# Patient Record
Sex: MALE | Race: WHITE | Employment: OTHER | URBAN - METROPOLITAN AREA
[De-identification: names, ages, dates, MRNs, and addresses within clinical notes are randomized per-mention and may not be internally consistent; named-entity substitution may affect disease eponyms.]

---

## 2017-05-24 ENCOUNTER — OFFICE VISIT (OUTPATIENT)
Dept: INTERNAL MEDICINE CLINIC | Age: 62
End: 2017-05-24

## 2017-05-24 VITALS
HEIGHT: 71 IN | OXYGEN SATURATION: 94 % | BODY MASS INDEX: 33.01 KG/M2 | DIASTOLIC BLOOD PRESSURE: 90 MMHG | WEIGHT: 235.8 LBS | HEART RATE: 70 BPM | SYSTOLIC BLOOD PRESSURE: 137 MMHG

## 2017-05-24 DIAGNOSIS — M79.2 PERIPHERAL NEUROPATHIC PAIN: ICD-10-CM

## 2017-05-24 DIAGNOSIS — E78.00 PURE HYPERCHOLESTEROLEMIA: ICD-10-CM

## 2017-05-24 DIAGNOSIS — I10 ESSENTIAL HYPERTENSION: Primary | ICD-10-CM

## 2017-05-24 DIAGNOSIS — Z00.00 PREVENTATIVE HEALTH CARE: ICD-10-CM

## 2017-05-24 DIAGNOSIS — K21.9 GASTROESOPHAGEAL REFLUX DISEASE WITHOUT ESOPHAGITIS: ICD-10-CM

## 2017-05-24 DIAGNOSIS — F51.05 INSOMNIA SECONDARY TO ANXIETY: ICD-10-CM

## 2017-05-24 DIAGNOSIS — I25.10 CORONARY ARTERY DISEASE INVOLVING NATIVE CORONARY ARTERY OF NATIVE HEART WITHOUT ANGINA PECTORIS: ICD-10-CM

## 2017-05-24 DIAGNOSIS — G47.33 OSA (OBSTRUCTIVE SLEEP APNEA): ICD-10-CM

## 2017-05-24 DIAGNOSIS — F41.9 INSOMNIA SECONDARY TO ANXIETY: ICD-10-CM

## 2017-05-24 DIAGNOSIS — E11.42 TYPE 2 DIABETES MELLITUS WITH DIABETIC POLYNEUROPATHY, WITHOUT LONG-TERM CURRENT USE OF INSULIN (HCC): ICD-10-CM

## 2017-05-24 LAB — LDL CHOLESTEROL DIRECT: 72 MG/DL

## 2017-05-24 PROCEDURE — 99204 OFFICE O/P NEW MOD 45 MIN: CPT | Performed by: INTERNAL MEDICINE

## 2017-05-24 RX ORDER — PHENOL 1.4 %
1 AEROSOL, SPRAY (ML) MUCOUS MEMBRANE DAILY
COMMUNITY
End: 2017-07-19 | Stop reason: ALTCHOICE

## 2017-05-24 RX ORDER — ATORVASTATIN CALCIUM 40 MG/1
40 TABLET, FILM COATED ORAL DAILY
Qty: 90 TABLET | Refills: 1 | Status: SHIPPED | OUTPATIENT
Start: 2017-05-24 | End: 2017-07-19 | Stop reason: SINTOL

## 2017-05-24 RX ORDER — AMITRIPTYLINE HYDROCHLORIDE 25 MG/1
50 TABLET, FILM COATED ORAL NIGHTLY
COMMUNITY
End: 2017-05-24 | Stop reason: SDUPTHER

## 2017-05-24 RX ORDER — OMEPRAZOLE 40 MG/1
40 CAPSULE, DELAYED RELEASE ORAL
Qty: 30 CAPSULE | Refills: 3 | Status: SHIPPED | OUTPATIENT
Start: 2017-05-24 | End: 2017-12-21 | Stop reason: SDUPTHER

## 2017-05-24 RX ORDER — BUSPIRONE HYDROCHLORIDE 15 MG/1
15 TABLET ORAL 3 TIMES DAILY
COMMUNITY
End: 2017-05-24

## 2017-05-24 RX ORDER — HYDROCHLOROTHIAZIDE 25 MG/1
25 TABLET ORAL DAILY
Qty: 30 TABLET | Refills: 3 | Status: ON HOLD | OUTPATIENT
Start: 2017-05-24 | End: 2017-09-08 | Stop reason: HOSPADM

## 2017-05-24 RX ORDER — AMITRIPTYLINE HYDROCHLORIDE 25 MG/1
TABLET, FILM COATED ORAL
Qty: 90 TABLET | Refills: 1 | Status: SHIPPED | OUTPATIENT
Start: 2017-05-24 | End: 2017-07-19 | Stop reason: SDUPTHER

## 2017-05-24 RX ORDER — HYDROCHLOROTHIAZIDE 25 MG/1
25 TABLET ORAL DAILY
COMMUNITY
End: 2017-05-24 | Stop reason: SDUPTHER

## 2017-05-24 RX ORDER — LISINOPRIL 30 MG/1
30 TABLET ORAL DAILY
Qty: 90 TABLET | Refills: 1 | Status: ON HOLD | OUTPATIENT
Start: 2017-05-24 | End: 2017-09-08 | Stop reason: HOSPADM

## 2017-05-24 RX ORDER — BLOOD-GLUCOSE METER
1 KIT MISCELLANEOUS DAILY
Qty: 1 KIT | Refills: 0 | Status: SHIPPED | OUTPATIENT
Start: 2017-05-24 | End: 2020-01-20

## 2017-05-24 RX ORDER — LANCETS 30 GAUGE
EACH MISCELLANEOUS
Qty: 50 EACH | Refills: 3 | Status: SHIPPED | OUTPATIENT
Start: 2017-05-24 | End: 2020-01-20

## 2017-05-24 RX ORDER — FENOFIBRATE 48 MG/1
48 TABLET, COATED ORAL DAILY
COMMUNITY
End: 2017-05-24 | Stop reason: ALTCHOICE

## 2017-05-24 RX ORDER — SPIRONOLACTONE 25 MG/1
25 TABLET ORAL EVERY MORNING
Qty: 30 TABLET | Refills: 3 | Status: ON HOLD | OUTPATIENT
Start: 2017-05-24 | End: 2017-09-08 | Stop reason: HOSPADM

## 2017-05-24 ASSESSMENT — PATIENT HEALTH QUESTIONNAIRE - PHQ9
2. FEELING DOWN, DEPRESSED OR HOPELESS: 1
1. LITTLE INTEREST OR PLEASURE IN DOING THINGS: 0
SUM OF ALL RESPONSES TO PHQ QUESTIONS 1-9: 1
SUM OF ALL RESPONSES TO PHQ9 QUESTIONS 1 & 2: 1

## 2017-05-25 ENCOUNTER — TELEPHONE (OUTPATIENT)
Dept: INTERNAL MEDICINE CLINIC | Age: 62
End: 2017-05-25

## 2017-05-25 RX ORDER — CHOLECALCIFEROL (VITAMIN D3) 125 MCG
500 CAPSULE ORAL DAILY
Qty: 90 TABLET | Refills: 1 | Status: SHIPPED | OUTPATIENT
Start: 2017-05-25 | End: 2018-06-12 | Stop reason: ALTCHOICE

## 2017-06-19 ENCOUNTER — TELEPHONE (OUTPATIENT)
Dept: INTERNAL MEDICINE CLINIC | Age: 62
End: 2017-06-19

## 2017-07-19 ENCOUNTER — OFFICE VISIT (OUTPATIENT)
Dept: INTERNAL MEDICINE CLINIC | Age: 62
End: 2017-07-19

## 2017-07-19 VITALS
HEART RATE: 43 BPM | OXYGEN SATURATION: 94 % | DIASTOLIC BLOOD PRESSURE: 91 MMHG | SYSTOLIC BLOOD PRESSURE: 129 MMHG | WEIGHT: 236 LBS | BODY MASS INDEX: 33.38 KG/M2

## 2017-07-19 DIAGNOSIS — M79.10 MYALGIA: ICD-10-CM

## 2017-07-19 DIAGNOSIS — E78.00 PURE HYPERCHOLESTEROLEMIA: ICD-10-CM

## 2017-07-19 DIAGNOSIS — Z00.00 PREVENTATIVE HEALTH CARE: ICD-10-CM

## 2017-07-19 DIAGNOSIS — I25.10 CORONARY ARTERY DISEASE INVOLVING NATIVE CORONARY ARTERY OF NATIVE HEART WITHOUT ANGINA PECTORIS: ICD-10-CM

## 2017-07-19 DIAGNOSIS — I10 ESSENTIAL HYPERTENSION: Primary | ICD-10-CM

## 2017-07-19 DIAGNOSIS — E11.42 TYPE 2 DIABETES MELLITUS WITH DIABETIC POLYNEUROPATHY, WITHOUT LONG-TERM CURRENT USE OF INSULIN (HCC): ICD-10-CM

## 2017-07-19 LAB
HEPATITIS C ANTIBODY INTERPRETATION: NORMAL
TOTAL CK: 90 U/L (ref 39–308)

## 2017-07-19 PROCEDURE — 99214 OFFICE O/P EST MOD 30 MIN: CPT | Performed by: INTERNAL MEDICINE

## 2017-07-19 RX ORDER — PRAVASTATIN SODIUM 40 MG
40 TABLET ORAL EVERY EVENING
Qty: 30 TABLET | Refills: 3 | Status: SHIPPED | OUTPATIENT
Start: 2017-07-19 | End: 2017-10-03 | Stop reason: ALTCHOICE

## 2017-07-19 RX ORDER — AMITRIPTYLINE HYDROCHLORIDE 75 MG/1
75 TABLET, FILM COATED ORAL NIGHTLY
Qty: 90 TABLET | Refills: 1 | Status: SHIPPED | OUTPATIENT
Start: 2017-07-19 | End: 2017-08-18 | Stop reason: SDUPTHER

## 2017-07-19 RX ORDER — CALCIUM CARBONATE 200(500)MG
1 TABLET,CHEWABLE ORAL
Qty: 90 TABLET | Refills: 3 | Status: SHIPPED | OUTPATIENT
Start: 2017-07-19 | End: 2017-08-31 | Stop reason: ALTCHOICE

## 2017-07-19 ASSESSMENT — ENCOUNTER SYMPTOMS
GASTROINTESTINAL NEGATIVE: 1
SHORTNESS OF BREATH: 0
RESPIRATORY NEGATIVE: 1

## 2017-07-31 ENCOUNTER — OFFICE VISIT (OUTPATIENT)
Dept: INTERNAL MEDICINE CLINIC | Age: 62
End: 2017-07-31

## 2017-07-31 VITALS
BODY MASS INDEX: 33.67 KG/M2 | DIASTOLIC BLOOD PRESSURE: 107 MMHG | WEIGHT: 238 LBS | HEART RATE: 115 BPM | SYSTOLIC BLOOD PRESSURE: 153 MMHG | OXYGEN SATURATION: 95 %

## 2017-07-31 DIAGNOSIS — G93.41 METABOLIC ENCEPHALOPATHY: ICD-10-CM

## 2017-07-31 DIAGNOSIS — I10 ESSENTIAL HYPERTENSION: Primary | ICD-10-CM

## 2017-07-31 DIAGNOSIS — T23.201A: ICD-10-CM

## 2017-07-31 PROCEDURE — 99213 OFFICE O/P EST LOW 20 MIN: CPT | Performed by: INTERNAL MEDICINE

## 2017-07-31 RX ORDER — HYDROCODONE BITARTRATE AND ACETAMINOPHEN 5; 325 MG/1; MG/1
1 TABLET ORAL EVERY 8 HOURS PRN
Qty: 30 TABLET | Refills: 0 | Status: SHIPPED | OUTPATIENT
Start: 2017-07-31 | End: 2017-08-10

## 2017-07-31 RX ORDER — ACETAMINOPHEN 500 MG
1000 TABLET ORAL EVERY 6 HOURS PRN
Qty: 225 TABLET | Refills: 3 | Status: SHIPPED | OUTPATIENT
Start: 2017-07-31 | End: 2017-09-18 | Stop reason: ALTCHOICE

## 2017-07-31 ASSESSMENT — ENCOUNTER SYMPTOMS
RESPIRATORY NEGATIVE: 1
SHORTNESS OF BREATH: 0
GASTROINTESTINAL NEGATIVE: 1

## 2017-08-11 ENCOUNTER — OFFICE VISIT (OUTPATIENT)
Dept: INTERNAL MEDICINE CLINIC | Age: 62
End: 2017-08-11

## 2017-08-11 ENCOUNTER — TELEPHONE (OUTPATIENT)
Dept: INTERNAL MEDICINE CLINIC | Age: 62
End: 2017-08-11

## 2017-08-11 VITALS — HEART RATE: 94 BPM | RESPIRATION RATE: 14 BRPM | SYSTOLIC BLOOD PRESSURE: 115 MMHG | DIASTOLIC BLOOD PRESSURE: 79 MMHG

## 2017-08-11 DIAGNOSIS — T23.201D: Primary | ICD-10-CM

## 2017-08-11 DIAGNOSIS — H81.10 BPPV (BENIGN PAROXYSMAL POSITIONAL VERTIGO), UNSPECIFIED LATERALITY: ICD-10-CM

## 2017-08-11 DIAGNOSIS — M26.609 TMJ (TEMPOROMANDIBULAR JOINT SYNDROME): ICD-10-CM

## 2017-08-11 PROCEDURE — 99214 OFFICE O/P EST MOD 30 MIN: CPT | Performed by: INTERNAL MEDICINE

## 2017-08-11 RX ORDER — MECLIZINE HYDROCHLORIDE 25 MG/1
25 TABLET ORAL 3 TIMES DAILY PRN
Qty: 15 TABLET | Refills: 0 | Status: SHIPPED | OUTPATIENT
Start: 2017-08-11 | End: 2017-08-16

## 2017-08-11 RX ORDER — TRAMADOL HYDROCHLORIDE 50 MG/1
TABLET ORAL
Qty: 56 TABLET | Refills: 1 | Status: ON HOLD | OUTPATIENT
Start: 2017-08-11 | End: 2017-09-08 | Stop reason: HOSPADM

## 2017-08-11 RX ORDER — CYCLOBENZAPRINE HCL 10 MG
10 TABLET ORAL NIGHTLY
Qty: 30 TABLET | Refills: 2 | Status: SHIPPED | OUTPATIENT
Start: 2017-08-11 | End: 2017-10-03 | Stop reason: ALTCHOICE

## 2017-08-11 RX ORDER — ACETAMINOPHEN 500 MG
1000 TABLET ORAL EVERY 6 HOURS PRN
Qty: 225 TABLET | Refills: 3 | Status: SHIPPED | OUTPATIENT
Start: 2017-08-11 | End: 2017-08-14 | Stop reason: SDUPTHER

## 2017-08-11 ASSESSMENT — ENCOUNTER SYMPTOMS
COUGH: 0
RESPIRATORY NEGATIVE: 1
SWOLLEN GLANDS: 0
BURN: 1
SORE THROAT: 0
VOMITING: 0
VISUAL CHANGE: 1
GASTROINTESTINAL NEGATIVE: 1
NAUSEA: 0
CHANGE IN BOWEL HABIT: 0
SHORTNESS OF BREATH: 0
ABDOMINAL PAIN: 0

## 2017-08-14 ENCOUNTER — OFFICE VISIT (OUTPATIENT)
Dept: INTERNAL MEDICINE CLINIC | Age: 62
End: 2017-08-14

## 2017-08-14 VITALS
OXYGEN SATURATION: 100 % | BODY MASS INDEX: 33.38 KG/M2 | SYSTOLIC BLOOD PRESSURE: 127 MMHG | WEIGHT: 236 LBS | DIASTOLIC BLOOD PRESSURE: 70 MMHG | HEART RATE: 94 BPM

## 2017-08-14 DIAGNOSIS — L03.213 PERIORBITAL CELLULITIS OF LEFT EYE: Primary | ICD-10-CM

## 2017-08-14 PROCEDURE — 99213 OFFICE O/P EST LOW 20 MIN: CPT | Performed by: NURSE PRACTITIONER

## 2017-08-14 RX ORDER — CLINDAMYCIN HYDROCHLORIDE 300 MG/1
300 CAPSULE ORAL 3 TIMES DAILY
Qty: 30 CAPSULE | Refills: 0 | Status: SHIPPED | OUTPATIENT
Start: 2017-08-14 | End: 2017-08-24

## 2017-08-14 RX ORDER — SAW/VIT E/SOD SEL/LYC/BETA/PYG 160-100
1 TABLET ORAL DAILY
COMMUNITY
Start: 2017-08-14 | End: 2017-10-03 | Stop reason: ALTCHOICE

## 2017-08-14 RX ORDER — METHYLPREDNISOLONE 4 MG/1
TABLET ORAL
Qty: 1 KIT | Refills: 0 | Status: SHIPPED | OUTPATIENT
Start: 2017-08-14 | End: 2017-08-20

## 2017-08-18 ENCOUNTER — OFFICE VISIT (OUTPATIENT)
Dept: INTERNAL MEDICINE CLINIC | Age: 62
End: 2017-08-18

## 2017-08-18 VITALS
BODY MASS INDEX: 33.79 KG/M2 | HEART RATE: 97 BPM | WEIGHT: 236 LBS | HEIGHT: 70 IN | OXYGEN SATURATION: 94 % | DIASTOLIC BLOOD PRESSURE: 106 MMHG | SYSTOLIC BLOOD PRESSURE: 154 MMHG

## 2017-08-18 DIAGNOSIS — F51.05 INSOMNIA SECONDARY TO ANXIETY: ICD-10-CM

## 2017-08-18 DIAGNOSIS — L03.213 PRESEPTAL CELLULITIS OF LEFT LOWER EYELID: Primary | ICD-10-CM

## 2017-08-18 DIAGNOSIS — F41.9 INSOMNIA SECONDARY TO ANXIETY: ICD-10-CM

## 2017-08-18 PROCEDURE — 99214 OFFICE O/P EST MOD 30 MIN: CPT | Performed by: INTERNAL MEDICINE

## 2017-08-18 RX ORDER — AMITRIPTYLINE HYDROCHLORIDE 150 MG/1
150 TABLET, FILM COATED ORAL NIGHTLY
Qty: 30 TABLET | Refills: 3 | Status: SHIPPED | OUTPATIENT
Start: 2017-08-18 | End: 2017-10-13 | Stop reason: ALTCHOICE

## 2017-08-18 ASSESSMENT — ENCOUNTER SYMPTOMS
SHORTNESS OF BREATH: 0
GASTROINTESTINAL NEGATIVE: 1
EYE DISCHARGE: 0
EYE REDNESS: 0
RESPIRATORY NEGATIVE: 1
BURN: 1

## 2017-08-23 ENCOUNTER — TELEPHONE (OUTPATIENT)
Dept: INTERNAL MEDICINE CLINIC | Age: 62
End: 2017-08-23

## 2017-09-01 PROBLEM — L03.113 CELLULITIS OF RIGHT UPPER EXTREMITY: Status: ACTIVE | Noted: 2017-09-01

## 2017-10-03 ENCOUNTER — TELEPHONE (OUTPATIENT)
Dept: INTERNAL MEDICINE CLINIC | Age: 62
End: 2017-10-03

## 2017-10-03 PROBLEM — N17.9 ACUTE RENAL FAILURE SUPERIMPOSED ON STAGE 3 CHRONIC KIDNEY DISEASE (HCC): Status: ACTIVE | Noted: 2017-10-03

## 2017-10-03 PROBLEM — E66.01 MORBID OBESITY DUE TO EXCESS CALORIES (HCC): Status: ACTIVE | Noted: 2017-10-03

## 2017-10-03 PROBLEM — N18.30 ACUTE RENAL FAILURE SUPERIMPOSED ON STAGE 3 CHRONIC KIDNEY DISEASE (HCC): Status: ACTIVE | Noted: 2017-10-03

## 2017-10-03 NOTE — TELEPHONE ENCOUNTER
Patient walked in at 1:20pm asking to see Janessa Gan. After finding out the patient has been feeling dizzy for over a week when he stands up, and that he was discharged from the hospital on 09/21/17 I went back and asked Gamal Heaton CNP if she felt comfortable seeing him tomorrow. Patient was also advised to go to the ER if things got worse. After speaking with Monica Pearce she felt he needs to go to the ER. (All physician schedules were filled today). Patient was advised to call if he needs anything else. Please advise.

## 2017-10-04 ENCOUNTER — TELEPHONE (OUTPATIENT)
Dept: CARDIOLOGY CLINIC | Age: 62
End: 2017-10-04

## 2017-10-13 ENCOUNTER — OFFICE VISIT (OUTPATIENT)
Dept: INTERNAL MEDICINE CLINIC | Age: 62
End: 2017-10-13

## 2017-10-13 VITALS
WEIGHT: 230 LBS | HEART RATE: 88 BPM | RESPIRATION RATE: 16 BRPM | BODY MASS INDEX: 31.19 KG/M2 | SYSTOLIC BLOOD PRESSURE: 110 MMHG | DIASTOLIC BLOOD PRESSURE: 70 MMHG

## 2017-10-13 DIAGNOSIS — J41.0 SIMPLE CHRONIC BRONCHITIS (HCC): ICD-10-CM

## 2017-10-13 DIAGNOSIS — H81.10 BPPV (BENIGN PAROXYSMAL POSITIONAL VERTIGO), UNSPECIFIED LATERALITY: Primary | ICD-10-CM

## 2017-10-13 DIAGNOSIS — D50.0 IRON DEFICIENCY ANEMIA DUE TO CHRONIC BLOOD LOSS: ICD-10-CM

## 2017-10-13 DIAGNOSIS — I10 ESSENTIAL HYPERTENSION: ICD-10-CM

## 2017-10-13 LAB
FERRITIN: 275 NG/ML (ref 30–400)
IRON SATURATION: 42 % (ref 20–50)
IRON: 122 UG/DL (ref 59–158)
TOTAL IRON BINDING CAPACITY: 293 UG/DL (ref 260–445)
VITAMIN B-12: 392 PG/ML (ref 211–911)

## 2017-10-13 PROCEDURE — G0008 ADMIN INFLUENZA VIRUS VAC: HCPCS | Performed by: INTERNAL MEDICINE

## 2017-10-13 PROCEDURE — 90686 IIV4 VACC NO PRSV 0.5 ML IM: CPT | Performed by: INTERNAL MEDICINE

## 2017-10-13 PROCEDURE — 99214 OFFICE O/P EST MOD 30 MIN: CPT | Performed by: INTERNAL MEDICINE

## 2017-10-13 RX ORDER — METHYLPREDNISOLONE 4 MG/1
8 TABLET ORAL EVERY MORNING
Qty: 14 TABLET | Refills: 0 | Status: ON HOLD | OUTPATIENT
Start: 2017-10-13 | End: 2017-10-17 | Stop reason: HOSPADM

## 2017-10-13 RX ORDER — PNV NO.95/FERROUS FUM/FOLIC AC 28MG-0.8MG
1 TABLET ORAL 2 TIMES DAILY WITH MEALS
Qty: 60 TABLET | Refills: 5 | Status: SHIPPED | OUTPATIENT
Start: 2017-10-13 | End: 2018-02-09 | Stop reason: ALTCHOICE

## 2017-10-13 RX ORDER — ALBUTEROL SULFATE 90 UG/1
2 AEROSOL, METERED RESPIRATORY (INHALATION) EVERY 4 HOURS PRN
Qty: 2 INHALER | Refills: 5 | Status: ON HOLD | OUTPATIENT
Start: 2017-10-13 | End: 2017-12-16

## 2017-10-13 RX ORDER — ZOLPIDEM TARTRATE 10 MG/1
10 TABLET ORAL NIGHTLY PRN
Qty: 30 TABLET | Refills: 2 | Status: ON HOLD | OUTPATIENT
Start: 2017-10-13 | End: 2017-11-08 | Stop reason: CLARIF

## 2017-10-13 RX ORDER — BUDESONIDE AND FORMOTEROL FUMARATE DIHYDRATE 160; 4.5 UG/1; UG/1
2 AEROSOL RESPIRATORY (INHALATION) 2 TIMES DAILY
Qty: 1 INHALER | Refills: 3 | Status: ON HOLD | OUTPATIENT
Start: 2017-10-13 | End: 2017-12-16

## 2017-10-13 ASSESSMENT — ENCOUNTER SYMPTOMS
EYE DISCHARGE: 0
SWOLLEN GLANDS: 0
CHANGE IN BOWEL HABIT: 0
HEMOPTYSIS: 0
COUGH: 1
VOMITING: 0
GASTROINTESTINAL NEGATIVE: 1
VISUAL CHANGE: 0
SHORTNESS OF BREATH: 0
EYE REDNESS: 0
NAUSEA: 0
SORE THROAT: 0
ABDOMINAL PAIN: 0
ORTHOPNEA: 0
SPUTUM PRODUCTION: 1

## 2017-10-13 NOTE — LETTER
MEDICATION AGREEMENT     Fillmoretha Guerra  [birth year]      For certain conditions, multiple classes of medications may be used to help better manage your symptoms, and to improve your ability to function at home, work and in social settings. However, these medications do have risks, which will be discussed with you, including addiction and dependency. The following prescribed medications need frequent monitoring and will require you to partner and assist in your healthcare. Medication  Dose, instructions and quantity as indicated on current prescription bottle Diagnosis/Reason(s) for Taking Category    Ambien 10 mg tablets # 30   take 1 tablet by mouth nightly as needed for sleep    Insomnia   sedative                           Benefits and goals of Controlled Substance Medications: There are two potential goals for your treatment: (1) decreased pain and suffering (2) improved daily life functions. There are many possible treatments for your chronic condition(s), and, in addition to controlled substance medications, we will try alternatives such as physical therapy, yoga, massage, home daily exercise, meditation, relaxation techniques, injections, chiropractic manipulations, surgery, cognitive therapy, hypnosis and many medications that are not habit-forming. Use of controlled substance medications may be helpful, but they are unlikely to resolve all of your symptoms or restore all function. Risks of Controlled Substance Medications:    Opioid pain medications: These medications can lead to problems such as addiction/dependence, sedation, lightheadedness/dizziness, memory issues, falls, constipation, nausea, or vomiting. They may also impair the ability to drive or operate machinery. Additionally, these medications may lower testosterone levels, leading to loss of bone strength, stamina and sex drive.   They may cause problems with breathing, sleep apnea and reduced coughing, which are especially dangerous for patients with lung disease. Overdose or dangerous interactions with alcohol and other medications may occur, leading to death. Hyperalgesia may develop, in which patients receiving opioids for the treatment of pain may actually become more sensitive to certain painful stimuli, and in some cases, experience pain from ordinarily non-painful stimuli. Women between the ages of 14-53 who could become pregnant should carefully weigh the risks and benefits of opioids with their physicians, as these medications increase the risk of pregnancy complications, including miscarriage,  delivery and stillbirth. It is also possible for babies to be born addicted to opioids. Opioid dependence withdrawal symptoms may include; feelings of uneasiness, increased pain, irritability, belly pain, diarrhea, sweats and goose-flesh. Benzodiazepines and non-benzodiazepine sleep medications: These medications can lead to problems such as addiction/dependence, sedation, fatigue, lightheadedness, dizziness, incoordination, falls, depression, hallucinations, and impaired judgment, memory and concentration. The ability to drive and operate machinery may also be affected. Abnormal sleep-related behaviors have been reported, including sleep walking, driving, making telephone calls, eating, or having sex while not fully awake. These medications can suppress breathing and worsen sleep apnea, particularly when combined with alcohol or other sedating medications, potentially leading to death. Dependence withdrawal symptoms may include tremors, anxiety, hallucinations and seizures. Stimulants:  Common adverse effects include addiction/dependence, increased blood pressure and heart rate, decreased appetite, nausea, involuntary weight loss, insomnia, irritability, and headaches.   These risks may increase when these medications are combined with other stimulants, such as caffeine pills or energy drinks, certain weight loss supplements and oral decongestants. Dependence withdrawal symptoms may include depressed mood, loss of interest, suicidal thoughts, anxiety, fatigue, appetite changes and agitation. Testosterone replacement therapy:  Potential side effects include increased risk of stroke and heart attack, blood clots, increased blood pressure, increased cholesterol, enlarged prostate, sleep apnea, irritability/aggression and other mood disorders, and decreased fertility. Other:     1. I understand that I have the following responsibilities:  · I will take medications at the dose and frequency prescribed. · I will not increase or change how I take my medications without the approval of the health care provider who signs this Medication Agreement. · I will arrange for refills at the prescribed interval ONLY during regular office hours. I will not ask for refills earlier than agreed, after-hours, on holidays or on weekends. · I will obtain all refills for these medications at  ·  ____________________________________  pharmacy (phone number  ·  ________________________), with full consent for my provider and pharmacist to exchange information in writing or verbally. · I will not request any pain medications or controlled substances from other providers and will inform this provider of all other medications I am taking. · I will inform my other health care providers that I am taking these medications and of the existence of this [address]. In the event of an emergency, I will provide the same information to the emergency department providers. · I will protect my prescriptions and medications. I understand that lost or misplaced prescriptions will not be replaced. · I will keep medications only for my own use and will not share them with others. I will keep all medications away from children. · My behavior is inconsistent with the responsibilities outlined above, which may also result in my being prevented from receiving further care from this office. · Other:____________________________________________________________________    AGREEMENT:    I have read the above and have had all of my questions answered. For chronic disease management, I know that my symptoms can be managed with many types of treatments. A chronic medication trial may be part of my treatment, but I must be an active participant in my care. Medication therapy is only one part of my symptom management plan. In some cases, there may be limited scientific evidence to support the chronic use of certain medications to improve symptoms and daily function. Furthermore, in certain circumstances, there may be scientific information that suggests that use of chronic controlled substances may actually worsen my symptoms and increase my risk of unintentional death directly related to this medication therapy. I know that if my provider feels my risk from controlled medications is greater than my benefit, I will have my controlled substance medication(s) compassionately lowered or removed altogether. I agree to a controlled substance medication trial.      I further agree to allow this office to contact family or friends if there are concerns about my safety and use of the controlled medications. I have agreed to use the following medications above as instructed by my physician and as stated in this [address].      Patient Signature:  ______________________  Date:10/13/2017 or _____________    Provider Signature:______________________  Date:10/13/2017 or _____________

## 2017-10-13 NOTE — PROGRESS NOTES
Subjective:      Patient ID: Obed Nguyen is a 58 y.o. male. Patient was in the hospital because of dizziness and LOC, he had a MRSA skin infection, he is still having dizziness and also c/o exertional dyspnea for the last 4 weeks, he was poorly compliant with his diuretics before and now he is unclear about which BP meds he is using, he still has not seen GI for the colonoscopy as yet      Dizziness   This is a recurrent problem. The current episode started 1 to 4 weeks ago. The problem occurs 2 to 4 times per day. The problem has been unchanged. Associated symptoms include coughing and vertigo. Pertinent negatives include no abdominal pain, anorexia, arthralgias, change in bowel habit, chest pain, chills, congestion, diaphoresis, fatigue, fever, headaches, joint swelling, myalgias, nausea, numbness, rash, sore throat, swollen glands, urinary symptoms, visual change, vomiting or weakness. The symptoms are aggravated by standing. He has tried lying down and position changes (meclizine) for the symptoms. The treatment provided significant relief. Shortness of Breath   This is a new problem. The current episode started 1 to 4 weeks ago. The problem occurs daily. The problem has been unchanged. Associated symptoms include sputum production. Pertinent negatives include no abdominal pain, chest pain, fever, headaches, hemoptysis, leg pain, leg swelling, orthopnea, PND, rash, sore throat, swollen glands or vomiting. The symptoms are aggravated by any activity. He has tried nothing for the symptoms. His past medical history is significant for COPD. Review of Systems   Constitutional: Negative. Negative for chills, diaphoresis, fatigue and fever. HENT: Positive for tinnitus. Negative for congestion and sore throat. Eyes: Negative for discharge, redness and visual disturbance. Respiratory: Positive for cough and sputum production. Negative for hemoptysis and shortness of breath.     Cardiovascular: Negative. Negative for chest pain, orthopnea, leg swelling and PND. Gastrointestinal: Negative. Negative for abdominal pain, anorexia, change in bowel habit, nausea and vomiting. Endocrine: Negative. Negative for polydipsia, polyphagia and polyuria. Musculoskeletal: Negative for arthralgias, joint swelling and myalgias. Skin: Positive for wound. Negative for rash. Allergic/Immunologic: Negative for immunocompromised state. Neurological: Positive for dizziness and vertigo. Negative for weakness, numbness and headaches. Hematological: Negative. Psychiatric/Behavioral: Positive for sleep disturbance. Negative for behavioral problems and dysphoric mood. Past Medical History:   Diagnosis Date    CAD (coronary artery disease)     [facility] [address] [facility]     [facility]     Hyperlipidemia     Hypertension     Kidney stone     MRSA infection        I personally reviewed active meds and allergies with the patient today      Objective:   Physical Exam   Constitutional: He is oriented to person, place, and time. He appears well-developed. No distress. + obese habitus   HENT:   Head: Normocephalic and atraumatic. Eyes: EOM are normal. Pupils are equal, round, and reactive to light. Left eye exhibits hordeolum. Left eye exhibits no chemosis and no discharge. Right conjunctiva is not injected. Left conjunctiva is not injected. No scleral icterus. Neck: No JVD present. Cardiovascular: Normal rate, regular rhythm and normal heart sounds. Exam reveals no gallop. No murmur heard. Pulmonary/Chest: Effort normal and breath sounds normal. No respiratory distress. He has no rales. Musculoskeletal: He exhibits no edema. Neurological: He is alert and oriented to person, place, and time. Coordination normal.   Skin: Skin is warm and dry. Burn noted. No rash noted. He is not diaphoretic. There is erythema. Psychiatric: He has a normal mood and affect. His behavior is normal.   Vitals reviewed. /70 (Site: Left Arm, Position: Sitting, Cuff Size: Large Adult)   Pulse 88 Comment: Regular  Resp 16   Wt 230 lb (104.3 kg)   BMI 31.19 kg/m²     Assessment:           ICD-10-CM ICD-9-CM    1. BPPV (benign paroxysmal positional vertigo), unspecified laterality H81.10 386.11 [facility]   2. Iron deficiency anemia due to chronic blood loss D50.0 280.0 VITAMIN B12      IRON AND TIBC      FERRITIN   3. Simple chronic bronchitis (HCC) J41.0 491.0 FULL PFT STUDY   4. Essential hypertension I10 401.9            Plan:       steroid burst, prn Albuterol, daily Symbicort   see Vestibular PT for Ep[jair  Start Iron Rx  Vaccinate today  D/c Elavil and reduce Ambien to 10 mg  See GI for Colonoscopy  Get PFTs    F/u in 4 weeks with all physical medications     Orders Placed This Encounter   Procedures    INFLUENZA, QUADV, 3 YRS AND OLDER, IM, PF, PREFILL SYR OR SDV, 0.5ML (FLUZONE QUADV, PF)    VITAMIN B12    IRON AND TIBC    FERRITIN    [facility]    FULL PFT STUDY       Current Outpatient Prescriptions   Medication Sig Dispense Refill    Ferrous Sulfate (IRON) 325 (65 Fe) MG TABS Take 1 tablet by mouth 2 times daily (with meals) 60 tablet 5    methylPREDNISolone (MEDROL) 4 MG tablet Take 2 tablets by mouth every morning for 7 days 14 tablet 0    albuterol sulfate  (90 Base) MCG/ACT inhaler Inhale 2 puffs into the lungs every 4 hours as needed for Wheezing or Shortness of Breath Use inhaler with a spacer device 2 Inhaler 5    Spacer/Aero-Holding Chambers (E-Z SPACER) MITZI 1 Device by Does not apply route daily as needed (shortness of breath) 1 Device 0    budesonide-formoterol (SYMBICORT) 160-4.5 MCG/ACT AERO Inhale 2 puffs into the lungs 2 times daily 1 Inhaler 3    zolpidem (AMBIEN) 10 MG tablet Take 1 tablet by mouth nightly as needed for Sleep 30 tablet 2    nitroGLYCERIN (NITROSTAT) 0.4 MG SL tablet up to max of 3 total doses.

## 2017-10-15 PROBLEM — R07.9 CHEST PAIN: Status: ACTIVE | Noted: 2017-10-15

## 2017-10-19 ENCOUNTER — TELEPHONE (OUTPATIENT)
Dept: CARDIOLOGY CLINIC | Age: 62
End: 2017-10-19

## 2017-10-19 NOTE — TELEPHONE ENCOUNTER
Please call patient and schedule (Dbl Bk) him tomorrow at 1000 only per Dr. Ron Farmer. Thanks.        Pt has presented repeatedly to the ED  Normal Stress 10/16, 5/20  Normal CXR 10/3, 10/14 and 10/19  Normal ECHO 9/19  Repeated troponin's negative (15x)  Mg a little low today   Looks like PFTs ordered on 10/13  MRI brain 9/21/17 negative   CT head 9/19 negative   Carotid duplex 9/19 negative

## 2017-10-19 NOTE — TELEPHONE ENCOUNTER
Patient with multiple admissions to hospital for CP and was seen in the ED again today. Dr. Elbert Dykes spoke with ED physician, states to have patient see him in office tomorrow.  I called patient to schedule appointment, no answer, LM for him to call office to schedule appointment for tomorrow with Dr. Elbert Dykes per Dr. Elbert Dykes request.

## 2017-11-08 PROBLEM — R55 SYNCOPE: Status: ACTIVE | Noted: 2017-11-08

## 2017-11-08 PROBLEM — I95.1 ORTHOSTATIC HYPOTENSION: Status: ACTIVE | Noted: 2017-11-08

## 2017-12-06 PROBLEM — R80.9 PROTEINURIA: Status: ACTIVE | Noted: 2017-12-06

## 2017-12-06 PROBLEM — R42 ORTHOSTATIC DIZZINESS: Status: ACTIVE | Noted: 2017-12-06

## 2017-12-06 PROBLEM — E11.9 DMII (DIABETES MELLITUS, TYPE 2) (HCC): Status: ACTIVE | Noted: 2017-05-24

## 2017-12-06 PROBLEM — E27.40 ADRENAL INSUFFICIENCY (HCC): Status: ACTIVE | Noted: 2017-12-06

## 2017-12-18 PROBLEM — R07.89 ATYPICAL CHEST PAIN: Status: ACTIVE | Noted: 2017-12-18

## 2017-12-20 RX ORDER — FENOFIBRATE 145 MG/1
145 TABLET, COATED ORAL DAILY
Qty: 30 TABLET | Refills: 3 | Status: ON HOLD | OUTPATIENT
Start: 2017-12-20 | End: 2018-04-03 | Stop reason: HOSPADM

## 2017-12-20 NOTE — TELEPHONE ENCOUNTER
Left message for pt to call back. Per [facility] pt should start taking fenofibrate due to lipid panel.  RX sent to pharmacy

## 2017-12-21 ENCOUNTER — OFFICE VISIT (OUTPATIENT)
Dept: INTERNAL MEDICINE CLINIC | Age: 62
End: 2017-12-21

## 2017-12-21 VITALS
HEART RATE: 92 BPM | SYSTOLIC BLOOD PRESSURE: 138 MMHG | BODY MASS INDEX: 31.38 KG/M2 | WEIGHT: 225 LBS | RESPIRATION RATE: 16 BRPM | DIASTOLIC BLOOD PRESSURE: 82 MMHG

## 2017-12-21 DIAGNOSIS — R07.89 OTHER CHEST PAIN: Primary | ICD-10-CM

## 2017-12-21 DIAGNOSIS — E78.2 MIXED HYPERLIPIDEMIA: ICD-10-CM

## 2017-12-21 DIAGNOSIS — K21.9 GASTROESOPHAGEAL REFLUX DISEASE, ESOPHAGITIS PRESENCE NOT SPECIFIED: ICD-10-CM

## 2017-12-21 DIAGNOSIS — I25.10 CORONARY ARTERY DISEASE INVOLVING NATIVE CORONARY ARTERY OF NATIVE HEART WITHOUT ANGINA PECTORIS: ICD-10-CM

## 2017-12-21 DIAGNOSIS — E11.9 TYPE 2 DIABETES MELLITUS WITHOUT COMPLICATION, WITHOUT LONG-TERM CURRENT USE OF INSULIN (HCC): ICD-10-CM

## 2017-12-21 DIAGNOSIS — I10 ESSENTIAL HYPERTENSION: ICD-10-CM

## 2017-12-21 PROCEDURE — 1111F DSCHRG MED/CURRENT MED MERGE: CPT | Performed by: INTERNAL MEDICINE

## 2017-12-21 PROCEDURE — 99214 OFFICE O/P EST MOD 30 MIN: CPT | Performed by: INTERNAL MEDICINE

## 2017-12-21 RX ORDER — OMEPRAZOLE 40 MG/1
40 CAPSULE, DELAYED RELEASE ORAL
Qty: 90 CAPSULE | Refills: 2 | Status: SHIPPED | OUTPATIENT
Start: 2017-12-21 | End: 2018-03-28

## 2017-12-21 RX ORDER — TRAMADOL HYDROCHLORIDE 50 MG/1
50 TABLET ORAL EVERY 8 HOURS PRN
Qty: 90 TABLET | Refills: 1 | Status: ON HOLD | OUTPATIENT
Start: 2017-12-21 | End: 2018-01-04 | Stop reason: HOSPADM

## 2017-12-21 RX ORDER — MIRTAZAPINE 15 MG/1
15 TABLET, FILM COATED ORAL NIGHTLY
Qty: 30 TABLET | Refills: 3 | Status: SHIPPED | OUTPATIENT
Start: 2017-12-21 | End: 2018-01-11 | Stop reason: SDUPTHER

## 2017-12-21 NOTE — LETTER
stimulants, such as caffeine pills or energy drinks, certain weight loss supplements and oral decongestants. Dependence withdrawal symptoms may include depressed mood, loss of interest, suicidal thoughts, anxiety, fatigue, appetite changes and agitation. Testosterone replacement therapy:  Potential side effects include increased risk of stroke and heart attack, blood clots, increased blood pressure, increased cholesterol, enlarged prostate, sleep apnea, irritability/aggression and other mood disorders, and decreased fertility. Other:     1. I understand that I have the following responsibilities:  · I will take medications at the dose and frequency prescribed. · I will not increase or change how I take my medications without the approval of the health care provider who signs this Medication Agreement. · I will arrange for refills at the prescribed interval ONLY during regular office hours. I will not ask for refills earlier than agreed, after-hours, on holidays or on weekends. · I will obtain all refills for these medications at  ·  ____________________________________  pharmacy (phone number  ·  ________________________), with full consent for my provider and pharmacist to exchange information in writing or verbally. · I will not request any pain medications or controlled substances from other providers and will inform this provider of all other medications I am taking. · I will inform my other health care providers that I am taking these medications and of the existence of this [address]. In the event of an emergency, I will provide the same information to the emergency department providers. · I will protect my prescriptions and medications. I understand that lost or misplaced prescriptions will not be replaced. · I will keep medications only for my own use and will not share them with others. I will keep all medications away from children. · I agree to participate in any medical, psychological or psychiatric assessments recommended by my provider. · I will actively participate in any program designed to improve function, including social, physical, psychological and daily or work activities. 2. I will not use illegal or street drugs or another person's prescription. If I have an addiction problem with drugs or alcohol and my provider asks me to enter a program to address this issue, I agree to follow through. Such programs may include:  · 12-Step program and securing a sponsor  · Individual counseling   · Inpatient or outpatient treatment  · Other:_____________________________________________________________________________________________________________________________________________    If in treatment, I will request that a copy of the programs initial evaluation and treatment recommendations be sent to this provider and will not expect refills until that is received. I will also request written monthly updates be sent to this provider to verify my continuing treatment. 3. I will consent to drug screening upon my providers request to assure I am only taking the prescribed drugs, described in this MEDICATION AGREEMENT. I understand that a drug screen is a laboratory test in which a sample of my urine, blood or saliva is checked to see what drugs I have been taking. 4. I agree that I will treat the providers and staff at this office with respect at all times. I will keep all of my scheduled appointments, but if I need to cancel my appointment, I will do so a minimum of 24 hours before it is scheduled. 5. I understand that this provider may stop prescribing the medications listed if:  · I do not show any improvement in pain, or my activity has not improved. · I develop rapid tolerance or loss of improvement, as described in my treatment plan. · I develop significant side effects from the medication. · My behavior is inconsistent with the responsibilities outlined above, which may also result in my being prevented from receiving further care from this office. · Other:____________________________________________________________________    AGREEMENT:    I have read the above and have had all of my questions answered. For chronic disease management, I know that my symptoms can be managed with many types of treatments. A chronic medication trial may be part of my treatment, but I must be an active participant in my care. Medication therapy is only one part of my symptom management plan. In some cases, there may be limited scientific evidence to support the chronic use of certain medications to improve symptoms and daily function. Furthermore, in certain circumstances, there may be scientific information that suggests that use of chronic controlled substances may actually worsen my symptoms and increase my risk of unintentional death directly related to this medication therapy. I know that if my provider feels my risk from controlled medications is greater than my benefit, I will have my controlled substance medication(s) compassionately lowered or removed altogether. I agree to a controlled substance medication trial.      I further agree to allow this office to contact family or friends if there are concerns about my safety and use of the controlled medications. I have agreed to use the following medications above as instructed by my physician and as stated in this [address].      Patient Signature:  ______________________  Date:12/21/2017 or _____________    Provider Signature:______________________  Date:12/21/2017 or _____________

## 2017-12-22 ASSESSMENT — ENCOUNTER SYMPTOMS
CONSTIPATION: 1
EYE DISCHARGE: 0
VOMITING: 0
DIARRHEA: 0
ABDOMINAL PAIN: 0
COUGH: 0
EYE REDNESS: 0
BLOOD IN STOOL: 0
WHEEZING: 0
SHORTNESS OF BREATH: 0

## 2017-12-22 NOTE — PROGRESS NOTES
Subjective:      Patient ID: Aidee Guerra is a 58 y.o. male. Hospital f/u for chest pain and ALEA / dehydration, cardiac work up was negative, his CAD was stable on angiography, the chest pain is non-exertional, central and dull, worse at night and sometimes wakes him from sleep, he ran out of the PPI,  The ALEA resolved with hydration and he's now off the thiazide and Aldactone, he says he feels fine today except that his sleep is still poor and the Ambien is no longer effective,         Review of Systems   Constitutional: Negative. Negative for activity change, appetite change, fever and unexpected weight change. HENT: Positive for postnasal drip and tinnitus. Eyes: Negative for discharge, redness and visual disturbance. Respiratory: Negative for cough, shortness of breath and wheezing. Cardiovascular: Positive for chest pain. Negative for leg swelling. Gastrointestinal: Positive for constipation. Negative for abdominal pain, blood in stool, diarrhea and vomiting. Endocrine: Negative. Negative for polydipsia, polyphagia and polyuria. Genitourinary: Negative for difficulty urinating. Musculoskeletal: Positive for arthralgias. Negative for joint swelling. Skin: Negative for rash and wound. Allergic/Immunologic: Positive for environmental allergies. Negative for immunocompromised state. Neurological: Negative for syncope, weakness and headaches. Hematological: Negative. Psychiatric/Behavioral: Positive for sleep disturbance. Negative for behavioral problems and dysphoric mood. The patient is nervous/anxious.          Past Medical History:   Diagnosis Date    CAD (coronary artery disease)     Chest pain 10/15/2017    COPD (chronic obstructive pulmonary disease) (HCC)     Diabetes mellitus ([facility] [place] [address]     Hyperlipidemia     Hypertension     Kidney stone     MRSA infection        I personally reviewed active meds and allergies with the patient today      Objective:   Physical Exam Constitutional: He is oriented to person, place, and time. He appears well-developed. No distress. + obese habitus   HENT:   Head: Normocephalic and atraumatic. Eyes: EOM are normal. Pupils are equal, round, and reactive to light. No scleral icterus. Neck: No JVD present. Cardiovascular: Normal rate, regular rhythm and normal heart sounds. Exam reveals no gallop. No murmur heard. Pulmonary/Chest: Effort normal and breath sounds normal. No respiratory distress. He has no rales. Musculoskeletal: He exhibits no edema. Neurological: He is alert and oriented to person, place, and time. No sensory deficit. Coordination normal.   Normal monofilament testing of both feet   Skin: Skin is warm and dry. No rash noted. He is not diaphoretic. No foot ulcers   Psychiatric: He has a normal mood and affect. His behavior is normal.   Vitals reviewed. /82 (Site: Right Arm, Position: Sitting, Cuff Size: Large Adult)   Pulse 92 Comment: Regular  Resp 16   Wt 225 lb (102.1 kg)   BMI 31.38 kg/m²     Assessment:           ICD-10-CM ICD-9-CM    1. Other chest pain R07.89 786.59    2. Essential hypertension I10 401.9    3. Gastroesophageal reflux disease, esophagitis presence not specified K21.9 530.81    4. Coronary artery disease involving native coronary artery of native heart without angina pectoris I25.10 414.01    5.  Type 2 diabetes mellitus without complication, without long-term current use of insulin (HCC) E11.9 250.00  DIABETES FOOT EXAM   6. Mixed hyperlipidemia E78.2 272.2       chest pain is consistent with GERD   HTN and DM 2 well controlled      Plan:        resume the PPI and start Remron 15 qhs  Continue prn Tylenol / Tramadol for joint pain  F/u with Cardiology      Orders Placed This Encounter   Procedures     DIABETES FOOT EXAM       Current Outpatient Prescriptions   Medication Sig Dispense Refill    omeprazole (PRILOSEC) 40 MG delayed release capsule Take 1 capsule by mouth every morning (before breakfast) 90 capsule 2    mirtazapine (REMERON) 15 MG tablet Take 1 tablet by mouth nightly 30 tablet 3    traMADol (ULTRAM) 50 MG tablet Take 1 tablet by mouth every 8 hours as needed for Pain . 90 tablet 1    metoprolol tartrate (LOPRESSOR) 25 MG tablet Take 0.5 tablets by mouth 2 times daily 60 tablet 0    magnesium oxide (MAG-OX) 400 (241.3 Mg) MG TABS tablet Take 1 tablet by mouth daily 30 tablet 0    budesonide-formoterol (SYMBICORT) 160-4.5 MCG/ACT AERO Inhale 2 puffs into the lungs 2 times daily 1 Inhaler 0    albuterol sulfate  (90 Base) MCG/ACT inhaler Inhale 2 puffs into the lungs every 4 hours as needed for Wheezing or Shortness of Breath Use inhaler with a spacer device 2 Inhaler 0    hydrALAZINE (APRESOLINE) 50 MG tablet Take 1 tablet by mouth 3 times daily 90 tablet 0    escitalopram (LEXAPRO) 10 MG tablet Take 1 tablet by mouth daily 30 tablet 3    fluticasone (FLONASE) 50 MCG/ACT nasal spray 1 spray by Nasal route daily as needed for Rhinitis      Ferrous Sulfate (IRON) 325 (65 Fe) MG TABS Take 1 tablet by mouth 2 times daily (with meals) 60 tablet 5    Spacer/Aero-Holding Chambers (E-Z SPACER) MITZI 1 Device by Does not apply route daily as needed (shortness of breath) 1 Device 0    nitroGLYCERIN (NITROSTAT) 0.4 MG SL tablet up to max of 3 total doses.  If no relief after 1 dose, call 911. 25 tablet 3    atorvastatin (LIPITOR) 40 MG tablet Take 40 mg by mouth daily      glucose blood VI test strips (FREESTYLE TEST STRIPS) strip 1 each by In Vitro route daily Test daily ICD E11.42 100 each 11    vitamin B-12 (CYANOCOBALAMIN) 500 MCG tablet Take 1 tablet by mouth daily 90 tablet 1    vitamin D (D3-1000) 1000 UNITS TABS tablet Take 1 tablet by mouth daily 90 tablet 3    glucose monitoring kit (FREESTYLE) monitoring kit 1 kit by Does not apply route daily ICD E11.42 1 kit 0    Lancets MISC Test daily ICD E11.42 50 each 3    aspirin 81 MG tablet Take 81 mg by mouth daily      fenofibrate (TRICOR) 145 MG tablet Take 1 tablet by mouth daily 30 tablet 3     No current facility-administered medications for this visit.         AVS and education print provided      Peace Rea   12/22/2017

## 2017-12-29 PROBLEM — F32.9 MAJOR DEPRESSION, SINGLE EPISODE: Status: ACTIVE | Noted: 2017-12-29

## 2018-01-04 PROBLEM — R07.89 ATYPICAL CHEST PAIN: Status: RESOLVED | Noted: 2017-12-18 | Resolved: 2018-01-04

## 2018-01-04 PROBLEM — R55 SYNCOPE: Status: RESOLVED | Noted: 2017-11-08 | Resolved: 2018-01-04

## 2018-01-04 PROBLEM — R80.9 PROTEINURIA: Status: RESOLVED | Noted: 2017-12-06 | Resolved: 2018-01-04

## 2018-01-04 PROBLEM — I25.10 CORONARY ARTERY DISEASE INVOLVING NATIVE CORONARY ARTERY OF NATIVE HEART WITHOUT ANGINA PECTORIS: Status: RESOLVED | Noted: 2017-05-24 | Resolved: 2018-01-04

## 2018-01-04 PROBLEM — E78.00 PURE HYPERCHOLESTEROLEMIA: Status: RESOLVED | Noted: 2017-05-24 | Resolved: 2018-01-04

## 2018-01-04 PROBLEM — I95.1 ORTHOSTATIC HYPOTENSION: Status: RESOLVED | Noted: 2017-11-08 | Resolved: 2018-01-04

## 2018-01-04 PROBLEM — K21.9 GASTROESOPHAGEAL REFLUX DISEASE WITHOUT ESOPHAGITIS: Status: RESOLVED | Noted: 2017-05-24 | Resolved: 2018-01-04

## 2018-01-04 PROBLEM — F51.05 INSOMNIA SECONDARY TO ANXIETY: Status: RESOLVED | Noted: 2017-05-24 | Resolved: 2018-01-04

## 2018-01-04 PROBLEM — L03.113 CELLULITIS OF RIGHT UPPER EXTREMITY: Status: RESOLVED | Noted: 2017-09-01 | Resolved: 2018-01-04

## 2018-01-04 PROBLEM — F32.9 MAJOR DEPRESSION, SINGLE EPISODE: Status: RESOLVED | Noted: 2017-12-29 | Resolved: 2018-01-04

## 2018-01-04 PROBLEM — M79.2 PERIPHERAL NEUROPATHIC PAIN: Status: RESOLVED | Noted: 2017-05-24 | Resolved: 2018-01-04

## 2018-01-04 PROBLEM — R42 ORTHOSTATIC DIZZINESS: Status: RESOLVED | Noted: 2017-12-06 | Resolved: 2018-01-04

## 2018-01-04 PROBLEM — G47.33 OSA (OBSTRUCTIVE SLEEP APNEA): Status: RESOLVED | Noted: 2017-05-24 | Resolved: 2018-01-04

## 2018-01-04 PROBLEM — N17.9 ACUTE RENAL FAILURE SUPERIMPOSED ON STAGE 3 CHRONIC KIDNEY DISEASE (HCC): Status: RESOLVED | Noted: 2017-10-03 | Resolved: 2018-01-04

## 2018-01-04 PROBLEM — E27.40 ADRENAL INSUFFICIENCY (HCC): Status: RESOLVED | Noted: 2017-12-06 | Resolved: 2018-01-04

## 2018-01-04 PROBLEM — I10 ESSENTIAL HYPERTENSION: Status: RESOLVED | Noted: 2017-05-24 | Resolved: 2018-01-04

## 2018-01-04 PROBLEM — R07.9 CHEST PAIN: Status: RESOLVED | Noted: 2017-10-15 | Resolved: 2018-01-04

## 2018-01-04 PROBLEM — N18.30 ACUTE RENAL FAILURE SUPERIMPOSED ON STAGE 3 CHRONIC KIDNEY DISEASE (HCC): Status: RESOLVED | Noted: 2017-10-03 | Resolved: 2018-01-04

## 2018-01-04 PROBLEM — F41.9 INSOMNIA SECONDARY TO ANXIETY: Status: RESOLVED | Noted: 2017-05-24 | Resolved: 2018-01-04

## 2018-01-04 PROBLEM — E11.9 DMII (DIABETES MELLITUS, TYPE 2) (HCC): Status: RESOLVED | Noted: 2017-05-24 | Resolved: 2018-01-04

## 2018-01-04 PROBLEM — E66.01 MORBID OBESITY DUE TO EXCESS CALORIES (HCC): Status: RESOLVED | Noted: 2017-10-03 | Resolved: 2018-01-04

## 2018-01-11 ENCOUNTER — OFFICE VISIT (OUTPATIENT)
Dept: INTERNAL MEDICINE CLINIC | Age: 63
End: 2018-01-11

## 2018-01-11 VITALS
WEIGHT: 230 LBS | RESPIRATION RATE: 16 BRPM | DIASTOLIC BLOOD PRESSURE: 90 MMHG | HEART RATE: 84 BPM | BODY MASS INDEX: 32.08 KG/M2 | SYSTOLIC BLOOD PRESSURE: 150 MMHG

## 2018-01-11 DIAGNOSIS — E11.9 TYPE 2 DIABETES MELLITUS WITHOUT COMPLICATION, WITHOUT LONG-TERM CURRENT USE OF INSULIN (HCC): ICD-10-CM

## 2018-01-11 DIAGNOSIS — I10 ESSENTIAL HYPERTENSION: ICD-10-CM

## 2018-01-11 DIAGNOSIS — F39 MOOD DISORDER (HCC): Primary | ICD-10-CM

## 2018-01-11 DIAGNOSIS — N52.01 ERECTILE DYSFUNCTION DUE TO ARTERIAL INSUFFICIENCY: ICD-10-CM

## 2018-01-11 PROCEDURE — 99214 OFFICE O/P EST MOD 30 MIN: CPT | Performed by: INTERNAL MEDICINE

## 2018-01-11 PROCEDURE — 1111F DSCHRG MED/CURRENT MED MERGE: CPT | Performed by: INTERNAL MEDICINE

## 2018-01-11 RX ORDER — TAMSULOSIN HYDROCHLORIDE 0.4 MG/1
0.4 CAPSULE ORAL DAILY
Qty: 30 CAPSULE | Refills: 3 | Status: SHIPPED | OUTPATIENT
Start: 2018-01-11 | End: 2018-04-01 | Stop reason: ALTCHOICE

## 2018-01-11 RX ORDER — LOSARTAN POTASSIUM 25 MG/1
25 TABLET ORAL DAILY
Qty: 30 TABLET | Refills: 3 | Status: SHIPPED | OUTPATIENT
Start: 2018-01-11 | End: 2018-01-11 | Stop reason: SDUPTHER

## 2018-01-11 RX ORDER — ARIPIPRAZOLE 5 MG/1
5 TABLET ORAL DAILY
Qty: 30 TABLET | Refills: 0 | Status: ON HOLD | OUTPATIENT
Start: 2018-01-11 | End: 2018-01-26 | Stop reason: HOSPADM

## 2018-01-11 RX ORDER — MIRTAZAPINE 15 MG/1
15 TABLET, FILM COATED ORAL NIGHTLY
Qty: 30 TABLET | Refills: 3 | Status: SHIPPED | OUTPATIENT
Start: 2018-01-11 | End: 2018-02-19 | Stop reason: SDUPTHER

## 2018-01-11 RX ORDER — LOSARTAN POTASSIUM 25 MG/1
25 TABLET ORAL DAILY
Qty: 30 TABLET | Refills: 3 | Status: ON HOLD | OUTPATIENT
Start: 2018-01-11 | End: 2018-04-03 | Stop reason: HOSPADM

## 2018-01-11 RX ORDER — VILAZODONE HYDROCHLORIDE 20 MG/1
20 TABLET ORAL DAILY
Qty: 30 TABLET | Refills: 2 | Status: ON HOLD | OUTPATIENT
Start: 2018-01-11 | End: 2018-01-26 | Stop reason: HOSPADM

## 2018-01-11 RX ORDER — PREDNISONE 20 MG/1
20 TABLET ORAL 2 TIMES DAILY
Qty: 10 TABLET | Refills: 0 | Status: SHIPPED | OUTPATIENT
Start: 2018-01-11 | End: 2018-01-16

## 2018-01-11 RX ORDER — SILDENAFIL CITRATE 20 MG/1
TABLET ORAL
Qty: 30 TABLET | Refills: 3 | Status: SHIPPED | OUTPATIENT
Start: 2018-01-11 | End: 2018-04-01 | Stop reason: ALTCHOICE

## 2018-01-12 ASSESSMENT — ENCOUNTER SYMPTOMS
SHORTNESS OF BREATH: 0
DIARRHEA: 0
COUGH: 0
CONSTIPATION: 1
WHEEZING: 0
EYE DISCHARGE: 0
VOMITING: 0
BLOOD IN STOOL: 0
EYE REDNESS: 0
ABDOMINAL PAIN: 0

## 2018-01-12 NOTE — PROGRESS NOTES
(ABILIFY) 5 MG tablet Take 1 tablet by mouth daily 30 tablet 0    predniSONE (DELTASONE) 20 MG tablet Take 1 tablet by mouth 2 times daily for 5 days 10 tablet 0    tamsulosin (FLOMAX) 0.4 MG capsule Take 1 capsule by mouth daily 30 capsule 3    losartan (COZAAR) 25 MG tablet Take 1 tablet by mouth daily 30 tablet 3    omeprazole (PRILOSEC) 40 MG delayed release capsule Take 1 capsule by mouth every morning (before breakfast) 90 capsule 2    fenofibrate (TRICOR) 145 MG tablet Take 1 tablet by mouth daily 30 tablet 3    metoprolol tartrate (LOPRESSOR) 25 MG tablet Take 0.5 tablets by mouth 2 times daily 60 tablet 0    magnesium oxide (MAG-OX) 400 (241.3 Mg) MG TABS tablet Take 1 tablet by mouth daily 30 tablet 0    budesonide-formoterol (SYMBICORT) 160-4.5 MCG/ACT AERO Inhale 2 puffs into the lungs 2 times daily 1 Inhaler 0    albuterol sulfate  (90 Base) MCG/ACT inhaler Inhale 2 puffs into the lungs every 4 hours as needed for Wheezing or Shortness of Breath Use inhaler with a spacer device 2 Inhaler 0    hydrALAZINE (APRESOLINE) 50 MG tablet Take 1 tablet by mouth 3 times daily 90 tablet 0    fluticasone (FLONASE) 50 MCG/ACT nasal spray 1 spray by Nasal route daily as needed for Rhinitis      Spacer/Aero-Holding Chambers (E-Z SPACER) MITZI 1 Device by Does not apply route daily as needed (shortness of breath) 1 Device 0    nitroGLYCERIN (NITROSTAT) 0.4 MG SL tablet up to max of 3 total doses.  If no relief after 1 dose, call 911. 25 tablet 3    atorvastatin (LIPITOR) 40 MG tablet Take 40 mg by mouth daily      glucose blood VI test strips (FREESTYLE TEST STRIPS) strip 1 each by In Vitro route daily Test daily ICD E11.42 100 each 11    vitamin B-12 (CYANOCOBALAMIN) 500 MCG tablet Take 1 tablet by mouth daily 90 tablet 1    vitamin D (D3-1000) 1000 UNITS TABS tablet Take 1 tablet by mouth daily 90 tablet 3    glucose monitoring kit (FREESTYLE) monitoring kit 1 kit by Does not apply route daily Inhaler 0    albuterol sulfate  (90 Base) MCG/ACT inhaler Inhale 2 puffs into the lungs every 4 hours as needed for Wheezing or Shortness of Breath Use inhaler with a spacer device 2 Inhaler 0    hydrALAZINE (APRESOLINE) 50 MG tablet Take 1 tablet by mouth 3 times daily 90 tablet 0    fluticasone (FLONASE) 50 MCG/ACT nasal spray 1 spray by Nasal route daily as needed for Rhinitis      Spacer/Aero-Holding Chambers (E-Z SPACER) MITZI 1 Device by Does not apply route daily as needed (shortness of breath) 1 Device 0    nitroGLYCERIN (NITROSTAT) 0.4 MG SL tablet up to max of 3 total doses. If no relief after 1 dose, call 911. 25 tablet 3    atorvastatin (LIPITOR) 40 MG tablet Take 40 mg by mouth daily      glucose blood VI test strips (FREESTYLE TEST STRIPS) strip 1 each by In Vitro route daily Test daily ICD E11.42 100 each 11    vitamin B-12 (CYANOCOBALAMIN) 500 MCG tablet Take 1 tablet by mouth daily 90 tablet 1    vitamin D (D3-1000) 1000 UNITS TABS tablet Take 1 tablet by mouth daily 90 tablet 3    glucose monitoring kit (FREESTYLE) monitoring kit 1 kit by Does not apply route daily ICD E11.42 1 kit 0    Lancets MISC Test daily ICD E11.42 50 each 3    aspirin 81 MG tablet Take 81 mg by mouth daily          Medications patient taking as of now reconciled against medications ordered at time of hospital discharge     Vitals:    01/11/18 1043 01/11/18 1127   BP: (!) 148/80 (!) 150/90   Site: Right Arm    Position: Sitting    Cuff Size: Large Adult    Pulse: 84    Resp: 16    Weight: 230 lb (104.3 kg)      Body mass index is 32.08 kg/m². Wt Readings from Last 3 Encounters:   01/11/18 230 lb (104.3 kg)   12/28/17 228 lb 13.4 oz (103.8 kg)   12/21/17 225 lb (102.1 kg)     BP Readings from Last 3 Encounters:   01/11/18 (!) 150/90   12/28/17 (!) 154/86   12/21/17 138/82        Inpatient course: Discharge summary reviewed- see chart.     Chief Complaint   Patient presents with   [address] from [facility]

## 2018-01-19 ENCOUNTER — TELEPHONE (OUTPATIENT)
Dept: INTERNAL MEDICINE CLINIC | Age: 63
End: 2018-01-19

## 2018-01-19 PROBLEM — F33.9 MAJOR DEPRESSIVE DISORDER, RECURRENT (HCC): Status: ACTIVE | Noted: 2018-01-19

## 2018-02-09 PROBLEM — R55 SYNCOPE AND COLLAPSE: Status: ACTIVE | Noted: 2018-02-09

## 2018-02-19 ENCOUNTER — TELEPHONE (OUTPATIENT)
Dept: INTERNAL MEDICINE CLINIC | Age: 63
End: 2018-02-19

## 2018-02-19 ENCOUNTER — OFFICE VISIT (OUTPATIENT)
Dept: PSYCHOLOGY | Age: 63
End: 2018-02-19

## 2018-02-19 ENCOUNTER — OFFICE VISIT (OUTPATIENT)
Dept: INTERNAL MEDICINE CLINIC | Age: 63
End: 2018-02-19

## 2018-02-19 VITALS
HEART RATE: 84 BPM | WEIGHT: 232 LBS | SYSTOLIC BLOOD PRESSURE: 138 MMHG | BODY MASS INDEX: 32.36 KG/M2 | RESPIRATION RATE: 16 BRPM | DIASTOLIC BLOOD PRESSURE: 96 MMHG

## 2018-02-19 DIAGNOSIS — F41.9 ANXIETY: ICD-10-CM

## 2018-02-19 DIAGNOSIS — F39 MOOD DISORDER (HCC): Primary | ICD-10-CM

## 2018-02-19 DIAGNOSIS — F32.A DEPRESSIVE DISORDER: Primary | ICD-10-CM

## 2018-02-19 DIAGNOSIS — I10 ESSENTIAL HYPERTENSION: ICD-10-CM

## 2018-02-19 PROCEDURE — 99213 OFFICE O/P EST LOW 20 MIN: CPT | Performed by: INTERNAL MEDICINE

## 2018-02-19 PROCEDURE — 90791 PSYCH DIAGNOSTIC EVALUATION: CPT | Performed by: PSYCHOLOGIST

## 2018-02-19 RX ORDER — HYDROXYZINE PAMOATE 50 MG/1
50 CAPSULE ORAL 2 TIMES DAILY PRN
Qty: 90 CAPSULE | Refills: 2 | Status: SHIPPED | OUTPATIENT
Start: 2018-02-19 | End: 2018-11-08 | Stop reason: ALTCHOICE

## 2018-02-19 RX ORDER — CHLORPROMAZINE HYDROCHLORIDE 200 MG/1
200 TABLET, FILM COATED ORAL NIGHTLY
Qty: 90 TABLET | Refills: 2 | Status: SHIPPED | OUTPATIENT
Start: 2018-02-19 | End: 2018-02-20

## 2018-02-19 RX ORDER — MIRTAZAPINE 15 MG/1
15 TABLET, FILM COATED ORAL NIGHTLY
Qty: 30 TABLET | Refills: 3 | Status: ON HOLD | OUTPATIENT
Start: 2018-02-19 | End: 2020-02-12

## 2018-02-19 ASSESSMENT — ENCOUNTER SYMPTOMS
SHORTNESS OF BREATH: 0
BLOOD IN STOOL: 0
WHEEZING: 0
COUGH: 0
CONSTIPATION: 1
DIARRHEA: 0
VOMITING: 0
ABDOMINAL PAIN: 0
EYE DISCHARGE: 0
EYE REDNESS: 0

## 2018-02-19 ASSESSMENT — PATIENT HEALTH QUESTIONNAIRE - PHQ9
SUM OF ALL RESPONSES TO PHQ QUESTIONS 1-9: 2
2. FEELING DOWN, DEPRESSED OR HOPELESS: 1
1. LITTLE INTEREST OR PLEASURE IN DOING THINGS: 1
SUM OF ALL RESPONSES TO PHQ9 QUESTIONS 1 & 2: 2

## 2018-02-19 NOTE — PATIENT INSTRUCTIONS
do so.    6. Recall your dreams. Your dreams often have important things to say about your feelings and about your relationship with the one who . Your dreams may be scary or sad, especially early on. They may seem weird or crazy to you. You may find that your loved one appears in your dreams. Accept your dreams for what they are and see what you can learn from them. No one knows that better than you. 7. Tell people what helps you and what doesnt. People around you may not understand what you need. So tell them. If hearing your loved ones name spoken aloud by others feels good, say so. If you need more time alone, or assistance with chores youre unable to complete, or an occasional hug, be honest.  People cant read your mind, so youll have to speak it. 8. Write things down. Most people who are grieving become more forgetful than usual.  So help yourself remember what you want by keeping track of it on paper or with whatever system works best for you. This may include writing down things you want to preserve about the person who has . 9. Ask for a copy of the Stepan's. If the  liturgy or memorial service holds special meaning for you because of what was spoken or read, ask for the words. Whoever participated in that ritual will feel gratified that what they prepared was appreciated. Turn to these words whenever you want. Some people find these thoughts provide even more help weeks and months after the service. 10. Remember the serenity prayer. This prayer is attributed to theologian Angel Luis Mireles, but its actually an ancient  [facility]. It has brought comfort and support to many that have suffered various kinds of afflictions. 11. Create a memory area at home.   In a space that feels appropriate, arrange a small table that honors the person: a framed photograph or two, perhaps a prized possession or award or something they created or something they therapist or a psychologist doesnt necessarily understand the unique issues of someone in grief. 12. Begin your day with your loved one. If your grief is young, youll probably wake up thinking of that person anyway. So why not decide that youll include her or him from the start? Focus this time in a positive way. Bring to your mind fulfilling memories. Recall lessons that this person taught you, gifts he or she gave you. Think about how you can spend your day in ways that would be keeping in with your loved ones best self and with your best self. Then carry that best self with you through your day. 16. Invite some one to be your telephone teresa. If your grief and sadness hit you especially hard at times and you have no   one nearby to turn to, ask someone you trust to be your telephone teresa. Ask their permission for you to call them whenever you feel youre at loose ends, day or night. Then put their number beside your phone and call them if you need them. Dont abuse the privilege, of course. And covenant that some day it will be payback time  someday youll make yourself available to help someone else in the same way youve been helped. That will help you accept the care youre receiving. 18. Avoid certain people if you must.  No one likes to be unfriendly or cold. But if there are people in your life who make it  very difficult for you to do your grieving then do what you can to stay out of their way. Some people may lecture you or belittle you. 23. Donate their possessions meaningfully. Whether you give your loved ones personal possessions to someone you know   or to a stranger, find ways to pass these things along so that others might benefit from them. Family members of friends might like to receive keepsakes. They or others might deserve tools, utensils, books or sporting equipment. [employer] organizations can put clothes to good use.   Some wish to do this quickly following the death, while others wish to wait awhile. 21. Donate in the others name. Honor the [employer] and spirit by giving a gift or gifts to a cause the other would   appreciate. A favorite marcus? A local ? A building project? Extend that persons influence even further. 21. Create or commission a memory quilt. Sew or invite others to sew with you, or hire someone to sew for you. However you get it completed, put together a wall hanging or a bedroom quilt that remembers the important life events of the one who . Take your time doing this. Make it what it is, a labor of love. 22. Take a yoga class. People of almost any age can do yoga. More than conditioning your body, it helps you relax and focus your mind. It can be woven into a practice of mediation. Its a gentle art for that time in your life when you deserve gentleness all around you. 23. Connect on the Internet. If youre [employer], search the Internet. Karl Sofia find many resources for people in grief,   as well as the opportunity to chat with fellow grievers. You can link up with others without leaving your home. Karl Sofia also find more to expand your horizons as a person who is beginning to grow. 24. Speak to a cleargyperson. If youre searching for answers to the larger questions about life and death, Episcopalian and spirituality, consider talking with a representative of your simona, or even anothers simona. Consider becoming a spiritual friend with another and making your time of grieving a time of personal exploring. Read of how others have responded to a loved ones death. You may feel that your own grief is all you can handle. But if youd like to look at the ways others have done it, try:  Beyond Grief:  A Guide for Recovering from the Death of a Loved One by Sixto 40 by Brandon Messina and Mayte Bustillo  The Grief Recovery Handbook:   The Action Program for Moving Beyond Death, Divorce, & Other Losses by Bryce Arroyo   A Grief Observed by Timmothy Malling  by Dionicio Matos When Good-Bye Is Forever by Humberto Carreno and Grief by Medina Valle or Lilian Rue Alexandre Gipson for a Son. There are many others. Check with a . 22. Learn about your loved one from others. Listen to the stories others have to tell about the one, who , stories youre familiar with and those youve never heard before. Spend time with their friends, schoolmates or colleagues. Invite them into your home. Solicit the writings of others. Preserve whatever you find out. Celebrate your time together. 26. Take a day off. When the mood is just right, take a one-day vacation. Do whatever you want, or dont do whatever you want. Travel somewhere or stay inside by yourself. Be very active or dont do anything at all. Just make it your day, whatever that means for you. 32. Invite someone to give you feedback. Select someone you trust, preferably someone familiar with the working of grief, to give you his or her reaction when you ask for it. If you want to check out how clearly youre thinking, how accurately youre remembering, how effectively youre coping, go to that person. Pose your questions, then listen to their responses. What you choose to do with that information will be up to you. 28. Vent your anger rather than hold it in. You may feel awkward being angry when youre grieving, but anger is a common reaction. The expression holds true: anger is best out floating rather than in bloating. Even if you feel a bit ashamed as you do it, find ways to get it out of your system. [place], even if its in an empty house. Cry. Hit something soft. Throw eggs at something hard. Vacuum up a storm. Resist the temptation to be proper. 29. Give thanks every day.   Whatever has happened to you, you still have things to be thankful

## 2018-02-19 NOTE — PROGRESS NOTES
Behavioral Health Consultation  RAHAT Amaya,Ph.D.  Psychologist  2018  2:04 PM      Time spent with Patient: 30 minutes  This is patient's first  [facility] appointment. Reason for Consult:    Chief Complaint   Patient presents with    Depression     Referring Provider: Claire Silverman MD  [facility], [address]    Pt provided informed consent for the behavioral health program. Discussed with patient model of service to include the limits of confidentiality (i.e. abuse reporting, suicide intervention, etc.) and short-term intervention focused approach. Pt indicated understanding. Feedback given to PCP. S:  Pt has been depressed off and on. Wife  this past September and he's been depressed since then. She had a heart attack and  in pt's arms. Doesn't want to go to a grief support group. Helps to visit friends and family. Has ARTEMIO but can't use a CPAP machine. Pt not sleeping well. Was using Ativan but now on Remeron and not sleeping at night. Napping during the day. Pt not working and sits at home and watches TV all day. Pt admits to drugs and overdosing in past, still thinks about it and not wanting to be here. Currently wishes would die but no active SI. Protective factor: dog, family. Has not been in therapy in the past, but has been on medications in the past.  Pt reports chronic anxiety. Sometimes goes for a drive when anxious, went to ER for it in the past and was given Ativan.     O:  MSE:    Appearance    cooperative  Appetite abnormal: eating once per day; lost a little weight  Sleep disturbance Yes  Fatigue Yes  Loss of pleasure No  Impulsive behavior No  Speech    normal rate  Mood    Anxious  Depressed  Affect    depressed affect  Thought Content    intact  Thought Process    coherent  Associations    logical connections  Insight    Good  Judgment    Intact  Orientation    oriented to person, place, time, and general circumstances  Memory    recent Spacer/Aero-Holding Chambers (E-Z SPACER) MITZI 1 Device by Does not apply route daily as needed (shortness of breath) 1 Device 0    nitroGLYCERIN (NITROSTAT) 0.4 MG SL tablet up to max of 3 total doses. If no relief after 1 dose, call 911. 25 tablet 3    atorvastatin (LIPITOR) 40 MG tablet Take 40 mg by mouth daily      glucose blood VI test strips (FREESTYLE TEST STRIPS) strip 1 each by In Vitro route daily Test daily ICD E11.42 100 each 11    vitamin B-12 (CYANOCOBALAMIN) 500 MCG tablet Take 1 tablet by mouth daily 90 tablet 1    vitamin D (D3-1000) 1000 UNITS TABS tablet Take 1 tablet by mouth daily 90 tablet 3    glucose monitoring kit (FREESTYLE) monitoring kit 1 kit by Does not apply route daily ICD E11.42 1 kit 0    Lancets MISC Test daily ICD E11.42 50 each 3    aspirin 81 MG tablet Take 81 mg by mouth daily       No current facility-administered medications for this visit. Social History:   Social History     Social History    Marital status:      Spouse name: N/A    Number of children: 1    Years of education: 15     Occupational History    retired      Social History Main Topics    Smoking status: Former Smoker     Packs/day: 2.50     Years: 40.00     Types: Cigarettes    Smokeless tobacco: Never Used    Alcohol use Yes      Comment: socially    Drug use: No    Sexual activity: Yes     Partners: Female     Other Topics Concern    Not on file     Social History Narrative    No narrative on file       TOBACCO:   reports that he has quit smoking. His smoking use included Cigarettes. He has a 100.00 pack-year smoking history. He has never used smokeless tobacco.  ETOH:   reports that he drinks alcohol.     Family History:   Family History   Problem Relation Age of Onset    Heart Disease Mother     Cancer Father      lung cancer    Diabetes Brother     Liver Disease Sister     Diabetes Brother          A:  Pt with h/o episodes of depression, current one started after wife  last fall. Sits around and watches TV and naps all day. Not sleeping well at night, also has untreated ARTEMIO. Pt also complains of anxiety. H/o suicide attempts, currently wishes would die but no active SI. Was given medication during recent hospitalization but ran out, needs a psychiatrist.  Referring to Dr. Olga Corrales per Dr. Weber Carls request.      [facility] Scores 2018   PHQ2 Score 2 1   PHQ9 Score 2 1     Interpretation of Total Score Depression Severity: 1-4 = Minimal depression, 5-9 = Mild depression, 10-14 = Moderate depression, 15-19 = Moderately severe depression, 20-27 = Severe depression        Diagnosis:    1. Depressive disorder    2.  Anxiety          Diagnosis Date    CAD (coronary artery disease)     Chest pain 10/15/2017    [facility]) ([facility])     [facility] mellitus ([facility] [address]     Hyperlipidemia     Hypertension     Kidney stone     MRSA infection          Plan:  Pt interventions:  Provided handout on  grief, Provided education on the use of medication to treat  depression and anxiety, Discussed various factors related to the development and maintenance of  depression and anxiety (including biological, cognitive, behavioral, and environmental factors), Trained in strategies for increasing balanced thinking, Discussed and set plan for behavioral activation, Trained in relaxation strategies, Discussed self-care (sleep, nutrition, rewarding activities, social support, exercise), Discussed benefits of referral for specialty care, Discussed and problem-solved barriers in adhering to behavioral change plan, Motivational Interviewing to increase patient confidence and compliance with adhering to behavioral change plan and Motivational Interviewing to determine importance and readiness for change      Pt Behavioral Change Plan:  See Pt Instructions

## 2018-02-20 ENCOUNTER — TELEPHONE (OUTPATIENT)
Dept: INTERNAL MEDICINE CLINIC | Age: 63
End: 2018-02-20

## 2018-02-20 RX ORDER — OLANZAPINE 10 MG/1
10 TABLET ORAL NIGHTLY
Qty: 30 TABLET | Refills: 3 | Status: SHIPPED | OUTPATIENT
Start: 2018-02-20 | End: 2018-03-19 | Stop reason: SDUPTHER

## 2018-02-20 NOTE — PROGRESS NOTES
and reactive to light. No scleral icterus. Neck: No JVD present. Cardiovascular: Normal rate, regular rhythm and normal heart sounds. Exam reveals no gallop. No murmur heard. Pulmonary/Chest: Effort normal and breath sounds normal. No respiratory distress. He has no rales. Musculoskeletal: He exhibits no edema. Neurological: He is alert and oriented to person, place, and time. No sensory deficit. Coordination normal.   Normal monofilament testing of both feet   Skin: Skin is warm and dry. No rash noted. He is not diaphoretic. No foot ulcers   Psychiatric: He has a normal mood and affect. His behavior is normal.   Vitals reviewed. BP (!) 138/96 (Site: Right Arm, Position: Sitting, Cuff Size: Large Adult)   Pulse 84 Comment: Regular  Resp 16   Wt 232 lb (105.2 kg)   BMI 32.36 kg/m²     Assessment:           ICD-10-CM ICD-9-CM    1. Mood disorder (HCC) F39 296.90    2. Essential hypertension I10 401.9       HTN controlled    Mood is poor      Plan:        resume the Thorazine  Go see Psychology and Psychiatry - Dr Nathan St    F/u in 4 weeks      No orders of the defined types were placed in this encounter. Current Outpatient Prescriptions   Medication Sig Dispense Refill    chlorproMAZINE (THORAZINE) 200 MG tablet Take 1 tablet by mouth nightly 90 tablet 2    hydrOXYzine (VISTARIL) 50 MG capsule Take 1 capsule by mouth 2 times daily as needed for Anxiety 90 capsule 2    mirtazapine (REMERON) 15 MG tablet Take 1 tablet by mouth nightly 30 tablet 3    traMADol (ULTRAM) 50 MG tablet Take 50 mg by mouth every 8 hours as needed for Pain.       amLODIPine (NORVASC) 5 MG tablet Take 1 tablet by mouth daily 30 tablet 0    sildenafil (REVATIO) 20 MG tablet Take 5 tablets by mouth as needed for erectile dysfunction 30 tablet 3    tamsulosin (FLOMAX) 0.4 MG capsule Take 1 capsule by mouth daily 30 capsule 3    losartan (COZAAR) 25 MG tablet Take 1 tablet by mouth daily 30 tablet 3    omeprazole (PRILOSEC) 40 MG delayed release capsule Take 1 capsule by mouth every morning (before breakfast) 90 capsule 2    fenofibrate (TRICOR) 145 MG tablet Take 1 tablet by mouth daily 30 tablet 3    metoprolol tartrate (LOPRESSOR) 25 MG tablet Take 0.5 tablets by mouth 2 times daily 60 tablet 0    magnesium oxide (MAG-OX) 400 (241.3 Mg) MG TABS tablet Take 1 tablet by mouth daily 30 tablet 0    budesonide-formoterol (SYMBICORT) 160-4.5 MCG/ACT AERO Inhale 2 puffs into the lungs 2 times daily 1 Inhaler 0    albuterol sulfate  (90 Base) MCG/ACT inhaler Inhale 2 puffs into the lungs every 4 hours as needed for Wheezing or Shortness of Breath Use inhaler with a spacer device 2 Inhaler 0    hydrALAZINE (APRESOLINE) 50 MG tablet Take 1 tablet by mouth 3 times daily (Patient taking differently: Take 50 mg by mouth 2 times daily ) 90 tablet 0    fluticasone (FLONASE) 50 MCG/ACT nasal spray 1 spray by Nasal route daily as needed for Rhinitis      Spacer/Aero-Holding Chambers (E-Z SPACER) MITZI 1 Device by Does not apply route daily as needed (shortness of breath) 1 Device 0    nitroGLYCERIN (NITROSTAT) 0.4 MG SL tablet up to max of 3 total doses. If no relief after 1 dose, call 911. 25 tablet 3    atorvastatin (LIPITOR) 40 MG tablet Take 40 mg by mouth daily      vitamin B-12 (CYANOCOBALAMIN) 500 MCG tablet Take 1 tablet by mouth daily 90 tablet 1    vitamin D (D3-1000) 1000 UNITS TABS tablet Take 1 tablet by mouth daily 90 tablet 3    glucose monitoring kit (FREESTYLE) monitoring kit 1 kit by Does not apply route daily ICD E11.42 1 kit 0    aspirin 81 MG tablet Take 81 mg by mouth daily      glucose blood VI test strips (FREESTYLE TEST STRIPS) strip 1 each by In Vitro route daily Test daily ICD E11.42 100 each 11    Lancets MISC Test daily ICD E11.42 50 each 3     No current facility-administered medications for this visit.         AVS and education print provided      Mary Alice Sharp   2/19/2018 90 tablet 0    fluticasone (FLONASE) 50 MCG/ACT nasal spray 1 spray by Nasal route daily as needed for Rhinitis      Spacer/Aero-Holding Chambers (E-Z SPACER) MITZI 1 Device by Does not apply route daily as needed (shortness of breath) 1 Device 0    nitroGLYCERIN (NITROSTAT) 0.4 MG SL tablet up to max of 3 total doses. If no relief after 1 dose, call 911. 25 tablet 3    atorvastatin (LIPITOR) 40 MG tablet Take 40 mg by mouth daily      vitamin B-12 (CYANOCOBALAMIN) 500 MCG tablet Take 1 tablet by mouth daily 90 tablet 1    vitamin D (D3-1000) 1000 UNITS TABS tablet Take 1 tablet by mouth daily 90 tablet 3    glucose monitoring kit (FREESTYLE) monitoring kit 1 kit by Does not apply route daily ICD E11.42 1 kit 0    aspirin 81 MG tablet Take 81 mg by mouth daily          Medications patient taking as of now reconciled against medications ordered at time of hospital discharge     Vitals:    02/19/18 1445 02/19/18 1451   BP: (!) 138/90 (!) 138/96   Site: Right Arm Right Arm   Position: Sitting Sitting   Cuff Size: Large Adult Large Adult   Pulse: 84    Resp: 16    Weight: 232 lb (105.2 kg)      Body mass index is 32.36 kg/m². Wt Readings from Last 3 Encounters:   02/19/18 232 lb (105.2 kg)   02/09/18 234 lb 9.1 oz (106.4 kg)   02/05/18 225 lb (102.1 kg)     BP Readings from Last 3 Encounters:   02/19/18 (!) 138/96   02/10/18 (!) 163/85   02/05/18 (!) 155/84        Inpatient course: Discharge summary reviewed- see chart. Chief Complaint   Patient presents with    Follow-up     Difficulty sleeping.      History of Present illness - Follow up of Hospital diagnosis(es): Major Depression with Suicidal attempt      Non face to face  following discharge, date last encounter closed (first attempt may have been earlier): *No documented post hospital discharge outreach found in the last 14 days *No documented post hospital discharge outreach found in the last 14 days    Call initiated 2 business days of discharge:

## 2018-02-20 NOTE — TELEPHONE ENCOUNTER
Yeah, that's too much, I will send in Olanzapine, even if it's not covered, it's $13 simmons    Angeli Coppola    2/20/2018

## 2018-03-09 PROBLEM — F41.9 ANXIETY: Status: ACTIVE | Noted: 2018-03-09

## 2018-03-09 PROBLEM — J18.9 PNEUMONIA: Status: ACTIVE | Noted: 2018-03-09
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
## 2018-03-09 PROBLEM — F32.A DEPRESSION: Status: ACTIVE | Noted: 2018-03-09

## 2018-03-09 PROBLEM — G47.9 SLEEPING DIFFICULTY: Status: ACTIVE | Noted: 2018-03-09

## 2018-03-12 ENCOUNTER — OFFICE VISIT (OUTPATIENT)
Dept: PSYCHOLOGY | Age: 63
End: 2018-03-12

## 2018-03-12 DIAGNOSIS — F41.9 ANXIETY: ICD-10-CM

## 2018-03-12 DIAGNOSIS — F32.A DEPRESSIVE DISORDER: Primary | ICD-10-CM

## 2018-03-12 PROCEDURE — 90832 PSYTX W PT 30 MINUTES: CPT | Performed by: PSYCHOLOGIST

## 2018-03-12 NOTE — PROGRESS NOTES
put very little effort into developing new skills. Continues to report multiple panic attacks per day. Admitted he took 3 Vistaril at once to try to manage anxiety with no benefit. Pt also did not successfully schedule an appt with Dr. Fany Stevenson and reports an odd reason why he didn't. His report of a recent ER visit also does not match what is documented in his chart. He thinks about suicide when he's experiencing severe anxiety but denied plan or intent. Diagnosis:    1. Depressive disorder    2.  Anxiety          Diagnosis Date    CAD (coronary artery disease)     Chest pain 10/15/2017    [facility]     Diabetes mellitus ([facility] [address]     Hyperlipidemia     Hypertension     Kidney stone     MRSA infection          Plan:  Pt interventions:  Discussed various factors related to the development and maintenance of  depression and anxiety (including biological, cognitive, behavioral, and environmental factors), Trained in strategies for increasing balanced thinking, Trained in relaxation strategies, Provided education, Discussed self-care (sleep, nutrition, rewarding activities, social support, exercise), Discussed and problem-solved barriers in adhering to behavioral change plan, Motivational Interviewing to increase patient confidence and compliance with adhering to behavioral change plan and Motivational Interviewing to determine importance and readiness for change      Pt Behavioral Change Plan:  See Pt Instructions

## 2018-03-13 ENCOUNTER — TELEPHONE (OUTPATIENT)
Dept: INTERNAL MEDICINE CLINIC | Age: 63
End: 2018-03-13

## 2018-03-14 ENCOUNTER — TELEPHONE (OUTPATIENT)
Dept: INTERNAL MEDICINE CLINIC | Age: 63
End: 2018-03-14

## 2018-03-15 ENCOUNTER — TELEPHONE (OUTPATIENT)
Dept: INTERNAL MEDICINE CLINIC | Age: 63
End: 2018-03-15

## 2018-03-15 NOTE — TELEPHONE ENCOUNTER
Pt's appt was bumped today . He is having bad panic attacks and needs to be seen  Please call him at phone# provided. Spoke to Raciel she will see where he can be worked in.

## 2018-03-16 NOTE — TELEPHONE ENCOUNTER
Patient called frustrated stating that he was told he would be seen today. I informed patient that Dr. Jorge Leyden was out of the office. He proceeded to state this is ridiculous, and that he is finding another doctor. Patient then hung up on me.

## 2018-03-19 ENCOUNTER — OFFICE VISIT (OUTPATIENT)
Dept: INTERNAL MEDICINE CLINIC | Age: 63
End: 2018-03-19

## 2018-03-19 VITALS
BODY MASS INDEX: 34.45 KG/M2 | WEIGHT: 247 LBS | SYSTOLIC BLOOD PRESSURE: 168 MMHG | HEART RATE: 88 BPM | DIASTOLIC BLOOD PRESSURE: 90 MMHG | RESPIRATION RATE: 16 BRPM

## 2018-03-19 DIAGNOSIS — I10 ESSENTIAL HYPERTENSION: ICD-10-CM

## 2018-03-19 DIAGNOSIS — F39 MOOD DISORDER (HCC): Primary | ICD-10-CM

## 2018-03-19 PROCEDURE — 99213 OFFICE O/P EST LOW 20 MIN: CPT | Performed by: INTERNAL MEDICINE

## 2018-03-19 RX ORDER — SERTRALINE HYDROCHLORIDE 100 MG/1
TABLET, FILM COATED ORAL
Qty: 30 TABLET | Refills: 3 | Status: SHIPPED | OUTPATIENT
Start: 2018-03-19 | End: 2019-06-07

## 2018-03-19 RX ORDER — CHLORPROMAZINE HYDROCHLORIDE 200 MG/1
200 TABLET, FILM COATED ORAL NIGHTLY
Qty: 30 TABLET | Refills: 3 | Status: SHIPPED | OUTPATIENT
Start: 2018-03-19 | End: 2018-04-01 | Stop reason: ALTCHOICE

## 2018-03-19 RX ORDER — HYDROCHLOROTHIAZIDE 25 MG/1
25 TABLET ORAL DAILY
Status: ON HOLD | COMMUNITY
End: 2018-05-11 | Stop reason: HOSPADM

## 2018-03-19 RX ORDER — SPIRONOLACTONE 50 MG/1
50 TABLET, FILM COATED ORAL DAILY
COMMUNITY
End: 2018-05-10 | Stop reason: ALTCHOICE

## 2018-03-19 RX ORDER — MIDODRINE HYDROCHLORIDE 5 MG/1
5 TABLET ORAL 3 TIMES DAILY
COMMUNITY
End: 2018-03-19 | Stop reason: ALTCHOICE

## 2018-03-19 RX ORDER — LISINOPRIL 30 MG/1
30 TABLET ORAL DAILY
Status: ON HOLD | COMMUNITY
End: 2018-04-03 | Stop reason: HOSPADM

## 2018-03-19 RX ORDER — MECLIZINE HYDROCHLORIDE 25 MG/1
25 TABLET ORAL 3 TIMES DAILY PRN
COMMUNITY
End: 2018-04-01 | Stop reason: ALTCHOICE

## 2018-03-19 RX ORDER — OLANZAPINE 15 MG/1
15 TABLET ORAL NIGHTLY
Qty: 30 TABLET | Refills: 3 | Status: SHIPPED | OUTPATIENT
Start: 2018-03-19 | End: 2018-03-19 | Stop reason: ALTCHOICE

## 2018-03-19 ASSESSMENT — ENCOUNTER SYMPTOMS
EYE DISCHARGE: 0
SHORTNESS OF BREATH: 0
ABDOMINAL PAIN: 0
VOMITING: 0
BLOOD IN STOOL: 0
EYE REDNESS: 0
CONSTIPATION: 1
DIARRHEA: 0
WHEEZING: 0
COUGH: 0

## 2018-03-19 NOTE — PROGRESS NOTES
personally reviewed active meds and allergies with the patient today      Objective:   Physical Exam   Constitutional: He is oriented to person, place, and time. He appears well-developed. No distress. + obese habitus   HENT:   Head: Normocephalic and atraumatic. Eyes: EOM are normal. Pupils are equal, round, and reactive to light. No scleral icterus. Neck: No JVD present. Cardiovascular: Normal rate, regular rhythm and normal heart sounds. Exam reveals no gallop. No murmur heard. Pulmonary/Chest: Effort normal and breath sounds normal. No respiratory distress. He has no rales. Musculoskeletal: He exhibits no edema. Neurological: He is alert and oriented to person, place, and time. No sensory deficit. Coordination normal.   Normal monofilament testing of both feet   Skin: Skin is warm and dry. No rash noted. He is not diaphoretic. No foot ulcers   Psychiatric: He has a normal mood and affect. His behavior is normal.   Vitals reviewed. BP (!) 168/90 (Site: Right Arm, Position: Sitting, Cuff Size: Large Adult)   Pulse 88 Comment: Regular  Resp 16   Wt 247 lb (112 kg)   BMI 34.45 kg/m²     Assessment:           ICD-10-CM ICD-9-CM    1. Mood disorder (HCC) F39 296.90    2. Essential hypertension I10 401.9       HTN uncontrolled     Mood is poor      Plan:        resume the Labetalol, STOP the Midodrine  resume the Thorazine and stop the Olanzapine  Start the Zoloft, titrate  Continue the Remeron 15  Prn Atarax   Go see Psychology and Psychiatry - Dr Leana Urbano    F/u in 4 weeks      No orders of the defined types were placed in this encounter.       Current Outpatient Prescriptions   Medication Sig Dispense Refill    spironolactone (ALDACTONE) 50 MG tablet Take 50 mg by mouth daily      hydrochlorothiazide (HYDRODIURIL) 25 MG tablet Take 25 mg by mouth daily      lisinopril (PRINIVIL;ZESTRIL) 30 MG tablet Take 30 mg by mouth daily      meclizine (ANTIVERT) 25 MG tablet Take 25 mg by mouth 3 times daily as needed      metFORMIN (GLUCOPHAGE) 1000 MG tablet Take 1,000 mg by mouth 2 times daily (with meals)      sertraline (ZOLOFT) 100 MG tablet Take HALF tablet by mouth daily for 1 week, then take 1 tablet by mouth daily 30 tablet 3    chlorproMAZINE (THORAZINE) 200 MG tablet Take 1 tablet by mouth nightly 30 tablet 3    hydrOXYzine (VISTARIL) 50 MG capsule Take 1 capsule by mouth 2 times daily as needed for Anxiety 90 capsule 2    mirtazapine (REMERON) 15 MG tablet Take 1 tablet by mouth nightly 30 tablet 3    omeprazole (PRILOSEC) 40 MG delayed release capsule Take 1 capsule by mouth every morning (before breakfast) (Patient taking differently: Take 40 mg by mouth daily as needed (heartburn) ) 90 capsule 2    fenofibrate (TRICOR) 145 MG tablet Take 1 tablet by mouth daily 30 tablet 3    budesonide-formoterol (SYMBICORT) 160-4.5 MCG/ACT AERO Inhale 2 puffs into the lungs 2 times daily 1 Inhaler 0    albuterol sulfate  (90 Base) MCG/ACT inhaler Inhale 2 puffs into the lungs every 4 hours as needed for Wheezing or Shortness of Breath Use inhaler with a spacer device 2 Inhaler 0    hydrALAZINE (APRESOLINE) 50 MG tablet Take 1 tablet by mouth 3 times daily (Patient taking differently: Take 50 mg by mouth daily ) 90 tablet 0    Spacer/Aero-Holding Chambers (E-Z SPACER) MITZI 1 Device by Does not apply route daily as needed (shortness of breath) 1 Device 0    nitroGLYCERIN (NITROSTAT) 0.4 MG SL tablet up to max of 3 total doses.  If no relief after 1 dose, call 911. 25 tablet 3    atorvastatin (LIPITOR) 40 MG tablet Take 40 mg by mouth daily      glucose blood VI test strips (FREESTYLE TEST STRIPS) strip 1 each by In Vitro route daily Test daily ICD E11.42 100 each 11    vitamin B-12 (CYANOCOBALAMIN) 500 MCG tablet Take 1 tablet by mouth daily 90 tablet 1    vitamin D (D3-1000) 1000 UNITS TABS tablet Take 1 tablet by mouth daily 90 tablet 3    glucose monitoring kit (FREESTYLE) monitoring (GLUCOPHAGE) 1000 MG tablet Take 1,000 mg by mouth 2 times daily (with meals)      sertraline (ZOLOFT) 100 MG tablet Take HALF tablet by mouth daily for 1 week, then take 1 tablet by mouth daily 30 tablet 3    chlorproMAZINE (THORAZINE) 200 MG tablet Take 1 tablet by mouth nightly 30 tablet 3    hydrOXYzine (VISTARIL) 50 MG capsule Take 1 capsule by mouth 2 times daily as needed for Anxiety 90 capsule 2    mirtazapine (REMERON) 15 MG tablet Take 1 tablet by mouth nightly 30 tablet 3    omeprazole (PRILOSEC) 40 MG delayed release capsule Take 1 capsule by mouth every morning (before breakfast) (Patient taking differently: Take 40 mg by mouth daily as needed (heartburn) ) 90 capsule 2    fenofibrate (TRICOR) 145 MG tablet Take 1 tablet by mouth daily 30 tablet 3    budesonide-formoterol (SYMBICORT) 160-4.5 MCG/ACT AERO Inhale 2 puffs into the lungs 2 times daily 1 Inhaler 0    albuterol sulfate  (90 Base) MCG/ACT inhaler Inhale 2 puffs into the lungs every 4 hours as needed for Wheezing or Shortness of Breath Use inhaler with a spacer device 2 Inhaler 0    hydrALAZINE (APRESOLINE) 50 MG tablet Take 1 tablet by mouth 3 times daily (Patient taking differently: Take 50 mg by mouth daily ) 90 tablet 0    Spacer/Aero-Holding Chambers (E-Z SPACER) MITZI 1 Device by Does not apply route daily as needed (shortness of breath) 1 Device 0    nitroGLYCERIN (NITROSTAT) 0.4 MG SL tablet up to max of 3 total doses.  If no relief after 1 dose, call 911. 25 tablet 3    atorvastatin (LIPITOR) 40 MG tablet Take 40 mg by mouth daily      glucose blood VI test strips (FREESTYLE TEST STRIPS) strip 1 each by In Vitro route daily Test daily ICD E11.42 100 each 11    vitamin B-12 (CYANOCOBALAMIN) 500 MCG tablet Take 1 tablet by mouth daily 90 tablet 1    vitamin D (D3-1000) 1000 UNITS TABS tablet Take 1 tablet by mouth daily 90 tablet 3    glucose monitoring kit (FREESTYLE) monitoring kit 1 kit by Does not apply route

## 2018-04-01 PROBLEM — I16.0 HYPERTENSIVE URGENCY: Status: ACTIVE | Noted: 2018-04-01
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
## 2018-05-10 PROBLEM — R07.9 CHEST PAIN: Status: ACTIVE | Noted: 2018-05-10

## 2018-05-10 PROBLEM — I10 UNCONTROLLED HYPERTENSION: Status: ACTIVE | Noted: 2018-05-10

## 2018-05-15 ENCOUNTER — TELEPHONE (OUTPATIENT)
Dept: CARDIOLOGY CLINIC | Age: 63
End: 2018-05-15

## 2018-05-15 NOTE — TELEPHONE ENCOUNTER
Elvis,   Please scheduled with Dr. Lobato Shown at the 1st opening at [facility] which is 6/13, Farhana Pa 6/25 OR he can be scheduled with Dr. Arnold Rodriguez at [facility] on 6/14. I thoroughly read through his chart and has known CAD with numerous ED visits/hospitalization for CP and associated symptoms and extensive repeated cardiac workup. Most recent include [address] 12/2017, Stress Test 4/2018 and an echo 2/2018. He has been made f/u appt with Cardiologist or attempted to be reached for appt. And I do not see any office follow ups. He has been seen but Dr. Arnold Rodriguez and Dr. Lobato Shown the most inpatient and has also been seen by Dr. Otis Knight  (not including cardiologist for other [facility].)   Most recent ED visit was again discussed and review with Dr. Lobato Shown. He has a hx of poorly controlled BP and will need management of his BP with PCP closely. Any further questions, let me know. Thanks.

## 2018-05-21 PROBLEM — F48.8: Status: ACTIVE | Noted: 2018-05-21

## 2018-05-29 ENCOUNTER — TELEPHONE (OUTPATIENT)
Dept: CARDIOLOGY CLINIC | Age: 63
End: 2018-05-29

## 2018-09-28 ENCOUNTER — APPOINTMENT (OUTPATIENT)
Dept: GENERAL RADIOLOGY | Age: 63
End: 2018-09-28
Payer: MEDICARE

## 2018-09-28 ENCOUNTER — HOSPITAL ENCOUNTER (EMERGENCY)
Age: 63
Discharge: HOME | End: 2018-09-28
Attending: EMERGENCY MEDICINE
Payer: MEDICARE

## 2018-09-28 VITALS
WEIGHT: 239.42 LBS | SYSTOLIC BLOOD PRESSURE: 153 MMHG | DIASTOLIC BLOOD PRESSURE: 91 MMHG | BODY MASS INDEX: 32.43 KG/M2 | OXYGEN SATURATION: 97 % | HEIGHT: 72 IN | HEART RATE: 79 BPM | RESPIRATION RATE: 18 BRPM | TEMPERATURE: 97.1 F

## 2018-09-28 DIAGNOSIS — R07.9 CHEST PAIN, UNSPECIFIED TYPE: Primary | ICD-10-CM

## 2018-09-28 LAB
A/G RATIO: 1.6 (ref 1.1–2.2)
ALBUMIN SERPL-MCNC: 4.7 G/DL (ref 3.4–5)
ALP BLD-CCNC: 111 U/L (ref 40–129)
ALT SERPL-CCNC: 25 U/L (ref 10–40)
ANION GAP SERPL CALCULATED.3IONS-SCNC: 15 MMOL/L (ref 3–16)
AST SERPL-CCNC: 18 U/L (ref 15–37)
BASOPHILS ABSOLUTE: 0 K/UL (ref 0–0.2)
BASOPHILS RELATIVE PERCENT: 0.5 %
BILIRUB SERPL-MCNC: 0.5 MG/DL (ref 0–1)
BUN BLDV-MCNC: 22 MG/DL (ref 7–20)
CALCIUM SERPL-MCNC: 9.1 MG/DL (ref 8.3–10.6)
CHLORIDE BLD-SCNC: 98 MMOL/L (ref 99–110)
CO2: 27 MMOL/L (ref 21–32)
CREAT SERPL-MCNC: 1.2 MG/DL (ref 0.8–1.3)
EKG ATRIAL RATE: 94 BPM
EKG DIAGNOSIS: NORMAL
EKG P AXIS: 37 DEGREES
EKG P-R INTERVAL: 156 MS
EKG Q-T INTERVAL: 378 MS
EKG QRS DURATION: 88 MS
EKG QTC CALCULATION (BAZETT): 472 MS
EKG R AXIS: 59 DEGREES
EKG T AXIS: 41 DEGREES
EKG VENTRICULAR RATE: 94 BPM
EOSINOPHILS ABSOLUTE: 0.1 K/UL (ref 0–0.6)
EOSINOPHILS RELATIVE PERCENT: 1.1 %
GFR AFRICAN AMERICAN: >60
GFR NON-AFRICAN AMERICAN: >60
GLOBULIN: 2.9 G/DL
GLUCOSE BLD-MCNC: 149 MG/DL (ref 70–99)
HCT VFR BLD CALC: 42.5 % (ref 40.5–52.5)
HEMOGLOBIN: 14 G/DL (ref 13.5–17.5)
LYMPHOCYTES ABSOLUTE: 1.5 K/UL (ref 1–5.1)
LYMPHOCYTES RELATIVE PERCENT: 22.6 %
MCH RBC QN AUTO: 28.5 PG (ref 26–34)
MCHC RBC AUTO-ENTMCNC: 33 G/DL (ref 31–36)
MCV RBC AUTO: 86.5 FL (ref 80–100)
MONOCYTES ABSOLUTE: 0.6 K/UL (ref 0–1.3)
MONOCYTES RELATIVE PERCENT: 8.5 %
NEUTROPHILS ABSOLUTE: 4.5 K/UL (ref 1.7–7.7)
NEUTROPHILS RELATIVE PERCENT: 67.3 %
PDW BLD-RTO: 14.2 % (ref 12.4–15.4)
PLATELET # BLD: 203 K/UL (ref 135–450)
PMV BLD AUTO: 8.4 FL (ref 5–10.5)
POTASSIUM REFLEX MAGNESIUM: 3.7 MMOL/L (ref 3.5–5.1)
PRO-BNP: 128 PG/ML (ref 0–124)
RBC # BLD: 4.92 M/UL (ref 4.2–5.9)
SODIUM BLD-SCNC: 140 MMOL/L (ref 136–145)
TOTAL PROTEIN: 7.6 G/DL (ref 6.4–8.2)
TROPONIN: <0.01 NG/ML
TROPONIN: <0.01 NG/ML
WBC # BLD: 6.8 K/UL (ref 4–11)

## 2018-09-28 PROCEDURE — 6360000002 HC RX W HCPCS: Performed by: PHYSICIAN ASSISTANT

## 2018-09-28 PROCEDURE — 85025 COMPLETE CBC W/AUTO DIFF WBC: CPT

## 2018-09-28 PROCEDURE — 93010 ELECTROCARDIOGRAM REPORT: CPT | Performed by: INTERNAL MEDICINE

## 2018-09-28 PROCEDURE — 71046 X-RAY EXAM CHEST 2 VIEWS: CPT

## 2018-09-28 PROCEDURE — 93005 ELECTROCARDIOGRAM TRACING: CPT | Performed by: PHYSICIAN ASSISTANT

## 2018-09-28 PROCEDURE — 99285 EMERGENCY DEPT VISIT HI MDM: CPT

## 2018-09-28 PROCEDURE — 83880 ASSAY OF NATRIURETIC PEPTIDE: CPT

## 2018-09-28 PROCEDURE — 84484 ASSAY OF TROPONIN QUANT: CPT

## 2018-09-28 PROCEDURE — 6370000000 HC RX 637 (ALT 250 FOR IP): Performed by: PHYSICIAN ASSISTANT

## 2018-09-28 PROCEDURE — 80053 COMPREHEN METABOLIC PANEL: CPT

## 2018-09-28 PROCEDURE — 96374 THER/PROPH/DIAG INJ IV PUSH: CPT

## 2018-09-28 RX ORDER — KETOROLAC TROMETHAMINE 30 MG/ML
15 INJECTION, SOLUTION INTRAMUSCULAR; INTRAVENOUS ONCE
Status: COMPLETED | OUTPATIENT
Start: 2018-09-28 | End: 2018-09-28

## 2018-09-28 RX ORDER — ASPIRIN 81 MG/1
324 TABLET, CHEWABLE ORAL ONCE
Status: COMPLETED | OUTPATIENT
Start: 2018-09-28 | End: 2018-09-28

## 2018-09-28 RX ADMIN — ASPIRIN 81 MG CHEWABLE TABLET 324 MG: 81 TABLET CHEWABLE at 11:29

## 2018-09-28 RX ADMIN — KETOROLAC TROMETHAMINE 15 MG: 30 INJECTION, SOLUTION INTRAMUSCULAR at 14:59

## 2018-09-28 ASSESSMENT — PAIN DESCRIPTION - FREQUENCY
FREQUENCY: CONTINUOUS
FREQUENCY: CONTINUOUS

## 2018-09-28 ASSESSMENT — PAIN DESCRIPTION - LOCATION
LOCATION: CHEST
LOCATION: CHEST

## 2018-09-28 ASSESSMENT — PAIN SCALES - GENERAL
PAINLEVEL_OUTOF10: 3
PAINLEVEL_OUTOF10: 7
PAINLEVEL_OUTOF10: 5
PAINLEVEL_OUTOF10: 5

## 2018-09-28 ASSESSMENT — PAIN DESCRIPTION - PAIN TYPE
TYPE: ACUTE PAIN
TYPE: ACUTE PAIN

## 2018-09-28 ASSESSMENT — HEART SCORE: ECG: 1

## 2018-09-28 ASSESSMENT — PAIN DESCRIPTION - ORIENTATION
ORIENTATION: LEFT;UPPER
ORIENTATION: LEFT

## 2018-09-28 NOTE — ED PROVIDER NOTES
[address]  [address] [address]  Dept: [phone]  Loc: [phone]    eMERGENCY dEPARTMENT eNCOUnter        CHIEF COMPLAINT    Chief Complaint   Patient presents with    Chest Pain     L sided chest pain started yesterday, stent placement and MI    Shortness of Breath     on exertion, hx COPD       HPI    Bony Page is a 61 y.o. male who presents with chest pain. Onset was yesterday at 1 PM.  The duration has been intermittent since the onset, constant since 5:30 AM today. The chest pain quality is sharp. The chest pain is localized in the left anterior chest .  Severity is 6/10. The chest pain is not associated with a cough. There are no alleviating factors. The patient denies any associated radiation of the pain, vomiting, diaphoresis, or increased pain with exertion. REVIEW OF SYSTEMS    Cardiac: see HPI, No syncope  Respiratory: +SOB, no sputum production, No hemoptysis  GI: No Vomiting or Diarrhea  General: No Fever  All other systems reviewed and are negative.      PAST MEDICAL & SURGICAL HISTORY    Past Medical History:   Diagnosis Date    CAD (coronary artery disease)     Chest pain 10/15/2017    [facility] [address]     Diabetes mellitus ([facility] [address]     Hyperlipidemia     Hypertension     Kidney stone     MRSA colonization 04/01/2018    MRSA infection      Past Surgical History:   Procedure Laterality Date    CHOLECYSTECTOMY      COLONOSCOPY      CORONARY ANGIOPLASTY WITH STENT PLACEMENT      3 stents     LITHOTRIPSY      SHOULDER ARTHROSCOPY         CURRENT MEDICATIONS  (may include discharge medications prescribed in the ED)  Current Outpatient Rx   Medication Sig Dispense Refill    carvedilol (COREG) 3.125 MG tablet Take 1 tablet by mouth 2 times daily (with meals) 60 tablet 0    vitamin B-12 (CYANOCOBALAMIN) 500 MCG tablet Take 500 mcg by mouth daily  isosorbide mononitrate (IMDUR) 30 MG extended release tablet Take 1 tablet by mouth daily 30 tablet 0    vitamin B-1 (THIAMINE) 100 MG tablet Take 100 mg by mouth daily      hydrALAZINE (APRESOLINE) 100 MG tablet Take 1 tablet by mouth every 8 hours 90 tablet 0    fenofibrate (TRICOR) 54 MG tablet Take 1 tablet by mouth daily s [facility] pharmacy: Please dispense generic fenofibrate unless prescriber denote 30 tablet 0    OLANZapine (ZYPREXA) 10 MG tablet Take 10 mg by mouth nightly      metFORMIN (GLUCOPHAGE) 1000 MG tablet Take 1,000 mg by mouth 2 times daily (with meals)      sertraline (ZOLOFT) 100 MG tablet Take HALF tablet by mouth daily for 1 week, then take 1 tablet by mouth daily (Patient taking differently: Take 100 mg by mouth daily ) 30 tablet 3    zolpidem (AMBIEN) 10 MG tablet Take 10 mg by mouth nightly.  hydrOXYzine (VISTARIL) 50 MG capsule Take 1 capsule by mouth 2 times daily as needed for Anxiety 90 capsule 2    mirtazapine (REMERON) 15 MG tablet Take 1 tablet by mouth nightly 30 tablet 3    budesonide-formoterol (SYMBICORT) 160-4.5 MCG/ACT AERO Inhale 2 puffs into the lungs 2 times daily 1 Inhaler 0    albuterol sulfate  (90 Base) MCG/ACT inhaler Inhale 2 puffs into the lungs every 4 hours as needed for Wheezing or Shortness of Breath Use inhaler with a spacer device 2 Inhaler 0    Spacer/Aero-Holding Chambers (E-Z SPACER) MITZI 1 Device by Does not apply route daily as needed (shortness of breath) 1 Device 0    nitroGLYCERIN (NITROSTAT) 0.4 MG SL tablet up to max of 3 total doses.  If no relief after 1 dose, call 911. 25 tablet 3    atorvastatin (LIPITOR) 40 MG tablet Take 40 mg by mouth daily      glucose blood VI test strips (FREESTYLE TEST STRIPS) strip 1 each by In Vitro route daily Test daily ICD E11.42 100 each 11    vitamin D (D3-1000) 1000 UNITS TABS tablet Take 1 tablet by mouth daily 90 tablet 3    glucose monitoring kit (FREESTYLE) monitoring kit 1 kit by interpreted. (See chart for details)  See chart for details of medications given during the ED stay. Vitals:    09/28/18 1401 09/28/18 1429 09/28/18 1446 09/28/18 1501   BP: (!) 141/70 (!) 119/92 136/89 (!) 147/95   Pulse: 76 94 85 90   Resp: 13 15 17 24   Temp:       TempSrc:       SpO2: 95% 100% 96% 97%   Weight:       Height:           Differential Diagnosis: Acute bronchitis, Musculoskeletal chest pain, Pleurisy, Pulmonary edema, Congestive Heart Failure, Myocardial Infarction, Pulmonary Embolus, Thoracic Dissection, Pericarditis, Pericardial Effusion, Pneumonia, Pneumothorax, Boerhaave's syndrome, Ischemic Bowel, Bowel Obstruction, PUD, GERD, Acute Cholecystitis, Pancreatitis, Hepatitis, Colitis, other    CARDIAC CATH 12/19/2017:  1. Minimal spasm of nondominant right coronary artery which was catheter  induced clearly. 2.  Patent stents in the proximal LAD and mid circumflex coronary artery. Left coronary dominance. No coronary artery disease identified of any  significance. The stents were widely patent in the LAD and circumflex. 3.  LV ejection fraction 55% without wall motion abnormality. The LVEDP  was only 3-5 mmHg suggesting that the patient is   chronically intravascularly depleted. 4.  Successful Angio-Seal right femoral arteriotomy. Patient is afebrile and nontoxic in appearance. Labs overall unremarkable. EKG nonischemic as interpreted by the physician, troponin negative. Chest x-ray as above. Patient is not PERC negative due to age being over 48, however, I believe the patient is clinically at low risk for pulmonary embolism: HR <100, O2 Sat on RA >95%, no prior history of venous thromboembolism, no trauma or surgery within the past 4 weeks, no hemoptysis, no exogenous estrogen use, and no unilateral leg swelling.  Patient is also in the low risk group per the Wells' Criteria for Pulmonary Embolism: no clinical signs or symptoms of DVT, PE is not the #1 most likely diagnosis,

## 2018-11-08 ENCOUNTER — APPOINTMENT (OUTPATIENT)
Dept: CT IMAGING | Age: 63
DRG: 305 | End: 2018-11-08
Payer: MEDICARE

## 2018-11-08 ENCOUNTER — APPOINTMENT (OUTPATIENT)
Dept: GENERAL RADIOLOGY | Age: 63
DRG: 305 | End: 2018-11-08
Payer: MEDICARE

## 2018-11-08 ENCOUNTER — HOSPITAL ENCOUNTER (INPATIENT)
Age: 63
LOS: 3 days | Discharge: HOME | DRG: 305 | End: 2018-11-14
Attending: EMERGENCY MEDICINE
Payer: MEDICARE

## 2018-11-08 DIAGNOSIS — R55 SYNCOPE AND COLLAPSE: ICD-10-CM

## 2018-11-08 DIAGNOSIS — S09.90XA CLOSED HEAD INJURY, INITIAL ENCOUNTER: ICD-10-CM

## 2018-11-08 DIAGNOSIS — R07.9 CHEST PAIN, UNSPECIFIED TYPE: Primary | ICD-10-CM

## 2018-11-08 DIAGNOSIS — R06.02 SHORTNESS OF BREATH: ICD-10-CM

## 2018-11-08 LAB
A/G RATIO: 1.5 (ref 1.1–2.2)
ALBUMIN SERPL-MCNC: 4 G/DL (ref 3.4–5)
ALP BLD-CCNC: 126 U/L (ref 40–129)
ALT SERPL-CCNC: 35 U/L (ref 10–40)
ANION GAP SERPL CALCULATED.3IONS-SCNC: 11 MMOL/L (ref 3–16)
AST SERPL-CCNC: 24 U/L (ref 15–37)
BASOPHILS ABSOLUTE: 0 K/UL (ref 0–0.2)
BASOPHILS RELATIVE PERCENT: 0.7 %
BILIRUB SERPL-MCNC: <0.2 MG/DL (ref 0–1)
BILIRUBIN URINE: NEGATIVE
BLOOD, URINE: NEGATIVE
BUN BLDV-MCNC: 12 MG/DL (ref 7–20)
CALCIUM SERPL-MCNC: 8.3 MG/DL (ref 8.3–10.6)
CHLORIDE BLD-SCNC: 106 MMOL/L (ref 99–110)
CLARITY: CLEAR
CO2: 23 MMOL/L (ref 21–32)
COLOR: YELLOW
CREAT SERPL-MCNC: 0.9 MG/DL (ref 0.8–1.3)
D DIMER: <200 NG/ML DDU (ref 0–229)
EOSINOPHILS ABSOLUTE: 0.1 K/UL (ref 0–0.6)
EOSINOPHILS RELATIVE PERCENT: 1.3 %
EPITHELIAL CELLS, UA: 0 /HPF (ref 0–5)
GFR AFRICAN AMERICAN: >60
GFR NON-AFRICAN AMERICAN: >60
GLOBULIN: 2.7 G/DL
GLUCOSE BLD-MCNC: 109 MG/DL (ref 70–99)
GLUCOSE BLD-MCNC: 131 MG/DL (ref 70–99)
GLUCOSE URINE: 250 MG/DL
HCT VFR BLD CALC: 37.5 % (ref 40.5–52.5)
HEMOGLOBIN: 12.3 G/DL (ref 13.5–17.5)
HYALINE CASTS: 0 /LPF (ref 0–8)
KETONES, URINE: NEGATIVE MG/DL
LACTIC ACID: 1.5 MMOL/L (ref 0.4–2)
LEUKOCYTE ESTERASE, URINE: NEGATIVE
LYMPHOCYTES ABSOLUTE: 1.4 K/UL (ref 1–5.1)
LYMPHOCYTES RELATIVE PERCENT: 22.9 %
MCH RBC QN AUTO: 28.6 PG (ref 26–34)
MCHC RBC AUTO-ENTMCNC: 32.7 G/DL (ref 31–36)
MCV RBC AUTO: 87.4 FL (ref 80–100)
MICROSCOPIC EXAMINATION: YES
MONOCYTES ABSOLUTE: 0.5 K/UL (ref 0–1.3)
MONOCYTES RELATIVE PERCENT: 7.7 %
NEUTROPHILS ABSOLUTE: 4.1 K/UL (ref 1.7–7.7)
NEUTROPHILS RELATIVE PERCENT: 67.4 %
NITRITE, URINE: NEGATIVE
PDW BLD-RTO: 14.3 % (ref 12.4–15.4)
PERFORMED ON: ABNORMAL
PH UA: 5.5
PLATELET # BLD: 180 K/UL (ref 135–450)
PMV BLD AUTO: 8.2 FL (ref 5–10.5)
POTASSIUM SERPL-SCNC: 4.2 MMOL/L (ref 3.5–5.1)
PROTEIN UA: ABNORMAL MG/DL
RAPID INFLUENZA  B AGN: NEGATIVE
RAPID INFLUENZA A AGN: NEGATIVE
RBC # BLD: 4.29 M/UL (ref 4.2–5.9)
RBC UA: 1 /HPF (ref 0–4)
SODIUM BLD-SCNC: 140 MMOL/L (ref 136–145)
SPECIFIC GRAVITY UA: 1.02
TOTAL PROTEIN: 6.7 G/DL (ref 6.4–8.2)
TROPONIN: <0.01 NG/ML
URINE REFLEX TO CULTURE: ABNORMAL
URINE TYPE: ABNORMAL
UROBILINOGEN, URINE: 0.2 E.U./DL
WBC # BLD: 6.1 K/UL (ref 4–11)
WBC UA: 1 /HPF (ref 0–5)

## 2018-11-08 PROCEDURE — 83605 ASSAY OF LACTIC ACID: CPT

## 2018-11-08 PROCEDURE — 70450 CT HEAD/BRAIN W/O DYE: CPT

## 2018-11-08 PROCEDURE — 85379 FIBRIN DEGRADATION QUANT: CPT

## 2018-11-08 PROCEDURE — 96374 THER/PROPH/DIAG INJ IV PUSH: CPT

## 2018-11-08 PROCEDURE — 71046 X-RAY EXAM CHEST 2 VIEWS: CPT

## 2018-11-08 PROCEDURE — 36415 COLL VENOUS BLD VENIPUNCTURE: CPT

## 2018-11-08 PROCEDURE — G0378 HOSPITAL OBSERVATION PER HR: HCPCS

## 2018-11-08 PROCEDURE — 81001 URINALYSIS AUTO W/SCOPE: CPT

## 2018-11-08 PROCEDURE — 2580000003 HC RX 258: Performed by: NURSE PRACTITIONER

## 2018-11-08 PROCEDURE — 6370000000 HC RX 637 (ALT 250 FOR IP): Performed by: NURSE PRACTITIONER

## 2018-11-08 PROCEDURE — 80053 COMPREHEN METABOLIC PANEL: CPT

## 2018-11-08 PROCEDURE — 87804 INFLUENZA ASSAY W/OPTIC: CPT

## 2018-11-08 PROCEDURE — 6360000002 HC RX W HCPCS: Performed by: NURSE PRACTITIONER

## 2018-11-08 PROCEDURE — 99285 EMERGENCY DEPT VISIT HI MDM: CPT

## 2018-11-08 PROCEDURE — 84484 ASSAY OF TROPONIN QUANT: CPT

## 2018-11-08 PROCEDURE — 85025 COMPLETE CBC W/AUTO DIFF WBC: CPT

## 2018-11-08 PROCEDURE — 6370000000 HC RX 637 (ALT 250 FOR IP): Performed by: EMERGENCY MEDICINE

## 2018-11-08 PROCEDURE — 93005 ELECTROCARDIOGRAM TRACING: CPT | Performed by: NURSE PRACTITIONER

## 2018-11-08 RX ORDER — ONDANSETRON 2 MG/ML
4 INJECTION INTRAMUSCULAR; INTRAVENOUS EVERY 6 HOURS PRN
Status: DISCONTINUED | OUTPATIENT
Start: 2018-11-08 | End: 2018-11-14 | Stop reason: HOSPADM

## 2018-11-08 RX ORDER — MIRTAZAPINE 15 MG/1
15 TABLET, FILM COATED ORAL NIGHTLY
Status: DISCONTINUED | OUTPATIENT
Start: 2018-11-08 | End: 2018-11-14 | Stop reason: HOSPADM

## 2018-11-08 RX ORDER — ZOLPIDEM TARTRATE 5 MG/1
10 TABLET ORAL NIGHTLY
Status: DISCONTINUED | OUTPATIENT
Start: 2018-11-08 | End: 2018-11-14 | Stop reason: HOSPADM

## 2018-11-08 RX ORDER — ACETAMINOPHEN 325 MG/1
650 TABLET ORAL EVERY 4 HOURS PRN
Status: DISCONTINUED | OUTPATIENT
Start: 2018-11-08 | End: 2018-11-14 | Stop reason: HOSPADM

## 2018-11-08 RX ORDER — ASPIRIN 81 MG/1
243 TABLET, CHEWABLE ORAL ONCE
Status: COMPLETED | OUTPATIENT
Start: 2018-11-08 | End: 2018-11-08

## 2018-11-08 RX ORDER — METOPROLOL TARTRATE 50 MG/1
50 TABLET, FILM COATED ORAL ONCE
Status: COMPLETED | OUTPATIENT
Start: 2018-11-08 | End: 2018-11-08

## 2018-11-08 RX ORDER — LISINOPRIL AND HYDROCHLOROTHIAZIDE 12.5; 1 MG/1; MG/1
2 TABLET ORAL DAILY
Status: DISCONTINUED | OUTPATIENT
Start: 2018-11-09 | End: 2018-11-09

## 2018-11-08 RX ORDER — HYDRALAZINE HYDROCHLORIDE 20 MG/ML
10 INJECTION INTRAMUSCULAR; INTRAVENOUS EVERY 6 HOURS PRN
Status: DISCONTINUED | OUTPATIENT
Start: 2018-11-08 | End: 2018-11-08

## 2018-11-08 RX ORDER — ASPIRIN 81 MG/1
81 TABLET, CHEWABLE ORAL DAILY
Status: DISCONTINUED | OUTPATIENT
Start: 2018-11-09 | End: 2018-11-14 | Stop reason: HOSPADM

## 2018-11-08 RX ORDER — LISINOPRIL AND HYDROCHLOROTHIAZIDE 25; 20 MG/1; MG/1
1 TABLET ORAL DAILY
Status: ON HOLD | COMMUNITY
End: 2018-11-14

## 2018-11-08 RX ORDER — FENOFIBRATE 54 MG/1
54 TABLET ORAL DAILY
Status: DISCONTINUED | OUTPATIENT
Start: 2018-11-09 | End: 2018-11-14 | Stop reason: HOSPADM

## 2018-11-08 RX ORDER — HYDRALAZINE HYDROCHLORIDE 20 MG/ML
10 INJECTION INTRAMUSCULAR; INTRAVENOUS ONCE
Status: COMPLETED | OUTPATIENT
Start: 2018-11-08 | End: 2018-11-08

## 2018-11-08 RX ORDER — NITROGLYCERIN 0.4 MG/1
0.4 TABLET SUBLINGUAL EVERY 5 MIN PRN
Status: DISCONTINUED | OUTPATIENT
Start: 2018-11-08 | End: 2018-11-14 | Stop reason: HOSPADM

## 2018-11-08 RX ORDER — CARVEDILOL 3.12 MG/1
3.12 TABLET ORAL 2 TIMES DAILY WITH MEALS
Status: DISCONTINUED | OUTPATIENT
Start: 2018-11-09 | End: 2018-11-08

## 2018-11-08 RX ORDER — SERTRALINE HYDROCHLORIDE 100 MG/1
100 TABLET, FILM COATED ORAL NIGHTLY
Status: DISCONTINUED | OUTPATIENT
Start: 2018-11-08 | End: 2018-11-14 | Stop reason: HOSPADM

## 2018-11-08 RX ORDER — CARVEDILOL 3.12 MG/1
3.12 TABLET ORAL 2 TIMES DAILY WITH MEALS
Status: DISCONTINUED | OUTPATIENT
Start: 2018-11-08 | End: 2018-11-09

## 2018-11-08 RX ORDER — SODIUM CHLORIDE 0.9 % (FLUSH) 0.9 %
10 SYRINGE (ML) INJECTION PRN
Status: DISCONTINUED | OUTPATIENT
Start: 2018-11-08 | End: 2018-11-14 | Stop reason: HOSPADM

## 2018-11-08 RX ORDER — ROSUVASTATIN CALCIUM 10 MG/1
20 TABLET, COATED ORAL NIGHTLY
Status: DISCONTINUED | OUTPATIENT
Start: 2018-11-08 | End: 2018-11-14 | Stop reason: HOSPADM

## 2018-11-08 RX ORDER — SODIUM CHLORIDE 0.9 % (FLUSH) 0.9 %
10 SYRINGE (ML) INJECTION EVERY 12 HOURS SCHEDULED
Status: DISCONTINUED | OUTPATIENT
Start: 2018-11-08 | End: 2018-11-14 | Stop reason: HOSPADM

## 2018-11-08 RX ORDER — HYDROCODONE BITARTRATE AND ACETAMINOPHEN 5; 325 MG/1; MG/1
2 TABLET ORAL ONCE
Status: COMPLETED | OUTPATIENT
Start: 2018-11-08 | End: 2018-11-08

## 2018-11-08 RX ADMIN — NITROGLYCERIN 0.4 MG: 0.4 TABLET, ORALLY DISINTEGRATING SUBLINGUAL at 18:35

## 2018-11-08 RX ADMIN — ASPIRIN 81 MG 243 MG: 81 TABLET ORAL at 18:35

## 2018-11-08 RX ADMIN — NITROGLYCERIN 0.4 MG: 0.4 TABLET, ORALLY DISINTEGRATING SUBLINGUAL at 18:46

## 2018-11-08 RX ADMIN — Medication 10 ML: at 22:56

## 2018-11-08 RX ADMIN — ACETAMINOPHEN 650 MG: 325 TABLET, FILM COATED ORAL at 22:54

## 2018-11-08 RX ADMIN — SERTRALINE 100 MG: 100 TABLET, FILM COATED ORAL at 22:54

## 2018-11-08 RX ADMIN — HYDRALAZINE HYDROCHLORIDE 10 MG: 20 INJECTION INTRAMUSCULAR; INTRAVENOUS at 23:17

## 2018-11-08 RX ADMIN — NITROGLYCERIN 0.4 MG: 0.4 TABLET, ORALLY DISINTEGRATING SUBLINGUAL at 18:39

## 2018-11-08 RX ADMIN — HYDROCODONE BITARTRATE AND ACETAMINOPHEN 2 TABLET: 5; 325 TABLET ORAL at 20:52

## 2018-11-08 RX ADMIN — NITROGLYCERIN 0.5 INCH: 20 OINTMENT TOPICAL at 22:54

## 2018-11-08 RX ADMIN — ZOLPIDEM TARTRATE 10 MG: 5 TABLET ORAL at 22:57

## 2018-11-08 RX ADMIN — MIRTAZAPINE 15 MG: 15 TABLET, FILM COATED ORAL at 22:54

## 2018-11-08 RX ADMIN — METOPROLOL TARTRATE 50 MG: 50 TABLET ORAL at 19:12

## 2018-11-08 RX ADMIN — ROSUVASTATIN CALCIUM 20 MG: 10 TABLET, FILM COATED ORAL at 23:16

## 2018-11-08 RX ADMIN — CARVEDILOL 3.12 MG: 3.12 TABLET, FILM COATED ORAL at 22:54

## 2018-11-08 ASSESSMENT — PAIN DESCRIPTION - PAIN TYPE
TYPE: ACUTE PAIN

## 2018-11-08 ASSESSMENT — ENCOUNTER SYMPTOMS
SHORTNESS OF BREATH: 1
COLOR CHANGE: 0
ABDOMINAL PAIN: 0
COUGH: 0
BACK PAIN: 0
DIARRHEA: 0
FACIAL SWELLING: 0
NAUSEA: 0
SORE THROAT: 0
VOMITING: 0
CONSTIPATION: 0
SINUS PRESSURE: 1
ABDOMINAL DISTENTION: 0

## 2018-11-08 ASSESSMENT — PAIN DESCRIPTION - LOCATION
LOCATION: CHEST
LOCATION: CHEST;GENERALIZED
LOCATION: CHEST

## 2018-11-08 ASSESSMENT — PAIN SCALES - GENERAL
PAINLEVEL_OUTOF10: 8
PAINLEVEL_OUTOF10: 7
PAINLEVEL_OUTOF10: 7
PAINLEVEL_OUTOF10: 6

## 2018-11-08 ASSESSMENT — PAIN DESCRIPTION - ORIENTATION: ORIENTATION: LEFT

## 2018-11-08 ASSESSMENT — PAIN DESCRIPTION - FREQUENCY
FREQUENCY: CONTINUOUS
FREQUENCY: CONTINUOUS

## 2018-11-08 ASSESSMENT — PAIN DESCRIPTION - DESCRIPTORS: DESCRIPTORS: SHARP

## 2018-11-08 NOTE — ED PROVIDER NOTES
[facility]  [address] [place] [address]   Phone [phone]   D-DIMER, QUANTITATIVE    Narrative:     Performed at:  [facility]  [address] [facility], [place] [address]   Phone [phone]   MICROSCOPIC URINALYSIS    Narrative:     Performed at:  [facility]  [address] [facility] [place] [address]   Phone [phone]       All other labs were within normal range or not returned as of this dictation. EKG: All EKG's are interpreted by the Emergency Department Physician who either signs orCo-signs this chart in the absence of a cardiologist.  Please see their note for interpretation of EKG. RADIOLOGY:   Non-plain film images such as CT, Ultrasound and MRI are read by the radiologist. Plain radiographic images are visualized andpreliminarily interpreted by the  ED Provider with the below findings:        Interpretation perthe Radiologist below, if available at the time of this note:    CT HEAD WO CONTRAST   Final Result   No acute intracranial abnormality. XR CHEST STANDARD (2 VW)   Final Result   No acute abnormality. Clear lungs. No significant change from the prior   study. No results found.       PROCEDURES   Unless otherwise noted below, none     Procedures    CRITICAL CARE TIME   N/A    CONSULTS:  IP CONSULT TO HOSPITALIST      EMERGENCY DEPARTMENT COURSE and DIFFERENTIALDIAGNOSIS/MDM:   Vitals:    Vitals:    11/08/18 1745 11/08/18 1817 11/08/18 1902 11/08/18 1946   BP: (!) 202/111  (!) 173/93 (!) 176/98   Pulse: 91  99 96   Resp: 16  19 20   Temp: 98.2 °F (36.8 °C) 99.3 °F (37.4 °C)     TempSrc: Oral Rectal     SpO2: 97%  97% 96%   Weight: 257 lb 0.9 oz (116.6 kg)      Height: 6' 1\" (1.854 m)          Patient was given thefollowing medications:  Medications   nitroGLYCERIN (NITROSTAT) SL tablet 0.4 mg (0.4 mg Sublingual Given 11/8/18 1846)   aspirin chewable tablet 243 mg (243 mg

## 2018-11-09 PROBLEM — R06.02 SHORTNESS OF BREATH: Status: ACTIVE | Noted: 2018-11-09

## 2018-11-09 LAB
CHOLESTEROL, TOTAL: 154 MG/DL (ref 0–199)
GLUCOSE BLD-MCNC: 130 MG/DL (ref 70–99)
GLUCOSE BLD-MCNC: 132 MG/DL (ref 70–99)
GLUCOSE BLD-MCNC: 165 MG/DL (ref 70–99)
GLUCOSE BLD-MCNC: 187 MG/DL (ref 70–99)
HCT VFR BLD CALC: 34.5 % (ref 40.5–52.5)
HDLC SERPL-MCNC: 29 MG/DL (ref 40–60)
HEMOGLOBIN: 11.4 G/DL (ref 13.5–17.5)
LDL CHOLESTEROL CALCULATED: ABNORMAL MG/DL
LDL CHOLESTEROL DIRECT: 61 MG/DL
MCH RBC QN AUTO: 28.8 PG (ref 26–34)
MCHC RBC AUTO-ENTMCNC: 33.2 G/DL (ref 31–36)
MCV RBC AUTO: 86.8 FL (ref 80–100)
PDW BLD-RTO: 14.1 % (ref 12.4–15.4)
PERFORMED ON: ABNORMAL
PLATELET # BLD: 171 K/UL (ref 135–450)
PMV BLD AUTO: 8.1 FL (ref 5–10.5)
RBC # BLD: 3.97 M/UL (ref 4.2–5.9)
TRIGL SERPL-MCNC: 546 MG/DL (ref 0–150)
TROPONIN: <0.01 NG/ML
TROPONIN: <0.01 NG/ML
VLDLC SERPL CALC-MCNC: ABNORMAL MG/DL
WBC # BLD: 5.3 K/UL (ref 4–11)

## 2018-11-09 PROCEDURE — 6360000002 HC RX W HCPCS: Performed by: INTERNAL MEDICINE

## 2018-11-09 PROCEDURE — 93010 ELECTROCARDIOGRAM REPORT: CPT | Performed by: INTERNAL MEDICINE

## 2018-11-09 PROCEDURE — G0008 ADMIN INFLUENZA VIRUS VAC: HCPCS | Performed by: INTERNAL MEDICINE

## 2018-11-09 PROCEDURE — 94760 N-INVAS EAR/PLS OXIMETRY 1: CPT

## 2018-11-09 PROCEDURE — 96376 TX/PRO/DX INJ SAME DRUG ADON: CPT

## 2018-11-09 PROCEDURE — 85027 COMPLETE CBC AUTOMATED: CPT

## 2018-11-09 PROCEDURE — 84484 ASSAY OF TROPONIN QUANT: CPT

## 2018-11-09 PROCEDURE — 80061 LIPID PANEL: CPT

## 2018-11-09 PROCEDURE — 99223 1ST HOSP IP/OBS HIGH 75: CPT | Performed by: INTERNAL MEDICINE

## 2018-11-09 PROCEDURE — 83721 ASSAY OF BLOOD LIPOPROTEIN: CPT

## 2018-11-09 PROCEDURE — 36415 COLL VENOUS BLD VENIPUNCTURE: CPT

## 2018-11-09 PROCEDURE — 6370000000 HC RX 637 (ALT 250 FOR IP): Performed by: INTERNAL MEDICINE

## 2018-11-09 PROCEDURE — 90686 IIV4 VACC NO PRSV 0.5 ML IM: CPT | Performed by: INTERNAL MEDICINE

## 2018-11-09 PROCEDURE — 6370000000 HC RX 637 (ALT 250 FOR IP): Performed by: NURSE PRACTITIONER

## 2018-11-09 PROCEDURE — G0378 HOSPITAL OBSERVATION PER HR: HCPCS

## 2018-11-09 PROCEDURE — 2580000003 HC RX 258: Performed by: NURSE PRACTITIONER

## 2018-11-09 PROCEDURE — 96372 THER/PROPH/DIAG INJ SC/IM: CPT

## 2018-11-09 PROCEDURE — 6360000002 HC RX W HCPCS: Performed by: NURSE PRACTITIONER

## 2018-11-09 RX ORDER — CARVEDILOL 6.25 MG/1
6.25 TABLET ORAL 2 TIMES DAILY WITH MEALS
Status: DISCONTINUED | OUTPATIENT
Start: 2018-11-09 | End: 2018-11-09

## 2018-11-09 RX ORDER — NIFEDIPINE 60 MG/1
60 TABLET, EXTENDED RELEASE ORAL DAILY
Status: DISCONTINUED | OUTPATIENT
Start: 2018-11-09 | End: 2018-11-11

## 2018-11-09 RX ORDER — NICOTINE POLACRILEX 4 MG
15 LOZENGE BUCCAL PRN
Status: DISCONTINUED | OUTPATIENT
Start: 2018-11-09 | End: 2018-11-14 | Stop reason: HOSPADM

## 2018-11-09 RX ORDER — CARVEDILOL 25 MG/1
25 TABLET ORAL 2 TIMES DAILY WITH MEALS
Status: DISCONTINUED | OUTPATIENT
Start: 2018-11-09 | End: 2018-11-14 | Stop reason: HOSPADM

## 2018-11-09 RX ORDER — DEXTROSE MONOHYDRATE 50 MG/ML
100 INJECTION, SOLUTION INTRAVENOUS PRN
Status: DISCONTINUED | OUTPATIENT
Start: 2018-11-09 | End: 2018-11-14 | Stop reason: HOSPADM

## 2018-11-09 RX ORDER — DEXTROSE MONOHYDRATE 25 G/50ML
12.5 INJECTION, SOLUTION INTRAVENOUS PRN
Status: DISCONTINUED | OUTPATIENT
Start: 2018-11-09 | End: 2018-11-14 | Stop reason: HOSPADM

## 2018-11-09 RX ORDER — LISINOPRIL 20 MG/1
40 TABLET ORAL DAILY
Status: DISCONTINUED | OUTPATIENT
Start: 2018-11-09 | End: 2018-11-14 | Stop reason: HOSPADM

## 2018-11-09 RX ORDER — HYDRALAZINE HYDROCHLORIDE 20 MG/ML
10 INJECTION INTRAMUSCULAR; INTRAVENOUS EVERY 6 HOURS PRN
Status: DISCONTINUED | OUTPATIENT
Start: 2018-11-09 | End: 2018-11-14 | Stop reason: HOSPADM

## 2018-11-09 RX ADMIN — NIFEDIPINE 60 MG: 60 TABLET, FILM COATED, EXTENDED RELEASE ORAL at 10:09

## 2018-11-09 RX ADMIN — ACETAMINOPHEN 650 MG: 325 TABLET, FILM COATED ORAL at 19:54

## 2018-11-09 RX ADMIN — ASPIRIN 81 MG CHEWABLE TABLET 81 MG: 81 TABLET CHEWABLE at 10:09

## 2018-11-09 RX ADMIN — LISINOPRIL 40 MG: 20 TABLET ORAL at 10:09

## 2018-11-09 RX ADMIN — ENOXAPARIN SODIUM 40 MG: 40 INJECTION SUBCUTANEOUS at 10:09

## 2018-11-09 RX ADMIN — ROSUVASTATIN CALCIUM 20 MG: 10 TABLET, FILM COATED ORAL at 21:28

## 2018-11-09 RX ADMIN — NITROGLYCERIN 0.5 INCH: 20 OINTMENT TOPICAL at 05:43

## 2018-11-09 RX ADMIN — INSULIN LISPRO 1 UNITS: 100 INJECTION, SOLUTION INTRAVENOUS; SUBCUTANEOUS at 17:01

## 2018-11-09 RX ADMIN — NITROGLYCERIN 0.5 INCH: 20 OINTMENT TOPICAL at 16:45

## 2018-11-09 RX ADMIN — CARVEDILOL 25 MG: 25 TABLET, FILM COATED ORAL at 16:45

## 2018-11-09 RX ADMIN — FENOFIBRATE 54 MG: 54 TABLET ORAL at 10:20

## 2018-11-09 RX ADMIN — SERTRALINE 100 MG: 100 TABLET, FILM COATED ORAL at 21:29

## 2018-11-09 RX ADMIN — INFLUENZA A VIRUS A/MICHIGAN/45/2015 X-275 (H1N1) ANTIGEN (FORMALDEHYDE INACTIVATED), INFLUENZA A VIRUS A/SINGAPORE/INFIMH-16-0019/2016 IVR-186 (H3N2) ANTIGEN (FORMALDEHYDE INACTIVATED), INFLUENZA B VIRUS B/PHUKET/3073/2013 ANTIGEN (FORMALDEHYDE INACTIVATED), AND INFLUENZA B VIRUS B/MARYLAND/15/2016 BX-69A ANTIGEN (FORMALDEHYDE INACTIVATED) 0.5 ML: 15; 15; 15; 15 INJECTION, SUSPENSION INTRAMUSCULAR at 10:09

## 2018-11-09 RX ADMIN — NITROGLYCERIN 0.5 INCH: 20 OINTMENT TOPICAL at 12:39

## 2018-11-09 RX ADMIN — MIRTAZAPINE 15 MG: 15 TABLET, FILM COATED ORAL at 21:28

## 2018-11-09 RX ADMIN — ZOLPIDEM TARTRATE 10 MG: 5 TABLET ORAL at 21:29

## 2018-11-09 RX ADMIN — Medication 10 ML: at 21:29

## 2018-11-09 RX ADMIN — HYDRALAZINE HYDROCHLORIDE 10 MG: 20 INJECTION INTRAMUSCULAR; INTRAVENOUS at 16:46

## 2018-11-09 RX ADMIN — Medication 10 ML: at 10:10

## 2018-11-09 ASSESSMENT — PAIN DESCRIPTION - LOCATION
LOCATION: CHEST
LOCATION: CHEST
LOCATION: LEG

## 2018-11-09 ASSESSMENT — PAIN DESCRIPTION - DESCRIPTORS
DESCRIPTORS: PRESSURE
DESCRIPTORS: SHARP

## 2018-11-09 ASSESSMENT — PAIN SCALES - GENERAL
PAINLEVEL_OUTOF10: 6
PAINLEVEL_OUTOF10: 0
PAINLEVEL_OUTOF10: 2
PAINLEVEL_OUTOF10: 0
PAINLEVEL_OUTOF10: 6

## 2018-11-09 ASSESSMENT — PAIN DESCRIPTION - PAIN TYPE
TYPE: ACUTE PAIN
TYPE: CHRONIC PAIN

## 2018-11-09 ASSESSMENT — PAIN DESCRIPTION - ORIENTATION: ORIENTATION: LEFT

## 2018-11-09 ASSESSMENT — PAIN DESCRIPTION - FREQUENCY: FREQUENCY: CONTINUOUS

## 2018-11-09 NOTE — CONSULTS
mg, Oral, Daily  glucose (GLUTOSE) 40 % oral gel 15 g, 15 g, Oral, PRN  dextrose 50 % solution 12.5 g, 12.5 g, Intravenous, PRN  glucagon (rDNA) injection 1 mg, 1 mg, Intramuscular, PRN  dextrose 5 % solution, 100 mL/hr, Intravenous, PRN  insulin lispro (HUMALOG) injection vial 0-6 Units, 0-6 Units, Subcutaneous, TID WC  insulin lispro (HUMALOG) injection vial 0-3 Units, 0-3 Units, Subcutaneous, Nightly  NIFEdipine (PROCARDIA XL) extended release tablet 60 mg, 60 mg, Oral, Daily  carvedilol (COREG) tablet 25 mg, 25 mg, Oral, BID WC  nitroGLYCERIN (NITROSTAT) SL tablet 0.4 mg, 0.4 mg, Sublingual, Q5 Min PRN  aspirin chewable tablet 81 mg, 81 mg, Oral, Daily  fenofibrate (TRICOR) tablet 54 mg, 54 mg, Oral, Daily  mirtazapine (REMERON) tablet 15 mg, 15 mg, Oral, Nightly  sertraline (ZOLOFT) tablet 100 mg, 100 mg, Oral, Nightly  zolpidem (AMBIEN) tablet 10 mg, 10 mg, Oral, Nightly  sodium chloride flush 0.9 % injection 10 mL, 10 mL, Intravenous, 2 times per day  sodium chloride flush 0.9 % injection 10 mL, 10 mL, Intravenous, PRN  magnesium hydroxide (MILK OF MAGNESIA) 400 MG/5ML suspension 30 mL, 30 mL, Oral, Daily PRN  ondansetron (ZOFRAN) injection 4 mg, 4 mg, Intravenous, Q6H PRN  rosuvastatin (CRESTOR) tablet 20 mg, 20 mg, Oral, Nightly  acetaminophen (TYLENOL) tablet 650 mg, 650 mg, Oral, Q4H PRN  enoxaparin (LOVENOX) injection 40 mg, 40 mg, Subcutaneous, Daily  influenza quadrivalent split vaccine (FLUZONE;FLUARIX;FLULAVAL;AFLURIA) injection 0.5 mL, 0.5 mL, Intramuscular, Once  nitroglycerin (NITRO-BID) 2 % ointment 0.5 inch, 0.5 inch, Topical, 4 times per day      Labs:   Recent Labs      11/08/18   1826  11/09/18   0604   WBC  6.1  5.3   HGB  12.3*  11.4*   HCT  37.5*  34.5*   PLT  180  171     Recent Labs      11/08/18   1826   NA  140   K  4.2   CO2  23   BUN  12   CREATININE  0.9   GLUCOSE  131*     Recent Labs      11/08/18   1826  11/08/18   2218  11/09/18   0159   TROPONINI  <0.01  <0.01  <0.01     Lab Results   Component Value Date    HDL 29 11/09/2018    LDLDIRECT 61 11/09/2018    LDLCALC see below 11/09/2018    TRIG 546 11/09/2018     Recent Labs      11/08/18   1826   AST  24   ALT  35   LABALBU  4.0         EKG:   Normal sinus rhythmPossible Left atrial enlargementIncomplete right bundle branch blockNonspecific T wave abnormalityAbnormal     Chest X-Ray:      ECHO: 2/2018   Normal left ventricle size and systolic function with an estimated ejection   fraction of 55-60%. No regional wall motion abnormalities are seen. There is   mild concentric left ventricular hypertrophy.   Normal right ventricular size and function.   Trivial mitral regurgitation. Stress Nuclear: 4/2018   Normal myocardial perfusion study.    Normal LV size and systolic function.    Non-diagnostic EKG response due to failure to reach target heart rate .    Overall findings represent a low risk study.             [facility] angiogram  & Intervention: 12/2017  1. Minimal spasm of nondominant right coronary artery which was catheter induced clearly. 2.  Patent stents in the proximal LAD and mid circumflex coronary artery. Left coronary dominance. No coronary artery disease identified of any significance. The stents were widely patent in the LAD and circumflex. 3.  LV ejection fraction 55% without wall motion abnormality. The LVEDP was only 3-5 mmHg suggesting that the patient is chronically intravascularly depleted. 4.  Successful Angio-Seal right femoral arteriotomy. ASSESSMENT AND PLAN:      Atypical chest pain  Patient originally came in daily because of severe hypertension  Has vague complaints of discomfort in his chest  Had multiple admissions for chest pain  Coronary angiogram  11 months ago showed no major obstructive disease  Subsequently had a stress nuclear scan in April 2018, which was also negative for ischemia.       Hypertension  Uncontrolled  Patient on lisinopril and low-dose Coreg  Will add nifedipine 60 mg daily

## 2018-11-09 NOTE — H&P
04/01/2018    MRSA infection        Past Surgical History:        Procedure Laterality Date    ABDOMEN SURGERY      CARDIAC SURGERY      3 STENTS PLACED     CHOLECYSTECTOMY      COLONOSCOPY      CORONARY ANGIOPLASTY WITH STENT PLACEMENT      3 stents     FRACTURE SURGERY      RIGHT ANKLE    LITHOTRIPSY      SHOULDER ARTHROSCOPY      VASCULAR SURGERY         Medications Prior to Admission:    Prior to Admission medications    Medication Sig Start Date End Date Taking? Authorizing Provider   lisinopril-hydrochlorothiazide (PRINZIDE;ZESTORETIC) 20-25 MG per tablet Take 1 tablet by mouth daily   Yes Historical Provider, MD   carvedilol (COREG) 3.125 MG tablet Take 1 tablet by mouth 2 times daily (with meals) 6/13/18  Yes Lance Traylor MD   vitamin B-12 (CYANOCOBALAMIN) 500 MCG tablet Take 500 mcg by mouth daily   Yes Historical Provider, MD   vitamin B-1 (THIAMINE) 100 MG tablet Take 100 mg by mouth daily   Yes Historical Provider, MD   fenofibrate (TRICOR) 54 MG tablet Take 1 tablet by mouth daily s [facility] pharmacy: Please dispense generic fenofibrate unless prescriber denote 4/4/18  Yes Cori Cortez MD   metFORMIN (GLUCOPHAGE) 1000 MG tablet Take 1,000 mg by mouth 2 times daily (with meals)   Yes Historical Provider, MD   sertraline (ZOLOFT) 100 MG tablet Take HALF tablet by mouth daily for 1 week, then take 1 tablet by mouth daily  Patient taking differently: Take 100 mg by mouth nightly  3/19/18  Yes Johnna Ewing MD   zolpidem (AMBIEN) 10 MG tablet Take 10 mg by mouth nightly. Yes Historical Provider, MD   mirtazapine (REMERON) 15 MG tablet Take 1 tablet by mouth nightly 2/19/18  Yes Johnna Ewing MD   nitroGLYCERIN (NITROSTAT) 0.4 MG SL tablet up to max of 3 total doses.  If no relief after 1 dose, call 911. 10/5/17  Yes Jamilah Ruano, DO   vitamin D (D3-1000) 1000 UNITS TABS tablet Take 1 tablet by mouth daily 5/24/17  Yes Johnna Ewing MD   aspirin 81 MG tablet Take 81 mg by mouth daily   Yes Historical Provider, MD   Spacer/Aero-Holding Chambers (E-Z SPACER) MITZI 1 Device by Does not apply route daily as needed (shortness of breath) 10/13/17   Elijah Maldonado MD   glucose blood VI test strips (FREESTYLE TEST STRIPS) strip 1 each by In Vitro route daily Test daily ICD E11.42 8/24/17   Elijah Maldonado MD   glucose monitoring kit (FREESTYLE) monitoring kit 1 kit by Does not apply route daily ICD E11.42 5/24/17   Elijah Maldonado MD   Lancets MISC Test daily ICD E11.42 5/24/17   Elijah Maldonado MD       Allergies:  Patient has no known allergies. Social History:  The patient currently lives Independently    TOBACCO:   reports that he has quit smoking. His smoking use included Cigarettes. He has a 100.00 pack-year smoking history. He has never used smokeless tobacco.  ETOH:   reports that he drinks alcohol. Family History:  Reviewed in detail and negative for DM, Early CAD, Cancer, CVA. Positive as follows:    Family History   Problem Relation Age of Onset    High Blood Pressure Mother     Cancer Father         lung cancer    Diabetes Brother     Liver Disease Sister     Diabetes Brother        REVIEW OF SYSTEMS:   Positive for Shortness of breath, headaches, nasal congestion and as noted in the HPI. All other systems reviewed and negative. PHYSICAL EXAM:    BP (!) 161/85   Pulse 91   Temp 98.6 °F (37 °C) (Oral)   Resp 18   Ht 6' (1.829 m)   Wt 255 lb 8.2 oz (115.9 kg)   SpO2 94%   BMI 34.65 kg/m²     General appearance: No apparent distress appears stated age and cooperative. Overweight  HEENT Normal cephalic, atraumatic without obvious deformity. Pupils equal, round, and reactive to light. Extra ocular muscles intact. Conjunctivae/corneas clear. Neck: Supple, No jugular venous distention/bruits. Trachea midline, full range of motion. Lungs: Clear to auscultation, bilaterally without Rales/Wheezes/Rhonchi with good respiratory effort.   Heart: Regular rate

## 2018-11-10 ENCOUNTER — APPOINTMENT (OUTPATIENT)
Dept: CT IMAGING | Age: 63
DRG: 305 | End: 2018-11-10
Payer: MEDICARE

## 2018-11-10 LAB
GLUCOSE BLD-MCNC: 145 MG/DL (ref 70–99)
GLUCOSE BLD-MCNC: 150 MG/DL (ref 70–99)
GLUCOSE BLD-MCNC: 154 MG/DL (ref 70–99)
GLUCOSE BLD-MCNC: 250 MG/DL (ref 70–99)
PERFORMED ON: ABNORMAL

## 2018-11-10 PROCEDURE — 71260 CT THORAX DX C+: CPT

## 2018-11-10 PROCEDURE — 96376 TX/PRO/DX INJ SAME DRUG ADON: CPT

## 2018-11-10 PROCEDURE — 6370000000 HC RX 637 (ALT 250 FOR IP): Performed by: NURSE PRACTITIONER

## 2018-11-10 PROCEDURE — 94640 AIRWAY INHALATION TREATMENT: CPT

## 2018-11-10 PROCEDURE — G0378 HOSPITAL OBSERVATION PER HR: HCPCS

## 2018-11-10 PROCEDURE — 6370000000 HC RX 637 (ALT 250 FOR IP): Performed by: INTERNAL MEDICINE

## 2018-11-10 PROCEDURE — 2580000003 HC RX 258: Performed by: NURSE PRACTITIONER

## 2018-11-10 PROCEDURE — 6360000002 HC RX W HCPCS: Performed by: INTERNAL MEDICINE

## 2018-11-10 PROCEDURE — 6360000004 HC RX CONTRAST MEDICATION: Performed by: INTERNAL MEDICINE

## 2018-11-10 PROCEDURE — 96372 THER/PROPH/DIAG INJ SC/IM: CPT

## 2018-11-10 PROCEDURE — 94760 N-INVAS EAR/PLS OXIMETRY 1: CPT

## 2018-11-10 PROCEDURE — 94664 DEMO&/EVAL PT USE INHALER: CPT

## 2018-11-10 PROCEDURE — 6360000002 HC RX W HCPCS: Performed by: NURSE PRACTITIONER

## 2018-11-10 RX ORDER — LORAZEPAM 0.5 MG/1
0.5 TABLET ORAL EVERY 6 HOURS PRN
Status: DISCONTINUED | OUTPATIENT
Start: 2018-11-10 | End: 2018-11-14 | Stop reason: HOSPADM

## 2018-11-10 RX ADMIN — NIFEDIPINE 60 MG: 60 TABLET, FILM COATED, EXTENDED RELEASE ORAL at 08:05

## 2018-11-10 RX ADMIN — INSULIN LISPRO 1 UNITS: 100 INJECTION, SOLUTION INTRAVENOUS; SUBCUTANEOUS at 09:09

## 2018-11-10 RX ADMIN — HYDRALAZINE HYDROCHLORIDE 10 MG: 20 INJECTION INTRAMUSCULAR; INTRAVENOUS at 19:36

## 2018-11-10 RX ADMIN — SERTRALINE 100 MG: 100 TABLET, FILM COATED ORAL at 21:13

## 2018-11-10 RX ADMIN — CARVEDILOL 25 MG: 25 TABLET, FILM COATED ORAL at 08:05

## 2018-11-10 RX ADMIN — ROSUVASTATIN CALCIUM 20 MG: 10 TABLET, FILM COATED ORAL at 21:13

## 2018-11-10 RX ADMIN — INSULIN LISPRO 2 UNITS: 100 INJECTION, SOLUTION INTRAVENOUS; SUBCUTANEOUS at 21:22

## 2018-11-10 RX ADMIN — IOPAMIDOL 75 ML: 755 INJECTION, SOLUTION INTRAVENOUS at 09:37

## 2018-11-10 RX ADMIN — INSULIN LISPRO 1 UNITS: 100 INJECTION, SOLUTION INTRAVENOUS; SUBCUTANEOUS at 18:03

## 2018-11-10 RX ADMIN — BECLOMETHASONE DIPROPIONATE 2 PUFF: 80 AEROSOL, METERED RESPIRATORY (INHALATION) at 20:23

## 2018-11-10 RX ADMIN — ASPIRIN 81 MG CHEWABLE TABLET 81 MG: 81 TABLET CHEWABLE at 08:05

## 2018-11-10 RX ADMIN — ZOLPIDEM TARTRATE 10 MG: 5 TABLET ORAL at 21:13

## 2018-11-10 RX ADMIN — Medication 10 ML: at 21:15

## 2018-11-10 RX ADMIN — NITROGLYCERIN 0.5 INCH: 20 OINTMENT TOPICAL at 05:14

## 2018-11-10 RX ADMIN — LISINOPRIL 40 MG: 20 TABLET ORAL at 08:05

## 2018-11-10 RX ADMIN — MIRTAZAPINE 15 MG: 15 TABLET, FILM COATED ORAL at 21:13

## 2018-11-10 RX ADMIN — NITROGLYCERIN 0.5 INCH: 20 OINTMENT TOPICAL at 12:30

## 2018-11-10 RX ADMIN — ACETAMINOPHEN 650 MG: 325 TABLET, FILM COATED ORAL at 05:14

## 2018-11-10 RX ADMIN — HYDRALAZINE HYDROCHLORIDE 10 MG: 20 INJECTION INTRAMUSCULAR; INTRAVENOUS at 11:52

## 2018-11-10 RX ADMIN — Medication 10 ML: at 08:05

## 2018-11-10 RX ADMIN — ENOXAPARIN SODIUM 40 MG: 40 INJECTION SUBCUTANEOUS at 08:05

## 2018-11-10 RX ADMIN — CARVEDILOL 25 MG: 25 TABLET, FILM COATED ORAL at 17:08

## 2018-11-10 RX ADMIN — FENOFIBRATE 54 MG: 54 TABLET ORAL at 08:16

## 2018-11-10 RX ADMIN — INSULIN LISPRO 1 UNITS: 100 INJECTION, SOLUTION INTRAVENOUS; SUBCUTANEOUS at 12:30

## 2018-11-10 RX ADMIN — LORAZEPAM 0.5 MG: 0.5 TABLET ORAL at 17:40

## 2018-11-10 RX ADMIN — HYDRALAZINE HYDROCHLORIDE 10 MG: 20 INJECTION INTRAMUSCULAR; INTRAVENOUS at 05:08

## 2018-11-10 RX ADMIN — ACETAMINOPHEN 650 MG: 325 TABLET, FILM COATED ORAL at 17:40

## 2018-11-10 ASSESSMENT — PAIN DESCRIPTION - PAIN TYPE: TYPE: CHRONIC PAIN

## 2018-11-10 ASSESSMENT — PAIN SCALES - GENERAL
PAINLEVEL_OUTOF10: 3
PAINLEVEL_OUTOF10: 5
PAINLEVEL_OUTOF10: 7
PAINLEVEL_OUTOF10: 0
PAINLEVEL_OUTOF10: 3

## 2018-11-10 ASSESSMENT — PAIN DESCRIPTION - LOCATION: LOCATION: GENERALIZED

## 2018-11-10 NOTE — PROGRESS NOTES
Pt c/o anxiety. He also requested to remove his nitro patch because he has a headache. Dr. Shima Shelley notified. New orders received.

## 2018-11-11 PROBLEM — I16.1 HYPERTENSIVE EMERGENCY: Status: ACTIVE | Noted: 2018-11-11

## 2018-11-11 LAB
GLUCOSE BLD-MCNC: 125 MG/DL (ref 70–99)
GLUCOSE BLD-MCNC: 204 MG/DL (ref 70–99)
GLUCOSE BLD-MCNC: 213 MG/DL (ref 70–99)
GLUCOSE BLD-MCNC: 248 MG/DL (ref 70–99)
PERFORMED ON: ABNORMAL

## 2018-11-11 PROCEDURE — 6370000000 HC RX 637 (ALT 250 FOR IP): Performed by: INTERNAL MEDICINE

## 2018-11-11 PROCEDURE — 6370000000 HC RX 637 (ALT 250 FOR IP): Performed by: NURSE PRACTITIONER

## 2018-11-11 PROCEDURE — 96372 THER/PROPH/DIAG INJ SC/IM: CPT

## 2018-11-11 PROCEDURE — 96376 TX/PRO/DX INJ SAME DRUG ADON: CPT

## 2018-11-11 PROCEDURE — 1200000000 HC SEMI PRIVATE

## 2018-11-11 PROCEDURE — 94760 N-INVAS EAR/PLS OXIMETRY 1: CPT

## 2018-11-11 PROCEDURE — 6360000002 HC RX W HCPCS: Performed by: NURSE PRACTITIONER

## 2018-11-11 PROCEDURE — 6360000002 HC RX W HCPCS: Performed by: INTERNAL MEDICINE

## 2018-11-11 PROCEDURE — 2580000003 HC RX 258: Performed by: NURSE PRACTITIONER

## 2018-11-11 PROCEDURE — 94640 AIRWAY INHALATION TREATMENT: CPT

## 2018-11-11 RX ORDER — NIFEDIPINE 90 MG/1
90 TABLET, EXTENDED RELEASE ORAL DAILY
Status: DISCONTINUED | OUTPATIENT
Start: 2018-11-12 | End: 2018-11-14 | Stop reason: HOSPADM

## 2018-11-11 RX ADMIN — CARVEDILOL 25 MG: 25 TABLET, FILM COATED ORAL at 08:25

## 2018-11-11 RX ADMIN — NIFEDIPINE 60 MG: 60 TABLET, FILM COATED, EXTENDED RELEASE ORAL at 08:25

## 2018-11-11 RX ADMIN — LISINOPRIL 40 MG: 20 TABLET ORAL at 08:25

## 2018-11-11 RX ADMIN — CARVEDILOL 25 MG: 25 TABLET, FILM COATED ORAL at 17:47

## 2018-11-11 RX ADMIN — ACETAMINOPHEN 650 MG: 325 TABLET, FILM COATED ORAL at 17:47

## 2018-11-11 RX ADMIN — SERTRALINE 100 MG: 100 TABLET, FILM COATED ORAL at 20:59

## 2018-11-11 RX ADMIN — ROSUVASTATIN CALCIUM 20 MG: 10 TABLET, FILM COATED ORAL at 20:59

## 2018-11-11 RX ADMIN — INSULIN LISPRO 1 UNITS: 100 INJECTION, SOLUTION INTRAVENOUS; SUBCUTANEOUS at 21:02

## 2018-11-11 RX ADMIN — HYDRALAZINE HYDROCHLORIDE 10 MG: 20 INJECTION INTRAMUSCULAR; INTRAVENOUS at 08:25

## 2018-11-11 RX ADMIN — MIRTAZAPINE 15 MG: 15 TABLET, FILM COATED ORAL at 20:59

## 2018-11-11 RX ADMIN — BECLOMETHASONE DIPROPIONATE 2 PUFF: 80 AEROSOL, METERED RESPIRATORY (INHALATION) at 20:11

## 2018-11-11 RX ADMIN — ZOLPIDEM TARTRATE 10 MG: 5 TABLET ORAL at 20:59

## 2018-11-11 RX ADMIN — BECLOMETHASONE DIPROPIONATE 2 PUFF: 80 AEROSOL, METERED RESPIRATORY (INHALATION) at 09:09

## 2018-11-11 RX ADMIN — ENOXAPARIN SODIUM 40 MG: 40 INJECTION SUBCUTANEOUS at 08:26

## 2018-11-11 RX ADMIN — INSULIN LISPRO 2 UNITS: 100 INJECTION, SOLUTION INTRAVENOUS; SUBCUTANEOUS at 13:58

## 2018-11-11 RX ADMIN — ASPIRIN 81 MG CHEWABLE TABLET 81 MG: 81 TABLET CHEWABLE at 08:25

## 2018-11-11 RX ADMIN — Medication 10 ML: at 08:25

## 2018-11-11 RX ADMIN — INSULIN LISPRO 2 UNITS: 100 INJECTION, SOLUTION INTRAVENOUS; SUBCUTANEOUS at 17:48

## 2018-11-11 RX ADMIN — Medication 10 ML: at 21:06

## 2018-11-11 RX ADMIN — FENOFIBRATE 54 MG: 54 TABLET ORAL at 08:25

## 2018-11-11 RX ADMIN — LORAZEPAM 0.5 MG: 0.5 TABLET ORAL at 17:47

## 2018-11-11 ASSESSMENT — PAIN SCALES - GENERAL
PAINLEVEL_OUTOF10: 0

## 2018-11-11 NOTE — PLAN OF CARE
Problem: Pain:  Goal: Pain level will decrease  Pain level will decrease    Pt able to express presence and absence of pain and rate pain appropriately using numerical scale.

## 2018-11-11 NOTE — PROGRESS NOTES
Hospitalist Progress Note      PCP: Carlos Alberto Clay MD    Date of Admission: 11/8/2018    Chief Complaint: MONSALVE      Subjective:   Continues to have persistent dyspnea. Worsened with ambulation and exertion  Denies CP. Otherwise no new acute complaints. Medications:  Reviewed    Infusion Medications    dextrose       Scheduled Medications    beclomethasone  2 puff Inhalation BID    lisinopril  40 mg Oral Daily    insulin lispro  0-6 Units Subcutaneous TID WC    insulin lispro  0-3 Units Subcutaneous Nightly    NIFEdipine  60 mg Oral Daily    carvedilol  25 mg Oral BID WC    aspirin  81 mg Oral Daily    fenofibrate  54 mg Oral Daily    mirtazapine  15 mg Oral Nightly    sertraline  100 mg Oral Nightly    zolpidem  10 mg Oral Nightly    sodium chloride flush  10 mL Intravenous 2 times per day    rosuvastatin  20 mg Oral Nightly    enoxaparin  40 mg Subcutaneous Daily     PRN Meds: LORazepam, glucose, dextrose, glucagon (rDNA), dextrose, hydrALAZINE, nitroGLYCERIN, sodium chloride flush, magnesium hydroxide, ondansetron, acetaminophen      Intake/Output Summary (Last 24 hours) at 11/11/18 1648  Last data filed at 11/11/18 0926   Gross per 24 hour   Intake              240 ml   Output                0 ml   Net              240 ml       Exam:    BP (!) 148/84   Pulse 80   Temp 98.2 °F (36.8 °C) (Oral)   Resp 20   Ht 6' (1.829 m)   Wt 247 lb 5.7 oz (112.2 kg)   SpO2 95%   BMI 33.55 kg/m²     Gen/overall appearance: Not in acute distress. Alert. Head: Normocephalic, atraumatic  Eyes: EOMI, no scleral icterus  CVS: regular rate and rhythm, Normal S1S2  Pulm: Clear to auscultation bilaterally. No crackles/wheezes  Gastrointestinal: Soft, nontender, nondistended, no guarding or rebound  Extremities: No edema.  No erythema or warmth  Neuro: No gross focal deficits noted  Skin: Warm, dry    Labs:   Recent Labs      11/08/18   1826  11/09/18   0604   WBC  6.1  5.3   HGB  12.3*  11.4*   HCT  37.5*

## 2018-11-12 ENCOUNTER — APPOINTMENT (OUTPATIENT)
Dept: GENERAL RADIOLOGY | Age: 63
DRG: 305 | End: 2018-11-12
Payer: MEDICARE

## 2018-11-12 PROBLEM — R06.09 DYSPNEA ON EXERTION: Status: ACTIVE | Noted: 2018-11-09

## 2018-11-12 LAB
ANION GAP SERPL CALCULATED.3IONS-SCNC: 10 MMOL/L (ref 3–16)
BASE EXCESS ARTERIAL: 0.5 MMOL/L (ref -3–3)
BUN BLDV-MCNC: 24 MG/DL (ref 7–20)
CALCIUM SERPL-MCNC: 8.3 MG/DL (ref 8.3–10.6)
CARBOXYHEMOGLOBIN ARTERIAL: 1.5 % (ref 0–1.5)
CHLORIDE BLD-SCNC: 105 MMOL/L (ref 99–110)
CO2: 25 MMOL/L (ref 21–32)
CREAT SERPL-MCNC: 1.2 MG/DL (ref 0.8–1.3)
GFR AFRICAN AMERICAN: >60
GFR NON-AFRICAN AMERICAN: >60
GLUCOSE BLD-MCNC: 133 MG/DL (ref 70–99)
GLUCOSE BLD-MCNC: 209 MG/DL (ref 70–99)
GLUCOSE BLD-MCNC: 210 MG/DL (ref 70–99)
GLUCOSE BLD-MCNC: 232 MG/DL (ref 70–99)
GLUCOSE BLD-MCNC: 233 MG/DL (ref 70–99)
HCO3 ARTERIAL: 25.1 MMOL/L (ref 21–29)
HCT VFR BLD CALC: 39.5 % (ref 40.5–52.5)
HEMOGLOBIN, ART, EXTENDED: 13 G/DL (ref 13.5–17.5)
HEMOGLOBIN: 12.9 G/DL (ref 13.5–17.5)
MCH RBC QN AUTO: 28.3 PG (ref 26–34)
MCHC RBC AUTO-ENTMCNC: 32.6 G/DL (ref 31–36)
MCV RBC AUTO: 86.8 FL (ref 80–100)
METHEMOGLOBIN ARTERIAL: 1.1 %
O2 CONTENT ARTERIAL: 17 ML/DL
O2 SAT, ARTERIAL: 95.1 %
O2 THERAPY: ABNORMAL
PCO2 ARTERIAL: 42.5 MMHG (ref 35–45)
PDW BLD-RTO: 14.3 % (ref 12.4–15.4)
PERFORMED ON: ABNORMAL
PH ARTERIAL: 7.39 (ref 7.35–7.45)
PLATELET # BLD: 190 K/UL (ref 135–450)
PMV BLD AUTO: 8.1 FL (ref 5–10.5)
PO2 ARTERIAL: 72.7 MMHG (ref 75–108)
POTASSIUM SERPL-SCNC: 3.9 MMOL/L (ref 3.5–5.1)
RBC # BLD: 4.55 M/UL (ref 4.2–5.9)
SODIUM BLD-SCNC: 140 MMOL/L (ref 136–145)
TCO2 ARTERIAL: 26.4 MMOL/L
WBC # BLD: 6 K/UL (ref 4–11)

## 2018-11-12 PROCEDURE — 99223 1ST HOSP IP/OBS HIGH 75: CPT | Performed by: INTERNAL MEDICINE

## 2018-11-12 PROCEDURE — 6370000000 HC RX 637 (ALT 250 FOR IP): Performed by: INTERNAL MEDICINE

## 2018-11-12 PROCEDURE — 94760 N-INVAS EAR/PLS OXIMETRY 1: CPT

## 2018-11-12 PROCEDURE — 1200000000 HC SEMI PRIVATE

## 2018-11-12 PROCEDURE — 36415 COLL VENOUS BLD VENIPUNCTURE: CPT

## 2018-11-12 PROCEDURE — 94761 N-INVAS EAR/PLS OXIMETRY MLT: CPT

## 2018-11-12 PROCEDURE — 94680 O2 UPTK RST&XERS DIR SIMPLE: CPT

## 2018-11-12 PROCEDURE — 94640 AIRWAY INHALATION TREATMENT: CPT

## 2018-11-12 PROCEDURE — 6360000002 HC RX W HCPCS: Performed by: INTERNAL MEDICINE

## 2018-11-12 PROCEDURE — 6360000002 HC RX W HCPCS: Performed by: NURSE PRACTITIONER

## 2018-11-12 PROCEDURE — 2580000003 HC RX 258: Performed by: NURSE PRACTITIONER

## 2018-11-12 PROCEDURE — 36600 WITHDRAWAL OF ARTERIAL BLOOD: CPT

## 2018-11-12 PROCEDURE — 6370000000 HC RX 637 (ALT 250 FOR IP): Performed by: NURSE PRACTITIONER

## 2018-11-12 PROCEDURE — 82803 BLOOD GASES ANY COMBINATION: CPT

## 2018-11-12 PROCEDURE — 76000 FLUOROSCOPY <1 HR PHYS/QHP: CPT

## 2018-11-12 PROCEDURE — 80048 BASIC METABOLIC PNL TOTAL CA: CPT

## 2018-11-12 PROCEDURE — 85027 COMPLETE CBC AUTOMATED: CPT

## 2018-11-12 RX ADMIN — LORAZEPAM 0.5 MG: 0.5 TABLET ORAL at 21:04

## 2018-11-12 RX ADMIN — ENOXAPARIN SODIUM 40 MG: 40 INJECTION SUBCUTANEOUS at 08:23

## 2018-11-12 RX ADMIN — FENOFIBRATE 54 MG: 54 TABLET ORAL at 11:41

## 2018-11-12 RX ADMIN — Medication 10 ML: at 21:04

## 2018-11-12 RX ADMIN — INSULIN LISPRO 1 UNITS: 100 INJECTION, SOLUTION INTRAVENOUS; SUBCUTANEOUS at 21:04

## 2018-11-12 RX ADMIN — NIFEDIPINE 90 MG: 90 TABLET, FILM COATED, EXTENDED RELEASE ORAL at 08:22

## 2018-11-12 RX ADMIN — LISINOPRIL 40 MG: 20 TABLET ORAL at 08:22

## 2018-11-12 RX ADMIN — Medication 10 ML: at 08:23

## 2018-11-12 RX ADMIN — INSULIN LISPRO 2 UNITS: 100 INJECTION, SOLUTION INTRAVENOUS; SUBCUTANEOUS at 17:39

## 2018-11-12 RX ADMIN — INSULIN LISPRO 2 UNITS: 100 INJECTION, SOLUTION INTRAVENOUS; SUBCUTANEOUS at 11:42

## 2018-11-12 RX ADMIN — ASPIRIN 81 MG CHEWABLE TABLET 81 MG: 81 TABLET CHEWABLE at 08:22

## 2018-11-12 RX ADMIN — BECLOMETHASONE DIPROPIONATE 2 PUFF: 80 AEROSOL, METERED RESPIRATORY (INHALATION) at 08:50

## 2018-11-12 RX ADMIN — ZOLPIDEM TARTRATE 10 MG: 5 TABLET ORAL at 21:04

## 2018-11-12 RX ADMIN — CARVEDILOL 25 MG: 25 TABLET, FILM COATED ORAL at 15:36

## 2018-11-12 RX ADMIN — CARVEDILOL 25 MG: 25 TABLET, FILM COATED ORAL at 08:22

## 2018-11-12 RX ADMIN — ROSUVASTATIN CALCIUM 20 MG: 10 TABLET, FILM COATED ORAL at 23:06

## 2018-11-12 RX ADMIN — MIRTAZAPINE 15 MG: 15 TABLET, FILM COATED ORAL at 21:04

## 2018-11-12 RX ADMIN — BECLOMETHASONE DIPROPIONATE 2 PUFF: 80 AEROSOL, METERED RESPIRATORY (INHALATION) at 20:13

## 2018-11-12 RX ADMIN — SERTRALINE 100 MG: 100 TABLET, FILM COATED ORAL at 21:04

## 2018-11-12 RX ADMIN — HYDRALAZINE HYDROCHLORIDE 10 MG: 20 INJECTION INTRAMUSCULAR; INTRAVENOUS at 15:36

## 2018-11-12 ASSESSMENT — PAIN SCALES - GENERAL: PAINLEVEL_OUTOF10: 0

## 2018-11-12 NOTE — PLAN OF CARE
Problem: Falls - Risk of:  Goal: Will remain free from falls  Will remain free from falls   Outcome: Ongoing  Patient is up ad shelby. Patient is wearing nonskid socks. Bed is locked, and in lowest position. Room is free of clutter. Call light is within reach and used appropriately. Fall risk assessed per protocol. Will continue to monitor. Problem: Pain:  Goal: Pain level will decrease  Pain level will decrease   Outcome: Ongoing  No complaints of pain or discomfort. Problem: Safety:  Goal: Free from accidental physical injury  Free from accidental physical injury   Outcome: Ongoing  Patient is A/O. Up ad shelby.

## 2018-11-13 LAB
GLUCOSE BLD-MCNC: 137 MG/DL (ref 70–99)
GLUCOSE BLD-MCNC: 239 MG/DL (ref 70–99)
GLUCOSE BLD-MCNC: 243 MG/DL (ref 70–99)
GLUCOSE BLD-MCNC: 243 MG/DL (ref 70–99)
LV EF: 60 %
LVEF MODALITY: NORMAL
PERFORMED ON: ABNORMAL

## 2018-11-13 PROCEDURE — 6360000002 HC RX W HCPCS: Performed by: INTERNAL MEDICINE

## 2018-11-13 PROCEDURE — 94761 N-INVAS EAR/PLS OXIMETRY MLT: CPT

## 2018-11-13 PROCEDURE — 6370000000 HC RX 637 (ALT 250 FOR IP): Performed by: INTERNAL MEDICINE

## 2018-11-13 PROCEDURE — 2500000003 HC RX 250 WO HCPCS: Performed by: INTERNAL MEDICINE

## 2018-11-13 PROCEDURE — 99233 SBSQ HOSP IP/OBS HIGH 50: CPT | Performed by: INTERNAL MEDICINE

## 2018-11-13 PROCEDURE — 94664 DEMO&/EVAL PT USE INHALER: CPT

## 2018-11-13 PROCEDURE — 6360000002 HC RX W HCPCS: Performed by: NURSE PRACTITIONER

## 2018-11-13 PROCEDURE — 2060000000 HC ICU INTERMEDIATE R&B

## 2018-11-13 PROCEDURE — C8929 TTE W OR WO FOL WCON,DOPPLER: HCPCS

## 2018-11-13 PROCEDURE — 2580000003 HC RX 258: Performed by: NURSE PRACTITIONER

## 2018-11-13 PROCEDURE — 1200000000 HC SEMI PRIVATE

## 2018-11-13 PROCEDURE — 6370000000 HC RX 637 (ALT 250 FOR IP): Performed by: NURSE PRACTITIONER

## 2018-11-13 PROCEDURE — 6360000004 HC RX CONTRAST MEDICATION: Performed by: INTERNAL MEDICINE

## 2018-11-13 PROCEDURE — 94640 AIRWAY INHALATION TREATMENT: CPT

## 2018-11-13 RX ORDER — IPRATROPIUM BROMIDE AND ALBUTEROL SULFATE 2.5; .5 MG/3ML; MG/3ML
1 SOLUTION RESPIRATORY (INHALATION)
Status: DISCONTINUED | OUTPATIENT
Start: 2018-11-13 | End: 2018-11-14

## 2018-11-13 RX ORDER — LABETALOL HYDROCHLORIDE 5 MG/ML
10 INJECTION, SOLUTION INTRAVENOUS EVERY 6 HOURS PRN
Status: DISCONTINUED | OUTPATIENT
Start: 2018-11-13 | End: 2018-11-13

## 2018-11-13 RX ORDER — HYDROCHLOROTHIAZIDE 25 MG/1
25 TABLET ORAL DAILY
Status: DISCONTINUED | OUTPATIENT
Start: 2018-11-13 | End: 2018-11-14 | Stop reason: HOSPADM

## 2018-11-13 RX ORDER — METOPROLOL TARTRATE 5 MG/5ML
5 INJECTION INTRAVENOUS EVERY 6 HOURS PRN
Status: DISCONTINUED | OUTPATIENT
Start: 2018-11-13 | End: 2018-11-14 | Stop reason: HOSPADM

## 2018-11-13 RX ADMIN — HYDRALAZINE HYDROCHLORIDE 10 MG: 20 INJECTION INTRAMUSCULAR; INTRAVENOUS at 07:08

## 2018-11-13 RX ADMIN — HYDROCHLOROTHIAZIDE 25 MG: 25 TABLET ORAL at 21:05

## 2018-11-13 RX ADMIN — ACETAMINOPHEN 650 MG: 325 TABLET, FILM COATED ORAL at 18:46

## 2018-11-13 RX ADMIN — INSULIN LISPRO 2 UNITS: 100 INJECTION, SOLUTION INTRAVENOUS; SUBCUTANEOUS at 17:08

## 2018-11-13 RX ADMIN — LORAZEPAM 0.5 MG: 0.5 TABLET ORAL at 21:05

## 2018-11-13 RX ADMIN — IPRATROPIUM BROMIDE AND ALBUTEROL SULFATE 1 AMPULE: .5; 3 SOLUTION RESPIRATORY (INHALATION) at 16:19

## 2018-11-13 RX ADMIN — HYDRALAZINE HYDROCHLORIDE 10 MG: 20 INJECTION INTRAMUSCULAR; INTRAVENOUS at 23:40

## 2018-11-13 RX ADMIN — ZOLPIDEM TARTRATE 10 MG: 5 TABLET ORAL at 21:05

## 2018-11-13 RX ADMIN — Medication 10 ML: at 21:06

## 2018-11-13 RX ADMIN — IPRATROPIUM BROMIDE AND ALBUTEROL SULFATE 1 AMPULE: .5; 3 SOLUTION RESPIRATORY (INHALATION) at 11:42

## 2018-11-13 RX ADMIN — HYDRALAZINE HYDROCHLORIDE 10 MG: 20 INJECTION INTRAMUSCULAR; INTRAVENOUS at 00:39

## 2018-11-13 RX ADMIN — NIFEDIPINE 90 MG: 90 TABLET, FILM COATED, EXTENDED RELEASE ORAL at 08:15

## 2018-11-13 RX ADMIN — HYDRALAZINE HYDROCHLORIDE 10 MG: 20 INJECTION INTRAMUSCULAR; INTRAVENOUS at 14:45

## 2018-11-13 RX ADMIN — ACETAMINOPHEN 650 MG: 325 TABLET, FILM COATED ORAL at 14:49

## 2018-11-13 RX ADMIN — SERTRALINE 100 MG: 100 TABLET, FILM COATED ORAL at 21:05

## 2018-11-13 RX ADMIN — BECLOMETHASONE DIPROPIONATE 2 PUFF: 80 AEROSOL, METERED RESPIRATORY (INHALATION) at 07:58

## 2018-11-13 RX ADMIN — MIRTAZAPINE 15 MG: 15 TABLET, FILM COATED ORAL at 21:05

## 2018-11-13 RX ADMIN — INSULIN LISPRO 1 UNITS: 100 INJECTION, SOLUTION INTRAVENOUS; SUBCUTANEOUS at 21:08

## 2018-11-13 RX ADMIN — FENOFIBRATE 54 MG: 54 TABLET ORAL at 08:19

## 2018-11-13 RX ADMIN — PERFLUTREN 1.43 MG: 6.52 INJECTION, SUSPENSION INTRAVENOUS at 14:13

## 2018-11-13 RX ADMIN — ENOXAPARIN SODIUM 40 MG: 40 INJECTION SUBCUTANEOUS at 08:15

## 2018-11-13 RX ADMIN — METOPROLOL TARTRATE 5 MG: 5 INJECTION, SOLUTION INTRAVENOUS at 18:46

## 2018-11-13 RX ADMIN — ROSUVASTATIN CALCIUM 20 MG: 10 TABLET, FILM COATED ORAL at 21:06

## 2018-11-13 RX ADMIN — CARVEDILOL 25 MG: 25 TABLET, FILM COATED ORAL at 08:15

## 2018-11-13 RX ADMIN — IPRATROPIUM BROMIDE AND ALBUTEROL SULFATE 1 AMPULE: .5; 3 SOLUTION RESPIRATORY (INHALATION) at 19:38

## 2018-11-13 RX ADMIN — ASPIRIN 81 MG CHEWABLE TABLET 81 MG: 81 TABLET CHEWABLE at 08:15

## 2018-11-13 RX ADMIN — CARVEDILOL 25 MG: 25 TABLET, FILM COATED ORAL at 17:08

## 2018-11-13 RX ADMIN — LISINOPRIL 40 MG: 20 TABLET ORAL at 08:15

## 2018-11-13 RX ADMIN — Medication 10 ML: at 07:07

## 2018-11-13 RX ADMIN — INSULIN LISPRO 2 UNITS: 100 INJECTION, SOLUTION INTRAVENOUS; SUBCUTANEOUS at 11:52

## 2018-11-13 ASSESSMENT — PAIN SCALES - GENERAL
PAINLEVEL_OUTOF10: 0
PAINLEVEL_OUTOF10: 4
PAINLEVEL_OUTOF10: 4

## 2018-11-14 VITALS
DIASTOLIC BLOOD PRESSURE: 71 MMHG | HEART RATE: 81 BPM | BODY MASS INDEX: 32.79 KG/M2 | OXYGEN SATURATION: 94 % | WEIGHT: 242.06 LBS | TEMPERATURE: 98.1 F | RESPIRATION RATE: 19 BRPM | HEIGHT: 72 IN | SYSTOLIC BLOOD PRESSURE: 124 MMHG

## 2018-11-14 DIAGNOSIS — J44.9 CHRONIC OBSTRUCTIVE PULMONARY DISEASE, UNSPECIFIED COPD TYPE (HCC): Primary | ICD-10-CM

## 2018-11-14 LAB
EKG ATRIAL RATE: 91 BPM
EKG DIAGNOSIS: NORMAL
EKG P AXIS: 38 DEGREES
EKG P-R INTERVAL: 168 MS
EKG Q-T INTERVAL: 362 MS
EKG QRS DURATION: 102 MS
EKG QTC CALCULATION (BAZETT): 445 MS
EKG R AXIS: 63 DEGREES
EKG T AXIS: 56 DEGREES
EKG VENTRICULAR RATE: 91 BPM
GLUCOSE BLD-MCNC: 124 MG/DL (ref 70–99)
GLUCOSE BLD-MCNC: 264 MG/DL (ref 70–99)
PERFORMED ON: ABNORMAL
PERFORMED ON: ABNORMAL

## 2018-11-14 PROCEDURE — 94640 AIRWAY INHALATION TREATMENT: CPT

## 2018-11-14 PROCEDURE — 94761 N-INVAS EAR/PLS OXIMETRY MLT: CPT

## 2018-11-14 PROCEDURE — 6360000002 HC RX W HCPCS: Performed by: NURSE PRACTITIONER

## 2018-11-14 PROCEDURE — 6370000000 HC RX 637 (ALT 250 FOR IP): Performed by: INTERNAL MEDICINE

## 2018-11-14 PROCEDURE — 94664 DEMO&/EVAL PT USE INHALER: CPT

## 2018-11-14 PROCEDURE — 6370000000 HC RX 637 (ALT 250 FOR IP): Performed by: NURSE PRACTITIONER

## 2018-11-14 PROCEDURE — 2500000003 HC RX 250 WO HCPCS: Performed by: INTERNAL MEDICINE

## 2018-11-14 PROCEDURE — 99232 SBSQ HOSP IP/OBS MODERATE 35: CPT | Performed by: INTERNAL MEDICINE

## 2018-11-14 RX ORDER — ALBUTEROL SULFATE 90 UG/1
2 AEROSOL, METERED RESPIRATORY (INHALATION) EVERY 6 HOURS PRN
Qty: 1 INHALER | Refills: 3 | Status: SHIPPED | OUTPATIENT
Start: 2018-11-14 | End: 2018-11-24

## 2018-11-14 RX ORDER — IPRATROPIUM BROMIDE AND ALBUTEROL SULFATE 2.5; .5 MG/3ML; MG/3ML
1 SOLUTION RESPIRATORY (INHALATION) EVERY 4 HOURS PRN
Status: DISCONTINUED | OUTPATIENT
Start: 2018-11-14 | End: 2018-11-14 | Stop reason: HOSPADM

## 2018-11-14 RX ORDER — NIFEDIPINE 90 MG/1
90 TABLET, EXTENDED RELEASE ORAL DAILY
Qty: 30 TABLET | Refills: 3 | Status: ON HOLD | OUTPATIENT
Start: 2018-11-15 | End: 2019-03-25 | Stop reason: HOSPADM

## 2018-11-14 RX ORDER — LISINOPRIL AND HYDROCHLOROTHIAZIDE 25; 20 MG/1; MG/1
2 TABLET ORAL DAILY
Qty: 30 TABLET | Refills: 3 | Status: SHIPPED | OUTPATIENT
Start: 2018-11-14 | End: 2019-01-18

## 2018-11-14 RX ADMIN — INSULIN LISPRO 3 UNITS: 100 INJECTION, SOLUTION INTRAVENOUS; SUBCUTANEOUS at 12:20

## 2018-11-14 RX ADMIN — CARVEDILOL 25 MG: 25 TABLET, FILM COATED ORAL at 08:53

## 2018-11-14 RX ADMIN — LISINOPRIL 40 MG: 20 TABLET ORAL at 08:53

## 2018-11-14 RX ADMIN — NIFEDIPINE 90 MG: 90 TABLET, FILM COATED, EXTENDED RELEASE ORAL at 08:53

## 2018-11-14 RX ADMIN — ASPIRIN 81 MG CHEWABLE TABLET 81 MG: 81 TABLET CHEWABLE at 08:53

## 2018-11-14 RX ADMIN — ENOXAPARIN SODIUM 40 MG: 40 INJECTION SUBCUTANEOUS at 08:53

## 2018-11-14 RX ADMIN — IPRATROPIUM BROMIDE AND ALBUTEROL SULFATE 1 AMPULE: .5; 3 SOLUTION RESPIRATORY (INHALATION) at 08:22

## 2018-11-14 RX ADMIN — METOPROLOL TARTRATE 5 MG: 5 INJECTION, SOLUTION INTRAVENOUS at 04:56

## 2018-11-14 RX ADMIN — HYDROCHLOROTHIAZIDE 25 MG: 25 TABLET ORAL at 08:53

## 2018-11-14 RX ADMIN — FENOFIBRATE 54 MG: 54 TABLET ORAL at 08:53

## 2018-11-14 RX ADMIN — TIOTROPIUM BROMIDE 18 MCG: 18 CAPSULE ORAL; RESPIRATORY (INHALATION) at 11:36

## 2018-11-14 ASSESSMENT — PAIN SCALES - GENERAL: PAINLEVEL_OUTOF10: 0

## 2018-11-14 NOTE — PLAN OF CARE
Problem: Falls - Risk of:  Goal: Will remain free from falls  Will remain free from falls   Outcome: Ongoing  Fall risk assessment completed every shift. All precautions in place. Pt has call light within reach at all times. Room clear of clutter. Pt aware to call for assistance when getting up.    Goal: Absence of physical injury  Absence of physical injury   Outcome: Ongoing  No signs of physical injury    Problem: Pain:  Goal: Pain level will decrease  Pain level will decrease   Outcome: Ongoing

## 2018-11-14 NOTE — DISCHARGE SUMMARY
Take 500 mcg by mouth dailyHistorical Med      vitamin B-1 (THIAMINE) 100 MG tablet Take 100 mg by mouth dailyHistorical Med      fenofibrate (TRICOR) 54 MG tablet Take 1 tablet by mouth daily s [facility] pharmacy: Please dispense generic fenofibrate unless prescriber denote, Disp-30 tablet, R-0Print      metFORMIN (GLUCOPHAGE) 1000 MG tablet Take 1,000 mg by mouth 2 times daily (with meals)Historical Med      sertraline (ZOLOFT) 100 MG tablet Take HALF tablet by mouth daily for 1 week, then take 1 tablet by mouth daily, Disp-30 tablet, R-3Normal      zolpidem (AMBIEN) 10 MG tablet Take 10 mg by mouth nightly. Historical Med      mirtazapine (REMERON) 15 MG tablet Take 1 tablet by mouth nightly, Disp-30 tablet, R-3Normal      Spacer/Aero-Holding Chambers (E-Z SPACER) MITZI DAILY PRN Starting Fri 10/13/2017, Disp-1 Device, R-0, Normal      nitroGLYCERIN (NITROSTAT) 0.4 MG SL tablet up to max of 3 total doses. If no relief after 1 dose, call 911., Disp-25 tablet, R-3Normal      glucose blood VI test strips (FREESTYLE TEST STRIPS) strip DAILY Starting 8/24/2017, Until Discontinued, Disp-100 each, R-11, NormalTest daily ICD E11.42      vitamin D (D3-1000) 1000 UNITS TABS tablet Take 1 tablet by mouth daily, Disp-90 tablet, R-3Normal      glucose monitoring kit (FREESTYLE) monitoring kit DAILY Starting 5/24/2017, Until Discontinued, Disp-1 kit, R-0, PrintICD E11.42      Lancets MISC Disp-50 each, R-3, PrintTest daily ICD E11.42      aspirin 81 MG tablet Take 81 mg by mouth dailyHistorical Med             Time Spent on discharge is more than 45 minutes in the examination, evaluation, counseling and review of medications and discharge plan. Signed:    Dariel Sierra MD   11/14/2018    Thank you Roger Campoverde MD for the opportunity to be involved in this patient's care.

## 2018-11-14 NOTE — PROGRESS NOTES
Pulmonary Progress Note    CC:  Follow up dyspnea    Subjective:  Had an \"event\" while in the bathroom yesterday but vitals were stable and no evidence of desaturation  Started him on duonebs and he says they are helping. Feels ready to go home        Intake/Output Summary (Last 24 hours) at 11/14/18 0717  Last data filed at 11/14/18 0600   Gross per 24 hour   Intake              240 ml   Output                0 ml   Net              240 ml         PHYSICAL EXAM:  Blood pressure (!) 178/95, pulse 87, temperature 97.7 °F (36.5 °C), temperature source Oral, resp. rate 18, height 6' (1.829 m), weight 242 lb 1 oz (109.8 kg), SpO2 95 %.'  Gen: No distress. Eyes: PERRL. No sclera icterus. No conjunctival injection. ENT: No discharge. Pharynx clear. External appearance of ears and nose normal.  Neck: Trachea midline. No obvious mass. Resp: No crackles. No wheezes. No rhonchi. No dullness on percussion. CV: Regular rate. Regular rhythm. No murmur or rub. GI: Non-tender. Non-distended. No hernia. Skin: Warm, dry, normal texture and turgor. No nodule on exposed extremities. Lymph: No cervical LAD. No supraclavicular LAD. M/S: No cyanosis. No clubbing. No joint deformity. Neuro: Moves all four extremities. CN 2-12 tested, no defect noted.   Ext:   trace edema    Medications:    Scheduled Meds:   ipratropium-albuterol  1 ampule Inhalation Q4H WA    hydrochlorothiazide  25 mg Oral Daily    NIFEdipine  90 mg Oral Daily    lisinopril  40 mg Oral Daily    insulin lispro  0-6 Units Subcutaneous TID     insulin lispro  0-3 Units Subcutaneous Nightly    carvedilol  25 mg Oral BID     aspirin  81 mg Oral Daily    fenofibrate  54 mg Oral Daily    mirtazapine  15 mg Oral Nightly    sertraline  100 mg Oral Nightly    zolpidem  10 mg Oral Nightly    sodium chloride flush  10 mL Intravenous 2 times per day    rosuvastatin  20 mg Oral Nightly    enoxaparin  40 mg Subcutaneous Daily       Continuous Infusions:   dextrose         PRN Meds:  metoprolol, LORazepam, glucose, dextrose, glucagon (rDNA), dextrose, hydrALAZINE, nitroGLYCERIN, sodium chloride flush, magnesium hydroxide, ondansetron, acetaminophen    Labs:  CBC:   Recent Labs      18   1137   WBC  6.0   HGB  12.9*   HCT  39.5*   MCV  86.8   PLT  190     BMP:   Recent Labs      18   1137   NA  140   K  3.9   CL  105   CO2  25   BUN  24*   CREATININE  1.2     LIVER PROFILE: No results for input(s): AST, ALT, LIPASE, BILIDIR, BILITOT, ALKPHOS in the last 72 hours. Invalid input(s): AMYLASE,  ALB  PT/INR: No results for input(s): PROTIME, INR in the last 72 hours. APTT: No results for input(s): APTT in the last 72 hours. UA:No results for input(s): NITRITE, COLORU, PHUR, LABCAST, WBCUA, RBCUA, MUCUS, TRICHOMONAS, YEAST, BACTERIA, CLARITYU, SPECGRAV, LEUKOCYTESUR, UROBILINOGEN, BILIRUBINUR, BLOODU, GLUCOSEU, AMORPHOUS in the last 72 hours. Invalid input(s): Smooth Paul  No results for input(s): PH, PCO2, PO2 in the last 72 hours. SNIFF:  1. Chronic elevation of the right hemidiaphragm. 2. No evidence of diaphragmatic paralysis. AB.    Oxygen assessment:  Patient resting on 0  lmp  with an oxygen saturation of  98 %      Patient ambulated on 0 lpm with an oxygen saturation of 96%    Assessment:   · Dyspnea:  Probably multifactorial.  May be more deconditioning than anything  · COPD (chart diagnosis). Adolhp Maria he was told he had COPD by PCP in [place]. Never did PFTs. Said inhalers never helped. Said this doctor diagnosed him with a lot of things  · Abnormal Radiograph with elevated right diaphragm but normal function of the right diaphragm  · ARTEMIO with intolerance to PAP therapy      Plan:  · Ambulate halls with RN and document oxygen and heart rate in chart  · Start Spiriva and albuterol.   Please discharge home with these  · Recommend outpatient PFTs  · DVT prophylaxis    I will get him an appointment with me  [place]

## 2018-11-23 ENCOUNTER — HOSPITAL ENCOUNTER (EMERGENCY)
Age: 63
Discharge: HOME | End: 2018-11-24
Attending: EMERGENCY MEDICINE
Payer: MEDICARE

## 2018-11-23 ENCOUNTER — APPOINTMENT (OUTPATIENT)
Dept: GENERAL RADIOLOGY | Age: 63
End: 2018-11-23
Payer: MEDICARE

## 2018-11-23 DIAGNOSIS — T78.40XA ACUTE ALLERGIC REACTION, INITIAL ENCOUNTER: Primary | ICD-10-CM

## 2018-11-23 DIAGNOSIS — J40 BRONCHITIS: ICD-10-CM

## 2018-11-23 LAB — TROPONIN: <0.01 NG/ML

## 2018-11-23 PROCEDURE — S0028 INJECTION, FAMOTIDINE, 20 MG: HCPCS | Performed by: EMERGENCY MEDICINE

## 2018-11-23 PROCEDURE — 99284 EMERGENCY DEPT VISIT MOD MDM: CPT

## 2018-11-23 PROCEDURE — 96374 THER/PROPH/DIAG INJ IV PUSH: CPT

## 2018-11-23 PROCEDURE — 96372 THER/PROPH/DIAG INJ SC/IM: CPT

## 2018-11-23 PROCEDURE — 71046 X-RAY EXAM CHEST 2 VIEWS: CPT

## 2018-11-23 PROCEDURE — 84484 ASSAY OF TROPONIN QUANT: CPT

## 2018-11-23 PROCEDURE — 96375 TX/PRO/DX INJ NEW DRUG ADDON: CPT

## 2018-11-23 PROCEDURE — 6360000002 HC RX W HCPCS: Performed by: EMERGENCY MEDICINE

## 2018-11-23 PROCEDURE — 2500000003 HC RX 250 WO HCPCS: Performed by: EMERGENCY MEDICINE

## 2018-11-23 PROCEDURE — 93005 ELECTROCARDIOGRAM TRACING: CPT | Performed by: EMERGENCY MEDICINE

## 2018-11-23 RX ORDER — DIPHENHYDRAMINE HYDROCHLORIDE 50 MG/ML
25 INJECTION INTRAMUSCULAR; INTRAVENOUS ONCE
Status: COMPLETED | OUTPATIENT
Start: 2018-11-23 | End: 2018-11-23

## 2018-11-23 RX ORDER — EPINEPHRINE 1 MG/ML(1)
0.3 AMPUL (ML) INJECTION ONCE
Status: COMPLETED | OUTPATIENT
Start: 2018-11-23 | End: 2018-11-23

## 2018-11-23 RX ORDER — METHYLPREDNISOLONE SODIUM SUCCINATE 125 MG/2ML
125 INJECTION, POWDER, LYOPHILIZED, FOR SOLUTION INTRAMUSCULAR; INTRAVENOUS ONCE
Status: COMPLETED | OUTPATIENT
Start: 2018-11-23 | End: 2018-11-23

## 2018-11-23 RX ADMIN — DIPHENHYDRAMINE HYDROCHLORIDE 25 MG: 50 INJECTION, SOLUTION INTRAMUSCULAR; INTRAVENOUS at 22:08

## 2018-11-23 RX ADMIN — EPINEPHRINE 0.3 MG: 1 INJECTION, SOLUTION, CONCENTRATE INTRAVENOUS at 22:15

## 2018-11-23 RX ADMIN — FAMOTIDINE 20 MG: 10 INJECTION, SOLUTION INTRAVENOUS at 22:11

## 2018-11-23 RX ADMIN — METHYLPREDNISOLONE SODIUM SUCCINATE 125 MG: 125 INJECTION, POWDER, FOR SOLUTION INTRAMUSCULAR; INTRAVENOUS at 22:10

## 2018-11-23 ASSESSMENT — ENCOUNTER SYMPTOMS
EYES NEGATIVE: 1
SORE THROAT: 1
GASTROINTESTINAL NEGATIVE: 1
SHORTNESS OF BREATH: 1
WHEEZING: 0

## 2018-11-23 ASSESSMENT — PAIN SCALES - GENERAL
PAINLEVEL_OUTOF10: 1
PAINLEVEL_OUTOF10: 5

## 2018-11-23 ASSESSMENT — PAIN DESCRIPTION - LOCATION: LOCATION: THROAT

## 2018-11-24 VITALS
WEIGHT: 251.54 LBS | HEART RATE: 84 BPM | SYSTOLIC BLOOD PRESSURE: 131 MMHG | DIASTOLIC BLOOD PRESSURE: 82 MMHG | RESPIRATION RATE: 21 BRPM | BODY MASS INDEX: 34.07 KG/M2 | HEIGHT: 72 IN | OXYGEN SATURATION: 96 % | TEMPERATURE: 98 F

## 2018-11-24 LAB
EKG ATRIAL RATE: 85 BPM
EKG DIAGNOSIS: NORMAL
EKG P AXIS: 45 DEGREES
EKG P-R INTERVAL: 162 MS
EKG Q-T INTERVAL: 372 MS
EKG QRS DURATION: 94 MS
EKG QTC CALCULATION (BAZETT): 442 MS
EKG R AXIS: 64 DEGREES
EKG T AXIS: 39 DEGREES
EKG VENTRICULAR RATE: 85 BPM

## 2018-11-24 PROCEDURE — 93010 ELECTROCARDIOGRAM REPORT: CPT | Performed by: INTERNAL MEDICINE

## 2018-11-24 RX ORDER — ALBUTEROL SULFATE 90 UG/1
2 AEROSOL, METERED RESPIRATORY (INHALATION) EVERY 4 HOURS PRN
Qty: 1 INHALER | Refills: 0 | Status: ON HOLD | OUTPATIENT
Start: 2018-11-24 | End: 2020-02-12

## 2018-11-24 RX ORDER — PREDNISONE 10 MG/1
60 TABLET ORAL DAILY
Qty: 30 TABLET | Refills: 0 | Status: SHIPPED | OUTPATIENT
Start: 2018-11-24 | End: 2018-11-29

## 2018-11-24 RX ORDER — BENZONATATE 100 MG/1
100 CAPSULE ORAL 3 TIMES DAILY PRN
Qty: 30 CAPSULE | Refills: 0 | Status: SHIPPED | OUTPATIENT
Start: 2018-11-24 | End: 2018-12-01

## 2018-11-24 ASSESSMENT — PAIN SCALES - GENERAL: PAINLEVEL_OUTOF10: 0

## 2018-11-24 NOTE — ED NOTES
Bed: B-08  Expected date:   Expected time:   Means of arrival:   Comments:  Soren Wintre RN  11/23/18 [id number]

## 2018-11-26 ENCOUNTER — APPOINTMENT (OUTPATIENT)
Dept: GENERAL RADIOLOGY | Age: 63
End: 2018-11-26
Payer: MEDICARE

## 2018-11-26 ENCOUNTER — HOSPITAL ENCOUNTER (EMERGENCY)
Age: 63
Discharge: HOME | End: 2018-11-26
Attending: EMERGENCY MEDICINE
Payer: MEDICARE

## 2018-11-26 VITALS
RESPIRATION RATE: 16 BRPM | OXYGEN SATURATION: 99 % | TEMPERATURE: 97.2 F | DIASTOLIC BLOOD PRESSURE: 109 MMHG | HEART RATE: 95 BPM | SYSTOLIC BLOOD PRESSURE: 195 MMHG

## 2018-11-26 DIAGNOSIS — J40 BRONCHITIS: Primary | ICD-10-CM

## 2018-11-26 LAB
ANION GAP SERPL CALCULATED.3IONS-SCNC: 13 MMOL/L (ref 3–16)
BASOPHILS ABSOLUTE: 0 K/UL (ref 0–0.2)
BASOPHILS RELATIVE PERCENT: 0.2 %
BUN BLDV-MCNC: 16 MG/DL (ref 7–20)
CALCIUM SERPL-MCNC: 8.3 MG/DL (ref 8.3–10.6)
CHLORIDE BLD-SCNC: 104 MMOL/L (ref 99–110)
CO2: 23 MMOL/L (ref 21–32)
CREAT SERPL-MCNC: 0.8 MG/DL (ref 0.8–1.3)
EOSINOPHILS ABSOLUTE: 0.1 K/UL (ref 0–0.6)
EOSINOPHILS RELATIVE PERCENT: 2.4 %
GFR AFRICAN AMERICAN: >60
GFR NON-AFRICAN AMERICAN: >60
GLUCOSE BLD-MCNC: 249 MG/DL (ref 70–99)
HCT VFR BLD CALC: 40.5 % (ref 40.5–52.5)
HEMOGLOBIN: 13.1 G/DL (ref 13.5–17.5)
LYMPHOCYTES ABSOLUTE: 1.1 K/UL (ref 1–5.1)
LYMPHOCYTES RELATIVE PERCENT: 31.3 %
MCH RBC QN AUTO: 28 PG (ref 26–34)
MCHC RBC AUTO-ENTMCNC: 32.4 G/DL (ref 31–36)
MCV RBC AUTO: 86.5 FL (ref 80–100)
MONOCYTES ABSOLUTE: 0.4 K/UL (ref 0–1.3)
MONOCYTES RELATIVE PERCENT: 11.6 %
NEUTROPHILS ABSOLUTE: 2 K/UL (ref 1.7–7.7)
NEUTROPHILS RELATIVE PERCENT: 54.5 %
PDW BLD-RTO: 13.9 % (ref 12.4–15.4)
PLATELET # BLD: 157 K/UL (ref 135–450)
PMV BLD AUTO: 8.6 FL (ref 5–10.5)
POTASSIUM REFLEX MAGNESIUM: 4.1 MMOL/L (ref 3.5–5.1)
RAPID INFLUENZA  B AGN: NEGATIVE
RAPID INFLUENZA A AGN: NEGATIVE
RBC # BLD: 4.68 M/UL (ref 4.2–5.9)
SODIUM BLD-SCNC: 140 MMOL/L (ref 136–145)
TROPONIN: <0.01 NG/ML
WBC # BLD: 3.7 K/UL (ref 4–11)

## 2018-11-26 PROCEDURE — 84484 ASSAY OF TROPONIN QUANT: CPT

## 2018-11-26 PROCEDURE — 71046 X-RAY EXAM CHEST 2 VIEWS: CPT

## 2018-11-26 PROCEDURE — 85025 COMPLETE CBC W/AUTO DIFF WBC: CPT

## 2018-11-26 PROCEDURE — 94640 AIRWAY INHALATION TREATMENT: CPT

## 2018-11-26 PROCEDURE — 87804 INFLUENZA ASSAY W/OPTIC: CPT

## 2018-11-26 PROCEDURE — 93005 ELECTROCARDIOGRAM TRACING: CPT | Performed by: PHYSICIAN ASSISTANT

## 2018-11-26 PROCEDURE — 36415 COLL VENOUS BLD VENIPUNCTURE: CPT

## 2018-11-26 PROCEDURE — 99284 EMERGENCY DEPT VISIT MOD MDM: CPT

## 2018-11-26 PROCEDURE — 94664 DEMO&/EVAL PT USE INHALER: CPT

## 2018-11-26 PROCEDURE — 80048 BASIC METABOLIC PNL TOTAL CA: CPT

## 2018-11-26 PROCEDURE — 6370000000 HC RX 637 (ALT 250 FOR IP): Performed by: PHYSICIAN ASSISTANT

## 2018-11-26 RX ORDER — IPRATROPIUM BROMIDE AND ALBUTEROL SULFATE 2.5; .5 MG/3ML; MG/3ML
1 SOLUTION RESPIRATORY (INHALATION) ONCE
Status: COMPLETED | OUTPATIENT
Start: 2018-11-26 | End: 2018-11-26

## 2018-11-26 RX ORDER — AZITHROMYCIN 250 MG/1
TABLET, FILM COATED ORAL
Qty: 1 PACKET | Refills: 0 | Status: SHIPPED | OUTPATIENT
Start: 2018-11-26 | End: 2018-12-06

## 2018-11-26 RX ORDER — PREDNISONE 10 MG/1
TABLET ORAL
Qty: 30 TABLET | Refills: 0 | Status: SHIPPED | OUTPATIENT
Start: 2018-11-26 | End: 2019-01-18

## 2018-11-26 RX ADMIN — IPRATROPIUM BROMIDE AND ALBUTEROL SULFATE 1 AMPULE: .5; 3 SOLUTION RESPIRATORY (INHALATION) at 21:16

## 2018-11-26 RX ADMIN — IPRATROPIUM BROMIDE AND ALBUTEROL SULFATE 1 AMPULE: .5; 3 SOLUTION RESPIRATORY (INHALATION) at 20:00

## 2018-11-26 ASSESSMENT — ENCOUNTER SYMPTOMS
SHORTNESS OF BREATH: 1
COLOR CHANGE: 0
CHEST TIGHTNESS: 1
ABDOMINAL PAIN: 0
RHINORRHEA: 0
NAUSEA: 0
VOMITING: 0
COUGH: 1

## 2018-11-26 ASSESSMENT — PAIN DESCRIPTION - LOCATION: LOCATION: HEAD

## 2018-11-26 ASSESSMENT — PAIN SCALES - GENERAL: PAINLEVEL_OUTOF10: 8

## 2018-11-27 ENCOUNTER — TELEPHONE (OUTPATIENT)
Dept: OTHER | Facility: CLINIC | Age: 63
End: 2018-11-27

## 2018-11-27 LAB
EKG ATRIAL RATE: 86 BPM
EKG DIAGNOSIS: NORMAL
EKG P AXIS: 47 DEGREES
EKG P-R INTERVAL: 160 MS
EKG Q-T INTERVAL: 374 MS
EKG QRS DURATION: 86 MS
EKG QTC CALCULATION (BAZETT): 447 MS
EKG R AXIS: 68 DEGREES
EKG T AXIS: 64 DEGREES
EKG VENTRICULAR RATE: 86 BPM

## 2018-11-27 PROCEDURE — 93010 ELECTROCARDIOGRAM REPORT: CPT | Performed by: INTERNAL MEDICINE

## 2018-11-27 NOTE — ED PROVIDER NOTES
[facility]  eMERGENCY dEPARTMENT eNCOUnter        Pt Name: Bebo Callaway  MRN: [id number]  [place] 1955  Date of evaluation: 11/26/2018  Provider: Raudel Matos PA-C  PCP: Andry Grace MD    This patient was seen and evaluated by the attending physician Dr. Arthur Napoles       Chief Complaint   Patient presents with    Cough    Shortness of Breath       HISTORY OF PRESENT ILLNESS   (Location/Symptom, Timing/Onset, Context/Setting, Quality, Duration, Modifying Factors, Severity)  Note limiting factors. Bebo Callaway is a 61 y.o. male presents emergency department by private vehicle complaining of shortness breath, chest tightness, cough, fatigue and generalized ill feeling. Patient states he had allergic reaction to naproxen last Friday was seen and evaluated in the emergency department. Patient also diagnosed with acute bronchitis at that time. Patient was discharged home with albuterol inhaler and steroids. Has been taking albuterol inhaler, steroids and cough medication as prescribed. Has not had any improvement since starting medications. States he has past medical history of COPD, does not take any medications daily for COPD. Other medical problems include DM, hypertension, hyperlipidemia, CAD. Patient has past cardiac history of stent placement ×3. Patient also had a syncopal episode yesterday. Patient hit his head and sustained hematoma to his head. Denies any extremity weakness/numbness/tingling, vomiting, amnesia. He is not on blood thinners. Denies any fevers or chills, nausea, vomiting, abdominal pain, rhinorrhea or nasal congestion. Nursing Notes were all reviewed and agreed with or any disagreements were addressed  in the HPI. REVIEW OF SYSTEMS    (2-9 systems for level 4, 10 or more for level 5)     Review of Systems   Constitutional: Negative for chills and fever.    HENT: Negative for congestion and affect. His behavior is normal.       DIAGNOSTIC RESULTS   LABS:    Labs Reviewed   CBC WITH AUTO DIFFERENTIAL - Abnormal; Notable for the following:        Result Value    WBC 3.7 (*)     Hemoglobin 13.1 (*)     All other components within normal limits    Narrative:     Performed at:  [facility]  [address] [place], [place] [address]   Phone [phone]   BASIC METABOLIC PANEL W/ REFLEX TO MG FOR LOW K - Abnormal; Notable for the following:     Glucose 249 (*)     All other components within normal limits    Narrative:     Performed at:  [facility]  [address] [place], [place] [address]   Phone [phone]   RAPID INFLUENZA A/B ANTIGENS    Narrative:     Performed at:  [address] [address]   Phone [phone]   TROPONIN    Narrative:     Performed at:  [facility]  [address] [place] [address]   Phone [phone]       All other labs were within normal range or not returned as of this dictation. EKG: All EKG's are interpreted by the Emergency Department Physician who either signs orCo-signs this chart in the absence of a cardiologist.  Please see their note for interpretation of EKG. RADIOLOGY:   Non-plain film images such as CT, Ultrasound and MRI are read by the radiologist. Plain radiographic images are visualized andpreliminarily interpreted by the  ED Provider with the below findings:        Interpretation perthe Radiologist below, if available at the time of this note:    XR CHEST STANDARD (2 VW)   Final Result   No acute process.            Xr Chest Standard (2 Vw)    Result Date: 11/26/2018  EXAMINATION: TWO VIEWS OF THE CHEST 11/26/2018 5:56 pm COMPARISON: 11/23/2018 HISTORY: ORDERING SYSTEM PROVIDED HISTORY: cough TECHNOLOGIST PROVIDED HISTORY: Reason for exam:->cough Ordering Physician Provided Reason for Exam: cough, sob, hx heart problems Acuity: Acute Type of Exam: Initial FINDINGS: The lungs are without acute focal process. There is no effusion or pneumothorax. The cardiomediastinal silhouette is stable. The osseous structures are stable. No acute process. PROCEDURES   Unless otherwise noted below, none     Procedures    CRITICAL CARE TIME   N/A    CONSULTS:  None      EMERGENCY DEPARTMENT COURSE and DIFFERENTIALDIAGNOSIS/MDM:   Vitals:    Vitals:    11/26/18 1726 11/26/18 2009 11/26/18 2116 11/26/18 2223   BP: (!) 191/110   (!) 195/109   Pulse: 96   95   Resp: 18 18 18 16   Temp: 97.2 °F (36.2 °C)      TempSrc: Oral      SpO2: 97% 97% 97% 99%       Patient was given thefollowing medications:  Medications   ipratropium-albuterol (DUONEB) nebulizer solution 1 ampule (1 ampule Inhalation Given 11/26/18 2000)   ipratropium-albuterol (DUONEB) nebulizer solution 1 ampule (1 ampule Inhalation Given 11/26/18 2000)   ipratropium-albuterol (DUONEB) nebulizer solution 1 ampule (1 ampule Inhalation Given 11/26/18 2116)   ipratropium-albuterol (DUONEB) nebulizer solution 1 ampule (1 ampule Inhalation Given 11/26/18 2116)       Patient presents to the ED with the HPI noted above. Upon arrival blood pressure elevated but patient is not tachycardic. He is afebrile and nontoxic-appearing. Oxygen saturation 97% on room air. After initial evaluation EKG obtained a cardiac workup initiated. Chest x-ray obtained. Patient given 2 duoneb treatments. Mild improvement at reevaluation, however, patient still symptomatic and two additional DuoNeb treatments given. A second reevaluation patient had near resolution of all symptoms. Oxygen saturation 99% on room air at this time. CBC showed mild leukopenia with a CBC of 3.7, mild anemia with and hgb of 13.1. BMP showed no left abnormality, no renal impairment. Influenza negative. Chest x-ray showed no acute process.   Given significant improvement in the ED

## 2018-12-04 ENCOUNTER — TELEPHONE (OUTPATIENT)
Dept: OTHER | Facility: CLINIC | Age: 63
End: 2018-12-04

## 2018-12-06 ENCOUNTER — TELEPHONE (OUTPATIENT)
Dept: PULMONOLOGY | Age: 63
End: 2018-12-06

## 2019-01-04 ENCOUNTER — HOSPITAL ENCOUNTER (EMERGENCY)
Age: 64
Discharge: HOME | End: 2019-01-04
Attending: EMERGENCY MEDICINE
Payer: MEDICARE

## 2019-01-04 VITALS
DIASTOLIC BLOOD PRESSURE: 94 MMHG | TEMPERATURE: 98.1 F | SYSTOLIC BLOOD PRESSURE: 174 MMHG | HEART RATE: 78 BPM | HEIGHT: 72 IN | OXYGEN SATURATION: 97 % | RESPIRATION RATE: 18 BRPM | BODY MASS INDEX: 32.78 KG/M2 | WEIGHT: 242 LBS

## 2019-01-04 DIAGNOSIS — F41.0 PANIC ATTACK: Primary | ICD-10-CM

## 2019-01-04 LAB
EKG ATRIAL RATE: 75 BPM
EKG DIAGNOSIS: NORMAL
EKG P AXIS: 31 DEGREES
EKG P-R INTERVAL: 176 MS
EKG Q-T INTERVAL: 392 MS
EKG QRS DURATION: 94 MS
EKG QTC CALCULATION (BAZETT): 437 MS
EKG R AXIS: 59 DEGREES
EKG T AXIS: 56 DEGREES
EKG VENTRICULAR RATE: 75 BPM

## 2019-01-04 PROCEDURE — 93010 ELECTROCARDIOGRAM REPORT: CPT | Performed by: INTERNAL MEDICINE

## 2019-01-04 PROCEDURE — 6370000000 HC RX 637 (ALT 250 FOR IP): Performed by: EMERGENCY MEDICINE

## 2019-01-04 PROCEDURE — 93005 ELECTROCARDIOGRAM TRACING: CPT | Performed by: EMERGENCY MEDICINE

## 2019-01-04 PROCEDURE — 99283 EMERGENCY DEPT VISIT LOW MDM: CPT

## 2019-01-04 RX ORDER — LORAZEPAM 1 MG/1
2 TABLET ORAL ONCE
Status: COMPLETED | OUTPATIENT
Start: 2019-01-04 | End: 2019-01-04

## 2019-01-04 RX ORDER — CLONAZEPAM 0.5 MG/1
1 TABLET ORAL 2 TIMES DAILY PRN
Qty: 10 TABLET | Refills: 0 | Status: ON HOLD | OUTPATIENT
Start: 2019-01-04 | End: 2019-08-19

## 2019-01-04 RX ADMIN — LORAZEPAM 2 MG: 1 TABLET ORAL at 03:30

## 2019-01-04 ASSESSMENT — PAIN SCALES - GENERAL
PAINLEVEL_OUTOF10: 0
PAINLEVEL_OUTOF10: 0

## 2019-01-18 ENCOUNTER — APPOINTMENT (OUTPATIENT)
Dept: CT IMAGING | Age: 64
DRG: 866 | End: 2019-01-18
Payer: MEDICARE

## 2019-01-18 ENCOUNTER — APPOINTMENT (OUTPATIENT)
Dept: GENERAL RADIOLOGY | Age: 64
DRG: 866 | End: 2019-01-18
Payer: MEDICARE

## 2019-01-18 ENCOUNTER — HOSPITAL ENCOUNTER (INPATIENT)
Age: 64
LOS: 4 days | Discharge: HOME | DRG: 866 | End: 2019-01-22
Attending: EMERGENCY MEDICINE | Admitting: INTERNAL MEDICINE
Payer: MEDICARE

## 2019-01-18 DIAGNOSIS — J18.9 PNEUMONIA OF RIGHT UPPER LOBE DUE TO INFECTIOUS ORGANISM: ICD-10-CM

## 2019-01-18 DIAGNOSIS — R11.2 NAUSEA, VOMITING AND DIARRHEA: ICD-10-CM

## 2019-01-18 DIAGNOSIS — A41.9 SEPTICEMIA (HCC): Primary | ICD-10-CM

## 2019-01-18 DIAGNOSIS — R19.7 NAUSEA, VOMITING AND DIARRHEA: ICD-10-CM

## 2019-01-18 DIAGNOSIS — R06.2 WHEEZING: ICD-10-CM

## 2019-01-18 DIAGNOSIS — R55 SYNCOPE, UNSPECIFIED SYNCOPE TYPE: ICD-10-CM

## 2019-01-18 LAB
A/G RATIO: 1.4 (ref 1.1–2.2)
ALBUMIN SERPL-MCNC: 4.7 G/DL (ref 3.4–5)
ALP BLD-CCNC: 138 U/L (ref 40–129)
ALT SERPL-CCNC: 31 U/L (ref 10–40)
ANION GAP SERPL CALCULATED.3IONS-SCNC: 16 MMOL/L (ref 3–16)
AST SERPL-CCNC: 25 U/L (ref 15–37)
BASOPHILS ABSOLUTE: 0 K/UL (ref 0–0.2)
BASOPHILS RELATIVE PERCENT: 0.6 %
BILIRUB SERPL-MCNC: 0.3 MG/DL (ref 0–1)
BUN BLDV-MCNC: 15 MG/DL (ref 7–20)
CALCIUM SERPL-MCNC: 9.2 MG/DL (ref 8.3–10.6)
CHLORIDE BLD-SCNC: 95 MMOL/L (ref 99–110)
CO2: 31 MMOL/L (ref 21–32)
CREAT SERPL-MCNC: 1.1 MG/DL (ref 0.8–1.3)
EOSINOPHILS ABSOLUTE: 0.1 K/UL (ref 0–0.6)
EOSINOPHILS RELATIVE PERCENT: 0.7 %
GFR AFRICAN AMERICAN: >60
GFR NON-AFRICAN AMERICAN: >60
GLOBULIN: 3.3 G/DL
GLUCOSE BLD-MCNC: 200 MG/DL (ref 70–99)
GLUCOSE BLD-MCNC: 225 MG/DL (ref 70–99)
HCT VFR BLD CALC: 43.4 % (ref 40.5–52.5)
HEMOGLOBIN: 14.5 G/DL (ref 13.5–17.5)
LACTIC ACID: 1.3 MMOL/L (ref 0.4–2)
LACTIC ACID: 1.8 MMOL/L (ref 0.4–2)
LIPASE: 40 U/L (ref 13–60)
LYMPHOCYTES ABSOLUTE: 0.9 K/UL (ref 1–5.1)
LYMPHOCYTES RELATIVE PERCENT: 13.1 %
MAGNESIUM: 1.3 MG/DL (ref 1.8–2.4)
MCH RBC QN AUTO: 28.6 PG (ref 26–34)
MCHC RBC AUTO-ENTMCNC: 33.5 G/DL (ref 31–36)
MCV RBC AUTO: 85.5 FL (ref 80–100)
MONOCYTES ABSOLUTE: 0.5 K/UL (ref 0–1.3)
MONOCYTES RELATIVE PERCENT: 7.6 %
NEUTROPHILS ABSOLUTE: 5.6 K/UL (ref 1.7–7.7)
NEUTROPHILS RELATIVE PERCENT: 78 %
PDW BLD-RTO: 13.6 % (ref 12.4–15.4)
PERFORMED ON: ABNORMAL
PLATELET # BLD: 199 K/UL (ref 135–450)
PMV BLD AUTO: 8.6 FL (ref 5–10.5)
POTASSIUM REFLEX MAGNESIUM: 3.4 MMOL/L (ref 3.5–5.1)
RAPID INFLUENZA  B AGN: NEGATIVE
RAPID INFLUENZA A AGN: NEGATIVE
RBC # BLD: 5.07 M/UL (ref 4.2–5.9)
SODIUM BLD-SCNC: 142 MMOL/L (ref 136–145)
TOTAL PROTEIN: 8 G/DL (ref 6.4–8.2)
TROPONIN: <0.01 NG/ML
WBC # BLD: 7.2 K/UL (ref 4–11)

## 2019-01-18 PROCEDURE — 85025 COMPLETE CBC W/AUTO DIFF WBC: CPT

## 2019-01-18 PROCEDURE — 6370000000 HC RX 637 (ALT 250 FOR IP): Performed by: PHYSICIAN ASSISTANT

## 2019-01-18 PROCEDURE — 87040 BLOOD CULTURE FOR BACTERIA: CPT

## 2019-01-18 PROCEDURE — 2580000003 HC RX 258: Performed by: NURSE PRACTITIONER

## 2019-01-18 PROCEDURE — 96361 HYDRATE IV INFUSION ADD-ON: CPT

## 2019-01-18 PROCEDURE — 1200000000 HC SEMI PRIVATE

## 2019-01-18 PROCEDURE — 96366 THER/PROPH/DIAG IV INF ADDON: CPT

## 2019-01-18 PROCEDURE — 71046 X-RAY EXAM CHEST 2 VIEWS: CPT

## 2019-01-18 PROCEDURE — 93010 ELECTROCARDIOGRAM REPORT: CPT | Performed by: INTERNAL MEDICINE

## 2019-01-18 PROCEDURE — 83690 ASSAY OF LIPASE: CPT

## 2019-01-18 PROCEDURE — 96368 THER/DIAG CONCURRENT INF: CPT

## 2019-01-18 PROCEDURE — 74176 CT ABD & PELVIS W/O CONTRAST: CPT

## 2019-01-18 PROCEDURE — 6360000004 HC RX CONTRAST MEDICATION: Performed by: EMERGENCY MEDICINE

## 2019-01-18 PROCEDURE — 6370000000 HC RX 637 (ALT 250 FOR IP): Performed by: NURSE PRACTITIONER

## 2019-01-18 PROCEDURE — 87804 INFLUENZA ASSAY W/OPTIC: CPT

## 2019-01-18 PROCEDURE — 71260 CT THORAX DX C+: CPT

## 2019-01-18 PROCEDURE — 84484 ASSAY OF TROPONIN QUANT: CPT

## 2019-01-18 PROCEDURE — 6360000002 HC RX W HCPCS: Performed by: NURSE PRACTITIONER

## 2019-01-18 PROCEDURE — 83735 ASSAY OF MAGNESIUM: CPT

## 2019-01-18 PROCEDURE — 96367 TX/PROPH/DG ADDL SEQ IV INF: CPT

## 2019-01-18 PROCEDURE — 87801 DETECT AGNT MULT DNA AMPLI: CPT

## 2019-01-18 PROCEDURE — 80053 COMPREHEN METABOLIC PANEL: CPT

## 2019-01-18 PROCEDURE — 96375 TX/PRO/DX INJ NEW DRUG ADDON: CPT

## 2019-01-18 PROCEDURE — 99285 EMERGENCY DEPT VISIT HI MDM: CPT

## 2019-01-18 PROCEDURE — 96365 THER/PROPH/DIAG IV INF INIT: CPT

## 2019-01-18 PROCEDURE — 83605 ASSAY OF LACTIC ACID: CPT

## 2019-01-18 PROCEDURE — 6370000000 HC RX 637 (ALT 250 FOR IP): Performed by: EMERGENCY MEDICINE

## 2019-01-18 PROCEDURE — 93005 ELECTROCARDIOGRAM TRACING: CPT | Performed by: EMERGENCY MEDICINE

## 2019-01-18 PROCEDURE — 2580000003 HC RX 258: Performed by: PHYSICIAN ASSISTANT

## 2019-01-18 RX ORDER — CARVEDILOL 6.25 MG/1
3.12 TABLET ORAL 2 TIMES DAILY WITH MEALS
Status: DISCONTINUED | OUTPATIENT
Start: 2019-01-19 | End: 2019-01-22 | Stop reason: HOSPADM

## 2019-01-18 RX ORDER — NICOTINE POLACRILEX 4 MG
15 LOZENGE BUCCAL PRN
Status: DISCONTINUED | OUTPATIENT
Start: 2019-01-18 | End: 2019-01-22 | Stop reason: HOSPADM

## 2019-01-18 RX ORDER — SODIUM CHLORIDE 9 MG/ML
INJECTION, SOLUTION INTRAVENOUS CONTINUOUS
Status: DISCONTINUED | OUTPATIENT
Start: 2019-01-18 | End: 2019-01-22 | Stop reason: HOSPADM

## 2019-01-18 RX ORDER — POTASSIUM CHLORIDE 7.45 MG/ML
10 INJECTION INTRAVENOUS PRN
Status: DISCONTINUED | OUTPATIENT
Start: 2019-01-18 | End: 2019-01-22 | Stop reason: HOSPADM

## 2019-01-18 RX ORDER — FENOFIBRATE 54 MG/1
54 TABLET ORAL DAILY
Status: DISCONTINUED | OUTPATIENT
Start: 2019-01-19 | End: 2019-01-18 | Stop reason: CLARIF

## 2019-01-18 RX ORDER — LISINOPRIL 20 MG/1
20 TABLET ORAL DAILY
COMMUNITY
End: 2019-06-07

## 2019-01-18 RX ORDER — LISINOPRIL AND HYDROCHLOROTHIAZIDE 25; 20 MG/1; MG/1
2 TABLET ORAL DAILY
Status: DISCONTINUED | OUTPATIENT
Start: 2019-01-19 | End: 2019-01-18 | Stop reason: CLARIF

## 2019-01-18 RX ORDER — ALBUTEROL SULFATE 90 UG/1
2 AEROSOL, METERED RESPIRATORY (INHALATION) EVERY 4 HOURS PRN
Status: DISCONTINUED | OUTPATIENT
Start: 2019-01-18 | End: 2019-01-22 | Stop reason: HOSPADM

## 2019-01-18 RX ORDER — POTASSIUM CHLORIDE 20 MEQ/1
40 TABLET, EXTENDED RELEASE ORAL PRN
Status: DISCONTINUED | OUTPATIENT
Start: 2019-01-18 | End: 2019-01-22 | Stop reason: HOSPADM

## 2019-01-18 RX ORDER — THIAMINE MONONITRATE (VIT B1) 100 MG
100 TABLET ORAL DAILY
Status: DISCONTINUED | OUTPATIENT
Start: 2019-01-19 | End: 2019-01-22 | Stop reason: HOSPADM

## 2019-01-18 RX ORDER — DEXTROSE MONOHYDRATE 25 G/50ML
12.5 INJECTION, SOLUTION INTRAVENOUS PRN
Status: DISCONTINUED | OUTPATIENT
Start: 2019-01-18 | End: 2019-01-22 | Stop reason: HOSPADM

## 2019-01-18 RX ORDER — 0.9 % SODIUM CHLORIDE 0.9 %
30 INTRAVENOUS SOLUTION INTRAVENOUS ONCE
Status: COMPLETED | OUTPATIENT
Start: 2019-01-18 | End: 2019-01-18

## 2019-01-18 RX ORDER — ASPIRIN 81 MG/1
81 TABLET, CHEWABLE ORAL DAILY
Status: DISCONTINUED | OUTPATIENT
Start: 2019-01-19 | End: 2019-01-22 | Stop reason: HOSPADM

## 2019-01-18 RX ORDER — METHYLPREDNISOLONE SODIUM SUCCINATE 40 MG/ML
40 INJECTION, POWDER, LYOPHILIZED, FOR SOLUTION INTRAMUSCULAR; INTRAVENOUS DAILY
Status: DISCONTINUED | OUTPATIENT
Start: 2019-01-19 | End: 2019-01-20

## 2019-01-18 RX ORDER — CHOLECALCIFEROL (VITAMIN D3) 125 MCG
500 CAPSULE ORAL DAILY
Status: DISCONTINUED | OUTPATIENT
Start: 2019-01-19 | End: 2019-01-22 | Stop reason: HOSPADM

## 2019-01-18 RX ORDER — ESZOPICLONE 2 MG/1
2 TABLET, FILM COATED ORAL NIGHTLY
Status: ON HOLD | COMMUNITY
Start: 2019-01-14 | End: 2019-01-22 | Stop reason: HOSPADM

## 2019-01-18 RX ORDER — LISINOPRIL 10 MG/1
20 TABLET ORAL DAILY
Status: DISCONTINUED | OUTPATIENT
Start: 2019-01-19 | End: 2019-01-22 | Stop reason: HOSPADM

## 2019-01-18 RX ORDER — SERTRALINE HYDROCHLORIDE 100 MG/1
100 TABLET, FILM COATED ORAL NIGHTLY
Status: DISCONTINUED | OUTPATIENT
Start: 2019-01-18 | End: 2019-01-22 | Stop reason: HOSPADM

## 2019-01-18 RX ORDER — ACETAMINOPHEN 325 MG/1
650 TABLET ORAL ONCE
Status: COMPLETED | OUTPATIENT
Start: 2019-01-18 | End: 2019-01-18

## 2019-01-18 RX ORDER — ACETAMINOPHEN 325 MG/1
650 TABLET ORAL EVERY 4 HOURS PRN
Status: DISCONTINUED | OUTPATIENT
Start: 2019-01-18 | End: 2019-01-22 | Stop reason: HOSPADM

## 2019-01-18 RX ORDER — IPRATROPIUM BROMIDE AND ALBUTEROL SULFATE 2.5; .5 MG/3ML; MG/3ML
1 SOLUTION RESPIRATORY (INHALATION) EVERY 4 HOURS PRN
Status: DISCONTINUED | OUTPATIENT
Start: 2019-01-18 | End: 2019-01-22 | Stop reason: HOSPADM

## 2019-01-18 RX ORDER — MAGNESIUM SULFATE IN WATER 40 MG/ML
2 INJECTION, SOLUTION INTRAVENOUS ONCE
Status: COMPLETED | OUTPATIENT
Start: 2019-01-18 | End: 2019-01-18

## 2019-01-18 RX ORDER — BENZONATATE 100 MG/1
100 CAPSULE ORAL 3 TIMES DAILY PRN
Status: DISCONTINUED | OUTPATIENT
Start: 2019-01-18 | End: 2019-01-22 | Stop reason: HOSPADM

## 2019-01-18 RX ORDER — SODIUM CHLORIDE 0.9 % (FLUSH) 0.9 %
10 SYRINGE (ML) INJECTION PRN
Status: DISCONTINUED | OUTPATIENT
Start: 2019-01-18 | End: 2019-01-22 | Stop reason: HOSPADM

## 2019-01-18 RX ORDER — MIRTAZAPINE 15 MG/1
15 TABLET, FILM COATED ORAL NIGHTLY
Status: DISCONTINUED | OUTPATIENT
Start: 2019-01-18 | End: 2019-01-22 | Stop reason: HOSPADM

## 2019-01-18 RX ORDER — SODIUM CHLORIDE 0.9 % (FLUSH) 0.9 %
10 SYRINGE (ML) INJECTION EVERY 12 HOURS SCHEDULED
Status: DISCONTINUED | OUTPATIENT
Start: 2019-01-18 | End: 2019-01-22 | Stop reason: HOSPADM

## 2019-01-18 RX ORDER — ZOLPIDEM TARTRATE 5 MG/1
10 TABLET ORAL NIGHTLY PRN
Status: DISCONTINUED | OUTPATIENT
Start: 2019-01-18 | End: 2019-01-22 | Stop reason: HOSPADM

## 2019-01-18 RX ORDER — CODEINE PHOSPHATE AND GUAIFENESIN 10; 100 MG/5ML; MG/5ML
5 SOLUTION ORAL EVERY 4 HOURS PRN
Status: DISCONTINUED | OUTPATIENT
Start: 2019-01-18 | End: 2019-01-19

## 2019-01-18 RX ORDER — DEXTROSE MONOHYDRATE 50 MG/ML
100 INJECTION, SOLUTION INTRAVENOUS PRN
Status: DISCONTINUED | OUTPATIENT
Start: 2019-01-18 | End: 2019-01-22 | Stop reason: HOSPADM

## 2019-01-18 RX ORDER — FAMOTIDINE 20 MG/1
20 TABLET, FILM COATED ORAL 2 TIMES DAILY
Status: DISCONTINUED | OUTPATIENT
Start: 2019-01-18 | End: 2019-01-22 | Stop reason: HOSPADM

## 2019-01-18 RX ORDER — METHYLPREDNISOLONE SODIUM SUCCINATE 125 MG/2ML
125 INJECTION, POWDER, LYOPHILIZED, FOR SOLUTION INTRAMUSCULAR; INTRAVENOUS ONCE
Status: COMPLETED | OUTPATIENT
Start: 2019-01-18 | End: 2019-01-18

## 2019-01-18 RX ORDER — ONDANSETRON 2 MG/ML
4 INJECTION INTRAMUSCULAR; INTRAVENOUS EVERY 6 HOURS PRN
Status: DISCONTINUED | OUTPATIENT
Start: 2019-01-18 | End: 2019-01-22 | Stop reason: HOSPADM

## 2019-01-18 RX ORDER — NIFEDIPINE 30 MG/1
90 TABLET, EXTENDED RELEASE ORAL DAILY
Status: DISCONTINUED | OUTPATIENT
Start: 2019-01-19 | End: 2019-01-22 | Stop reason: HOSPADM

## 2019-01-18 RX ORDER — IPRATROPIUM BROMIDE AND ALBUTEROL SULFATE 2.5; .5 MG/3ML; MG/3ML
1 SOLUTION RESPIRATORY (INHALATION) ONCE
Status: COMPLETED | OUTPATIENT
Start: 2019-01-18 | End: 2019-01-18

## 2019-01-18 RX ORDER — 0.9 % SODIUM CHLORIDE 0.9 %
1000 INTRAVENOUS SOLUTION INTRAVENOUS ONCE
Status: DISCONTINUED | OUTPATIENT
Start: 2019-01-18 | End: 2019-01-18

## 2019-01-18 RX ORDER — POTASSIUM CHLORIDE 1.5 G/1.77G
40 POWDER, FOR SOLUTION ORAL PRN
Status: DISCONTINUED | OUTPATIENT
Start: 2019-01-18 | End: 2019-01-22 | Stop reason: HOSPADM

## 2019-01-18 RX ORDER — ZOLPIDEM TARTRATE 5 MG/1
10 TABLET ORAL NIGHTLY
Status: CANCELLED | OUTPATIENT
Start: 2019-01-18

## 2019-01-18 RX ORDER — CLONAZEPAM 0.5 MG/1
1 TABLET ORAL 2 TIMES DAILY PRN
Status: CANCELLED | OUTPATIENT
Start: 2019-01-18

## 2019-01-18 RX ORDER — LANOLIN ALCOHOL/MO/W.PET/CERES
3 CREAM (GRAM) TOPICAL NIGHTLY PRN
Status: DISCONTINUED | OUTPATIENT
Start: 2019-01-18 | End: 2019-01-22 | Stop reason: HOSPADM

## 2019-01-18 RX ADMIN — IPRATROPIUM BROMIDE AND ALBUTEROL SULFATE 1 AMPULE: .5; 3 SOLUTION RESPIRATORY (INHALATION) at 18:12

## 2019-01-18 RX ADMIN — ACETAMINOPHEN 650 MG: 325 TABLET, FILM COATED ORAL at 19:09

## 2019-01-18 RX ADMIN — METHYLPREDNISOLONE SODIUM SUCCINATE 125 MG: 125 INJECTION, POWDER, FOR SOLUTION INTRAMUSCULAR; INTRAVENOUS at 20:17

## 2019-01-18 RX ADMIN — IOPAMIDOL 75 ML: 755 INJECTION, SOLUTION INTRAVENOUS at 19:13

## 2019-01-18 RX ADMIN — SODIUM CHLORIDE 3264 ML: 9 INJECTION, SOLUTION INTRAVENOUS at 19:09

## 2019-01-18 RX ADMIN — CEFTRIAXONE 1 G: 1 INJECTION, POWDER, FOR SOLUTION INTRAMUSCULAR; INTRAVENOUS at 20:19

## 2019-01-18 RX ADMIN — FAMOTIDINE 20 MG: 20 TABLET ORAL at 23:19

## 2019-01-18 RX ADMIN — ZOLPIDEM TARTRATE 10 MG: 5 TABLET ORAL at 23:55

## 2019-01-18 RX ADMIN — GUAIFENESIN AND CODEINE PHOSPHATE 5 ML: 10; 100 LIQUID ORAL at 23:19

## 2019-01-18 RX ADMIN — BENZONATATE 100 MG: 100 CAPSULE ORAL at 23:19

## 2019-01-18 RX ADMIN — MIRTAZAPINE 15 MG: 15 TABLET, FILM COATED ORAL at 23:19

## 2019-01-18 RX ADMIN — MAGNESIUM SULFATE HEPTAHYDRATE 2 G: 40 INJECTION, SOLUTION INTRAVENOUS at 20:41

## 2019-01-18 RX ADMIN — SODIUM CHLORIDE: 9 INJECTION, SOLUTION INTRAVENOUS at 23:17

## 2019-01-18 RX ADMIN — AZITHROMYCIN MONOHYDRATE 500 MG: 500 INJECTION, POWDER, LYOPHILIZED, FOR SOLUTION INTRAVENOUS at 21:52

## 2019-01-18 RX ADMIN — INSULIN LISPRO 2 UNITS: 100 INJECTION, SOLUTION INTRAVENOUS; SUBCUTANEOUS at 23:20

## 2019-01-18 RX ADMIN — SERTRALINE 100 MG: 100 TABLET, FILM COATED ORAL at 23:19

## 2019-01-18 ASSESSMENT — PAIN DESCRIPTION - FREQUENCY
FREQUENCY: CONTINUOUS
FREQUENCY: CONTINUOUS

## 2019-01-18 ASSESSMENT — ENCOUNTER SYMPTOMS
VOMITING: 1
BACK PAIN: 0
COUGH: 1
ABDOMINAL PAIN: 1
BLOOD IN STOOL: 0
ABDOMINAL DISTENTION: 0
NAUSEA: 1
COLOR CHANGE: 1
DIARRHEA: 1
SHORTNESS OF BREATH: 1

## 2019-01-18 ASSESSMENT — PAIN DESCRIPTION - ORIENTATION
ORIENTATION: LEFT
ORIENTATION: LEFT

## 2019-01-18 ASSESSMENT — PAIN SCALES - GENERAL
PAINLEVEL_OUTOF10: 0
PAINLEVEL_OUTOF10: 5
PAINLEVEL_OUTOF10: 8

## 2019-01-18 ASSESSMENT — PAIN DESCRIPTION - LOCATION
LOCATION: CHEST;HEAD
LOCATION: CHEST

## 2019-01-18 ASSESSMENT — PAIN DESCRIPTION - PAIN TYPE: TYPE: ACUTE PAIN

## 2019-01-18 ASSESSMENT — PAIN DESCRIPTION - DESCRIPTORS
DESCRIPTORS: HEAVINESS
DESCRIPTORS: TIGHTNESS

## 2019-01-18 ASSESSMENT — PAIN DESCRIPTION - PROGRESSION
CLINICAL_PROGRESSION: GRADUALLY IMPROVING
CLINICAL_PROGRESSION: GRADUALLY WORSENING

## 2019-01-18 ASSESSMENT — PAIN DESCRIPTION - ONSET: ONSET: GRADUAL

## 2019-01-19 PROBLEM — E87.6 HYPOKALEMIA: Status: ACTIVE | Noted: 2019-01-19

## 2019-01-19 PROBLEM — E78.1 HYPERTRIGLYCERIDEMIA: Status: ACTIVE | Noted: 2019-01-19

## 2019-01-19 PROBLEM — J44.1 COPD EXACERBATION (HCC): Status: ACTIVE | Noted: 2019-01-19

## 2019-01-19 PROBLEM — R07.81 PLEURITIC CHEST PAIN: Status: ACTIVE | Noted: 2019-01-19

## 2019-01-19 PROBLEM — A41.9 SEPSIS (HCC): Status: ACTIVE | Noted: 2019-01-19

## 2019-01-19 PROBLEM — E83.42 HYPOMAGNESEMIA: Status: ACTIVE | Noted: 2019-01-19

## 2019-01-19 PROBLEM — I10 HTN (HYPERTENSION): Status: ACTIVE | Noted: 2019-01-19

## 2019-01-19 LAB
ANION GAP SERPL CALCULATED.3IONS-SCNC: 18 MMOL/L (ref 3–16)
BUN BLDV-MCNC: 15 MG/DL (ref 7–20)
CALCIUM SERPL-MCNC: 8.3 MG/DL (ref 8.3–10.6)
CHLORIDE BLD-SCNC: 98 MMOL/L (ref 99–110)
CO2: 25 MMOL/L (ref 21–32)
CREAT SERPL-MCNC: 0.9 MG/DL (ref 0.8–1.3)
GFR AFRICAN AMERICAN: >60
GFR NON-AFRICAN AMERICAN: >60
GLUCOSE BLD-MCNC: 212 MG/DL (ref 70–99)
GLUCOSE BLD-MCNC: 253 MG/DL (ref 70–99)
GLUCOSE BLD-MCNC: 270 MG/DL (ref 70–99)
GLUCOSE BLD-MCNC: 277 MG/DL (ref 70–99)
GLUCOSE BLD-MCNC: 324 MG/DL (ref 70–99)
HCT VFR BLD CALC: 40.9 % (ref 40.5–52.5)
HEMOGLOBIN: 13.5 G/DL (ref 13.5–17.5)
MAGNESIUM: 1.4 MG/DL (ref 1.8–2.4)
MCH RBC QN AUTO: 28.1 PG (ref 26–34)
MCHC RBC AUTO-ENTMCNC: 33 G/DL (ref 31–36)
MCV RBC AUTO: 85.1 FL (ref 80–100)
ORGANISM: ABNORMAL
PDW BLD-RTO: 13.4 % (ref 12.4–15.4)
PERFORMED ON: ABNORMAL
PLATELET # BLD: 177 K/UL (ref 135–450)
PMV BLD AUTO: 8.8 FL (ref 5–10.5)
POTASSIUM REFLEX MAGNESIUM: 3.5 MMOL/L (ref 3.5–5.1)
PROCALCITONIN: 0.07 NG/ML (ref 0–0.15)
RBC # BLD: 4.81 M/UL (ref 4.2–5.9)
REPORT: NORMAL
RESPIRATORY PANEL PCR: ABNORMAL
RESPIRATORY PANEL PCR: ABNORMAL
SODIUM BLD-SCNC: 141 MMOL/L (ref 136–145)
WBC # BLD: 5.7 K/UL (ref 4–11)

## 2019-01-19 PROCEDURE — 80048 BASIC METABOLIC PNL TOTAL CA: CPT

## 2019-01-19 PROCEDURE — 87581 M.PNEUMON DNA AMP PROBE: CPT

## 2019-01-19 PROCEDURE — 2580000003 HC RX 258: Performed by: PHYSICIAN ASSISTANT

## 2019-01-19 PROCEDURE — 36415 COLL VENOUS BLD VENIPUNCTURE: CPT

## 2019-01-19 PROCEDURE — 6360000002 HC RX W HCPCS: Performed by: PHYSICIAN ASSISTANT

## 2019-01-19 PROCEDURE — 84145 PROCALCITONIN (PCT): CPT

## 2019-01-19 PROCEDURE — 6370000000 HC RX 637 (ALT 250 FOR IP): Performed by: PHYSICIAN ASSISTANT

## 2019-01-19 PROCEDURE — 87081 CULTURE SCREEN ONLY: CPT

## 2019-01-19 PROCEDURE — 94760 N-INVAS EAR/PLS OXIMETRY 1: CPT

## 2019-01-19 PROCEDURE — 94640 AIRWAY INHALATION TREATMENT: CPT

## 2019-01-19 PROCEDURE — 87486 CHLMYD PNEUM DNA AMP PROBE: CPT

## 2019-01-19 PROCEDURE — 85027 COMPLETE CBC AUTOMATED: CPT

## 2019-01-19 PROCEDURE — 1200000000 HC SEMI PRIVATE

## 2019-01-19 PROCEDURE — 83735 ASSAY OF MAGNESIUM: CPT

## 2019-01-19 PROCEDURE — 87798 DETECT AGENT NOS DNA AMP: CPT

## 2019-01-19 PROCEDURE — 87633 RESP VIRUS 12-25 TARGETS: CPT

## 2019-01-19 PROCEDURE — 6370000000 HC RX 637 (ALT 250 FOR IP): Performed by: HOSPITALIST

## 2019-01-19 RX ORDER — CODEINE PHOSPHATE AND GUAIFENESIN 10; 100 MG/5ML; MG/5ML
5 SOLUTION ORAL EVERY 4 HOURS PRN
Status: DISCONTINUED | OUTPATIENT
Start: 2019-01-19 | End: 2019-01-22 | Stop reason: HOSPADM

## 2019-01-19 RX ORDER — FENOFIBRATE 54 MG/1
54 TABLET ORAL DAILY
Status: DISCONTINUED | OUTPATIENT
Start: 2019-01-19 | End: 2019-01-22 | Stop reason: HOSPADM

## 2019-01-19 RX ADMIN — METHYLPREDNISOLONE SODIUM SUCCINATE 40 MG: 40 INJECTION, POWDER, FOR SOLUTION INTRAMUSCULAR; INTRAVENOUS at 09:13

## 2019-01-19 RX ADMIN — BENZONATATE 100 MG: 100 CAPSULE ORAL at 09:18

## 2019-01-19 RX ADMIN — ZOLPIDEM TARTRATE 10 MG: 5 TABLET ORAL at 20:42

## 2019-01-19 RX ADMIN — ACETAMINOPHEN 650 MG: 325 TABLET, FILM COATED ORAL at 09:18

## 2019-01-19 RX ADMIN — LISINOPRIL 20 MG: 10 TABLET ORAL at 09:12

## 2019-01-19 RX ADMIN — Medication 3 MG: at 20:42

## 2019-01-19 RX ADMIN — FAMOTIDINE 20 MG: 20 TABLET ORAL at 09:12

## 2019-01-19 RX ADMIN — ENOXAPARIN SODIUM 40 MG: 40 INJECTION SUBCUTANEOUS at 09:13

## 2019-01-19 RX ADMIN — MOMETASONE FUROATE AND FORMOTEROL FUMARATE DIHYDRATE 2 PUFF: 200; 5 AEROSOL RESPIRATORY (INHALATION) at 08:59

## 2019-01-19 RX ADMIN — Medication 10 ML: at 20:33

## 2019-01-19 RX ADMIN — FENOFIBRATE 54 MG: 54 TABLET ORAL at 09:12

## 2019-01-19 RX ADMIN — AZITHROMYCIN MONOHYDRATE 500 MG: 500 INJECTION, POWDER, LYOPHILIZED, FOR SOLUTION INTRAVENOUS at 21:43

## 2019-01-19 RX ADMIN — MIRTAZAPINE 15 MG: 15 TABLET, FILM COATED ORAL at 20:32

## 2019-01-19 RX ADMIN — INSULIN LISPRO 8 UNITS: 100 INJECTION, SOLUTION INTRAVENOUS; SUBCUTANEOUS at 17:39

## 2019-01-19 RX ADMIN — INSULIN LISPRO 4 UNITS: 100 INJECTION, SOLUTION INTRAVENOUS; SUBCUTANEOUS at 12:18

## 2019-01-19 RX ADMIN — INSULIN LISPRO 6 UNITS: 100 INJECTION, SOLUTION INTRAVENOUS; SUBCUTANEOUS at 09:19

## 2019-01-19 RX ADMIN — CARVEDILOL 3.12 MG: 6.25 TABLET, FILM COATED ORAL at 17:38

## 2019-01-19 RX ADMIN — ACETAMINOPHEN 650 MG: 325 TABLET, FILM COATED ORAL at 20:32

## 2019-01-19 RX ADMIN — Medication 500 MCG: at 09:12

## 2019-01-19 RX ADMIN — SERTRALINE 100 MG: 100 TABLET, FILM COATED ORAL at 20:32

## 2019-01-19 RX ADMIN — MOMETASONE FUROATE AND FORMOTEROL FUMARATE DIHYDRATE 2 PUFF: 200; 5 AEROSOL RESPIRATORY (INHALATION) at 20:01

## 2019-01-19 RX ADMIN — GUAIFENESIN AND CODEINE PHOSPHATE 5 ML: 10; 100 LIQUID ORAL at 12:24

## 2019-01-19 RX ADMIN — TIOTROPIUM BROMIDE 18 MCG: 18 CAPSULE ORAL; RESPIRATORY (INHALATION) at 08:59

## 2019-01-19 RX ADMIN — ASPIRIN 81 MG 81 MG: 81 TABLET ORAL at 09:12

## 2019-01-19 RX ADMIN — SODIUM CHLORIDE: 9 INJECTION, SOLUTION INTRAVENOUS at 20:45

## 2019-01-19 RX ADMIN — Medication 100 MG: at 09:12

## 2019-01-19 RX ADMIN — FAMOTIDINE 20 MG: 20 TABLET ORAL at 20:32

## 2019-01-19 RX ADMIN — INSULIN LISPRO 3 UNITS: 100 INJECTION, SOLUTION INTRAVENOUS; SUBCUTANEOUS at 20:48

## 2019-01-19 RX ADMIN — CARVEDILOL 3.12 MG: 6.25 TABLET, FILM COATED ORAL at 09:12

## 2019-01-19 RX ADMIN — CEFTRIAXONE SODIUM 1 G: 1 INJECTION, POWDER, FOR SOLUTION INTRAMUSCULAR; INTRAVENOUS at 20:33

## 2019-01-19 RX ADMIN — NIFEDIPINE 90 MG: 30 TABLET, FILM COATED, EXTENDED RELEASE ORAL at 09:12

## 2019-01-19 ASSESSMENT — PAIN SCALES - GENERAL
PAINLEVEL_OUTOF10: 6
PAINLEVEL_OUTOF10: 8
PAINLEVEL_OUTOF10: 6

## 2019-01-20 LAB
ANION GAP SERPL CALCULATED.3IONS-SCNC: 13 MMOL/L (ref 3–16)
BUN BLDV-MCNC: 16 MG/DL (ref 7–20)
CALCIUM SERPL-MCNC: 8.6 MG/DL (ref 8.3–10.6)
CHLORIDE BLD-SCNC: 108 MMOL/L (ref 99–110)
CO2: 27 MMOL/L (ref 21–32)
CREAT SERPL-MCNC: 0.8 MG/DL (ref 0.8–1.3)
GFR AFRICAN AMERICAN: >60
GFR NON-AFRICAN AMERICAN: >60
GLUCOSE BLD-MCNC: 136 MG/DL (ref 70–99)
GLUCOSE BLD-MCNC: 144 MG/DL (ref 70–99)
GLUCOSE BLD-MCNC: 183 MG/DL (ref 70–99)
GLUCOSE BLD-MCNC: 309 MG/DL (ref 70–99)
GLUCOSE BLD-MCNC: 346 MG/DL (ref 70–99)
HCT VFR BLD CALC: 39.5 % (ref 40.5–52.5)
HEMOGLOBIN: 13 G/DL (ref 13.5–17.5)
MCH RBC QN AUTO: 28.6 PG (ref 26–34)
MCHC RBC AUTO-ENTMCNC: 32.8 G/DL (ref 31–36)
MCV RBC AUTO: 87.3 FL (ref 80–100)
MRSA CULTURE ONLY: ABNORMAL
MRSA CULTURE ONLY: ABNORMAL
ORGANISM: ABNORMAL
PDW BLD-RTO: 13.4 % (ref 12.4–15.4)
PERFORMED ON: ABNORMAL
PLATELET # BLD: 169 K/UL (ref 135–450)
PMV BLD AUTO: 8.5 FL (ref 5–10.5)
POTASSIUM SERPL-SCNC: 3.5 MMOL/L (ref 3.5–5.1)
RBC # BLD: 4.53 M/UL (ref 4.2–5.9)
REPORT: NORMAL
SODIUM BLD-SCNC: 148 MMOL/L (ref 136–145)
WBC # BLD: 7.2 K/UL (ref 4–11)

## 2019-01-20 PROCEDURE — 80048 BASIC METABOLIC PNL TOTAL CA: CPT

## 2019-01-20 PROCEDURE — 6360000002 HC RX W HCPCS: Performed by: PHYSICIAN ASSISTANT

## 2019-01-20 PROCEDURE — 94640 AIRWAY INHALATION TREATMENT: CPT

## 2019-01-20 PROCEDURE — 6370000000 HC RX 637 (ALT 250 FOR IP): Performed by: PHYSICIAN ASSISTANT

## 2019-01-20 PROCEDURE — 6370000000 HC RX 637 (ALT 250 FOR IP): Performed by: HOSPITALIST

## 2019-01-20 PROCEDURE — 94760 N-INVAS EAR/PLS OXIMETRY 1: CPT

## 2019-01-20 PROCEDURE — 2580000003 HC RX 258: Performed by: HOSPITALIST

## 2019-01-20 PROCEDURE — 85027 COMPLETE CBC AUTOMATED: CPT

## 2019-01-20 PROCEDURE — 6360000002 HC RX W HCPCS: Performed by: HOSPITALIST

## 2019-01-20 PROCEDURE — 1200000000 HC SEMI PRIVATE

## 2019-01-20 PROCEDURE — 2580000003 HC RX 258: Performed by: PHYSICIAN ASSISTANT

## 2019-01-20 RX ORDER — PREDNISONE 20 MG/1
40 TABLET ORAL DAILY
Status: DISCONTINUED | OUTPATIENT
Start: 2019-01-20 | End: 2019-01-22 | Stop reason: HOSPADM

## 2019-01-20 RX ADMIN — Medication 500 MCG: at 09:09

## 2019-01-20 RX ADMIN — Medication 10 ML: at 21:37

## 2019-01-20 RX ADMIN — MIRTAZAPINE 15 MG: 15 TABLET, FILM COATED ORAL at 21:25

## 2019-01-20 RX ADMIN — FAMOTIDINE 20 MG: 20 TABLET ORAL at 21:24

## 2019-01-20 RX ADMIN — Medication 3 MG: at 21:25

## 2019-01-20 RX ADMIN — CARVEDILOL 3.12 MG: 6.25 TABLET, FILM COATED ORAL at 17:56

## 2019-01-20 RX ADMIN — INSULIN LISPRO 8 UNITS: 100 INJECTION, SOLUTION INTRAVENOUS; SUBCUTANEOUS at 17:56

## 2019-01-20 RX ADMIN — GUAIFENESIN AND CODEINE PHOSPHATE 5 ML: 10; 100 LIQUID ORAL at 21:35

## 2019-01-20 RX ADMIN — Medication 100 MG: at 09:09

## 2019-01-20 RX ADMIN — FENOFIBRATE 54 MG: 54 TABLET ORAL at 09:09

## 2019-01-20 RX ADMIN — MOMETASONE FUROATE AND FORMOTEROL FUMARATE DIHYDRATE 2 PUFF: 200; 5 AEROSOL RESPIRATORY (INHALATION) at 19:31

## 2019-01-20 RX ADMIN — IPRATROPIUM BROMIDE AND ALBUTEROL SULFATE 1 AMPULE: .5; 3 SOLUTION RESPIRATORY (INHALATION) at 08:58

## 2019-01-20 RX ADMIN — LISINOPRIL 20 MG: 10 TABLET ORAL at 09:09

## 2019-01-20 RX ADMIN — VANCOMYCIN HYDROCHLORIDE 1250 MG: 10 INJECTION, POWDER, LYOPHILIZED, FOR SOLUTION INTRAVENOUS at 15:02

## 2019-01-20 RX ADMIN — ASPIRIN 81 MG 81 MG: 81 TABLET ORAL at 09:08

## 2019-01-20 RX ADMIN — ZOLPIDEM TARTRATE 10 MG: 5 TABLET ORAL at 21:25

## 2019-01-20 RX ADMIN — TIOTROPIUM BROMIDE 18 MCG: 18 CAPSULE ORAL; RESPIRATORY (INHALATION) at 08:58

## 2019-01-20 RX ADMIN — GUAIFENESIN AND CODEINE PHOSPHATE 5 ML: 10; 100 LIQUID ORAL at 03:16

## 2019-01-20 RX ADMIN — SERTRALINE 100 MG: 100 TABLET, FILM COATED ORAL at 21:25

## 2019-01-20 RX ADMIN — PREDNISONE 40 MG: 20 TABLET ORAL at 15:02

## 2019-01-20 RX ADMIN — MOMETASONE FUROATE AND FORMOTEROL FUMARATE DIHYDRATE 2 PUFF: 200; 5 AEROSOL RESPIRATORY (INHALATION) at 08:58

## 2019-01-20 RX ADMIN — FAMOTIDINE 20 MG: 20 TABLET ORAL at 09:09

## 2019-01-20 RX ADMIN — INSULIN LISPRO 2 UNITS: 100 INJECTION, SOLUTION INTRAVENOUS; SUBCUTANEOUS at 12:29

## 2019-01-20 RX ADMIN — AZITHROMYCIN MONOHYDRATE 500 MG: 500 INJECTION, POWDER, LYOPHILIZED, FOR SOLUTION INTRAVENOUS at 22:58

## 2019-01-20 RX ADMIN — METHYLPREDNISOLONE SODIUM SUCCINATE 40 MG: 40 INJECTION, POWDER, FOR SOLUTION INTRAMUSCULAR; INTRAVENOUS at 09:10

## 2019-01-20 RX ADMIN — CEFTRIAXONE SODIUM 1 G: 1 INJECTION, POWDER, FOR SOLUTION INTRAMUSCULAR; INTRAVENOUS at 21:25

## 2019-01-20 RX ADMIN — NIFEDIPINE 90 MG: 30 TABLET, FILM COATED, EXTENDED RELEASE ORAL at 09:08

## 2019-01-20 RX ADMIN — CARVEDILOL 3.12 MG: 6.25 TABLET, FILM COATED ORAL at 09:09

## 2019-01-20 RX ADMIN — INSULIN LISPRO 4 UNITS: 100 INJECTION, SOLUTION INTRAVENOUS; SUBCUTANEOUS at 21:29

## 2019-01-20 RX ADMIN — ENOXAPARIN SODIUM 40 MG: 40 INJECTION SUBCUTANEOUS at 09:09

## 2019-01-21 PROBLEM — E11.65 TYPE 2 DIABETES MELLITUS WITH HYPERGLYCEMIA (HCC): Status: ACTIVE | Noted: 2017-05-24

## 2019-01-21 LAB
ANION GAP SERPL CALCULATED.3IONS-SCNC: 14 MMOL/L (ref 3–16)
BUN BLDV-MCNC: 17 MG/DL (ref 7–20)
CALCIUM SERPL-MCNC: 8.6 MG/DL (ref 8.3–10.6)
CHLORIDE BLD-SCNC: 103 MMOL/L (ref 99–110)
CO2: 24 MMOL/L (ref 21–32)
CREAT SERPL-MCNC: 1.1 MG/DL (ref 0.8–1.3)
EKG ATRIAL RATE: 102 BPM
EKG DIAGNOSIS: NORMAL
EKG P AXIS: 55 DEGREES
EKG P-R INTERVAL: 152 MS
EKG Q-T INTERVAL: 334 MS
EKG QRS DURATION: 90 MS
EKG QTC CALCULATION (BAZETT): 435 MS
EKG R AXIS: 85 DEGREES
EKG T AXIS: 63 DEGREES
EKG VENTRICULAR RATE: 102 BPM
GFR AFRICAN AMERICAN: >60
GFR NON-AFRICAN AMERICAN: >60
GLUCOSE BLD-MCNC: 157 MG/DL (ref 70–99)
GLUCOSE BLD-MCNC: 202 MG/DL (ref 70–99)
GLUCOSE BLD-MCNC: 205 MG/DL (ref 70–99)
GLUCOSE BLD-MCNC: 253 MG/DL (ref 70–99)
GLUCOSE BLD-MCNC: 310 MG/DL (ref 70–99)
PERFORMED ON: ABNORMAL
POTASSIUM SERPL-SCNC: 3.5 MMOL/L (ref 3.5–5.1)
PROCALCITONIN: 0.07 NG/ML (ref 0–0.15)
SODIUM BLD-SCNC: 141 MMOL/L (ref 136–145)
VANCOMYCIN TROUGH: 7.5 UG/ML (ref 10–20)

## 2019-01-21 PROCEDURE — 6370000000 HC RX 637 (ALT 250 FOR IP): Performed by: INTERNAL MEDICINE

## 2019-01-21 PROCEDURE — 1200000000 HC SEMI PRIVATE

## 2019-01-21 PROCEDURE — 84145 PROCALCITONIN (PCT): CPT

## 2019-01-21 PROCEDURE — 94760 N-INVAS EAR/PLS OXIMETRY 1: CPT

## 2019-01-21 PROCEDURE — 6370000000 HC RX 637 (ALT 250 FOR IP): Performed by: PHYSICIAN ASSISTANT

## 2019-01-21 PROCEDURE — 6370000000 HC RX 637 (ALT 250 FOR IP): Performed by: HOSPITALIST

## 2019-01-21 PROCEDURE — 2580000003 HC RX 258: Performed by: HOSPITALIST

## 2019-01-21 PROCEDURE — 94640 AIRWAY INHALATION TREATMENT: CPT

## 2019-01-21 PROCEDURE — 80048 BASIC METABOLIC PNL TOTAL CA: CPT

## 2019-01-21 PROCEDURE — 6360000002 HC RX W HCPCS: Performed by: HOSPITALIST

## 2019-01-21 PROCEDURE — 2580000003 HC RX 258: Performed by: PHYSICIAN ASSISTANT

## 2019-01-21 PROCEDURE — 80202 ASSAY OF VANCOMYCIN: CPT

## 2019-01-21 PROCEDURE — 36415 COLL VENOUS BLD VENIPUNCTURE: CPT

## 2019-01-21 PROCEDURE — 6360000002 HC RX W HCPCS: Performed by: PHYSICIAN ASSISTANT

## 2019-01-21 RX ORDER — CLONAZEPAM 0.5 MG/1
0.5 TABLET ORAL 2 TIMES DAILY PRN
Status: DISCONTINUED | OUTPATIENT
Start: 2019-01-21 | End: 2019-01-22 | Stop reason: HOSPADM

## 2019-01-21 RX ORDER — AZITHROMYCIN 500 MG/1
500 TABLET, FILM COATED ORAL
Status: COMPLETED | OUTPATIENT
Start: 2019-01-21 | End: 2019-01-22

## 2019-01-21 RX ADMIN — FAMOTIDINE 20 MG: 20 TABLET ORAL at 21:59

## 2019-01-21 RX ADMIN — CARVEDILOL 3.12 MG: 6.25 TABLET, FILM COATED ORAL at 17:14

## 2019-01-21 RX ADMIN — VANCOMYCIN HYDROCHLORIDE 1250 MG: 10 INJECTION, POWDER, LYOPHILIZED, FOR SOLUTION INTRAVENOUS at 02:16

## 2019-01-21 RX ADMIN — ACETAMINOPHEN 650 MG: 325 TABLET, FILM COATED ORAL at 22:17

## 2019-01-21 RX ADMIN — MIRTAZAPINE 15 MG: 15 TABLET, FILM COATED ORAL at 21:59

## 2019-01-21 RX ADMIN — SODIUM CHLORIDE: 9 INJECTION, SOLUTION INTRAVENOUS at 12:51

## 2019-01-21 RX ADMIN — LISINOPRIL 20 MG: 10 TABLET ORAL at 09:20

## 2019-01-21 RX ADMIN — FAMOTIDINE 20 MG: 20 TABLET ORAL at 09:20

## 2019-01-21 RX ADMIN — SODIUM CHLORIDE: 9 INJECTION, SOLUTION INTRAVENOUS at 02:21

## 2019-01-21 RX ADMIN — METFORMIN HYDROCHLORIDE 1000 MG: 500 TABLET ORAL at 18:55

## 2019-01-21 RX ADMIN — SERTRALINE 100 MG: 100 TABLET, FILM COATED ORAL at 21:58

## 2019-01-21 RX ADMIN — MOMETASONE FUROATE AND FORMOTEROL FUMARATE DIHYDRATE 2 PUFF: 200; 5 AEROSOL RESPIRATORY (INHALATION) at 08:32

## 2019-01-21 RX ADMIN — GUAIFENESIN AND CODEINE PHOSPHATE 5 ML: 10; 100 LIQUID ORAL at 09:30

## 2019-01-21 RX ADMIN — PREDNISONE 40 MG: 20 TABLET ORAL at 09:20

## 2019-01-21 RX ADMIN — INSULIN LISPRO 4 UNITS: 100 INJECTION, SOLUTION INTRAVENOUS; SUBCUTANEOUS at 21:59

## 2019-01-21 RX ADMIN — Medication 10 ML: at 21:59

## 2019-01-21 RX ADMIN — INSULIN LISPRO 6 UNITS: 100 INJECTION, SOLUTION INTRAVENOUS; SUBCUTANEOUS at 17:15

## 2019-01-21 RX ADMIN — CLONAZEPAM 0.5 MG: 0.5 TABLET ORAL at 17:14

## 2019-01-21 RX ADMIN — ASPIRIN 81 MG 81 MG: 81 TABLET ORAL at 09:20

## 2019-01-21 RX ADMIN — TIOTROPIUM BROMIDE 18 MCG: 18 CAPSULE ORAL; RESPIRATORY (INHALATION) at 08:33

## 2019-01-21 RX ADMIN — MOMETASONE FUROATE AND FORMOTEROL FUMARATE DIHYDRATE 2 PUFF: 200; 5 AEROSOL RESPIRATORY (INHALATION) at 20:50

## 2019-01-21 RX ADMIN — ZOLPIDEM TARTRATE 10 MG: 5 TABLET ORAL at 22:05

## 2019-01-21 RX ADMIN — AZITHROMYCIN 500 MG: 500 TABLET, FILM COATED ORAL at 17:14

## 2019-01-21 RX ADMIN — Medication 100 MG: at 09:20

## 2019-01-21 RX ADMIN — INSULIN LISPRO 2 UNITS: 100 INJECTION, SOLUTION INTRAVENOUS; SUBCUTANEOUS at 12:47

## 2019-01-21 RX ADMIN — Medication 500 MCG: at 09:20

## 2019-01-21 RX ADMIN — CARVEDILOL 3.12 MG: 6.25 TABLET, FILM COATED ORAL at 09:30

## 2019-01-21 RX ADMIN — FENOFIBRATE 54 MG: 54 TABLET ORAL at 09:20

## 2019-01-21 RX ADMIN — Medication 10 ML: at 09:30

## 2019-01-21 RX ADMIN — BENZONATATE 100 MG: 100 CAPSULE ORAL at 22:05

## 2019-01-21 RX ADMIN — ENOXAPARIN SODIUM 40 MG: 40 INJECTION SUBCUTANEOUS at 09:19

## 2019-01-21 RX ADMIN — SODIUM CHLORIDE: 9 INJECTION, SOLUTION INTRAVENOUS at 22:18

## 2019-01-21 RX ADMIN — NIFEDIPINE 90 MG: 30 TABLET, FILM COATED, EXTENDED RELEASE ORAL at 09:20

## 2019-01-21 ASSESSMENT — PAIN SCALES - GENERAL: PAINLEVEL_OUTOF10: 6

## 2019-01-22 VITALS
RESPIRATION RATE: 18 BRPM | HEART RATE: 90 BPM | DIASTOLIC BLOOD PRESSURE: 91 MMHG | HEIGHT: 72 IN | TEMPERATURE: 99.2 F | OXYGEN SATURATION: 93 % | BODY MASS INDEX: 32.55 KG/M2 | SYSTOLIC BLOOD PRESSURE: 197 MMHG | WEIGHT: 240.3 LBS

## 2019-01-22 LAB
ANION GAP SERPL CALCULATED.3IONS-SCNC: 13 MMOL/L (ref 3–16)
BUN BLDV-MCNC: 16 MG/DL (ref 7–20)
CALCIUM SERPL-MCNC: 8.5 MG/DL (ref 8.3–10.6)
CHLORIDE BLD-SCNC: 106 MMOL/L (ref 99–110)
CO2: 24 MMOL/L (ref 21–32)
CREAT SERPL-MCNC: 0.7 MG/DL (ref 0.8–1.3)
CULTURE, BLOOD 2: ABNORMAL
GFR AFRICAN AMERICAN: >60
GFR NON-AFRICAN AMERICAN: >60
GLUCOSE BLD-MCNC: 134 MG/DL (ref 70–99)
GLUCOSE BLD-MCNC: 136 MG/DL (ref 70–99)
GLUCOSE BLD-MCNC: 145 MG/DL (ref 70–99)
ORGANISM: ABNORMAL
ORGANISM: ABNORMAL
PERFORMED ON: ABNORMAL
PERFORMED ON: ABNORMAL
POTASSIUM SERPL-SCNC: 3.4 MMOL/L (ref 3.5–5.1)
SODIUM BLD-SCNC: 143 MMOL/L (ref 136–145)

## 2019-01-22 PROCEDURE — 2580000003 HC RX 258: Performed by: PHYSICIAN ASSISTANT

## 2019-01-22 PROCEDURE — 6370000000 HC RX 637 (ALT 250 FOR IP): Performed by: INTERNAL MEDICINE

## 2019-01-22 PROCEDURE — 6360000002 HC RX W HCPCS: Performed by: PHYSICIAN ASSISTANT

## 2019-01-22 PROCEDURE — 36415 COLL VENOUS BLD VENIPUNCTURE: CPT

## 2019-01-22 PROCEDURE — 6370000000 HC RX 637 (ALT 250 FOR IP): Performed by: HOSPITALIST

## 2019-01-22 PROCEDURE — 94640 AIRWAY INHALATION TREATMENT: CPT

## 2019-01-22 PROCEDURE — 94761 N-INVAS EAR/PLS OXIMETRY MLT: CPT

## 2019-01-22 PROCEDURE — 6370000000 HC RX 637 (ALT 250 FOR IP): Performed by: PHYSICIAN ASSISTANT

## 2019-01-22 PROCEDURE — 80048 BASIC METABOLIC PNL TOTAL CA: CPT

## 2019-01-22 RX ORDER — GUAIFENESIN 600 MG/1
1200 TABLET, EXTENDED RELEASE ORAL 2 TIMES DAILY
Qty: 40 TABLET | Refills: 0 | Status: SHIPPED | OUTPATIENT
Start: 2019-01-22 | End: 2019-02-01

## 2019-01-22 RX ORDER — GUAIFENESIN 600 MG/1
600 TABLET, EXTENDED RELEASE ORAL 2 TIMES DAILY
Status: DISCONTINUED | OUTPATIENT
Start: 2019-01-22 | End: 2019-01-22 | Stop reason: HOSPADM

## 2019-01-22 RX ORDER — BENZONATATE 100 MG/1
100 CAPSULE ORAL 3 TIMES DAILY PRN
Qty: 21 CAPSULE | Refills: 0 | Status: SHIPPED | OUTPATIENT
Start: 2019-01-22 | End: 2019-01-29

## 2019-01-22 RX ORDER — FENOFIBRATE 54 MG/1
54 TABLET ORAL DAILY
Qty: 30 TABLET | Refills: 3 | Status: ON HOLD | OUTPATIENT
Start: 2019-01-23 | End: 2020-02-12

## 2019-01-22 RX ADMIN — NIFEDIPINE 90 MG: 30 TABLET, FILM COATED, EXTENDED RELEASE ORAL at 09:13

## 2019-01-22 RX ADMIN — FENOFIBRATE 54 MG: 54 TABLET ORAL at 09:14

## 2019-01-22 RX ADMIN — MOMETASONE FUROATE AND FORMOTEROL FUMARATE DIHYDRATE 2 PUFF: 200; 5 AEROSOL RESPIRATORY (INHALATION) at 08:19

## 2019-01-22 RX ADMIN — GUAIFENESIN 600 MG: 600 TABLET, EXTENDED RELEASE ORAL at 12:01

## 2019-01-22 RX ADMIN — FAMOTIDINE 20 MG: 20 TABLET ORAL at 09:14

## 2019-01-22 RX ADMIN — Medication 100 MG: at 09:14

## 2019-01-22 RX ADMIN — Medication 500 MCG: at 09:14

## 2019-01-22 RX ADMIN — LISINOPRIL 20 MG: 10 TABLET ORAL at 09:14

## 2019-01-22 RX ADMIN — ENOXAPARIN SODIUM 40 MG: 40 INJECTION SUBCUTANEOUS at 09:13

## 2019-01-22 RX ADMIN — METFORMIN HYDROCHLORIDE 1000 MG: 500 TABLET ORAL at 09:23

## 2019-01-22 RX ADMIN — GUAIFENESIN AND CODEINE PHOSPHATE 5 ML: 10; 100 LIQUID ORAL at 03:03

## 2019-01-22 RX ADMIN — INSULIN LISPRO 2 UNITS: 100 INJECTION, SOLUTION INTRAVENOUS; SUBCUTANEOUS at 13:39

## 2019-01-22 RX ADMIN — AZITHROMYCIN 500 MG: 500 TABLET, FILM COATED ORAL at 15:54

## 2019-01-22 RX ADMIN — ASPIRIN 81 MG 81 MG: 81 TABLET ORAL at 09:14

## 2019-01-22 RX ADMIN — Medication 10 ML: at 09:14

## 2019-01-22 RX ADMIN — PREDNISONE 40 MG: 20 TABLET ORAL at 09:14

## 2019-01-22 RX ADMIN — CARVEDILOL 3.12 MG: 6.25 TABLET, FILM COATED ORAL at 09:23

## 2019-01-22 RX ADMIN — SODIUM CHLORIDE: 9 INJECTION, SOLUTION INTRAVENOUS at 12:01

## 2019-01-22 RX ADMIN — TIOTROPIUM BROMIDE 18 MCG: 18 CAPSULE ORAL; RESPIRATORY (INHALATION) at 08:19

## 2019-01-23 LAB — BLOOD CULTURE, ROUTINE: NORMAL

## 2019-03-08 ENCOUNTER — APPOINTMENT (OUTPATIENT)
Dept: CT IMAGING | Age: 64
End: 2019-03-08
Payer: MEDICARE

## 2019-03-08 ENCOUNTER — APPOINTMENT (OUTPATIENT)
Dept: GENERAL RADIOLOGY | Age: 64
End: 2019-03-08
Payer: MEDICARE

## 2019-03-08 ENCOUNTER — HOSPITAL ENCOUNTER (EMERGENCY)
Age: 64
Discharge: HOME | End: 2019-03-08
Attending: EMERGENCY MEDICINE
Payer: MEDICARE

## 2019-03-08 VITALS
RESPIRATION RATE: 17 BRPM | TEMPERATURE: 98 F | OXYGEN SATURATION: 94 % | SYSTOLIC BLOOD PRESSURE: 178 MMHG | HEART RATE: 79 BPM | DIASTOLIC BLOOD PRESSURE: 98 MMHG | BODY MASS INDEX: 32.7 KG/M2 | WEIGHT: 241.4 LBS | HEIGHT: 72 IN

## 2019-03-08 DIAGNOSIS — R07.9 CHEST PAIN, UNSPECIFIED TYPE: Primary | ICD-10-CM

## 2019-03-08 DIAGNOSIS — R10.9 ACUTE RIGHT FLANK PAIN: ICD-10-CM

## 2019-03-08 DIAGNOSIS — R06.02 SOB (SHORTNESS OF BREATH): ICD-10-CM

## 2019-03-08 LAB
A/G RATIO: 1.5 (ref 1.1–2.2)
ALBUMIN SERPL-MCNC: 4.2 G/DL (ref 3.4–5)
ALP BLD-CCNC: 94 U/L (ref 40–129)
ALT SERPL-CCNC: 21 U/L (ref 10–40)
ANION GAP SERPL CALCULATED.3IONS-SCNC: 12 MMOL/L (ref 3–16)
AST SERPL-CCNC: 16 U/L (ref 15–37)
BASOPHILS ABSOLUTE: 0 K/UL (ref 0–0.2)
BASOPHILS RELATIVE PERCENT: 0.5 %
BILIRUB SERPL-MCNC: 0.3 MG/DL (ref 0–1)
BUN BLDV-MCNC: 23 MG/DL (ref 7–20)
CALCIUM SERPL-MCNC: 9 MG/DL (ref 8.3–10.6)
CHLORIDE BLD-SCNC: 101 MMOL/L (ref 99–110)
CO2: 26 MMOL/L (ref 21–32)
CREAT SERPL-MCNC: 1 MG/DL (ref 0.8–1.3)
D DIMER: <200 NG/ML DDU (ref 0–229)
EKG ATRIAL RATE: 86 BPM
EKG DIAGNOSIS: NORMAL
EKG P AXIS: 39 DEGREES
EKG P-R INTERVAL: 164 MS
EKG Q-T INTERVAL: 368 MS
EKG QRS DURATION: 90 MS
EKG QTC CALCULATION (BAZETT): 440 MS
EKG R AXIS: 64 DEGREES
EKG T AXIS: 54 DEGREES
EKG VENTRICULAR RATE: 86 BPM
EOSINOPHILS ABSOLUTE: 0 K/UL (ref 0–0.6)
EOSINOPHILS RELATIVE PERCENT: 0.8 %
GFR AFRICAN AMERICAN: >60
GFR NON-AFRICAN AMERICAN: >60
GLOBULIN: 2.8 G/DL
GLUCOSE BLD-MCNC: 125 MG/DL (ref 70–99)
HCT VFR BLD CALC: 39.4 % (ref 40.5–52.5)
HEMOGLOBIN: 12.9 G/DL (ref 13.5–17.5)
LIPASE: 41 U/L (ref 13–60)
LYMPHOCYTES ABSOLUTE: 1.4 K/UL (ref 1–5.1)
LYMPHOCYTES RELATIVE PERCENT: 24.1 %
MCH RBC QN AUTO: 28.6 PG (ref 26–34)
MCHC RBC AUTO-ENTMCNC: 32.8 G/DL (ref 31–36)
MCV RBC AUTO: 87.1 FL (ref 80–100)
MONOCYTES ABSOLUTE: 0.4 K/UL (ref 0–1.3)
MONOCYTES RELATIVE PERCENT: 7.6 %
NEUTROPHILS ABSOLUTE: 3.9 K/UL (ref 1.7–7.7)
NEUTROPHILS RELATIVE PERCENT: 67 %
PDW BLD-RTO: 14.6 % (ref 12.4–15.4)
PLATELET # BLD: 233 K/UL (ref 135–450)
PMV BLD AUTO: 8.2 FL (ref 5–10.5)
POTASSIUM REFLEX MAGNESIUM: 4 MMOL/L (ref 3.5–5.1)
PRO-BNP: 153 PG/ML (ref 0–124)
RBC # BLD: 4.52 M/UL (ref 4.2–5.9)
SODIUM BLD-SCNC: 139 MMOL/L (ref 136–145)
TOTAL PROTEIN: 7 G/DL (ref 6.4–8.2)
TROPONIN: <0.01 NG/ML
WBC # BLD: 5.8 K/UL (ref 4–11)

## 2019-03-08 PROCEDURE — 71046 X-RAY EXAM CHEST 2 VIEWS: CPT

## 2019-03-08 PROCEDURE — 83690 ASSAY OF LIPASE: CPT

## 2019-03-08 PROCEDURE — 93010 ELECTROCARDIOGRAM REPORT: CPT | Performed by: INTERNAL MEDICINE

## 2019-03-08 PROCEDURE — 6360000002 HC RX W HCPCS: Performed by: PHYSICIAN ASSISTANT

## 2019-03-08 PROCEDURE — 84484 ASSAY OF TROPONIN QUANT: CPT

## 2019-03-08 PROCEDURE — 85025 COMPLETE CBC W/AUTO DIFF WBC: CPT

## 2019-03-08 PROCEDURE — 74176 CT ABD & PELVIS W/O CONTRAST: CPT

## 2019-03-08 PROCEDURE — 96361 HYDRATE IV INFUSION ADD-ON: CPT

## 2019-03-08 PROCEDURE — 96375 TX/PRO/DX INJ NEW DRUG ADDON: CPT

## 2019-03-08 PROCEDURE — 6370000000 HC RX 637 (ALT 250 FOR IP): Performed by: PHYSICIAN ASSISTANT

## 2019-03-08 PROCEDURE — 83880 ASSAY OF NATRIURETIC PEPTIDE: CPT

## 2019-03-08 PROCEDURE — 99285 EMERGENCY DEPT VISIT HI MDM: CPT

## 2019-03-08 PROCEDURE — 85379 FIBRIN DEGRADATION QUANT: CPT

## 2019-03-08 PROCEDURE — 80053 COMPREHEN METABOLIC PANEL: CPT

## 2019-03-08 PROCEDURE — 2580000003 HC RX 258: Performed by: PHYSICIAN ASSISTANT

## 2019-03-08 PROCEDURE — 96374 THER/PROPH/DIAG INJ IV PUSH: CPT

## 2019-03-08 PROCEDURE — 93005 ELECTROCARDIOGRAM TRACING: CPT | Performed by: EMERGENCY MEDICINE

## 2019-03-08 RX ORDER — ONDANSETRON 2 MG/ML
4 INJECTION INTRAMUSCULAR; INTRAVENOUS EVERY 30 MIN PRN
Status: DISCONTINUED | OUTPATIENT
Start: 2019-03-08 | End: 2019-03-08 | Stop reason: HOSPADM

## 2019-03-08 RX ORDER — SODIUM CHLORIDE, SODIUM LACTATE, POTASSIUM CHLORIDE, AND CALCIUM CHLORIDE .6; .31; .03; .02 G/100ML; G/100ML; G/100ML; G/100ML
1000 INJECTION, SOLUTION INTRAVENOUS ONCE
Status: COMPLETED | OUTPATIENT
Start: 2019-03-08 | End: 2019-03-08

## 2019-03-08 RX ORDER — ASPIRIN 81 MG/1
324 TABLET, CHEWABLE ORAL ONCE
Status: COMPLETED | OUTPATIENT
Start: 2019-03-08 | End: 2019-03-08

## 2019-03-08 RX ORDER — MORPHINE SULFATE 4 MG/ML
4 INJECTION, SOLUTION INTRAMUSCULAR; INTRAVENOUS ONCE
Status: COMPLETED | OUTPATIENT
Start: 2019-03-08 | End: 2019-03-08

## 2019-03-08 RX ORDER — HYDROCODONE BITARTRATE AND ACETAMINOPHEN 5; 325 MG/1; MG/1
1 TABLET ORAL EVERY 6 HOURS PRN
Qty: 10 TABLET | Refills: 0 | Status: SHIPPED | OUTPATIENT
Start: 2019-03-08 | End: 2019-03-15

## 2019-03-08 RX ORDER — ORPHENADRINE CITRATE 30 MG/ML
60 INJECTION INTRAMUSCULAR; INTRAVENOUS ONCE
Status: COMPLETED | OUTPATIENT
Start: 2019-03-08 | End: 2019-03-08

## 2019-03-08 RX ADMIN — ORPHENADRINE CITRATE 60 MG: 30 INJECTION INTRAMUSCULAR; INTRAVENOUS at 16:39

## 2019-03-08 RX ADMIN — ONDANSETRON 4 MG: 2 INJECTION INTRAMUSCULAR; INTRAVENOUS at 16:36

## 2019-03-08 RX ADMIN — ASPIRIN 81 MG 324 MG: 81 TABLET ORAL at 16:30

## 2019-03-08 RX ADMIN — SODIUM CHLORIDE, POTASSIUM CHLORIDE, SODIUM LACTATE AND CALCIUM CHLORIDE 1000 ML: 600; 310; 30; 20 INJECTION, SOLUTION INTRAVENOUS at 16:36

## 2019-03-08 RX ADMIN — MORPHINE SULFATE 4 MG: 4 INJECTION, SOLUTION INTRAMUSCULAR; INTRAVENOUS at 16:37

## 2019-03-08 ASSESSMENT — PAIN SCALES - GENERAL
PAINLEVEL_OUTOF10: 8
PAINLEVEL_OUTOF10: 2
PAINLEVEL_OUTOF10: 9
PAINLEVEL_OUTOF10: 6

## 2019-03-08 ASSESSMENT — PAIN DESCRIPTION - PAIN TYPE: TYPE: ACUTE PAIN

## 2019-03-08 ASSESSMENT — PAIN DESCRIPTION - LOCATION
LOCATION: BACK
LOCATION: BACK;FLANK
LOCATION_2: CHEST

## 2019-03-08 ASSESSMENT — PAIN DESCRIPTION - INTENSITY: RATING_2: 6

## 2019-03-08 ASSESSMENT — HEART SCORE: ECG: 0

## 2019-03-23 ENCOUNTER — APPOINTMENT (OUTPATIENT)
Dept: GENERAL RADIOLOGY | Age: 64
DRG: 684 | End: 2019-03-23
Payer: MEDICARE

## 2019-03-23 ENCOUNTER — HOSPITAL ENCOUNTER (INPATIENT)
Age: 64
LOS: 1 days | Discharge: HOME | DRG: 684 | End: 2019-03-25
Attending: FAMILY MEDICINE | Admitting: INTERNAL MEDICINE
Payer: MEDICARE

## 2019-03-23 ENCOUNTER — APPOINTMENT (OUTPATIENT)
Dept: CT IMAGING | Age: 64
DRG: 684 | End: 2019-03-23
Payer: MEDICARE

## 2019-03-23 ENCOUNTER — HOSPITAL ENCOUNTER (EMERGENCY)
Age: 64
Discharge: HOME | DRG: 684 | End: 2019-03-23
Payer: MEDICARE

## 2019-03-23 VITALS
DIASTOLIC BLOOD PRESSURE: 71 MMHG | HEIGHT: 72 IN | HEART RATE: 88 BPM | TEMPERATURE: 98 F | BODY MASS INDEX: 32.1 KG/M2 | RESPIRATION RATE: 15 BRPM | SYSTOLIC BLOOD PRESSURE: 138 MMHG | WEIGHT: 236.99 LBS | OXYGEN SATURATION: 97 %

## 2019-03-23 DIAGNOSIS — N17.9 ACUTE KIDNEY INJURY (HCC): ICD-10-CM

## 2019-03-23 DIAGNOSIS — N17.9 AKI (ACUTE KIDNEY INJURY) (HCC): ICD-10-CM

## 2019-03-23 DIAGNOSIS — E87.6 HYPOKALEMIA: ICD-10-CM

## 2019-03-23 DIAGNOSIS — R55 SYNCOPE AND COLLAPSE: Primary | ICD-10-CM

## 2019-03-23 DIAGNOSIS — R77.8 ELEVATED TROPONIN: ICD-10-CM

## 2019-03-23 LAB
A/G RATIO: 1.2 (ref 1.1–2.2)
ALBUMIN SERPL-MCNC: 4.3 G/DL (ref 3.4–5)
ALP BLD-CCNC: 116 U/L (ref 40–129)
ALT SERPL-CCNC: 16 U/L (ref 10–40)
ANION GAP SERPL CALCULATED.3IONS-SCNC: 15 MMOL/L (ref 3–16)
AST SERPL-CCNC: 15 U/L (ref 15–37)
BASOPHILS ABSOLUTE: 0.1 K/UL (ref 0–0.2)
BASOPHILS RELATIVE PERCENT: 0.9 %
BILIRUB SERPL-MCNC: 0.4 MG/DL (ref 0–1)
BUN BLDV-MCNC: 38 MG/DL (ref 7–20)
CALCIUM SERPL-MCNC: 9.9 MG/DL (ref 8.3–10.6)
CHLORIDE BLD-SCNC: 95 MMOL/L (ref 99–110)
CO2: 27 MMOL/L (ref 21–32)
CREAT SERPL-MCNC: 2 MG/DL (ref 0.8–1.3)
EOSINOPHILS ABSOLUTE: 0.1 K/UL (ref 0–0.6)
EOSINOPHILS RELATIVE PERCENT: 0.9 %
GFR AFRICAN AMERICAN: 41
GFR NON-AFRICAN AMERICAN: 34
GLOBULIN: 3.5 G/DL
GLUCOSE BLD-MCNC: 198 MG/DL (ref 70–99)
GLUCOSE BLD-MCNC: 244 MG/DL (ref 70–99)
HCT VFR BLD CALC: 45.3 % (ref 40.5–52.5)
HEMOGLOBIN: 14.6 G/DL (ref 13.5–17.5)
LYMPHOCYTES ABSOLUTE: 1.7 K/UL (ref 1–5.1)
LYMPHOCYTES RELATIVE PERCENT: 22.6 %
MCH RBC QN AUTO: 28.1 PG (ref 26–34)
MCHC RBC AUTO-ENTMCNC: 32.2 G/DL (ref 31–36)
MCV RBC AUTO: 87.4 FL (ref 80–100)
MONOCYTES ABSOLUTE: 0.8 K/UL (ref 0–1.3)
MONOCYTES RELATIVE PERCENT: 10.4 %
NEUTROPHILS ABSOLUTE: 5 K/UL (ref 1.7–7.7)
NEUTROPHILS RELATIVE PERCENT: 65.2 %
PDW BLD-RTO: 13.9 % (ref 12.4–15.4)
PERFORMED ON: ABNORMAL
PLATELET # BLD: 194 K/UL (ref 135–450)
PMV BLD AUTO: 8.6 FL (ref 5–10.5)
POTASSIUM SERPL-SCNC: 4.1 MMOL/L (ref 3.5–5.1)
RBC # BLD: 5.18 M/UL (ref 4.2–5.9)
SODIUM BLD-SCNC: 137 MMOL/L (ref 136–145)
TOTAL PROTEIN: 7.8 G/DL (ref 6.4–8.2)
TROPONIN: 0.03 NG/ML
WBC # BLD: 7.7 K/UL (ref 4–11)

## 2019-03-23 PROCEDURE — 99284 EMERGENCY DEPT VISIT MOD MDM: CPT

## 2019-03-23 PROCEDURE — 85025 COMPLETE CBC W/AUTO DIFF WBC: CPT

## 2019-03-23 PROCEDURE — 2580000003 HC RX 258: Performed by: PHYSICIAN ASSISTANT

## 2019-03-23 PROCEDURE — 70450 CT HEAD/BRAIN W/O DYE: CPT

## 2019-03-23 PROCEDURE — 93005 ELECTROCARDIOGRAM TRACING: CPT | Performed by: PHYSICIAN ASSISTANT

## 2019-03-23 PROCEDURE — 71046 X-RAY EXAM CHEST 2 VIEWS: CPT

## 2019-03-23 PROCEDURE — 84484 ASSAY OF TROPONIN QUANT: CPT

## 2019-03-23 PROCEDURE — 80053 COMPREHEN METABOLIC PANEL: CPT

## 2019-03-23 PROCEDURE — 36415 COLL VENOUS BLD VENIPUNCTURE: CPT

## 2019-03-23 RX ORDER — 0.9 % SODIUM CHLORIDE 0.9 %
1000 INTRAVENOUS SOLUTION INTRAVENOUS ONCE
Status: COMPLETED | OUTPATIENT
Start: 2019-03-23 | End: 2019-03-23

## 2019-03-23 RX ORDER — SODIUM CHLORIDE, SODIUM LACTATE, POTASSIUM CHLORIDE, CALCIUM CHLORIDE 600; 310; 30; 20 MG/100ML; MG/100ML; MG/100ML; MG/100ML
INJECTION, SOLUTION INTRAVENOUS CONTINUOUS
Status: DISCONTINUED | OUTPATIENT
Start: 2019-03-23 | End: 2019-03-24

## 2019-03-23 RX ADMIN — SODIUM CHLORIDE 1000 ML: 9 INJECTION, SOLUTION INTRAVENOUS at 15:52

## 2019-03-23 SDOH — HEALTH STABILITY: MENTAL HEALTH: HOW OFTEN DO YOU HAVE A DRINK CONTAINING ALCOHOL?: NEVER

## 2019-03-23 ASSESSMENT — PAIN - FUNCTIONAL ASSESSMENT
PAIN_FUNCTIONAL_ASSESSMENT: PREVENTS OR INTERFERES SOME ACTIVE ACTIVITIES AND ADLS
PAIN_FUNCTIONAL_ASSESSMENT: ACTIVITIES ARE NOT PREVENTED
PAIN_FUNCTIONAL_ASSESSMENT: PREVENTS OR INTERFERES SOME ACTIVE ACTIVITIES AND ADLS
PAIN_FUNCTIONAL_ASSESSMENT: ACTIVITIES ARE NOT PREVENTED

## 2019-03-23 ASSESSMENT — PAIN DESCRIPTION - ORIENTATION
ORIENTATION: MID
ORIENTATION: MID

## 2019-03-23 ASSESSMENT — PAIN DESCRIPTION - LOCATION
LOCATION: HEAD
LOCATION: CHEST

## 2019-03-23 ASSESSMENT — ENCOUNTER SYMPTOMS
EYE REDNESS: 0
BACK PAIN: 0
FACIAL SWELLING: 0
VOMITING: 0
ABDOMINAL PAIN: 0
SHORTNESS OF BREATH: 0
CHOKING: 0
EYE DISCHARGE: 0
NAUSEA: 0
APNEA: 0

## 2019-03-23 ASSESSMENT — PAIN DESCRIPTION - ONSET
ONSET: ON-GOING

## 2019-03-23 ASSESSMENT — PAIN DESCRIPTION - PROGRESSION
CLINICAL_PROGRESSION: GRADUALLY WORSENING
CLINICAL_PROGRESSION: GRADUALLY IMPROVING
CLINICAL_PROGRESSION: GRADUALLY WORSENING
CLINICAL_PROGRESSION: GRADUALLY WORSENING

## 2019-03-23 ASSESSMENT — PAIN SCALES - GENERAL
PAINLEVEL_OUTOF10: 7
PAINLEVEL_OUTOF10: 0
PAINLEVEL_OUTOF10: 7
PAINLEVEL_OUTOF10: 4
PAINLEVEL_OUTOF10: 7

## 2019-03-23 ASSESSMENT — PAIN DESCRIPTION - FREQUENCY
FREQUENCY: CONTINUOUS

## 2019-03-23 ASSESSMENT — PAIN DESCRIPTION - DESCRIPTORS
DESCRIPTORS: HEAVINESS
DESCRIPTORS: ACHING;HEADACHE
DESCRIPTORS: HEAVINESS;POUNDING

## 2019-03-23 ASSESSMENT — PAIN DESCRIPTION - PAIN TYPE
TYPE: ACUTE PAIN

## 2019-03-23 NOTE — ED NOTES
Pt unhooked his tele monitor wires and unhooked his IV and got up to go to bathroom alone. RN informed pt about fall risk and asked pt to please not get up without staff assistance since he is here for syncope. Pt verbalized understanding.      Natalie Duffy RN  03/23/19 9864

## 2019-03-23 NOTE — ED NOTES
Pt signed [place] paperwork, states he has to go home and take care of his dog. Is planning to return. Pt informed of risks involved, demonstrated understanding.      Phyllis Rodriguez RN  03/23/19 2002

## 2019-03-23 NOTE — LETTER
[facility] Emergency Department  [address] [place] [id number]  Phone: [phone]    Patient: Michael Cardoza  YOB: 1955  Date: 3/23/2019 Time: 6:41 PM    Leaving the Hospital Against Medical Advice    Chart #:[id number]    This will certify that I, the undersigned,    ______________________________________________________________________    A patient in the above named medical center, having requested discharge and removal from the medical center against the advice of my attending physician(s), hereby release the Emergency Department, its physicians, officers and employees, severally and individually, from any and all liability of any nature whatsoever for any injury or harm or complication of any kind that may result directly or indirectly, by reason of my terminating my stay as a patient from [facility], and hereby waive any and all rights of action I may now have or later acquire as a result of my voluntary departure from [facility] and the termination of my stay as a patient therein. This release is made with the full knowledge of the danger that may result from the action which I am taking.       Date:_______________________                         ___________________________                                                                                    Patient/Legal Representative    Witness:        ____________________________                          ___________________________  Nurse                                                                        Physician

## 2019-03-23 NOTE — ED PROVIDER NOTES
**EVALUATED BY ADVANCED PRACTICE PROVIDER**        [facility]  eMERGENCY dEPARTMENT eNCOUnter      Pt Name: Luz Mast  YNV:[id number]  [place]ang[place]fmiguel a 1955  Date of evaluation: 3/23/2019  Provider: Josef Baires PA-C      Chief Complaint:    Chief Complaint   Patient presents with    Loss of Consciousness     Initially happened yesterday while going up stairs. Today passed out 2 more times, short of breath, headache and chest pain       Nursing Notes, Past Medical Hx, Past Surgical Hx, Social Hx, Allergies, and Family Hx were all reviewed and agreed with or any disagreements were addressed in the HPI.    HPI:  (Location, Duration, Timing, Severity,Quality, Assoc Sx, Context, Modifying factors)  This is a  59 y.o. male who complain of having 3 syncopal episodes since yesterday. His first episode was yesterday he was coming down the steps at dizzy and lost consciousness and fell. In he described another episode is sitting on the couch watching TV and got up and he collapsed again had a full syncopal episode. He complained of slight headache. Denies visual changes, says he has pressure on his chest. No nausea vomiting. Denies abdominal pain. No extremity weakness. This has not happened in the past. He has coronary artery disease, diabetes, hyperlipidemia, hypertension.     PastMedical/Surgical History:      Diagnosis Date    CAD (coronary artery disease)     Chest pain 10/15/2017    COPD (chronic obstructive pulmonary disease) (HCC)     Diabetes mellitus ([facility] [address]     Hyperlipidemia     Hypertension     Kidney stone     MRSA colonization 1/19/19;04/01/2018    MRSA infection          Procedure Laterality Date    ABDOMEN SURGERY      CARDIAC SURGERY      3 STENTS PLACED     CHOLECYSTECTOMY      COLONOSCOPY      CORONARY ANGIOPLASTY WITH STENT PLACEMENT      3 stents     FRACTURE SURGERY      RIGHT ANKLE    LITHOTRIPSY      SHOULDER ARTHROSCOPY      VASCULAR SURGERY         Medications:  Previous Medications    ALBUTEROL SULFATE HFA (PROVENTIL HFA) 108 (90 BASE) MCG/ACT INHALER    Inhale 2 puffs into the lungs every 4 hours as needed for Wheezing (coughing episodes)    ASPIRIN 81 MG TABLET    Take 81 mg by mouth daily    CARVEDILOL (COREG) 3.125 MG TABLET    Take 1 tablet by mouth 2 times daily (with meals)    CLONAZEPAM (KLONOPIN) 0.5 MG TABLET    Take 2 tablets by mouth 2 times daily as needed for Anxiety for up to 3 days. .    FENOFIBRATE (TRICOR) 54 MG TABLET    Take 1 tablet by mouth daily [facility]: Please dispense generic fenofibrate unless prescriber denote    GLUCOSE BLOOD VI TEST STRIPS (FREESTYLE TEST STRIPS) STRIP    1 each by In Vitro route daily Test daily ICD E11.42    GLUCOSE MONITORING KIT (FREESTYLE) MONITORING KIT    1 kit by Does not apply route daily ICD E11.42    LANCETS MISC    Test daily ICD E11.42    LISINOPRIL (PRINIVIL;ZESTRIL) 20 MG TABLET    Take 20 mg by mouth daily    METFORMIN (GLUCOPHAGE) 1000 MG TABLET    Take 1,000 mg by mouth 2 times daily (with meals)    MIRTAZAPINE (REMERON) 15 MG TABLET    Take 1 tablet by mouth nightly    MOMETASONE-FORMOTEROL (DULERA) 200-5 MCG/ACT INHALER    Inhale 2 puffs into the lungs 2 times daily    NIFEDIPINE (PROCARDIA XL) 90 MG EXTENDED RELEASE TABLET    Take 1 tablet by mouth daily    NITROGLYCERIN (NITROSTAT) 0.4 MG SL TABLET    up to max of 3 total doses. If no relief after 1 dose, call 911.     SERTRALINE (ZOLOFT) 100 MG TABLET    Take HALF tablet by mouth daily for 1 week, then take 1 tablet by mouth daily    SPACER/AERO-HOLDING CHAMBERS (E-Z SPACER) MITZI    1 Device by Does not apply route daily as needed (shortness of breath)    TIOTROPIUM (SPIRIVA) 18 MCG INHALATION CAPSULE    Inhale 1 capsule into the lungs daily    VITAMIN B-1 (THIAMINE) 100 MG TABLET    Take 100 mg by mouth daily    VITAMIN B-12 (CYANOCOBALAMIN) 500 MCG TABLET    Take 500 mcg by mouth daily    VITAMIN D (D3-1000) 1000 UNITS TABS TABLET    Take 1 tablet by mouth daily    ZOLPIDEM (AMBIEN) 10 MG TABLET    Take 10 mg by mouth nightly. Review of Systems:  Review of Systems   Constitutional: Negative for chills and fever. HENT: Negative for congestion, facial swelling and sneezing. Eyes: Negative for discharge and redness. Respiratory: Negative for apnea, choking and shortness of breath. Cardiovascular: Positive for chest pain. Gastrointestinal: Negative for abdominal pain, nausea and vomiting. Genitourinary: Negative for dysuria. Musculoskeletal: Negative for back pain, neck pain and neck stiffness. Neurological: Positive for syncope. Negative for dizziness, tremors, seizures and headaches. All other systems reviewed and are negative. Positives and Pertinent negatives as per HPI. Except as noted above in the ROS, problem specific ROS was completed and is negative. Physical Exam:  Physical Exam   Constitutional: He is oriented to person, place, and time. He appears well-developed and well-nourished. HENT:   Head: Normocephalic and atraumatic. Nose: Nose normal.   Eyes: Pupils are equal, round, and reactive to light. Conjunctivae and EOM are normal. Right eye exhibits no discharge. Left eye exhibits no discharge. No scleral icterus. Neck: Normal range of motion. Neck supple. No nuchal rigidity   Cardiovascular: Normal rate, regular rhythm and normal heart sounds. Exam reveals no gallop and no friction rub. No murmur heard. Pulmonary/Chest: Effort normal and breath sounds normal. No respiratory distress. He has no wheezes. He has no rales. He exhibits no tenderness. Abdominal: Soft. Bowel sounds are normal. He exhibits no distension and no mass. There is no tenderness. There is no rebound and no guarding. Musculoskeletal: Normal range of motion. Neurological: He is alert and oriented to person, place, and time. He has normal strength. He is not disoriented. He displays no tremor.  No cranial nerve deficit or sensory deficit. He exhibits normal muscle tone. He displays no seizure activity. Coordination normal. GCS eye subscore is 4. GCS verbal subscore is 5. GCS motor subscore is 6. Finger to nose normal   Skin: Skin is warm and dry. He is not diaphoretic. Psychiatric: He has a normal mood and affect. His behavior is normal.   Nursing note and vitals reviewed.       MEDICAL DECISION MAKING    Vitals:    Vitals:    03/23/19 1555 03/23/19 1558 03/23/19 1602 03/23/19 1614   BP: 132/76 131/81 (!) 141/92    Pulse: 95 100 99 103   Resp: 16 19 17 19   Temp:       TempSrc:       SpO2: 95% 96% 96% 97%   Weight:       Height:           LABS:  Labs Reviewed   COMPREHENSIVE METABOLIC PANEL - Abnormal; Notable for the following components:       Result Value    Chloride 95 (*)     Glucose 198 (*)     BUN 38 (*)     CREATININE 2.0 (*)     GFR Non- 34 (*)     GFR  41 (*)     All other components within normal limits    Narrative:     Performed at:  [address] [place]   Phone [phone]   TROPONIN - Abnormal; Notable for the following components:    Troponin 0.03 (*)     All other components within normal limits    Narrative:     Performed at:  [address] [place] [address]   Phone [phone]   POCT GLUCOSE - Abnormal; Notable for the following components:    POC Glucose 244 (*)     All other components within normal limits    Narrative:     Performed at:  [address] [facility] [place] [address]   Phone [phone]   CBC WITH AUTO DIFFERENTIAL    Narrative:     Performed at:  [address] [place] [address]   Phone [phone] of labs reviewed and werenegative at this time or not returned at the time of this note.    RADIOLOGY:   Non-plain film images such as CT, Ultrasound and MRI are read by the radiologist. Derrek Milligan PA-C have directly visualized the radiologic plain film image(s) with the below findings:        Interpretation per the Radiologist below, if available at the time of thisnote:    XR CHEST STANDARD (2 VW)   Final Result   No active cardiopulmonary disease         CT Head WO Contrast   Final Result   No acute intracranial abnormality. Ct Abdomen Pelvis Wo Contrast Additional Contrast? None    Result Date: 3/8/2019  EXAMINATION: CT OF THE ABDOMEN AND PELVIS WITHOUT CONTRAST 3/8/2019 2:46 pm TECHNIQUE: CT of the abdomen and pelvis was performed without the administration of intravenous contrast. Multiplanar reformatted images are provided for review. Dose modulation, iterative reconstruction, and/or weight based adjustment of the mA/kV was utilized to reduce the radiation dose to as low as reasonably achievable. COMPARISON: 01/18/2019 HISTORY: ORDERING SYSTEM PROVIDED HISTORY: right flank pain TECHNOLOGIST PROVIDED HISTORY: Additional Contrast?->None Ordering Physician Provided Reason for Exam: rt flank pain, hx stones hx gb surgery Acuity: Acute Type of Exam: Initial FINDINGS: Lower Chest: Scarring versus atelectasis in the right lower lobe. Interval resolution of left basilar infiltrate Organs: No ureterolithiasis or hydronephrosis. 7 mm left renal stone. Slight low-attenuation of the liver. Remaining solid organs have an unremarkable noncontrast appearance. Gallbladder surgically absent GI/Bowel: Diverticulum of the 2nd segment of the duodenum. A few diverticula of the sigmoid colon with no diverticular inflammatory stranding. Appendix normal Pelvis: Urinary bladder unremarkable. No pelvic fluid Peritoneum/Retroperitoneum: No ascites or pneumoperitoneum. Aorta normal in caliber. Bones/Soft Tissues: No acute bony abnormality     1.  No acute abdominopelvic process demonstrated 2. Nonobstructing left nephrolithiasis 3. Mild hepatic steatosis 4. Duodenal and colonic diverticulosis     Xr Chest Standard (2 Vw)    Result Date: 3/23/2019  EXAMINATION: TWO VIEWS OF THE CHEST 3/23/2019 3:31 pm COMPARISON: 03/08/2019 HISTORY: ORDERING SYSTEM PROVIDED HISTORY: Syncope TECHNOLOGIST PROVIDED HISTORY: Reason for exam:->Syncope Ordering Physician Provided Reason for Exam: dizzy, sob Acuity: Acute Type of Exam: Initial FINDINGS: Heart size and pulmonary vessels within normal limits. Coronary artery stents noted. Lungs clear. Costophrenic angles sharp     No active cardiopulmonary disease     Xr Chest Standard (2 Vw)    Result Date: 3/8/2019  EXAMINATION: TWO VIEWS OF THE CHEST 3/8/2019 2:33 pm COMPARISON: 01/18/2018 HISTORY: ORDERING SYSTEM PROVIDED HISTORY: CP TECHNOLOGIST PROVIDED HISTORY: Reason for exam:->CP Ordering Physician Provided Reason for Exam: chest pain Acuity: Acute Type of Exam: Initial Relevant Medical/Surgical History: Back Back, Shortness of Breath FINDINGS: Frontal and lateral views of the chest are submitted for review. The cardiac silhouette is normal in size. Lung parenchyma is clear without focal airspace consolidation, sizeable pleural effusion, or pneumothorax. Trachea is midline. Osseous structures and soft tissues are grossly intact. Multilevel degenerative endplate spurring of the thoracic spine. Acute cardiopulmonary pathology. Ct Head Wo Contrast    Result Date: 3/23/2019  EXAMINATION: CT OF THE HEAD WITHOUT CONTRAST  3/23/2019 3:23 pm TECHNIQUE: CT of the head was performed without the administration of intravenous contrast. Dose modulation, iterative reconstruction, and/or weight based adjustment of the mA/kV was utilized to reduce the radiation dose to as low as reasonably achievable. COMPARISON: 11/08/2018 HISTORY: ORDERING SYSTEM PROVIDED HISTORY: STROKE TECHNOLOGIST PROVIDED HISTORY: Has a \"code stroke\" or \"stroke alert\" been called? ->No Ordering EF is 6E percent. Left atrium is of normal size. Normal right ventricular size and function. I did discuss patient lab results CT results and chest x-ray results with him. At this time I'll place a call out to hospitalist service for admission for syncope        The patient tolerated their visit well. I evaluated the patient. The physician was available for consultation as needed. The patient and / or the family were informed of the results of anytests, a time was given to answer questions, a plan was proposed and they agreed with plan. CLINICAL IMPRESSION:  1. Syncope and collapse        DISPOSITION Decision To Admit 03/23/2019 05:57:08 PM      PATIENT REFERRED TO:  No follow-up provider specified.     DISCHARGE MEDICATIONS:  New Prescriptions    No medications on file       DISCONTINUED MEDICATIONS:  Discontinued Medications    No medications on file              (Please note the MDM and HPI sections of this note were completed with a voice recognition program.  Efforts weremade to edit the dictations but occasionally words are mis-transcribed.)    Electronically signed, Leeanna Francis PA-C,          Leeanna Francis PA-C  03/28/19 [id number]

## 2019-03-24 PROBLEM — E11.9 DMII (DIABETES MELLITUS, TYPE 2) (HCC): Status: ACTIVE | Noted: 2019-03-24

## 2019-03-24 LAB
A/G RATIO: 1.6 (ref 1.1–2.2)
ALBUMIN SERPL-MCNC: 4.4 G/DL (ref 3.4–5)
ALP BLD-CCNC: 106 U/L (ref 40–129)
ALT SERPL-CCNC: 16 U/L (ref 10–40)
AMPHETAMINE SCREEN, URINE: NORMAL
ANION GAP SERPL CALCULATED.3IONS-SCNC: 17 MMOL/L (ref 3–16)
ANION GAP SERPL CALCULATED.3IONS-SCNC: 21 MMOL/L (ref 3–16)
AST SERPL-CCNC: 13 U/L (ref 15–37)
BARBITURATE SCREEN URINE: NORMAL
BASOPHILS ABSOLUTE: 0 K/UL (ref 0–0.2)
BASOPHILS ABSOLUTE: 0.1 K/UL (ref 0–0.2)
BASOPHILS RELATIVE PERCENT: 0.4 %
BASOPHILS RELATIVE PERCENT: 0.8 %
BENZODIAZEPINE SCREEN, URINE: NORMAL
BILIRUB SERPL-MCNC: 0.3 MG/DL (ref 0–1)
BILIRUBIN URINE: NEGATIVE
BILIRUBIN URINE: NEGATIVE
BLOOD, URINE: NEGATIVE
BLOOD, URINE: NEGATIVE
BUN BLDV-MCNC: 42 MG/DL (ref 7–20)
BUN BLDV-MCNC: 45 MG/DL (ref 7–20)
CALCIUM SERPL-MCNC: 9.1 MG/DL (ref 8.3–10.6)
CALCIUM SERPL-MCNC: 9.2 MG/DL (ref 8.3–10.6)
CANNABINOID SCREEN URINE: NORMAL
CHLORIDE BLD-SCNC: 90 MMOL/L (ref 99–110)
CHLORIDE BLD-SCNC: 97 MMOL/L (ref 99–110)
CLARITY: CLEAR
CLARITY: CLEAR
CO2: 18 MMOL/L (ref 21–32)
CO2: 25 MMOL/L (ref 21–32)
COCAINE METABOLITE SCREEN URINE: NORMAL
COLOR: YELLOW
COLOR: YELLOW
CORTISOL TOTAL: 4.1 UG/DL
CREAT SERPL-MCNC: 1.7 MG/DL (ref 0.8–1.3)
CREAT SERPL-MCNC: 2.3 MG/DL (ref 0.8–1.3)
CREATININE URINE: 133 MG/DL (ref 39–259)
EKG ATRIAL RATE: 100 BPM
EKG DIAGNOSIS: NORMAL
EKG P AXIS: 35 DEGREES
EKG P-R INTERVAL: 154 MS
EKG Q-T INTERVAL: 350 MS
EKG QRS DURATION: 86 MS
EKG QTC CALCULATION (BAZETT): 451 MS
EKG R AXIS: 74 DEGREES
EKG T AXIS: 46 DEGREES
EKG VENTRICULAR RATE: 100 BPM
EOSINOPHILS ABSOLUTE: 0 K/UL (ref 0–0.6)
EOSINOPHILS ABSOLUTE: 0.1 K/UL (ref 0–0.6)
EOSINOPHILS RELATIVE PERCENT: 0.1 %
EOSINOPHILS RELATIVE PERCENT: 1.3 %
GFR AFRICAN AMERICAN: 35
GFR AFRICAN AMERICAN: 49
GFR NON-AFRICAN AMERICAN: 29
GFR NON-AFRICAN AMERICAN: 41
GLOBULIN: 2.8 G/DL
GLUCOSE BLD-MCNC: 137 MG/DL (ref 70–99)
GLUCOSE BLD-MCNC: 153 MG/DL (ref 70–99)
GLUCOSE BLD-MCNC: 156 MG/DL (ref 70–99)
GLUCOSE BLD-MCNC: 174 MG/DL (ref 70–99)
GLUCOSE BLD-MCNC: 175 MG/DL (ref 70–99)
GLUCOSE BLD-MCNC: 179 MG/DL (ref 70–99)
GLUCOSE URINE: 250 MG/DL
GLUCOSE URINE: 500 MG/DL
HCT VFR BLD CALC: 41.9 % (ref 40.5–52.5)
HCT VFR BLD CALC: 42.6 % (ref 40.5–52.5)
HEMOGLOBIN: 13.6 G/DL (ref 13.5–17.5)
HEMOGLOBIN: 13.7 G/DL (ref 13.5–17.5)
KETONES, URINE: NEGATIVE MG/DL
KETONES, URINE: NEGATIVE MG/DL
LEUKOCYTE ESTERASE, URINE: NEGATIVE
LEUKOCYTE ESTERASE, URINE: NEGATIVE
LYMPHOCYTES ABSOLUTE: 0.9 K/UL (ref 1–5.1)
LYMPHOCYTES ABSOLUTE: 2.4 K/UL (ref 1–5.1)
LYMPHOCYTES RELATIVE PERCENT: 16.5 %
LYMPHOCYTES RELATIVE PERCENT: 36.3 %
Lab: NORMAL
MCH RBC QN AUTO: 28.1 PG (ref 26–34)
MCH RBC QN AUTO: 28.4 PG (ref 26–34)
MCHC RBC AUTO-ENTMCNC: 32 G/DL (ref 31–36)
MCHC RBC AUTO-ENTMCNC: 32.4 G/DL (ref 31–36)
MCV RBC AUTO: 87.7 FL (ref 80–100)
MCV RBC AUTO: 87.7 FL (ref 80–100)
METHADONE SCREEN, URINE: NORMAL
MICROSCOPIC EXAMINATION: ABNORMAL
MICROSCOPIC EXAMINATION: ABNORMAL
MONOCYTES ABSOLUTE: 0.2 K/UL (ref 0–1.3)
MONOCYTES ABSOLUTE: 0.7 K/UL (ref 0–1.3)
MONOCYTES RELATIVE PERCENT: 10.4 %
MONOCYTES RELATIVE PERCENT: 3 %
NEUTROPHILS ABSOLUTE: 3.4 K/UL (ref 1.7–7.7)
NEUTROPHILS ABSOLUTE: 4.5 K/UL (ref 1.7–7.7)
NEUTROPHILS RELATIVE PERCENT: 51.2 %
NEUTROPHILS RELATIVE PERCENT: 80 %
NITRITE, URINE: NEGATIVE
NITRITE, URINE: NEGATIVE
OPIATE SCREEN URINE: NORMAL
OSMOLALITY URINE: 384 MOSM/KG (ref 390–1070)
OXYCODONE URINE: NORMAL
PDW BLD-RTO: 14 % (ref 12.4–15.4)
PDW BLD-RTO: 14.2 % (ref 12.4–15.4)
PERFORMED ON: ABNORMAL
PH UA: 5
PH UA: 5 (ref 5–8)
PH UA: 5 (ref 5–8)
PHENCYCLIDINE SCREEN URINE: NORMAL
PLATELET # BLD: 164 K/UL (ref 135–450)
PLATELET # BLD: 188 K/UL (ref 135–450)
PMV BLD AUTO: 8.7 FL (ref 5–10.5)
PMV BLD AUTO: 9 FL (ref 5–10.5)
POTASSIUM REFLEX MAGNESIUM: 4.6 MMOL/L (ref 3.5–5.1)
POTASSIUM SERPL-SCNC: 2.9 MMOL/L (ref 3.5–5.1)
PRO-BNP: 241 PG/ML (ref 0–124)
PROPOXYPHENE SCREEN: NORMAL
PROTEIN UA: NEGATIVE MG/DL
PROTEIN UA: NEGATIVE MG/DL
RAPID INFLUENZA  B AGN: NEGATIVE
RAPID INFLUENZA A AGN: NEGATIVE
RBC # BLD: 4.78 M/UL (ref 4.2–5.9)
RBC # BLD: 4.86 M/UL (ref 4.2–5.9)
SODIUM BLD-SCNC: 132 MMOL/L (ref 136–145)
SODIUM BLD-SCNC: 136 MMOL/L (ref 136–145)
SODIUM URINE: 74 MMOL/L
SPECIFIC GRAVITY UA: 1.01 (ref 1–1.03)
SPECIFIC GRAVITY UA: 1.01 (ref 1–1.03)
TOTAL PROTEIN: 7.2 G/DL (ref 6.4–8.2)
TROPONIN: 0.03 NG/ML
TROPONIN: 0.04 NG/ML
TROPONIN: <0.01 NG/ML
TROPONIN: <0.01 NG/ML
TSH REFLEX: 2.94 UIU/ML (ref 0.27–4.2)
URIC ACID, SERUM: 9.5 MG/DL (ref 3.5–7.2)
URINE REFLEX TO CULTURE: ABNORMAL
URINE REFLEX TO CULTURE: ABNORMAL
URINE TYPE: ABNORMAL
URINE TYPE: ABNORMAL
UROBILINOGEN, URINE: 0.2 E.U./DL
UROBILINOGEN, URINE: 0.2 E.U./DL
WBC # BLD: 5.6 K/UL (ref 4–11)
WBC # BLD: 6.7 K/UL (ref 4–11)

## 2019-03-24 PROCEDURE — 82570 ASSAY OF URINE CREATININE: CPT

## 2019-03-24 PROCEDURE — 87804 INFLUENZA ASSAY W/OPTIC: CPT

## 2019-03-24 PROCEDURE — 99223 1ST HOSP IP/OBS HIGH 75: CPT | Performed by: INTERNAL MEDICINE

## 2019-03-24 PROCEDURE — 84550 ASSAY OF BLOOD/URIC ACID: CPT

## 2019-03-24 PROCEDURE — 83935 ASSAY OF URINE OSMOLALITY: CPT

## 2019-03-24 PROCEDURE — 36415 COLL VENOUS BLD VENIPUNCTURE: CPT

## 2019-03-24 PROCEDURE — 6370000000 HC RX 637 (ALT 250 FOR IP): Performed by: FAMILY MEDICINE

## 2019-03-24 PROCEDURE — 96361 HYDRATE IV INFUSION ADD-ON: CPT

## 2019-03-24 PROCEDURE — 84484 ASSAY OF TROPONIN QUANT: CPT

## 2019-03-24 PROCEDURE — 93010 ELECTROCARDIOGRAM REPORT: CPT | Performed by: INTERNAL MEDICINE

## 2019-03-24 PROCEDURE — 6370000000 HC RX 637 (ALT 250 FOR IP): Performed by: INTERNAL MEDICINE

## 2019-03-24 PROCEDURE — 1200000000 HC SEMI PRIVATE

## 2019-03-24 PROCEDURE — 93005 ELECTROCARDIOGRAM TRACING: CPT | Performed by: FAMILY MEDICINE

## 2019-03-24 PROCEDURE — 84300 ASSAY OF URINE SODIUM: CPT

## 2019-03-24 PROCEDURE — 94640 AIRWAY INHALATION TREATMENT: CPT

## 2019-03-24 PROCEDURE — 96365 THER/PROPH/DIAG IV INF INIT: CPT

## 2019-03-24 PROCEDURE — 6360000002 HC RX W HCPCS: Performed by: INTERNAL MEDICINE

## 2019-03-24 PROCEDURE — 96375 TX/PRO/DX INJ NEW DRUG ADDON: CPT

## 2019-03-24 PROCEDURE — 83880 ASSAY OF NATRIURETIC PEPTIDE: CPT

## 2019-03-24 PROCEDURE — 96372 THER/PROPH/DIAG INJ SC/IM: CPT

## 2019-03-24 PROCEDURE — 82533 TOTAL CORTISOL: CPT

## 2019-03-24 PROCEDURE — 81003 URINALYSIS AUTO W/O SCOPE: CPT

## 2019-03-24 PROCEDURE — 94760 N-INVAS EAR/PLS OXIMETRY 1: CPT

## 2019-03-24 PROCEDURE — G0378 HOSPITAL OBSERVATION PER HR: HCPCS

## 2019-03-24 PROCEDURE — 2580000003 HC RX 258: Performed by: INTERNAL MEDICINE

## 2019-03-24 PROCEDURE — 80053 COMPREHEN METABOLIC PANEL: CPT

## 2019-03-24 PROCEDURE — 84443 ASSAY THYROID STIM HORMONE: CPT

## 2019-03-24 PROCEDURE — 80307 DRUG TEST PRSMV CHEM ANLYZR: CPT

## 2019-03-24 PROCEDURE — 87641 MR-STAPH DNA AMP PROBE: CPT

## 2019-03-24 PROCEDURE — 2580000003 HC RX 258: Performed by: FAMILY MEDICINE

## 2019-03-24 PROCEDURE — 87086 URINE CULTURE/COLONY COUNT: CPT

## 2019-03-24 PROCEDURE — 6360000002 HC RX W HCPCS: Performed by: FAMILY MEDICINE

## 2019-03-24 PROCEDURE — 94664 DEMO&/EVAL PT USE INHALER: CPT

## 2019-03-24 PROCEDURE — 85025 COMPLETE CBC W/AUTO DIFF WBC: CPT

## 2019-03-24 RX ORDER — ZOLPIDEM TARTRATE 5 MG/1
10 TABLET ORAL NIGHTLY
Status: DISCONTINUED | OUTPATIENT
Start: 2019-03-24 | End: 2019-03-25 | Stop reason: HOSPADM

## 2019-03-24 RX ORDER — FAMOTIDINE 20 MG/1
20 TABLET, FILM COATED ORAL 2 TIMES DAILY
Status: DISCONTINUED | OUTPATIENT
Start: 2019-03-24 | End: 2019-03-24 | Stop reason: DRUGHIGH

## 2019-03-24 RX ORDER — POTASSIUM CHLORIDE 750 MG/1
60 TABLET, FILM COATED, EXTENDED RELEASE ORAL ONCE
Status: COMPLETED | OUTPATIENT
Start: 2019-03-24 | End: 2019-03-24

## 2019-03-24 RX ORDER — ONDANSETRON 2 MG/ML
4 INJECTION INTRAMUSCULAR; INTRAVENOUS EVERY 4 HOURS PRN
Status: DISCONTINUED | OUTPATIENT
Start: 2019-03-24 | End: 2019-03-25 | Stop reason: HOSPADM

## 2019-03-24 RX ORDER — CLONAZEPAM 0.5 MG/1
0.5 TABLET ORAL ONCE
Status: COMPLETED | OUTPATIENT
Start: 2019-03-24 | End: 2019-03-24

## 2019-03-24 RX ORDER — TRAMADOL HYDROCHLORIDE 50 MG/1
50 TABLET ORAL EVERY 6 HOURS PRN
Status: DISCONTINUED | OUTPATIENT
Start: 2019-03-24 | End: 2019-03-25 | Stop reason: HOSPADM

## 2019-03-24 RX ORDER — TRAMADOL HYDROCHLORIDE 50 MG/1
100 TABLET ORAL EVERY 6 HOURS PRN
Status: DISCONTINUED | OUTPATIENT
Start: 2019-03-24 | End: 2019-03-25 | Stop reason: HOSPADM

## 2019-03-24 RX ORDER — NIFEDIPINE 90 MG/1
90 TABLET, EXTENDED RELEASE ORAL DAILY
Status: DISCONTINUED | OUTPATIENT
Start: 2019-03-24 | End: 2019-03-24

## 2019-03-24 RX ORDER — SODIUM CHLORIDE 9 MG/ML
INJECTION, SOLUTION INTRAVENOUS CONTINUOUS
Status: DISCONTINUED | OUTPATIENT
Start: 2019-03-24 | End: 2019-03-25 | Stop reason: HOSPADM

## 2019-03-24 RX ORDER — MAGNESIUM SULFATE IN WATER 40 MG/ML
2 INJECTION, SOLUTION INTRAVENOUS ONCE
Status: COMPLETED | OUTPATIENT
Start: 2019-03-24 | End: 2019-03-24

## 2019-03-24 RX ORDER — POTASSIUM CHLORIDE 20 MEQ/1
40 TABLET, EXTENDED RELEASE ORAL ONCE
Status: COMPLETED | OUTPATIENT
Start: 2019-03-24 | End: 2019-03-24

## 2019-03-24 RX ORDER — DEXAMETHASONE SODIUM PHOSPHATE 4 MG/ML
4 INJECTION, SOLUTION INTRA-ARTICULAR; INTRALESIONAL; INTRAMUSCULAR; INTRAVENOUS; SOFT TISSUE ONCE
Status: COMPLETED | OUTPATIENT
Start: 2019-03-24 | End: 2019-03-24

## 2019-03-24 RX ORDER — IPRATROPIUM BROMIDE AND ALBUTEROL SULFATE 2.5; .5 MG/3ML; MG/3ML
1 SOLUTION RESPIRATORY (INHALATION) EVERY 4 HOURS PRN
Status: DISCONTINUED | OUTPATIENT
Start: 2019-03-24 | End: 2019-03-25 | Stop reason: HOSPADM

## 2019-03-24 RX ORDER — SERTRALINE HYDROCHLORIDE 100 MG/1
100 TABLET, FILM COATED ORAL NIGHTLY
Status: DISCONTINUED | OUTPATIENT
Start: 2019-03-24 | End: 2019-03-25 | Stop reason: HOSPADM

## 2019-03-24 RX ORDER — FAMOTIDINE 20 MG/1
20 TABLET, FILM COATED ORAL 2 TIMES DAILY
Status: DISCONTINUED | OUTPATIENT
Start: 2019-03-24 | End: 2019-03-25 | Stop reason: HOSPADM

## 2019-03-24 RX ORDER — METOCLOPRAMIDE HYDROCHLORIDE 5 MG/ML
10 INJECTION INTRAMUSCULAR; INTRAVENOUS ONCE
Status: COMPLETED | OUTPATIENT
Start: 2019-03-24 | End: 2019-03-24

## 2019-03-24 RX ORDER — NIFEDIPINE 60 MG/1
60 TABLET, EXTENDED RELEASE ORAL DAILY
Status: DISCONTINUED | OUTPATIENT
Start: 2019-03-25 | End: 2019-03-25 | Stop reason: HOSPADM

## 2019-03-24 RX ORDER — MIRTAZAPINE 15 MG/1
15 TABLET, FILM COATED ORAL NIGHTLY
Status: DISCONTINUED | OUTPATIENT
Start: 2019-03-24 | End: 2019-03-25 | Stop reason: HOSPADM

## 2019-03-24 RX ORDER — NICOTINE POLACRILEX 4 MG
15 LOZENGE BUCCAL PRN
Status: DISCONTINUED | OUTPATIENT
Start: 2019-03-24 | End: 2019-03-25 | Stop reason: HOSPADM

## 2019-03-24 RX ORDER — DEXTROSE MONOHYDRATE 50 MG/ML
100 INJECTION, SOLUTION INTRAVENOUS PRN
Status: DISCONTINUED | OUTPATIENT
Start: 2019-03-24 | End: 2019-03-25 | Stop reason: HOSPADM

## 2019-03-24 RX ORDER — CARVEDILOL 3.12 MG/1
3.12 TABLET ORAL 2 TIMES DAILY WITH MEALS
Status: DISCONTINUED | OUTPATIENT
Start: 2019-03-24 | End: 2019-03-25 | Stop reason: HOSPADM

## 2019-03-24 RX ORDER — FENTANYL CITRATE 50 UG/ML
25 INJECTION, SOLUTION INTRAMUSCULAR; INTRAVENOUS ONCE
Status: COMPLETED | OUTPATIENT
Start: 2019-03-24 | End: 2019-03-24

## 2019-03-24 RX ORDER — SODIUM CHLORIDE 0.9 % (FLUSH) 0.9 %
10 SYRINGE (ML) INJECTION PRN
Status: DISCONTINUED | OUTPATIENT
Start: 2019-03-24 | End: 2019-03-25 | Stop reason: HOSPADM

## 2019-03-24 RX ORDER — SODIUM CHLORIDE 0.9 % (FLUSH) 0.9 %
10 SYRINGE (ML) INJECTION EVERY 12 HOURS SCHEDULED
Status: DISCONTINUED | OUTPATIENT
Start: 2019-03-24 | End: 2019-03-25 | Stop reason: HOSPADM

## 2019-03-24 RX ORDER — DIPHENHYDRAMINE HYDROCHLORIDE 50 MG/ML
50 INJECTION INTRAMUSCULAR; INTRAVENOUS ONCE
Status: COMPLETED | OUTPATIENT
Start: 2019-03-24 | End: 2019-03-24

## 2019-03-24 RX ORDER — FAMOTIDINE 20 MG/1
20 TABLET, FILM COATED ORAL DAILY
Status: DISCONTINUED | OUTPATIENT
Start: 2019-03-24 | End: 2019-03-24

## 2019-03-24 RX ORDER — ASPIRIN 81 MG/1
81 TABLET, CHEWABLE ORAL DAILY
Status: DISCONTINUED | OUTPATIENT
Start: 2019-03-24 | End: 2019-03-25 | Stop reason: HOSPADM

## 2019-03-24 RX ORDER — DEXTROSE MONOHYDRATE 25 G/50ML
12.5 INJECTION, SOLUTION INTRAVENOUS PRN
Status: DISCONTINUED | OUTPATIENT
Start: 2019-03-24 | End: 2019-03-25 | Stop reason: HOSPADM

## 2019-03-24 RX ADMIN — MOMETASONE FUROATE AND FORMOTEROL FUMARATE DIHYDRATE 2 PUFF: 200; 5 AEROSOL RESPIRATORY (INHALATION) at 20:27

## 2019-03-24 RX ADMIN — INSULIN LISPRO 2 UNITS: 100 INJECTION, SOLUTION INTRAVENOUS; SUBCUTANEOUS at 12:39

## 2019-03-24 RX ADMIN — TRAMADOL HYDROCHLORIDE 100 MG: 50 TABLET, FILM COATED ORAL at 05:03

## 2019-03-24 RX ADMIN — MIRTAZAPINE 15 MG: 15 TABLET, FILM COATED ORAL at 21:27

## 2019-03-24 RX ADMIN — NITROGLYCERIN 0.5 INCH: 20 OINTMENT TOPICAL at 01:53

## 2019-03-24 RX ADMIN — FAMOTIDINE 20 MG: 20 TABLET ORAL at 21:27

## 2019-03-24 RX ADMIN — POTASSIUM CHLORIDE 40 MEQ: 20 TABLET, EXTENDED RELEASE ORAL at 01:54

## 2019-03-24 RX ADMIN — MAGNESIUM SULFATE HEPTAHYDRATE 2 G: 40 INJECTION, SOLUTION INTRAVENOUS at 01:54

## 2019-03-24 RX ADMIN — DIPHENHYDRAMINE HYDROCHLORIDE 50 MG: 50 INJECTION, SOLUTION INTRAMUSCULAR; INTRAVENOUS at 01:54

## 2019-03-24 RX ADMIN — SODIUM CHLORIDE, SODIUM LACTATE, POTASSIUM CHLORIDE, AND CALCIUM CHLORIDE: .6; .31; .03; .02 INJECTION, SOLUTION INTRAVENOUS at 00:26

## 2019-03-24 RX ADMIN — ZOLPIDEM TARTRATE 10 MG: 5 TABLET ORAL at 21:27

## 2019-03-24 RX ADMIN — TRAMADOL HYDROCHLORIDE 100 MG: 50 TABLET, FILM COATED ORAL at 16:37

## 2019-03-24 RX ADMIN — CLONAZEPAM 0.5 MG: 0.5 TABLET ORAL at 00:36

## 2019-03-24 RX ADMIN — NIFEDIPINE 90 MG: 90 TABLET, FILM COATED, EXTENDED RELEASE ORAL at 08:02

## 2019-03-24 RX ADMIN — CARVEDILOL 3.12 MG: 3.12 TABLET, FILM COATED ORAL at 08:01

## 2019-03-24 RX ADMIN — ENOXAPARIN SODIUM 40 MG: 40 INJECTION SUBCUTANEOUS at 08:01

## 2019-03-24 RX ADMIN — Medication 10 ML: at 08:06

## 2019-03-24 RX ADMIN — METOCLOPRAMIDE 10 MG: 5 INJECTION, SOLUTION INTRAMUSCULAR; INTRAVENOUS at 01:53

## 2019-03-24 RX ADMIN — TIOTROPIUM BROMIDE 18 MCG: 18 CAPSULE ORAL; RESPIRATORY (INHALATION) at 09:18

## 2019-03-24 RX ADMIN — ASPIRIN 81 MG 81 MG: 81 TABLET ORAL at 08:01

## 2019-03-24 RX ADMIN — INSULIN LISPRO 2 UNITS: 100 INJECTION, SOLUTION INTRAVENOUS; SUBCUTANEOUS at 16:32

## 2019-03-24 RX ADMIN — FAMOTIDINE 20 MG: 20 TABLET ORAL at 08:01

## 2019-03-24 RX ADMIN — SODIUM CHLORIDE: 9 INJECTION, SOLUTION INTRAVENOUS at 03:55

## 2019-03-24 RX ADMIN — MOMETASONE FUROATE AND FORMOTEROL FUMARATE DIHYDRATE 2 PUFF: 200; 5 AEROSOL RESPIRATORY (INHALATION) at 09:18

## 2019-03-24 RX ADMIN — POTASSIUM CHLORIDE 60 MEQ: 750 TABLET, EXTENDED RELEASE ORAL at 01:54

## 2019-03-24 RX ADMIN — FENTANYL CITRATE 25 MCG: 50 INJECTION, SOLUTION INTRAMUSCULAR; INTRAVENOUS at 00:36

## 2019-03-24 RX ADMIN — CARVEDILOL 3.12 MG: 3.12 TABLET, FILM COATED ORAL at 16:37

## 2019-03-24 RX ADMIN — SODIUM CHLORIDE: 9 INJECTION, SOLUTION INTRAVENOUS at 16:38

## 2019-03-24 RX ADMIN — DEXAMETHASONE SODIUM PHOSPHATE 4 MG: 4 INJECTION, SOLUTION INTRAMUSCULAR; INTRAVENOUS at 01:53

## 2019-03-24 RX ADMIN — INSULIN LISPRO 2 UNITS: 100 INJECTION, SOLUTION INTRAVENOUS; SUBCUTANEOUS at 08:03

## 2019-03-24 RX ADMIN — SERTRALINE 100 MG: 100 TABLET, FILM COATED ORAL at 21:27

## 2019-03-24 ASSESSMENT — PAIN DESCRIPTION - LOCATION
LOCATION: HEAD;CHEST
LOCATION: HEAD
LOCATION: HEAD
LOCATION: CHEST
LOCATION: HEAD

## 2019-03-24 ASSESSMENT — PAIN DESCRIPTION - PAIN TYPE
TYPE: ACUTE PAIN

## 2019-03-24 ASSESSMENT — PAIN DESCRIPTION - ORIENTATION
ORIENTATION: MID
ORIENTATION: MID

## 2019-03-24 ASSESSMENT — PAIN SCALES - GENERAL
PAINLEVEL_OUTOF10: 8
PAINLEVEL_OUTOF10: 0
PAINLEVEL_OUTOF10: 0
PAINLEVEL_OUTOF10: 4
PAINLEVEL_OUTOF10: 6
PAINLEVEL_OUTOF10: 0
PAINLEVEL_OUTOF10: 8
PAINLEVEL_OUTOF10: 7

## 2019-03-24 ASSESSMENT — PAIN DESCRIPTION - ONSET: ONSET: ON-GOING

## 2019-03-24 ASSESSMENT — PAIN DESCRIPTION - FREQUENCY
FREQUENCY: CONTINUOUS

## 2019-03-24 ASSESSMENT — PAIN DESCRIPTION - PROGRESSION
CLINICAL_PROGRESSION: NOT CHANGED
CLINICAL_PROGRESSION: GRADUALLY IMPROVING

## 2019-03-24 ASSESSMENT — PAIN DESCRIPTION - DESCRIPTORS
DESCRIPTORS: PRESSURE;ACHING
DESCRIPTORS: HEADACHE;PRESSURE
DESCRIPTORS: HEADACHE
DESCRIPTORS: HEADACHE;PRESSURE
DESCRIPTORS: HEADACHE

## 2019-03-24 NOTE — CONSULTS
[address]                                  CONSULTATION    PATIENT NAME: Mckenna Marques                      :        1955  MED REC NO:   [id number]                          ROOM:       26  ACCOUNT NO:   [de-identified]                           ADMIT DATE: 2019  PROVIDER:     Elena Caballero MD      CARDIOLOGY CONSULTATION    CONSULT DATE:  2019    HISTORY OF PRESENT ILLNESS:  This is a 28-year-old male with a known  history of coronary artery disease, hypertension, diabetes,  hyperlipidemia, and COPD who presented to the hospital with episodes of  syncope. He said Friday, he has passed out while he was walking on the  stairs. He had no symptoms of chest pain or palpitation prior to this. He lost his consciousness for a very brief period of time. He again had  two more episodes yesterday and decided to come to the hospital.  He did  complain of some chest pain after he fell yesterday. He had no  diaphoresis. He had no urinary or bowel incontinence or weakness of his  arms. In the emergency room, he was found to have acute kidney injury and also  had troponin elevation leading to this cardiac consultation. The  patient does describe having more exertional shortness of breath than  before but has not been experiencing any heaviness, pressure or  diaphoresis. He also is having trouble with diarrhea but had no fever,  chills or rigors, cough, or sputum production. REVIEW OF SYSTEMS:  Please see HPI. All other systems were reviewed and  they are negative. PAST MEDICAL HISTORY:  1. History of coronary artery disease. He has a stent in his LAD and  circumflex artery. His angiogram from 2017 showed patent stents with  some spasm of the arteries. 2.  Diabetes mellitus. 3.  Postural hypertension. 4.  Hyperlipidemia. 5.  Hypertension. 6.  History of _____ colonization.   7. COPD.    SURGICAL HISTORY:  He had stents placed as mentioned, cholecystectomy,  colonoscopy, right ankle fracture surgery, lithotripsy, shoulder  arthroscopy. MEDICINES AND ALLERGIES:  Have been reviewed. SOCIAL HISTORY:  He denied any smoking or alcohol abuse. FAMILY HISTORY:  His father and brother both had _____. PHYSICAL EXAMINATION:  VITAL SIGNS:  His pulse is 84, blood pressure 142/71, respirations 16,  oxygen saturation 94%, temperature is 98.1. CONSTITUTIONAL:  Alert and oriented. HEENT EXAMINATION:  His neck is supple. JVP is flat. No carotid  bruits. His right eye shows no icterus or pale conjunctiva. CARDIAC EXAMINATION:  Normal S1 and S2. No gallop, murmur or rub. LUNGS:  Clear to auscultation and palpation. ABDOMEN:  Soft, nontender. Bowel sounds are present. EXTREMITIES:  Show no edema. NEUROLOGICAL EXAMINATION:  Alert and oriented. Cranial nerves II  through XII intact. No focal deficits. SKIN:  Shows no rashes. LABORATORY DATA:  Sodium 132, potassium 4.6, chloride 97, bicarb is 18,  BUN is 42, creatinine 1.7. BUN was 38 and creatinine of 2.0 on  admission. ProBNP was 241. His initial troponin was 0.03, which went  up to 0.04, and is now less than 0.01.  TSH is normal.    His EKG shows sinus rhythm. No acute ST-T wave changes. White count is  5.6, hemoglobin 13.6, hematocrit is 41.6. IMPRESSION:  1. This is a 63-year-old male who has presented with recurrent syncope. This is with his abnormal kidney function, appears to be due to  significant postural hypertension plus possible volume depletion. The  patient does have history of postural hypertension in the past and  certainly, his diarrhea may have caused further volume depletion having  him the syncopal spells. Other cardiac arrhythmias though less likely,  cannot entirely be excluded. 2.  Elevated troponin.   The patient's troponin was elevated on admission  but it has trended down with improvement in his

## 2019-03-24 NOTE — PROGRESS NOTES
Clinical Pharmacy Note  Renal Dose Adjustment    Michael Cardoza is receiving Famotidine 20 mg po QAM.  This medication is renally eliminated. Based on the patient's Estimated Creatinine Clearance: 56 mL/min (A) (based on SCr of 1.7 mg/dL (H)). and urine output, the dose has been adjusted to Famotidine 20 mg po BID per protocol. Pharmacy will continue to monitor and adjust dose as needed for changes in renal function.       Mariluz William, PharmD, BCPS  3/24/2019 2:20 PM

## 2019-03-24 NOTE — PLAN OF CARE
Problem: Falls - Risk of:  Goal: Will remain free from falls  Description  Will remain free from falls  3/24/2019 0825 by Aquiles Buitrago RN  Outcome: Ongoing  3/24/2019 0413 by Salvador Pepe RN  Outcome: Ongoing  Goal: Absence of physical injury  Description  Absence of physical injury  3/24/2019 0825 by Aquiles Buitrago RN  Outcome: Ongoing  3/24/2019 0413 by aSlvador Pepe RN  Outcome: Ongoing     Problem: Pain:  Goal: Pain level will decrease  Description  Pain level will decrease  3/24/2019 0825 by Aquiles Buitrago RN  Outcome: Ongoing  3/24/2019 0413 by Salvador Pepe RN  Outcome: Ongoing  Goal: Control of acute pain  Description  Control of acute pain  3/24/2019 0825 by Aquiles Buitrago RN  Outcome: Ongoing  3/24/2019 0413 by Salvador Pepe RN  Outcome: Ongoing  Goal: Control of chronic pain  Description  Control of chronic pain  3/24/2019 0825 by Aquiles Buitrago RN  Outcome: Ongoing  3/24/2019 0413 by Salvador Pepe RN  Outcome: Ongoing     Problem: SKIN INTEGRITY  Goal: Skin integrity is maintained or improved  3/24/2019 0825 by Aquiles Buitrago RN  Outcome: Ongoing  3/24/2019 0413 by Salvador Pepe RN  Outcome: Ongoing     Problem: KNOWLEDGE DEFICIT  Goal: Patient/S.O. demonstrates understanding of disease process, treatment plan, medications, and discharge instructions.   3/24/2019 0825 by Aquiles Buitrago RN  Outcome: Ongoing  3/24/2019 0413 by Salvador Pepe RN  Outcome: Ongoing     Problem: DISCHARGE BARRIERS  Goal: Patient's continuum of care needs are met  3/24/2019 0825 by Aquiles Buitrago RN  Outcome: Ongoing  3/24/2019 0413 by Salvador Pepe RN  Outcome: Ongoing

## 2019-03-24 NOTE — PROGRESS NOTES
Patient remains alert and oriented x4,able to verbalize needs. Denies feeling dizziness or lightheaded. Patient stated that he has had several loose stools in the past 24 hr. No stools noted this morning,denies abdominal pain/cramping. Pt placed In c-diff precaution,hat in toilet,pending. Nasal swab collected to r/o MRSA. Call light remains in reach. Pt watching tv.

## 2019-03-24 NOTE — H&P
Hospital Medicine  History and Physical    PCP: Sandi Casanova MD  Patient Name: Robert Odom    Date of Service: Pt seen/examined on 03/24/2019 and Admitted to Inpatient with expected LOS greater than two midnights due to medical therapy    CHIEF COMPLAINT:  Pt c/o passing out 3 times in 2 days  HISTORY OF PRESENT ILLNESS: Patient is a 51-year-old man with a history of hypertension, hyperlipidemia, diabetes mellitus, coronary artery disease and COPD. He presents to the emergency room for evaluation after having three syncopal episodes in the past two days. Initially, he states that he passed out while walking on the stairs on 3/22/2019. He states that he passed out two more times prior to coming in to the emergency room on 3/23/2019. In the emergency room he was found to have acute renal failure, hypokalemia and an elevated troponin level. He is being admitted for further evaluation and treatment. Associated signs and symptoms do not include chest pain, shortness of breath, diaphoresis, edema, orthopnea, paroxysmal nocturnal dyspnea, fever or chills. Past Medical History:        Diagnosis Date    CAD (coronary artery disease)     Chest pain 10/15/2017    COPD (chronic obstructive pulmonary disease) (HCC)     Diabetes mellitus ([address]     Hyperlipidemia     Hypertension     Kidney stone     MRSA colonization 1/19/19;04/01/2018    MRSA infection        Past Surgical History:        Procedure Laterality Date    ABDOMEN SURGERY      CARDIAC SURGERY      3 STENTS PLACED     CHOLECYSTECTOMY      COLONOSCOPY      CORONARY ANGIOPLASTY WITH STENT PLACEMENT      3 stents     FRACTURE SURGERY      RIGHT ANKLE    LITHOTRIPSY      SHOULDER ARTHROSCOPY      VASCULAR SURGERY         Allergies:  Naproxen    Medications Prior to Admission:    Prior to Admission medications    Medication Sig Start Date End Date Taking?  Authorizing Provider   fenofibrate (TRICOR) 54 MG tablet Take 1 tablet by mouth daily [facility]: Please dispense generic fenofibrate unless prescriber denote 1/23/19  Yes Puneet Simpson MD   lisinopril (PRINIVIL;ZESTRIL) 20 MG tablet Take 20 mg by mouth daily   Yes Historical Provider, MD   NIFEdipine (PROCARDIA XL) 90 MG extended release tablet Take 1 tablet by mouth daily 11/15/18  Yes Nat Brewer MD   carvedilol (COREG) 3.125 MG tablet Take 1 tablet by mouth 2 times daily (with meals) 6/13/18  Yes Lance Ruiz MD   vitamin B-12 (CYANOCOBALAMIN) 500 MCG tablet Take 500 mcg by mouth daily   Yes Historical Provider, MD   vitamin B-1 (THIAMINE) 100 MG tablet Take 100 mg by mouth daily   Yes Historical Provider, MD   metFORMIN (GLUCOPHAGE) 1000 MG tablet Take 1,000 mg by mouth 2 times daily (with meals)   Yes Historical Provider, MD   sertraline (ZOLOFT) 100 MG tablet Take HALF tablet by mouth daily for 1 week, then take 1 tablet by mouth daily  Patient taking differently: Take 100 mg by mouth nightly  3/19/18  Yes Tre Freeman MD   glucose blood VI test strips (FREESTYLE TEST STRIPS) strip 1 each by In Vitro route daily Test daily ICD E11.42 8/24/17  Yes Tre Freeman MD   vitamin D (D3-1000) 1000 UNITS TABS tablet Take 1 tablet by mouth daily 5/24/17  Yes Tre Freeman MD   glucose monitoring kit (FREESTYLE) monitoring kit 1 kit by Does not apply route daily ICD E11.42 5/24/17  Yes Tre Freeman MD   Lancets MISC Test daily ICD E11.42 5/24/17  Yes Tre Freeman MD   aspirin 81 MG tablet Take 81 mg by mouth daily   Yes Historical Provider, MD   mometasone-formoterol (DULERA) 200-5 MCG/ACT inhaler Inhale 2 puffs into the lungs 2 times daily 1/22/19   Puneet Simpson MD   tiotropium (SPIRIVA) 18 MCG inhalation capsule Inhale 1 capsule into the lungs daily 1/23/19   Puneet Simpson MD   clonazePAM (KLONOPIN) 0.5 MG tablet Take 2 tablets by mouth 2 times daily as needed for Anxiety for up to 3 days. . 1/4/19 1/18/19  Ashley Boland MD   albuterol sulfate HFA modulation, iterative reconstruction, and/or weight based adjustment of the mA/kV was utilized to reduce the radiation dose to as low as reasonably achievable. COMPARISON: 01/18/2019 HISTORY: ORDERING SYSTEM PROVIDED HISTORY: right flank pain TECHNOLOGIST PROVIDED HISTORY: Additional Contrast?->None Ordering Physician Provided Reason for Exam: rt flank pain, hx stones hx gb surgery Acuity: Acute Type of Exam: Initial FINDINGS: Lower Chest: Scarring versus atelectasis in the right lower lobe. Interval resolution of left basilar infiltrate Organs: No ureterolithiasis or hydronephrosis. 7 mm left renal stone. Slight low-attenuation of the liver. Remaining solid organs have an unremarkable noncontrast appearance. Gallbladder surgically absent GI/Bowel: Diverticulum of the 2nd segment of the duodenum. A few diverticula of the sigmoid colon with no diverticular inflammatory stranding. Appendix normal Pelvis: Urinary bladder unremarkable. No pelvic fluid Peritoneum/Retroperitoneum: No ascites or pneumoperitoneum. Aorta normal in caliber. Bones/Soft Tissues: No acute bony abnormality     1. No acute abdominopelvic process demonstrated 2. Nonobstructing left nephrolithiasis 3. Mild hepatic steatosis 4. Duodenal and colonic diverticulosis     Xr Chest Standard (2 Vw)    Result Date: 3/23/2019  EXAMINATION: TWO VIEWS OF THE CHEST 3/23/2019 3:31 pm COMPARISON: 03/08/2019 HISTORY: ORDERING SYSTEM PROVIDED HISTORY: Syncope TECHNOLOGIST PROVIDED HISTORY: Reason for exam:->Syncope Ordering Physician Provided Reason for Exam: dizzy, sob Acuity: Acute Type of Exam: Initial FINDINGS: Heart size and pulmonary vessels within normal limits. Coronary artery stents noted. Lungs clear.   Costophrenic angles sharp     No active cardiopulmonary disease     Xr Chest Standard (2 Vw)    Result Date: 3/8/2019  EXAMINATION: TWO VIEWS OF THE CHEST 3/8/2019 2:33 pm COMPARISON: 01/18/2018 HISTORY: ORDERING SYSTEM PROVIDED HISTORY: DELMER TECHNOLOGIST PROVIDED HISTORY: Reason for exam:->CP Ordering Physician Provided Reason for Exam: chest pain Acuity: Acute Type of Exam: Initial Relevant Medical/Surgical History: Back Back, Shortness of Breath FINDINGS: Frontal and lateral views of the chest are submitted for review. The cardiac silhouette is normal in size. Lung parenchyma is clear without focal airspace consolidation, sizeable pleural effusion, or pneumothorax. Trachea is midline. Osseous structures and soft tissues are grossly intact. Multilevel degenerative endplate spurring of the thoracic spine. Acute cardiopulmonary pathology. Ct Head Wo Contrast    Result Date: 3/23/2019  EXAMINATION: CT OF THE HEAD WITHOUT CONTRAST  3/23/2019 3:23 pm TECHNIQUE: CT of the head was performed without the administration of intravenous contrast. Dose modulation, iterative reconstruction, and/or weight based adjustment of the mA/kV was utilized to reduce the radiation dose to as low as reasonably achievable. COMPARISON: 11/08/2018 HISTORY: ORDERING SYSTEM PROVIDED HISTORY: STROKE TECHNOLOGIST PROVIDED HISTORY: Has a \"code stroke\" or \"stroke alert\" been called? ->No Ordering Physician Provided Reason for Exam: syncope/stroke. Loss of Consciousness (Initially happened yesterday while going up stairs. Today passed out 2 more times Acuity: Acute Type of Exam: Initial FINDINGS: BRAIN/VENTRICLES: There is no acute intracranial hemorrhage, mass effect or midline shift. No abnormal extra-axial fluid collection. The gray-white differentiation is maintained without evidence of an acute infarct. There is no evidence of hydrocephalus. ORBITS: The visualized portion of the orbits demonstrate no acute abnormality. SINUSES: The visualized paranasal sinuses and mastoid air cells demonstrate no acute abnormality. SOFT TISSUES/SKULL:  No acute abnormality of the visualized skull or soft tissues. No acute intracranial abnormality.      Assessment:   Principal Problem:    Syncope and collapse  Active Problems:    Chronic obstructive pulmonary disease (HCC)    Acute renal failure (ARF) (HCC)    Hypertension    Mixed hyperlipidemia    CAD (coronary artery disease)    Syncope    Hypokalemia    DMII (diabetes mellitus, type 2) ([facility])  Resolved Problems:    * No resolved hospital problems. *      Plan:     Syncope and collapse - 3 episodes in the past 2 days. Reviewed recent Echo 11/13/2018 with EF 60%. Could be associated with his acute renal failure. Will check Orthostatics. Consult Cardiology and monitor on Telemetry. Elevated troponin - Pt denies chest pain, and there are not ischemic changes on the initial EKG. Possibly associated with renal failure. Will monitor on Telemetry and follow serial cardiac enzymes    CAD (coronary artery disease) - currently stable. Pt denies chest pain. Continue Beta Blocker and Aspirin. Monitor on Telemetry. Acute renal failure - the etiology is unclear but considerations include dehydration, hypotension, nephrotoxic medications, and ATN. Will hydrate, consult Nephrology to help in his evaluation and treatment and avoid nephrotoxic agents. · Urinalysis with Micro  · Urine Culture  · FEna (Urine Sodium, Urine Creatinine)  · Cortisol (total)  · Uric Acid  · TSH with Reflex  · Urine Osmolality    Hypokalemia - replace potassium and recheck in the am    COPD (chronic obstructive pulmonary disease) - without acute exacerbation. Will provide Nebulizer treatments as needed and continue home medications. Patient will be monitored closely, and deep breathing and coughing will be encouraged while awake. Diabetes mellitus II - SSI and CCD    Essential (primary) hypertension - continue home meds and monitor blood pressure          DVT Prophylaxis: Lovenox  Diet: DIET CARB CONTROL;  Code Status: Full Code  (Advanced care planning has been discussed with patient and/or responsible family member and is reflected in the code status.  Further orders associated with this have been entered if appropriate)    Disposition: Anticipate that patient will remain in the hospital for 2 to 3 days depending on further evaluation and clinical course.      Tracey Bryant MD

## 2019-03-24 NOTE — PROGRESS NOTES
4 Eyes Skin Assessment     The patient is being assess for  Admission    I agree that 2 RN's have performed a thorough Head to Toe Skin Assessment on the patient. ALL assessment sites listed below have been assessed. Areas assessed by both nurses:                                                          [x]   Head, Face, and Ears   [x]   Shoulders, Back, and Chest  [x]   Arms, Elbows, and Hands   [x]   Coccyx, Sacrum, and IschIum  [x]   Legs, Feet, and Heels        Does the Patient have Skin Breakdown?   No         Mundo Prevention initiated:  No   Wound Care Orders initiated:  No      [facility] nurse consulted for Pressure Injury (Stage 3,4, Unstageable, DTI, NWPT, and Complex wounds), New and Established Ostomies:  No      Nurse 1 eSignature: Electronically signed by Love Pugh RN on 3/24/19 at 4:47 AM    **SHARE this note so that the co-signing nurse is able to place an eSignature**    Nurse 2 eSignature: {Esignature:[id number]}

## 2019-03-24 NOTE — ED PROVIDER NOTES
Triage Chief Complaint:   Headache (started yesterday, patient here earlier today, left AMA to take care of his dogs, is now back for admission. ) and Generalized Body Aches    Kenaitze:  Balinda Lesches is a 59 y.o. male that presents with 4-5 episodes of syncopal episodes over the last 2-3 days, intermittent headache, generalized body aches, and intermittent chest pain. Patient was seen earlier and admission was recommended in he left AMA in order to take care of his dogs. He presents back for admission. He notes continued intermittent chest pain as well as continued intermittent headache. No thunderclap onset. No nuchal rigidity. Not the worse headache of his life. No vision change. No unilateral weakness. Although previously has a history of cocaine use denies the same currently. No prior history of renal dysfunction.     ROS:  General:  No fevers, no chills, no weakness  Eyes:  No recent vison changes, no discharge  ENT:  No sore throat, no nasal congestion, no hearing changes  Cardiovascular:  As above  Respiratory:  No shortness of breath, no cough, no wheezing  Gastrointestinal:  No pain, no nausea, no vomiting, no diarrhea  Musculoskeletal:  No muscle pain, no joint pain  Skin:  No rash, no pruritis, no easy bruising  Neurologic:  As above   Psychiatric:  No anxiety, no hallucinations or delusions, no suicidal or homicidal ideation  Genitourinary:  No dysuria, no hematuria  Endocrine:  No unexpected weight gain, no unexpected weight loss  Extremities:  no edema, no pain    Past Medical History:   Diagnosis Date    CAD (coronary artery disease)     Chest pain 10/15/2017    COPD [facility]ch[facility] obstructive [facility] disease) [facility])     Diabetes mellitus (HCC)     Hyperlipidemia     Hypertension     Kidney stone     MRSA colonization 1/19/19;04/01/2018    MRSA infection      Past Surgical History:   Procedure Laterality Date    ABDOMEN SURGERY      CARDIAC SURGERY      3 STENTS PLACED     CHOLECYSTECTOMY      COLONOSCOPY      CORONARY ANGIOPLASTY WITH STENT PLACEMENT      3 stents     FRACTURE SURGERY      RIGHT ANKLE    LITHOTRIPSY      SHOULDER ARTHROSCOPY      VASCULAR SURGERY       Family History   Problem Relation Age of Onset    High Blood Pressure Mother     Cancer Father         lung cancer    Diabetes Brother     Liver Disease Sister     Diabetes Brother      Social History     Socioeconomic History    Marital status: Single     Spouse name: Not on file    Number of children: 1    Years of education: 15    Highest education level: Not on file   Occupational History    Occupation: retired   Social Needs    Financial resource strain: Not on file    Food insecurity:     Worry: Not on file     Inability: Not on file    Transportation needs:     Medical: Not on file     Non-medical: Not on file   Tobacco Use    Smoking status: Former Smoker     Packs/day: 2.50     Years: 40.00     Pack years: 100.00     Types: Cigarettes    Smokeless tobacco: Never Used   Substance and Sexual Activity    Alcohol use: Never     Frequency: Never    Drug use: No    Sexual activity: Yes     Partners: Female   Lifestyle    Physical activity:     Days per week: Not on file     Minutes per session: Not on file    Stress: Not on file   Relationships    Social connections:     Talks on phone: Not on file     Gets together: Not on file     Attends [employer] service: Not on file     Active member of club or organization: Not on file     Attends meetings of clubs or organizations: Not on file     Relationship status: Not on file    Intimate partner violence:     Fear of current or ex partner: Not on file     Emotionally abused: Not on file     Physically abused: Not on file     Forced sexual activity: Not on file   Other Topics Concern    Not on file   Social History Narrative    Not on file     Current Facility-Administered Medications   Medication Dose Route Frequency Provider Last Rate Last Dose    zolpidem (AMBIEN) tablet 10 mg  10 mg Oral Nightly Deidre Ham MD        tiotropium [facility]) inhalation capsule 18 mcg  18 mcg Inhalation Daily Deidre Ham MD        sertraline (ZOLOFT) tablet 100 mg  100 mg Oral Nightly Deidre Ham MD        NIFEdipine (PROCARDIA XL) extended release tablet 90 mg  90 mg Oral Daily Deidre Ham MD        mometasone-formoterol [facility]) 200-5 MCG/ACT inhaler 2 puff  2 puff Inhalation BID Deidre Ham MD        mirtazapine (REMERON) tablet 15 mg  15 mg Oral Nightly Deidre Ham MD        carvedilol (COREG) tablet 3.125 mg  3.125 mg Oral BID  Deidre Ham MD        aspirin chewable tablet 81 mg  81 mg Oral Daily Deidre Ham MD        sodium chloride flush 0.9 % injection 10 mL  10 mL Intravenous 2 times per day Deidre Ham MD        sodium chloride flush 0.9 % injection 10 mL  10 mL Intravenous PRN Deidre Ham MD        magnesium hydroxide (MILK OF MAGNESIA) 400 MG/5ML suspension 30 mL  30 mL Oral Daily PRN Deidre Ham MD        ondansetron [facility]) injection 4 mg  4 mg Intravenous Q4H PRN Deidre Ham MD        enoxaparin (LOVENOX) injection 40 mg  40 mg Subcutaneous Daily Deidre Ham MD        insulin lispro (HUMALOG) injection vial 0-12 Units  0-12 Units Subcutaneous TID  Deidre Ham MD        insulin lispro (HUMALOG) injection vial 0-6 Units  0-6 Units Subcutaneous Nightly Deidre Ham MD        glucose (GLUTOSE) 40 % oral gel 15 g  15 g Oral PRN Deidre Ham MD        dextrose 50 % solution 12.5 g  12.5 g Intravenous PRN Deidre Ham MD        glucagon (rDNA) injection 1 mg  1 mg Intramuscular PRN Deidre Ham MD        dextrose 5 % solution  100 mL/hr Intravenous PRN Deidre Ham MD        0.9 % sodium chloride infusion   Intravenous Continuous Deidre Ham MD 75 mL/hr at 03/24/19 0355      famotidine (PEPCID) tablet 20 mg  20 mg Oral Daily Deidre Ham MD  traMADol (ULTRAM) tablet 50 mg  50 mg Oral Q6H PRN Marisa Walden MD        Or    traMADol Andrea Serve) tablet 100 mg  100 mg Oral Q6H PRN Marisa Walden MD   100 mg at 03/24/19 0503    ipratropium-albuterol (DUONEB) nebulizer solution 1 ampule  1 ampule Inhalation Q4H PRN Marisa Walden MD         Allergies   Allergen Reactions    Naproxen Hives       Nursing Notes Reviewed    Physical Exam:  ED Triage Vitals   Enc Vitals Group      BP 03/23/19 2349 107/70      Pulse 03/23/19 2349 98      Resp 03/23/19 2349 18      Temp 03/23/19 2349 98.3 °F (36.8 °C)      Temp Source 03/23/19 2349 Oral      SpO2 03/23/19 2349 96 %      Weight 03/23/19 2349 245 lb 2.4 oz (111.2 kg)      Height 03/24/19 0322 6' (1.829 m)      Head Circumference --       Peak Flow --       Pain Score --       Pain Loc --       Pain Edu? --       Excl. in [address]? --        My pulse ox interpretation is - normal    General appearance:  No acute distress. Mentating normally. Skin:  Warm. Dry. No petechiae or purpura. Eye:  Extraocular movements intact. PERRLA  Ears, nose, mouth and throat:  Oral mucosa moist, no trismus. Tympanic membranes bilaterally normal.  Oropharynx with no exudate or erythema. Neck:  Trachea midline. Supple. No cervical lymphadenopathy  Extremity:  No swelling. Normal ROM. No calf pain or asymmetric swelling. No lower extremity edema  Heart:  Regular rate and rhythm, normal S1 & S2, no extra heart sounds. Perfusion:  Intact, capillary refill less than 2 seconds  Respiratory:  Lungs clear to auscultation bilaterally. Respirations nonlabored. Abdominal:  Normal bowel sounds. Soft. No peritoneal signs. No hepatosplenomegaly. Back:  No CVA tenderness to palpation. No bruising. No CTL tenderness to palpation or step-off  Neurological:  Alert and oriented times 3. No focal neuro deficits. Cranial nerves II through XII are grossly intact.           Psychiatric:  Appropriate    I have reviewed and Negative    Ketones, Urine Negative Negative mg/dL    Specific Gravity, UA 1.010 1.005 - 1.030    Blood, Urine Negative Negative    pH, UA 5.0 5.0 - 8.0    Protein, UA Negative Negative mg/dL    Urobilinogen, Urine 0.2 <2.0 E.U./dL    Nitrite, Urine Negative Negative    Leukocyte Esterase, Urine Negative Negative    Microscopic Examination Not Indicated     Urine Reflex to Culture Not Indicated     Urine Type Not Specified    Urine Drug Screen   Result Value Ref Range    Amphetamine Screen, Urine Neg Negative <1000ng/mL    Barbiturate Screen, Ur Neg Negative <200 ng/mL    Benzodiazepine Screen, Urine Neg Negative <200 ng/mL    Cannabinoid Scrn, Ur Neg Negative <50 ng/mL    Cocaine Metabolite Screen, Urine Neg Negative <300 ng/mL    Opiate Scrn, Ur Neg Negative <300 ng/mL    PCP Screen, Urine Neg Negative <25 ng/mL    Methadone Screen, Urine Neg Negative <300 ng/mL    Propoxyphene Scrn, Ur Neg Negative <300 ng/mL    pH, UA 5.0     Drug Screen Comment: see below     Oxycodone Urine Neg Negative <100 ng/ml   Troponin   Result Value Ref Range    Troponin 0.03 (H) <0.01 ng/mL   TSH with Reflex   Result Value Ref Range    TSH 2.94 0.27 - 4.20 uIU/mL   Sodium, urine, random   Result Value Ref Range    Sodium, Ur 74 Not Established mmol/L   Creatinine, Random Urine   Result Value Ref Range    Creatinine, Ur 133.0 39.0 - 259.0 mg/dL   Cortisol Total   Result Value Ref Range    Cortisol 4.1 ug/dL   Uric Acid   Result Value Ref Range    Uric Acid, Serum 9.5 (H) 3.5 - 7.2 mg/dL   Urinalysis Reflex to Culture   Result Value Ref Range    Color, UA YELLOW Straw/Yellow    Clarity, UA Clear Clear    Glucose, Ur 500 (A) Negative mg/dL    Bilirubin Urine Negative Negative    Ketones, Urine Negative Negative mg/dL    Specific Gravity, UA 1.014 1.005 - 1.030    Blood, Urine Negative Negative    pH, UA 5.0 5.0 - 8.0    Protein, UA Negative Negative mg/dL    Urobilinogen, Urine 0.2 <2.0 E.U./dL    Nitrite, Urine Negative Negative Leukocyte Esterase, Urine Negative Negative    Microscopic Examination Not Indicated     Urine Reflex to Culture Not Indicated     Urine Type Not Specified       Radiographs (if obtained):  [] The following radiograph was interpreted by myself in the absence of a radiologist:   [] Radiologist's Report Reviewed:  No orders to display         EKG (if obtained): (All EKG's are interpreted by myself in the absence of a cardiologist) EKG compared to earlier today is negative for diameter morphologic change. Normal sinus rhythm. Normal axis. Incomplete right bundle-branch. Prolonged QTC. P1 60. QRS 96. QTc 524. No acute ST elevation or depression. Normal R-wave progression. Abnormal EKG. No evidence of acute ischemia. Chart review shows recent radiographs:  Ct Abdomen Pelvis Wo Contrast Additional Contrast? None    Result Date: 3/8/2019  EXAMINATION: CT OF THE ABDOMEN AND PELVIS WITHOUT CONTRAST 3/8/2019 2:46 pm TECHNIQUE: CT of the abdomen and pelvis was performed without the administration of intravenous contrast. Multiplanar reformatted images are provided for review. Dose modulation, iterative reconstruction, and/or weight based adjustment of the mA/kV was utilized to reduce the radiation dose to as low as reasonably achievable. COMPARISON: 01/18/2019 HISTORY: ORDERING SYSTEM PROVIDED HISTORY: right flank pain TECHNOLOGIST PROVIDED HISTORY: Additional Contrast?->None Ordering Physician Provided Reason for Exam: rt flank pain, hx stones hx gb surgery Acuity: Acute Type of Exam: Initial FINDINGS: Lower Chest: Scarring versus atelectasis in the right lower lobe. Interval resolution of left basilar infiltrate Organs: No ureterolithiasis or hydronephrosis. 7 mm left renal stone. Slight low-attenuation of the liver. Remaining solid organs have an unremarkable noncontrast appearance. Gallbladder surgically absent GI/Bowel: Diverticulum of the 2nd segment of the duodenum.   A few diverticula of the sigmoid colon with no diverticular inflammatory stranding. Appendix normal Pelvis: Urinary bladder unremarkable. No pelvic fluid Peritoneum/Retroperitoneum: No ascites or pneumoperitoneum. Aorta normal in caliber. Bones/Soft Tissues: No acute bony abnormality     1. No acute abdominopelvic process demonstrated 2. Nonobstructing left nephrolithiasis 3. Mild hepatic steatosis 4. Duodenal and colonic diverticulosis     Xr Chest Standard (2 Vw)    Result Date: 3/23/2019  EXAMINATION: TWO VIEWS OF THE CHEST 3/23/2019 3:31 pm COMPARISON: 03/08/2019 HISTORY: ORDERING SYSTEM PROVIDED HISTORY: Syncope TECHNOLOGIST PROVIDED HISTORY: Reason for exam:->Syncope Ordering Physician Provided Reason for Exam: dizzy, sob Acuity: Acute Type of Exam: Initial FINDINGS: Heart size and pulmonary vessels within normal limits. Coronary artery stents noted. Lungs clear. Costophrenic angles sharp     No active cardiopulmonary disease     Xr Chest Standard (2 Vw)    Result Date: 3/8/2019  EXAMINATION: TWO VIEWS OF THE CHEST 3/8/2019 2:33 pm COMPARISON: 01/18/2018 HISTORY: ORDERING SYSTEM PROVIDED HISTORY: CP TECHNOLOGIST PROVIDED HISTORY: Reason for exam:->CP Ordering Physician Provided Reason for Exam: chest pain Acuity: Acute Type of Exam: Initial Relevant Medical/Surgical History: Back Back, Shortness of Breath FINDINGS: Frontal and lateral views of the chest are submitted for review. The cardiac silhouette is normal in size. Lung parenchyma is clear without focal airspace consolidation, sizeable pleural effusion, or pneumothorax. Trachea is midline. Osseous structures and soft tissues are grossly intact. Multilevel degenerative endplate spurring of the thoracic spine. Acute cardiopulmonary pathology.      Ct Head Wo Contrast    Result Date: 3/23/2019  EXAMINATION: CT OF THE HEAD WITHOUT CONTRAST  3/23/2019 3:23 pm TECHNIQUE: CT of the head was performed without the administration of intravenous contrast. Dose modulation, iterative reconstruction, and/or weight based adjustment of the mA/kV was utilized to reduce the radiation dose to as low as reasonably achievable. COMPARISON: 11/08/2018 HISTORY: ORDERING SYSTEM PROVIDED HISTORY: STROKE TECHNOLOGIST PROVIDED HISTORY: Has a \"code stroke\" or \"stroke alert\" been called? ->No Ordering Physician Provided Reason for Exam: syncope/stroke. Loss of Consciousness (Initially happened yesterday while going up stairs. Today passed out 2 more times Acuity: Acute Type of Exam: Initial FINDINGS: BRAIN/VENTRICLES: There is no acute intracranial hemorrhage, mass effect or midline shift. No abnormal extra-axial fluid collection. The gray-white differentiation is maintained without evidence of an acute infarct. There is no evidence of hydrocephalus. ORBITS: The visualized portion of the orbits demonstrate no acute abnormality. SINUSES: The visualized paranasal sinuses and mastoid air cells demonstrate no acute abnormality. SOFT TISSUES/SKULL:  No acute abnormality of the visualized skull or soft tissues. No acute intracranial abnormality. MDM:  70-year-old mal with history as above. Neuro exam is normal.  Head CT earlier was negative. Headache treated with Benadryl, Decadron, fentanyl and Reglan. This improved significantly. No evidence of nuchal rigidity or acute infectious process. Doubt subarachnoid hemorrhage. Patient with new elevation of creatinine. Urinalysis sent. Potassium decreased. Corrected orally. Also magnesium given. EKG with no acute changes. Troponin indeterminate but unchanged from earlier today. 4 baby aspirin had artery been given. When I saw the patient he was currently chest pain-free and so no additional treatment was given for this. On repeat evaluation at time of admission patient states his headache is nearly gone. He is mentating normally. His blood pressure normal.  He has no current chest pain.   He is aware that he is with some degree of acute renal failure and will continue to be evaluated both for his chest pain, syncope, and renal failure. Critical care time of 35 minutes with CNS, cardiac, pulmonary, renal, toxicology systems all at acute risk of decompensation and collapse requiring immediate intervention. This is exclusionary billable procedures. Clinical Impression:  1. Syncope and collapse    2. ALEA (acute kidney injury) ([facility]    3. Hypokalemia      Disposition referral (if applicable):  Nelly Quintero MD  [address] [phone]          Disposition medications (if applicable):  Current Discharge Medication List          Comment: Please note this report has been produced using speech recognition software and may contain errors related to that system including errors in grammar, punctuation, and spelling, as well as words and phrases that may be inappropriate. If there are any questions or concerns please feel free to contact the dictating provider for clarification.       Maricruz Mckenna MD  03/24/19 [id number]

## 2019-03-24 NOTE — PLAN OF CARE
Problem: Falls - Risk of:  Goal: Will remain free from falls  Description  Will remain free from falls  Outcome: Ongoing  Goal: Absence of physical injury  Description  Absence of physical injury  Outcome: Ongoing     Problem: Pain:  Goal: Pain level will decrease  Description  Pain level will decrease  Outcome: Ongoing  Goal: Control of acute pain  Description  Control of acute pain  Outcome: Ongoing  Goal: Control of chronic pain  Description  Control of chronic pain  Outcome: Ongoing     Problem: SKIN INTEGRITY  Goal: Skin integrity is maintained or improved  Outcome: Ongoing     Problem: KNOWLEDGE DEFICIT  Goal: Patient/S.O. demonstrates understanding of disease process, treatment plan, medications, and discharge instructions.   Outcome: Ongoing     Problem: DISCHARGE BARRIERS  Goal: Patient's continuum of care needs are met  Outcome: Ongoing

## 2019-03-25 VITALS
HEIGHT: 72 IN | OXYGEN SATURATION: 92 % | WEIGHT: 242.95 LBS | TEMPERATURE: 98.2 F | SYSTOLIC BLOOD PRESSURE: 161 MMHG | RESPIRATION RATE: 18 BRPM | HEART RATE: 75 BPM | BODY MASS INDEX: 32.91 KG/M2 | DIASTOLIC BLOOD PRESSURE: 92 MMHG

## 2019-03-25 LAB
ANION GAP SERPL CALCULATED.3IONS-SCNC: 10 MMOL/L (ref 3–16)
BASOPHILS ABSOLUTE: 0 K/UL (ref 0–0.2)
BASOPHILS RELATIVE PERCENT: 0.4 %
BUN BLDV-MCNC: 30 MG/DL (ref 7–20)
CALCIUM SERPL-MCNC: 8.6 MG/DL (ref 8.3–10.6)
CHLORIDE BLD-SCNC: 106 MMOL/L (ref 99–110)
CHLORIDE URINE RANDOM: 81 MMOL/L
CO2: 25 MMOL/L (ref 21–32)
CREAT SERPL-MCNC: 0.9 MG/DL (ref 0.8–1.3)
CREATININE URINE: 49.2 MG/DL (ref 39–259)
EKG ATRIAL RATE: 93 BPM
EKG DIAGNOSIS: NORMAL
EKG P AXIS: 34 DEGREES
EKG P-R INTERVAL: 160 MS
EKG Q-T INTERVAL: 422 MS
EKG QRS DURATION: 96 MS
EKG QTC CALCULATION (BAZETT): 524 MS
EKG R AXIS: 80 DEGREES
EKG T AXIS: 49 DEGREES
EKG VENTRICULAR RATE: 93 BPM
EOSINOPHILS ABSOLUTE: 0.1 K/UL (ref 0–0.6)
EOSINOPHILS RELATIVE PERCENT: 0.9 %
GFR AFRICAN AMERICAN: >60
GFR NON-AFRICAN AMERICAN: >60
GLUCOSE BLD-MCNC: 138 MG/DL (ref 70–99)
GLUCOSE BLD-MCNC: 160 MG/DL (ref 70–99)
GLUCOSE BLD-MCNC: 230 MG/DL (ref 70–99)
HCT VFR BLD CALC: 39.3 % (ref 40.5–52.5)
HEMOGLOBIN: 12.7 G/DL (ref 13.5–17.5)
LV EF: 58 %
LV EF: 65 %
LVEF MODALITY: NORMAL
LVEF MODALITY: NORMAL
LYMPHOCYTES ABSOLUTE: 1.9 K/UL (ref 1–5.1)
LYMPHOCYTES RELATIVE PERCENT: 29.5 %
MCH RBC QN AUTO: 28.1 PG (ref 26–34)
MCHC RBC AUTO-ENTMCNC: 32.4 G/DL (ref 31–36)
MCV RBC AUTO: 86.9 FL (ref 80–100)
MONOCYTES ABSOLUTE: 0.5 K/UL (ref 0–1.3)
MONOCYTES RELATIVE PERCENT: 8.6 %
MRSA SCREEN RT-PCR: ABNORMAL
MRSA SCREEN RT-PCR: ABNORMAL
NEUTROPHILS ABSOLUTE: 3.8 K/UL (ref 1.7–7.7)
NEUTROPHILS RELATIVE PERCENT: 60.6 %
ORGANISM: ABNORMAL
PDW BLD-RTO: 14.1 % (ref 12.4–15.4)
PERFORMED ON: ABNORMAL
PERFORMED ON: ABNORMAL
PLATELET # BLD: 152 K/UL (ref 135–450)
PMV BLD AUTO: 8.8 FL (ref 5–10.5)
POTASSIUM REFLEX MAGNESIUM: 4.3 MMOL/L (ref 3.5–5.1)
PROTEIN PROTEIN: <4 MG/DL
RBC # BLD: 4.53 M/UL (ref 4.2–5.9)
SODIUM BLD-SCNC: 141 MMOL/L (ref 136–145)
SODIUM URINE: 102 MMOL/L
URINE CULTURE, ROUTINE: NORMAL
WBC # BLD: 6.3 K/UL (ref 4–11)

## 2019-03-25 PROCEDURE — 93010 ELECTROCARDIOGRAM REPORT: CPT | Performed by: INTERNAL MEDICINE

## 2019-03-25 PROCEDURE — C8929 TTE W OR WO FOL WCON,DOPPLER: HCPCS

## 2019-03-25 PROCEDURE — 6360000002 HC RX W HCPCS: Performed by: INTERNAL MEDICINE

## 2019-03-25 PROCEDURE — 94640 AIRWAY INHALATION TREATMENT: CPT

## 2019-03-25 PROCEDURE — 6370000000 HC RX 637 (ALT 250 FOR IP): Performed by: INTERNAL MEDICINE

## 2019-03-25 PROCEDURE — 84156 ASSAY OF PROTEIN URINE: CPT

## 2019-03-25 PROCEDURE — 85025 COMPLETE CBC W/AUTO DIFF WBC: CPT

## 2019-03-25 PROCEDURE — 80048 BASIC METABOLIC PNL TOTAL CA: CPT

## 2019-03-25 PROCEDURE — 93017 CV STRESS TEST TRACING ONLY: CPT

## 2019-03-25 PROCEDURE — 94760 N-INVAS EAR/PLS OXIMETRY 1: CPT

## 2019-03-25 PROCEDURE — A9502 TC99M TETROFOSMIN: HCPCS | Performed by: INTERNAL MEDICINE

## 2019-03-25 PROCEDURE — 99223 1ST HOSP IP/OBS HIGH 75: CPT | Performed by: INTERNAL MEDICINE

## 2019-03-25 PROCEDURE — 78452 HT MUSCLE IMAGE SPECT MULT: CPT

## 2019-03-25 PROCEDURE — 82570 ASSAY OF URINE CREATININE: CPT

## 2019-03-25 PROCEDURE — 6360000004 HC RX CONTRAST MEDICATION: Performed by: INTERNAL MEDICINE

## 2019-03-25 PROCEDURE — G0378 HOSPITAL OBSERVATION PER HR: HCPCS

## 2019-03-25 PROCEDURE — 3430000000 HC RX DIAGNOSTIC RADIOPHARMACEUTICAL: Performed by: INTERNAL MEDICINE

## 2019-03-25 PROCEDURE — 82436 ASSAY OF URINE CHLORIDE: CPT

## 2019-03-25 PROCEDURE — 2580000003 HC RX 258: Performed by: INTERNAL MEDICINE

## 2019-03-25 PROCEDURE — 96361 HYDRATE IV INFUSION ADD-ON: CPT

## 2019-03-25 PROCEDURE — 84300 ASSAY OF URINE SODIUM: CPT

## 2019-03-25 PROCEDURE — 36415 COLL VENOUS BLD VENIPUNCTURE: CPT

## 2019-03-25 RX ORDER — NIFEDIPINE 60 MG/1
60 TABLET, FILM COATED, EXTENDED RELEASE ORAL DAILY
Qty: 30 TABLET | Refills: 3 | Status: ON HOLD | OUTPATIENT
Start: 2019-03-26 | End: 2019-04-18 | Stop reason: HOSPADM

## 2019-03-25 RX ADMIN — TETROFOSMIN 30 MILLICURIE: 0.23 INJECTION, POWDER, LYOPHILIZED, FOR SOLUTION INTRAVENOUS at 08:53

## 2019-03-25 RX ADMIN — SODIUM CHLORIDE: 9 INJECTION, SOLUTION INTRAVENOUS at 05:29

## 2019-03-25 RX ADMIN — TETROFOSMIN 10 MILLICURIE: 0.23 INJECTION, POWDER, LYOPHILIZED, FOR SOLUTION INTRAVENOUS at 07:15

## 2019-03-25 RX ADMIN — TIOTROPIUM BROMIDE 18 MCG: 18 CAPSULE ORAL; RESPIRATORY (INHALATION) at 12:29

## 2019-03-25 RX ADMIN — MOMETASONE FUROATE AND FORMOTEROL FUMARATE DIHYDRATE 2 PUFF: 200; 5 AEROSOL RESPIRATORY (INHALATION) at 12:29

## 2019-03-25 RX ADMIN — FAMOTIDINE 20 MG: 20 TABLET ORAL at 11:44

## 2019-03-25 RX ADMIN — PERFLUTREN 1.43 MG: 6.52 INJECTION, SUSPENSION INTRAVENOUS at 10:44

## 2019-03-25 RX ADMIN — INSULIN LISPRO 4 UNITS: 100 INJECTION, SOLUTION INTRAVENOUS; SUBCUTANEOUS at 11:44

## 2019-03-25 RX ADMIN — CARVEDILOL 3.12 MG: 3.12 TABLET, FILM COATED ORAL at 11:43

## 2019-03-25 RX ADMIN — REGADENOSON 0.4 MG: 0.08 INJECTION, SOLUTION INTRAVENOUS at 08:55

## 2019-03-25 RX ADMIN — TRAMADOL HYDROCHLORIDE 100 MG: 50 TABLET, FILM COATED ORAL at 02:14

## 2019-03-25 RX ADMIN — NIFEDIPINE 60 MG: 60 TABLET, FILM COATED, EXTENDED RELEASE ORAL at 11:44

## 2019-03-25 RX ADMIN — ASPIRIN 81 MG 81 MG: 81 TABLET ORAL at 11:44

## 2019-03-25 ASSESSMENT — PAIN DESCRIPTION - PAIN TYPE
TYPE: ACUTE PAIN

## 2019-03-25 ASSESSMENT — PAIN DESCRIPTION - ORIENTATION: ORIENTATION: MID

## 2019-03-25 ASSESSMENT — PAIN DESCRIPTION - FREQUENCY
FREQUENCY: CONTINUOUS

## 2019-03-25 ASSESSMENT — PAIN DESCRIPTION - DESCRIPTORS
DESCRIPTORS: HEADACHE
DESCRIPTORS: HEADACHE;PRESSURE
DESCRIPTORS: HEADACHE;PRESSURE

## 2019-03-25 ASSESSMENT — PAIN SCALES - GENERAL
PAINLEVEL_OUTOF10: 7
PAINLEVEL_OUTOF10: 3
PAINLEVEL_OUTOF10: 5

## 2019-03-25 ASSESSMENT — PAIN DESCRIPTION - LOCATION
LOCATION: HEAD

## 2019-03-25 ASSESSMENT — PAIN - FUNCTIONAL ASSESSMENT: PAIN_FUNCTIONAL_ASSESSMENT: ACTIVITIES ARE NOT PREVENTED

## 2019-03-25 ASSESSMENT — PAIN DESCRIPTION - PROGRESSION: CLINICAL_PROGRESSION: NOT CHANGED

## 2019-03-25 ASSESSMENT — PAIN DESCRIPTION - ONSET: ONSET: ON-GOING

## 2019-03-25 NOTE — DISCHARGE SUMMARY
Hospital Medicine Discharge Summary    Patient ID: Sebastian Pineda      Patient's PCP: Kari Smyth MD    Admit Date: 3/23/2019     Discharge Date:   3/25/19    Admitting Physician: Krupa Jacques MD     Discharge Physician: Carson Carvalho MD     Discharge Diagnoses: Active Hospital Problems    Diagnosis Date Noted    [facility] [E11.9] 03/24/2019    Elevated troponin [R74.8]     Hypokalemia [E87.6] 01/19/2019    Syncope and collapse [R55] 02/09/2018    Syncope [R55] 11/08/2017    CAD (coronary artery disease) [I25.10]     Mixed hyperlipidemia [E78.2]     Hypertension [I10] 05/24/2017    Acute renal failure (ARF) ([facility] [N17.9] 06/17/2014    Chronic obstructive pulmonary disease ([facility] [J44.9] 06/16/2014       The patient was seen and examined on day of discharge and this discharge summary is in conjunction with any daily progress note from day of discharge. Patient is a 60-year-old man with a history of hypertension, hyperlipidemia, diabetes mellitus, coronary artery disease and COPD. He presents to the emergency room for evaluation after having three syncopal episodes in the past two days. Initially, he states that he passed out while walking on the stairs on 3/22/2019. He states that he passed out two more times prior to coming in to the emergency room on 3/23/2019. In the emergency room he was found to have acute renal failure, hypokalemia and an elevated troponin level. He is being admitted for further evaluation and treatment. Associated signs and symptoms do not include chest pain, shortness of breath, diaphoresis, edema, orthopnea, paroxysmal nocturnal dyspnea, fever or chills. Hospital Course:   Syncope and collapse - 3 episodes in the past 2 days. Reviewed recent Echo 11/13/2018 with EF 60%. Could be associated with his acute renal failure. Will check Orthostatics, normal. Consult Cardiology and monitor on Telemetry.  repeat ECHO showed now change     Elevated troponin - Pt denies chest pain, and there are not ischemic changes on the initial EKG. Possibly associated with renal failure. Will monitor on Telemetry and follow serial cardiac enzymes. Enzymes trended to zero. NM stress negative for reversible ischemia     CAD (coronary artery disease) - currently stable. Pt denies chest pain. Continue Beta Blocker and Aspirin. Monitor on Telemetry.      Acute renal failure - the etiology is unclear but considerations include dehydration, hypotension, nephrotoxic medications, and ATN. Will hydrate, consult Nephrology to help in his evaluation and treatment and avoid nephrotoxic agents. Resolved with IVF and decrease in BP meds     Hypokalemia - replace potassium and recheck in the am, resolved     COPD (chronic obstructive pulmonary disease) - without acute exacerbation. Will provide Nebulizer treatments as needed and continue home medications. Patient will be monitored closely, and deep breathing and coughing will be encouraged while awake.      Diabetes mellitus II - SSI and CCD     Essential (primary) hypertension - continue home meds and monitor blood pressure      Exam:     BP (!) 161/92   Pulse 75   Temp 98.2 °F (36.8 °C) (Oral)   Resp 18   Ht 6' (1.829 m)   Wt 242 lb 15.2 oz (110.2 kg)   SpO2 92%   BMI 32.95 kg/m²     General appearance: No apparent distress, appears stated age and cooperative. HEENT: Pupils equal, round, and reactive to light. Conjunctivae/corneas clear. Neck: Supple, with full range of motion. No jugular venous distention. Trachea midline. Respiratory:  Normal respiratory effort. Clear to auscultation, bilaterally without Rales/Wheezes/Rhonchi. Cardiovascular: Regular rate and rhythm with normal S1/S2 without murmurs, rubs or gallops. Abdomen: Soft, non-tender, non-distended with normal bowel sounds. Musculoskeletal: No clubbing, cyanosis or edema bilaterally. Full range of motion without deformity.   Skin: Skin color, metFORMIN (GLUCOPHAGE) 1000 MG tablet Take 1,000 mg by mouth 2 times daily (with meals)      sertraline (ZOLOFT) 100 MG tablet Take HALF tablet by mouth daily for 1 week, then take 1 tablet by mouth daily  Qty: 30 tablet, Refills: 3      glucose blood VI test strips (FREESTYLE TEST STRIPS) strip 1 each by In Vitro route daily Test daily ICD E11.42  Qty: 100 each, Refills: 11      vitamin D (D3-1000) 1000 UNITS TABS tablet Take 1 tablet by mouth daily  Qty: 90 tablet, Refills: 3      glucose monitoring kit (FREESTYLE) monitoring kit 1 kit by Does not apply route daily ICD E11.42  Qty: 1 kit, Refills: 0      Lancets MISC Test daily ICD E11.42  Qty: 50 each, Refills: 3      aspirin 81 MG tablet Take 81 mg by mouth daily      mometasone-formoterol (DULERA) 200-5 MCG/ACT inhaler Inhale 2 puffs into the lungs 2 times daily  Qty: 1 Inhaler, Refills: 1      tiotropium (SPIRIVA) 18 MCG inhalation capsule Inhale 1 capsule into the lungs daily  Qty: 30 capsule, Refills: 3      clonazePAM (KLONOPIN) 0.5 MG tablet Take 2 tablets by mouth 2 times daily as needed for Anxiety for up to 3 days. [place]: 10 tablet, Refills: 0    Associated Diagnoses: Panic attack      albuterol sulfate HFA (PROVENTIL HFA) 108 (90 Base) MCG/ACT inhaler Inhale 2 puffs into the lungs every 4 hours as needed for Wheezing (coughing episodes)  Qty: 1 Inhaler, Refills: 0      zolpidem (AMBIEN) 10 MG tablet Take 10 mg by mouth nightly. mirtazapine (REMERON) 15 MG tablet Take 1 tablet by mouth nightly  Qty: 30 tablet, Refills: 3      Spacer/Aero-Holding Chambers (E-Z SPACER) MITZI 1 Device by Does not apply route daily as needed (shortness of breath)  Qty: 1 Device, Refills: 0      nitroGLYCERIN (NITROSTAT) 0.4 MG SL tablet up to max of 3 total doses. If no relief after 1 dose, call 911. Qty: 25 tablet, Refills: 3             No future appointments.     Time Spent on discharge is more than 30 minutes in the examination, evaluation, counseling and review of medications and discharge plan. Signed:    Georgiana Tipton MD   3/25/2019      Thank you Brenda Chakraborty MD for the opportunity to be involved in this patient's care. If you have any questions or concerns please feel free to contact me at [phone].

## 2019-03-25 NOTE — CONSULTS
inhaler 2 puff BID   mirtazapine (REMERON) tablet 15 mg Nightly   carvedilol (COREG) tablet 3.125 mg BID WC   aspirin chewable tablet 81 mg Daily   sodium chloride flush 0.9 % injection 10 mL 2 times per day   sodium chloride flush 0.9 % injection 10 mL PRN   magnesium hydroxide (MILK OF MAGNESIA) 400 MG/5ML suspension 30 mL Daily PRN   ondansetron (ZOFRAN) injection 4 mg Q4H PRN   enoxaparin (LOVENOX) injection 40 mg Daily   insulin lispro (HUMALOG) injection vial 0-12 Units TID WC   insulin lispro (HUMALOG) injection vial 0-6 Units Nightly   glucose (GLUTOSE) 40 % oral gel 15 g PRN   dextrose 50 % solution 12.5 g PRN   glucagon (rDNA) injection 1 mg PRN   dextrose 5 % solution PRN   0.9 % sodium chloride infusion Continuous   traMADol (ULTRAM) tablet 50 mg Q6H PRN   Or    traMADol (ULTRAM) tablet 100 mg Q6H PRN   ipratropium-albuterol (DUONEB) nebulizer solution 1 ampule Q4H PRN   regadenoson (LEXISCAN) injection 0.4 mg ONCE PRN   famotidine (PEPCID) tablet 20 mg BID       Review of Systems:   14 point ROS obtained but were negative except mentioned in HPI      Physical exam:     Vitals:  BP (!) 157/70   Pulse 82   Temp 97.8 °F (36.6 °C) (Oral)   Resp 18   Ht 6' (1.829 m)   Wt 240 lb 1.3 oz (108.9 kg)   SpO2 93%   BMI 32.56 kg/m²   Constitutional:  OAA X3 NAD  Skin: no rash, turgor wnl  Heent:  eomi, mmm  Neck: no bruits or jvd noted  Cardiovascular:  S1, S2 without m/r/g  Respiratory: CTA B without w/r/r  Abdomen:  +bs, soft, nt, nd  Ext: no lower extremity edema  Psychiatric: mood and affect appropriate  Musculoskeletal:  Rom, muscular strength intact    Data:   Labs:  CBC:   Recent Labs     03/23/19  1548 03/24/19  0022 03/24/19  0723   WBC 7.7 6.7 5.6   HGB 14.6 13.7 13.6    188 164     BMP:  Recent Labs     03/23/19  1548 03/24/19  0022 03/24/19  0723    136 132*   K 4.1 2.9* 4.6   CL 95* 90* 97*   CO2 27 25 18*   BUN 38* 45* 42*   CREATININE 2.0* 2.3* 1.7*   GLUCOSE 198* 153* 175*

## 2019-03-25 NOTE — PROGRESS NOTES
[facility]  [facility]    CC:        Syncope        HPI:   This is a 59 y.o. male with history of CAD, hypertension, diabetes, COPD, who came to the hospital for recurrent syncope  In the emergency room, he was found to have acute kidney injury and also  had troponin elevation leading to this cardiac consultation. Review of Systems -   Constitutional: Negative for weight gain/loss; malaise, fever  Respiratory: Negative for Asthma;  cough and hemoptysis  Cardiovascular: Negative for palpitations,dizziness   Gastrointestinal: Negative for abd.pain; constipation/diarrhea;    Genitourinary: Negative for stones; hematuria; frequency hesitancy  Integumentt: Negative for rash or pruritis  Hematologic/lymphatic: Negative for blood dyscrasia; leukemia/lymphoma  Musculoskeletal: Negative for Connective tissue disease  Neurological:  Negative for Seizure   Behavioral/Psych:Negative for Bipolar disorder, Schizophrenia; Dementia  Endocrine: negative for thyroid, parathyroid disease    No intake or output data in the 24 hours ending 03/25/19 1637      Physical Examination:    BP (!) 161/92   Pulse 75   Temp 98.2 °F (36.8 °C) (Oral)   Resp 18   Ht 6' (1.829 m)   Wt 242 lb 15.2 oz (110.2 kg)   SpO2 92%   BMI 32.95 kg/m²   HEENT:  Face: Atraumatic, Conjunctiva: Pink; non icteric,  Mucous Memb:  Moist, No thyromegaly or Lymphadenopathy  Respiratory:  Resp Assessment: normal, Resp Auscultation: clear   Cardiovascular: Auscultation: nl S1 & S2, Palpation:  Nl PMI;  No heaves or thrills, JVP:  normal  Abdomen: Soft, non-tender, Normal bowel sounds,  No organomegaly  Extremities: No Cyanosis or Clubbing; Edema none  Neurological: Oriented to time, place, and person, Non-anxious  Psychiatric: Normal mood and affect  Skin: Warm and dry,  No rash seen    Current Facility-Administered Medications: mupirocin (BACTROBAN) 2 % ointment 1 g, 1 g, Nasal, BID  zolpidem (AMBIEN) tablet 10 mg, 10 mg, Oral, Labs     03/24/19  0022 03/24/19  0723 03/24/19  1523   TROPONINI 0.03*  0.04* <0.01 <0.01         Telemetry Monitor:       EKG:    Sinus rhythm with Premature atrial complexesPossible Left atrial enlargementBorderline      ECHO:  Left ventricular cavity size is normal. There is mild conc. LVH EF 55-60%. Normal right ventricular size and function. There are no significant valvular abnormalities appreciated in this study. Stress Nuclear:  Normal myocardial perfusion study. Normal LV size and systolic function.        ASSESSMENT AND PLAN:    Stress test negative  ECHO normal LV size and function    Syncope likely from hypotension/hypovolemia  May go home from cardiac standpoint    Hypertension  BP up again   On Nifedipine and Roxene Lab M.D  3/25/2019

## 2019-03-25 NOTE — PROGRESS NOTES
Discharge orders received. Provided Pt with written and verbal discharge orders. Pt verbalized understanding.

## 2019-03-25 NOTE — CARE COORDINATION
INITIAL CASE MANAGEMENT ASSESSMENT    Reviewed chart, met with patient to assess possible discharge needs. Explained Case Management role/services. Living Situation: Patient lives with his Brother in a house with a full flight of steps to his BR/BR. ADLs: Independent     DME: None    PT/OT Recs: not ordered     Active Services: None     Transportation: Drives/Friend will transport to home     Medications: [facility]/affordable    PCP: Dr. Francisco Vaughn      HD/PD: n/a    PLAN/COMMENTS: Patient plans to return to home without needs. SW/CM provided contact information for patient or family to call with any questions. SW/CM will follow and assist as needed. Electronically signed by Jono Oshea RN on 3/25/2019 at 1:27 PM

## 2019-03-25 NOTE — PROGRESS NOTES
Pt continues to complain of headache unrelieved by Ultram. Pt refusing Fide Lubin aware. No new orders received. Will continue to monitor.

## 2019-04-15 ENCOUNTER — HOSPITAL ENCOUNTER (INPATIENT)
Age: 64
LOS: 10 days | Discharge: HOME | DRG: 683 | End: 2019-04-26
Attending: FAMILY MEDICINE | Admitting: INTERNAL MEDICINE
Payer: MEDICARE

## 2019-04-15 DIAGNOSIS — E87.1 HYPONATREMIA: ICD-10-CM

## 2019-04-15 DIAGNOSIS — R55 SYNCOPE AND COLLAPSE: Primary | ICD-10-CM

## 2019-04-15 DIAGNOSIS — N17.9 AKI (ACUTE KIDNEY INJURY) (HCC): ICD-10-CM

## 2019-04-15 PROCEDURE — 99285 EMERGENCY DEPT VISIT HI MDM: CPT

## 2019-04-15 PROCEDURE — 93005 ELECTROCARDIOGRAM TRACING: CPT | Performed by: FAMILY MEDICINE

## 2019-04-15 ASSESSMENT — PAIN SCALES - GENERAL: PAINLEVEL_OUTOF10: 6

## 2019-04-15 ASSESSMENT — PAIN DESCRIPTION - DESCRIPTORS: DESCRIPTORS: POUNDING

## 2019-04-15 ASSESSMENT — PAIN DESCRIPTION - LOCATION: LOCATION: CHEST

## 2019-04-15 ASSESSMENT — PAIN DESCRIPTION - PAIN TYPE: TYPE: ACUTE PAIN

## 2019-04-16 ENCOUNTER — APPOINTMENT (OUTPATIENT)
Dept: GENERAL RADIOLOGY | Age: 64
DRG: 683 | End: 2019-04-16
Payer: MEDICARE

## 2019-04-16 LAB
A/G RATIO: 1.1 (ref 1.1–2.2)
ALBUMIN SERPL-MCNC: 3.6 G/DL (ref 3.4–5)
ALP BLD-CCNC: 144 U/L (ref 40–129)
ALT SERPL-CCNC: 15 U/L (ref 10–40)
AMPHETAMINE SCREEN, URINE: ABNORMAL
ANION GAP SERPL CALCULATED.3IONS-SCNC: 14 MMOL/L (ref 3–16)
ANION GAP SERPL CALCULATED.3IONS-SCNC: 17 MMOL/L (ref 3–16)
AST SERPL-CCNC: 12 U/L (ref 15–37)
BARBITURATE SCREEN URINE: ABNORMAL
BASOPHILS ABSOLUTE: 0 K/UL (ref 0–0.2)
BASOPHILS RELATIVE PERCENT: 0.6 %
BENZODIAZEPINE SCREEN, URINE: ABNORMAL
BILIRUB SERPL-MCNC: <0.2 MG/DL (ref 0–1)
BILIRUBIN URINE: NEGATIVE
BLOOD, URINE: NEGATIVE
BUN BLDV-MCNC: 35 MG/DL (ref 7–20)
BUN BLDV-MCNC: 42 MG/DL (ref 7–20)
CALCIUM SERPL-MCNC: 8 MG/DL (ref 8.3–10.6)
CALCIUM SERPL-MCNC: 8.2 MG/DL (ref 8.3–10.6)
CANNABINOID SCREEN URINE: ABNORMAL
CHLORIDE BLD-SCNC: 93 MMOL/L (ref 99–110)
CHLORIDE BLD-SCNC: 99 MMOL/L (ref 99–110)
CLARITY: CLEAR
CO2: 25 MMOL/L (ref 21–32)
CO2: 27 MMOL/L (ref 21–32)
COCAINE METABOLITE SCREEN URINE: POSITIVE
COLOR: YELLOW
CREAT SERPL-MCNC: 1.2 MG/DL (ref 0.8–1.3)
CREAT SERPL-MCNC: 1.7 MG/DL (ref 0.8–1.3)
EOSINOPHILS ABSOLUTE: 0.1 K/UL (ref 0–0.6)
EOSINOPHILS RELATIVE PERCENT: 1.7 %
GFR AFRICAN AMERICAN: 49
GFR AFRICAN AMERICAN: >60
GFR NON-AFRICAN AMERICAN: 41
GFR NON-AFRICAN AMERICAN: >60
GI BACTERIAL PATHOGENS BY PCR: NORMAL
GLOBULIN: 3.2 G/DL
GLUCOSE BLD-MCNC: 110 MG/DL (ref 70–99)
GLUCOSE BLD-MCNC: 113 MG/DL (ref 70–99)
GLUCOSE BLD-MCNC: 125 MG/DL (ref 70–99)
GLUCOSE BLD-MCNC: 129 MG/DL (ref 70–99)
GLUCOSE BLD-MCNC: 177 MG/DL (ref 70–99)
GLUCOSE BLD-MCNC: 193 MG/DL (ref 70–99)
GLUCOSE URINE: NEGATIVE MG/DL
HCT VFR BLD CALC: 38.6 % (ref 40.5–52.5)
HEMOGLOBIN: 12.7 G/DL (ref 13.5–17.5)
KETONES, URINE: NEGATIVE MG/DL
LACTIC ACID: 2.4 MMOL/L (ref 0.4–2)
LEUKOCYTE ESTERASE, URINE: NEGATIVE
LYMPHOCYTES ABSOLUTE: 2 K/UL (ref 1–5.1)
LYMPHOCYTES RELATIVE PERCENT: 30.9 %
Lab: ABNORMAL
MAGNESIUM: 1.3 MG/DL (ref 1.8–2.4)
MCH RBC QN AUTO: 27.9 PG (ref 26–34)
MCHC RBC AUTO-ENTMCNC: 32.7 G/DL (ref 31–36)
MCV RBC AUTO: 85.3 FL (ref 80–100)
METHADONE SCREEN, URINE: ABNORMAL
MICROSCOPIC EXAMINATION: NORMAL
MONOCYTES ABSOLUTE: 0.6 K/UL (ref 0–1.3)
MONOCYTES RELATIVE PERCENT: 10 %
MRSA SCREEN RT-PCR: ABNORMAL
MRSA SCREEN RT-PCR: ABNORMAL
NEUTROPHILS ABSOLUTE: 3.7 K/UL (ref 1.7–7.7)
NEUTROPHILS RELATIVE PERCENT: 56.8 %
NITRITE, URINE: NEGATIVE
OPIATE SCREEN URINE: ABNORMAL
ORGANISM: ABNORMAL
OXYCODONE URINE: ABNORMAL
PDW BLD-RTO: 13.9 % (ref 12.4–15.4)
PERFORMED ON: ABNORMAL
PH UA: 5.5
PH UA: 5.5 (ref 5–8)
PHENCYCLIDINE SCREEN URINE: ABNORMAL
PLATELET # BLD: 185 K/UL (ref 135–450)
PMV BLD AUTO: 8.9 FL (ref 5–10.5)
POTASSIUM SERPL-SCNC: 2.9 MMOL/L (ref 3.5–5.1)
POTASSIUM SERPL-SCNC: 3.6 MMOL/L (ref 3.5–5.1)
PRO-BNP: 63 PG/ML (ref 0–124)
PROPOXYPHENE SCREEN: ABNORMAL
PROTEIN UA: NEGATIVE MG/DL
RBC # BLD: 4.53 M/UL (ref 4.2–5.9)
SEDIMENTATION RATE, ERYTHROCYTE: 29 MM/HR (ref 0–20)
SODIUM BLD-SCNC: 134 MMOL/L (ref 136–145)
SODIUM BLD-SCNC: 141 MMOL/L (ref 136–145)
SPECIFIC GRAVITY UA: 1.01 (ref 1–1.03)
TOTAL CK: 94 U/L (ref 39–308)
TOTAL PROTEIN: 6.8 G/DL (ref 6.4–8.2)
TROPONIN: <0.01 NG/ML
TROPONIN: <0.01 NG/ML
URINE REFLEX TO CULTURE: NORMAL
URINE TYPE: NORMAL
UROBILINOGEN, URINE: 0.2 E.U./DL
WBC # BLD: 6.5 K/UL (ref 4–11)

## 2019-04-16 PROCEDURE — 94640 AIRWAY INHALATION TREATMENT: CPT

## 2019-04-16 PROCEDURE — 82550 ASSAY OF CK (CPK): CPT

## 2019-04-16 PROCEDURE — 6370000000 HC RX 637 (ALT 250 FOR IP): Performed by: HOSPITALIST

## 2019-04-16 PROCEDURE — 36415 COLL VENOUS BLD VENIPUNCTURE: CPT

## 2019-04-16 PROCEDURE — 6370000000 HC RX 637 (ALT 250 FOR IP): Performed by: INTERNAL MEDICINE

## 2019-04-16 PROCEDURE — 87046 STOOL CULTR AEROBIC BACT EA: CPT

## 2019-04-16 PROCEDURE — 85025 COMPLETE CBC W/AUTO DIFF WBC: CPT

## 2019-04-16 PROCEDURE — 83605 ASSAY OF LACTIC ACID: CPT

## 2019-04-16 PROCEDURE — 96361 HYDRATE IV INFUSION ADD-ON: CPT

## 2019-04-16 PROCEDURE — 71045 X-RAY EXAM CHEST 1 VIEW: CPT

## 2019-04-16 PROCEDURE — 80053 COMPREHEN METABOLIC PANEL: CPT

## 2019-04-16 PROCEDURE — 2580000003 HC RX 258: Performed by: FAMILY MEDICINE

## 2019-04-16 PROCEDURE — 84484 ASSAY OF TROPONIN QUANT: CPT

## 2019-04-16 PROCEDURE — 94760 N-INVAS EAR/PLS OXIMETRY 1: CPT

## 2019-04-16 PROCEDURE — 1200000000 HC SEMI PRIVATE

## 2019-04-16 PROCEDURE — 2580000003 HC RX 258: Performed by: HOSPITALIST

## 2019-04-16 PROCEDURE — 6360000002 HC RX W HCPCS: Performed by: HOSPITALIST

## 2019-04-16 PROCEDURE — 93010 ELECTROCARDIOGRAM REPORT: CPT | Performed by: INTERNAL MEDICINE

## 2019-04-16 PROCEDURE — 2580000003 HC RX 258: Performed by: INTERNAL MEDICINE

## 2019-04-16 PROCEDURE — 83880 ASSAY OF NATRIURETIC PEPTIDE: CPT

## 2019-04-16 PROCEDURE — 6360000002 HC RX W HCPCS: Performed by: INTERNAL MEDICINE

## 2019-04-16 PROCEDURE — 6370000000 HC RX 637 (ALT 250 FOR IP): Performed by: NURSE PRACTITIONER

## 2019-04-16 PROCEDURE — 81003 URINALYSIS AUTO W/O SCOPE: CPT

## 2019-04-16 PROCEDURE — 87505 NFCT AGENT DETECTION GI: CPT

## 2019-04-16 PROCEDURE — 83735 ASSAY OF MAGNESIUM: CPT

## 2019-04-16 PROCEDURE — 80307 DRUG TEST PRSMV CHEM ANLYZR: CPT

## 2019-04-16 PROCEDURE — 85652 RBC SED RATE AUTOMATED: CPT

## 2019-04-16 PROCEDURE — 96360 HYDRATION IV INFUSION INIT: CPT

## 2019-04-16 PROCEDURE — 87641 MR-STAPH DNA AMP PROBE: CPT

## 2019-04-16 PROCEDURE — 6370000000 HC RX 637 (ALT 250 FOR IP): Performed by: FAMILY MEDICINE

## 2019-04-16 RX ORDER — 0.9 % SODIUM CHLORIDE 0.9 %
1000 INTRAVENOUS SOLUTION INTRAVENOUS ONCE
Status: COMPLETED | OUTPATIENT
Start: 2019-04-16 | End: 2019-04-16

## 2019-04-16 RX ORDER — MAGNESIUM SULFATE IN WATER 40 MG/ML
2 INJECTION, SOLUTION INTRAVENOUS ONCE
Status: COMPLETED | OUTPATIENT
Start: 2019-04-16 | End: 2019-04-16

## 2019-04-16 RX ORDER — MOMETASONE FUROATE 1 MG/G
CREAM TOPICAL DAILY
Status: ON HOLD | COMMUNITY
End: 2019-08-22 | Stop reason: HOSPADM

## 2019-04-16 RX ORDER — BACITRACIN ZINC AND POLYMYXIN B SULFATE 500; 1000 [USP'U]/G; [USP'U]/G
OINTMENT TOPICAL 2 TIMES DAILY
Status: DISCONTINUED | OUTPATIENT
Start: 2019-04-16 | End: 2019-04-21

## 2019-04-16 RX ORDER — ALBUTEROL SULFATE 2.5 MG/3ML
2.5 SOLUTION RESPIRATORY (INHALATION) EVERY 4 HOURS PRN
Status: DISCONTINUED | OUTPATIENT
Start: 2019-04-16 | End: 2019-04-26 | Stop reason: HOSPADM

## 2019-04-16 RX ORDER — SODIUM CHLORIDE 9 MG/ML
INJECTION, SOLUTION INTRAVENOUS CONTINUOUS
Status: DISCONTINUED | OUTPATIENT
Start: 2019-04-16 | End: 2019-04-17

## 2019-04-16 RX ORDER — DEXTROSE MONOHYDRATE 25 G/50ML
12.5 INJECTION, SOLUTION INTRAVENOUS PRN
Status: DISCONTINUED | OUTPATIENT
Start: 2019-04-16 | End: 2019-04-26 | Stop reason: HOSPADM

## 2019-04-16 RX ORDER — SODIUM CHLORIDE 0.9 % (FLUSH) 0.9 %
10 SYRINGE (ML) INJECTION PRN
Status: DISCONTINUED | OUTPATIENT
Start: 2019-04-16 | End: 2019-04-26 | Stop reason: HOSPADM

## 2019-04-16 RX ORDER — LORAZEPAM 1 MG/1
1 TABLET ORAL ONCE
Status: COMPLETED | OUTPATIENT
Start: 2019-04-16 | End: 2019-04-16

## 2019-04-16 RX ORDER — NIFEDIPINE 60 MG/1
60 TABLET, EXTENDED RELEASE ORAL DAILY
Status: DISCONTINUED | OUTPATIENT
Start: 2019-04-16 | End: 2019-04-17

## 2019-04-16 RX ORDER — HYDRALAZINE HYDROCHLORIDE 25 MG/1
25 TABLET, FILM COATED ORAL 3 TIMES DAILY
Status: DISCONTINUED | OUTPATIENT
Start: 2019-04-16 | End: 2019-04-17

## 2019-04-16 RX ORDER — LISINOPRIL 20 MG/1
20 TABLET ORAL DAILY
Status: DISCONTINUED | OUTPATIENT
Start: 2019-04-16 | End: 2019-04-16

## 2019-04-16 RX ORDER — ONDANSETRON 2 MG/ML
4 INJECTION INTRAMUSCULAR; INTRAVENOUS EVERY 6 HOURS PRN
Status: DISCONTINUED | OUTPATIENT
Start: 2019-04-16 | End: 2019-04-26 | Stop reason: HOSPADM

## 2019-04-16 RX ORDER — NICOTINE POLACRILEX 4 MG
15 LOZENGE BUCCAL PRN
Status: DISCONTINUED | OUTPATIENT
Start: 2019-04-16 | End: 2019-04-26 | Stop reason: HOSPADM

## 2019-04-16 RX ORDER — SODIUM CHLORIDE, SODIUM LACTATE, POTASSIUM CHLORIDE, CALCIUM CHLORIDE 600; 310; 30; 20 MG/100ML; MG/100ML; MG/100ML; MG/100ML
INJECTION, SOLUTION INTRAVENOUS ONCE
Status: COMPLETED | OUTPATIENT
Start: 2019-04-16 | End: 2019-04-16

## 2019-04-16 RX ORDER — DEXTROSE MONOHYDRATE 50 MG/ML
100 INJECTION, SOLUTION INTRAVENOUS PRN
Status: DISCONTINUED | OUTPATIENT
Start: 2019-04-16 | End: 2019-04-26 | Stop reason: HOSPADM

## 2019-04-16 RX ORDER — POTASSIUM CHLORIDE 20 MEQ/1
80 TABLET, EXTENDED RELEASE ORAL ONCE
Status: COMPLETED | OUTPATIENT
Start: 2019-04-16 | End: 2019-04-16

## 2019-04-16 RX ORDER — SODIUM CHLORIDE 0.9 % (FLUSH) 0.9 %
10 SYRINGE (ML) INJECTION EVERY 12 HOURS SCHEDULED
Status: DISCONTINUED | OUTPATIENT
Start: 2019-04-16 | End: 2019-04-26 | Stop reason: HOSPADM

## 2019-04-16 RX ORDER — LANOLIN ALCOHOL/MO/W.PET/CERES
6 CREAM (GRAM) TOPICAL ONCE
Status: COMPLETED | OUTPATIENT
Start: 2019-04-16 | End: 2019-04-16

## 2019-04-16 RX ORDER — MIRTAZAPINE 15 MG/1
15 TABLET, FILM COATED ORAL NIGHTLY
Status: DISCONTINUED | OUTPATIENT
Start: 2019-04-16 | End: 2019-04-26 | Stop reason: HOSPADM

## 2019-04-16 RX ADMIN — SODIUM CHLORIDE, POTASSIUM CHLORIDE, SODIUM LACTATE AND CALCIUM CHLORIDE: 600; 310; 30; 20 INJECTION, SOLUTION INTRAVENOUS at 03:04

## 2019-04-16 RX ADMIN — POTASSIUM CHLORIDE 80 MEQ: 20 TABLET, EXTENDED RELEASE ORAL at 03:04

## 2019-04-16 RX ADMIN — MAGNESIUM SULFATE HEPTAHYDRATE 2 G: 40 INJECTION, SOLUTION INTRAVENOUS at 12:13

## 2019-04-16 RX ADMIN — LORAZEPAM 1 MG: 1 TABLET ORAL at 03:27

## 2019-04-16 RX ADMIN — SODIUM CHLORIDE 1000 ML: 9 INJECTION, SOLUTION INTRAVENOUS at 02:09

## 2019-04-16 RX ADMIN — Medication 6 MG: at 22:34

## 2019-04-16 RX ADMIN — MIRTAZAPINE 15 MG: 15 TABLET, FILM COATED ORAL at 21:21

## 2019-04-16 RX ADMIN — SODIUM CHLORIDE: 9 INJECTION, SOLUTION INTRAVENOUS at 20:25

## 2019-04-16 RX ADMIN — ENOXAPARIN SODIUM 40 MG: 40 INJECTION SUBCUTANEOUS at 08:57

## 2019-04-16 RX ADMIN — HYDRALAZINE HYDROCHLORIDE 25 MG: 25 TABLET ORAL at 18:09

## 2019-04-16 RX ADMIN — NIFEDIPINE 60 MG: 60 TABLET, FILM COATED, EXTENDED RELEASE ORAL at 08:57

## 2019-04-16 RX ADMIN — INSULIN LISPRO 1 UNITS: 100 INJECTION, SOLUTION INTRAVENOUS; SUBCUTANEOUS at 21:21

## 2019-04-16 RX ADMIN — MOMETASONE FUROATE AND FORMOTEROL FUMARATE DIHYDRATE 2 PUFF: 200; 5 AEROSOL RESPIRATORY (INHALATION) at 22:05

## 2019-04-16 RX ADMIN — BACITRACIN ZINC AND POLYMYXIN B SULFATE: at 22:33

## 2019-04-16 RX ADMIN — SODIUM CHLORIDE: 9 INJECTION, SOLUTION INTRAVENOUS at 07:06

## 2019-04-16 ASSESSMENT — PAIN SCALES - GENERAL
PAINLEVEL_OUTOF10: 6
PAINLEVEL_OUTOF10: 0
PAINLEVEL_OUTOF10: 4
PAINLEVEL_OUTOF10: 0
PAINLEVEL_OUTOF10: 5
PAINLEVEL_OUTOF10: 0
PAINLEVEL_OUTOF10: 6

## 2019-04-16 ASSESSMENT — PAIN DESCRIPTION - DESCRIPTORS
DESCRIPTORS: ACHING
DESCRIPTORS: ACHING
DESCRIPTORS: ACHING;DULL
DESCRIPTORS: HEAVINESS

## 2019-04-16 ASSESSMENT — PAIN DESCRIPTION - FREQUENCY
FREQUENCY: CONTINUOUS

## 2019-04-16 ASSESSMENT — PAIN DESCRIPTION - PROGRESSION
CLINICAL_PROGRESSION: NOT CHANGED
CLINICAL_PROGRESSION: NOT CHANGED

## 2019-04-16 ASSESSMENT — PAIN DESCRIPTION - PAIN TYPE
TYPE: ACUTE PAIN

## 2019-04-16 ASSESSMENT — PAIN DESCRIPTION - ORIENTATION: ORIENTATION: MID

## 2019-04-16 ASSESSMENT — PAIN DESCRIPTION - LOCATION
LOCATION: CHEST

## 2019-04-16 NOTE — PROGRESS NOTES
Hospitalist Progress Note    CC:     Shortness of Breath (SOB,CP,dizziness since this morning. reports LOC x 2. reports hitting head. denies blood thinners. ); Chest Pain; Dizziness; and Loss of Consciousness      Admit date: 4/15/2019  Days in hospital:  0    Subjective:     Denies anymore complaint. No acute events overnight. ROS:   Constitutional: Negative for chills and fever. Respiratory: Negative for cough and shortness of breath. Cardiovascular: Negative for chest pain and palpitations. Gastrointestinal: Negative for abdominal pain, constipation and positive for diarrhea. Genitourinary: Negative for dysuria and urgency. Neurological: Negative for headaches. Objective:    BP (!) 179/84   Pulse 78   Temp 98 °F (36.7 °C) (Oral)   Resp 18   Ht 6' (1.829 m)   Wt 246 lb 7.6 oz (111.8 kg)   SpO2 95%   BMI 33.43 kg/m²     Gen: alert, NAD  HEENT: NC/AT, moist mucous membranes, no oropharyngeal erythema or exudate  Neck: supple, trachea midline, no anterior cervical or SC LAD  Heart: Normal s1/s2, RRR, no murmurs, gallops, or rubs. Lungs: CTA bilaterally, no wheezing, no rales, no rhonchi, no use of accessory muscles  Abd: bowel sounds present, soft, nondistended, none tender  Extrem: No clubbing, cyanosis, no  edema  Psych: A & O x3  , affect appropriate  Neuro: grossly intact, moves all four extremities spontaneously.       Medications:  Scheduled Meds:   mirtazapine  15 mg Oral Nightly    NIFEdipine  60 mg Oral Daily    sodium chloride flush  10 mL Intravenous 2 times per day    enoxaparin  40 mg Subcutaneous Daily    insulin lispro  0-12 Units Subcutaneous TID     insulin lispro  0-6 Units Subcutaneous Nightly    magnesium sulfate  2 g Intravenous Once       PRN Meds:  sodium chloride flush, magnesium hydroxide, ondansetron, glucose, dextrose, glucagon (rDNA), dextrose    IV:   sodium chloride 100 mL/hr at 04/16/19 0706    dextrose           Intake/Output Summary (Last 24 hours) at 4/16/2019 1307  Last data filed at 4/16/2019 0814  Gross per 24 hour   Intake 240 ml   Output --   Net 240 ml       Results:  CBC:   Recent Labs     04/16/19 0200   WBC 6.5   HGB 12.7*   HCT 38.6*   MCV 85.3        BMP:   Recent Labs     04/16/19 0200 04/16/19  0611   * 141   K 2.9* 3.6   CL 93* 99   CO2 27 25   BUN 42* 35*   CREATININE 1.7* 1.2     Mag: No results for input(s): MAG in the last 72 hours. Phos:   Lab Results   Component Value Date    PHOS 2.5 04/02/2018     No components found for: GLU    LIVER PROFILE:   Recent Labs     04/16/19 0200   AST 12*   ALT 15   BILITOT <0.2   ALKPHOS 144*     PT/INR: No results for input(s): PROTIME, INR in the last 72 hours. APTT: No results for input(s): APTT in the last 72 hours. UA:  Recent Labs     04/16/19 0200   COLORU YELLOW   PHUR 5.5  5.5   CLARITYU Clear   SPECGRAV 1.012   LEUKOCYTESUR Negative   UROBILINOGEN 0.2   BILIRUBINUR Negative   BLOODU Negative   GLUCOSEU Negative       Invalid input(s): ABG  Lab Results   Component Value Date    CALCIUM 8.0 (L) 04/16/2019    PHOS 2.5 04/02/2018       Assessment and Plan:    Syncope:   patient had similar presentation 3 weeks ago. Patient felt lightheaded after going to the bathroom and then lost his consciousness. He reports some loose stool for the last couple days. Orthostatic were positive in the ED. Patient was seen by cardiology 3 weeks ago for similar presentation. Continue telemetry  IV fluid  PT/OT    Acute kidney injury  Most likely prerenal with loose stool and diarrhea  IV fluid  Avoid nephrotoxic medications    Hypokalemia and hypomagnesemia  Replete and recheck    COPD without any acute exacerbation  Continue with home meds    Type 2 diabetes mellitus  Sliding scale insulin    Hypertension  Continue with nifedipine  Hold lisinopril    Positive drug screen:   patient drug screen is positive for cocaine. Patient denies any recent use.   Counseling was provided          Code status:  Full   DVT prophylaxis: Lovenox   Disposition: [facility]     Electronically signed by Rodriguez Hogan MD on 4/16/2019 at 1:07 PM

## 2019-04-16 NOTE — PROGRESS NOTES
Pt arrived to room 4255 from ED. Pt is alert and oriented in bed. VSS. Call light within reach of patient. Will continue to monitor.

## 2019-04-16 NOTE — PROGRESS NOTES
Clinical Pharmacy Note  Medication Counseling    Reviewed new medications started during hospital admission: enoxaparin, insulin lispro. Indications and side effects were emphasized during counseling. All medication-related questions addressed. Patient verbalized understanding of education. Should the patient express any additional questions or concerns regarding their medications, please do not hesitate to contact the pharmacy department. Patient/caregiver aware they may refuse medications during hospital stay.

## 2019-04-16 NOTE — CARE COORDINATION
INITIAL CASE MANAGEMENT ASSESSMENT    Reviewed chart, met with patient to assess possible discharge needs. Explained Case Management role/services. Living Situation: lives w/ his brother who is w/c bound. Home is 2 story and his bedroom is upstairs. 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 Car is here and plans to drive himself home     Medications: states that he had a \"[employer]\" policy last year and copays were minimal. This termed 1/1/19 and now has [employer] and copays are 30+. Provided SV[employer] information for assistance    PCP: confirmed Donaciano Dakin      HD/PD: n/a    PLAN/COMMENTS: would like a new glucometer. Requested per sticky note    SW/CM provided contact information for patient or family to call with any questions. SW/CM will follow and assist as needed.     Electronically signed by Christine Kan RN on 4/16/2019 at 11:04 AM

## 2019-04-16 NOTE — H&P
Hospital Medicine History & Physical      PCP: Camden Gutierres MD    Date of Admission: 4/15/2019    Date of Service: Pt seen/examined on 4/16/19 and Admitted to Inpatient   Chief Complaint:  syncope      History Of Present Illness: The patient is a 59 y.o. male who presents to [facility] with acute onset of syncope. Started yesterday no cp or palpitations hx of similar episode last month pt. 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  Authorizing Provider   NIFEdipine (ADALAT CC) 60 MG extended release tablet Take 1 tablet by mouth daily 3/26/19  Yes Sue Brice MD   mometasone-formoterol [facility]) 200-5 MCG/ACT inhaler Inhale 2 puffs into the lungs 2 times daily 1/22/19  Yes Heather De La Cruz MD   tiotropium (SPIRIVA) 18 MCG inhalation capsule Inhale 1 capsule into the lungs daily 1/23/19  Yes Heather De La Cruz MD   fenofibrate (TRICOR) 54 MG tablet Take 1 tablet by mouth daily s [facility] pharmacy: Please dispense generic fenofibrate unless prescriber denote 1/23/19  Yes Heather De La Cruz MD   lisinopril (PRINIVIL;ZESTRIL) 20 MG tablet Take 20 mg by mouth daily   Yes Historical Provider, MD   albuterol sulfate HFA (PROVENTIL HFA) 108 (90 Base) MCG/ACT inhaler Inhale 2 puffs into the lungs every 4 hours as needed for Wheezing (coughing episodes) 11/24/18  Yes Adal Kohler MD   carvedilol (COREG) 3.125 MG tablet Take 1 tablet by mouth 2 times daily (with meals) 6/13/18  Yes Lance Traylor MD   vitamin B-12 (CYANOCOBALAMIN) 500 MCG tablet Take 500 mcg by mouth daily   Yes Historical Provider, MD   vitamin B-1 (THIAMINE) 100 MG tablet Take 100 mg by mouth daily   Yes Historical Provider, MD   metFORMIN (GLUCOPHAGE) 1000 MG tablet Take 1,000 mg by mouth 2 times daily (with meals)   Yes Historical Provider, MD   sertraline (ZOLOFT) 100 MG tablet Take HALF tablet by mouth daily for 1 week, then take 1 tablet by mouth daily  Patient taking differently: Take 100 mg by mouth nightly  3/19/18  Yes Cordella Sandhoff, MD   zolpidem (AMBIEN) 10 MG tablet Take 10 mg by mouth nightly. Yes Historical Provider, MD   mirtazapine (REMERON) 15 MG tablet Take 1 tablet by mouth nightly 2/19/18  Yes Cordella Sandhoff, MD   nitroGLYCERIN (NITROSTAT) 0.4 MG SL tablet up to max of 3 total doses. If no relief after 1 dose, call 911. 10/5/17  Yes Jamilah Ruano,    vitamin D (D3-1000) 1000 UNITS TABS tablet Take 1 tablet by mouth daily 5/24/17  Yes Cordella Sandhoff, MD   aspirin 81 MG tablet Take 81 mg by mouth daily   Yes Historical Provider, MD   clonazePAM (KLONOPIN) 0.5 MG tablet Take 2 tablets by mouth 2 times daily as needed for Anxiety for up to 3 days. . 1/4/19 1/18/19  Imer Kwok MD   Spacer/Aero-Holding Chambers (E-Z SPACER) MITZI 1 Device by Does not apply route daily as needed (shortness of breath) 10/13/17   Cordella Sandhoff, MD   glucose blood VI test strips (FREESTYLE TEST STRIPS) strip 1 each by In Vitro route daily Test daily ICD E11.42 8/24/17   Cordella Sandhoff, MD   glucose monitoring kit (FREESTYLE) monitoring kit 1 kit by Does not apply route daily ICD E11.42 5/24/17   Tre Freeman MD   Lancets MISC Test daily ICD E11.42 5/24/17   Tre Freeman MD       Allergies:  Naproxen    Social History:  The patient currently lives home     TOBACCO:   reports that he has quit smoking. His smoking use included cigarettes. He has a 100.00 pack-year smoking history. He has never used smokeless tobacco.  ETOH:   reports that he does not drink alcohol. Family History:  Reviewed in detail and negative for DM, Early CAD, Cancer, CVA. Positive as follows:        Problem Relation Age of Onset    High Blood Pressure Mother     Cancer Father         lung cancer    Diabetes Brother     Liver Disease Sister     Diabetes Brother        REVIEW OF SYSTEMS:   Positive for syncope  and as noted in the HPI. All other systems reviewed and negative. PHYSICAL EXAM:    BP (!) 160/92   Pulse 84   Temp 97.8 °F (36.6 °C) (Oral)   Resp 18   Ht 6' (1.829 m)   Wt 246 lb 7.6 oz (111.8 kg)   SpO2 95%   BMI 33.43 kg/m²     General appearance: No apparent distress appears stated age and cooperative. HEENT Normal cephalic, atraumatic without obvious deformity. Pupils equal, round, and reactive to light. Extra ocular muscles intact. Conjunctivae/corneas clear. Neck: Supple, No jugular venous distention/bruits. Trachea midline without thyromegaly or adenopathy with full range of motion. Lungs: Clear to auscultation, bilaterally without Rales/Wheezes/Rhonchi with good respiratory effort. Heart: Regular rate and rhythm with Normal S1/S2 without murmurs, rubs or gallops, point of maximum impulse non-displaced  Abdomen: Soft, non-tender or non-distended without rigidity or guarding and positive bowel sounds all four quadrants. Extremities: No clubbing, cyanosis, or edema bilaterally. Full range of motion without deformity and normal gait intact. Skin: Skin color, texture, turgor normal.  No rashes or lesions.   Neurologic: Alert and oriented X 3, neurovascularly intact with sensory/motor intact upper extremities/lower extremities, bilaterally. Cranial nerves: II-XII intact, grossly non-focal.  Mental status: Alert, oriented, thought content appropriate. Capillary Refill: Acceptable  < 3 seconds  Peripheral Pulses: +3 Easily felt, not easily obliterated with pressure      CXR:  I have reviewed the CXR   EKG:  I have reviewed the EKG    CBC   Recent Labs     04/16/19 0200   WBC 6.5   HGB 12.7*   HCT 38.6*         RENAL  Recent Labs     04/16/19 0200   *   K 2.9*   CL 93*   CO2 27   BUN 42*   CREATININE 1.7*     LFT'S  Recent Labs     04/16/19 0200   AST 12*   ALT 15   BILITOT <0.2   ALKPHOS 144*     COAG  No results for input(s): INR in the last 72 hours. CARDIAC ENZYMES  Recent Labs     04/16/19 0200   CKTOTAL 94   TROPONINI <0.01       U/A:    Lab Results   Component Value Date    COLORU YELLOW 04/16/2019    WBCUA 1 11/08/2018    RBCUA 1 11/08/2018    MUCUS 3+ 01/19/2018    BACTERIA Rare 01/19/2018    CLARITYU Clear 04/16/2019    SPECGRAV 1.012 04/16/2019    LEUKOCYTESUR Negative 04/16/2019    BLOODU Negative 04/16/2019    GLUCOSEU Negative 04/16/2019       ABG    Lab Results   Component Value Date    COD7IBC 25.1 11/12/2018    BEART 0.5 11/12/2018    I3HGWTTY 95.1 11/12/2018    PHART 7.389 11/12/2018    HFC7EGV 42.5 11/12/2018    PO2ART 72.7 11/12/2018    WWS5ILH 26.4 11/12/2018           Active Hospital Problems    Diagnosis Date Noted    Syncope and collapse [R55] 02/09/2018         PHYSICIANS CERTIFICATION:    I certify that Govind Quintana is expected to be hospitalized for more than 2 midnights based on the following assessment and plan:      ASSESSMENT/PLAN:  Hypokalemia  ivania  Dm    Tele  Serial trop  ivf  Monitor and correct electrolytes  Resume home mes      DVT Prophylaxis: lovenox  Diet: DIET CARB CONTROL;  Code Status: Full Code      Dispo - inpt.        Genie Mathew MD    Thank you Batsheva Mcbride MD for the opportunity to be involved in this patient's care. If you have any questions or concerns please feel free to contact me at [phone].

## 2019-04-16 NOTE — PROGRESS NOTES
Medication Reconciliation    List of medications patient is currently taking is complete. Source of information: 1. Conversation with patient's family at bedside                                      2. EPIC records      Allergies  Naproxen     Notes regarding home medications: 1. Patient no longer taking Spiriva. Removed from list.  2. Patient still taking clonazepam 0.5 mg as needed for panic attacks. Added back to list.   3. Patient taking mometasone cream for psoriasis.  Added to list.

## 2019-04-16 NOTE — ED NOTES
Report given to Korey Corea on 4N. Bed still dirty. Will take pt to floor once bed is clean.       Titi Ramirez RN  04/16/19 [id number]

## 2019-04-17 PROBLEM — R55 SYNCOPE AND COLLAPSE: Status: RESOLVED | Noted: 2018-02-09 | Resolved: 2019-04-17

## 2019-04-17 LAB
ANION GAP SERPL CALCULATED.3IONS-SCNC: 11 MMOL/L (ref 3–16)
BASOPHILS ABSOLUTE: 0 K/UL (ref 0–0.2)
BASOPHILS RELATIVE PERCENT: 0.7 %
BUN BLDV-MCNC: 18 MG/DL (ref 7–20)
C DIFFICILE TOXIN, EIA: NORMAL
CALCIUM SERPL-MCNC: 8.2 MG/DL (ref 8.3–10.6)
CHLORIDE BLD-SCNC: 105 MMOL/L (ref 99–110)
CO2: 26 MMOL/L (ref 21–32)
CREAT SERPL-MCNC: 0.8 MG/DL (ref 0.8–1.3)
EOSINOPHILS ABSOLUTE: 0.1 K/UL (ref 0–0.6)
EOSINOPHILS RELATIVE PERCENT: 2 %
GFR AFRICAN AMERICAN: >60
GFR NON-AFRICAN AMERICAN: >60
GLUCOSE BLD-MCNC: 129 MG/DL (ref 70–99)
GLUCOSE BLD-MCNC: 137 MG/DL (ref 70–99)
GLUCOSE BLD-MCNC: 141 MG/DL (ref 70–99)
GLUCOSE BLD-MCNC: 157 MG/DL (ref 70–99)
GLUCOSE BLD-MCNC: 157 MG/DL (ref 70–99)
HCT VFR BLD CALC: 37.6 % (ref 40.5–52.5)
HEMOGLOBIN: 12.2 G/DL (ref 13.5–17.5)
LYMPHOCYTES ABSOLUTE: 1.3 K/UL (ref 1–5.1)
LYMPHOCYTES RELATIVE PERCENT: 28 %
MCH RBC QN AUTO: 28.1 PG (ref 26–34)
MCHC RBC AUTO-ENTMCNC: 32.5 G/DL (ref 31–36)
MCV RBC AUTO: 86.2 FL (ref 80–100)
MONOCYTES ABSOLUTE: 0.5 K/UL (ref 0–1.3)
MONOCYTES RELATIVE PERCENT: 10.1 %
NEUTROPHILS ABSOLUTE: 2.7 K/UL (ref 1.7–7.7)
NEUTROPHILS RELATIVE PERCENT: 59.2 %
PDW BLD-RTO: 14.1 % (ref 12.4–15.4)
PERFORMED ON: ABNORMAL
PLATELET # BLD: 165 K/UL (ref 135–450)
PMV BLD AUTO: 8.6 FL (ref 5–10.5)
POTASSIUM REFLEX MAGNESIUM: 3.8 MMOL/L (ref 3.5–5.1)
RBC # BLD: 4.36 M/UL (ref 4.2–5.9)
SODIUM BLD-SCNC: 142 MMOL/L (ref 136–145)
WBC # BLD: 4.6 K/UL (ref 4–11)

## 2019-04-17 PROCEDURE — 6370000000 HC RX 637 (ALT 250 FOR IP): Performed by: INTERNAL MEDICINE

## 2019-04-17 PROCEDURE — 94761 N-INVAS EAR/PLS OXIMETRY MLT: CPT

## 2019-04-17 PROCEDURE — 87449 NOS EACH ORGANISM AG IA: CPT

## 2019-04-17 PROCEDURE — 36415 COLL VENOUS BLD VENIPUNCTURE: CPT

## 2019-04-17 PROCEDURE — 6360000002 HC RX W HCPCS: Performed by: INTERNAL MEDICINE

## 2019-04-17 PROCEDURE — 1200000000 HC SEMI PRIVATE

## 2019-04-17 PROCEDURE — 87324 CLOSTRIDIUM AG IA: CPT

## 2019-04-17 PROCEDURE — 2580000003 HC RX 258: Performed by: INTERNAL MEDICINE

## 2019-04-17 PROCEDURE — 6370000000 HC RX 637 (ALT 250 FOR IP): Performed by: HOSPITALIST

## 2019-04-17 PROCEDURE — 80048 BASIC METABOLIC PNL TOTAL CA: CPT

## 2019-04-17 PROCEDURE — 94640 AIRWAY INHALATION TREATMENT: CPT

## 2019-04-17 PROCEDURE — 85025 COMPLETE CBC W/AUTO DIFF WBC: CPT

## 2019-04-17 RX ORDER — NIFEDIPINE 30 MG/1
30 TABLET, EXTENDED RELEASE ORAL ONCE
Status: COMPLETED | OUTPATIENT
Start: 2019-04-17 | End: 2019-04-17

## 2019-04-17 RX ORDER — CARVEDILOL 3.12 MG/1
3.12 TABLET ORAL 2 TIMES DAILY WITH MEALS
Status: DISCONTINUED | OUTPATIENT
Start: 2019-04-17 | End: 2019-04-26 | Stop reason: HOSPADM

## 2019-04-17 RX ORDER — SERTRALINE HYDROCHLORIDE 100 MG/1
100 TABLET, FILM COATED ORAL NIGHTLY
Status: DISCONTINUED | OUTPATIENT
Start: 2019-04-17 | End: 2019-04-26 | Stop reason: HOSPADM

## 2019-04-17 RX ORDER — LOPERAMIDE HYDROCHLORIDE 2 MG/1
2 CAPSULE ORAL 4 TIMES DAILY PRN
Status: DISCONTINUED | OUTPATIENT
Start: 2019-04-17 | End: 2019-04-26 | Stop reason: HOSPADM

## 2019-04-17 RX ORDER — LANCETS 28 GAUGE
1 EACH MISCELLANEOUS DAILY
Qty: 100 EACH | Refills: 3 | Status: SHIPPED | OUTPATIENT
Start: 2019-04-17 | End: 2019-04-17 | Stop reason: SDUPTHER

## 2019-04-17 RX ORDER — LANCETS 28 GAUGE
1 EACH MISCELLANEOUS 3 TIMES DAILY
Qty: 100 EACH | Refills: 3 | Status: SHIPPED | OUTPATIENT
Start: 2019-04-17 | End: 2020-01-20

## 2019-04-17 RX ORDER — LOPERAMIDE HYDROCHLORIDE 2 MG/1
2 CAPSULE ORAL 4 TIMES DAILY PRN
Qty: 20 CAPSULE | Refills: 0 | Status: SHIPPED | OUTPATIENT
Start: 2019-04-17 | End: 2019-04-17

## 2019-04-17 RX ORDER — NIFEDIPINE 90 MG/1
90 TABLET, EXTENDED RELEASE ORAL DAILY
Status: DISCONTINUED | OUTPATIENT
Start: 2019-04-18 | End: 2019-04-21

## 2019-04-17 RX ORDER — LOPERAMIDE HYDROCHLORIDE 2 MG/1
2 CAPSULE ORAL 4 TIMES DAILY PRN
Qty: 20 CAPSULE | Refills: 0 | Status: SHIPPED | OUTPATIENT
Start: 2019-04-17 | End: 2019-04-18 | Stop reason: HOSPADM

## 2019-04-17 RX ORDER — BLOOD-GLUCOSE METER
1 KIT MISCELLANEOUS DAILY PRN
Qty: 1 DEVICE | Refills: 0 | Status: SHIPPED | OUTPATIENT
Start: 2019-04-17 | End: 2020-01-20

## 2019-04-17 RX ORDER — HYDRALAZINE HYDROCHLORIDE 50 MG/1
50 TABLET, FILM COATED ORAL 3 TIMES DAILY
Qty: 90 TABLET | Refills: 3 | Status: SHIPPED | OUTPATIENT
Start: 2019-04-17 | End: 2019-04-18

## 2019-04-17 RX ORDER — HYDRALAZINE HYDROCHLORIDE 50 MG/1
50 TABLET, FILM COATED ORAL 3 TIMES DAILY
Qty: 90 TABLET | Refills: 3 | Status: SHIPPED | OUTPATIENT
Start: 2019-04-17 | End: 2019-04-17

## 2019-04-17 RX ORDER — ALBUTEROL SULFATE 90 UG/1
2 AEROSOL, METERED RESPIRATORY (INHALATION) EVERY 4 HOURS PRN
Status: DISCONTINUED | OUTPATIENT
Start: 2019-04-17 | End: 2019-04-26 | Stop reason: HOSPADM

## 2019-04-17 RX ORDER — LISINOPRIL 20 MG/1
20 TABLET ORAL DAILY
Status: DISCONTINUED | OUTPATIENT
Start: 2019-04-17 | End: 2019-04-18

## 2019-04-17 RX ORDER — HYDRALAZINE HYDROCHLORIDE 50 MG/1
50 TABLET, FILM COATED ORAL 3 TIMES DAILY
Status: DISCONTINUED | OUTPATIENT
Start: 2019-04-17 | End: 2019-04-18

## 2019-04-17 RX ORDER — CLONIDINE HYDROCHLORIDE 0.1 MG/1
0.1 TABLET ORAL 2 TIMES DAILY
Status: DISCONTINUED | OUTPATIENT
Start: 2019-04-17 | End: 2019-04-18

## 2019-04-17 RX ORDER — ZOLPIDEM TARTRATE 5 MG/1
5 TABLET ORAL NIGHTLY PRN
Status: DISCONTINUED | OUTPATIENT
Start: 2019-04-17 | End: 2019-04-18

## 2019-04-17 RX ORDER — BLOOD-GLUCOSE METER
1 KIT MISCELLANEOUS DAILY PRN
Qty: 1 DEVICE | Refills: 0 | Status: SHIPPED | OUTPATIENT
Start: 2019-04-17 | End: 2019-04-17 | Stop reason: SDUPTHER

## 2019-04-17 RX ADMIN — ZOLPIDEM TARTRATE 5 MG: 5 TABLET ORAL at 21:23

## 2019-04-17 RX ADMIN — NIFEDIPINE 30 MG: 30 TABLET, FILM COATED, EXTENDED RELEASE ORAL at 17:19

## 2019-04-17 RX ADMIN — BACITRACIN ZINC AND POLYMYXIN B SULFATE: at 09:01

## 2019-04-17 RX ADMIN — CLONIDINE HYDROCHLORIDE 0.1 MG: 0.1 TABLET ORAL at 17:18

## 2019-04-17 RX ADMIN — HYDRALAZINE HYDROCHLORIDE 50 MG: 50 TABLET, FILM COATED ORAL at 08:59

## 2019-04-17 RX ADMIN — HYDRALAZINE HYDROCHLORIDE 50 MG: 50 TABLET, FILM COATED ORAL at 14:34

## 2019-04-17 RX ADMIN — CARVEDILOL 3.12 MG: 3.12 TABLET, FILM COATED ORAL at 17:17

## 2019-04-17 RX ADMIN — Medication 10 ML: at 09:00

## 2019-04-17 RX ADMIN — LISINOPRIL 20 MG: 20 TABLET ORAL at 10:56

## 2019-04-17 RX ADMIN — Medication 10 ML: at 21:24

## 2019-04-17 RX ADMIN — NIFEDIPINE 60 MG: 60 TABLET, FILM COATED, EXTENDED RELEASE ORAL at 09:00

## 2019-04-17 RX ADMIN — SERTRALINE 100 MG: 100 TABLET, FILM COATED ORAL at 21:22

## 2019-04-17 RX ADMIN — LOPERAMIDE HYDROCHLORIDE 2 MG: 2 CAPSULE ORAL at 10:54

## 2019-04-17 RX ADMIN — ENOXAPARIN SODIUM 40 MG: 40 INJECTION SUBCUTANEOUS at 08:59

## 2019-04-17 RX ADMIN — MOMETASONE FUROATE AND FORMOTEROL FUMARATE DIHYDRATE 2 PUFF: 200; 5 AEROSOL RESPIRATORY (INHALATION) at 19:42

## 2019-04-17 RX ADMIN — INSULIN LISPRO 1 UNITS: 100 INJECTION, SOLUTION INTRAVENOUS; SUBCUTANEOUS at 21:23

## 2019-04-17 RX ADMIN — LOPERAMIDE HYDROCHLORIDE 2 MG: 2 CAPSULE ORAL at 17:18

## 2019-04-17 RX ADMIN — INSULIN LISPRO 2 UNITS: 100 INJECTION, SOLUTION INTRAVENOUS; SUBCUTANEOUS at 17:18

## 2019-04-17 RX ADMIN — BACITRACIN ZINC AND POLYMYXIN B SULFATE: at 21:28

## 2019-04-17 RX ADMIN — MOMETASONE FUROATE AND FORMOTEROL FUMARATE DIHYDRATE 2 PUFF: 200; 5 AEROSOL RESPIRATORY (INHALATION) at 08:11

## 2019-04-17 RX ADMIN — MIRTAZAPINE 15 MG: 15 TABLET, FILM COATED ORAL at 21:23

## 2019-04-17 RX ADMIN — CARVEDILOL 3.12 MG: 3.12 TABLET, FILM COATED ORAL at 09:04

## 2019-04-17 RX ADMIN — HYDRALAZINE HYDROCHLORIDE 50 MG: 50 TABLET, FILM COATED ORAL at 21:23

## 2019-04-17 ASSESSMENT — PAIN DESCRIPTION - DESCRIPTORS
DESCRIPTORS: TENDER
DESCRIPTORS: ACHING
DESCRIPTORS: ACHING

## 2019-04-17 ASSESSMENT — PAIN DESCRIPTION - ONSET
ONSET: AWAKENED FROM SLEEP
ONSET: ON-GOING
ONSET: GRADUAL

## 2019-04-17 ASSESSMENT — PAIN DESCRIPTION - PAIN TYPE
TYPE: ACUTE PAIN

## 2019-04-17 ASSESSMENT — PAIN DESCRIPTION - PROGRESSION
CLINICAL_PROGRESSION: NOT CHANGED
CLINICAL_PROGRESSION: GRADUALLY IMPROVING

## 2019-04-17 ASSESSMENT — PAIN DESCRIPTION - ORIENTATION
ORIENTATION: MID
ORIENTATION: LOWER;LEFT
ORIENTATION: MID

## 2019-04-17 ASSESSMENT — PAIN DESCRIPTION - LOCATION
LOCATION: HEAD
LOCATION: ABDOMEN
LOCATION: HEAD

## 2019-04-17 ASSESSMENT — PAIN DESCRIPTION - FREQUENCY
FREQUENCY: CONTINUOUS

## 2019-04-17 ASSESSMENT — PAIN - FUNCTIONAL ASSESSMENT: PAIN_FUNCTIONAL_ASSESSMENT: ACTIVITIES ARE NOT PREVENTED

## 2019-04-17 ASSESSMENT — PAIN SCALES - GENERAL
PAINLEVEL_OUTOF10: 7
PAINLEVEL_OUTOF10: 5
PAINLEVEL_OUTOF10: 8

## 2019-04-17 NOTE — ED PROVIDER NOTES
COLONOSCOPY      CORONARY ANGIOPLASTY WITH STENT PLACEMENT      3 stents     FRACTURE SURGERY      RIGHT ANKLE    LITHOTRIPSY      SHOULDER ARTHROSCOPY      VASCULAR SURGERY       Family History   Problem Relation Age of Onset    High Blood Pressure Mother     Cancer Father         lung cancer    Diabetes Brother     Liver Disease Sister     Diabetes Brother      Social History     Socioeconomic History    Marital status: Single     Spouse name: Not on file    Number of children: 1    Years of education: 15    Highest education level: Not on file   Occupational History    Occupation: retired   Social Needs    Financial resource strain: Not on file    Food insecurity:     Worry: Not on file     Inability: Not on file    Transportation needs:     Medical: Not on file     Non-medical: Not on file   Tobacco Use    Smoking status: Former Smoker     Packs/day: 2.50     Years: 40.00     Pack years: 100.00     Types: Cigarettes    Smokeless tobacco: Never Used   Substance and Sexual Activity    Alcohol use: Never     Frequency: Never    Drug use: No    Sexual activity: Yes     Partners: Female   Lifestyle    Physical activity:     Days per week: Not on file     Minutes per session: Not on file    Stress: Not on file   Relationships    Social connections:     Talks on phone: Not on file     Gets together: Not on file     Attends Mandaen service: Not on file     Active member of club or organization: Not on file     Attends meetings of clubs or organizations: Not on file     Relationship status: Not on file    Intimate partner violence:     Fear of current or ex partner: Not on file     Emotionally abused: Not on file     Physically abused: Not on file     Forced sexual activity: Not on file   Other Topics Concern    Not on file   Social History Narrative    Not on file     Current Facility-Administered Medications   Medication Dose Route Frequency Provider Last Rate Last Dose    mirtazapine 2348]   Enc Vitals Group      /68      Pulse 69      Resp 16      Temp 97 °F (36.1 °C)      Temp Source Oral      SpO2 97 %      Weight 242 lb 11.6 oz (110.1 kg)      Height 6' (1.829 m)      Head Circumference       Peak Flow       Pain Score       Pain Loc       Pain Edu? Excl. in [address]? My pulse ox interpretation is - normal    General appearance:  No acute distress. Pleasant. Normal vital signs. Skin:  Warm. Dry. No petechiae or purpura. Eye:  Extraocular movements intact. PERRLA  Ears, nose, mouth and throat:  Oral mucosa moist, no trismus. Tympanic membranes bilaterally normal.  Oropharynx with no exudate or erythema. Neck:  Trachea midline. Supple. No cervical lymphadenopathy  Extremity:  No swelling. Normal ROM. No calf pain or asymmetric swelling. No lower extremity edema  Heart:  Regular rate and rhythm, normal S1 & S2, no extra heart sounds. Perfusion:  Intact, capillary refill less than 2 seconds  Respiratory:  Lungs clear to auscultation bilaterally. Respirations nonlabored. Abdominal:  Normal bowel sounds. Soft. No peritoneal signs. No hepatosplenomegaly. Back:  No CVA tenderness to palpation. No bruising. No CTL tenderness to palpation or step-off  Neurological:  Alert and oriented times 3. No focal neuro deficits. Cranial nerves II through XII are grossly intact.   Upper extremity and lower extremity strength, deep tendon reflexes, light touch are normal.          Psychiatric:  Appropriate    I have reviewed and interpreted all of the currently available lab results from this visit (if applicable):  Results for orders placed or performed during the hospital encounter of 04/15/19   MRSA DNA Probe, Nasal   Result Value Ref Range    MRSA SCREEN RT-PCR      Organism Staph aureus MRSA (A)     MRSA SCREEN RT-PCR (A)      POSITIVE for  Normal Range: Not detected  CONTACT PRECAUTIONS INDICATED     GI Bacterial Pathogens By PCR   Result Value Ref Range    GI Bacterial Pathogens By PCR       No Shigella spp/EIEC DNA detected  No Shiga toxin-producing gene(s) detected  No Campylobacter spp. (jejuni and coli)DNA detected  No Salmonella spp.  DNA detected  Normal Range:  None detected     CBC Auto Differential   Result Value Ref Range    WBC 6.5 4.0 - 11.0 K/uL    RBC 4.53 4.20 - 5.90 M/uL    Hemoglobin 12.7 (L) 13.5 - 17.5 g/dL    Hematocrit 38.6 (L) 40.5 - 52.5 %    MCV 85.3 80.0 - 100.0 fL    MCH 27.9 26.0 - 34.0 pg    MCHC 32.7 31.0 - 36.0 g/dL    RDW 13.9 12.4 - 15.4 %    Platelets 052 199 - 225 K/uL    MPV 8.9 5.0 - 10.5 fL    Neutrophils % 56.8 %    Lymphocytes % 30.9 %    Monocytes % 10.0 %    Eosinophils % 1.7 %    Basophils % 0.6 %    Neutrophils # 3.7 1.7 - 7.7 K/uL    Lymphocytes # 2.0 1.0 - 5.1 K/uL    Monocytes # 0.6 0.0 - 1.3 K/uL    Eosinophils # 0.1 0.0 - 0.6 K/uL    Basophils # 0.0 0.0 - 0.2 K/uL   Comprehensive Metabolic Panel   Result Value Ref Range    Sodium 134 (L) 136 - 145 mmol/L    Potassium 2.9 (LL) 3.5 - 5.1 mmol/L    Chloride 93 (L) 99 - 110 mmol/L    CO2 27 21 - 32 mmol/L    Anion Gap 14 3 - 16    Glucose 193 (H) 70 - 99 mg/dL    BUN 42 (H) 7 - 20 mg/dL    CREATININE 1.7 (H) 0.8 - 1.3 mg/dL    GFR Non- 41 (A) >60    GFR  49 (A) >60    Calcium 8.2 (L) 8.3 - 10.6 mg/dL    Total Protein 6.8 6.4 - 8.2 g/dL    Alb 3.6 3.4 - 5.0 g/dL    Albumin/Globulin Ratio 1.1 1.1 - 2.2    Total Bilirubin <0.2 0.0 - 1.0 mg/dL    Alkaline Phosphatase 144 (H) 40 - 129 U/L    ALT 15 10 - 40 U/L    AST 12 (L) 15 - 37 U/L    Globulin 3.2 g/dL   Troponin   Result Value Ref Range    Troponin <0.01 <0.01 ng/mL   Brain Natriuretic Peptide   Result Value Ref Range    Pro-BNP 63 0 - 124 pg/mL   Urine, reflex to culture   Result Value Ref Range    Color, UA YELLOW Straw/Yellow    Clarity, UA Clear Clear    Glucose, Ur Negative Negative mg/dL    Bilirubin Urine Negative Negative    Ketones, Urine Negative Negative mg/dL    Specific [place], UA 1.012 1.005 - 1.030    Blood, Urine Negative Negative    pH, UA 5.5 5.0 - 8.0    Protein, UA Negative Negative mg/dL    Urobilinogen, Urine 0.2 <2.0 E.U./dL    Nitrite, Urine Negative Negative    Leukocyte Esterase, Urine Negative Negative    Microscopic Examination Not Indicated     Urine Reflex to Culture Not Indicated     Urine Type Not Specified    Urine Drug Screen   Result Value Ref Range    Amphetamine Screen, Urine Neg Negative <1000ng/mL    Barbiturate Screen, Ur Neg Negative <200 ng/mL    Benzodiazepine Screen, Urine Neg Negative <200 ng/mL    Cannabinoid Scrn, Ur Neg Negative <50 ng/mL    Cocaine Metabolite Screen, Urine POSITIVE (A) Negative <300 ng/mL    Opiate Scrn, Ur Neg Negative <300 ng/mL    PCP Screen, Urine Neg Negative <25 ng/mL    Methadone Screen, Urine Neg Negative <300 ng/mL    Propoxyphene Scrn, Ur Neg Negative <300 ng/mL    pH, UA 5.5     Drug Screen Comment: see below     Oxycodone Urine Neg Negative <100 ng/ml   Sedimentation Rate   Result Value Ref Range    Sed Rate 29 (H) 0 - 20 mm/Hr   Lactic Acid, Plasma   Result Value Ref Range    Lactic Acid 2.4 (H) 0.4 - 2.0 mmol/L   CK   Result Value Ref Range    Total CK 94 39 - 308 U/L   Magnesium   Result Value Ref Range    Magnesium 1.30 (L) 1.80 - 2.40 mg/dL   Troponin   Result Value Ref Range    Troponin <0.01 <0.01 ng/mL   Basic Metabolic Panel   Result Value Ref Range    Sodium 141 136 - 145 mmol/L    Potassium 3.6 3.5 - 5.1 mmol/L    Chloride 99 99 - 110 mmol/L    CO2 25 21 - 32 mmol/L    Anion Gap 17 (H) 3 - 16    Glucose 113 (H) 70 - 99 mg/dL    BUN 35 (H) 7 - 20 mg/dL    CREATININE 1.2 0.8 - 1.3 mg/dL    GFR Non-African American >60 >60    GFR African American >60 >60    Calcium 8.0 (L) 8.3 - 10.6 mg/dL   POCT Glucose   Result Value Ref Range    POC Glucose 110 (H) 70 - 99 mg/dl    Performed on ACCU-CHEK    POCT Glucose   Result Value Ref Range    POC Glucose 125 (H) 70 - 99 mg/dl    Performed on ACCU-CHEK    POCT Glucose   Result Value Ref Range    POC Glucose 129 (H) 70 - 99 mg/dl    Performed on ACCU-CHEK    POCT Glucose   Result Value Ref Range    POC Glucose 177 (H) 70 - 99 mg/dl    Performed on ACCU-CHEK    EKG 12 Lead   Result Value Ref Range    Ventricular Rate 70 BPM    Atrial Rate 70 BPM    P-R Interval 174 ms    QRS Duration 104 ms    Q-T Interval 418 ms    QTc Calculation (Bazett) 451 ms    P Axis 51 degrees    R Axis 78 degrees    T Axis 71 degrees    Diagnosis       Sinus rhythm with Premature atrial complexesPossible Left atrial enlargementIncomplete right bundle branch blockNonspecific ST and T wave abnormalityConfirmed by Diana DANIEL MD ([id number]) on 4/16/2019 9:34:35 AM      Radiographs (if obtained):  [] The following radiograph was interpreted by myself in the absence of a radiologist:   [] Radiologist's Report Reviewed:  XR CHEST PORTABLE   Final Result   No acute abnormality detected. EKG (if obtained): (All EKG's are interpreted by myself in the absence of a cardiologist) EKG is abnormal but no evidence of ST elevation MI. Sinus rhythm. PACs. Rate 70. . . QTc 451. Nonspecific ST elevations    Chart review shows recent radiographs:  Xr Chest Standard (2 Vw)    Result Date: 3/23/2019  EXAMINATION: TWO VIEWS OF THE CHEST 3/23/2019 3:31 pm COMPARISON: 03/08/2019 HISTORY: ORDERING SYSTEM PROVIDED HISTORY: Syncope TECHNOLOGIST PROVIDED HISTORY: Reason for exam:->Syncope Ordering Physician Provided Reason for Exam: dizzy, sob Acuity: Acute Type of Exam: Initial FINDINGS: Heart size and pulmonary vessels within normal limits. Coronary artery stents noted. Lungs clear.   Costophrenic angles sharp     No active cardiopulmonary disease     Ct Head Wo Contrast    Result Date: 3/23/2019  EXAMINATION: CT OF THE HEAD WITHOUT CONTRAST  3/23/2019 3:23 pm TECHNIQUE: CT of the head was performed without the administration of intravenous contrast. Dose modulation, iterative reconstruction, and/or weight based adjustment of the mA/kV was utilized to reduce the radiation dose to as low as reasonably achievable. COMPARISON: 11/08/2018 HISTORY: ORDERING SYSTEM PROVIDED HISTORY: STROKE TECHNOLOGIST PROVIDED HISTORY: Has a \"code stroke\" or \"stroke alert\" been called? ->No Ordering Physician Provided Reason for Exam: syncope/stroke. Loss of Consciousness (Initially happened yesterday while going up stairs. Today passed out 2 more times Acuity: Acute Type of Exam: Initial FINDINGS: BRAIN/VENTRICLES: There is no acute intracranial hemorrhage, mass effect or midline shift. No abnormal extra-axial fluid collection. The gray-white differentiation is maintained without evidence of an acute infarct. There is no evidence of hydrocephalus. ORBITS: The visualized portion of the orbits demonstrate no acute abnormality. SINUSES: The visualized paranasal sinuses and mastoid air cells demonstrate no acute abnormality. SOFT TISSUES/SKULL:  No acute abnormality of the visualized skull or soft tissues. No acute intracranial abnormality. Xr Chest Portable    Result Date: 4/16/2019  EXAMINATION: SINGLE XRAY VIEW OF THE CHEST 4/15/2019 9:14 pm COMPARISON: 03/23/2019 HISTORY: ORDERING SYSTEM PROVIDED HISTORY: syncope TECHNOLOGIST PROVIDED HISTORY: Reason for exam:->syncope Ordering Physician Provided Reason for Exam: dizzy, chest pain, hx of stents Acuity: Acute Type of Exam: Initial FINDINGS: Cardial-pericardial silhouette is normal.  The lungs are clear. There is mild elevation the right hemidiaphragm, unchanged. No pneumothorax is found. No free air. No acute bony abnormality. No acute abnormality detected.      Nm Myocardial Spect Rest Exercise Or Rx    Result Date: 3/25/2019  Cardiac Perfusion Imaging  Demographics   Patient Name       Fredrick Gomez   Date of Study      03/25/2019         Gender              Male   Patient Number     [id number]         Date of Birth       1955   Visit Number       [id number] Age                 59 year(s)   Accession Number   [id number]          Room Number         7582   Corporate ID       [id number]           NM Technician       Karie Guajardo, RT   Nurse              Claudia Vicente RN, Interpreting        Danica Soto.                     BSN                Physician           Miguel Ragland MD   Ordering Physician Richmond Cox,  Stress Interpreting Renee Bruce MD                 Physician           Oma Mancera MD   The procedure was explained in detail to the patient. Risks,  complications and alternative treatments were reviewed. Written consent  was obtained. Procedure Procedure Type:   Nuclear Stress Test:NM MYOCARDIAL SPECT REST EXERCISE OR RX   Study location: [facility] - Nuclear Medicine   Indications: Dyspnea upon exertion. Hospital Status: Inpatient. Risk Factors   The patient risk factors include:obesity, physical activity, former tobacco  use, treated hypercholesterolemia, treated hypertension, family history of  premature CAD appeared at age 52, orally-treated diabetes mellitus, chronic  lung disease, dyslipidemia, prior heart failure , prior MI and (pack years:  1 years not smokin). Conclusions   Summary  Normal myocardial perfusion study. Normal LV size and systolic function. Non-diagnostic EKG response due to failure to reach target heart rate. Overall findings represent a low risk study. Stress Protocols   Resting ECG  Normal sinus rhythm. Resting HR:75 bpm             Resting BP:156/87 mmHg   Pre-stress physical exam: Rest Lexiscan Stress Test:  Findings on the brief pre-stress cardiopulmonary exam:  unremarkable heart and lungs. Prior to test initiation, patient states he is has a headache. Pre-Test SpO2 = 94% RA.   Stress Protocol:Pharmacologic - Lexiscan's Critical care time of 30 minutes with CNS, toxicology, renal, A, cardiac systems all at acute risk of decompensation and collapse. This is exclusionary of any billable procedures. Clinical Impression:  1. Syncope and collapse    2. ALEA (acute kidney injury) ([facility] [address]    3. Hyponatremia      Disposition referral (if applicable):  Paulina Jain MD  [address] [phone]          Disposition medications (if applicable):  Current Discharge Medication List          Comment: Please note this report has been produced using speech recognition software and may contain errors related to that system including errors in grammar, punctuation, and spelling, as well as words and phrases that may be inappropriate. If there are any questions or concerns please feel free to contact the dictating provider for clarification.       Bosie Lundborg, MD  04/17/19 5002

## 2019-04-17 NOTE — PROGRESS NOTES
Patient in bed resting comfortably. Respirations steady and unlabored. No signs of respiratory or cardiac distress. No complaints of pain at this time. IVF infusing as ordered. No needs at this time. Call light in reach. Will continue to monitor.   Electronically signed by Guerda Garibay RN on 4/17/2019 at 2:11 AM

## 2019-04-17 NOTE — PROGRESS NOTES
Patient asking for home meds of klonopin, ambien, metformin, and zoloft to be ordered. Patient states he cannot sleep without the ambien and is feeling nervous. Melatonin ordered for sleep. Patient not happy that [place] not ordered. On call MDs name provided to pt so he can address with attending in AM.  No needs at this time. Patient resting comfortably in bed. Call light in reach. Will continue to monitor.    Electronically signed by Jose Umanzor RN on 4/17/2019 at 12:34 AM

## 2019-04-17 NOTE — PROGRESS NOTES
Patient is A&O x3.  RA, sat 91%. No complaints of pain or SOB. Respirations appear to be easy and unlabored. Lungs clear. C/o occasional dizzy, none now. Respirations easy with no complaints of cough. Cardiac monitor in place, current rhythm NSR.  IVF infusing per right FA PIV as ordered. No complaints of nausea/vomiting/diarrhea. Up ad shelby to the bathroom/BSC as needed. Tolerating diabetic diet. Plan of care and safety measures reviewed with the patient. Call light in reach. Will continue to monitor.   Electronically signed by Jessica Fonseca RN on 4/17/2019 at 12:31 AM

## 2019-04-18 LAB
ANION GAP SERPL CALCULATED.3IONS-SCNC: 11 MMOL/L (ref 3–16)
BASOPHILS ABSOLUTE: 0.1 K/UL (ref 0–0.2)
BASOPHILS RELATIVE PERCENT: 0.9 %
BUN BLDV-MCNC: 14 MG/DL (ref 7–20)
CALCIUM SERPL-MCNC: 9.2 MG/DL (ref 8.3–10.6)
CHLORIDE BLD-SCNC: 99 MMOL/L (ref 99–110)
CO2: 29 MMOL/L (ref 21–32)
CREAT SERPL-MCNC: 0.8 MG/DL (ref 0.8–1.3)
EOSINOPHILS ABSOLUTE: 0.2 K/UL (ref 0–0.6)
EOSINOPHILS RELATIVE PERCENT: 2.3 %
GFR AFRICAN AMERICAN: >60
GFR NON-AFRICAN AMERICAN: >60
GLUCOSE BLD-MCNC: 125 MG/DL (ref 70–99)
GLUCOSE BLD-MCNC: 137 MG/DL (ref 70–99)
GLUCOSE BLD-MCNC: 150 MG/DL (ref 70–99)
GLUCOSE BLD-MCNC: 184 MG/DL (ref 70–99)
GLUCOSE BLD-MCNC: 201 MG/DL (ref 70–99)
HCT VFR BLD CALC: 41.4 % (ref 40.5–52.5)
HEMOGLOBIN: 13.7 G/DL (ref 13.5–17.5)
LYMPHOCYTES ABSOLUTE: 1.7 K/UL (ref 1–5.1)
LYMPHOCYTES RELATIVE PERCENT: 25.2 %
MCH RBC QN AUTO: 28.4 PG (ref 26–34)
MCHC RBC AUTO-ENTMCNC: 33.2 G/DL (ref 31–36)
MCV RBC AUTO: 85.6 FL (ref 80–100)
MONOCYTES ABSOLUTE: 0.6 K/UL (ref 0–1.3)
MONOCYTES RELATIVE PERCENT: 8.7 %
NEUTROPHILS ABSOLUTE: 4.1 K/UL (ref 1.7–7.7)
NEUTROPHILS RELATIVE PERCENT: 62.9 %
PDW BLD-RTO: 13.8 % (ref 12.4–15.4)
PERFORMED ON: ABNORMAL
PLATELET # BLD: 199 K/UL (ref 135–450)
PMV BLD AUTO: 8.6 FL (ref 5–10.5)
POTASSIUM REFLEX MAGNESIUM: 3.9 MMOL/L (ref 3.5–5.1)
RBC # BLD: 4.84 M/UL (ref 4.2–5.9)
SODIUM BLD-SCNC: 139 MMOL/L (ref 136–145)
WBC # BLD: 6.6 K/UL (ref 4–11)

## 2019-04-18 PROCEDURE — 94761 N-INVAS EAR/PLS OXIMETRY MLT: CPT

## 2019-04-18 PROCEDURE — 6370000000 HC RX 637 (ALT 250 FOR IP): Performed by: HOSPITALIST

## 2019-04-18 PROCEDURE — 6370000000 HC RX 637 (ALT 250 FOR IP): Performed by: INTERNAL MEDICINE

## 2019-04-18 PROCEDURE — 2500000003 HC RX 250 WO HCPCS: Performed by: HOSPITALIST

## 2019-04-18 PROCEDURE — 36415 COLL VENOUS BLD VENIPUNCTURE: CPT

## 2019-04-18 PROCEDURE — 80048 BASIC METABOLIC PNL TOTAL CA: CPT

## 2019-04-18 PROCEDURE — 6360000002 HC RX W HCPCS: Performed by: INTERNAL MEDICINE

## 2019-04-18 PROCEDURE — 2580000003 HC RX 258: Performed by: INTERNAL MEDICINE

## 2019-04-18 PROCEDURE — 94640 AIRWAY INHALATION TREATMENT: CPT

## 2019-04-18 PROCEDURE — 1200000000 HC SEMI PRIVATE

## 2019-04-18 PROCEDURE — 85025 COMPLETE CBC W/AUTO DIFF WBC: CPT

## 2019-04-18 RX ORDER — ACETAMINOPHEN 325 MG/1
650 TABLET ORAL EVERY 4 HOURS PRN
Status: DISCONTINUED | OUTPATIENT
Start: 2019-04-18 | End: 2019-04-26 | Stop reason: HOSPADM

## 2019-04-18 RX ORDER — NIFEDIPINE 90 MG/1
90 TABLET, EXTENDED RELEASE ORAL DAILY
Qty: 30 TABLET | Refills: 3 | Status: SHIPPED | OUTPATIENT
Start: 2019-04-18 | End: 2019-04-18

## 2019-04-18 RX ORDER — CLONIDINE HYDROCHLORIDE 0.1 MG/1
0.1 TABLET ORAL 2 TIMES DAILY
Qty: 60 TABLET | Refills: 3 | Status: SHIPPED | OUTPATIENT
Start: 2019-04-18 | End: 2019-04-26 | Stop reason: HOSPADM

## 2019-04-18 RX ORDER — CLONIDINE HYDROCHLORIDE 0.1 MG/1
0.2 TABLET ORAL 2 TIMES DAILY
Status: DISCONTINUED | OUTPATIENT
Start: 2019-04-18 | End: 2019-04-24

## 2019-04-18 RX ORDER — LABETALOL HYDROCHLORIDE 5 MG/ML
20 INJECTION, SOLUTION INTRAVENOUS EVERY 4 HOURS PRN
Status: DISCONTINUED | OUTPATIENT
Start: 2019-04-18 | End: 2019-04-21

## 2019-04-18 RX ORDER — NIFEDIPINE 90 MG/1
90 TABLET, EXTENDED RELEASE ORAL DAILY
Qty: 30 TABLET | Refills: 3 | Status: SHIPPED | OUTPATIENT
Start: 2019-04-18 | End: 2019-04-26 | Stop reason: HOSPADM

## 2019-04-18 RX ORDER — CLONAZEPAM 0.5 MG/1
0.5 TABLET ORAL 2 TIMES DAILY PRN
Status: DISCONTINUED | OUTPATIENT
Start: 2019-04-18 | End: 2019-04-26 | Stop reason: HOSPADM

## 2019-04-18 RX ORDER — HYDRALAZINE HYDROCHLORIDE 50 MG/1
50 TABLET, FILM COATED ORAL 3 TIMES DAILY
Qty: 90 TABLET | Refills: 3 | Status: SHIPPED | OUTPATIENT
Start: 2019-04-18 | End: 2019-04-18

## 2019-04-18 RX ORDER — LISINOPRIL 20 MG/1
20 TABLET ORAL ONCE
Status: COMPLETED | OUTPATIENT
Start: 2019-04-18 | End: 2019-04-18

## 2019-04-18 RX ORDER — HYDRALAZINE HYDROCHLORIDE 50 MG/1
50 TABLET, FILM COATED ORAL 3 TIMES DAILY
Qty: 90 TABLET | Refills: 3 | Status: SHIPPED | OUTPATIENT
Start: 2019-04-18 | End: 2019-04-26 | Stop reason: HOSPADM

## 2019-04-18 RX ORDER — ACETAMINOPHEN 650 MG/1
650 SUPPOSITORY RECTAL EVERY 4 HOURS PRN
Status: DISCONTINUED | OUTPATIENT
Start: 2019-04-18 | End: 2019-04-18

## 2019-04-18 RX ORDER — ZOLPIDEM TARTRATE 5 MG/1
10 TABLET ORAL NIGHTLY PRN
Status: DISCONTINUED | OUTPATIENT
Start: 2019-04-18 | End: 2019-04-26 | Stop reason: HOSPADM

## 2019-04-18 RX ORDER — LISINOPRIL 20 MG/1
40 TABLET ORAL DAILY
Status: DISCONTINUED | OUTPATIENT
Start: 2019-04-19 | End: 2019-04-19

## 2019-04-18 RX ORDER — CLONIDINE HYDROCHLORIDE 0.1 MG/1
0.1 TABLET ORAL 2 TIMES DAILY
Qty: 60 TABLET | Refills: 3 | Status: SHIPPED | OUTPATIENT
Start: 2019-04-18 | End: 2019-04-18

## 2019-04-18 RX ADMIN — HYDRALAZINE HYDROCHLORIDE 75 MG: 25 TABLET ORAL at 21:35

## 2019-04-18 RX ADMIN — Medication 10 ML: at 10:26

## 2019-04-18 RX ADMIN — CLONAZEPAM 0.5 MG: 0.5 TABLET ORAL at 15:50

## 2019-04-18 RX ADMIN — CLONIDINE HYDROCHLORIDE 0.2 MG: 0.1 TABLET ORAL at 21:36

## 2019-04-18 RX ADMIN — MOMETASONE FUROATE AND FORMOTEROL FUMARATE DIHYDRATE 2 PUFF: 200; 5 AEROSOL RESPIRATORY (INHALATION) at 08:42

## 2019-04-18 RX ADMIN — MOMETASONE FUROATE AND FORMOTEROL FUMARATE DIHYDRATE 2 PUFF: 200; 5 AEROSOL RESPIRATORY (INHALATION) at 19:32

## 2019-04-18 RX ADMIN — BACITRACIN ZINC AND POLYMYXIN B SULFATE: at 10:25

## 2019-04-18 RX ADMIN — ACETAMINOPHEN 650 MG: 325 TABLET ORAL at 12:13

## 2019-04-18 RX ADMIN — MIRTAZAPINE 15 MG: 15 TABLET, FILM COATED ORAL at 21:35

## 2019-04-18 RX ADMIN — CARVEDILOL 3.12 MG: 3.12 TABLET, FILM COATED ORAL at 10:23

## 2019-04-18 RX ADMIN — LISINOPRIL 20 MG: 20 TABLET ORAL at 10:24

## 2019-04-18 RX ADMIN — CLONIDINE HYDROCHLORIDE 0.1 MG: 0.1 TABLET ORAL at 10:23

## 2019-04-18 RX ADMIN — Medication 10 ML: at 21:36

## 2019-04-18 RX ADMIN — LABETALOL HYDROCHLORIDE 20 MG: 5 INJECTION INTRAVENOUS at 18:20

## 2019-04-18 RX ADMIN — SERTRALINE 100 MG: 100 TABLET, FILM COATED ORAL at 21:36

## 2019-04-18 RX ADMIN — HYDRALAZINE HYDROCHLORIDE 75 MG: 25 TABLET ORAL at 15:09

## 2019-04-18 RX ADMIN — CARVEDILOL 3.12 MG: 3.12 TABLET, FILM COATED ORAL at 18:20

## 2019-04-18 RX ADMIN — ENOXAPARIN SODIUM 40 MG: 40 INJECTION SUBCUTANEOUS at 10:25

## 2019-04-18 RX ADMIN — HYDRALAZINE HYDROCHLORIDE 50 MG: 50 TABLET, FILM COATED ORAL at 10:23

## 2019-04-18 RX ADMIN — BACITRACIN ZINC AND POLYMYXIN B SULFATE: at 21:38

## 2019-04-18 RX ADMIN — NIFEDIPINE 90 MG: 90 TABLET, FILM COATED, EXTENDED RELEASE ORAL at 10:23

## 2019-04-18 RX ADMIN — INSULIN LISPRO 1 UNITS: 100 INJECTION, SOLUTION INTRAVENOUS; SUBCUTANEOUS at 21:36

## 2019-04-18 RX ADMIN — ZOLPIDEM TARTRATE 10 MG: 5 TABLET ORAL at 21:35

## 2019-04-18 RX ADMIN — LISINOPRIL 20 MG: 20 TABLET ORAL at 18:20

## 2019-04-18 ASSESSMENT — PAIN DESCRIPTION - PAIN TYPE
TYPE: ACUTE PAIN
TYPE: ACUTE PAIN

## 2019-04-18 ASSESSMENT — PAIN SCALES - GENERAL
PAINLEVEL_OUTOF10: 0
PAINLEVEL_OUTOF10: 0
PAINLEVEL_OUTOF10: 2
PAINLEVEL_OUTOF10: 5
PAINLEVEL_OUTOF10: 0
PAINLEVEL_OUTOF10: 8

## 2019-04-18 ASSESSMENT — PAIN DESCRIPTION - ONSET
ONSET: ON-GOING
ONSET: GRADUAL

## 2019-04-18 ASSESSMENT — PAIN - FUNCTIONAL ASSESSMENT: PAIN_FUNCTIONAL_ASSESSMENT: ACTIVITIES ARE NOT PREVENTED

## 2019-04-18 ASSESSMENT — PAIN DESCRIPTION - FREQUENCY
FREQUENCY: INTERMITTENT
FREQUENCY: INTERMITTENT

## 2019-04-18 ASSESSMENT — PAIN DESCRIPTION - LOCATION
LOCATION: HEAD
LOCATION: HEAD

## 2019-04-18 ASSESSMENT — PAIN DESCRIPTION - DESCRIPTORS
DESCRIPTORS: ACHING
DESCRIPTORS: ACHING

## 2019-04-18 ASSESSMENT — PAIN DESCRIPTION - ORIENTATION
ORIENTATION: MID
ORIENTATION: MID

## 2019-04-18 ASSESSMENT — PAIN DESCRIPTION - PROGRESSION: CLINICAL_PROGRESSION: GRADUALLY IMPROVING

## 2019-04-18 NOTE — FLOWSHEET NOTE
/115. MD paged. Awaiting call back. Electronically signed by Digna Meneses RN on 4/18/2019 at 5:53 PM    MD aware of elevated BP. See new orders.   Electronically signed by Digna Meneses RN on 4/18/2019 at 6:10 PM

## 2019-04-18 NOTE — FLOWSHEET NOTE
BP remains elevated. Headache resolved with tylenol. MD made aware of BP. D/c cancelled. See new orders. Updated pt. He VU.   Electronically signed by Digna Meneses RN on 4/18/2019 at 1:59 PM

## 2019-04-18 NOTE — PROGRESS NOTES
NP declining to treat HA pain at this time.     Electronically signed by Jason Mills RN on 4/17/2019 at 10:57 PM

## 2019-04-18 NOTE — PROGRESS NOTES
Hospitalist Progress Note    CC:     Shortness of Breath (SOB,CP,dizziness since this morning. reports LOC x 2. reports hitting head. denies blood thinners. ); Chest Pain; Dizziness; and Loss of Consciousness      Admit date: 4/15/2019  Days in hospital:  2    Subjective:     Patient blood pressure this morning was doing better so he was discharged. Unfortunately later on in the afternoon his blood pressure became very high, so he was kept for further evaluation and treatment, discharge was canceled    ROS:   Review of Systems   Constitutional: Negative for chills and fever. Respiratory: Negative for cough and shortness of breath. Cardiovascular: Negative for chest pain and palpitations. Gastrointestinal: Negative for abdominal pain, constipation and diarrhea. Genitourinary: Negative for dysuria and urgency. Neurological: Negative for headaches. Objective:    BP (!) 192/101   Pulse 76   Temp 98.1 °F (36.7 °C)   Resp 18   Ht 6' (1.829 m)   Wt 248 lb 14.4 oz (112.9 kg)   SpO2 93%   BMI 33.76 kg/m²     Gen: alert, NAD  HEENT: NC/AT, moist mucous membranes, no oropharyngeal erythema or exudate  Neck: supple, trachea midline, no anterior cervical or SC LAD  Heart: Normal s1/s2, RRR, no murmurs, gallops, or rubs. Lungs: CTA bilaterally, no wheezing, no rales, no rhonchi, no use of accessory muscles  Abd: bowel sounds present, soft, nondistended, none tender  Extrem: No clubbing, cyanosis, no  edema  Psych: A & O x3  , affect appropriate  Neuro: grossly intact, moves all four extremities spontaneously.       Medications:  Scheduled Meds:   hydrALAZINE  75 mg Oral TID    cloNIDine  0.2 mg Oral BID    [START ON 4/19/2019] lisinopril  40 mg Oral Daily    carvedilol  3.125 mg Oral BID WC    sertraline  100 mg Oral Nightly    NIFEdipine  90 mg Oral Daily    mirtazapine  15 mg Oral Nightly    sodium chloride flush  10 mL Intravenous 2 times per day    enoxaparin  40 mg Subcutaneous Daily    insulin lispro  0-12 Units Subcutaneous TID WC    insulin lispro  0-6 Units Subcutaneous Nightly    bacitracin-polymyxin b   Topical BID    mometasone-formoterol  2 puff Inhalation BID       PRN Meds:  acetaminophen, albuterol sulfate HFA, loperamide, zolpidem, sodium chloride flush, magnesium hydroxide, ondansetron, glucose, dextrose, glucagon (rDNA), dextrose, albuterol    IV:   dextrose           Intake/Output Summary (Last 24 hours) at 4/18/2019 1412  Last data filed at 4/18/2019 1218  Gross per 24 hour   Intake 1140 ml   Output --   Net 1140 ml       Results:  CBC:   Recent Labs     04/16/19  0200 04/17/19  0725 04/18/19  0842   WBC 6.5 4.6 6.6   HGB 12.7* 12.2* 13.7   HCT 38.6* 37.6* 41.4   MCV 85.3 86.2 85.6    165 199     BMP:   Recent Labs     04/16/19  0611 04/17/19  0725 04/18/19  0842    142 139   K 3.6 3.8 3.9   CL 99 105 99   CO2 25 26 29   BUN 35* 18 14   CREATININE 1.2 0.8 0.8     Mag: No results for input(s): MAG in the last 72 hours. Phos:   Lab Results   Component Value Date    PHOS 2.5 04/02/2018     No components found for: GLU    LIVER PROFILE:   Recent Labs     04/16/19  0200   AST 12*   ALT 15   BILITOT <0.2   ALKPHOS 144*     PT/INR: No results for input(s): PROTIME, INR in the last 72 hours. APTT: No results for input(s): APTT in the last 72 hours. UA:  Recent Labs     04/16/19  0200   COLORU YELLOW   PHUR 5.5  5.5   CLARITYU Clear   SPECGRAV 1.012   LEUKOCYTESUR Negative   UROBILINOGEN 0.2   BILIRUBINUR Negative   BLOODU Negative   GLUCOSEU Negative       Invalid input(s): ABG  Lab Results   Component Value Date    CALCIUM 9.2 04/18/2019    PHOS 2.5 04/02/2018       Assessment and Plan:      Hypertension urgency  Improved   Continue with nifedipine, and lisinopril. Increase Hydralazine and clonidine  Check renal artery study     Syncope:   Patient had similar presentation 3 weeks ago.   Patient felt lightheaded after going to the bathroom and then lost his consciousness. He reports some loose stool for the last couple days. Orthostatic were positive in the ED. Patient was seen by cardiology 3 weeks ago for similar presentation. Resolved   PT/OT     Acute kidney injury  Most likely prerenal with loose stool and diarrhea  Resolved   Avoid nephrotoxic medications           Diarrhea:   PCR pathogen and C. diff were negative  Resolved after 2 Imodium     COPD without any acute exacerbation  Continue with home meds     Type 2 diabetes mellitus  Sliding scale insulin          Positive drug screen:   patient drug screen is positive for cocaine. Patient denies any recent use.   Counseling was provided        Code status:  Full   DVT prophylaxis: Lovenox   Disposition: Pending blood pressure improvement     Electronically signed by Mich Ford MD on 4/18/2019 at 2:12 PM

## 2019-04-18 NOTE — PROGRESS NOTES
Patient in bed resting comfortably. Respirations steady and unlabored. No signs of respiratory or cardiac distress. No complaints of pain at this time. No needs at this time. Call light in reach. Will continue to monitor.   Electronically signed by Myron Lovelace RN on 4/18/2019 at 1:58 AM

## 2019-04-18 NOTE — DISCHARGE SUMMARY
Hospitalist Discharge Summary    Patient ID:  Holly Dee  [id number]  76 y.o.  1955    Admit date: 4/15/2019    Discharge date: 4/18/2019    Disposition: home    Admission Diagnoses:   Patient Active Problem List   Diagnosis    [facility] disease ([facility]    Acute renal failure (ARF) ([facility])    ARTEMIO (obstructive sleep apnea)    Hypertension    Type 2 diabetes mellitus with hyperglycemia (HCC)    Mixed hyperlipidemia    CAD (coronary artery disease)    Syncope    Atypical chest pain    Major depressive disorder, recurrent ([facility]    Suicide ideation    [facility]ondependent [facility] abuse ([facility]    Pneumonia    Depression    Anxiety    Sleeping difficulty    Hypertensive urgency    Drug use disorder    Uncontrolled hypertension    LVH (left ventricular hypertrophy) due to hypertensive disease, without heart failure    Chest pain    Syncope, psychogenic    Dyspnea on exertion    Hypertensive emergency    Abnormal radiograph    Hypertriglyceridemia    Sepsis ([facility]    COPD exacerbation ([facility])    Pleuritic chest pain    Hypomagnesemia    Hypokalemia    HTN (hypertension)    DMII (diabetes mellitus, type 2) ([facility])    Elevated troponin       Discharge Diagnoses: Active Problems:    * No active hospital problems. *  Resolved Problems:    Syncope and collapse      Code Status:  Full Code    Condition:  Stable    Discharge Diet: Diet:  DIET CARB CONTROL;    PCP to do list:          Hospital Course:     Patient  Presented to the hospital with diarrhea and syncope. He was found to have ALEA, and + orthostatic hypotension. His symptoms resolved and he is being discharged to follow up with his PCP. Syncope:   Patient had similar presentation 3 weeks ago. Patient felt lightheaded after going to the bathroom and then lost his consciousness. He reports some loose stool for the last couple days. Orthostatic were positive in the ED.   Patient was seen by cardiology 3 weeks ago for similar presentation. Resolved   PT/OT     Acute kidney injury  Most likely prerenal with loose stool and diarrhea  Resolved   Avoid nephrotoxic medications          Diarrhea:   PCR pathogen and C. diff were negative  Resolved after 2 Imodium     COPD without any acute exacerbation  Continue with home meds     Type 2 diabetes mellitus  Sliding scale insulin     Hypertension urgency  Improved   Continue with nifedipine, and lisinopril. Hydralazine and clonidine were added.      Positive drug screen:   patient drug screen is positive for cocaine. Patient denies any recent use. Counseling was provided      Discharge Medications:   Current Discharge Medication List      START taking these medications    Details   cloNIDine (CATAPRES) 0.1 MG tablet Take 1 tablet by mouth 2 times daily  Qty: 60 tablet, Refills: 3      hydrALAZINE (APRESOLINE) 50 MG tablet Take 1 tablet by mouth 3 times daily  Qty: 90 tablet, Refills: 3      Blood Glucose Monitoring Suppl (FREESTYLE LITE) MITZI 1 Device by Does not apply route daily as needed (Check BG 3 times a day)  Qty: 1 Device, Refills: 0      !! blood glucose test strips (FREESTYLE LITE) strip 1 each by In Vitro route 3 times daily As needed. Qty: 100 each, Refills: 3      !! FREESTYLE LANCETS MISC 1 each by Does not apply route 3 times daily  Qty: 100 each, Refills: 3       !! - Potential duplicate medications found. Please discuss with provider. Current Discharge Medication List      CONTINUE these medications which have CHANGED    Details   NIFEdipine (PROCARDIA XL) 90 MG extended release tablet Take 1 tablet by mouth daily  Qty: 30 tablet, Refills: 3           Current Discharge Medication List      CONTINUE these medications which have NOT CHANGED    Details   mometasone (ELOCON) 0.1 % cream Apply topically daily Apply topically daily.       mometasone-formoterol (DULERA) 200-5 MCG/ACT inhaler Inhale 2 puffs into the lungs 2 times daily  Qty: 1 Inhaler, Refills: 1      fenofibrate (TRICOR) 54 MG tablet Take 1 tablet by mouth daily [facility]: Please dispense generic fenofibrate unless prescriber denote  Qty: 30 tablet, Refills: 3      lisinopril (PRINIVIL;ZESTRIL) 20 MG tablet Take 20 mg by mouth daily      clonazePAM (KLONOPIN) 0.5 MG tablet Take 2 tablets by mouth 2 times daily as needed for Anxiety for up to 3 days. Sarah Parish: 10 tablet, Refills: 0    Associated Diagnoses: Panic attack      albuterol sulfate HFA (PROVENTIL HFA) 108 (90 Base) MCG/ACT inhaler Inhale 2 puffs into the lungs every 4 hours as needed for Wheezing (coughing episodes)  Qty: 1 Inhaler, Refills: 0      carvedilol (COREG) 3.125 MG tablet Take 1 tablet by mouth 2 times daily (with meals)  Qty: 60 tablet, Refills: 0      vitamin B-12 (CYANOCOBALAMIN) 500 MCG tablet Take 500 mcg by mouth daily      vitamin B-1 (THIAMINE) 100 MG tablet Take 100 mg by mouth daily      metFORMIN (GLUCOPHAGE) 1000 MG tablet Take 1,000 mg by mouth 2 times daily (with meals)      sertraline (ZOLOFT) 100 MG tablet Take HALF tablet by mouth daily for 1 week, then take 1 tablet by mouth daily  Qty: 30 tablet, Refills: 3      zolpidem (AMBIEN) 10 MG tablet Take 10 mg by mouth nightly. mirtazapine (REMERON) 15 MG tablet Take 1 tablet by mouth nightly  Qty: 30 tablet, Refills: 3      nitroGLYCERIN (NITROSTAT) 0.4 MG SL tablet up to max of 3 total doses. If no relief after 1 dose, call 911.   Qty: 25 tablet, Refills: 3      vitamin D (D3-1000) 1000 UNITS TABS tablet Take 1 tablet by mouth daily  Qty: 90 tablet, Refills: 3      aspirin 81 MG tablet Take 81 mg by mouth daily      Spacer/Aero-Holding Chambers (E-Z SPACER) MITZI 1 Device by Does not apply route daily as needed (shortness of breath)  Qty: 1 Device, Refills: 0      !! glucose blood VI test strips (FREESTYLE TEST STRIPS) strip 1 each by In Vitro route daily Test daily ICD E11.42  Qty: 100 each, Refills: 11      glucose monitoring kit (FREESTYLE) without the administration of intravenous contrast. Dose modulation, iterative reconstruction, and/or weight based adjustment of the mA/kV was utilized to reduce the radiation dose to as low as reasonably achievable. COMPARISON: 11/08/2018 HISTORY: ORDERING SYSTEM PROVIDED HISTORY: STROKE TECHNOLOGIST PROVIDED HISTORY: Has a \"code stroke\" or \"stroke alert\" been called? ->No Ordering Physician Provided Reason for Exam: syncope/stroke. Loss of Consciousness (Initially happened yesterday while going up stairs. Today passed out 2 more times Acuity: Acute Type of Exam: Initial FINDINGS: BRAIN/VENTRICLES: There is no acute intracranial hemorrhage, mass effect or midline shift. No abnormal extra-axial fluid collection. The gray-white differentiation is maintained without evidence of an acute infarct. There is no evidence of hydrocephalus. ORBITS: The visualized portion of the orbits demonstrate no acute abnormality. SINUSES: The visualized paranasal sinuses and mastoid air cells demonstrate no acute abnormality. SOFT TISSUES/SKULL:  No acute abnormality of the visualized skull or soft tissues. No acute intracranial abnormality. Xr Chest Portable    Result Date: 4/16/2019  EXAMINATION: SINGLE XRAY VIEW OF THE CHEST 4/15/2019 9:14 pm COMPARISON: 03/23/2019 HISTORY: ORDERING SYSTEM PROVIDED HISTORY: syncope TECHNOLOGIST PROVIDED HISTORY: Reason for exam:->syncope Ordering Physician Provided Reason for Exam: dizzy, chest pain, hx of stents Acuity: Acute Type of Exam: Initial FINDINGS: Cardial-pericardial silhouette is normal.  The lungs are clear. There is mild elevation the right hemidiaphragm, unchanged. No pneumothorax is found. No free air. No acute bony abnormality. No acute abnormality detected.      Nm Myocardial Spect Rest Exercise Or Rx    Result Date: 3/25/2019  Cardiac Perfusion Imaging  Demographics   Patient Name       Para Downs   Date of Study      03/25/2019         Gender              Male patient states he is has a headache. Pre-Test SpO2 = 94% RA. Stress Protocol:Pharmacologic - Lexiscan's  Peak HR:96 bpm                      HR response: Normal  Peak BP:156/87 mmHg                 BP response: Normal  Predicted HR: 156 bpm               HR/BP product:82567  % of predicted HR: 62  Test duration: 1 min and 40 sec  Reason for termination:Completed   Symptoms  Developed symptoms likely related to [address] [place] [address]. There was stress induced shortness of breath and lightheaded. Symptoms returned to baseline with rest.  Pre-Test SpO2 = 96% RA. Complications  Procedure complication was none.   Procedure Medications   - Lexiscan I.V. 0.4 mg.  Imaging Protocols   - One Day   Rest                   Stress   Isotope:Myoview        Isotope: Myoview  Isotope dose:11.4 mCi  Isotope dose:34 mCi                          Technique:   Gated  Imaging Results    Summed scores     - Summed stress score: 0     - Summed rest score: 2     - Summed difference score:    0     Stress ejection    Ejection fraction:65 %    EDV :117 ml    ESV :41 ml    Stroke volume :76 ml  Medical History  Signatures   ------------------------------------------------------------------  Electronically signed by Jeannine Gallegos MD  (Interpreting physician) on 03/25/2019 at 15:25  ------------------------------------------------------------------            Last Labs on Discharge:     Recent Results (from the past 24 hour(s))   POCT Glucose    Collection Time: 04/17/19 11:54 AM   Result Value Ref Range    POC Glucose 129 (H) 70 - 99 mg/dl    Performed on ACCU-CHEK    POCT Glucose    Collection Time: 04/17/19  4:45 PM   Result Value Ref Range    POC Glucose 157 (H) 70 - 99 mg/dl    Performed on ACCU-CHEK    POCT Glucose    Collection Time: 04/17/19  8:51 PM   Result Value Ref Range    POC Glucose 157 (H) 70 - 99 mg/dl    Performed on ACCU-CHEK    POCT Glucose    Collection Time: 04/18/19  8:04 AM   Result Value Ref Range    POC Glucose 137 (H) 70 - 99 mg/dl    Performed on ACCU-CHEK    Basic Metabolic Panel w/ Reflex to MG    Collection Time: 04/18/19  8:42 AM   Result Value Ref Range    Sodium 139 136 - 145 mmol/L    Potassium reflex Magnesium 3.9 3.5 - 5.1 mmol/L    Chloride 99 99 - 110 mmol/L    CO2 29 21 - 32 mmol/L    Anion Gap 11 3 - 16    Glucose 184 (H) 70 - 99 mg/dL    BUN 14 7 - 20 mg/dL    CREATININE 0.8 0.8 - 1.3 mg/dL    GFR Non-African American >60 >60    GFR African American >60 >60    Calcium 9.2 8.3 - 10.6 mg/dL   CBC Auto Differential    Collection Time: 04/18/19  8:42 AM   Result Value Ref Range    WBC 6.6 4.0 - 11.0 K/uL    RBC 4.84 4.20 - 5.90 M/uL    Hemoglobin 13.7 13.5 - 17.5 g/dL    Hematocrit 41.4 40.5 - 52.5 %    MCV 85.6 80.0 - 100.0 fL    MCH 28.4 26.0 - 34.0 pg    MCHC 33.2 31.0 - 36.0 g/dL    RDW 13.8 12.4 - 15.4 %    Platelets 914 093 - 966 K/uL    MPV 8.6 5.0 - 10.5 fL    Neutrophils % 62.9 %    Lymphocytes % 25.2 %    Monocytes % 8.7 %    Eosinophils % 2.3 %    Basophils % 0.9 %    Neutrophils # 4.1 1.7 - 7.7 K/uL    Lymphocytes # 1.7 1.0 - 5.1 K/uL    Monocytes # 0.6 0.0 - 1.3 K/uL    Eosinophils # 0.2 0.0 - 0.6 K/uL    Basophils # 0.1 0.0 - 0.2 K/uL         Follow up: with Blair Fay MD    Note that over 30 minutes was spent in preparing discharge papers, discussing discharge with patient, medication review, etc.    Thank you Blair Fay MD for the opportunity to be involved in this patient's care. If you have any questions or concerns please feel free to contact me at [phone].     Electronically signed by Avis Muro MD on 4/18/2019 at 10:11 AM

## 2019-04-18 NOTE — PROGRESS NOTES
Patient HA 7/10 now and patient states it is slowly improving. Will continue to monitor for now and contact MD if warranted. No needs at this time. Patient resting comfortably in bed. Call light in reach. Will continue to monitor.    Electronically signed by Colby Abad RN on 4/17/2019 at 10:58 PM

## 2019-04-19 LAB
ANION GAP SERPL CALCULATED.3IONS-SCNC: 11 MMOL/L (ref 3–16)
BUN BLDV-MCNC: 17 MG/DL (ref 7–20)
CALCIUM SERPL-MCNC: 8.6 MG/DL (ref 8.3–10.6)
CHLORIDE BLD-SCNC: 104 MMOL/L (ref 99–110)
CO2: 26 MMOL/L (ref 21–32)
CREAT SERPL-MCNC: 0.8 MG/DL (ref 0.8–1.3)
GFR AFRICAN AMERICAN: >60
GFR NON-AFRICAN AMERICAN: >60
GLUCOSE BLD-MCNC: 115 MG/DL (ref 70–99)
GLUCOSE BLD-MCNC: 155 MG/DL (ref 70–99)
GLUCOSE BLD-MCNC: 182 MG/DL (ref 70–99)
GLUCOSE BLD-MCNC: 184 MG/DL (ref 70–99)
PERFORMED ON: ABNORMAL
POTASSIUM REFLEX MAGNESIUM: 3.7 MMOL/L (ref 3.5–5.1)
SODIUM BLD-SCNC: 141 MMOL/L (ref 136–145)

## 2019-04-19 PROCEDURE — 36415 COLL VENOUS BLD VENIPUNCTURE: CPT

## 2019-04-19 PROCEDURE — 93975 VASCULAR STUDY: CPT

## 2019-04-19 PROCEDURE — 1200000000 HC SEMI PRIVATE

## 2019-04-19 PROCEDURE — 94760 N-INVAS EAR/PLS OXIMETRY 1: CPT

## 2019-04-19 PROCEDURE — 84244 ASSAY OF RENIN: CPT

## 2019-04-19 PROCEDURE — 82088 ASSAY OF ALDOSTERONE: CPT

## 2019-04-19 PROCEDURE — 94640 AIRWAY INHALATION TREATMENT: CPT

## 2019-04-19 PROCEDURE — 6370000000 HC RX 637 (ALT 250 FOR IP): Performed by: INTERNAL MEDICINE

## 2019-04-19 PROCEDURE — 6360000002 HC RX W HCPCS: Performed by: INTERNAL MEDICINE

## 2019-04-19 PROCEDURE — 83835 ASSAY OF METANEPHRINES: CPT

## 2019-04-19 PROCEDURE — 80048 BASIC METABOLIC PNL TOTAL CA: CPT

## 2019-04-19 PROCEDURE — 2580000003 HC RX 258: Performed by: INTERNAL MEDICINE

## 2019-04-19 PROCEDURE — 2500000003 HC RX 250 WO HCPCS: Performed by: HOSPITALIST

## 2019-04-19 PROCEDURE — 6370000000 HC RX 637 (ALT 250 FOR IP): Performed by: HOSPITALIST

## 2019-04-19 RX ORDER — SPIRONOLACTONE 25 MG/1
25 TABLET ORAL 2 TIMES DAILY
Status: DISCONTINUED | OUTPATIENT
Start: 2019-04-19 | End: 2019-04-20

## 2019-04-19 RX ORDER — LISINOPRIL 20 MG/1
20 TABLET ORAL DAILY
Status: DISCONTINUED | OUTPATIENT
Start: 2019-04-20 | End: 2019-04-26 | Stop reason: HOSPADM

## 2019-04-19 RX ADMIN — HYDRALAZINE HYDROCHLORIDE 75 MG: 25 TABLET ORAL at 13:10

## 2019-04-19 RX ADMIN — LISINOPRIL 40 MG: 20 TABLET ORAL at 08:16

## 2019-04-19 RX ADMIN — NIFEDIPINE 90 MG: 90 TABLET, FILM COATED, EXTENDED RELEASE ORAL at 08:16

## 2019-04-19 RX ADMIN — CARVEDILOL 3.12 MG: 3.12 TABLET, FILM COATED ORAL at 08:17

## 2019-04-19 RX ADMIN — INSULIN LISPRO 1 UNITS: 100 INJECTION, SOLUTION INTRAVENOUS; SUBCUTANEOUS at 20:37

## 2019-04-19 RX ADMIN — CARVEDILOL 3.12 MG: 3.12 TABLET, FILM COATED ORAL at 17:18

## 2019-04-19 RX ADMIN — BACITRACIN ZINC AND POLYMYXIN B SULFATE: at 20:45

## 2019-04-19 RX ADMIN — SPIRONOLACTONE 25 MG: 25 TABLET ORAL at 17:18

## 2019-04-19 RX ADMIN — ENOXAPARIN SODIUM 40 MG: 40 INJECTION SUBCUTANEOUS at 08:16

## 2019-04-19 RX ADMIN — MOMETASONE FUROATE AND FORMOTEROL FUMARATE DIHYDRATE 2 PUFF: 200; 5 AEROSOL RESPIRATORY (INHALATION) at 21:12

## 2019-04-19 RX ADMIN — CLONAZEPAM 0.5 MG: 0.5 TABLET ORAL at 13:15

## 2019-04-19 RX ADMIN — CLONIDINE HYDROCHLORIDE 0.2 MG: 0.1 TABLET ORAL at 20:36

## 2019-04-19 RX ADMIN — CLONAZEPAM 0.5 MG: 0.5 TABLET ORAL at 22:37

## 2019-04-19 RX ADMIN — INSULIN LISPRO 2 UNITS: 100 INJECTION, SOLUTION INTRAVENOUS; SUBCUTANEOUS at 13:10

## 2019-04-19 RX ADMIN — CLONIDINE HYDROCHLORIDE 0.2 MG: 0.1 TABLET ORAL at 08:16

## 2019-04-19 RX ADMIN — MOMETASONE FUROATE AND FORMOTEROL FUMARATE DIHYDRATE 2 PUFF: 200; 5 AEROSOL RESPIRATORY (INHALATION) at 09:08

## 2019-04-19 RX ADMIN — SERTRALINE 100 MG: 100 TABLET, FILM COATED ORAL at 20:36

## 2019-04-19 RX ADMIN — INSULIN LISPRO 2 UNITS: 100 INJECTION, SOLUTION INTRAVENOUS; SUBCUTANEOUS at 17:18

## 2019-04-19 RX ADMIN — ZOLPIDEM TARTRATE 10 MG: 5 TABLET ORAL at 22:37

## 2019-04-19 RX ADMIN — HYDRALAZINE HYDROCHLORIDE 75 MG: 25 TABLET ORAL at 08:17

## 2019-04-19 RX ADMIN — Medication 10 ML: at 08:20

## 2019-04-19 RX ADMIN — MIRTAZAPINE 15 MG: 15 TABLET, FILM COATED ORAL at 20:36

## 2019-04-19 RX ADMIN — HYDRALAZINE HYDROCHLORIDE 75 MG: 25 TABLET ORAL at 20:36

## 2019-04-19 RX ADMIN — BACITRACIN ZINC AND POLYMYXIN B SULFATE: at 08:19

## 2019-04-19 RX ADMIN — LABETALOL HYDROCHLORIDE 20 MG: 5 INJECTION INTRAVENOUS at 10:18

## 2019-04-19 RX ADMIN — LABETALOL HYDROCHLORIDE 20 MG: 5 INJECTION INTRAVENOUS at 17:18

## 2019-04-19 RX ADMIN — Medication 10 ML: at 20:37

## 2019-04-19 ASSESSMENT — PAIN DESCRIPTION - PROGRESSION
CLINICAL_PROGRESSION: GRADUALLY IMPROVING

## 2019-04-19 ASSESSMENT — PAIN SCALES - GENERAL
PAINLEVEL_OUTOF10: 6
PAINLEVEL_OUTOF10: 0

## 2019-04-19 ASSESSMENT — PAIN DESCRIPTION - LOCATION: LOCATION: HEAD

## 2019-04-19 ASSESSMENT — PAIN DESCRIPTION - PAIN TYPE: TYPE: ACUTE PAIN

## 2019-04-19 NOTE — CONSULTS
Nephrology Consult Note      Patient's Name: Ruben Fung  1:31 PM  4/19/2019    Reason for Consult:  Uncontrolled HTN      Requesting Physician:  Sasha Pepe MD      Chief Complaint:    Chief Complaint   Patient presents with    Shortness of Breath     SOB,CP,dizziness since this morning. reports LOC x 2. reports hitting head. denies blood thinners.  Chest Pain    Dizziness    Loss of Consciousness         History of Present iIlness:    Ruben Fung is a 59 y.o. male with h/o uncontrolled HTN . He was admitted with c/o SOB, nausea, diarrhea. He had an episode of loss of consciousness  He is on multiple BP meds and BP is still uncontrolled   Has h/o non compliance with meds. He had ALEA which improved over last few days     His Urine tox screen is positive for cocaine     On multiple meds for HTN       Past Medical History:   Diagnosis Date    CAD (coronary artery disease)     Chest pain 10/15/2017    [facility]) [facility]     Diabetes mellitus [facility]     Hyperlipidemia     Hypertension     Kidney stone     MRSA colonization 1/19/19;04/01/2018    MRSA colonization 4/16/19; 03/24/2019    MRSA infection        Past Surgical History:   Procedure Laterality Date    ABDOMEN SURGERY      CARDIAC SURGERY      3 STENTS PLACED     CHOLECYSTECTOMY      COLONOSCOPY      CORONARY ANGIOPLASTY WITH STENT PLACEMENT      3 stents     FRACTURE SURGERY      RIGHT ANKLE    LITHOTRIPSY      SHOULDER ARTHROSCOPY      VASCULAR SURGERY         Family History   Problem Relation Age of Onset    High Blood Pressure Mother     Cancer Father         lung cancer    Diabetes Brother     Liver Disease Sister     Diabetes Brother         reports that he has quit smoking. His smoking use included cigarettes. He has a 100.00 pack-year smoking history. He has never used smokeless tobacco. He reports that he does not drink alcohol or use drugs.     Allergies:  Naproxen    Current Medications:      acetaminophen (TYLENOL) tablet 650 mg Q4H PRN   hydrALAZINE (APRESOLINE) tablet 75 mg TID   cloNIDine (CATAPRES) tablet 0.2 mg BID   lisinopril (PRINIVIL;ZESTRIL) tablet 40 mg Daily   clonazePAM (KLONOPIN) tablet 0.5 mg BID PRN   zolpidem (AMBIEN) tablet 10 mg Nightly PRN   labetalol (NORMODYNE;TRANDATE) injection 20 mg Q4H PRN   albuterol sulfate  (90 Base) MCG/ACT inhaler 2 puff Q4H PRN   carvedilol (COREG) tablet 3.125 mg BID WC   sertraline (ZOLOFT) tablet 100 mg Nightly   loperamide (IMODIUM) capsule 2 mg 4x Daily PRN   NIFEdipine (PROCARDIA XL) extended release tablet 90 mg Daily   mirtazapine (REMERON) tablet 15 mg Nightly   sodium chloride flush 0.9 % injection 10 mL 2 times per day   sodium chloride flush 0.9 % injection 10 mL PRN   magnesium hydroxide (MILK OF MAGNESIA) 400 MG/5ML suspension 30 mL Daily PRN   ondansetron (ZOFRAN) injection 4 mg Q6H PRN   enoxaparin (LOVENOX) injection 40 mg Daily   insulin lispro (HUMALOG) injection vial 0-12 Units TID WC   insulin lispro (HUMALOG) injection vial 0-6 Units Nightly   glucose (GLUTOSE) 40 % oral gel 15 g PRN   dextrose 50 % solution 12.5 g PRN   glucagon (rDNA) injection 1 mg PRN   dextrose 5 % solution PRN   bacitracin-polymyxin b (POLYSPORIN) ointment BID   mometasone-formoterol (DULERA) 200-5 MCG/ACT inhaler 2 puff BID   albuterol (PROVENTIL) nebulizer solution 2.5 mg Q4H PRN       Review of Systems:   14 point ROS obtained but were negative except mentioned in HPI      Physical exam:     Vitals:  BP (!) 196/96   Pulse 72   Temp 97.8 °F (36.6 °C) (Oral)   Resp 16   Ht 6' (1.829 m)   Wt 249 lb 1.9 oz (113 kg)   SpO2 97%   BMI 33.79 kg/m²   Constitutional:  OAA X3 NAD  Skin: no rash, turgor wnl  Heent:  eomi, mmm  Neck: no bruits or jvd noted  Cardiovascular:  S1, S2 without m/r/g  Respiratory: CTA B without w/r/r  Abdomen:  +bs, soft, nt, nd  Ext: no lower extremity edema  Psychiatric: mood and affect appropriate  Musculoskeletal:  Rom, muscular strength intact    Labs:  CBC:   Recent Labs     04/17/19  0725 04/18/19  0842   WBC 4.6 6.6   HGB 12.2* 13.7    199     BMP:  Recent Labs     04/17/19  0725 04/18/19  0842 04/19/19  0635    139 141   K 3.8 3.9 3.7    99 104   CO2 26 29 26   BUN 18 14 17   CREATININE 0.8 0.8 0.8   GLUCOSE 141* 184* 155*     Ca/Mg/Phos: Recent Labs     04/17/19  0725 04/18/19  0842 04/19/19  0635   CALCIUM 8.2* 9.2 8.6     Hepatic: No results for input(s): AST, ALT, ALB, BILITOT, ALKPHOS in the last 72 hours. Troponin: No results for input(s): TROPONINI in the last 72 hours. BNP: No results for input(s): BNP in the last 72 hours. Lipids: No results for input(s): CHOL, TRIG, HDL, LDLCALC, LABVLDL in the last 72 hours. ABGs: No results for input(s): PHART, PO2ART, CVK2TCB in the last 72 hours. INR: No results for input(s): INR in the last 72 hours. UA:No results for input(s): Onita Broom, GLUCOSEU, BILIRUBINUR, [place], BLOODU, PHUR, PROTEINU, UROBILINOGEN, NITRU, LEUKOCYTESUR, LABMICR, URINETYPE in the last 72 hours. Urine Microscopic: No results for input(s): LABCAST, BACTERIA, COMU, HYALCAST, WBCUA, RBCUA, EPIU in the last 72 hours. Urine Culture: No results for input(s): LABURIN in the last 72 hours. Urine Chemistry: No results for input(s): Glenard Sober, PROTEINUR, NAUR in the last 72 hours. IMAGING:  VL Renal Arterial Duplex Complete         XR CHEST PORTABLE   Final Result   No acute abnormality detected. I/O last 3 completed shifts: In: 1160 [P.O.:1160]  Out: -     Assessment/Plan :      1. Uncontrolled  HTN. Check renin, ary levels  Plasma metanephrine levels already ordered  Will start aldactone   Monitor k level  Check renal doppler study   Low sodium diet     2. ALEA - resolved     3. Cocaine abuse.  advised to avoid illicit drug use     Monitor and adjust BP meds           Thank you for allowing us to participate in care of Robert Odom         Electronically signed by: Veronica Lemus MD, 4/19/2019, 1:31 PM      [facility] [id number]  Office : [phone]  Fax :[phone]

## 2019-04-19 NOTE — PROGRESS NOTES
Hospitalist Progress Note    CC:     Shortness of Breath (SOB,CP,dizziness since this morning. reports LOC x 2. reports hitting head. denies blood thinners. ); Chest Pain; Dizziness; and Loss of Consciousness      Admit date: 4/15/2019  Days in hospital:  3    Subjective:     Patient continued to have high blood pressure in the 200s readings with some headache. No other complaints    ROS:   Review of Systems   Constitutional: Negative for chills and fever. Respiratory: Negative for cough and shortness of breath. Cardiovascular: Negative for chest pain and palpitations. Gastrointestinal: Negative for abdominal pain, constipation and diarrhea. Genitourinary: Negative for dysuria and urgency. Neurological: Negative for headaches. Objective:    BP (!) 196/96   Pulse 72   Temp 97.8 °F (36.6 °C) (Oral)   Resp 16   Ht 6' (1.829 m)   Wt 249 lb 1.9 oz (113 kg)   SpO2 97%   BMI 33.79 kg/m²     Gen: alert, NAD  HEENT: NC/AT, moist mucous membranes, no oropharyngeal erythema or exudate  Neck: supple, trachea midline, no anterior cervical or SC LAD  Heart: Normal s1/s2, RRR, no murmurs, gallops, or rubs. Lungs: CTA bilaterally, no wheezing, no rales, no rhonchi, no use of accessory muscles  Abd: bowel sounds present, soft, nondistended, none tender  Extrem: No clubbing, cyanosis, no  edema  Psych: A & O x3  , affect appropriate  Neuro: grossly intact, moves all four extremities spontaneously.       Medications:  Scheduled Meds:   spironolactone  25 mg Oral BID    [START ON 4/20/2019] lisinopril  20 mg Oral Daily    hydrALAZINE  75 mg Oral TID    cloNIDine  0.2 mg Oral BID    carvedilol  3.125 mg Oral BID     sertraline  100 mg Oral Nightly    NIFEdipine  90 mg Oral Daily    mirtazapine  15 mg Oral Nightly    sodium chloride flush  10 mL Intravenous 2 times per day    enoxaparin  40 mg Subcutaneous Daily    insulin lispro  0-12 Units Subcutaneous TID     insulin lispro  0-6 Units Subcutaneous Nightly    bacitracin-polymyxin b   Topical BID    mometasone-formoterol  2 puff Inhalation BID       PRN Meds:  acetaminophen, clonazePAM, zolpidem, labetalol, albuterol sulfate HFA, loperamide, sodium chloride flush, magnesium hydroxide, ondansetron, glucose, dextrose, glucagon (rDNA), dextrose, albuterol    IV:   dextrose           Intake/Output Summary (Last 24 hours) at 4/19/2019 1455  Last data filed at 4/18/2019 2139  Gross per 24 hour   Intake 440 ml   Output --   Net 440 ml       Results:  CBC:   Recent Labs     04/17/19  0725 04/18/19  0842   WBC 4.6 6.6   HGB 12.2* 13.7   HCT 37.6* 41.4   MCV 86.2 85.6    199     BMP:   Recent Labs     04/17/19  0725 04/18/19  0842 04/19/19  0635    139 141   K 3.8 3.9 3.7    99 104   CO2 26 29 26   BUN 18 14 17   CREATININE 0.8 0.8 0.8     Mag: No results for input(s): MAG in the last 72 hours. Phos:   Lab Results   Component Value Date    PHOS 2.5 04/02/2018     No components found for: GLU    LIVER PROFILE: No results for input(s): AST, ALT, LIPASE, BILIDIR, BILITOT, ALKPHOS in the last 72 hours. Invalid input(s): AMYLASE,  ALB  PT/INR: No results for input(s): PROTIME, INR in the last 72 hours. APTT: No results for input(s): APTT in the last 72 hours. UA:No results for input(s): NITRITE, COLORU, PHUR, LABCAST, WBCUA, RBCUA, MUCUS, TRICHOMONAS, YEAST, BACTERIA, CLARITYU, SPECGRAV, LEUKOCYTESUR, UROBILINOGEN, BILIRUBINUR, BLOODU, GLUCOSEU, AMORPHOUS in the last 72 hours. Invalid input(s): Miguel Gutierres input(s): ABG  Lab Results   Component Value Date    CALCIUM 8.6 04/19/2019    PHOS 2.5 04/02/2018       Assessment and Plan:    Hypertension urgency  Continues to have high readings.    Continue with nifedipine, lisinopril, Hydralazine and clonidine  Renal artery study: Non diagnostic exam.  Check a.m. cortisol, metanephrines level    I'll ask nephrology to see him     Syncope:   Patient had similar presentation 3 weeks ago. Patient felt lightheaded after going to the bathroom and then lost his consciousness. He reports some loose stool for the last couple days. Orthostatic were positive in the ED. Patient was seen by cardiology 3 weeks ago for similar presentation. Resolved   PT/OT     Acute kidney injury  Most likely prerenal with loose stool and diarrhea  Resolved   Avoid nephrotoxic medications           Diarrhea:   PCR pathogen and C. diff were negative  Resolved after 2 Imodium     COPD without any acute exacerbation  Continue with home meds     Type 2 diabetes mellitus  Sliding scale insulin           Positive drug screen:   patient drug screen is positive for cocaine. Patient denies any recent use.   Counseling was provided           Code status:  Full   DVT prophylaxis: Lovenox   Disposition: Pending blood pressure improvement         Electronically signed by Wilfredo Woodson MD on 4/19/2019 at 2:55 PM

## 2019-04-19 NOTE — PROGRESS NOTES
Patient is A&O x3.  RA, sat 93%. No complaints of pain or SOB. Respirations appear to be easy and unlabored. Lungs clear. Respirations easy with no complaints of cough. Cardiac monitor in place, current rhythm NSR. BP running high still, medicated with scheduled and PRN meds as needed. No complaints of nausea/vomiting/diarrhea. Up ad shelby to the bathroom/BSC as needed. Right FA PIV intact and flushed. Tolerating diabetic diet. Plan of care and safety measures reviewed with the patient. Call light in reach. Will continue to monitor.   Electronically signed by Guerda Garibay RN on 4/19/2019 at 12:11 AM

## 2019-04-19 NOTE — PROGRESS NOTES
Patient in bed resting comfortably. Respirations steady and unlabored. No signs of respiratory or cardiac distress. No complaints of pain at this time. No needs at this time. Call light in reach. Will continue to monitor.   Electronically signed by Sherie Lane RN on 4/19/2019 at 1:42 AM

## 2019-04-20 LAB
ANION GAP SERPL CALCULATED.3IONS-SCNC: 10 MMOL/L (ref 3–16)
BASOPHILS ABSOLUTE: 0.1 K/UL (ref 0–0.2)
BASOPHILS RELATIVE PERCENT: 1 %
BUN BLDV-MCNC: 17 MG/DL (ref 7–20)
CALCIUM SERPL-MCNC: 8.4 MG/DL (ref 8.3–10.6)
CHLORIDE BLD-SCNC: 106 MMOL/L (ref 99–110)
CO2: 28 MMOL/L (ref 21–32)
CORTISOL - AM: 1.9 UG/DL (ref 4.3–22.4)
CREAT SERPL-MCNC: 0.9 MG/DL (ref 0.8–1.3)
EOSINOPHILS ABSOLUTE: 0.1 K/UL (ref 0–0.6)
EOSINOPHILS RELATIVE PERCENT: 1.9 %
GFR AFRICAN AMERICAN: >60
GFR NON-AFRICAN AMERICAN: >60
GLUCOSE BLD-MCNC: 138 MG/DL (ref 70–99)
GLUCOSE BLD-MCNC: 148 MG/DL (ref 70–99)
GLUCOSE BLD-MCNC: 152 MG/DL (ref 70–99)
GLUCOSE BLD-MCNC: 164 MG/DL (ref 70–99)
GLUCOSE BLD-MCNC: 175 MG/DL (ref 70–99)
HCT VFR BLD CALC: 37.8 % (ref 40.5–52.5)
HEMOGLOBIN: 12.4 G/DL (ref 13.5–17.5)
LYMPHOCYTES ABSOLUTE: 1.3 K/UL (ref 1–5.1)
LYMPHOCYTES RELATIVE PERCENT: 26.5 %
MCH RBC QN AUTO: 28.4 PG (ref 26–34)
MCHC RBC AUTO-ENTMCNC: 32.7 G/DL (ref 31–36)
MCV RBC AUTO: 86.6 FL (ref 80–100)
MONOCYTES ABSOLUTE: 0.4 K/UL (ref 0–1.3)
MONOCYTES RELATIVE PERCENT: 7.3 %
NEUTROPHILS ABSOLUTE: 3.2 K/UL (ref 1.7–7.7)
NEUTROPHILS RELATIVE PERCENT: 63.3 %
PDW BLD-RTO: 14 % (ref 12.4–15.4)
PERFORMED ON: ABNORMAL
PLATELET # BLD: 182 K/UL (ref 135–450)
PMV BLD AUTO: 8.1 FL (ref 5–10.5)
POTASSIUM REFLEX MAGNESIUM: 4 MMOL/L (ref 3.5–5.1)
RBC # BLD: 4.36 M/UL (ref 4.2–5.9)
SODIUM BLD-SCNC: 144 MMOL/L (ref 136–145)
WBC # BLD: 5 K/UL (ref 4–11)

## 2019-04-20 PROCEDURE — 94664 DEMO&/EVAL PT USE INHALER: CPT

## 2019-04-20 PROCEDURE — 82533 TOTAL CORTISOL: CPT

## 2019-04-20 PROCEDURE — 6360000002 HC RX W HCPCS: Performed by: HOSPITALIST

## 2019-04-20 PROCEDURE — 85025 COMPLETE CBC W/AUTO DIFF WBC: CPT

## 2019-04-20 PROCEDURE — 6370000000 HC RX 637 (ALT 250 FOR IP): Performed by: HOSPITALIST

## 2019-04-20 PROCEDURE — 2580000003 HC RX 258: Performed by: INTERNAL MEDICINE

## 2019-04-20 PROCEDURE — 94640 AIRWAY INHALATION TREATMENT: CPT

## 2019-04-20 PROCEDURE — 84244 ASSAY OF RENIN: CPT

## 2019-04-20 PROCEDURE — 80048 BASIC METABOLIC PNL TOTAL CA: CPT

## 2019-04-20 PROCEDURE — 36415 COLL VENOUS BLD VENIPUNCTURE: CPT

## 2019-04-20 PROCEDURE — 2500000003 HC RX 250 WO HCPCS: Performed by: HOSPITALIST

## 2019-04-20 PROCEDURE — 94760 N-INVAS EAR/PLS OXIMETRY 1: CPT

## 2019-04-20 PROCEDURE — 82088 ASSAY OF ALDOSTERONE: CPT

## 2019-04-20 PROCEDURE — 6370000000 HC RX 637 (ALT 250 FOR IP): Performed by: NURSE PRACTITIONER

## 2019-04-20 PROCEDURE — 1200000000 HC SEMI PRIVATE

## 2019-04-20 PROCEDURE — 6360000002 HC RX W HCPCS: Performed by: INTERNAL MEDICINE

## 2019-04-20 PROCEDURE — 6370000000 HC RX 637 (ALT 250 FOR IP): Performed by: INTERNAL MEDICINE

## 2019-04-20 RX ORDER — HYDRALAZINE HYDROCHLORIDE 20 MG/ML
5 INJECTION INTRAMUSCULAR; INTRAVENOUS ONCE
Status: COMPLETED | OUTPATIENT
Start: 2019-04-20 | End: 2019-04-20

## 2019-04-20 RX ORDER — SPIRONOLACTONE 25 MG/1
50 TABLET ORAL 2 TIMES DAILY
Status: DISCONTINUED | OUTPATIENT
Start: 2019-04-20 | End: 2019-04-26 | Stop reason: HOSPADM

## 2019-04-20 RX ORDER — BUTALBITAL, ACETAMINOPHEN AND CAFFEINE 50; 325; 40 MG/1; MG/1; MG/1
1 TABLET ORAL EVERY 4 HOURS PRN
Status: DISCONTINUED | OUTPATIENT
Start: 2019-04-20 | End: 2019-04-24

## 2019-04-20 RX ORDER — HYDRALAZINE HYDROCHLORIDE 50 MG/1
100 TABLET, FILM COATED ORAL 3 TIMES DAILY
Status: DISCONTINUED | OUTPATIENT
Start: 2019-04-21 | End: 2019-04-21

## 2019-04-20 RX ADMIN — MOMETASONE FUROATE AND FORMOTEROL FUMARATE DIHYDRATE 2 PUFF: 200; 5 AEROSOL RESPIRATORY (INHALATION) at 20:50

## 2019-04-20 RX ADMIN — CARVEDILOL 3.12 MG: 3.12 TABLET, FILM COATED ORAL at 07:53

## 2019-04-20 RX ADMIN — HYDRALAZINE HYDROCHLORIDE 75 MG: 25 TABLET ORAL at 07:54

## 2019-04-20 RX ADMIN — BUTALBITAL, ACETAMINOPHEN, AND CAFFEINE 1 TABLET: 50; 325; 40 TABLET ORAL at 21:44

## 2019-04-20 RX ADMIN — INSULIN LISPRO 1 UNITS: 100 INJECTION, SOLUTION INTRAVENOUS; SUBCUTANEOUS at 21:44

## 2019-04-20 RX ADMIN — ENOXAPARIN SODIUM 40 MG: 40 INJECTION SUBCUTANEOUS at 07:53

## 2019-04-20 RX ADMIN — ZOLPIDEM TARTRATE 10 MG: 5 TABLET ORAL at 22:00

## 2019-04-20 RX ADMIN — LISINOPRIL 20 MG: 20 TABLET ORAL at 07:54

## 2019-04-20 RX ADMIN — HYDRALAZINE HYDROCHLORIDE 5 MG: 20 INJECTION INTRAMUSCULAR; INTRAVENOUS at 18:52

## 2019-04-20 RX ADMIN — LABETALOL HYDROCHLORIDE 20 MG: 5 INJECTION INTRAVENOUS at 05:18

## 2019-04-20 RX ADMIN — HYDRALAZINE HYDROCHLORIDE 75 MG: 25 TABLET ORAL at 20:00

## 2019-04-20 RX ADMIN — MOMETASONE FUROATE AND FORMOTEROL FUMARATE DIHYDRATE 2 PUFF: 200; 5 AEROSOL RESPIRATORY (INHALATION) at 08:45

## 2019-04-20 RX ADMIN — INSULIN LISPRO 2 UNITS: 100 INJECTION, SOLUTION INTRAVENOUS; SUBCUTANEOUS at 12:55

## 2019-04-20 RX ADMIN — CLONAZEPAM 0.5 MG: 0.5 TABLET ORAL at 22:00

## 2019-04-20 RX ADMIN — HYDRALAZINE HYDROCHLORIDE 75 MG: 25 TABLET ORAL at 12:54

## 2019-04-20 RX ADMIN — NIFEDIPINE 90 MG: 90 TABLET, FILM COATED, EXTENDED RELEASE ORAL at 07:54

## 2019-04-20 RX ADMIN — LABETALOL HYDROCHLORIDE 20 MG: 5 INJECTION INTRAVENOUS at 12:54

## 2019-04-20 RX ADMIN — BACITRACIN ZINC AND POLYMYXIN B SULFATE: at 22:02

## 2019-04-20 RX ADMIN — LOPERAMIDE HYDROCHLORIDE 2 MG: 2 CAPSULE ORAL at 09:42

## 2019-04-20 RX ADMIN — LABETALOL HYDROCHLORIDE 20 MG: 5 INJECTION INTRAVENOUS at 17:21

## 2019-04-20 RX ADMIN — ACETAMINOPHEN 650 MG: 325 TABLET ORAL at 05:18

## 2019-04-20 RX ADMIN — BACITRACIN ZINC AND POLYMYXIN B SULFATE: at 07:55

## 2019-04-20 RX ADMIN — CLONIDINE HYDROCHLORIDE 0.2 MG: 0.1 TABLET ORAL at 20:00

## 2019-04-20 RX ADMIN — CARVEDILOL 3.12 MG: 3.12 TABLET, FILM COATED ORAL at 16:29

## 2019-04-20 RX ADMIN — INSULIN LISPRO 2 UNITS: 100 INJECTION, SOLUTION INTRAVENOUS; SUBCUTANEOUS at 17:21

## 2019-04-20 RX ADMIN — SPIRONOLACTONE 50 MG: 25 TABLET ORAL at 16:29

## 2019-04-20 RX ADMIN — SERTRALINE 100 MG: 100 TABLET, FILM COATED ORAL at 22:00

## 2019-04-20 RX ADMIN — LABETALOL HYDROCHLORIDE 20 MG: 5 INJECTION INTRAVENOUS at 21:50

## 2019-04-20 RX ADMIN — MIRTAZAPINE 15 MG: 15 TABLET, FILM COATED ORAL at 22:00

## 2019-04-20 RX ADMIN — CLONIDINE HYDROCHLORIDE 0.2 MG: 0.1 TABLET ORAL at 07:54

## 2019-04-20 RX ADMIN — ACETAMINOPHEN 650 MG: 325 TABLET ORAL at 16:29

## 2019-04-20 RX ADMIN — SPIRONOLACTONE 25 MG: 25 TABLET ORAL at 07:54

## 2019-04-20 RX ADMIN — Medication 10 ML: at 07:56

## 2019-04-20 RX ADMIN — Medication 10 ML: at 21:52

## 2019-04-20 ASSESSMENT — PAIN SCALES - GENERAL
PAINLEVEL_OUTOF10: 5
PAINLEVEL_OUTOF10: 7
PAINLEVEL_OUTOF10: 3
PAINLEVEL_OUTOF10: 6
PAINLEVEL_OUTOF10: 0
PAINLEVEL_OUTOF10: 0
PAINLEVEL_OUTOF10: 3
PAINLEVEL_OUTOF10: 8
PAINLEVEL_OUTOF10: 7

## 2019-04-20 ASSESSMENT — PAIN DESCRIPTION - LOCATION
LOCATION: HEAD

## 2019-04-20 ASSESSMENT — PAIN DESCRIPTION - PAIN TYPE
TYPE: ACUTE PAIN

## 2019-04-20 NOTE — PROGRESS NOTES
Hospitalist Progress Note    CC:     Shortness of Breath (SOB,CP,dizziness since this morning. reports LOC x 2. reports hitting head. denies blood thinners. ); Chest Pain; Dizziness; and Loss of Consciousness      Admit date: 4/15/2019  Days in hospital:  4    Subjective:     He continues to have some diarrhea and high BP. No acute events overnight     ROS:   Review of Systems   Constitutional: Negative for chills and fever. Respiratory: Negative for cough and shortness of breath. Cardiovascular: Negative for chest pain and palpitations. Gastrointestinal: Negative for abdominal pain, constipation. Genitourinary: Negative for dysuria and urgency. Neurological: Positive for headaches. Objective:    BP (!) 193/97   Pulse 75   Temp 98.5 °F (36.9 °C) (Oral)   Resp 16   Ht 6' (1.829 m)   Wt 240 lb 4.8 oz (109 kg)   SpO2 96%   BMI 32.59 kg/m²     Gen: alert, NAD  HEENT: NC/AT, moist mucous membranes, no oropharyngeal erythema or exudate  Neck: supple, trachea midline, no anterior cervical or SC LAD  Heart: Normal s1/s2, RRR, no murmurs, gallops, or rubs. Lungs: CTA bilaterally, no wheezing, no rales, no rhonchi, no use of accessory muscles  Abd: bowel sounds present, soft, nondistended, none tender  Extrem: No clubbing, cyanosis, no  edema  Psych: A & O x3 , affect appropriate  Neuro: grossly intact, moves all four extremities spontaneously.       Medications:  Scheduled Meds:   spironolactone  50 mg Oral BID    lisinopril  20 mg Oral Daily    hydrALAZINE  75 mg Oral TID    cloNIDine  0.2 mg Oral BID    carvedilol  3.125 mg Oral BID     sertraline  100 mg Oral Nightly    NIFEdipine  90 mg Oral Daily    mirtazapine  15 mg Oral Nightly    sodium chloride flush  10 mL Intravenous 2 times per day    enoxaparin  40 mg Subcutaneous Daily    insulin lispro  0-12 Units Subcutaneous TID     insulin lispro  0-6 Units Subcutaneous Nightly    bacitracin-polymyxin b   Topical BID  mometasone-formoterol  2 puff Inhalation BID       PRN Meds:  acetaminophen, clonazePAM, zolpidem, labetalol, albuterol sulfate HFA, loperamide, sodium chloride flush, magnesium hydroxide, ondansetron, glucose, dextrose, glucagon (rDNA), dextrose, albuterol    IV:   dextrose           Intake/Output Summary (Last 24 hours) at 4/20/2019 1724  Last data filed at 4/20/2019 0526  Gross per 24 hour   Intake 480 ml   Output --   Net 480 ml       Results:  CBC:   Recent Labs     04/18/19  0842 04/20/19  0637   WBC 6.6 5.0   HGB 13.7 12.4*   HCT 41.4 37.8*   MCV 85.6 86.6    182     BMP:   Recent Labs     04/18/19  0842 04/19/19  0635 04/20/19  0637    141 144   K 3.9 3.7 4.0   CL 99 104 106   CO2 29 26 28   BUN 14 17 17   CREATININE 0.8 0.8 0.9     Mag: No results for input(s): MAG in the last 72 hours. Phos:   Lab Results   Component Value Date    PHOS 2.5 04/02/2018     No components found for: GLU    LIVER PROFILE: No results for input(s): AST, ALT, LIPASE, BILIDIR, BILITOT, ALKPHOS in the last 72 hours. Invalid input(s): AMYLASE,  ALB  PT/INR: No results for input(s): PROTIME, INR in the last 72 hours. APTT: No results for input(s): APTT in the last 72 hours. UA:No results for input(s): NITRITE, COLORU, PHUR, LABCAST, WBCUA, RBCUA, MUCUS, TRICHOMONAS, YEAST, BACTERIA, CLARITYU, SPECGRAV, LEUKOCYTESUR, UROBILINOGEN, BILIRUBINUR, BLOODU, GLUCOSEU, AMORPHOUS in the last 72 hours. Invalid input(s): Sally Jackson input(s): ABG  Lab Results   Component Value Date    CALCIUM 8.4 04/20/2019    PHOS 2.5 04/02/2018       Assessment and Plan:    Hypertension urgency  Continues to have high readings. Continue with nifedipine, lisinopril, Hydralazine and clonidine  Increase aldactone   Renal artery study: Non diagnostic exam.  A.m. Cortisol: Low: R/O cushing disease.   metanephrines level , renin and Aldosterone level are pending   Nephro are following      Syncope:   Patient had similar presentation 3 weeks ago. Patient felt lightheaded after going to the bathroom and then lost his consciousness. He reports some loose stool for the last couple days. Orthostatic were positive in the ED. Patient was seen by cardiology 3 weeks ago for similar presentation. Resolved   PT/OT     Acute kidney injury  Most likely prerenal with loose stool and diarrhea  Resolved   Avoid nephrotoxic medications           Diarrhea:   PCR pathogen and C. diff were negative  Resolved after 2 Imodium     COPD without any acute exacerbation  Continue with home meds     Type 2 diabetes mellitus  Sliding scale insulin           Positive drug screen:   patient drug screen is positive for cocaine. Patient denies any recent use.   Counseling was provided           Code status:  Full   DVT prophylaxis: Lovenox   Disposition: Pending blood pressure improvement   *    Electronically signed by Kiki Molina MD on 4/20/2019 at 5:24 PM

## 2019-04-20 NOTE — PROGRESS NOTES
Patient c/o headache due to hypertension - BP continues to be elevated 180/88 manual right arm.  :artient has scattered psoriasis - new onset - has appt with dermatology

## 2019-04-20 NOTE — PROGRESS NOTES
Patient c/o headache due to hypertension - BP continues to be elevated 180/88 manual right arm.  partient has scattered psoriasis - new onset - has appt with dermatology

## 2019-04-20 NOTE — PROGRESS NOTES
Nephrology progress Note        Chief Complaint:    Chief Complaint   Patient presents with    Shortness of Breath     SOB,CP,dizziness since this morning. reports LOC x 2. reports hitting head. denies blood thinners.  Chest Pain    Dizziness    Loss of Consciousness         History of Present iIlness:    Robert Odom is a 59 y.o. male with h/o uncontrolled HTN . He was admitted with c/o SOB, nausea, diarrhea. He had an episode of loss of consciousness  He is on multiple BP meds and BP is still uncontrolled   Has h/o non compliance with meds.    He had ALEA which improved over last few days     His Urine tox screen is positive for cocaine     On multiple meds for HTN       Interval HX     Still has diarrhea   SBP now in 160s  No headaches   Started on aldactone yesterday       Past Medical History:   Diagnosis Date    CAD (coronary artery disease)     Chest pain 10/15/2017    [facility] [address]     Diabetes mellitus (HCC)     Hyperlipidemia     Hypertension     Kidney stone     MRSA colonization 1/19/19;04/01/2018    MRSA colonization 4/16/19; 03/24/2019    MRSA infection        Past Surgical History:   Procedure Laterality Date    ABDOMEN SURGERY      CARDIAC SURGERY      3 STENTS PLACED     CHOLECYSTECTOMY      COLONOSCOPY      CORONARY ANGIOPLASTY WITH STENT PLACEMENT      3 stents     FRACTURE SURGERY      RIGHT ANKLE    LITHOTRIPSY      SHOULDER ARTHROSCOPY      VASCULAR SURGERY         Family History   Problem Relation Age of Onset    High Blood Pressure Mother     Cancer Father         lung cancer    Diabetes Brother     Liver Disease Sister     Diabetes Brother          Current Medications:      spironolactone (ALDACTONE) tablet 50 mg BID   lisinopril (PRINIVIL;ZESTRIL) tablet 20 mg Daily   acetaminophen (TYLENOL) tablet 650 mg Q4H PRN   hydrALAZINE (APRESOLINE) tablet 75 mg TID   cloNIDine (CATAPRES) tablet 0.2 mg BID   clonazePAM (KLONOPIN) tablet 0.5 mg BID PRN   zolpidem (AMBIEN) tablet 10 mg Nightly PRN   labetalol (NORMODYNE;TRANDATE) injection 20 mg Q4H PRN   albuterol sulfate  (90 Base) MCG/ACT inhaler 2 puff Q4H PRN   carvedilol (COREG) tablet 3.125 mg BID WC   sertraline (ZOLOFT) tablet 100 mg Nightly   loperamide (IMODIUM) capsule 2 mg 4x Daily PRN   NIFEdipine (PROCARDIA XL) extended release tablet 90 mg Daily   mirtazapine (REMERON) tablet 15 mg Nightly   sodium chloride flush 0.9 % injection 10 mL 2 times per day   sodium chloride flush 0.9 % injection 10 mL PRN   magnesium hydroxide (MILK OF MAGNESIA) 400 MG/5ML suspension 30 mL Daily PRN   ondansetron (ZOFRAN) injection 4 mg Q6H PRN   enoxaparin (LOVENOX) injection 40 mg Daily   insulin lispro (HUMALOG) injection vial 0-12 Units TID    insulin lispro (HUMALOG) injection vial 0-6 Units Nightly   glucose (GLUTOSE) 40 % oral gel 15 g PRN   dextrose 50 % solution 12.5 g PRN   glucagon (rDNA) injection 1 mg PRN   dextrose 5 % solution PRN   bacitracin-polymyxin b (POLYSPORIN) ointment BID   mometasone-formoterol (DULERA) 200-5 MCG/ACT inhaler 2 puff BID   albuterol (PROVENTIL) nebulizer solution 2.5 mg Q4H PRN             Physical exam:     Vitals:  BP (!) 196/93   Pulse 75   Temp 98.8 °F (37.1 °C)   Resp 18   Ht 6' (1.829 m)   Wt 240 lb 4.8 oz (109 kg)   SpO2 92%   BMI 32.59 kg/m²   Constitutional:  OAA X3 NAD  Skin: no rash, turgor wnl  Heent:  eomi, mmm  Neck: no bruits or jvd noted  Cardiovascular:  S1, S2 without m/r/g  Respiratory: CTA B without w/r/r  Abdomen:  +bs, soft, nt, nd  Ext: no lower extremity edema  Psychiatric: mood and affect appropriate  Musculoskeletal:  Rom, muscular strength intact    Labs:  CBC:   Recent Labs     04/18/19  0842 04/20/19  0637   WBC 6.6 5.0   HGB 13.7 12.4*    182     BMP:    Recent Labs     04/18/19  0842 04/19/19  0635 04/20/19  0637    141 144   K 3.9 3.7 4.0   CL 99 104 106   CO2 29 26 28   BUN 14 17 17 CREATININE 0.8 0.8 0.9   GLUCOSE 184* 155* 152*     Ca/Mg/Phos:   Recent Labs     04/18/19  0842 04/19/19  0635 04/20/19  0637   CALCIUM 9.2 8.6 8.4     Hepatic: No results for input(s): AST, ALT, ALB, BILITOT, ALKPHOS in the last 72 hours. Troponin: No results for input(s): TROPONINI in the last 72 hours. BNP: No results for input(s): BNP in the last 72 hours. Lipids: No results for input(s): CHOL, TRIG, HDL, LDLCALC, LABVLDL in the last 72 hours. ABGs: No results for input(s): PHART, PO2ART, [id number] in the last 72 hours. INR: No results for input(s): INR in the last 72 hours. UA:No results for input(s): Lennox Adrien, GLUCOSEU, BILIRUBINUR, Maria Ines Rumple, BLOODU, PHUR, PROTEINU, UROBILINOGEN, NITRU, LEUKOCYTESUR, LABMICR, URINETYPE in the last 72 hours. Urine Microscopic: No results for input(s): LABCAST, BACTERIA, COMU, HYALCAST, WBCUA, RBCUA, EPIU in the last 72 hours. Urine Culture: No results for input(s): LABURIN in the last 72 hours. Urine Chemistry: No results for input(s): Peggy , PROTEINUR, NAUR in the last 72 hours. IMAGING:  VL Renal Arterial Duplex Complete   Final Result      XR CHEST PORTABLE   Final Result   No acute abnormality detected. I/O last 3 completed shifts: In: 720 [P.O.:720]  Out: -     Assessment/Plan :      1. Uncontrolled  HTN. Check renin, ary levels  Plasma metanephrine levels already ordered  Will started aldactone . Increased dose to 50 mg po bid   Monitor k level  renal doppler study shows patent veins, arteries could not be visualized   Low sodium diet     2. ALEA - resolved     3. Cocaine abuse. advised to avoid illicit drug use     Monitor and adjust BP meds   On multiple meds.  Will see if responds to aldactone         Thank you for allowing us to participate in care of Eric Espinosa         Electronically signed by: Reese Nixon MD, 4/20/2019, 1:07 PM      [facility] [address]  Office : [phone]  Fax

## 2019-04-21 LAB
ANION GAP SERPL CALCULATED.3IONS-SCNC: 16 MMOL/L (ref 3–16)
BUN BLDV-MCNC: 15 MG/DL (ref 7–20)
CALCIUM SERPL-MCNC: 8.5 MG/DL (ref 8.3–10.6)
CHLORIDE BLD-SCNC: 104 MMOL/L (ref 99–110)
CO2: 24 MMOL/L (ref 21–32)
CREAT SERPL-MCNC: 0.7 MG/DL (ref 0.8–1.3)
GFR AFRICAN AMERICAN: >60
GFR NON-AFRICAN AMERICAN: >60
GLUCOSE BLD-MCNC: 148 MG/DL (ref 70–99)
GLUCOSE BLD-MCNC: 150 MG/DL (ref 70–99)
GLUCOSE BLD-MCNC: 150 MG/DL (ref 70–99)
GLUCOSE BLD-MCNC: 170 MG/DL (ref 70–99)
GLUCOSE BLD-MCNC: 228 MG/DL (ref 70–99)
PERFORMED ON: ABNORMAL
POTASSIUM REFLEX MAGNESIUM: 3.9 MMOL/L (ref 3.5–5.1)
SODIUM BLD-SCNC: 144 MMOL/L (ref 136–145)
TROPONIN: <0.01 NG/ML

## 2019-04-21 PROCEDURE — 36415 COLL VENOUS BLD VENIPUNCTURE: CPT

## 2019-04-21 PROCEDURE — 84484 ASSAY OF TROPONIN QUANT: CPT

## 2019-04-21 PROCEDURE — 6370000000 HC RX 637 (ALT 250 FOR IP): Performed by: HOSPITALIST

## 2019-04-21 PROCEDURE — 2500000003 HC RX 250 WO HCPCS: Performed by: HOSPITALIST

## 2019-04-21 PROCEDURE — 94760 N-INVAS EAR/PLS OXIMETRY 1: CPT

## 2019-04-21 PROCEDURE — 93005 ELECTROCARDIOGRAM TRACING: CPT | Performed by: HOSPITALIST

## 2019-04-21 PROCEDURE — 6370000000 HC RX 637 (ALT 250 FOR IP): Performed by: INTERNAL MEDICINE

## 2019-04-21 PROCEDURE — 94640 AIRWAY INHALATION TREATMENT: CPT

## 2019-04-21 PROCEDURE — 2100000000 HC CCU R&B

## 2019-04-21 PROCEDURE — 6360000002 HC RX W HCPCS: Performed by: INTERNAL MEDICINE

## 2019-04-21 PROCEDURE — 80048 BASIC METABOLIC PNL TOTAL CA: CPT

## 2019-04-21 PROCEDURE — 2700000000 HC OXYGEN THERAPY PER DAY

## 2019-04-21 PROCEDURE — 94664 DEMO&/EVAL PT USE INHALER: CPT

## 2019-04-21 PROCEDURE — 6360000002 HC RX W HCPCS: Performed by: HOSPITALIST

## 2019-04-21 RX ORDER — HYDRALAZINE HYDROCHLORIDE 20 MG/ML
10 INJECTION INTRAMUSCULAR; INTRAVENOUS ONCE
Status: COMPLETED | OUTPATIENT
Start: 2019-04-21 | End: 2019-04-21

## 2019-04-21 RX ORDER — TRAMADOL HYDROCHLORIDE 50 MG/1
50 TABLET ORAL ONCE
Status: COMPLETED | OUTPATIENT
Start: 2019-04-21 | End: 2019-04-21

## 2019-04-21 RX ADMIN — INSULIN LISPRO 4 UNITS: 100 INJECTION, SOLUTION INTRAVENOUS; SUBCUTANEOUS at 12:51

## 2019-04-21 RX ADMIN — SPIRONOLACTONE 50 MG: 25 TABLET ORAL at 07:48

## 2019-04-21 RX ADMIN — NICARDIPINE HYDROCHLORIDE 4 MG/HR: 0.1 INJECTION, SOLUTION INTRAVENOUS at 23:44

## 2019-04-21 RX ADMIN — LABETALOL HYDROCHLORIDE 20 MG: 5 INJECTION INTRAVENOUS at 07:47

## 2019-04-21 RX ADMIN — MOMETASONE FUROATE AND FORMOTEROL FUMARATE DIHYDRATE 2 PUFF: 200; 5 AEROSOL RESPIRATORY (INHALATION) at 08:20

## 2019-04-21 RX ADMIN — NICARDIPINE HYDROCHLORIDE 5 MG/HR: 0.1 INJECTION, SOLUTION INTRAVENOUS at 18:20

## 2019-04-21 RX ADMIN — NIFEDIPINE 90 MG: 90 TABLET, FILM COATED, EXTENDED RELEASE ORAL at 07:48

## 2019-04-21 RX ADMIN — BACITRACIN ZINC AND POLYMYXIN B SULFATE: at 07:56

## 2019-04-21 RX ADMIN — MIRTAZAPINE 15 MG: 15 TABLET, FILM COATED ORAL at 21:26

## 2019-04-21 RX ADMIN — CLONIDINE HYDROCHLORIDE 0.2 MG: 0.1 TABLET ORAL at 21:26

## 2019-04-21 RX ADMIN — INSULIN LISPRO 1 UNITS: 100 INJECTION, SOLUTION INTRAVENOUS; SUBCUTANEOUS at 21:33

## 2019-04-21 RX ADMIN — NICARDIPINE HYDROCHLORIDE 2.5 MG/HR: 0.1 INJECTION, SOLUTION INTRAVENOUS at 15:16

## 2019-04-21 RX ADMIN — ZOLPIDEM TARTRATE 10 MG: 5 TABLET ORAL at 21:26

## 2019-04-21 RX ADMIN — LISINOPRIL 20 MG: 20 TABLET ORAL at 07:48

## 2019-04-21 RX ADMIN — SPIRONOLACTONE 50 MG: 25 TABLET ORAL at 17:30

## 2019-04-21 RX ADMIN — TRAMADOL HYDROCHLORIDE 50 MG: 50 TABLET, FILM COATED ORAL at 08:43

## 2019-04-21 RX ADMIN — CLONAZEPAM 0.5 MG: 0.5 TABLET ORAL at 18:03

## 2019-04-21 RX ADMIN — SERTRALINE 100 MG: 100 TABLET, FILM COATED ORAL at 21:26

## 2019-04-21 RX ADMIN — MOMETASONE FUROATE AND FORMOTEROL FUMARATE DIHYDRATE 2 PUFF: 200; 5 AEROSOL RESPIRATORY (INHALATION) at 18:58

## 2019-04-21 RX ADMIN — ACETAMINOPHEN 650 MG: 325 TABLET ORAL at 16:20

## 2019-04-21 RX ADMIN — CARVEDILOL 3.12 MG: 3.12 TABLET, FILM COATED ORAL at 17:30

## 2019-04-21 RX ADMIN — CLONIDINE HYDROCHLORIDE 0.2 MG: 0.1 TABLET ORAL at 07:47

## 2019-04-21 RX ADMIN — INSULIN LISPRO 2 UNITS: 100 INJECTION, SOLUTION INTRAVENOUS; SUBCUTANEOUS at 17:29

## 2019-04-21 RX ADMIN — CARVEDILOL 3.12 MG: 3.12 TABLET, FILM COATED ORAL at 07:48

## 2019-04-21 RX ADMIN — ENOXAPARIN SODIUM 40 MG: 40 INJECTION SUBCUTANEOUS at 07:48

## 2019-04-21 RX ADMIN — HYDRALAZINE HYDROCHLORIDE 10 MG: 20 INJECTION INTRAMUSCULAR; INTRAVENOUS at 09:46

## 2019-04-21 RX ADMIN — HYDRALAZINE HYDROCHLORIDE 100 MG: 50 TABLET, FILM COATED ORAL at 07:48

## 2019-04-21 RX ADMIN — INSULIN LISPRO 2 UNITS: 100 INJECTION, SOLUTION INTRAVENOUS; SUBCUTANEOUS at 09:59

## 2019-04-21 ASSESSMENT — PAIN - FUNCTIONAL ASSESSMENT: PAIN_FUNCTIONAL_ASSESSMENT: PREVENTS OR INTERFERES SOME ACTIVE ACTIVITIES AND ADLS

## 2019-04-21 ASSESSMENT — PAIN SCALES - GENERAL
PAINLEVEL_OUTOF10: 5
PAINLEVEL_OUTOF10: 0
PAINLEVEL_OUTOF10: 3
PAINLEVEL_OUTOF10: 5

## 2019-04-21 ASSESSMENT — PAIN DESCRIPTION - ORIENTATION: ORIENTATION: MID

## 2019-04-21 ASSESSMENT — PAIN DESCRIPTION - PAIN TYPE: TYPE: ACUTE PAIN

## 2019-04-21 ASSESSMENT — PAIN DESCRIPTION - ONSET: ONSET: ON-GOING

## 2019-04-21 ASSESSMENT — PAIN DESCRIPTION - DESCRIPTORS: DESCRIPTORS: ACHING

## 2019-04-21 ASSESSMENT — PAIN DESCRIPTION - FREQUENCY: FREQUENCY: INTERMITTENT

## 2019-04-21 ASSESSMENT — PAIN DESCRIPTION - LOCATION: LOCATION: HEAD

## 2019-04-21 NOTE — PROGRESS NOTES
pressure    Renal artery study: Non diagnostic exam.  A.m. Cortisol: Low: R/O cushing disease. metanephrines level , renin and Aldosterone level are pending   Nephro are following: D/W with Dr Sherie Box.      Syncope on admission    Patient had similar presentation 3 weeks ago. Patient felt lightheaded after going to the bathroom and then lost his consciousness. He reports some loose stool for the last couple days. Orthostatic were positive in the ED. Patient was seen by cardiology 3 weeks ago for similar presentation. Resolved        Acute kidney injury  Most likely prerenal with loose stool and diarrhea  Resolved   Avoid nephrotoxic medications       Diarrhea:   PCR pathogen and C. diff were negative  Imodium     COPD without any acute exacerbation  Continue with home meds     Type 2 diabetes mellitus  Sliding scale insulin           Positive drug screen:  Patient drug screen is positive for cocaine. Patient denies any recent use.   Counseling was provided        Code status:  Full   DVT prophylaxis: Lovenox   Disposition: Pending blood pressure improvement       Electronically signed by Stephane Gao MD on 4/21/2019 at 2:22 PM

## 2019-04-21 NOTE — PROGRESS NOTES
04/21/2019    1915 - Bedside shift hand-off done w/ off-going RN JAMES Portillo. Pt. is alert and oriented, SR on the monitor, on room air, and not in any distress. He is on Nicardipine drip @ 5 mg/hr. No needs right now except he needs to urinate. Urinal was given to him. He's steady on his feet and can stand at the bedside to use the urinal.    1949 - Shift assessment completed. He denies any pain nor any SOB. Plan of care for this shift explained to him. Nicardipine drip to be titrated for  mmHg. 2133 - Fingerstick blood sugar = 170 mg/dl. This RN covered this w/ 1 unit SQ Humalog per sliding scale. Due medications were also given, including Ambien per his request.    8741 - Called-out to ask for 2 cups of sugar-free pudding; these were given to him. [address] [place] [address]. 04/22/2019    0003 - Sleeping but easily arousable w/ minimal verbal stimuli. His vitals are stable and Nicardipine drip is now down to 3 mg/hr. 0201 - Just woke-up a few minutes ago. He complained of a \"6\"/10 R and L temporal area \"throbbing\" headache. Tylenol 650 mg PO was given. PRN Clonazepam 0.5 mg PO was also administered per his request.    0244 - O2 sat had been hanging around 86-87% for more than a minute already. He was sleeping, snoring, and lying on his R side. Placed on O2 @ 1L/min per nasal cannula. He said he has a history of sleep apnea but doesn't use a CPAP machine at home.    0301 - O2 sat = 92% on O2 @ 1L. He's still asleep. 5146 - Reassessment unchanged. His vitals are stable w/ Nicardipine @ 2 mg/hr. He is still alert and oriented, denies any pain nor any SOB, and not in any distress. Denies any needs right now. 9679 - Sleeping and not in any distress. 1930 - SBP within goal. Titrated Nicardipine down to 1 mg/hr. 4564 - Bedside shift hand-off done w/ oncoming RUSTY Mcmanus to assume pt. care. Pt. just woke-up during hand-off. He denies any pain and is not in any distress.     Electronically signed by

## 2019-04-21 NOTE — PROGRESS NOTES
4 Eyes Skin Assessment     The patient is being assess for  Transfer to New Unit    I agree that 2 RN's have performed a thorough Head to Toe Skin Assessment on the patient. ALL assessment sites listed below have been assessed. Areas assessed by both nurses:   [x]   Head, Face, and Ears   [x]   Shoulders, Back, and Chest  [x]   Arms, Elbows, and Hands   [x]   Coccyx, Sacrum, and IschIum  [x]   Legs, Feet, and Heels        Does the Patient have Skin Breakdown?   No         Mundo Prevention initiated:  No   Wound Care Orders initiated:  NA      [facility] nurse consulted for Pressure Injury (Stage 3,4, Unstageable, DTI, NWPT, and Complex wounds), New and Established Ostomies:  NA      Nurse 1 eSignature: Electronically signed by Vidal Acuña RN on 4/21/19 at 3:33 PM    **SHARE this note so that the co-signing nurse is able to place an eSignature**    Nurse 2 eSignature: Electronically signed by Daniel Carreno RN on 4/21/19 at 7:07 PM

## 2019-04-21 NOTE — PROGRESS NOTES
1409: received report from [facility]: pt arrived to CVU from 4255 via wheel chair with RN. Assessment as noted. VSS on room air. HTN noted on monitor. Started patient on cardene gtt, will titrate to reach goal . Pt has lionel elbow and shin psoriasis, redness. 4 eye assessment done with other RN. Pt c/o 3/10 headache. 1610: Dr Etienne Poon at bedside rounding on patient. No new orders. 1800: titrating Cardene gtt to reach goal .   1915: gave handoff report to Mani Calvillo, RN. Left patient in stable condition.    Electronically signed by Kenzie Rivero RN on 4/21/2019 at 7:20 PM

## 2019-04-21 NOTE — PROGRESS NOTES
Nephrology progress Note        Chief Complaint:    Chief Complaint   Patient presents with    Shortness of Breath     SOB,CP,dizziness since this morning. reports LOC x 2. reports hitting head. denies blood thinners.  Chest Pain    Dizziness    Loss of Consciousness         History of Present iIlness:    Denita De Jesus is a 59 y.o. male with h/o uncontrolled HTN . He was admitted with c/o SOB, nausea, diarrhea. He had an episode of loss of consciousness  He is on multiple BP meds and BP is still uncontrolled   Has h/o non compliance with meds.    He had ALEA which improved over last few days     His Urine tox screen is positive for cocaine     On multiple meds for HTN       Interval HX     Still has diarrhea   SBP now in 160s  No headaches   Started on aldactone yesterday       Past Medical History:   Diagnosis Date    CAD (coronary artery disease)     Chest pain 10/15/2017    [facility] [address]     Diabetes mellitus ([facility] [address]     Hyperlipidemia     Hypertension     Kidney stone     MRSA colonization 1/19/19;04/01/2018    MRSA colonization 4/16/19; 03/24/2019    MRSA infection        Past Surgical History:   Procedure Laterality Date    ABDOMEN SURGERY      CARDIAC SURGERY      3 STENTS PLACED     CHOLECYSTECTOMY      COLONOSCOPY      CORONARY ANGIOPLASTY WITH STENT PLACEMENT      3 stents     FRACTURE SURGERY      RIGHT ANKLE    LITHOTRIPSY      SHOULDER ARTHROSCOPY      VASCULAR SURGERY         Family History   Problem Relation Age of Onset    High Blood Pressure Mother     Cancer Father         lung cancer    Diabetes Brother     Liver Disease Sister     Diabetes Brother          Current Medications:      niCARdipine (CARDENE) 20 mg in 0.9 % sodium chloride 200 mL infusion Continuous   spironolactone (ALDACTONE) tablet 50 mg BID   butalbital-acetaminophen-caffeine (FIORICET, ESGIC) per tablet 1 tablet Q4H PRN   lisinopril (PRINIVIL;ZESTRIL) tablet 20 mg Daily   acetaminophen (TYLENOL) tablet 650 mg Q4H PRN   cloNIDine (CATAPRES) tablet 0.2 mg BID   clonazePAM (KLONOPIN) tablet 0.5 mg BID PRN   zolpidem (AMBIEN) tablet 10 mg Nightly PRN   albuterol sulfate  (90 Base) MCG/ACT inhaler 2 puff Q4H PRN   carvedilol (COREG) tablet 3.125 mg BID WC   sertraline (ZOLOFT) tablet 100 mg Nightly   loperamide (IMODIUM) capsule 2 mg 4x Daily PRN   mirtazapine (REMERON) tablet 15 mg Nightly   sodium chloride flush 0.9 % injection 10 mL 2 times per day   sodium chloride flush 0.9 % injection 10 mL PRN   magnesium hydroxide (MILK OF MAGNESIA) 400 MG/5ML suspension 30 mL Daily PRN   ondansetron (ZOFRAN) injection 4 mg Q6H PRN   enoxaparin (LOVENOX) injection 40 mg Daily   insulin lispro (HUMALOG) injection vial 0-12 Units TID WC   insulin lispro (HUMALOG) injection vial 0-6 Units Nightly   glucose (GLUTOSE) 40 % oral gel 15 g PRN   dextrose 50 % solution 12.5 g PRN   glucagon (rDNA) injection 1 mg PRN   dextrose 5 % solution PRN   bacitracin-polymyxin b (POLYSPORIN) ointment BID   mometasone-formoterol (DULERA) 200-5 MCG/ACT inhaler 2 puff BID   albuterol (PROVENTIL) nebulizer solution 2.5 mg Q4H PRN             Physical exam:     Vitals:  BP (!) 175/91   Pulse 76   Temp 97.9 °F (36.6 °C) (Oral)   Resp 16   Ht 6' (1.829 m)   Wt 253 lb 15.5 oz (115.2 kg)   SpO2 96%   BMI 34.44 kg/m²   Constitutional:  OAA X3 NAD  Skin: no rash, turgor wnl  Heent:  eomi, mmm  Neck: no bruits or jvd noted  Cardiovascular:  S1, S2 without m/r/g  Respiratory: CTA B without w/r/r  Abdomen:  +bs, soft, nt, nd  Ext: no lower extremity edema  Psychiatric: mood and affect appropriate  Musculoskeletal:  Rom, muscular strength intact    Labs:  CBC:   Recent Labs     04/20/19  0637   WBC 5.0   HGB 12.4*        BMP:    Recent Labs     04/19/19  0635 04/20/19  0637 04/21/19  0740    144 144   K 3.7 4.0 3.9    106 104   CO2 26 28 24   BUN 17 17 15   CREATININE 0.8 0.9 0.7* GLUCOSE 155* 152* 150*     Ca/Mg/Phos:   Recent Labs     04/19/19  0635 04/20/19  0637 04/21/19  0740   CALCIUM 8.6 8.4 8.5     Hepatic: No results for input(s): AST, ALT, ALB, BILITOT, ALKPHOS in the last 72 hours. Troponin:   Recent Labs     04/21/19  0740   TROPONINI <0.01     BNP: No results for input(s): BNP in the last 72 hours. Lipids: No results for input(s): CHOL, TRIG, HDL, LDLCALC, LABVLDL in the last 72 hours. ABGs: No results for input(s): PHART, PO2ART, NTH4TOA in the last 72 hours. INR: No results for input(s): INR in the last 72 hours. UA:No results for input(s): Michelle Phy, GLUCOSEU, BILIRUBINUR, Christina Diver, BLOODU, PHUR, PROTEINU, UROBILINOGEN, NITRU, LEUKOCYTESUR, LABMICR, URINETYPE in the last 72 hours. Urine Microscopic: No results for input(s): LABCAST, BACTERIA, COMU, HYALCAST, WBCUA, RBCUA, EPIU in the last 72 hours. Urine Culture: No results for input(s): LABURIN in the last 72 hours. Urine Chemistry: No results for input(s): Junaid Kuster, PROTEINUR, NAUR in the last 72 hours. IMAGING:  VL Renal Arterial Duplex Complete   Final Result      XR CHEST PORTABLE   Final Result   No acute abnormality detected. I/O last 3 completed shifts: In: 480 [P.O.:480]  Out: -     Assessment/Plan :      1. Uncontrolled  HTN. BP very labile   On multiple BP meds   Has headache when BP high   D/w Dr. Victor Hugo Mayo . Start cardene drip. Stop nifedipine XL and hydralazine     Check renin, ary levels  Plasma metanephrine levels already ordered  renal doppler study shows patent veins, arteries could not be visualized   Low sodium diet     2. ALEA - resolved     3. Cocaine abuse.  advised to avoid illicit drug use             Thank you for allowing us to participate in care of Ed Pont         Electronically signed by: Penny Ware MD, 4/21/2019, 1:08 PM      [facility] [address]  Office : [phone]  Fax :[phone]

## 2019-04-22 LAB
ANION GAP SERPL CALCULATED.3IONS-SCNC: 12 MMOL/L (ref 3–16)
BASOPHILS ABSOLUTE: 0 K/UL (ref 0–0.2)
BASOPHILS RELATIVE PERCENT: 0.4 %
BUN BLDV-MCNC: 18 MG/DL (ref 7–20)
CALCIUM SERPL-MCNC: 8.9 MG/DL (ref 8.3–10.6)
CHLORIDE BLD-SCNC: 106 MMOL/L (ref 99–110)
CO2: 24 MMOL/L (ref 21–32)
CREAT SERPL-MCNC: 0.9 MG/DL (ref 0.8–1.3)
EKG ATRIAL RATE: 67 BPM
EKG ATRIAL RATE: 70 BPM
EKG DIAGNOSIS: NORMAL
EKG DIAGNOSIS: NORMAL
EKG P AXIS: 36 DEGREES
EKG P AXIS: 51 DEGREES
EKG P-R INTERVAL: 174 MS
EKG P-R INTERVAL: 190 MS
EKG Q-T INTERVAL: 416 MS
EKG Q-T INTERVAL: 418 MS
EKG QRS DURATION: 104 MS
EKG QRS DURATION: 94 MS
EKG QTC CALCULATION (BAZETT): 439 MS
EKG QTC CALCULATION (BAZETT): 451 MS
EKG R AXIS: 70 DEGREES
EKG R AXIS: 78 DEGREES
EKG T AXIS: 68 DEGREES
EKG T AXIS: 71 DEGREES
EKG VENTRICULAR RATE: 67 BPM
EKG VENTRICULAR RATE: 70 BPM
EOSINOPHILS ABSOLUTE: 0.1 K/UL (ref 0–0.6)
EOSINOPHILS RELATIVE PERCENT: 1.9 %
GFR AFRICAN AMERICAN: >60
GFR NON-AFRICAN AMERICAN: >60
GLUCOSE BLD-MCNC: 136 MG/DL (ref 70–99)
GLUCOSE BLD-MCNC: 145 MG/DL (ref 70–99)
GLUCOSE BLD-MCNC: 160 MG/DL (ref 70–99)
GLUCOSE BLD-MCNC: 160 MG/DL (ref 70–99)
GLUCOSE BLD-MCNC: 187 MG/DL (ref 70–99)
HCT VFR BLD CALC: 39.4 % (ref 40.5–52.5)
HEMOGLOBIN: 12.6 G/DL (ref 13.5–17.5)
LYMPHOCYTES ABSOLUTE: 1.5 K/UL (ref 1–5.1)
LYMPHOCYTES RELATIVE PERCENT: 26.8 %
MCH RBC QN AUTO: 27.7 PG (ref 26–34)
MCHC RBC AUTO-ENTMCNC: 31.9 G/DL (ref 31–36)
MCV RBC AUTO: 86.9 FL (ref 80–100)
MONOCYTES ABSOLUTE: 0.5 K/UL (ref 0–1.3)
MONOCYTES RELATIVE PERCENT: 8 %
NEUTROPHILS ABSOLUTE: 3.6 K/UL (ref 1.7–7.7)
NEUTROPHILS RELATIVE PERCENT: 62.9 %
PDW BLD-RTO: 14.4 % (ref 12.4–15.4)
PERFORMED ON: ABNORMAL
PLATELET # BLD: 186 K/UL (ref 135–450)
PMV BLD AUTO: 8.1 FL (ref 5–10.5)
POTASSIUM REFLEX MAGNESIUM: 3.8 MMOL/L (ref 3.5–5.1)
RBC # BLD: 4.53 M/UL (ref 4.2–5.9)
SODIUM BLD-SCNC: 142 MMOL/L (ref 136–145)
WBC # BLD: 5.7 K/UL (ref 4–11)

## 2019-04-22 PROCEDURE — 2500000003 HC RX 250 WO HCPCS: Performed by: HOSPITALIST

## 2019-04-22 PROCEDURE — 6360000002 HC RX W HCPCS: Performed by: INTERNAL MEDICINE

## 2019-04-22 PROCEDURE — 94762 N-INVAS EAR/PLS OXIMTRY CONT: CPT

## 2019-04-22 PROCEDURE — 6370000000 HC RX 637 (ALT 250 FOR IP): Performed by: INTERNAL MEDICINE

## 2019-04-22 PROCEDURE — 36415 COLL VENOUS BLD VENIPUNCTURE: CPT

## 2019-04-22 PROCEDURE — 6370000000 HC RX 637 (ALT 250 FOR IP): Performed by: HOSPITALIST

## 2019-04-22 PROCEDURE — 93010 ELECTROCARDIOGRAM REPORT: CPT | Performed by: INTERNAL MEDICINE

## 2019-04-22 PROCEDURE — 94640 AIRWAY INHALATION TREATMENT: CPT

## 2019-04-22 PROCEDURE — 2700000000 HC OXYGEN THERAPY PER DAY

## 2019-04-22 PROCEDURE — 80048 BASIC METABOLIC PNL TOTAL CA: CPT

## 2019-04-22 PROCEDURE — 2100000000 HC CCU R&B

## 2019-04-22 PROCEDURE — 85025 COMPLETE CBC W/AUTO DIFF WBC: CPT

## 2019-04-22 RX ORDER — NIFEDIPINE 30 MG/1
30 TABLET, EXTENDED RELEASE ORAL 2 TIMES DAILY
Status: DISCONTINUED | OUTPATIENT
Start: 2019-04-22 | End: 2019-04-24

## 2019-04-22 RX ADMIN — CLONIDINE HYDROCHLORIDE 0.2 MG: 0.1 TABLET ORAL at 21:59

## 2019-04-22 RX ADMIN — LOPERAMIDE HYDROCHLORIDE 2 MG: 2 CAPSULE ORAL at 16:41

## 2019-04-22 RX ADMIN — LISINOPRIL 20 MG: 20 TABLET ORAL at 08:18

## 2019-04-22 RX ADMIN — ZOLPIDEM TARTRATE 10 MG: 5 TABLET ORAL at 21:59

## 2019-04-22 RX ADMIN — INSULIN LISPRO 2 UNITS: 100 INJECTION, SOLUTION INTRAVENOUS; SUBCUTANEOUS at 17:43

## 2019-04-22 RX ADMIN — ENOXAPARIN SODIUM 40 MG: 40 INJECTION SUBCUTANEOUS at 08:18

## 2019-04-22 RX ADMIN — NIFEDIPINE 30 MG: 30 TABLET, FILM COATED, EXTENDED RELEASE ORAL at 11:29

## 2019-04-22 RX ADMIN — MOMETASONE FUROATE AND FORMOTEROL FUMARATE DIHYDRATE 2 PUFF: 200; 5 AEROSOL RESPIRATORY (INHALATION) at 19:58

## 2019-04-22 RX ADMIN — INSULIN LISPRO 2 UNITS: 100 INJECTION, SOLUTION INTRAVENOUS; SUBCUTANEOUS at 21:57

## 2019-04-22 RX ADMIN — MIRTAZAPINE 15 MG: 15 TABLET, FILM COATED ORAL at 21:59

## 2019-04-22 RX ADMIN — CARVEDILOL 3.12 MG: 3.12 TABLET, FILM COATED ORAL at 16:41

## 2019-04-22 RX ADMIN — CLONAZEPAM 0.5 MG: 0.5 TABLET ORAL at 20:58

## 2019-04-22 RX ADMIN — SPIRONOLACTONE 50 MG: 25 TABLET ORAL at 16:40

## 2019-04-22 RX ADMIN — NIFEDIPINE 30 MG: 30 TABLET, FILM COATED, EXTENDED RELEASE ORAL at 21:59

## 2019-04-22 RX ADMIN — CLONAZEPAM 0.5 MG: 0.5 TABLET ORAL at 10:01

## 2019-04-22 RX ADMIN — CLONAZEPAM 0.5 MG: 0.5 TABLET ORAL at 02:01

## 2019-04-22 RX ADMIN — CARVEDILOL 3.12 MG: 3.12 TABLET, FILM COATED ORAL at 08:18

## 2019-04-22 RX ADMIN — NICARDIPINE HYDROCHLORIDE 1 MG/HR: 0.1 INJECTION, SOLUTION INTRAVENOUS at 10:01

## 2019-04-22 RX ADMIN — MOMETASONE FUROATE AND FORMOTEROL FUMARATE DIHYDRATE 2 PUFF: 200; 5 AEROSOL RESPIRATORY (INHALATION) at 07:54

## 2019-04-22 RX ADMIN — SPIRONOLACTONE 50 MG: 25 TABLET ORAL at 08:17

## 2019-04-22 RX ADMIN — NICARDIPINE HYDROCHLORIDE 3.5 MG/HR: 0.1 INJECTION, SOLUTION INTRAVENOUS at 22:02

## 2019-04-22 RX ADMIN — INSULIN LISPRO 2 UNITS: 100 INJECTION, SOLUTION INTRAVENOUS; SUBCUTANEOUS at 12:52

## 2019-04-22 RX ADMIN — CLONIDINE HYDROCHLORIDE 0.2 MG: 0.1 TABLET ORAL at 08:17

## 2019-04-22 RX ADMIN — SERTRALINE 100 MG: 100 TABLET, FILM COATED ORAL at 21:59

## 2019-04-22 RX ADMIN — ACETAMINOPHEN 650 MG: 325 TABLET ORAL at 02:01

## 2019-04-22 ASSESSMENT — PAIN - FUNCTIONAL ASSESSMENT: PAIN_FUNCTIONAL_ASSESSMENT: ACTIVITIES ARE NOT PREVENTED

## 2019-04-22 ASSESSMENT — PAIN SCALES - GENERAL
PAINLEVEL_OUTOF10: 6
PAINLEVEL_OUTOF10: 0
PAINLEVEL_OUTOF10: 0
PAINLEVEL_OUTOF10: 1
PAINLEVEL_OUTOF10: 0

## 2019-04-22 ASSESSMENT — PAIN DESCRIPTION - DESCRIPTORS: DESCRIPTORS: THROBBING

## 2019-04-22 ASSESSMENT — PAIN DESCRIPTION - ORIENTATION: ORIENTATION: RIGHT;LEFT

## 2019-04-22 ASSESSMENT — PAIN DESCRIPTION - LOCATION: LOCATION: HEAD

## 2019-04-22 ASSESSMENT — PAIN DESCRIPTION - PAIN TYPE: TYPE: ACUTE PAIN

## 2019-04-22 NOTE — PROGRESS NOTES
6320- Report received from Lupe Meneses, [facility]. Patient currently resting quietly in bed, eyes closed. No complaints at this time. Cardene infusion currently at 1 mg/hr. Will monitor. 8618- Patient assisted to bathroom, states had loose BM, but improved from earlier this admission. Denies pain at this time. Cardene continues to infuse at 1 mg/hr. 1000- patient with complaints of anxiety, requesting Klonopin at this time. Medicated per PRN order. 36- Dr Troy Howard at bedside for eval. Will start nifedipine, then wean off 4-5 hours after. Renal artery study to be repeated. 1230- assessment unchanged from previous, Dr Chloe Villatoro at bedside for eval. Will continue to wean Cardene as able. Patient without complaints at this time. Will monitor. 1600- patient assisted to bathroom, states had loose stool at this time. Requesting immodium now. BP fluctuating, continuing to monitor. Unable to wean cardene off, discussed with Dr Chloe Villatoro. Will continue to titrate. 3623- Report given to Matthew Mccord RN to assume care. No complaints noted. Patient resting quietly at this time.

## 2019-04-22 NOTE — PROGRESS NOTES
Nephrology progress Note        Chief Complaint:    Chief Complaint   Patient presents with    Shortness of Breath     SOB,CP,dizziness since this morning. reports LOC x 2. reports hitting head. denies blood thinners.  Chest Pain    Dizziness    Loss of Consciousness         History of Present iIlness:    Davian Joseph is a 59 y.o. male with h/o uncontrolled HTN . He was admitted with c/o SOB, nausea, diarrhea. He had an episode of loss of consciousness  He is on multiple BP meds and BP is still uncontrolled   Has h/o non compliance with meds. He had ALEA which improved over last few days     His Urine tox screen is positive for cocaine     On multiple meds for HTN       Interval HX     BP stable now on cardene drip   On cardene 1 mg/ hr   No headache and chest pain today      Past Medical History:   Diagnosis Date    CAD (coronary artery disease)     Chest pain 10/15/2017    [facility] [address]     Diabetes mellitus ([facility] [address]     Hyperlipidemia     Hypertension     Kidney stone     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        Past Surgical History:   Procedure Laterality Date    ABDOMEN SURGERY      CARDIAC SURGERY      3 STENTS PLACED     CHOLECYSTECTOMY      COLONOSCOPY      CORONARY ANGIOPLASTY WITH STENT PLACEMENT      3 stents     FRACTURE SURGERY      RIGHT ANKLE    LITHOTRIPSY      SHOULDER ARTHROSCOPY      VASCULAR SURGERY         Family History   Problem Relation Age of Onset    High Blood Pressure Mother     Cancer Father         lung cancer    Diabetes Brother     Liver Disease Sister     Diabetes Brother          Current Medications:      niCARdipine (CARDENE) 20 mg in 0.9 % sodium chloride 200 mL infusion Continuous   spironolactone (ALDACTONE) tablet 50 mg BID   butalbital-acetaminophen-caffeine (FIORICET, ESGIC) per tablet 1 tablet Q4H PRN   lisinopril (PRINIVIL;ZESTRIL) tablet 20 mg Daily   acetaminophen (TYLENOL) tablet 650 mg Q4H PRN   cloNIDine (CATAPRES) tablet 0.2 mg BID   clonazePAM (KLONOPIN) tablet 0.5 mg BID PRN   zolpidem (AMBIEN) tablet 10 mg Nightly PRN   albuterol sulfate  (90 Base) MCG/ACT inhaler 2 puff Q4H PRN   carvedilol (COREG) tablet 3.125 mg BID WC   sertraline (ZOLOFT) tablet 100 mg Nightly   loperamide (IMODIUM) capsule 2 mg 4x Daily PRN   mirtazapine (REMERON) tablet 15 mg Nightly   sodium chloride flush 0.9 % injection 10 mL 2 times per day   sodium chloride flush 0.9 % injection 10 mL PRN   magnesium hydroxide (MILK OF MAGNESIA) 400 MG/5ML suspension 30 mL Daily PRN   ondansetron (ZOFRAN) injection 4 mg Q6H PRN   enoxaparin (LOVENOX) injection 40 mg Daily   insulin lispro (HUMALOG) injection vial 0-12 Units TID WC   insulin lispro (HUMALOG) injection vial 0-6 Units Nightly   glucose (GLUTOSE) 40 % oral gel 15 g PRN   dextrose 50 % solution 12.5 g PRN   glucagon (rDNA) injection 1 mg PRN   dextrose 5 % solution PRN   mometasone-formoterol (DULERA) 200-5 MCG/ACT inhaler 2 puff BID   albuterol (PROVENTIL) nebulizer solution 2.5 mg Q4H PRN             Physical exam:     Vitals:  BP (!) 147/83   Pulse 71   Temp 97.9 °F (36.6 °C) (Oral)   Resp 17   Ht 6' (1.829 m)   Wt 245 lb 6 oz (111.3 kg)   SpO2 97%   BMI 33.28 kg/m²   Constitutional:  OAA X3 NAD  Skin: no rash, turgor wnl  Heent:  eomi, mmm  Neck: no bruits or jvd noted  Cardiovascular:  S1, S2 without m/r/g  Respiratory: CTA B without w/r/r  Abdomen:  +bs, soft, nt, nd  Ext: no lower extremity edema  Psychiatric: mood and affect appropriate  Musculoskeletal:  Rom, muscular strength intact    Labs:  CBC:   Recent Labs     04/20/19  0637 04/22/19  0405   WBC 5.0 5.7   HGB 12.4* 12.6*    186     BMP:    Recent Labs     04/20/19  0637 04/21/19  0740 04/22/19  0405    144 142   K 4.0 3.9 3.8    104 106   CO2 28 24 24   BUN 17 15 18   CREATININE 0.9 0.7* 0.9   GLUCOSE 152* 150* 145*     Ca/Mg/Phos:   Recent Labs     04/20/19  0637 04/21/19  0740 04/22/19  0405   CALCIUM 8.4 8.5 8.9     Hepatic: No results for input(s): AST, ALT, ALB, BILITOT, ALKPHOS in the last 72 hours. Troponin:   Recent Labs     04/21/19  0740   TROPONINI <0.01     BNP: No results for input(s): BNP in the last 72 hours. Lipids: No results for input(s): CHOL, TRIG, HDL, LDLCALC, LABVLDL in the last 72 hours. ABGs: No results for input(s): PHART, PO2ART, BFX6WTV in the last 72 hours. INR: No results for input(s): INR in the last 72 hours. UA:No results for input(s): Siobhan Thine, GLUCOSEU, BILIRUBINUR, [place] , BLOODU, PHUR, PROTEINU, UROBILINOGEN, NITRU, LEUKOCYTESUR, LABMICR, URINETYPE in the last 72 hours. Urine Microscopic: No results for input(s): LABCAST, BACTERIA, COMU, HYALCAST, WBCUA, RBCUA, EPIU in the last 72 hours. Urine Culture: No results for input(s): LABURIN in the last 72 hours. Urine Chemistry: No results for input(s): Usman Bears, PROTEINUR, NAUR in the last 72 hours. IMAGING:  VL Renal Arterial Duplex Complete   Final Result      XR CHEST PORTABLE   Final Result   No acute abnormality detected. I/O last 3 completed shifts: In: 9733 [P. O.:660; I.V.:544]  Out: 2310 [Urine:2310]    Assessment/Plan :      1. Uncontrolled  HTN. On cardene drip now. Will start nifedipine XL 30 mg po bID   Try to taper off cardene today    Will RePeat renal doppler study to r/o renal artery stenosis         Check renin, ary levels  Plasma metanephrine levels already ordered  Low sodium diet     2. ALEA - resolved     3. Cocaine abuse.  advised to avoid illicit drug use             Thank you for allowing us to participate in care of Leopold Camps         Electronically signed by: Lisa Rosas MD, 4/22/2019, 8:15 AM      [facility]  Office : [phone]  Fax :[phone]

## 2019-04-22 NOTE — PROGRESS NOTES
Hospitalist Progress Note      PCP: Brien Luther MD    Date of Admission: 4/15/2019    Chief Complaint: Syncope    Hospital Course: Patient had from home after having a syncopal episode. Patient later found to have hypertensive emergency and required increased levels of oral hypertensive medication. Patient then required Cardene drip. Subjective: Seen and examined. States he feels fine but every once in a while he does have headache along with blurred vision. Patient also decreased on Cardene drip from 2 to1 but later in the day his blood pressure increased and required increased amounts of Cardene again. Medications:  Reviewed    Infusion Medications    niCARdipine 1 mg/hr (04/22/19 0619)    dextrose       Scheduled Medications    spironolactone  50 mg Oral BID    lisinopril  20 mg Oral Daily    cloNIDine  0.2 mg Oral BID    carvedilol  3.125 mg Oral BID WC    sertraline  100 mg Oral Nightly    mirtazapine  15 mg Oral Nightly    sodium chloride flush  10 mL Intravenous 2 times per day    enoxaparin  40 mg Subcutaneous Daily    insulin lispro  0-12 Units Subcutaneous TID WC    insulin lispro  0-6 Units Subcutaneous Nightly    mometasone-formoterol  2 puff Inhalation BID     PRN Meds: butalbital-acetaminophen-caffeine, acetaminophen, clonazePAM, zolpidem, albuterol sulfate HFA, loperamide, sodium chloride flush, magnesium hydroxide, ondansetron, glucose, dextrose, glucagon (rDNA), dextrose, albuterol      Intake/Output Summary (Last 24 hours) at 4/22/2019 0825  Last data filed at 4/22/2019 6842  Gross per 24 hour   Intake 1204 ml   Output 2310 ml   Net -1106 ml       Physical Exam Performed:    BP (!) 147/83   Pulse 71   Temp 97.9 °F (36.6 °C) (Oral)   Resp 17   Ht 6' (1.829 m)   Wt 245 lb 6 oz (111.3 kg)   SpO2 97%   BMI 33.28 kg/m²     General appearance: No apparent distress, appears stated age and cooperative. HEENT: Pupils equal, round, and reactive to light. Conjunctivae/corneas clear. Neck: Supple, with full range of motion. No jugular venous distention. Trachea midline. Respiratory:  Normal respiratory effort. Clear to auscultation, bilaterally without Rales/Wheezes/Rhonchi. Cardiovascular: Regular rate and rhythm with normal S1/S2 without murmurs, rubs or gallops. Abdomen: Soft, non-tender, non-distended with normal bowel sounds. Musculoskeletal: No clubbing, cyanosis or edema bilaterally. Skin: Skin color, texture, turgor normal.  No rashes or lesions. Neurologic:  Neurovascularly intact without any focal sensory/motor deficits. Cranial nerves: II-XII intact, grossly non-focal.  Psychiatric: Alert and oriented, thought content appropriate, normal insight  Capillary Refill: Brisk,< 3 seconds   Peripheral Pulses: +2 palpable, equal bilaterally       Labs:   Recent Labs     04/20/19  0637 04/22/19  0405   WBC 5.0 5.7   HGB 12.4* 12.6*   HCT 37.8* 39.4*    186     Recent Labs     04/20/19  0637 04/21/19  0740 04/22/19  0405    144 142   K 4.0 3.9 3.8    104 106   CO2 28 24 24   BUN 17 15 18   CREATININE 0.9 0.7* 0.9   CALCIUM 8.4 8.5 8.9     No results for input(s): AST, ALT, BILIDIR, BILITOT, ALKPHOS in the last 72 hours. No results for input(s): INR in the last 72 hours. Recent Labs     04/21/19  0740   TROPONINI <0.01       Urinalysis:      Lab Results   Component Value Date    NITRU Negative 04/16/2019    WBCUA 1 11/08/2018    BACTERIA Rare 01/19/2018    RBCUA 1 11/08/2018    BLOODU Negative 04/16/2019    SPECGRAV 1.012 04/16/2019    GLUCOSEU Negative 04/16/2019       Radiology:  VL Renal Arterial Duplex Complete   Final Result      XR CHEST PORTABLE   Final Result   No acute abnormality detected.                  Assessment/Plan:    Hypertension urgency  Continues to have high readings.   Patient has been on nifedipine, lisinopril, Hydralazine and clonidine  Aldactone increased  cardene drip, decrease drip and add back on CCB     Renal artery study: Non diagnostic exam, repeat study  A.m. Cortisol: Low: R/O cushing disease. metanephrines level , renin and Aldosterone level are pending   Nephro are following: D/W with Dr Yue Lancaster.      Syncope on admission   Patient had similar presentation 3 weeks ago. Patient felt lightheaded after going to the bathroom and then lost his consciousness. He reports some loose stool for the last couple days. Orthostatic were positive in the ED. Patient was seen by cardiology 3 weeks ago for similar presentation. Resolved      Acute kidney injury  Most likely prerenal with loose stool and diarrhea  Resolved   Avoid nephrotoxic medications       Diarrhea:  PCR pathogen and C. diff were negative  Imodium     COPD without any acute exacerbation  Continue with home meds     Type 2 diabetes mellitus  Sliding scale insulin     Cocaine Abuse  Patient drug screen is positive for cocaine. Patient denies any recent use.   Counseling was provided      DVT Prophylaxis: lovenox  Diet: DIET CARB CONTROL;  Code Status: Full Code    PT/OT Eval Status: NA    Dispo - ICU    I spent greater than 30 minutes of critical care time with patient regards to his life-threatening hypertensive emergency requiring Cardene jay Marques MD

## 2019-04-23 LAB
ALDOSTERONE: 4.3 NG/DL
ALDOSTERONE: 5.7 NG/DL
GLUCOSE BLD-MCNC: 135 MG/DL (ref 70–99)
GLUCOSE BLD-MCNC: 172 MG/DL (ref 70–99)
GLUCOSE BLD-MCNC: 182 MG/DL (ref 70–99)
GLUCOSE BLD-MCNC: 287 MG/DL (ref 70–99)
PERFORMED ON: ABNORMAL

## 2019-04-23 PROCEDURE — 93976 VASCULAR STUDY: CPT

## 2019-04-23 PROCEDURE — 94640 AIRWAY INHALATION TREATMENT: CPT

## 2019-04-23 PROCEDURE — 6370000000 HC RX 637 (ALT 250 FOR IP): Performed by: INTERNAL MEDICINE

## 2019-04-23 PROCEDURE — 2500000003 HC RX 250 WO HCPCS: Performed by: HOSPITALIST

## 2019-04-23 PROCEDURE — 6360000002 HC RX W HCPCS: Performed by: INTERNAL MEDICINE

## 2019-04-23 PROCEDURE — 2100000000 HC CCU R&B

## 2019-04-23 PROCEDURE — 6370000000 HC RX 637 (ALT 250 FOR IP): Performed by: HOSPITALIST

## 2019-04-23 PROCEDURE — 94761 N-INVAS EAR/PLS OXIMETRY MLT: CPT

## 2019-04-23 RX ADMIN — LISINOPRIL 20 MG: 20 TABLET ORAL at 09:37

## 2019-04-23 RX ADMIN — MOMETASONE FUROATE AND FORMOTEROL FUMARATE DIHYDRATE 2 PUFF: 200; 5 AEROSOL RESPIRATORY (INHALATION) at 07:48

## 2019-04-23 RX ADMIN — CARVEDILOL 3.12 MG: 3.12 TABLET, FILM COATED ORAL at 09:37

## 2019-04-23 RX ADMIN — NIFEDIPINE 30 MG: 30 TABLET, FILM COATED, EXTENDED RELEASE ORAL at 20:49

## 2019-04-23 RX ADMIN — INSULIN LISPRO 1 UNITS: 100 INJECTION, SOLUTION INTRAVENOUS; SUBCUTANEOUS at 20:48

## 2019-04-23 RX ADMIN — SPIRONOLACTONE 50 MG: 25 TABLET ORAL at 09:37

## 2019-04-23 RX ADMIN — CLONIDINE HYDROCHLORIDE 0.2 MG: 0.1 TABLET ORAL at 09:36

## 2019-04-23 RX ADMIN — ZOLPIDEM TARTRATE 10 MG: 5 TABLET ORAL at 22:11

## 2019-04-23 RX ADMIN — ENOXAPARIN SODIUM 40 MG: 40 INJECTION SUBCUTANEOUS at 09:36

## 2019-04-23 RX ADMIN — MOMETASONE FUROATE AND FORMOTEROL FUMARATE DIHYDRATE 2 PUFF: 200; 5 AEROSOL RESPIRATORY (INHALATION) at 20:39

## 2019-04-23 RX ADMIN — CLONAZEPAM 0.5 MG: 0.5 TABLET ORAL at 15:05

## 2019-04-23 RX ADMIN — CLONAZEPAM 0.5 MG: 0.5 TABLET ORAL at 23:31

## 2019-04-23 RX ADMIN — CLONIDINE HYDROCHLORIDE 0.2 MG: 0.1 TABLET ORAL at 20:48

## 2019-04-23 RX ADMIN — NICARDIPINE HYDROCHLORIDE 2 MG/HR: 0.1 INJECTION, SOLUTION INTRAVENOUS at 08:25

## 2019-04-23 RX ADMIN — MIRTAZAPINE 15 MG: 15 TABLET, FILM COATED ORAL at 20:49

## 2019-04-23 RX ADMIN — SPIRONOLACTONE 50 MG: 25 TABLET ORAL at 17:47

## 2019-04-23 RX ADMIN — INSULIN LISPRO 2 UNITS: 100 INJECTION, SOLUTION INTRAVENOUS; SUBCUTANEOUS at 17:48

## 2019-04-23 RX ADMIN — CARVEDILOL 3.12 MG: 3.12 TABLET, FILM COATED ORAL at 17:47

## 2019-04-23 RX ADMIN — INSULIN LISPRO 6 UNITS: 100 INJECTION, SOLUTION INTRAVENOUS; SUBCUTANEOUS at 12:05

## 2019-04-23 RX ADMIN — SERTRALINE 100 MG: 100 TABLET, FILM COATED ORAL at 20:49

## 2019-04-23 RX ADMIN — NIFEDIPINE 30 MG: 30 TABLET, FILM COATED, EXTENDED RELEASE ORAL at 09:37

## 2019-04-23 ASSESSMENT — PAIN SCALES - GENERAL
PAINLEVEL_OUTOF10: 0

## 2019-04-23 NOTE — PROGRESS NOTES
Nutrition Assessment (Low Risk)    Type and Reason for Visit: Initial(LOS)    Nutrition Recommendations:   Encouraged pt to contact Dietitian should any questions arise regarding diet     Nutrition Assessment:  Patient assessed for nutritional risk. Deemed to be at low risk at this time. Will continue to monitor for changes in status.       Malnutrition Assessment:  · Malnutrition Status: No malnutrition    Nutrition Risk Level   Risk Level: Low    Nutrition Diagnosis:   · Problem: No nutrition diagnosis at this time    Nutrition Intervention:  Food and/or Delivery: Continue current diet  Nutrition Education/Counseling/Coordination of Care:  Continued Inpatient Monitoring, Education declined      Electronically signed by Rajat Steele RD, HERLINDA on 4/23/19 at 8:40 AM    Contact Number: [phone]

## 2019-04-23 NOTE — PROGRESS NOTES
Nephrology progress Note        Chief Complaint:    Chief Complaint   Patient presents with    Shortness of Breath     SOB,CP,dizziness since this morning. reports LOC x 2. reports hitting head. denies blood thinners.  Chest Pain    Dizziness    Loss of Consciousness         History of Present iIlness:    Rosa Noe is a 59 y.o. male with h/o uncontrolled HTN . He was admitted with c/o SOB, nausea, diarrhea. He had an episode of loss of consciousness  He is on multiple BP meds and BP is still uncontrolled   Has h/o non compliance with meds. He had ALEA which improved over last few days     His Urine tox screen is positive for cocaine     On multiple meds for HTN       Interval HX     BP stable now on cardene drip   On cardene 2 mg/ hr   No headache and chest pain today      Past Medical History:   Diagnosis Date    CAD (coronary artery disease)     Chest pain 10/15/2017    [facility] [address]     Diabetes mellitus ([facility] [address]     Hyperlipidemia     Hypertension     Kidney stone     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        Past Surgical History:   Procedure Laterality Date    ABDOMEN SURGERY      CARDIAC SURGERY      3 STENTS PLACED     CHOLECYSTECTOMY      COLONOSCOPY      CORONARY ANGIOPLASTY WITH STENT PLACEMENT      3 stents     FRACTURE SURGERY      RIGHT ANKLE    LITHOTRIPSY      SHOULDER ARTHROSCOPY      VASCULAR SURGERY         Family History   Problem Relation Age of Onset    High Blood Pressure Mother     Cancer Father         lung cancer    Diabetes Brother     Liver Disease Sister     Diabetes Brother          Current Medications:      NIFEdipine (PROCARDIA XL) extended release tablet 30 mg BID   niCARdipine (CARDENE) 20 mg in 0.9 % sodium chloride 200 mL infusion Continuous   spironolactone (ALDACTONE) tablet 50 mg BID butalbital-acetaminophen-caffeine (FIORICET, ESGIC) per tablet 1 tablet Q4H PRN   lisinopril (PRINIVIL;ZESTRIL) tablet 20 mg Daily   acetaminophen (TYLENOL) tablet 650 mg Q4H PRN   cloNIDine (CATAPRES) tablet 0.2 mg BID   clonazePAM (KLONOPIN) tablet 0.5 mg BID PRN   zolpidem (AMBIEN) tablet 10 mg Nightly PRN   albuterol sulfate  (90 Base) MCG/ACT inhaler 2 puff Q4H PRN   carvedilol (COREG) tablet 3.125 mg BID WC   sertraline (ZOLOFT) tablet 100 mg Nightly   loperamide (IMODIUM) capsule 2 mg 4x Daily PRN   mirtazapine (REMERON) tablet 15 mg Nightly   sodium chloride flush 0.9 % injection 10 mL 2 times per day   sodium chloride flush 0.9 % injection 10 mL PRN   magnesium hydroxide (MILK OF MAGNESIA) 400 MG/5ML suspension 30 mL Daily PRN   ondansetron (ZOFRAN) injection 4 mg Q6H PRN   enoxaparin (LOVENOX) injection 40 mg Daily   insulin lispro (HUMALOG) injection vial 0-12 Units TID WC   insulin lispro (HUMALOG) injection vial 0-6 Units Nightly   glucose (GLUTOSE) 40 % oral gel 15 g PRN   dextrose 50 % solution 12.5 g PRN   glucagon (rDNA) injection 1 mg PRN   dextrose 5 % solution PRN   mometasone-formoterol (DULERA) 200-5 MCG/ACT inhaler 2 puff BID   albuterol (PROVENTIL) nebulizer solution 2.5 mg Q4H PRN             Physical exam:     Vitals:  BP (!) 159/67   Pulse 81   Temp 98 °F (36.7 °C) (Oral)   Resp 22   Ht 6' (1.829 m)   Wt 244 lb 4.3 oz (110.8 kg)   SpO2 96%   BMI 33.13 kg/m²   Constitutional:  OAA X3 NAD  Skin: no rash, turgor wnl  Heent:  eomi, mmm  Neck: no bruits or jvd noted  Cardiovascular:  S1, S2 without m/r/g  Respiratory: CTA B without w/r/r  Abdomen:  +bs, soft, nt, nd  Ext: no lower extremity edema  Psychiatric: mood and affect appropriate  Musculoskeletal:  Rom, muscular strength intact    Labs:  CBC:   Recent Labs     04/22/19  0405   WBC 5.7   HGB 12.6*        BMP:    Recent Labs     04/21/19  0740 04/22/19  0405    142   K 3.9 3.8    106   CO2 24 24   BUN 15 18   CREATININE 0.7* 0.9   GLUCOSE 150* 145*     Ca/Mg/Phos:   Recent Labs     04/21/19  0740 04/22/19  0405   CALCIUM 8.5 8.9     Hepatic: No results for input(s): AST, ALT, ALB, BILITOT, ALKPHOS in the last 72 hours. Troponin:   Recent Labs     04/21/19  0740   TROPONINI <0.01     BNP: No results for input(s): BNP in the last 72 hours. Lipids: No results for input(s): CHOL, TRIG, HDL, LDLCALC, LABVLDL in the last 72 hours. ABGs: No results for input(s): PHART, PO2ART, [id number] in the last 72 hours. INR: No results for input(s): INR in the last 72 hours. UA:No results for input(s): Felicie Kail, GLUCOSEU, BILIRUBINUR, [place], BLOODU, PHUR, PROTEINU, UROBILINOGEN, NITRU, LEUKOCYTESUR, LABMICR, URINETYPE in the last 72 hours. Urine Microscopic: No results for input(s): LABCAST, BACTERIA, COMU, HYALCAST, WBCUA, RBCUA, EPIU in the last 72 hours. Urine Culture: No results for input(s): LABURIN in the last 72 hours. Urine Chemistry: No results for input(s): Leotis Macedonian, PROTEINUR, NAUR in the last 72 hours. IMAGING:   Renal Arterial Duplex Complete   Final Result      XR CHEST PORTABLE   Final Result   No acute abnormality detected.  Renal Arterial Duplex Complete    (Results Pending)       I/O last 3 completed shifts: In: 1311.6 [P.O.:1200; I.V.:111.6]  Out: 4725 [Urine:2575]    Assessment/Plan :      1. Uncontrolled  HTN. On cardene drip now. Continue nifedipine XL 30 mg po bID   Try to taper off cardene today     RePeat renal doppler study has  r/o renal artery stenosis   There is no significant stenosis       Check renin, ary levels- pending results   Plasma metanephrine levels - pending results   Low sodium diet     2. ALEA - resolved     3. Cocaine abuse.  advised to avoid illicit drug use       Once off cardene drip and SBP less that 150 mm HG should be able to discharge home         Thank you for allowing us to participate in care of [facility] Electronically signed by: Julisa Jane MD, 4/23/2019, 8:21 AM      [facility] of [address]  Office : [phone]  Fax :[phone]

## 2019-04-23 NOTE — PROGRESS NOTES
Hospitalist Progress Note      PCP: Dagmar Vincent MD    Date of Admission: 4/15/2019    Chief Complaint: Syncope    Hospital Course: Patient had from home after having a syncopal episode. Patient later found to have hypertensive emergency and required increased levels of oral hypertensive medication. Patient then required Cardene drip. Subjective: Seen and examined. Patient is still requiring the Cardene drip. If necessary we can increase patient's nifedipine. Patient has no complaints to just like to get this resolved. Medications:  Reviewed    Infusion Medications    niCARdipine 20 mg/hr (04/23/19 0825)    dextrose       Scheduled Medications    NIFEdipine  30 mg Oral BID    spironolactone  50 mg Oral BID    lisinopril  20 mg Oral Daily    cloNIDine  0.2 mg Oral BID    carvedilol  3.125 mg Oral BID WC    sertraline  100 mg Oral Nightly    mirtazapine  15 mg Oral Nightly    sodium chloride flush  10 mL Intravenous 2 times per day    enoxaparin  40 mg Subcutaneous Daily    insulin lispro  0-12 Units Subcutaneous TID WC    insulin lispro  0-6 Units Subcutaneous Nightly    mometasone-formoterol  2 puff Inhalation BID     PRN Meds: butalbital-acetaminophen-caffeine, acetaminophen, clonazePAM, zolpidem, albuterol sulfate HFA, loperamide, sodium chloride flush, magnesium hydroxide, ondansetron, glucose, dextrose, glucagon (rDNA), dextrose, albuterol      Intake/Output Summary (Last 24 hours) at 4/23/2019 0842  Last data filed at 4/23/2019 0825  Gross per 24 hour   Intake 1311.59 ml   Output 2975 ml   Net -1663.41 ml       Physical Exam Performed:    BP (!) 171/94   Pulse 76   Temp 98 °F (36.7 °C) (Oral)   Resp (!) 40   Ht 6' (1.829 m)   Wt 244 lb 4.3 oz (110.8 kg)   SpO2 96%   BMI 33.13 kg/m²     General appearance: No apparent distress, appears stated age and cooperative. HEENT: Pupils equal, round, and reactive to light. Conjunctivae/corneas clear.   Neck: Supple, with full range of motion. No jugular venous distention. Trachea midline. Respiratory:  Normal respiratory effort. Clear to auscultation, bilaterally without Rales/Wheezes/Rhonchi. Cardiovascular: Regular rate and rhythm with normal S1/S2 without murmurs, rubs or gallops. Abdomen: Soft, non-tender, non-distended with normal bowel sounds. Musculoskeletal: No clubbing, cyanosis or edema bilaterally. Skin: Skin color, texture, turgor normal.  No rashes or lesions. Neurologic:  Neurovascularly intact without any focal sensory/motor deficits. Cranial nerves: II-XII intact, grossly non-focal.  Psychiatric: Alert and oriented, thought content appropriate, normal insight  Capillary Refill: Brisk,< 3 seconds   Peripheral Pulses: +2 palpable, equal bilaterally       Labs:   Recent Labs     04/22/19  0405   WBC 5.7   HGB 12.6*   HCT 39.4*        Recent Labs     04/21/19  0740 04/22/19  0405    142   K 3.9 3.8    106   CO2 24 24   BUN 15 18   CREATININE 0.7* 0.9   CALCIUM 8.5 8.9     No results for input(s): AST, ALT, BILIDIR, BILITOT, ALKPHOS in the last 72 hours. No results for input(s): INR in the last 72 hours. Recent Labs     04/21/19  0740   TROPONINI <0.01       Urinalysis:      Lab Results   Component Value Date    NITRU Negative 04/16/2019    WBCUA 1 11/08/2018    BACTERIA Rare 01/19/2018    RBCUA 1 11/08/2018    BLOODU Negative 04/16/2019    SPECGRAV 1.012 04/16/2019    GLUCOSEU Negative 04/16/2019       Radiology:  VL Renal Arterial Duplex Complete   Final Result      XR CHEST PORTABLE   Final Result   No acute abnormality detected. VL Renal Arterial Duplex Complete    (Results Pending)           Assessment/Plan:    Hypertension urgency  Continues to have high readings.   Patient has been on nifedipine, lisinopril, Hydralazine and clonidine  Aldactone increased  cardene drip, decrease drip and add back on CCB, attempt to transition off.  If BP remains below 170 we can DC home      Renal artery study: Non diagnostic exam, repeat study  A.m. Cortisol: Low: R/O cushing disease. metanephrines level , renin and Aldosterone level are pending   Nephro are following: D/W with Dr Nadeen Lofton.      Syncope on admission   Patient had similar presentation 3 weeks ago. Patient felt lightheaded after going to the bathroom and then lost his consciousness. He reports some loose stool for the last couple days. Orthostatic were positive in the ED. Patient was seen by cardiology 3 weeks ago for similar presentation. Resolved      Acute kidney injury  Most likely prerenal with loose stool and diarrhea  Resolved   Avoid nephrotoxic medications       Diarrhea:  PCR pathogen and C. diff were negative  Imodium     COPD without any acute exacerbation  Continue with home meds     Type 2 diabetes mellitus  Sliding scale insulin     Cocaine Abuse  Patient drug screen is positive for cocaine. Patient denies any recent use.   Counseling was provided      DVT Prophylaxis: lovenox  Diet: DIET CARB CONTROL;  Code Status: Full Code    PT/OT Eval Status: NA    Dispo - ICU    I spent greater than 30 minutes of critical care time with patient regards to his life-threatening hypertensive emergency requiring Cardene jay Mitchell MD

## 2019-04-23 NOTE — CARE COORDINATION
BECKAW reviewed chart. LSW gowned and gloved to meet with patient to introduce  role and discuss DC planning. He reports he lives with brother and sister in law in a house with 4 steps to enter; 15 steps up to bed and bath. 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 He is active with Dr Sandee Pineda. He gets his medications at [facility]. He states [facility] called him as he has a new glucometer to  when discharged and the cost is $49 which he reports he can afford. He states he was given information regarding [address] that he will look into as his copays are very high. His goal is to return home. BECKAW left him with list of home care agencies for possible future needs.   [facility]     Case Management   [phone]    4/23/2019  10:31 AM

## 2019-04-23 NOTE — PROGRESS NOTES
4/22/2019  1915 Bedside handoff completed with Melvin Helms. 2030 Pt resting comfortably in bed. Pt denies pain. Assessment completed. Pt has no other complaints at this time. 2058 Pt was complaining of being anxious. Pt was given PRN med to help with anxiety see MAR.  2220 Pt resting comfortably in bed. Pt denies pain at this time. 2350 Pt resting comfortably in bed. Pt denies pain. Assessment completed. Pt has no other complaints at this time. 4/23/2019  0200 Pt resting comfortably, asleep in bed. Will continue to monitor.    8354 Bedside handoff completed with Melvin Helms

## 2019-04-23 NOTE — PROGRESS NOTES
0730- Report received from [facility]. Patient resting quietly in bed, anticipating renal artery study this AM. Remains NPO. Cardene infusing without difficulty. No needs noted at this time. 0820- BP continuing to trend up, cardene increased to 2 mg/hr. Will monitor. 0234- Vascular tech at bedside for study. 8549- Dr Mary Smiley at bedside for eval. Will monitor BP, if remains elevated by 1100, will contact MD to increase nifedipine. 1100- patient finished eating meal, assisted to bathroom to wash. No other needs noted at this time. Remains on cardene at 2 mg/hr. 1200- BP improved, below titration goal, cardene turned off. Will monitor for effect. 1430- patient remains off cardene, BP stable. Patient without needs or complaints, will monitor. 1600- assessment remains unchanged from previous. Patient still off cardene, continuing to monitor. Denies needs or complaints at this time. 1800- patient resting quietly in bed, denies needs or complaints at this time. 1230- report given to Jocelyn Cantu RN to assume care.

## 2019-04-24 LAB
GLUCOSE BLD-MCNC: 112 MG/DL (ref 70–99)
GLUCOSE BLD-MCNC: 136 MG/DL (ref 70–99)
GLUCOSE BLD-MCNC: 157 MG/DL (ref 70–99)
GLUCOSE BLD-MCNC: 193 MG/DL (ref 70–99)
METANEPH/PLASMA INTERP: NORMAL
METANEPHRINE FREE PLASMA: 0.13 NMOL/L (ref 0–0.49)
NORMETANEPHRINE FREE PLASMA: 0.22 NMOL/L (ref 0–0.89)
PERFORMED ON: ABNORMAL
RENIN ACTIVITY: 0.3 NG/ML/HR

## 2019-04-24 PROCEDURE — 6370000000 HC RX 637 (ALT 250 FOR IP): Performed by: INTERNAL MEDICINE

## 2019-04-24 PROCEDURE — 2580000003 HC RX 258: Performed by: INTERNAL MEDICINE

## 2019-04-24 PROCEDURE — 94640 AIRWAY INHALATION TREATMENT: CPT

## 2019-04-24 PROCEDURE — 2500000003 HC RX 250 WO HCPCS: Performed by: INTERNAL MEDICINE

## 2019-04-24 PROCEDURE — 6360000002 HC RX W HCPCS: Performed by: INTERNAL MEDICINE

## 2019-04-24 PROCEDURE — 2100000000 HC CCU R&B

## 2019-04-24 PROCEDURE — 6370000000 HC RX 637 (ALT 250 FOR IP): Performed by: HOSPITALIST

## 2019-04-24 RX ORDER — CLONIDINE HYDROCHLORIDE 0.1 MG/1
0.2 TABLET ORAL 3 TIMES DAILY
Status: DISCONTINUED | OUTPATIENT
Start: 2019-04-24 | End: 2019-04-24

## 2019-04-24 RX ORDER — NIFEDIPINE 30 MG/1
30 TABLET, EXTENDED RELEASE ORAL ONCE
Status: COMPLETED | OUTPATIENT
Start: 2019-04-24 | End: 2019-04-24

## 2019-04-24 RX ORDER — OXYCODONE HYDROCHLORIDE AND ACETAMINOPHEN 5; 325 MG/1; MG/1
1 TABLET ORAL EVERY 8 HOURS PRN
Status: DISCONTINUED | OUTPATIENT
Start: 2019-04-24 | End: 2019-04-26 | Stop reason: HOSPADM

## 2019-04-24 RX ORDER — BUTALBITAL, ACETAMINOPHEN AND CAFFEINE 50; 325; 40 MG/1; MG/1; MG/1
1 TABLET ORAL EVERY 4 HOURS PRN
Status: COMPLETED | OUTPATIENT
Start: 2019-04-24 | End: 2019-04-24

## 2019-04-24 RX ORDER — FUROSEMIDE 20 MG/1
20 TABLET ORAL DAILY
Status: DISCONTINUED | OUTPATIENT
Start: 2019-04-24 | End: 2019-04-26 | Stop reason: HOSPADM

## 2019-04-24 RX ORDER — NIFEDIPINE 60 MG/1
60 TABLET, EXTENDED RELEASE ORAL 2 TIMES DAILY
Status: DISCONTINUED | OUTPATIENT
Start: 2019-04-24 | End: 2019-04-26 | Stop reason: HOSPADM

## 2019-04-24 RX ORDER — CLONIDINE HYDROCHLORIDE 0.1 MG/1
0.1 TABLET ORAL 3 TIMES DAILY
Status: DISCONTINUED | OUTPATIENT
Start: 2019-04-24 | End: 2019-04-26 | Stop reason: HOSPADM

## 2019-04-24 RX ADMIN — ENOXAPARIN SODIUM 40 MG: 40 INJECTION SUBCUTANEOUS at 08:11

## 2019-04-24 RX ADMIN — LISINOPRIL 20 MG: 20 TABLET ORAL at 08:11

## 2019-04-24 RX ADMIN — SPIRONOLACTONE 50 MG: 25 TABLET ORAL at 18:19

## 2019-04-24 RX ADMIN — NIFEDIPINE 30 MG: 30 TABLET, FILM COATED, EXTENDED RELEASE ORAL at 08:11

## 2019-04-24 RX ADMIN — INSULIN LISPRO 1 UNITS: 100 INJECTION, SOLUTION INTRAVENOUS; SUBCUTANEOUS at 21:20

## 2019-04-24 RX ADMIN — ZOLPIDEM TARTRATE 10 MG: 5 TABLET ORAL at 21:21

## 2019-04-24 RX ADMIN — CLONIDINE HYDROCHLORIDE 0.1 MG: 0.1 TABLET ORAL at 21:21

## 2019-04-24 RX ADMIN — CARVEDILOL 3.12 MG: 3.12 TABLET, FILM COATED ORAL at 18:19

## 2019-04-24 RX ADMIN — SERTRALINE 100 MG: 100 TABLET, FILM COATED ORAL at 21:21

## 2019-04-24 RX ADMIN — CARVEDILOL 3.12 MG: 3.12 TABLET, FILM COATED ORAL at 08:11

## 2019-04-24 RX ADMIN — OXYCODONE HYDROCHLORIDE AND ACETAMINOPHEN 1 TABLET: 5; 325 TABLET ORAL at 12:30

## 2019-04-24 RX ADMIN — CLONIDINE HYDROCHLORIDE 0.2 MG: 0.1 TABLET ORAL at 08:11

## 2019-04-24 RX ADMIN — Medication 10 ML: at 21:22

## 2019-04-24 RX ADMIN — NICARDIPINE HYDROCHLORIDE 2.5 MG/HR: 0.1 INJECTION, SOLUTION INTRAVENOUS at 12:24

## 2019-04-24 RX ADMIN — NIFEDIPINE 60 MG: 60 TABLET, FILM COATED, EXTENDED RELEASE ORAL at 21:21

## 2019-04-24 RX ADMIN — INSULIN LISPRO 2 UNITS: 100 INJECTION, SOLUTION INTRAVENOUS; SUBCUTANEOUS at 12:23

## 2019-04-24 RX ADMIN — MUPIROCIN 1 G: 20 OINTMENT TOPICAL at 10:32

## 2019-04-24 RX ADMIN — BUTALBITAL, ACETAMINOPHEN, AND CAFFEINE 1 TABLET: 50; 325; 40 TABLET ORAL at 10:32

## 2019-04-24 RX ADMIN — MOMETASONE FUROATE AND FORMOTEROL FUMARATE DIHYDRATE 2 PUFF: 200; 5 AEROSOL RESPIRATORY (INHALATION) at 07:58

## 2019-04-24 RX ADMIN — MUPIROCIN 1 G: 20 OINTMENT TOPICAL at 21:22

## 2019-04-24 RX ADMIN — ACETAMINOPHEN 650 MG: 325 TABLET ORAL at 08:16

## 2019-04-24 RX ADMIN — CLONIDINE HYDROCHLORIDE 0.1 MG: 0.1 TABLET ORAL at 14:36

## 2019-04-24 RX ADMIN — MOMETASONE FUROATE AND FORMOTEROL FUMARATE DIHYDRATE 2 PUFF: 200; 5 AEROSOL RESPIRATORY (INHALATION) at 21:38

## 2019-04-24 RX ADMIN — NICARDIPINE HYDROCHLORIDE 12.5 MG/HR: 0.1 INJECTION, SOLUTION INTRAVENOUS at 21:42

## 2019-04-24 RX ADMIN — NIFEDIPINE 30 MG: 30 TABLET, FILM COATED, EXTENDED RELEASE ORAL at 10:32

## 2019-04-24 RX ADMIN — SPIRONOLACTONE 50 MG: 25 TABLET ORAL at 08:11

## 2019-04-24 RX ADMIN — CLONAZEPAM 0.5 MG: 0.5 TABLET ORAL at 21:21

## 2019-04-24 RX ADMIN — FUROSEMIDE 20 MG: 20 TABLET ORAL at 10:32

## 2019-04-24 RX ADMIN — NICARDIPINE HYDROCHLORIDE 10 MG/HR: 0.1 INJECTION, SOLUTION INTRAVENOUS at 23:09

## 2019-04-24 RX ADMIN — MIRTAZAPINE 15 MG: 15 TABLET, FILM COATED ORAL at 21:21

## 2019-04-24 ASSESSMENT — PAIN DESCRIPTION - DESCRIPTORS
DESCRIPTORS: SHARP;THROBBING;STABBING
DESCRIPTORS: THROBBING

## 2019-04-24 ASSESSMENT — PAIN SCALES - GENERAL
PAINLEVEL_OUTOF10: 10
PAINLEVEL_OUTOF10: 0
PAINLEVEL_OUTOF10: 6
PAINLEVEL_OUTOF10: 0
PAINLEVEL_OUTOF10: 0
PAINLEVEL_OUTOF10: 8
PAINLEVEL_OUTOF10: 0

## 2019-04-24 ASSESSMENT — PAIN DESCRIPTION - ORIENTATION
ORIENTATION: MID
ORIENTATION: MID

## 2019-04-24 ASSESSMENT — PAIN DESCRIPTION - LOCATION
LOCATION: HEAD
LOCATION: HEAD

## 2019-04-24 ASSESSMENT — PAIN DESCRIPTION - FREQUENCY
FREQUENCY: INTERMITTENT
FREQUENCY: CONTINUOUS

## 2019-04-24 ASSESSMENT — PAIN DESCRIPTION - PAIN TYPE
TYPE: ACUTE PAIN
TYPE: ACUTE PAIN

## 2019-04-24 ASSESSMENT — PAIN - FUNCTIONAL ASSESSMENT: PAIN_FUNCTIONAL_ASSESSMENT: ACTIVITIES ARE NOT PREVENTED

## 2019-04-24 ASSESSMENT — PAIN DESCRIPTION - PROGRESSION: CLINICAL_PROGRESSION: GRADUALLY WORSENING

## 2019-04-24 NOTE — PROGRESS NOTES
0700:Handoff with Dionne Woods RN    0800: Shift assessment complete. Pt c/o headache and states that how he knows his BP is high. BP is 175/102. AM BP medications given. NO insulin coverage given. Pt done with breakfast. Resting in bed. Pt was given PRN tylenol for headache. Will monitor BP    1000: Dr. Tr Chiu rounding. Aware of current BP. Increased nifedipine from 30 to 60. Will hold off on d/c today. 1030: new medications given per MAR. Pt was also given caffeine pill for headache. 1040: Dr. April Saleem rounding. New order for lasix and increased clonidine. 1200: reassessment complete at this time. Remains unchanged. SBP >200. Pt c/o of \"splitting\" 10/10 headache. Will perfect serve Dr. Tr Chiu for new orders. 1225: restarting Cardene drip @ 2.5mg/hr. 2 units of insulin coverage given. 1700: reassessment complete and remains unchange. Pt states head ache is better.  Pt getting washed up.     1900:Handoff with Gilberto Llanos RN

## 2019-04-24 NOTE — PROGRESS NOTES
Hospitalist Progress Note      PCP: Griselda Ahle, MD    Date of Admission: 4/15/2019    Chief Complaint: Syncope    Hospital Course: Patient had from home after having a syncopal episode. Patient later found to have hypertensive emergency and required increased levels of oral hypertensive medication. Patient then required Cardene drip. BP meds adjusted and he was titrated of fthe cardene drip. Subjective: Seen and examined. Patient off cardene drip. /190's AM. Will increase the nifedipene and see response. .      Medications:  Reviewed    Infusion Medications    dextrose       Scheduled Medications    NIFEdipine  60 mg Oral BID    NIFEdipine  30 mg Oral Once    mupirocin  1 g Nasal BID    spironolactone  50 mg Oral BID    lisinopril  20 mg Oral Daily    cloNIDine  0.2 mg Oral BID    carvedilol  3.125 mg Oral BID WC    sertraline  100 mg Oral Nightly    mirtazapine  15 mg Oral Nightly    sodium chloride flush  10 mL Intravenous 2 times per day    enoxaparin  40 mg Subcutaneous Daily    insulin lispro  0-12 Units Subcutaneous TID WC    insulin lispro  0-6 Units Subcutaneous Nightly    mometasone-formoterol  2 puff Inhalation BID     PRN Meds: butalbital-acetaminophen-caffeine, acetaminophen, clonazePAM, zolpidem, albuterol sulfate HFA, loperamide, sodium chloride flush, magnesium hydroxide, ondansetron, glucose, dextrose, glucagon (rDNA), dextrose, albuterol      Intake/Output Summary (Last 24 hours) at 4/24/2019 1016  Last data filed at 4/24/2019 0800  Gross per 24 hour   Intake 820.1 ml   Output 1850 ml   Net -1029.9 ml       Physical Exam Performed:    BP (!) 184/98   Pulse 78   Temp 97.9 °F (36.6 °C) (Oral)   Resp 18   Ht 6' (1.829 m)   Wt 243 lb 12.4 oz (110.6 kg)   SpO2 95%   BMI 33.06 kg/m²     General appearance: No apparent distress, appears stated age and cooperative. HEENT: Pupils equal, round, and reactive to light. Conjunctivae/corneas clear.   Neck: Supple, with full range of motion. No jugular venous distention. Trachea midline. Respiratory:  Normal respiratory effort. Clear to auscultation, bilaterally without Rales/Wheezes/Rhonchi. Cardiovascular: Regular rate and rhythm with normal S1/S2 without murmurs, rubs or gallops. Abdomen: Soft, non-tender, non-distended with normal bowel sounds. Musculoskeletal: No clubbing, cyanosis or edema bilaterally. Skin: Skin color, texture, turgor normal.  No rashes or lesions. Neurologic:  Neurovascularly intact without any focal sensory/motor deficits. Cranial nerves: II-XII intact, grossly non-focal.  Psychiatric: Alert and oriented, thought content appropriate, normal insight  Capillary Refill: Brisk,< 3 seconds   Peripheral Pulses: +2 palpable, equal bilaterally       Labs:   Recent Labs     04/22/19  0405   WBC 5.7   HGB 12.6*   HCT 39.4*        Recent Labs     04/22/19  0405      K 3.8      CO2 24   BUN 18   CREATININE 0.9   CALCIUM 8.9     No results for input(s): AST, ALT, BILIDIR, BILITOT, ALKPHOS in the last 72 hours. No results for input(s): INR in the last 72 hours. No results for input(s): Dex Connie in the last 72 hours. Urinalysis:      Lab Results   Component Value Date    NITRU Negative 04/16/2019    WBCUA 1 11/08/2018    BACTERIA Rare 01/19/2018    RBCUA 1 11/08/2018    BLOODU Negative 04/16/2019    SPECGRAV 1.012 04/16/2019    GLUCOSEU Negative 04/16/2019       Radiology:  VL Renal Arterial Duplex Complete         VL Renal Arterial Duplex Complete   Final Result      XR CHEST PORTABLE   Final Result   No acute abnormality detected. Assessment/Plan:    Hypertension Emergency  Continues to have high readings.   Patient has been on nifedipine 60mg bid, lisinopril 20, coreg 3.125 and clonidine 0.2  Aldactone increased  Cardene drip off, increase nifedipene to 60mg bid     Renal artery study: Non diagnostic exam, repeat study neg  A.m.  Cortisol: Low: R/O cushing disease. metanephrines level TBD , renin  TBD and Aldosterone level normal  Nephro are following: D/W with Dr Irish Quevedo.      Syncope on admission   Patient had similar presentation 3 weeks ago. Patient felt lightheaded after going to the bathroom and then lost his consciousness. He reports some loose stool for the last couple days. Orthostatic were positive in the ED. Patient was seen by cardiology 3 weeks ago for similar presentation. Resolved      Acute kidney injury  Most likely prerenal with loose stool and diarrhea  Resolved   Avoid nephrotoxic medications       Diarrhea:  PCR pathogen and C. diff were negative  Imodium     COPD without any acute exacerbation  Continue with home meds     Type 2 diabetes mellitus  Sliding scale insulin     Cocaine Abuse  Patient drug screen is positive for cocaine. Patient denies any recent use. Counseling was provided    Start bactroban for MRSA in nares. Fioricet started for headache     DVT Prophylaxis: lovenox  Diet: DIET CARB CONTROL;  Code Status: Full Code    PT/OT Eval Status: NA    Dispo - Stable for med surg tele. DC once SBP stable below 170    I spent greater than 30 minutes of critical care time with patient in regards to his life-threatening hypertensive emergency requiring reinitiation of Cardene drip    Chloe Stovall MD     Addendum. Was called by nursing staff stating that patient's blood pressure once again over 200. Patient needed to be restarted on Cardene drip. There is thought that patient could be possibly abusing cocaine [facility].  Patient's blood pressure has remained elevated despite aggressive oral and IV therapy. Consider getting a drug test from the blood to test for further cocaine metabolites.

## 2019-04-24 NOTE — PROGRESS NOTES
Nephrology progress Note        Chief Complaint:    Chief Complaint   Patient presents with    Shortness of Breath     SOB,CP,dizziness since this morning. reports LOC x 2. reports hitting head. denies blood thinners.  Chest Pain    Dizziness    Loss of Consciousness         History of Present iIlness:    Anais Rothman is a 59 y.o. male with h/o uncontrolled HTN . He was admitted with c/o SOB, nausea, diarrhea. He had an episode of loss of consciousness  He is on multiple BP meds and BP is still uncontrolled   Has h/o non compliance with meds. He had ALEA which improved over last few days     His Urine tox screen is positive for cocaine     On multiple meds for HTN       Interval HX     BP stable now on cardene drip   On cardene 2 mg/ hr   No headache and chest pain today      Past Medical History:   Diagnosis Date    CAD (coronary artery disease)     Chest pain 10/15/2017    [facility] [address] [facility]     Diabetes mellitus [facility] [facility]     Hyperlipidemia     Hypertension     Kidney stone     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        Past Surgical History:   Procedure Laterality Date    ABDOMEN SURGERY      CARDIAC SURGERY      3 STENTS PLACED     CHOLECYSTECTOMY      COLONOSCOPY      CORONARY ANGIOPLASTY WITH STENT PLACEMENT      3 stents     FRACTURE SURGERY      RIGHT ANKLE    LITHOTRIPSY      SHOULDER ARTHROSCOPY      VASCULAR SURGERY         Family History   Problem Relation Age of Onset    High Blood Pressure Mother     Cancer Father         lung cancer    Diabetes Brother     Liver Disease Sister     Diabetes Brother          Current Medications:      NIFEdipine (PROCARDIA XL) extended release tablet 60 mg BID   mupirocin (BACTROBAN) 2 % ointment 1 g BID   cloNIDine (CATAPRES) tablet 0.2 mg TID   furosemide (LASIX) tablet 20 mg Daily Hepatic: No results for input(s): AST, ALT, ALB, BILITOT, ALKPHOS in the last 72 hours. Troponin:   No results for input(s): TROPONINI in the last 72 hours. BNP: No results for input(s): BNP in the last 72 hours. Lipids: No results for input(s): CHOL, TRIG, HDL, LDLCALC, LABVLDL in the last 72 hours. ABGs: No results for input(s): PHART, PO2ART, [id number] in the last 72 hours. INR: No results for input(s): INR in the last 72 hours. UA:No results for input(s): Raymond Perks, GLUCOSEU, BILIRUBINUR, Luz Maria Anahy, BLOODU, PHUR, PROTEINU, UROBILINOGEN, NITRU, LEUKOCYTESUR, LABMICR, URINETYPE in the last 72 hours. Urine Microscopic: No results for input(s): LABCAST, BACTERIA, COMU, HYALCAST, WBCUA, RBCUA, EPIU in the last 72 hours. Urine Culture: No results for input(s): LABURIN in the last 72 hours. Urine Chemistry: No results for input(s): Medina Roch, PROTEINUR, NAUR in the last 72 hours. IMAGING:  VL Renal Arterial Duplex Complete         VL Renal Arterial Duplex Complete   Final Result      XR CHEST PORTABLE   Final Result   No acute abnormality detected. I/O last 3 completed shifts: In: 820.1 [P.O.:720; I.V.:100.1]  Out: 2775 [Urine:2775]    Assessment/Plan :      1. Uncontrolled  HTN. Off cardene drip now. BP elevated . Increased nifedipine XL to 60  mg po bID   Will add lasix 20 mg po daily    RePeat renal doppler study has  r/o renal artery stenosis   There is no significant stenosis       Check renin- pending    ary levels- 5.7     Plasma metanephrine levels - pending results   Low sodium diet     2. ALEA - resolved     3. Cocaine abuse.  advised to avoid illicit drug use       Once off cardene drip and SBP less that 150 mm HG should be able to discharge home         Thank you for allowing us to participate in care of Geoff Fraction         Electronically signed by: Ana Delatorre MD, 4/24/2019, 11:58 AM      [facility] [address]  Office :

## 2019-04-24 NOTE — PROGRESS NOTES
No acute events overnight. Patient SBP able to remain below 170 overnight, but ran in the 160's part of the night.

## 2019-04-25 LAB
GLUCOSE BLD-MCNC: 122 MG/DL (ref 70–99)
GLUCOSE BLD-MCNC: 133 MG/DL (ref 70–99)
GLUCOSE BLD-MCNC: 140 MG/DL (ref 70–99)
GLUCOSE BLD-MCNC: 191 MG/DL (ref 70–99)
PERFORMED ON: ABNORMAL
PLATELET # BLD: 207 K/UL (ref 135–450)

## 2019-04-25 PROCEDURE — 36415 COLL VENOUS BLD VENIPUNCTURE: CPT

## 2019-04-25 PROCEDURE — 6360000002 HC RX W HCPCS: Performed by: INTERNAL MEDICINE

## 2019-04-25 PROCEDURE — 6370000000 HC RX 637 (ALT 250 FOR IP): Performed by: HOSPITALIST

## 2019-04-25 PROCEDURE — 94761 N-INVAS EAR/PLS OXIMETRY MLT: CPT

## 2019-04-25 PROCEDURE — 94640 AIRWAY INHALATION TREATMENT: CPT

## 2019-04-25 PROCEDURE — 6370000000 HC RX 637 (ALT 250 FOR IP): Performed by: INTERNAL MEDICINE

## 2019-04-25 PROCEDURE — 2100000000 HC CCU R&B

## 2019-04-25 PROCEDURE — 2580000003 HC RX 258: Performed by: INTERNAL MEDICINE

## 2019-04-25 PROCEDURE — 84244 ASSAY OF RENIN: CPT

## 2019-04-25 PROCEDURE — 85049 AUTOMATED PLATELET COUNT: CPT

## 2019-04-25 RX ORDER — HYDRALAZINE HYDROCHLORIDE 25 MG/1
25 TABLET, FILM COATED ORAL EVERY 8 HOURS SCHEDULED
Status: DISCONTINUED | OUTPATIENT
Start: 2019-04-25 | End: 2019-04-26 | Stop reason: HOSPADM

## 2019-04-25 RX ADMIN — CLONIDINE HYDROCHLORIDE 0.1 MG: 0.1 TABLET ORAL at 08:20

## 2019-04-25 RX ADMIN — INSULIN LISPRO 2 UNITS: 100 INJECTION, SOLUTION INTRAVENOUS; SUBCUTANEOUS at 12:05

## 2019-04-25 RX ADMIN — CLONIDINE HYDROCHLORIDE 0.1 MG: 0.1 TABLET ORAL at 20:22

## 2019-04-25 RX ADMIN — CLONIDINE HYDROCHLORIDE 0.1 MG: 0.1 TABLET ORAL at 15:29

## 2019-04-25 RX ADMIN — SERTRALINE 100 MG: 100 TABLET, FILM COATED ORAL at 20:22

## 2019-04-25 RX ADMIN — SPIRONOLACTONE 50 MG: 25 TABLET ORAL at 08:21

## 2019-04-25 RX ADMIN — NIFEDIPINE 60 MG: 60 TABLET, FILM COATED, EXTENDED RELEASE ORAL at 08:21

## 2019-04-25 RX ADMIN — MOMETASONE FUROATE AND FORMOTEROL FUMARATE DIHYDRATE 2 PUFF: 200; 5 AEROSOL RESPIRATORY (INHALATION) at 08:38

## 2019-04-25 RX ADMIN — MIRTAZAPINE 15 MG: 15 TABLET, FILM COATED ORAL at 20:22

## 2019-04-25 RX ADMIN — CLONAZEPAM 0.5 MG: 0.5 TABLET ORAL at 21:14

## 2019-04-25 RX ADMIN — HYDRALAZINE HYDROCHLORIDE 25 MG: 25 TABLET, FILM COATED ORAL at 15:29

## 2019-04-25 RX ADMIN — MUPIROCIN 1 G: 20 OINTMENT TOPICAL at 20:35

## 2019-04-25 RX ADMIN — CLONAZEPAM 0.5 MG: 0.5 TABLET ORAL at 16:21

## 2019-04-25 RX ADMIN — CARVEDILOL 3.12 MG: 3.12 TABLET, FILM COATED ORAL at 08:20

## 2019-04-25 RX ADMIN — SPIRONOLACTONE 50 MG: 25 TABLET ORAL at 16:21

## 2019-04-25 RX ADMIN — OXYCODONE HYDROCHLORIDE AND ACETAMINOPHEN 1 TABLET: 5; 325 TABLET ORAL at 20:27

## 2019-04-25 RX ADMIN — ZOLPIDEM TARTRATE 10 MG: 5 TABLET ORAL at 21:14

## 2019-04-25 RX ADMIN — MOMETASONE FUROATE AND FORMOTEROL FUMARATE DIHYDRATE 2 PUFF: 200; 5 AEROSOL RESPIRATORY (INHALATION) at 19:47

## 2019-04-25 RX ADMIN — Medication 10 ML: at 20:36

## 2019-04-25 RX ADMIN — NIFEDIPINE 60 MG: 60 TABLET, FILM COATED, EXTENDED RELEASE ORAL at 20:22

## 2019-04-25 RX ADMIN — ENOXAPARIN SODIUM 40 MG: 40 INJECTION SUBCUTANEOUS at 08:21

## 2019-04-25 RX ADMIN — INSULIN LISPRO 1 UNITS: 100 INJECTION, SOLUTION INTRAVENOUS; SUBCUTANEOUS at 20:44

## 2019-04-25 RX ADMIN — HYDRALAZINE HYDROCHLORIDE 25 MG: 25 TABLET, FILM COATED ORAL at 21:14

## 2019-04-25 RX ADMIN — CARVEDILOL 3.12 MG: 3.12 TABLET, FILM COATED ORAL at 16:21

## 2019-04-25 RX ADMIN — MUPIROCIN 1 G: 20 OINTMENT TOPICAL at 08:21

## 2019-04-25 RX ADMIN — LISINOPRIL 20 MG: 20 TABLET ORAL at 08:21

## 2019-04-25 RX ADMIN — FUROSEMIDE 20 MG: 20 TABLET ORAL at 08:21

## 2019-04-25 ASSESSMENT — PAIN DESCRIPTION - LOCATION: LOCATION: HEAD

## 2019-04-25 ASSESSMENT — PAIN DESCRIPTION - ONSET: ONSET: PROGRESSIVE

## 2019-04-25 ASSESSMENT — PAIN SCALES - GENERAL
PAINLEVEL_OUTOF10: 0
PAINLEVEL_OUTOF10: 0
PAINLEVEL_OUTOF10: 9
PAINLEVEL_OUTOF10: 4
PAINLEVEL_OUTOF10: 6
PAINLEVEL_OUTOF10: 0
PAINLEVEL_OUTOF10: 0

## 2019-04-25 ASSESSMENT — PAIN DESCRIPTION - FREQUENCY: FREQUENCY: INTERMITTENT

## 2019-04-25 ASSESSMENT — PAIN - FUNCTIONAL ASSESSMENT: PAIN_FUNCTIONAL_ASSESSMENT: ACTIVITIES ARE NOT PREVENTED

## 2019-04-25 ASSESSMENT — PAIN DESCRIPTION - DESCRIPTORS: DESCRIPTORS: THROBBING

## 2019-04-25 ASSESSMENT — PAIN DESCRIPTION - PAIN TYPE: TYPE: ACUTE PAIN

## 2019-04-25 NOTE — PROGRESS NOTES
Nephrology progress Note        Chief Complaint:    Chief Complaint   Patient presents with    Shortness of Breath     SOB,CP,dizziness since this morning. reports LOC x 2. reports hitting head. denies blood thinners.  Chest Pain    Dizziness    Loss of Consciousness         History of Present iIlness:    Rosa Noe is a 59 y.o. male with h/o uncontrolled HTN . He was admitted with c/o SOB, nausea, diarrhea. He had an episode of loss of consciousness  He is on multiple BP meds and BP is still uncontrolled   Has h/o non compliance with meds. He had ALEA which improved over last few days     His Urine tox screen is positive for cocaine     On multiple meds for HTN       Interval HX     Had to be started back on cardene drip. 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        Past Surgical History:   Procedure Laterality Date    ABDOMEN SURGERY      CARDIAC SURGERY      3 STENTS PLACED     CHOLECYSTECTOMY      COLONOSCOPY      CORONARY ANGIOPLASTY WITH STENT PLACEMENT      3 stents     FRACTURE SURGERY      RIGHT ANKLE    LITHOTRIPSY      SHOULDER ARTHROSCOPY      VASCULAR SURGERY         Family History   Problem Relation Age of Onset    High Blood Pressure Mother     Cancer Father         lung cancer    Diabetes Brother     Liver Disease Sister     Diabetes Brother          Current Medications:      NIFEdipine (PROCARDIA XL) extended release tablet 60 mg BID   mupirocin (BACTROBAN) 2 % ointment 1 g BID   furosemide (LASIX) tablet 20 mg Daily   cloNIDine (CATAPRES) tablet 0.1 mg TID   niCARdipine (CARDENE) 20 mg in 0.9 % sodium chloride 200 mL infusion Continuous   oxyCODONE-acetaminophen (PERCOCET) 5-325 MG per tablet 1 tablet Q8H PRN   spironolactone (ALDACTONE) tablet 50 mg BID   lisinopril (PRINIVIL;ZESTRIL) tablet 20 mg Daily   acetaminophen (TYLENOL) tablet 650 mg Q4H PRN   clonazePAM (KLONOPIN) tablet 0.5 mg BID PRN   zolpidem (AMBIEN) tablet 10 mg Nightly PRN   albuterol sulfate  (90 Base) MCG/ACT inhaler 2 puff Q4H PRN   carvedilol (COREG) tablet 3.125 mg BID WC   sertraline (ZOLOFT) tablet 100 mg Nightly   loperamide (IMODIUM) capsule 2 mg 4x Daily PRN   mirtazapine (REMERON) tablet 15 mg Nightly   sodium chloride flush 0.9 % injection 10 mL 2 times per day   sodium chloride flush 0.9 % injection 10 mL PRN   magnesium hydroxide (MILK OF MAGNESIA) 400 MG/5ML suspension 30 mL Daily PRN   ondansetron (ZOFRAN) injection 4 mg Q6H PRN   enoxaparin (LOVENOX) injection 40 mg Daily   insulin lispro (HUMALOG) injection vial 0-12 Units TID WC   insulin lispro (HUMALOG) injection vial 0-6 Units Nightly   glucose (GLUTOSE) 40 % oral gel 15 g PRN   dextrose 50 % solution 12.5 g PRN   glucagon (rDNA) injection 1 mg PRN   dextrose 5 % solution PRN   mometasone-formoterol (DULERA) 200-5 MCG/ACT inhaler 2 puff BID   albuterol (PROVENTIL) nebulizer solution 2.5 mg Q4H PRN             Physical exam:     Vitals:  BP (!) 162/76   Pulse 76   Temp 97.7 °F (36.5 °C) (Axillary)   Resp 18   Ht 6' (1.829 m)   Wt 242 lb 15.2 oz (110.2 kg)   SpO2 96%   BMI 32.95 kg/m²   Constitutional:  OAA X3 NAD  Skin: no rash, turgor wnl  Heent:  eomi, mmm  Neck: no bruits or jvd noted  Cardiovascular:  S1, S2 without m/r/g  Respiratory: CTA B without w/r/r  Abdomen:  +bs, soft, nt, nd  Ext: no lower extremity edema  Psychiatric: mood and affect appropriate  Musculoskeletal:  Rom, muscular strength intact    Labs:  CBC:   Recent Labs     04/25/19  0443        BMP:    No results for input(s): NA, K, CL, CO2, BUN, CREATININE, GLUCOSE in the last 72 hours. Ca/Mg/Phos:   No results for input(s): CALCIUM, MG, PHOS in the last 72 hours. Hepatic: No results for input(s): AST, ALT, ALB, BILITOT, ALKPHOS in the last 72 hours. Troponin:   No results for input(s): TROPONINI in the last 72 hours. BNP: No results for input(s): BNP in the last 72 hours. Lipids: No results for input(s): CHOL, TRIG, HDL, LDLCALC, LABVLDL in the last 72 hours. ABGs: No results for input(s): PHART, PO2ART, MOT1BHK in the last 72 hours. INR: No results for input(s): INR in the last 72 hours. UA:No results for input(s): Deri Skipper, GLUCOSEU, BILIRUBINUR, Teola Ganja, BLOODU, PHUR, PROTEINU, UROBILINOGEN, NITRU, LEUKOCYTESUR, LABMICR, URINETYPE in the last 72 hours. Urine Microscopic: No results for input(s): LABCAST, BACTERIA, COMU, HYALCAST, WBCUA, RBCUA, EPIU in the last 72 hours. Urine Culture: No results for input(s): LABURIN in the last 72 hours. Urine Chemistry: No results for input(s): Felicity Bobo, PROTEINUR, NAUR in the last 72 hours. IMAGING:  VL Renal Arterial Duplex Complete   Final Result      VL Renal Arterial Duplex Complete   Final Result      XR CHEST PORTABLE   Final Result   No acute abnormality detected. I/O last 3 completed shifts: In: 871.9 [P.O.:100; I.V.:771.9]  Out: 2300 [Urine:2300]    Assessment/Plan :      1. Uncontrolled  HTN. Off cardene drip now. BP elevated . Increased nifedipine XL to 60  mg po bID   Will add lasix 20 mg po daily    RePeat renal doppler study has  r/o renal artery stenosis   There is no significant stenosis       Check renin- pending    ary levels- 5.7     Plasma metanephrine levels -normal   Low sodium diet     2. ALEA - resolved     3. Cocaine abuse.  advised to avoid illicit drug use       Once off cardene drip and SBP less that 150 mm HG should be able to discharge home         Thank you for allowing us to participate in care of Lani Plasencia         Electronically signed by: Juana Gonzalez Tabitha Sharma MD, 4/25/2019, 12:49 PM      [facility] [address]  Office : [phone]  Fax :[phone]

## 2019-04-25 NOTE — PROGRESS NOTES
Hospitalist Progress Note      PCP: Sandi Casanova MD    Date of Admission: 4/15/2019    Chief Complaint: Syncope    Hospital Course: Patient had from home after having a syncopal episode. Patient later found to have hypertensive emergency and required increased levels of oral hypertensive medication. Patient then required Cardene drip. BP meds adjusted and he was titrated of fthe cardene drip. Subjective: Seen and examined. Patient is back on Cardene drip. We have been unable to titrate the patient off the drip and keep him off the drip. Discussed with difficulty and we will start hydralazine if patient requires increased medication again. Patient denies any kind of substance abuse including cocaine.   He says he used to do this back in college but has not done it since      Medications:  Reviewed    Infusion Medications    niCARdipine 5 mg/hr (04/25/19 0746)    dextrose       Scheduled Medications    NIFEdipine  60 mg Oral BID    mupirocin  1 g Nasal BID    furosemide  20 mg Oral Daily    cloNIDine  0.1 mg Oral TID    spironolactone  50 mg Oral BID    lisinopril  20 mg Oral Daily    carvedilol  3.125 mg Oral BID WC    sertraline  100 mg Oral Nightly    mirtazapine  15 mg Oral Nightly    sodium chloride flush  10 mL Intravenous 2 times per day    enoxaparin  40 mg Subcutaneous Daily    insulin lispro  0-12 Units Subcutaneous TID WC    insulin lispro  0-6 Units Subcutaneous Nightly    mometasone-formoterol  2 puff Inhalation BID     PRN Meds: oxyCODONE-acetaminophen, acetaminophen, clonazePAM, zolpidem, albuterol sulfate HFA, loperamide, sodium chloride flush, magnesium hydroxide, ondansetron, glucose, dextrose, glucagon (rDNA), dextrose, albuterol      Intake/Output Summary (Last 24 hours) at 4/25/2019 0816  Last data filed at 4/25/2019 0532  Gross per 24 hour   Intake 871.9 ml   Output 2000 ml   Net -1128.1 ml       Physical Exam Performed:    BP (!) 157/100   Pulse 75   Temp 98.6 °F (37 °C) (Oral)   Resp 16   Ht 6' (1.829 m)   Wt 242 lb 15.2 oz (110.2 kg)   SpO2 94%   BMI 32.95 kg/m²     General appearance: No apparent distress, appears stated age and cooperative. HEENT: Pupils equal, round, and reactive to light. Conjunctivae/corneas clear. Neck: Supple, with full range of motion. No jugular venous distention. Trachea midline. Respiratory:  Normal respiratory effort. Clear to auscultation, bilaterally without Rales/Wheezes/Rhonchi. Cardiovascular: Regular rate and rhythm with normal S1/S2 without murmurs, rubs or gallops. Abdomen: Soft, non-tender, non-distended with normal bowel sounds. Musculoskeletal: No clubbing, cyanosis or edema bilaterally. Skin: Skin color, texture, turgor normal.  No rashes or lesions. Neurologic:  Neurovascularly intact without any focal sensory/motor deficits. Cranial nerves: II-XII intact, grossly non-focal.  Psychiatric: Alert and oriented, thought content appropriate, normal insight  Capillary Refill: Brisk,< 3 seconds   Peripheral Pulses: +2 palpable, equal bilaterally       Labs:   Recent Labs     04/25/19  0443        No results for input(s): NA, K, CL, CO2, BUN, CREATININE, CALCIUM, PHOS in the last 72 hours. Invalid input(s): MAGNES  No results for input(s): AST, ALT, BILIDIR, BILITOT, ALKPHOS in the last 72 hours. No results for input(s): INR in the last 72 hours. No results for input(s): Madalynn Josie in the last 72 hours. Urinalysis:      Lab Results   Component Value Date    NITRU Negative 04/16/2019    WBCUA 1 11/08/2018    BACTERIA Rare 01/19/2018    RBCUA 1 11/08/2018    BLOODU Negative 04/16/2019    SPECGRAV 1.012 04/16/2019    GLUCOSEU Negative 04/16/2019       Radiology:  VL Renal Arterial Duplex Complete   Final Result      VL Renal Arterial Duplex Complete   Final Result      XR CHEST PORTABLE   Final Result   No acute abnormality detected.                  Assessment/Plan:    Hypertension Emergency  Continues to have high readings.   Patient has been on nifedipine 60mg bid, lisinopril 20, coreg 3.125 and clonidine 0.2  Aldactone increased  Cardene drip restarted, increase nifedipene to 60mg bid  Add hydralazine prn for SBP>160     Renal artery study: Non diagnostic exam, repeat study neg  A.m. Cortisol: Low: R/O cushing disease. metanephrines level normal , renin  Normal and Aldosterone level normal  Nephro are following: D/W with Dr Flo Nelson.      Syncope on admission   Patient had similar presentation 3 weeks ago. Patient felt lightheaded after going to the bathroom and then lost his consciousness. He reports some loose stool for the last couple days. Orthostatic were positive in the ED. Patient was seen by cardiology 3 weeks ago for similar presentation. Resolved      Acute kidney injury  Most likely prerenal with loose stool and diarrhea  Resolved   Avoid nephrotoxic medications       Diarrhea:  PCR pathogen and C. diff were negative  Imodium     COPD without any acute exacerbation  Continue with home meds     Type 2 diabetes mellitus  Sliding scale insulin     Cocaine Abuse  Patient drug screen is positive for cocaine. Patient denies any recent use. Counseling was provided  Unsure if continued use while inpatient is reason for uncontrolled BP despite aggressive therapy    Start bactroban for MRSA in nares.  D2/5    DVT Prophylaxis: lovenox  Diet: DIET CARB CONTROL;  Code Status: Full Code    PT/OT Eval Status: NA    Dispo -DC once SBP stable below 170    Radha Church MD

## 2019-04-25 NOTE — PROGRESS NOTES
SBP trending down overnight as shown below. Cardene infusion titrated down & stopped at 0401.    127/50Abnormal    by Sebastian Paredes, RUSTY at 04/25/19 0402   133/53Abnormal    by Sebastian Paredes RN at 04/25/19 5069   150/54Abnormal    by Sebastian Paredes RN at 04/25/19 0302   141/77Abnormal    by Sebastian Paredes, RUSTY at 04/25/19 0232   136/48Abnormal    by Sebastian Paredes RN at 04/25/19 0202   159/82Abnormal    by Sebastian Paredes, RUSTY at 04/25/19 0132   151/34Abnormal    by Sebastian Paredes RN at 04/25/19 0102   144/52Abnormal    by Sebastian Paredes RN at 04/25/19 0032   129/62   by Sebastian Paredes RN at 04/25/19 0002   155/64Abnormal    by Sebastian Paredes RN at 04/24/19 2332   149/73Abnormal    by Sebastian Paredes RN at 04/24/19 2302   152/66Abnormal    by Sebastian Paredes RN at 04/24/19 2232   167/80Abnormal    by Sebastian Paredes RN at 04/24/19 2202   157/71Abnormal    by Sebastian Paredes RN at 04/24/19 2147   160/63Abnormal    by Sebastian Paredes RN at 04/24/19 2132   171/86Abnormal    by Sebastian Paredes RN at 04/24/19 2117   172/87Abnormal    by Sebastian Paredes RN at 04/24/19 2102   169/83Abnormal    by Sebastian Paredes RN at 04/24/19 2047   164/77Abnormal    by Sebastian Paredes RN at 04/24/19 2032   170/74Abnormal    by Sebastian Paredes RN at 04/24/19 2017   168/76Abnormal    by Sebastian Paredes RN at 04/24/19 2002   159/78Abnormal    by Sebastian Paredes RN at 04/24/19 1947   166/80Abnormal    by Sebastian Paredes RN at 04/24/19 1932     BP in 190's, Cardene restarted at 5mg/hr.     9823 Shift report given to [facility]

## 2019-04-26 VITALS
WEIGHT: 243.61 LBS | DIASTOLIC BLOOD PRESSURE: 71 MMHG | TEMPERATURE: 98.2 F | SYSTOLIC BLOOD PRESSURE: 124 MMHG | HEART RATE: 73 BPM | HEIGHT: 72 IN | RESPIRATION RATE: 20 BRPM | OXYGEN SATURATION: 97 % | BODY MASS INDEX: 33 KG/M2

## 2019-04-26 LAB
GLUCOSE BLD-MCNC: 127 MG/DL (ref 70–99)
GLUCOSE BLD-MCNC: 144 MG/DL (ref 70–99)
PERFORMED ON: ABNORMAL
PERFORMED ON: ABNORMAL

## 2019-04-26 PROCEDURE — 6370000000 HC RX 637 (ALT 250 FOR IP): Performed by: INTERNAL MEDICINE

## 2019-04-26 PROCEDURE — 6360000002 HC RX W HCPCS: Performed by: INTERNAL MEDICINE

## 2019-04-26 PROCEDURE — 6370000000 HC RX 637 (ALT 250 FOR IP): Performed by: HOSPITALIST

## 2019-04-26 PROCEDURE — 94762 N-INVAS EAR/PLS OXIMTRY CONT: CPT

## 2019-04-26 PROCEDURE — 94640 AIRWAY INHALATION TREATMENT: CPT

## 2019-04-26 PROCEDURE — 2580000003 HC RX 258: Performed by: INTERNAL MEDICINE

## 2019-04-26 RX ORDER — SPIRONOLACTONE 50 MG/1
50 TABLET, FILM COATED ORAL 2 TIMES DAILY
Qty: 30 TABLET | Refills: 3 | Status: ON HOLD | OUTPATIENT
Start: 2019-04-26 | End: 2019-08-19

## 2019-04-26 RX ORDER — NIFEDIPINE 60 MG/1
60 TABLET, FILM COATED, EXTENDED RELEASE ORAL 2 TIMES DAILY
Qty: 30 TABLET | Refills: 3 | Status: ON HOLD | OUTPATIENT
Start: 2019-04-26 | End: 2019-08-19

## 2019-04-26 RX ORDER — CLONIDINE HYDROCHLORIDE 0.1 MG/1
0.1 TABLET ORAL 3 TIMES DAILY
Qty: 60 TABLET | Refills: 3 | Status: SHIPPED | OUTPATIENT
Start: 2019-04-26 | End: 2019-06-07

## 2019-04-26 RX ORDER — HYDRALAZINE HYDROCHLORIDE 25 MG/1
25 TABLET, FILM COATED ORAL EVERY 8 HOURS SCHEDULED
Qty: 90 TABLET | Refills: 3 | Status: ON HOLD | OUTPATIENT
Start: 2019-04-26 | End: 2019-08-19 | Stop reason: ALTCHOICE

## 2019-04-26 RX ORDER — FUROSEMIDE 20 MG/1
20 TABLET ORAL DAILY
Qty: 60 TABLET | Refills: 3 | Status: ON HOLD | OUTPATIENT
Start: 2019-04-27 | End: 2019-08-19

## 2019-04-26 RX ADMIN — NIFEDIPINE 60 MG: 60 TABLET, FILM COATED, EXTENDED RELEASE ORAL at 08:45

## 2019-04-26 RX ADMIN — MOMETASONE FUROATE AND FORMOTEROL FUMARATE DIHYDRATE 2 PUFF: 200; 5 AEROSOL RESPIRATORY (INHALATION) at 08:23

## 2019-04-26 RX ADMIN — CLONIDINE HYDROCHLORIDE 0.1 MG: 0.1 TABLET ORAL at 13:54

## 2019-04-26 RX ADMIN — HYDRALAZINE HYDROCHLORIDE 25 MG: 25 TABLET, FILM COATED ORAL at 06:55

## 2019-04-26 RX ADMIN — ENOXAPARIN SODIUM 40 MG: 40 INJECTION SUBCUTANEOUS at 08:45

## 2019-04-26 RX ADMIN — CLONIDINE HYDROCHLORIDE 0.1 MG: 0.1 TABLET ORAL at 08:45

## 2019-04-26 RX ADMIN — FUROSEMIDE 20 MG: 20 TABLET ORAL at 08:45

## 2019-04-26 RX ADMIN — SPIRONOLACTONE 50 MG: 25 TABLET ORAL at 08:45

## 2019-04-26 RX ADMIN — INSULIN LISPRO 2 UNITS: 100 INJECTION, SOLUTION INTRAVENOUS; SUBCUTANEOUS at 12:58

## 2019-04-26 RX ADMIN — CLONAZEPAM 0.5 MG: 0.5 TABLET ORAL at 13:00

## 2019-04-26 RX ADMIN — LISINOPRIL 20 MG: 20 TABLET ORAL at 08:45

## 2019-04-26 RX ADMIN — HYDRALAZINE HYDROCHLORIDE 25 MG: 25 TABLET, FILM COATED ORAL at 13:54

## 2019-04-26 RX ADMIN — CARVEDILOL 3.12 MG: 3.12 TABLET, FILM COATED ORAL at 08:45

## 2019-04-26 RX ADMIN — MUPIROCIN 1 G: 20 OINTMENT TOPICAL at 08:46

## 2019-04-26 RX ADMIN — Medication 10 ML: at 08:45

## 2019-04-26 ASSESSMENT — PAIN SCALES - GENERAL
PAINLEVEL_OUTOF10: 0

## 2019-04-26 NOTE — PROGRESS NOTES
Pt slept since MN, BP as ow as 115 when asleep, awake at 0400 to use bathroom, no complaints, hopes to be D/c'd today if BP remains controlled on po meds.

## 2019-04-26 NOTE — PROGRESS NOTES
1930 Handoff from BE[facility]AVIORAL MEDICINE AT [facility]    2030 Pt c/o HA, temporal area, 's/90's, pt given schedule Clonidine and procardia, pt sitting in bed, crossing legs [place] [facility] style. Advised its best to lay out legs straight while in  in bed. Has been up to bathroom independently to void. 2200 ,, covered with 1unit  SSI, requested snac fpo bed, given, lights off. Given Ambien and clonazepam to help him Sleep. BP stll up 180's. 2345 Pt finally asleep BP much better once asleep, HA resolved.

## 2019-04-26 NOTE — PROGRESS NOTES
0730- Report received from [facility], patient resting quietly in bed. 8101- Assessment completed, patient sitting up in bed, denies pain or SOB. Breakfast at bedside. Patient states anxious for discharge, hoping to be discharged today as BP controlled off cardene. No needs noted at this time. Will monitor. 1000- patient resting quietly in bed, no needs noted at this time Vital signs remain stable. 1200- Assessment unchanged from previous. No needs noted at this time. Vital signs remain stable. Will continue to monitor. 200- Dr Humble Cho at bedside for eval ok for discharge today as BP has been stable. Awaiting hospitalist to see patient. 1300- Dr Mariam Ramirez at bedside for Chrissy Katya for discharge today. 1400- patient states he does not think he has enough money with him to  his meds at retail pharmacy. Patient will be discharged and return before 4 pm to  his meds. 1425- Discharge instructions reviewed with patient. Patient verbalized understanding. All home medications have been reviewed, questions answered and patient voiced understanding. All medication side effects reviewed and patient verbalized understanding. Patient given prescriptions, discharge instructions, and appointment times. Patient discharged to home, [facility]

## 2019-04-26 NOTE — DISCHARGE SUMMARY
to what the cause of the patient's hypertensive emergency was. Workup was completed and for the most part completely negative. The only thing that was positive was his urine drug screen which is positive for cocaine. This can sometimes be positive in people who take Zoloft so unsure if he actually uses or not. Either way the patient's blood pressure was controlled with a Cardene drip and eventually just oral medications. He is advised to follow-up with his primary care doctor along with Dr. Rosey Campoverde in the outpatient    Exam:     /71   Pulse 73   Temp 98.2 °F (36.8 °C) (Oral)   Resp 20   Ht 6' (1.829 m)   Wt 243 lb 9.7 oz (110.5 kg)   SpO2 97%   BMI 33.04 kg/m²     General appearance: No apparent distress, appears stated age and cooperative. HEENT: Pupils equal, round, and reactive to light. Conjunctivae/corneas clear. Neck: Supple, with full range of motion. No jugular venous distention. Trachea midline. Respiratory:  Normal respiratory effort. Clear to auscultation, bilaterally without Rales/Wheezes/Rhonchi. Cardiovascular: Regular rate and rhythm with normal S1/S2 without murmurs, rubs or gallops. Abdomen: Soft, non-tender, non-distended with normal bowel sounds. Musculoskeletal: No clubbing, cyanosis or edema bilaterally. Full range of motion without deformity. Skin: Skin color, texture, turgor normal.  No rashes or lesions. Neurologic:  Neurovascularly intact without any focal sensory/motor deficits. Cranial nerves: II-XII intact, grossly non-focal.  Psychiatric: Alert and oriented, thought content appropriate, normal insight      Consults:     IP CONSULT TO NEPHROLOGY    Significant Diagnostic Studies:     VL Renal Arterial Duplex Complete   Final Result      VL Renal Arterial Duplex Complete   Final Result      XR CHEST PORTABLE   Final Result   No acute abnormality detected.              Disposition:  Home     Condition at Discharge: Stable    Discharge Instructions/Follow-up:  PCP, Nephro    Code Status:  Prior     Activity: activity as tolerated    Diet: diabetic diet      Labs: For convenience and continuity at follow-up the following most recent labs are provided:      CBC:    Lab Results   Component Value Date    WBC 5.7 04/22/2019    HGB 12.6 04/22/2019    HCT 39.4 04/22/2019     04/25/2019       Renal:    Lab Results   Component Value Date     04/22/2019    K 3.8 04/22/2019     04/22/2019    CO2 24 04/22/2019    BUN 18 04/22/2019    CREATININE 0.9 04/22/2019    CALCIUM 8.9 04/22/2019    PHOS 2.5 04/02/2018       Discharge Medications:     Discharge Medication List as of 4/26/2019  2:11 PM           Details   spironolactone (ALDACTONE) 50 MG tablet Take 1 tablet by mouth 2 times daily, Disp-30 tablet, R-3Normal      furosemide (LASIX) 20 MG tablet Take 1 tablet by mouth daily, Disp-60 tablet, R-3Normal              Details   cloNIDine (CATAPRES) 0.1 MG tablet Take 1 tablet by mouth 3 times daily, Disp-60 tablet, R-3Normal      hydrALAZINE (APRESOLINE) 25 MG tablet Take 1 tablet by mouth every 8 hours, Disp-90 tablet, R-3Normal      NIFEdipine (ADALAT CC) 60 MG extended release tablet Take 1 tablet by mouth 2 times daily, Disp-30 tablet, R-3Normal      Blood Glucose Monitoring Suppl (FREESTYLE LITE) MITZI DAILY PRN Starting Wed 4/17/2019, Disp-1 Device, R-0, Normal      !! blood glucose test strips (FREESTYLE LITE) strip 3 TIMES DAILY Starting Wed 4/17/2019, Disp-100 each, R-3, NormalAs needed. !! FREESTYLE LANCETS MISC 3 TIMES DAILY Starting Wed 4/17/2019, Disp-100 each, R-3, Normal       !! - Potential duplicate medications found. Please discuss with provider. Details   mometasone (ELOCON) 0.1 % cream Apply topically daily Apply topically daily. , Topical, DAILY, Historical Med      mometasone-formoterol (DULERA) 200-5 MCG/ACT inhaler Inhale 2 puffs into the lungs 2 times daily, Disp-1 Inhaler, R-1Normal      fenofibrate (TRICOR) 54 MG tablet Take 1 tablet by mouth daily [facility] pharmacy: Please dispense generic fenofibrate unless prescriber denote, Disp-30 tablet, R-3Normal      lisinopril (PRINIVIL;ZESTRIL) 20 MG tablet Take 20 mg by mouth dailyHistorical Med      clonazePAM (KLONOPIN) 0.5 MG tablet Take 2 tablets by mouth 2 times daily as needed for Anxiety for up to 3 days. ., Disp-10 tablet, R-0Print      albuterol sulfate HFA (PROVENTIL HFA) 108 (90 Base) MCG/ACT inhaler Inhale 2 puffs into the lungs every 4 hours as needed for Wheezing (coughing episodes), Disp-1 Inhaler, R-0Print      carvedilol (COREG) 3.125 MG tablet Take 1 tablet by mouth 2 times daily (with meals), Disp-60 tablet, R-0Normal      vitamin B-12 (CYANOCOBALAMIN) 500 MCG tablet Take 500 mcg by mouth dailyHistorical Med      vitamin B-1 (THIAMINE) 100 MG tablet Take 100 mg by mouth dailyHistorical Med      metFORMIN (GLUCOPHAGE) 1000 MG tablet Take 1,000 mg by mouth 2 times daily (with meals)Historical Med      sertraline (ZOLOFT) 100 MG tablet Take HALF tablet by mouth daily for 1 week, then take 1 tablet by mouth daily, Disp-30 tablet, R-3Normal      zolpidem (AMBIEN) 10 MG tablet Take 10 mg by mouth nightly. Historical Med      mirtazapine (REMERON) 15 MG tablet Take 1 tablet by mouth nightly, Disp-30 tablet, R-3Normal      nitroGLYCERIN (NITROSTAT) 0.4 MG SL tablet up to max of 3 total doses.  If no relief after 1 dose, call 911., Disp-25 tablet, R-3Normal      vitamin D (D3-1000) 1000 UNITS TABS tablet Take 1 tablet by mouth daily, Disp-90 tablet, R-3Normal      aspirin 81 MG tablet Take 81 mg by mouth dailyHistorical Med      Spacer/Aero-Holding Chambers (E-Z SPACER) MITZI DAILY PRN Starting Fri 10/13/2017, Disp-1 Device, R-0, Normal      !! glucose blood VI test strips (FREESTYLE TEST STRIPS) strip DAILY Starting 8/24/2017, Until Discontinued, Disp-100 each, R-11, NormalTest daily ICD E11.42      glucose monitoring kit (FREESTYLE) monitoring kit DAILY Starting 5/24/2017, Until Discontinued, Disp-1 kit, R-0, PrintICD E11.42      !! Lancets MISC Disp-50 each, R-3, PrintTest daily ICD E11.42       !! - Potential duplicate medications found. Please discuss with provider. No future appointments. Time Spent on discharge is more than 30 minutes in the examination, evaluation, counseling and review of medications and discharge plan. Signed:    Catherine Watson MD   4/26/2019      Thank you Roxane Wong MD for the opportunity to be involved in this patient's care. If you have any questions or concerns please feel free to contact me at [phone].

## 2019-04-26 NOTE — PROGRESS NOTES
Nephrology progress Note        Chief Complaint:    Chief Complaint   Patient presents with    Shortness of Breath     SOB,CP,dizziness since this morning. reports LOC x 2. reports hitting head. denies blood thinners.  Chest Pain    Dizziness    Loss of Consciousness         History of Present iIlness:    Robert Odom is a 59 y.o. male with h/o uncontrolled HTN . He was admitted with c/o SOB, nausea, diarrhea. He had an episode of loss of consciousness  He is on multiple BP meds and BP is still uncontrolled   Has h/o non compliance with meds. 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        Past Surgical History:   Procedure Laterality Date    ABDOMEN SURGERY      CARDIAC SURGERY      3 STENTS PLACED     CHOLECYSTECTOMY      COLONOSCOPY      CORONARY ANGIOPLASTY WITH STENT PLACEMENT      3 stents     FRACTURE SURGERY      RIGHT ANKLE    LITHOTRIPSY      SHOULDER ARTHROSCOPY      VASCULAR SURGERY         Family History   Problem Relation Age of Onset    High Blood Pressure Mother     Cancer Father         lung cancer    Diabetes Brother     Liver Disease Sister     Diabetes Brother          Current Medications:      hydrALAZINE (APRESOLINE) tablet 25 mg 3 times per day   NIFEdipine (PROCARDIA XL) extended release tablet 60 mg BID   mupirocin (BACTROBAN) 2 % ointment 1 g BID   furosemide (LASIX) tablet 20 mg Daily   cloNIDine (CATAPRES) tablet 0.1 CO2, BUN, CREATININE, GLUCOSE in the last 72 hours. Ca/Mg/Phos:   No results for input(s): CALCIUM, MG, PHOS in the last 72 hours. Hepatic: No results for input(s): AST, ALT, ALB, BILITOT, ALKPHOS in the last 72 hours. Troponin:   No results for input(s): TROPONINI in the last 72 hours. BNP: No results for input(s): BNP in the last 72 hours. Lipids: No results for input(s): CHOL, TRIG, HDL, LDLCALC, LABVLDL in the last 72 hours. ABGs: No results for input(s): PHART, PO2ART, [id number] in the last 72 hours. INR: No results for input(s): INR in the last 72 hours. UA:No results for input(s): Onita Broom, GLUCOSEU, BILIRUBINUR, [place], BLOODU, PHUR, PROTEINU, UROBILINOGEN, NITRU, LEUKOCYTESUR, LABMICR, URINETYPE in the last 72 hours. Urine Microscopic: No results for input(s): LABCAST, BACTERIA, COMU, HYALCAST, WBCUA, RBCUA, EPIU in the last 72 hours. Urine Culture: No results for input(s): LABURIN in the last 72 hours. Urine Chemistry: No results for input(s): Glenard Sober, PROTEINUR, NAUR in the last 72 hours. IMAGING:  VL Renal Arterial Duplex Complete   Final Result      VL Renal Arterial Duplex Complete   Final Result      XR CHEST PORTABLE   Final Result   No acute abnormality detected. No intake/output data recorded. Assessment/Plan :      1. Uncontrolled  HTN. Off cardene drip now. BP elevated . Increased nifedipine XL to 60  mg po bID   Will add lasix 20 mg po daily    RePeat renal doppler study has  r/o renal artery stenosis   There is no significant stenosis       Check renin- pending    ary levels- 5.7     Plasma metanephrine levels -normal   Low sodium diet     2. ALEA - resolved     3. Cocaine abuse.  advised to avoid illicit drug use     DC home today       Thank you for allowing us to participate in care of Joe Martin         Electronically signed by: Marvene Gaucher, MD, 4/26/2019, 12:35 PM      [facility] [address]  Office :

## 2019-04-28 LAB — RENIN ACTIVITY: 0.5 NG/ML/HR

## 2019-05-24 ENCOUNTER — APPOINTMENT (OUTPATIENT)
Dept: GENERAL RADIOLOGY | Age: 64
End: 2019-05-24
Payer: MEDICARE

## 2019-05-24 ENCOUNTER — APPOINTMENT (OUTPATIENT)
Dept: CT IMAGING | Age: 64
End: 2019-05-24
Payer: MEDICARE

## 2019-05-24 ENCOUNTER — HOSPITAL ENCOUNTER (EMERGENCY)
Age: 64
Discharge: HOME | End: 2019-05-24
Payer: MEDICARE

## 2019-05-24 VITALS
DIASTOLIC BLOOD PRESSURE: 100 MMHG | SYSTOLIC BLOOD PRESSURE: 171 MMHG | OXYGEN SATURATION: 96 % | HEART RATE: 81 BPM | TEMPERATURE: 97.8 F | RESPIRATION RATE: 18 BRPM

## 2019-05-24 DIAGNOSIS — S79.911A HIP INJURY, RIGHT, INITIAL ENCOUNTER: ICD-10-CM

## 2019-05-24 DIAGNOSIS — S09.90XA CLOSED HEAD INJURY, INITIAL ENCOUNTER: Primary | ICD-10-CM

## 2019-05-24 DIAGNOSIS — M54.50 ACUTE BILATERAL LOW BACK PAIN WITHOUT SCIATICA: ICD-10-CM

## 2019-05-24 DIAGNOSIS — S49.91XA RIGHT SHOULDER INJURY, INITIAL ENCOUNTER: ICD-10-CM

## 2019-05-24 DIAGNOSIS — R03.0 ELEVATED BLOOD PRESSURE READING: ICD-10-CM

## 2019-05-24 PROCEDURE — 90471 IMMUNIZATION ADMIN: CPT | Performed by: PHYSICIAN ASSISTANT

## 2019-05-24 PROCEDURE — 99284 EMERGENCY DEPT VISIT MOD MDM: CPT

## 2019-05-24 PROCEDURE — 90715 TDAP VACCINE 7 YRS/> IM: CPT | Performed by: PHYSICIAN ASSISTANT

## 2019-05-24 PROCEDURE — 6360000002 HC RX W HCPCS: Performed by: PHYSICIAN ASSISTANT

## 2019-05-24 PROCEDURE — 93005 ELECTROCARDIOGRAM TRACING: CPT | Performed by: PHYSICIAN ASSISTANT

## 2019-05-24 PROCEDURE — 6370000000 HC RX 637 (ALT 250 FOR IP): Performed by: PHYSICIAN ASSISTANT

## 2019-05-24 PROCEDURE — 73502 X-RAY EXAM HIP UNI 2-3 VIEWS: CPT

## 2019-05-24 PROCEDURE — 73030 X-RAY EXAM OF SHOULDER: CPT

## 2019-05-24 PROCEDURE — 72100 X-RAY EXAM L-S SPINE 2/3 VWS: CPT

## 2019-05-24 PROCEDURE — 70450 CT HEAD/BRAIN W/O DYE: CPT

## 2019-05-24 RX ORDER — IBUPROFEN 600 MG/1
600 TABLET ORAL EVERY 6 HOURS PRN
Qty: 30 TABLET | Refills: 0 | Status: ON HOLD | OUTPATIENT
Start: 2019-05-24 | End: 2019-08-22 | Stop reason: HOSPADM

## 2019-05-24 RX ORDER — IBUPROFEN 400 MG/1
800 TABLET ORAL ONCE
Status: COMPLETED | OUTPATIENT
Start: 2019-05-24 | End: 2019-05-24

## 2019-05-24 RX ORDER — METHOCARBAMOL 750 MG/1
750 TABLET, FILM COATED ORAL 3 TIMES DAILY
Qty: 15 TABLET | Refills: 0 | Status: SHIPPED | OUTPATIENT
Start: 2019-05-24 | End: 2019-05-29

## 2019-05-24 RX ADMIN — IBUPROFEN 800 MG: 400 TABLET, FILM COATED ORAL at 15:30

## 2019-05-24 RX ADMIN — TETANUS TOXOID, REDUCED DIPHTHERIA TOXOID AND ACELLULAR PERTUSSIS VACCINE, ADSORBED 0.5 ML: 5; 2.5; 8; 8; 2.5 SUSPENSION INTRAMUSCULAR at 15:43

## 2019-05-24 ASSESSMENT — PAIN SCALES - GENERAL
PAINLEVEL_OUTOF10: 9

## 2019-05-24 ASSESSMENT — ENCOUNTER SYMPTOMS
COUGH: 0
COLOR CHANGE: 0
NAUSEA: 0
SHORTNESS OF BREATH: 0
CHEST TIGHTNESS: 0
ABDOMINAL PAIN: 0
VOMITING: 0

## 2019-05-24 ASSESSMENT — PAIN DESCRIPTION - ORIENTATION: ORIENTATION: RIGHT

## 2019-05-24 ASSESSMENT — PAIN DESCRIPTION - DESCRIPTORS: DESCRIPTORS: ACHING

## 2019-05-24 ASSESSMENT — PAIN DESCRIPTION - ONSET: ONSET: SUDDEN

## 2019-05-24 ASSESSMENT — PAIN DESCRIPTION - LOCATION: LOCATION: SHOULDER;HIP

## 2019-05-24 ASSESSMENT — PAIN DESCRIPTION - PAIN TYPE: TYPE: ACUTE PAIN

## 2019-05-24 ASSESSMENT — PAIN DESCRIPTION - PROGRESSION: CLINICAL_PROGRESSION: GRADUALLY WORSENING

## 2019-05-24 ASSESSMENT — PAIN - FUNCTIONAL ASSESSMENT: PAIN_FUNCTIONAL_ASSESSMENT: PREVENTS OR INTERFERES SOME ACTIVE ACTIVITIES AND ADLS

## 2019-05-24 ASSESSMENT — PAIN DESCRIPTION - FREQUENCY: FREQUENCY: CONTINUOUS

## 2019-05-24 NOTE — ED PROVIDER NOTES
[address] [facility]        Pt Name: Joe Martin  MRN: [id number]  [place] 1955  Date of evaluation: 5/24/2019  Provider: Carlene Vega PA-C  PCP: Blair Fay MD    This patient was not seen and evaluated by the attending physician       [address] [place] [address]       Chief Complaint   Patient presents with    Fall     mechanical. down 6 steps yesterday. c/o right shoulder and hip pain. also c/o intermittent dizziness today. denies HI       HISTORY OF PRESENT ILLNESS   (Location/Symptom, Timing/Onset, Context/Setting, Quality, Duration, Modifying Factors, Severity)  Note limiting factors. Joe Martin is a 59 y.o. male presents emergency Department by private vehicle complaining of right shoulder pain, right hip pain and low back pain following a fall. Patient states he fell on his right side down about 6 steps yesterday. Patient slipped on a plastic bag causing him to fall. Fall was not secondary to dizziness, lightheadedness, syncope, chest pain or shortness of breath. Fall was mechanical.  He states he did hit his head, denies any loss consciousness, visual disturbance, nausea or vomiting. States he has mild dizziness earlier today, denies any at this time. Denies any chest pain, shortness of breath. All pain isolated to right shoulder, right hip and low back. Took ibuprofen with significant improvement of pain. All pain worsens with movement. No other significant alleviating or aggravating factors. Denies any lower extremity weakness/numbness/tingling, urinary/bowel incontinence, urinary retention, saddle anesthesia. No other complaints this time. Nursing Notes were all reviewed and agreed with or any disagreements were addressed  in the HPI. REVIEW OF SYSTEMS    (2-9 systems for level 4, 10 or more for level 5)     Review of Systems   Constitutional: Negative for chills and fever. HENT: Negative.     Respiratory: Negative for cough, chest tightness and shortness of breath. Cardiovascular: Negative. Gastrointestinal: Negative for abdominal pain, nausea and vomiting. Genitourinary: Negative. Musculoskeletal: Positive for arthralgias and myalgias. Skin: Positive for wound. Negative for color change. Neurological: Negative for light-headedness and headaches. Psychiatric/Behavioral: Negative for behavioral problems and confusion. Positives and Pertinent negatives as per HPI. Except as noted abovein the ROS, all other systems were reviewed and negative. PAST MEDICAL HISTORY     Past Medical History:   Diagnosis Date    CAD (coronary artery disease)     Chest pain 10/15/2017    [facility]     Diabetes mellitus ([facility]     Hyperlipidemia     Hypertension     Kidney stone     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 SURGICAL HISTORY     Past Surgical History:   Procedure Laterality Date    ABDOMEN SURGERY      CARDIAC SURGERY      3 STENTS PLACED     CHOLECYSTECTOMY      COLONOSCOPY      CORONARY ANGIOPLASTY WITH STENT PLACEMENT      3 stents     FRACTURE SURGERY      RIGHT ANKLE    LITHOTRIPSY      SHOULDER ARTHROSCOPY      VASCULAR SURGERY           CURRENTMEDICATIONS       Previous Medications    ALBUTEROL SULFATE HFA (PROVENTIL HFA) 108 (90 BASE) MCG/ACT INHALER    Inhale 2 puffs into the lungs every 4 hours as needed for Wheezing (coughing episodes)    ASPIRIN 81 MG TABLET    Take 81 mg by mouth daily    BLOOD GLUCOSE MONITORING SUPPL (FREESTYLE LITE) MITZI    1 Device by Does not apply route daily as needed (Check BG 3 times a day)    BLOOD GLUCOSE TEST STRIPS (FREESTYLE LITE) STRIP    1 each by In Vitro route 3 times daily As needed.     CARVEDILOL (COREG) 3.125 MG TABLET    Take 1 tablet by mouth 2 times daily (with meals)    CLONAZEPAM (KLONOPIN) 0.5 MG TABLET    Take 2 tablets by mouth 2 times daily as needed for Anxiety for up to 3 days. Chica Deleon CLONIDINE (CATAPRES) 0.1 MG TABLET    Take 1 tablet by mouth 3 times daily    FENOFIBRATE (TRICOR) 54 MG TABLET    Take 1 tablet by mouth daily [facility]: Please dispense generic fenofibrate unless prescriber denote    FREESTYLE LANCETS MISC    1 each by Does not apply route 3 times daily    FUROSEMIDE (LASIX) 20 MG TABLET    Take 1 tablet by mouth daily    GLUCOSE BLOOD VI TEST STRIPS (FREESTYLE TEST STRIPS) STRIP    1 each by In Vitro route daily Test daily ICD E11.42    GLUCOSE MONITORING KIT (FREESTYLE) MONITORING KIT    1 kit by Does not apply route daily ICD E11.42    HYDRALAZINE (APRESOLINE) 25 MG TABLET    Take 1 tablet by mouth every 8 hours    LANCETS MISC    Test daily ICD E11.42    LISINOPRIL (PRINIVIL;ZESTRIL) 20 MG TABLET    Take 20 mg by mouth daily    METFORMIN (GLUCOPHAGE) 1000 MG TABLET    Take 1,000 mg by mouth 2 times daily (with meals)    MIRTAZAPINE (REMERON) 15 MG TABLET    Take 1 tablet by mouth nightly    MOMETASONE (ELOCON) 0.1 % CREAM    Apply topically daily Apply topically daily. MOMETASONE-FORMOTEROL (DULERA) 200-5 MCG/ACT INHALER    Inhale 2 puffs into the lungs 2 times daily    NIFEDIPINE (ADALAT CC) 60 MG EXTENDED RELEASE TABLET    Take 1 tablet by mouth 2 times daily    NITROGLYCERIN (NITROSTAT) 0.4 MG SL TABLET    up to max of 3 total doses. If no relief after 1 dose, call 911.     SERTRALINE (ZOLOFT) 100 MG TABLET    Take HALF tablet by mouth daily for 1 week, then take 1 tablet by mouth daily    SPACER/AERO-HOLDING CHAMBERS (E-Z SPACER) MITZI    1 Device by Does not apply route daily as needed (shortness of breath)    SPIRONOLACTONE (ALDACTONE) 50 MG TABLET    Take 1 tablet by mouth 2 times daily    VITAMIN B-1 (THIAMINE) 100 MG TABLET    Take 100 mg by mouth daily    VITAMIN B-12 (CYANOCOBALAMIN) 500 MCG TABLET    Take 500 mcg by mouth daily VITAMIN D (D3-1000) 1000 UNITS TABS TABLET    Take 1 tablet by mouth daily    ZOLPIDEM (AMBIEN) 10 MG TABLET    Take 10 mg by mouth nightly.          ALLERGIES     Naproxen    FAMILYHISTORY       Family History   Problem Relation Age of Onset    High Blood Pressure Mother     Cancer Father         lung cancer    Diabetes Brother     Liver Disease Sister     Diabetes Brother           SOCIAL HISTORY       Social History     Socioeconomic History    Marital status: Single     Spouse name: None    Number of children: 1    Years of education: 15    Highest education level: None   Occupational History    Occupation: retired   Social Needs    Financial resource strain: None    Food insecurity:     Worry: None     Inability: None    Transportation needs:     Medical: None     Non-medical: None   Tobacco Use    Smoking status: Former Smoker     Packs/day: 2.50     Years: 40.00     Pack years: 100.00     Types: Cigarettes    Smokeless tobacco: Never Used   Substance and Sexual Activity    Alcohol use: Never     Frequency: Never    Drug use: No    Sexual activity: Yes     Partners: Female   Lifestyle    Physical activity:     Days per week: None     Minutes per session: None    Stress: None   Relationships    Social connections:     Talks on phone: None     Gets together: None     Attends Muslim service: None     Active member of club or organization: None     Attends meetings of clubs or organizations: None     Relationship status: None    Intimate partner violence:     Fear of current or ex partner: None     Emotionally abused: None     Physically abused: None     Forced sexual activity: None   Other Topics Concern    None   Social History Narrative    None       SCREENINGS             PHYSICAL EXAM    (up to 7 for level 4, 8 or more for level 5)     ED Triage Vitals [05/24/19 1406]   BP Temp Temp Source Pulse Resp SpO2 Height Weight   (!) 182/106 97.8 °F (36.6 °C) Oral 81 18 96 % -- -- radiographic images are visualized andpreliminarily interpreted by the  ED Provider with the below findings:        Interpretation perthe Radiologist below, if available at the time of this note:    XR LUMBAR SPINE (2-3 VIEWS)   Preliminary Result   1. Mild levoscoliosis of the lumbar spine without acute fracture or   subluxation. 2. Mild-to-moderate multilevel degenerative disc disease, as detailed above. XR HIP RIGHT (2-3 VIEWS)   Final Result   Moderate right hip osteoarthritis         XR SHOULDER RIGHT (MIN 2 VIEWS)   Final Result   Probable postsurgical change of osteotomy at the [facility] joint. AC dislocation   considered less likely. No other significant finding in the shoulder. CT HEAD WO CONTRAST   Final Result   Stable CT brain with no acute intracranial abnormality. Ct Head Wo Contrast    Result Date: 5/24/2019  EXAMINATION: CT OF THE HEAD WITHOUT CONTRAST  5/24/2019 2:38 pm TECHNIQUE: CT of the head was performed without the administration of intravenous contrast. Dose modulation, iterative reconstruction, and/or weight based adjustment of the mA/kV was utilized to reduce the radiation dose to as low as reasonably achievable. COMPARISON: 03/23/2019 HISTORY: ORDERING SYSTEM PROVIDED HISTORY: head injury yesterday TECHNOLOGIST PROVIDED HISTORY: Has a \"code stroke\" or \"stroke alert\" been called? ->No Ordering Physician Provided Reason for Exam: fall dizziness Acuity: Acute Type of Exam: Initial FINDINGS: BRAIN/VENTRICLES: No acute intracranial hemorrhage, mass effect or midline shift. No abnormal extra-axial fluid collection. The gray-white differentiation is maintained without evidence of an acute infarct. No evidence of hydrocephalus. ORBITS: The visualized portion of the orbits demonstrate no acute abnormality. SINUSES: The visualized paranasal sinuses and mastoid air cells demonstrate no acute abnormality.  SOFT TISSUES/SKULL:  No acute abnormality of the visualized skull or earlier today, denies any at this time or prostration stay. Again no other concerning symptoms. Follow-up was mechanical.  No additional workup indicated at this time. Patient told to return ED should be any headaches, lightheadedness, chest pain or shortness breath. Patient voiced understanding. Patient prescribed Robaxin, told not to drive or operate a vehicle taking. Told to take ibuprofen as needed for pain and inflammation. Ice, rest and elevate. Encourage mobility of right shoulder. Follow-up with PCP in 3-5 days for reevaluation or return to ED sooner if any acute worsening. Patient understands her return precautions. The patient tolerated their visit well. I have discussed the findings of today's workup with the patient and addressed the patient's questions and concerns. Important warning signs as well as new or worsening symptoms which would necessitate immediate return to the ED were discussed. The plan is to discharge from the ED at this time, and the patient is in stable condition. The patient acknowledged understanding is agreeable with this plan. FINAL IMPRESSION      1. Closed head injury, initial encounter    2. Elevated blood pressure reading    3. Right shoulder injury, initial encounter    4. Hip injury, right, initial encounter    5. Acute bilateral low back pain without sciatica          DISPOSITION/PLAN   DISPOSITION Decision To Discharge 05/24/2019 03:46:42 PM      PATIENT REFERREDTO:  [facility] Emergency Department  [address] [facility] [id number]  [phone]  Go to   If symptoms worsen    Jose Carpenter MD  [address] [facility]  [address] [facility]  [phone]    Schedule an appointment as soon as possible for a visit in 3 days  For follow up and reevaluation.     Barb Stuart, [facility]  [address] [place] [address]  [phone]    Schedule an appointment as soon as possible for a visit in 3 days  For follow up and reevaluation.       DISCHARGE MEDICATIONS:  New Prescriptions    IBUPROFEN (ADVIL;MOTRIN) 600 MG TABLET    Take 1 tablet by mouth every 6 hours as needed for Pain    METHOCARBAMOL (ROBAXIN-750) 750 MG TABLET    Take 1 tablet by mouth 3 times daily for 5 days       DISCONTINUED MEDICATIONS:  Discontinued Medications    No medications on file              (Please note that portions ofthis note were completed with a voice recognition program.  Efforts were made to edit the dictations but occasionally words are mis-transcribed.)    Cody Livingston PA-C (electronically signed)           Cody Livingston PA-C  05/24/19 [id number]

## 2019-05-25 LAB
EKG ATRIAL RATE: 80 BPM
EKG DIAGNOSIS: NORMAL
EKG P AXIS: 37 DEGREES
EKG P-R INTERVAL: 168 MS
EKG Q-T INTERVAL: 380 MS
EKG QRS DURATION: 90 MS
EKG QTC CALCULATION (BAZETT): 438 MS
EKG R AXIS: 62 DEGREES
EKG T AXIS: 51 DEGREES
EKG VENTRICULAR RATE: 80 BPM

## 2019-05-25 PROCEDURE — 93010 ELECTROCARDIOGRAM REPORT: CPT | Performed by: INTERNAL MEDICINE

## 2019-06-07 ENCOUNTER — APPOINTMENT (OUTPATIENT)
Dept: GENERAL RADIOLOGY | Age: 64
End: 2019-06-07
Payer: MEDICARE

## 2019-06-07 ENCOUNTER — APPOINTMENT (OUTPATIENT)
Dept: CT IMAGING | Age: 64
End: 2019-06-07
Payer: MEDICARE

## 2019-06-07 ENCOUNTER — HOSPITAL ENCOUNTER (EMERGENCY)
Age: 64
Discharge: HOME | End: 2019-06-07
Attending: EMERGENCY MEDICINE
Payer: MEDICARE

## 2019-06-07 VITALS
RESPIRATION RATE: 18 BRPM | OXYGEN SATURATION: 97 % | HEIGHT: 72 IN | BODY MASS INDEX: 34.75 KG/M2 | SYSTOLIC BLOOD PRESSURE: 162 MMHG | TEMPERATURE: 98 F | WEIGHT: 256.6 LBS | HEART RATE: 80 BPM | DIASTOLIC BLOOD PRESSURE: 84 MMHG

## 2019-06-07 DIAGNOSIS — R55 SYNCOPE, UNSPECIFIED SYNCOPE TYPE: ICD-10-CM

## 2019-06-07 DIAGNOSIS — R07.9 CHEST PAIN, UNSPECIFIED TYPE: Primary | ICD-10-CM

## 2019-06-07 DIAGNOSIS — R06.02 SHORTNESS OF BREATH: ICD-10-CM

## 2019-06-07 LAB
A/G RATIO: 1.2 (ref 1.1–2.2)
ALBUMIN SERPL-MCNC: 3.7 G/DL (ref 3.4–5)
ALP BLD-CCNC: 154 U/L (ref 40–129)
ALT SERPL-CCNC: 29 U/L (ref 10–40)
AMPHETAMINE SCREEN, URINE: NORMAL
ANION GAP SERPL CALCULATED.3IONS-SCNC: 14 MMOL/L (ref 3–16)
AST SERPL-CCNC: 26 U/L (ref 15–37)
BARBITURATE SCREEN URINE: NORMAL
BASOPHILS ABSOLUTE: 0.1 K/UL (ref 0–0.2)
BASOPHILS RELATIVE PERCENT: 2.4 %
BENZODIAZEPINE SCREEN, URINE: NORMAL
BILIRUB SERPL-MCNC: <0.2 MG/DL (ref 0–1)
BILIRUBIN URINE: ABNORMAL
BLOOD, URINE: NEGATIVE
BUN BLDV-MCNC: 17 MG/DL (ref 7–20)
CALCIUM SERPL-MCNC: 8.6 MG/DL (ref 8.3–10.6)
CANNABINOID SCREEN URINE: NORMAL
CHLORIDE BLD-SCNC: 104 MMOL/L (ref 99–110)
CLARITY: CLEAR
CO2: 23 MMOL/L (ref 21–32)
COCAINE METABOLITE SCREEN URINE: NORMAL
COLOR: YELLOW
CREAT SERPL-MCNC: 0.8 MG/DL (ref 0.8–1.3)
D DIMER: <200 NG/ML DDU (ref 0–229)
EOSINOPHILS ABSOLUTE: 0.1 K/UL (ref 0–0.6)
EOSINOPHILS RELATIVE PERCENT: 1.5 %
EPITHELIAL CELLS, UA: 1 /HPF (ref 0–5)
GFR AFRICAN AMERICAN: >60
GFR NON-AFRICAN AMERICAN: >60
GLOBULIN: 3 G/DL
GLUCOSE BLD-MCNC: 239 MG/DL (ref 70–99)
GLUCOSE URINE: >=1000 MG/DL
HCT VFR BLD CALC: 38.3 % (ref 40.5–52.5)
HEMOGLOBIN: 13.4 G/DL (ref 13.5–17.5)
HYALINE CASTS: 5 /LPF (ref 0–8)
INR BLD: 0.89 (ref 0.86–1.14)
KETONES, URINE: ABNORMAL MG/DL
LACTIC ACID: 2.1 MMOL/L (ref 0.4–2)
LEUKOCYTE ESTERASE, URINE: NEGATIVE
LYMPHOCYTES ABSOLUTE: 1.6 K/UL (ref 1–5.1)
LYMPHOCYTES RELATIVE PERCENT: 30.4 %
Lab: NORMAL
MCH RBC QN AUTO: 30 PG (ref 26–34)
MCHC RBC AUTO-ENTMCNC: 34.9 G/DL (ref 31–36)
MCV RBC AUTO: 85.8 FL (ref 80–100)
METHADONE SCREEN, URINE: NORMAL
MICROSCOPIC EXAMINATION: YES
MONOCYTES ABSOLUTE: 0.3 K/UL (ref 0–1.3)
MONOCYTES RELATIVE PERCENT: 5.9 %
NEUTROPHILS ABSOLUTE: 3.2 K/UL (ref 1.7–7.7)
NEUTROPHILS RELATIVE PERCENT: 59.8 %
NITRITE, URINE: NEGATIVE
OPIATE SCREEN URINE: NORMAL
OXYCODONE URINE: NORMAL
PDW BLD-RTO: 14.3 % (ref 12.4–15.4)
PH UA: 6
PH UA: 6 (ref 5–8)
PHENCYCLIDINE SCREEN URINE: NORMAL
PLATELET # BLD: 180 K/UL (ref 135–450)
PMV BLD AUTO: 8.6 FL (ref 5–10.5)
POTASSIUM SERPL-SCNC: 4.2 MMOL/L (ref 3.5–5.1)
PRO-BNP: 235 PG/ML (ref 0–124)
PROPOXYPHENE SCREEN: NORMAL
PROTEIN UA: 100 MG/DL
PROTHROMBIN TIME: 10.2 SEC (ref 9.8–13)
RBC # BLD: 4.46 M/UL (ref 4.2–5.9)
RBC UA: 1 /HPF (ref 0–4)
SODIUM BLD-SCNC: 141 MMOL/L (ref 136–145)
SPECIFIC GRAVITY UA: >1.03 (ref 1–1.03)
TOTAL CK: 103 U/L (ref 39–308)
TOTAL PROTEIN: 6.7 G/DL (ref 6.4–8.2)
TROPONIN: <0.01 NG/ML
TROPONIN: <0.01 NG/ML
URINE REFLEX TO CULTURE: ABNORMAL
URINE TYPE: ABNORMAL
UROBILINOGEN, URINE: 1 E.U./DL
WBC # BLD: 5.3 K/UL (ref 4–11)
WBC UA: 2 /HPF (ref 0–5)

## 2019-06-07 PROCEDURE — 85610 PROTHROMBIN TIME: CPT

## 2019-06-07 PROCEDURE — 81001 URINALYSIS AUTO W/SCOPE: CPT

## 2019-06-07 PROCEDURE — 71260 CT THORAX DX C+: CPT

## 2019-06-07 PROCEDURE — 84484 ASSAY OF TROPONIN QUANT: CPT

## 2019-06-07 PROCEDURE — 82550 ASSAY OF CK (CPK): CPT

## 2019-06-07 PROCEDURE — 83880 ASSAY OF NATRIURETIC PEPTIDE: CPT

## 2019-06-07 PROCEDURE — 83605 ASSAY OF LACTIC ACID: CPT

## 2019-06-07 PROCEDURE — 70450 CT HEAD/BRAIN W/O DYE: CPT

## 2019-06-07 PROCEDURE — 71046 X-RAY EXAM CHEST 2 VIEWS: CPT

## 2019-06-07 PROCEDURE — 99285 EMERGENCY DEPT VISIT HI MDM: CPT

## 2019-06-07 PROCEDURE — 93005 ELECTROCARDIOGRAM TRACING: CPT | Performed by: EMERGENCY MEDICINE

## 2019-06-07 PROCEDURE — 80307 DRUG TEST PRSMV CHEM ANLYZR: CPT

## 2019-06-07 PROCEDURE — 80053 COMPREHEN METABOLIC PANEL: CPT

## 2019-06-07 PROCEDURE — 85379 FIBRIN DEGRADATION QUANT: CPT

## 2019-06-07 PROCEDURE — 6360000004 HC RX CONTRAST MEDICATION: Performed by: EMERGENCY MEDICINE

## 2019-06-07 PROCEDURE — 85025 COMPLETE CBC W/AUTO DIFF WBC: CPT

## 2019-06-07 RX ORDER — SERTRALINE HYDROCHLORIDE 100 MG/1
100 TABLET, FILM COATED ORAL NIGHTLY
Status: ON HOLD | COMMUNITY
Start: 2019-04-15 | End: 2020-02-12

## 2019-06-07 RX ORDER — TRAMADOL HYDROCHLORIDE 50 MG/1
1 TABLET ORAL 2 TIMES DAILY PRN
Status: ON HOLD | COMMUNITY
Start: 2019-05-31 | End: 2019-08-19

## 2019-06-07 RX ORDER — ATORVASTATIN CALCIUM 40 MG/1
40 TABLET, FILM COATED ORAL NIGHTLY
Status: ON HOLD | COMMUNITY
Start: 2015-02-26 | End: 2020-02-12

## 2019-06-07 RX ORDER — CLONIDINE HYDROCHLORIDE 0.1 MG/1
0.1 TABLET ORAL 2 TIMES DAILY
Status: ON HOLD | COMMUNITY
Start: 2019-04-26 | End: 2019-08-22 | Stop reason: HOSPADM

## 2019-06-07 RX ORDER — LISINOPRIL AND HYDROCHLOROTHIAZIDE 25; 20 MG/1; MG/1
1 TABLET ORAL DAILY
Status: ON HOLD | COMMUNITY
Start: 2019-05-13 | End: 2020-01-05

## 2019-06-07 RX ADMIN — IOPAMIDOL 75 ML: 755 INJECTION, SOLUTION INTRAVENOUS at 20:16

## 2019-06-07 ASSESSMENT — PAIN DESCRIPTION - DESCRIPTORS
DESCRIPTORS: HEAVINESS
DESCRIPTORS: HEAVINESS

## 2019-06-07 ASSESSMENT — ENCOUNTER SYMPTOMS
ABDOMINAL PAIN: 0
NAUSEA: 0
SINUS PAIN: 0
COUGH: 0
ABDOMINAL DISTENTION: 0
SINUS PRESSURE: 0
BACK PAIN: 0
VOMITING: 0
COLOR CHANGE: 0
FACIAL SWELLING: 0
DIARRHEA: 0
SHORTNESS OF BREATH: 1

## 2019-06-07 ASSESSMENT — PAIN SCALES - GENERAL
PAINLEVEL_OUTOF10: 8
PAINLEVEL_OUTOF10: 0
PAINLEVEL_OUTOF10: 3

## 2019-06-07 ASSESSMENT — PAIN DESCRIPTION - LOCATION
LOCATION: CHEST
LOCATION: CHEST

## 2019-06-07 ASSESSMENT — PAIN DESCRIPTION - PAIN TYPE
TYPE: ACUTE PAIN
TYPE: ACUTE PAIN

## 2019-06-07 ASSESSMENT — PAIN DESCRIPTION - FREQUENCY
FREQUENCY: CONTINUOUS
FREQUENCY: CONTINUOUS

## 2019-06-07 ASSESSMENT — PAIN - FUNCTIONAL ASSESSMENT: PAIN_FUNCTIONAL_ASSESSMENT: ACTIVITIES ARE NOT PREVENTED

## 2019-06-07 ASSESSMENT — PAIN DESCRIPTION - PROGRESSION: CLINICAL_PROGRESSION: GRADUALLY IMPROVING

## 2019-06-07 ASSESSMENT — PAIN DESCRIPTION - ONSET: ONSET: ON-GOING

## 2019-06-07 NOTE — ED NOTES
RN at bedside introducing self to pt along with updating him on plan of care.       Angelina Lora RN  06/07/19 [id number]

## 2019-06-07 NOTE — ED NOTES
Pt arrived to the ED via EMS, pt states that he was having chest pain at home, and had a \" passing out\" spell, pt states he knows he did because he woke up on the floor, pt states that he has mid sternum pain, 8/10 on pain      Chase Dejesus, RUSTY  06/07/19 0335

## 2019-06-07 NOTE — ED PROVIDER NOTES
[address] [facility]        Pt Name: Govind Quintana  MRN: [id number]  [place] 1955  Date of evaluation: 6/7/2019  Provider: Darron Meigs, APRN - LANIE  PCP: Batsheva Mcbride MD    This patient was seen and evaluated by the attending physician Parry Holstein, MD.      [address] [place] [address]       Chief Complaint   Patient presents with    Chest Pain     intermittent all day. syncopal episode around 3 pm. hx mi with stents.  Loss of Consciousness       HISTORY OF PRESENT ILLNESS   (Location/Symptom, Timing/Onset, Context/Setting, Quality, Duration, Modifying Factors, Severity)  Note limiting factors. Govind Quintana is a 59 y.o. male with PMH of skin for CAD, COPD, DM, HLD, HTN, ARTEMIO, and MRSA colonization who presents to the emergency department today complaining of chest pain, shortness of breath, syncope, high blood pressure. He states that the chest pain started around noon today and has been intermittent. It is midsternal and does not radiate. It started while at rest and is not exertional.  Shortness of breath is not exertional.  No cough fever or recent illness. No abdominal pain or GI symptoms. He states the syncopal episode occurred around the clock this afternoon. He did not know he passed out but his brother will come up off the floor. He states around 1530 hrs. he denies headache, lightheadedness, dizziness. He took a total of 3 nitro but did not get relief of his chest pain. He currently rates the pain 8/10 and describes it as a heaviness. No neck pain or stiffness. No back pain. No lacerations or abrasions from the fall. Patient has been admitted several times this year already for the same complaints. Workups have been negative besides positive for cocaine and orthostatic. Nursing Notes were all reviewed and agreed with or any disagreements were addressed  in the HPI.     REVIEW OF SYSTEMS    (2-9 systems for level 4, 10 or more for level 5)     Review of Systems   Constitutional: Negative for chills, diaphoresis, fatigue and fever. HENT: Negative for congestion, facial swelling, nosebleeds, sinus pressure, sinus pain and tinnitus. Eyes: Negative for visual disturbance. Respiratory: Positive for shortness of breath. Negative for cough. Cardiovascular: Positive for chest pain. Negative for palpitations and leg swelling. Gastrointestinal: Negative for abdominal distention, abdominal pain, diarrhea, nausea and vomiting. Genitourinary: Negative for dysuria, flank pain, frequency and hematuria. Musculoskeletal: Negative for arthralgias, back pain, joint swelling, myalgias, neck pain and neck stiffness. Skin: Negative for color change, pallor, rash and wound. Allergic/Immunologic: Negative for immunocompromised state. Neurological: Positive for syncope. Negative for dizziness, weakness, light-headedness, numbness and headaches. Hematological: Negative for adenopathy. Psychiatric/Behavioral: Negative for confusion. All other systems reviewed and are negative. Positives and Pertinent negatives as per HPI. Except as noted abovein the ROS, all other systems were reviewed and negative. PAST MEDICAL HISTORY     Past Medical History:   Diagnosis Date    CAD (coronary artery disease)     Chest pain 10/15/2017    [facility]     Diabetes mellitus ([facility]     Hyperlipidemia     Hypertension     Kidney stone     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          SURGICAL HISTORY     Past Surgical History:   Procedure Laterality Date    ABDOMEN SURGERY      CARDIAC SURGERY      3 STENTS PLACED     CHOLECYSTECTOMY      COLONOSCOPY      CORONARY ANGIOPLASTY WITH STENT PLACEMENT      3 stents     FRACTURE SURGERY      RIGHT ANKLE    LITHOTRIPSY      SHOULDER ARTHROSCOPY      VASCULAR SURGERY           CURRENTMEDICATIONS       Previous Medications    ALBUTEROL SULFATE HFA (PROVENTIL HFA) 108 (90 BASE) MCG/ACT INHALER    Inhale 2 puffs into the lungs every 4 hours as needed for Wheezing (coughing episodes)    ASPIRIN 81 MG TABLET    Take 81 mg by mouth daily    ATORVASTATIN (LIPITOR) 40 MG TABLET    Take 40 mg by mouth daily    BLOOD GLUCOSE MONITORING SUPPL (FREESTYLE LITE) MITZI    1 Device by Does not apply route daily as needed (Check BG 3 times a day)    BLOOD GLUCOSE TEST STRIPS (FREESTYLE LITE) STRIP    1 each by In Vitro route 3 times daily As needed. CARVEDILOL (COREG) 3.125 MG TABLET    Take 1 tablet by mouth 2 times daily (with meals)    CLONAZEPAM (KLONOPIN) 0.5 MG TABLET    Take 2 tablets by mouth 2 times daily as needed for Anxiety for up to 3 days. Olu Ruiz     CLONIDINE (CATAPRES) 0.1 MG TABLET    Take 0.1 mg by mouth 2 times daily    FENOFIBRATE (TRICOR) 54 MG TABLET    Take 1 tablet by mouth daily s [facility] pharmacy: Please dispense generic fenofibrate unless prescriber denote    FREESTYLE LANCETS MISC    1 each by Does not apply route 3 times daily    FUROSEMIDE (LASIX) 20 MG TABLET    Take 1 tablet by mouth daily    GLUCOSE BLOOD VI TEST STRIPS (FREESTYLE TEST STRIPS) STRIP    1 each by In Vitro route daily Test daily ICD E11.42    GLUCOSE MONITORING KIT (FREESTYLE) MONITORING KIT    1 kit by Does not apply route daily ICD E11.42    HYDRALAZINE (APRESOLINE) 25 MG TABLET    Take 1 tablet by mouth every 8 hours    IBUPROFEN (ADVIL;MOTRIN) 600 MG TABLET    Take 1 tablet by mouth every 6 hours as needed for Pain    LANCETS MISC    Test daily ICD E11.42    LISINOPRIL-HYDROCHLOROTHIAZIDE (PRINZIDE;ZESTORETIC) 20-25 MG PER TABLET    Take 1 tablet by mouth daily    METFORMIN (GLUCOPHAGE) 1000 MG TABLET    Take 1,000 mg by mouth 2 times daily (with meals)    MIRTAZAPINE (REMERON) 15 MG TABLET    Take 1 tablet by mouth nightly    MOMETASONE (ELOCON) 0.1 % CREAM Apply topically daily Apply topically daily. MOMETASONE-FORMOTEROL (DULERA) 200-5 MCG/ACT INHALER    Inhale 2 puffs into the lungs 2 times daily    NIFEDIPINE (ADALAT CC) 60 MG EXTENDED RELEASE TABLET    Take 1 tablet by mouth 2 times daily    NITROGLYCERIN (NITROSTAT) 0.4 MG SL TABLET    up to max of 3 total doses. If no relief after 1 dose, call 911. SERTRALINE (ZOLOFT) 100 MG TABLET    Take 100 mg by mouth nightly    SPACER/AERO-HOLDING CHAMBERS (E-Z SPACER) MITZI    1 Device by Does not apply route daily as needed (shortness of breath)    SPIRONOLACTONE (ALDACTONE) 50 MG TABLET    Take 1 tablet by mouth 2 times daily    TRAMADOL (ULTRAM) 50 MG TABLET    Take 1 tablet by mouth 2 times daily as needed for Pain Moderate (4-6). VITAMIN B-1 (THIAMINE) 100 MG TABLET    Take 100 mg by mouth daily    VITAMIN B-12 (CYANOCOBALAMIN) 500 MCG TABLET    Take 500 mcg by mouth daily    VITAMIN D (D3-1000) 1000 UNITS TABS TABLET    Take 1 tablet by mouth daily    ZOLPIDEM (AMBIEN) 10 MG TABLET    Take 10 mg by mouth nightly.          ALLERGIES     Naproxen    FAMILYHISTORY       Family History   Problem Relation Age of Onset    High Blood Pressure Mother     Cancer Father         lung cancer    Diabetes Brother     Liver Disease Sister     Diabetes Brother           SOCIAL HISTORY       Social History     Socioeconomic History    Marital status: Single     Spouse name: None    Number of children: 1    Years of education: 15    Highest education level: None   Occupational History    Occupation: retired   Social Needs    Financial resource strain: None    Food insecurity:     Worry: None     Inability: None    Transportation needs:     Medical: None     Non-medical: None   Tobacco Use    Smoking status: Former Smoker     Packs/day: 2.50     Years: 40.00     Pack years: 100.00     Types: Cigarettes    Smokeless tobacco: Never Used   Substance and Sexual Activity    Alcohol use: Never     Frequency: Never  Drug use: No    Sexual activity: Yes     Partners: Female   Lifestyle    Physical activity:     Days per week: None     Minutes per session: None    Stress: None   Relationships    Social connections:     Talks on phone: None     Gets together: None     Attends Orthodoxy service: None     Active member of club or organization: None     Attends meetings of clubs or organizations: None     Relationship status: None    Intimate partner violence:     Fear of current or ex partner: None     Emotionally abused: None     Physically abused: None     Forced sexual activity: None   Other Topics Concern    None   Social History Narrative    None       SCREENINGS             PHYSICAL EXAM    (up to 7 for level 4, 8 or more for level 5)     ED Triage Vitals [06/07/19 1754]   BP Temp Temp Source Pulse Resp SpO2 Height Weight   (!) 203/98 97.4 °F (36.3 °C) Oral 97 18 95 % 6' (1.829 m) 256 lb 9.6 oz (116.4 kg)       Physical Exam   Constitutional: Vital signs are normal. He appears well-developed and well-nourished. Non-toxic appearance. No distress. Obese well-appearing 63-year-old male lying in bed in no acute distress. HENT:   Head: Normocephalic and atraumatic. Right Ear: Tympanic membrane and ear canal normal. No hemotympanum. Left Ear: Tympanic membrane and ear canal normal. No hemotympanum. Nose: Nose normal. No nasal septal hematoma. Mouth/Throat: Uvula is midline, oropharynx is clear and moist and mucous membranes are normal.   Eyes: Pupils are equal, round, and reactive to light. Conjunctivae and EOM are normal. No scleral icterus. Neck: Full passive range of motion without pain. Neck supple. No JVD present. No neck rigidity. Cardiovascular: Normal rate and regular rhythm. Exam reveals no gallop and no friction rub. No murmur heard. Pulmonary/Chest: Effort normal and breath sounds normal. No respiratory distress. Abdominal: Soft. Normal appearance. He exhibits no distension and no mass. There is no tenderness. There is no rigidity. Musculoskeletal: Normal range of motion. Neurological: He is alert. NIH 0. Patient is awake, alert & oriented x4 and speaking in complete sentences without dysphasia or slurring of their words, CN II-XII grossly intact, good coordination with normal finger-to-nose and heel-to-shin test, 5/5 motor strength in all 4 extremities, sensation to light touch intact bilaterally, patellar and achilles reflexes 2+ and equal bilaterally, gait is normal without ataxia, no truncal ataxia. Skin: Skin is warm, dry and intact. Capillary refill takes less than 2 seconds. No abrasion, no bruising and no laceration noted. Psychiatric: He has a normal mood and affect. Nursing note and vitals reviewed.       DIAGNOSTIC RESULTS   LABS:    Labs Reviewed   CBC WITH AUTO DIFFERENTIAL - Abnormal; Notable for the following components:       Result Value    Hemoglobin 13.4 (*)     Hematocrit 38.3 (*)     All other components within normal limits    Narrative:     Performed at:  [address] [address]   Phone [phone]   COMPREHENSIVE METABOLIC PANEL - Abnormal; Notable for the following components:    Glucose 239 (*)     Alkaline Phosphatase 154 (*)     All other components within normal limits    Narrative:     Performed at:  [address] [address]   Phone [phone]   URINE RT REFLEX TO CULTURE - Abnormal; Notable for the following components:    Glucose, Ur >=1000 (*)     Bilirubin Urine SMALL (*)     Ketones, Urine TRACE (*)     Protein,  (*)     All other components within normal limits    Narrative:     Performed at:  [address] [address]   Phone [phone]   BRAIN NATRIURETIC PEPTIDE - Abnormal; Notable for the following components:    Pro- (*)     All other components within normal limits    Narrative:     Performed at:  [facility]  [address] [place], [place] [address]   Phone [phone]   LACTIC ACID, PLASMA - Abnormal; Notable for the following components:    Lactic Acid 2.1 (*)     All other components within normal limits    Narrative:     Performed at:  [facility]  [address] [place], [place] [address]   Phone [phone]   TROPONIN    Narrative:     Performed at:  [facility]  [address], [place] [address]   Phone [phone]   D-DIMER, QUANTITATIVE    Narrative:     Performed at:  [facility]  [address] [facility] [place] [address]   Phone [phone]   PROTIME-INR    Narrative:     Performed at:  [facility]  [address] [facility] [place] [address]   Phone [phone]   URINE DRUG SCREEN    Narrative:     Performed at:  [facility]  [address] [facility] [place] [address]   Phone [phone]   CK    Narrative:     Performed at:  [facility]  [address] [facility] [place] [address]   Phone [phone]   MICROSCOPIC URINALYSIS    Narrative:     Performed at:  [facility]  [address], [place] [address]   Phone [phone]   TROPONIN    Narrative:     Performed at:  [facility]  [address], [place] [address]   Phone [phone]       All other labs were within normal range or not returned as of this dictation. EKG: All EKG's are interpreted by the Emergency Department Physician who either signs orCo-signs this chart in the absence of a cardiologist.  Please see their note for interpretation of EKG.       RADIOLOGY:   Non-plain film images such as CT, Ultrasound and MRI are read by the radiologist. Derek Alejandre radiographic images are visualized andpreliminarily interpreted by the  ED Provider with the below findings:        Interpretation [facility] Radiologist below, if available at the time of this note:    CT CHEST PULMONARY EMBOLISM W CONTRAST   Final Result   Limited evaluation of the segmental and subsegmental pulmonary arterial   branches. No central pulmonary embolism detected. Overall, no acute abnormality identified. XR CHEST STANDARD (2 VW)   Final Result   No acute cardiopulmonary disease or significant interval change from prior   study 04/16/2019         CT HEAD WO CONTRAST   Final Result   No acute intracranial abnormality or significant interval change from prior   study 05/24/2019. No results found. PROCEDURES   Unless otherwise noted below, none     Procedures    CRITICAL CARE TIME   N/A    CONSULTS:  None      EMERGENCY DEPARTMENT COURSE and DIFFERENTIALDIAGNOSIS/MDM:   Vitals:    Vitals:    06/07/19 2144 06/07/19 2147 06/07/19 2149 06/07/19 2156   BP:    (!) 165/85   Pulse: 88 83 84 82   Resp: 20 24 21 19   Temp:    98 °F (36.7 °C)   TempSrc:    Oral   SpO2: 97% 96% 96% 97%   Weight:       Height:           Patient was given thefollowing medications:  Medications   iopamidol (ISOVUE-370) 76 % injection 75 mL (75 mLs Intravenous Given 6/7/19 2016)       Differential Diagnosis: Cardiac arrhythmia, drop attack from a subarachnoid hemorrhage, sepsis, dehydration, vasovagal syncope; Acute Coronary Syndrome, Congestive Heart Failure, Thoracic Dissection, Pericarditis, Pericardial Effusion, Pulmonary Embolism, Pneumonia, Pneumothorax, Ischemic Bowel, Bowel Obstruction, PUD, GERD, Acute Cholecystitis, Pancreatitis, Hepatitis, Colitis, other    Patient seen and examined today for syncope and chest pain. See HPI for patient presentation. Patient is hemodynamically stable, nontoxic, afebrile, and without tachycardia, tachypnea, and hypoxia. Physical exam as above.

## 2019-06-08 LAB
EKG ATRIAL RATE: 98 BPM
EKG DIAGNOSIS: NORMAL
EKG P AXIS: 37 DEGREES
EKG P-R INTERVAL: 156 MS
EKG Q-T INTERVAL: 356 MS
EKG QRS DURATION: 86 MS
EKG QTC CALCULATION (BAZETT): 454 MS
EKG R AXIS: 77 DEGREES
EKG T AXIS: 57 DEGREES
EKG VENTRICULAR RATE: 98 BPM

## 2019-06-08 PROCEDURE — 93010 ELECTROCARDIOGRAM REPORT: CPT | Performed by: INTERNAL MEDICINE

## 2019-06-08 NOTE — ED NOTES
Pt ambulated to and from restroom with a steady gait. No concerns voiced at this time.       Elke De RN  06/07/19 8022

## 2019-06-08 NOTE — ED PROVIDER NOTES
Attending Supervising Physicians Attestation Statement  I was present with the Mid Level Provider during the history and exam. I discussed the findings and plans with the Mid Level Provider and agree as documented in his note      EKG shows NSR LAE nonspecific EKG changes simal ir to old EKGs    Labs reassuring     Has had multiple work ups for syncope and multiple admissions    Trop x 2 negative      Ambulates with steady gait in ER without symptoms     Needs to follow up with cardiology and nephrology    I discussed with patient the results of evaluation in the ED, diagnosis, care, and prognosis. The plan is to discharge to home. Patient is in agreement with plan and questions have been answered.      I also discussed with patient the reasons which may require a return visit and the importance of follow-up care. The patient is well-appearing, nontoxic, and improved at the time of discharge. Patient agrees to call to arrange follow-up care as directed. Patient understands to return immediately for worsening/change in symptoms.       Vitals:    06/07/19 1911 06/07/19 1923 06/07/19 1929 06/07/19 1934   BP:       Pulse: 87 86 88 86   Resp: 17 22 21 23   Temp:       TempSrc:       SpO2: 97% 95% 94% 94%   Weight:       Height:           Electronically signed by Liz Martinez MD on 6/7/19 at 8:58 PM            Liz Martinez MD  06/08/19 2000

## 2019-06-08 NOTE — PROGRESS NOTES
Medication Reconciliation     List of medications patient is currently taking is complete. Source of information: 1. Conversation with patient at bedside                                      2. EPIC records     Allergies  Naproxen     Notes regarding home medications:  1. Patient received all of his morning home medications today.     2. Counseled on benefit of using Dulera twice daily as prescribed        Oscar Reyes, Pharmacy Intern  6/7/2019 8:12 PM

## 2019-07-19 ENCOUNTER — HOSPITAL ENCOUNTER (EMERGENCY)
Age: 64
Discharge: HOME | End: 2019-07-19
Payer: MEDICARE

## 2019-07-19 VITALS
OXYGEN SATURATION: 99 % | WEIGHT: 250 LBS | DIASTOLIC BLOOD PRESSURE: 104 MMHG | TEMPERATURE: 98.2 F | BODY MASS INDEX: 33.86 KG/M2 | HEART RATE: 89 BPM | HEIGHT: 72 IN | SYSTOLIC BLOOD PRESSURE: 191 MMHG | RESPIRATION RATE: 16 BRPM

## 2019-07-19 DIAGNOSIS — L02.01 FACIAL ABSCESS: Primary | ICD-10-CM

## 2019-07-19 PROCEDURE — 6370000000 HC RX 637 (ALT 250 FOR IP): Performed by: NURSE PRACTITIONER

## 2019-07-19 PROCEDURE — 99282 EMERGENCY DEPT VISIT SF MDM: CPT

## 2019-07-19 RX ORDER — CEPHALEXIN 500 MG/1
500 CAPSULE ORAL ONCE
Status: COMPLETED | OUTPATIENT
Start: 2019-07-19 | End: 2019-07-19

## 2019-07-19 RX ORDER — CEPHALEXIN 500 MG/1
500 CAPSULE ORAL 4 TIMES DAILY
Qty: 40 CAPSULE | Refills: 0 | Status: ON HOLD | OUTPATIENT
Start: 2019-07-19 | End: 2019-08-19

## 2019-07-19 RX ORDER — DOXYCYCLINE HYCLATE 100 MG
100 TABLET ORAL 2 TIMES DAILY
Qty: 20 TABLET | Refills: 0 | Status: SHIPPED | OUTPATIENT
Start: 2019-07-19 | End: 2019-07-29

## 2019-07-19 RX ORDER — DOXYCYCLINE HYCLATE 100 MG
100 TABLET ORAL ONCE
Status: COMPLETED | OUTPATIENT
Start: 2019-07-19 | End: 2019-07-19

## 2019-07-19 RX ADMIN — DOXYCYCLINE HYCLATE 100 MG: 100 TABLET, COATED ORAL at 16:39

## 2019-07-19 RX ADMIN — CEPHALEXIN 500 MG: 500 CAPSULE ORAL at 16:39

## 2019-07-19 ASSESSMENT — PAIN - FUNCTIONAL ASSESSMENT: PAIN_FUNCTIONAL_ASSESSMENT: 0-10

## 2019-07-19 ASSESSMENT — ENCOUNTER SYMPTOMS: RESPIRATORY NEGATIVE: 1

## 2019-07-19 ASSESSMENT — PAIN DESCRIPTION - LOCATION
LOCATION: FACE
LOCATION: FACE

## 2019-07-19 ASSESSMENT — PAIN SCALES - GENERAL
PAINLEVEL_OUTOF10: 7
PAINLEVEL_OUTOF10: 7

## 2019-07-19 ASSESSMENT — PAIN DESCRIPTION - PAIN TYPE
TYPE: ACUTE PAIN
TYPE: ACUTE PAIN

## 2019-07-19 ASSESSMENT — PAIN DESCRIPTION - DESCRIPTORS: DESCRIPTORS: BURNING;CONSTANT

## 2019-07-19 ASSESSMENT — PAIN DESCRIPTION - ORIENTATION: ORIENTATION: LEFT

## 2019-07-19 NOTE — ED PROVIDER NOTES
Disp-1 Inhaler, R-1Normal      fenofibrate (TRICOR) 54 MG tablet Take 1 tablet by mouth daily [facility]: Please dispense generic fenofibrate unless prescriber denote, Disp-30 tablet, R-3Normal      clonazePAM (KLONOPIN) 0.5 MG tablet Take 2 tablets by mouth 2 times daily as needed for Anxiety for up to 3 days. ., Disp-10 tablet, R-0Print      albuterol sulfate HFA (PROVENTIL HFA) 108 (90 Base) MCG/ACT inhaler Inhale 2 puffs into the lungs every 4 hours as needed for Wheezing (coughing episodes), Disp-1 Inhaler, R-0Print      carvedilol (COREG) 3.125 MG tablet Take 1 tablet by mouth 2 times daily (with meals), Disp-60 tablet, R-0Normal      vitamin B-12 (CYANOCOBALAMIN) 500 MCG tablet Take 500 mcg by mouth dailyHistorical Med      vitamin B-1 (THIAMINE) 100 MG tablet Take 100 mg by mouth dailyHistorical Med      metFORMIN (GLUCOPHAGE) 1000 MG tablet Take 1,000 mg by mouth 2 times daily (with meals)Historical Med      zolpidem (AMBIEN) 10 MG tablet Take 10 mg by mouth nightly. Historical Med      mirtazapine (REMERON) 15 MG tablet Take 1 tablet by mouth nightly, Disp-30 tablet, R-3Normal      Spacer/Aero-Holding Chambers (E-Z SPACER) MITZI DAILY PRN Starting Fri 10/13/2017, Disp-1 Device, R-0, Normal      nitroGLYCERIN (NITROSTAT) 0.4 MG SL tablet up to max of 3 total doses. If no relief after 1 dose, call 911., Disp-25 tablet, R-3Normal      !! glucose blood VI test strips (FREESTYLE TEST STRIPS) strip DAILY Starting 8/24/2017, Until Discontinued, Disp-100 each, R-11, NormalTest daily ICD E11.42      vitamin D (D3-1000) 1000 UNITS TABS tablet Take 1 tablet by mouth daily, Disp-90 tablet, R-3Normal      glucose monitoring kit (FREESTYLE) monitoring kit DAILY Starting 5/24/2017, Until Discontinued, Disp-1 kit, R-0, PrintICD E11.42      !! Lancets MISC Disp-50 each, R-3, PrintTest daily ICD E11.42      aspirin 81 MG tablet Take 81 mg by mouth dailyHistorical Med       !! - Potential duplicate medications found. Vivek Arevalo,  [place] [address]  [phone]    Schedule an appointment as soon as possible for a visit in 3 days        DISCHARGE MEDICATIONS:  Discharge Medication List as of 7/19/2019  4:36 PM      START taking these medications    Details   doxycycline hyclate (VIBRA-TABS) 100 MG tablet Take 1 tablet by mouth 2 times daily for 10 days, Disp-20 tablet, R-0Print      cephALEXin (KEFLEX) 500 MG capsule Take 1 capsule by mouth 4 times daily, Disp-40 capsule, R-0Print      mupirocin (BACTROBAN) 2 % ointment Apply topically 3 times daily. 22 gram tube generic OK. , Disp-1 Tube, R-0, Print             (Please note that portions of this note werecompleted with a voice recognition program.  Efforts were made to edit the dictations but occasionally words are mis-transcribed.)    Raji Chong, EDMOND - CNP      Tonny Bajwa, EDMOND - CNP  07/19/19 EDMOND Acevedo - CNP  07/21/19 5364

## 2019-07-21 ENCOUNTER — HOSPITAL ENCOUNTER (EMERGENCY)
Age: 64
Discharge: HOME | End: 2019-07-21
Attending: EMERGENCY MEDICINE
Payer: MEDICARE

## 2019-07-21 ENCOUNTER — APPOINTMENT (OUTPATIENT)
Dept: CT IMAGING | Age: 64
End: 2019-07-21
Payer: MEDICARE

## 2019-07-21 VITALS
BODY MASS INDEX: 33.56 KG/M2 | RESPIRATION RATE: 20 BRPM | HEIGHT: 72 IN | HEART RATE: 93 BPM | OXYGEN SATURATION: 96 % | WEIGHT: 247.8 LBS | TEMPERATURE: 98.3 F | SYSTOLIC BLOOD PRESSURE: 140 MMHG | DIASTOLIC BLOOD PRESSURE: 84 MMHG

## 2019-07-21 DIAGNOSIS — I10 ESSENTIAL HYPERTENSION: Primary | ICD-10-CM

## 2019-07-21 PROCEDURE — 70450 CT HEAD/BRAIN W/O DYE: CPT

## 2019-07-21 PROCEDURE — 99283 EMERGENCY DEPT VISIT LOW MDM: CPT

## 2019-07-21 ASSESSMENT — PAIN DESCRIPTION - PROGRESSION: CLINICAL_PROGRESSION: NOT CHANGED

## 2019-07-21 ASSESSMENT — PAIN SCALES - GENERAL: PAINLEVEL_OUTOF10: 5

## 2019-07-21 ASSESSMENT — PAIN - FUNCTIONAL ASSESSMENT: PAIN_FUNCTIONAL_ASSESSMENT: ACTIVITIES ARE NOT PREVENTED

## 2019-07-21 ASSESSMENT — PAIN DESCRIPTION - DESCRIPTORS: DESCRIPTORS: DISCOMFORT

## 2019-07-21 ASSESSMENT — PAIN DESCRIPTION - ONSET: ONSET: GRADUAL

## 2019-07-21 ASSESSMENT — PAIN DESCRIPTION - PAIN TYPE: TYPE: ACUTE PAIN

## 2019-07-21 ASSESSMENT — PAIN DESCRIPTION - FREQUENCY: FREQUENCY: CONTINUOUS

## 2019-07-21 ASSESSMENT — PAIN DESCRIPTION - LOCATION: LOCATION: FACE

## 2019-08-18 ENCOUNTER — APPOINTMENT (OUTPATIENT)
Dept: GENERAL RADIOLOGY | Age: 64
DRG: 305 | End: 2019-08-18
Payer: MEDICARE

## 2019-08-18 ENCOUNTER — HOSPITAL ENCOUNTER (INPATIENT)
Age: 64
LOS: 4 days | Discharge: HOME | DRG: 305 | End: 2019-08-22
Attending: EMERGENCY MEDICINE | Admitting: INTERNAL MEDICINE
Payer: MEDICARE

## 2019-08-18 DIAGNOSIS — I20.0 UNSTABLE ANGINA PECTORIS (HCC): Primary | ICD-10-CM

## 2019-08-18 LAB
A/G RATIO: 1.6 (ref 1.1–2.2)
ALBUMIN SERPL-MCNC: 4.6 G/DL (ref 3.4–5)
ALP BLD-CCNC: 120 U/L (ref 40–129)
ALT SERPL-CCNC: 26 U/L (ref 10–40)
ANION GAP SERPL CALCULATED.3IONS-SCNC: 13 MMOL/L (ref 3–16)
APTT: 29.1 SEC (ref 26–36)
AST SERPL-CCNC: 22 U/L (ref 15–37)
BASOPHILS ABSOLUTE: 0 K/UL (ref 0–0.2)
BASOPHILS RELATIVE PERCENT: 0.5 %
BILIRUB SERPL-MCNC: <0.2 MG/DL (ref 0–1)
BILIRUBIN URINE: NEGATIVE
BLOOD, URINE: NEGATIVE
BUN BLDV-MCNC: 19 MG/DL (ref 7–20)
CALCIUM SERPL-MCNC: 9.3 MG/DL (ref 8.3–10.6)
CHLORIDE BLD-SCNC: 103 MMOL/L (ref 99–110)
CLARITY: CLEAR
CO2: 23 MMOL/L (ref 21–32)
COLOR: YELLOW
CREAT SERPL-MCNC: 1 MG/DL (ref 0.8–1.3)
EOSINOPHILS ABSOLUTE: 0.1 K/UL (ref 0–0.6)
EOSINOPHILS RELATIVE PERCENT: 1.2 %
EPITHELIAL CELLS, UA: 1 /HPF (ref 0–5)
GFR AFRICAN AMERICAN: >60
GFR NON-AFRICAN AMERICAN: >60
GLOBULIN: 2.9 G/DL
GLUCOSE BLD-MCNC: 233 MG/DL (ref 70–99)
GLUCOSE URINE: >=1000 MG/DL
HCT VFR BLD CALC: 40.8 % (ref 40.5–52.5)
HEMOGLOBIN: 13.5 G/DL (ref 13.5–17.5)
HYALINE CASTS: 10 /LPF (ref 0–8)
INR BLD: 0.94 (ref 0.86–1.14)
KETONES, URINE: ABNORMAL MG/DL
LEUKOCYTE ESTERASE, URINE: NEGATIVE
LYMPHOCYTES ABSOLUTE: 1.5 K/UL (ref 1–5.1)
LYMPHOCYTES RELATIVE PERCENT: 25.7 %
MCH RBC QN AUTO: 29.1 PG (ref 26–34)
MCHC RBC AUTO-ENTMCNC: 33 G/DL (ref 31–36)
MCV RBC AUTO: 88 FL (ref 80–100)
MICROSCOPIC EXAMINATION: YES
MONOCYTES ABSOLUTE: 0.6 K/UL (ref 0–1.3)
MONOCYTES RELATIVE PERCENT: 10 %
NEUTROPHILS ABSOLUTE: 3.8 K/UL (ref 1.7–7.7)
NEUTROPHILS RELATIVE PERCENT: 62.6 %
NITRITE, URINE: NEGATIVE
PDW BLD-RTO: 14.4 % (ref 12.4–15.4)
PH UA: 6 (ref 5–8)
PLATELET # BLD: 183 K/UL (ref 135–450)
PMV BLD AUTO: 8.3 FL (ref 5–10.5)
POTASSIUM REFLEX MAGNESIUM: 4 MMOL/L (ref 3.5–5.1)
PRO-BNP: 155 PG/ML (ref 0–124)
PROTEIN UA: 100 MG/DL
PROTHROMBIN TIME: 10.7 SEC (ref 9.8–13)
RBC # BLD: 4.63 M/UL (ref 4.2–5.9)
RBC UA: 1 /HPF (ref 0–4)
SODIUM BLD-SCNC: 139 MMOL/L (ref 136–145)
SPECIFIC GRAVITY UA: 1.03 (ref 1–1.03)
TOTAL PROTEIN: 7.5 G/DL (ref 6.4–8.2)
TROPONIN: <0.01 NG/ML
URINE REFLEX TO CULTURE: ABNORMAL
URINE TYPE: ABNORMAL
UROBILINOGEN, URINE: 1 E.U./DL
WBC # BLD: 6 K/UL (ref 4–11)
WBC UA: 2 /HPF (ref 0–5)

## 2019-08-18 PROCEDURE — 83880 ASSAY OF NATRIURETIC PEPTIDE: CPT

## 2019-08-18 PROCEDURE — 85025 COMPLETE CBC W/AUTO DIFF WBC: CPT

## 2019-08-18 PROCEDURE — 93005 ELECTROCARDIOGRAM TRACING: CPT | Performed by: EMERGENCY MEDICINE

## 2019-08-18 PROCEDURE — 99285 EMERGENCY DEPT VISIT HI MDM: CPT

## 2019-08-18 PROCEDURE — 96375 TX/PRO/DX INJ NEW DRUG ADDON: CPT

## 2019-08-18 PROCEDURE — 96374 THER/PROPH/DIAG INJ IV PUSH: CPT

## 2019-08-18 PROCEDURE — 84484 ASSAY OF TROPONIN QUANT: CPT

## 2019-08-18 PROCEDURE — 71045 X-RAY EXAM CHEST 1 VIEW: CPT

## 2019-08-18 PROCEDURE — 80053 COMPREHEN METABOLIC PANEL: CPT

## 2019-08-18 PROCEDURE — 6360000002 HC RX W HCPCS: Performed by: EMERGENCY MEDICINE

## 2019-08-18 PROCEDURE — 1200000000 HC SEMI PRIVATE

## 2019-08-18 PROCEDURE — 81001 URINALYSIS AUTO W/SCOPE: CPT

## 2019-08-18 PROCEDURE — 85730 THROMBOPLASTIN TIME PARTIAL: CPT

## 2019-08-18 PROCEDURE — 2500000003 HC RX 250 WO HCPCS: Performed by: EMERGENCY MEDICINE

## 2019-08-18 PROCEDURE — 85610 PROTHROMBIN TIME: CPT

## 2019-08-18 RX ORDER — MORPHINE SULFATE 2 MG/ML
2 INJECTION, SOLUTION INTRAMUSCULAR; INTRAVENOUS ONCE
Status: COMPLETED | OUTPATIENT
Start: 2019-08-18 | End: 2019-08-18

## 2019-08-18 RX ORDER — LABETALOL HYDROCHLORIDE 5 MG/ML
10 INJECTION, SOLUTION INTRAVENOUS ONCE
Status: COMPLETED | OUTPATIENT
Start: 2019-08-18 | End: 2019-08-18

## 2019-08-18 RX ADMIN — LABETALOL HYDROCHLORIDE 10 MG: 5 INJECTION INTRAVENOUS at 22:23

## 2019-08-18 RX ADMIN — MORPHINE SULFATE 2 MG: 2 INJECTION, SOLUTION INTRAMUSCULAR; INTRAVENOUS at 22:51

## 2019-08-18 ASSESSMENT — PAIN DESCRIPTION - FREQUENCY
FREQUENCY: CONTINUOUS
FREQUENCY: CONTINUOUS

## 2019-08-18 ASSESSMENT — PAIN SCALES - GENERAL
PAINLEVEL_OUTOF10: 6
PAINLEVEL_OUTOF10: 6
PAINLEVEL_OUTOF10: 5

## 2019-08-18 ASSESSMENT — HEART SCORE: ECG: 0

## 2019-08-18 ASSESSMENT — PAIN - FUNCTIONAL ASSESSMENT
PAIN_FUNCTIONAL_ASSESSMENT: PREVENTS OR INTERFERES SOME ACTIVE ACTIVITIES AND ADLS
PAIN_FUNCTIONAL_ASSESSMENT: ACTIVITIES ARE NOT PREVENTED

## 2019-08-18 ASSESSMENT — PAIN DESCRIPTION - ONSET: ONSET: GRADUAL

## 2019-08-18 ASSESSMENT — PAIN DESCRIPTION - DESCRIPTORS
DESCRIPTORS: HEAVINESS
DESCRIPTORS: CONSTANT

## 2019-08-18 ASSESSMENT — PAIN DESCRIPTION - LOCATION
LOCATION: CHEST
LOCATION: CHEST

## 2019-08-18 ASSESSMENT — PAIN DESCRIPTION - PROGRESSION: CLINICAL_PROGRESSION: GRADUALLY IMPROVING

## 2019-08-18 ASSESSMENT — PAIN DESCRIPTION - ORIENTATION: ORIENTATION: MID

## 2019-08-18 ASSESSMENT — PAIN DESCRIPTION - PAIN TYPE
TYPE: ACUTE PAIN
TYPE: ACUTE PAIN

## 2019-08-19 LAB
AMPHETAMINE SCREEN, URINE: ABNORMAL
BARBITURATE SCREEN URINE: ABNORMAL
BENZODIAZEPINE SCREEN, URINE: ABNORMAL
CANNABINOID SCREEN URINE: ABNORMAL
COCAINE METABOLITE SCREEN URINE: ABNORMAL
EKG ATRIAL RATE: 101 BPM
EKG DIAGNOSIS: NORMAL
EKG P AXIS: 61 DEGREES
EKG P-R INTERVAL: 192 MS
EKG Q-T INTERVAL: 344 MS
EKG QRS DURATION: 90 MS
EKG QTC CALCULATION (BAZETT): 446 MS
EKG R AXIS: 88 DEGREES
EKG T AXIS: 63 DEGREES
EKG VENTRICULAR RATE: 101 BPM
GLUCOSE BLD-MCNC: 124 MG/DL (ref 70–99)
GLUCOSE BLD-MCNC: 156 MG/DL (ref 70–99)
GLUCOSE BLD-MCNC: 162 MG/DL (ref 70–99)
GLUCOSE BLD-MCNC: 163 MG/DL (ref 70–99)
GLUCOSE BLD-MCNC: 168 MG/DL (ref 70–99)
HCT VFR BLD CALC: 39 % (ref 40.5–52.5)
HEMOGLOBIN: 12.7 G/DL (ref 13.5–17.5)
Lab: ABNORMAL
MCH RBC QN AUTO: 28.5 PG (ref 26–34)
MCHC RBC AUTO-ENTMCNC: 32.6 G/DL (ref 31–36)
MCV RBC AUTO: 87.4 FL (ref 80–100)
METHADONE SCREEN, URINE: ABNORMAL
OPIATE SCREEN URINE: POSITIVE
OXYCODONE URINE: ABNORMAL
PDW BLD-RTO: 14.2 % (ref 12.4–15.4)
PERFORMED ON: ABNORMAL
PH UA: 5
PHENCYCLIDINE SCREEN URINE: ABNORMAL
PLATELET # BLD: 159 K/UL (ref 135–450)
PMV BLD AUTO: 8.4 FL (ref 5–10.5)
PROPOXYPHENE SCREEN: ABNORMAL
RBC # BLD: 4.46 M/UL (ref 4.2–5.9)
TROPONIN: <0.01 NG/ML
TROPONIN: <0.01 NG/ML
WBC # BLD: 5.9 K/UL (ref 4–11)

## 2019-08-19 PROCEDURE — 6370000000 HC RX 637 (ALT 250 FOR IP): Performed by: INTERNAL MEDICINE

## 2019-08-19 PROCEDURE — 85027 COMPLETE CBC AUTOMATED: CPT

## 2019-08-19 PROCEDURE — 84484 ASSAY OF TROPONIN QUANT: CPT

## 2019-08-19 PROCEDURE — 36415 COLL VENOUS BLD VENIPUNCTURE: CPT

## 2019-08-19 PROCEDURE — 94760 N-INVAS EAR/PLS OXIMETRY 1: CPT

## 2019-08-19 PROCEDURE — 93010 ELECTROCARDIOGRAM REPORT: CPT | Performed by: INTERNAL MEDICINE

## 2019-08-19 PROCEDURE — 80307 DRUG TEST PRSMV CHEM ANLYZR: CPT

## 2019-08-19 PROCEDURE — 94640 AIRWAY INHALATION TREATMENT: CPT

## 2019-08-19 PROCEDURE — 1200000000 HC SEMI PRIVATE

## 2019-08-19 PROCEDURE — 6360000002 HC RX W HCPCS: Performed by: INTERNAL MEDICINE

## 2019-08-19 RX ORDER — SODIUM CHLORIDE 0.9 % (FLUSH) 0.9 %
10 SYRINGE (ML) INJECTION PRN
Status: DISCONTINUED | OUTPATIENT
Start: 2019-08-18 | End: 2019-08-22 | Stop reason: HOSPADM

## 2019-08-19 RX ORDER — ISOSORBIDE MONONITRATE 30 MG/1
30 TABLET, EXTENDED RELEASE ORAL EVERY EVENING
Status: DISCONTINUED | OUTPATIENT
Start: 2019-08-19 | End: 2019-08-22 | Stop reason: HOSPADM

## 2019-08-19 RX ORDER — DEXTROSE MONOHYDRATE 50 MG/ML
100 INJECTION, SOLUTION INTRAVENOUS PRN
Status: DISCONTINUED | OUTPATIENT
Start: 2019-08-19 | End: 2019-08-22 | Stop reason: HOSPADM

## 2019-08-19 RX ORDER — ONDANSETRON 2 MG/ML
4 INJECTION INTRAMUSCULAR; INTRAVENOUS EVERY 6 HOURS PRN
Status: DISCONTINUED | OUTPATIENT
Start: 2019-08-18 | End: 2019-08-22 | Stop reason: HOSPADM

## 2019-08-19 RX ORDER — LISINOPRIL AND HYDROCHLOROTHIAZIDE 12.5; 1 MG/1; MG/1
2 TABLET ORAL DAILY
Status: DISCONTINUED | OUTPATIENT
Start: 2019-08-19 | End: 2019-08-22 | Stop reason: HOSPADM

## 2019-08-19 RX ORDER — MIRTAZAPINE 15 MG/1
15 TABLET, FILM COATED ORAL NIGHTLY
Status: DISCONTINUED | OUTPATIENT
Start: 2019-08-19 | End: 2019-08-22 | Stop reason: HOSPADM

## 2019-08-19 RX ORDER — FUROSEMIDE 20 MG/1
20 TABLET ORAL DAILY
Status: DISCONTINUED | OUTPATIENT
Start: 2019-08-19 | End: 2019-08-19

## 2019-08-19 RX ORDER — LISINOPRIL 20 MG/1
20 TABLET ORAL DAILY
Status: ON HOLD | COMMUNITY
End: 2019-08-22 | Stop reason: HOSPADM

## 2019-08-19 RX ORDER — CARVEDILOL 6.25 MG/1
6.25 TABLET ORAL 2 TIMES DAILY WITH MEALS
Status: DISCONTINUED | OUTPATIENT
Start: 2019-08-19 | End: 2019-08-20

## 2019-08-19 RX ORDER — DEXTROSE MONOHYDRATE 25 G/50ML
12.5 INJECTION, SOLUTION INTRAVENOUS PRN
Status: DISCONTINUED | OUTPATIENT
Start: 2019-08-19 | End: 2019-08-22 | Stop reason: HOSPADM

## 2019-08-19 RX ORDER — HYDRALAZINE HYDROCHLORIDE 25 MG/1
25 TABLET, FILM COATED ORAL EVERY 8 HOURS SCHEDULED
Status: DISCONTINUED | OUTPATIENT
Start: 2019-08-19 | End: 2019-08-19

## 2019-08-19 RX ORDER — ATORVASTATIN CALCIUM 40 MG/1
40 TABLET, FILM COATED ORAL DAILY
Status: DISCONTINUED | OUTPATIENT
Start: 2019-08-19 | End: 2019-08-22 | Stop reason: HOSPADM

## 2019-08-19 RX ORDER — ACETAMINOPHEN 325 MG/1
650 TABLET ORAL EVERY 4 HOURS PRN
Status: DISCONTINUED | OUTPATIENT
Start: 2019-08-19 | End: 2019-08-22 | Stop reason: HOSPADM

## 2019-08-19 RX ORDER — CARVEDILOL 3.12 MG/1
3.12 TABLET ORAL 2 TIMES DAILY WITH MEALS
Status: DISCONTINUED | OUTPATIENT
Start: 2019-08-19 | End: 2019-08-19

## 2019-08-19 RX ORDER — SPIRONOLACTONE 25 MG/1
25 TABLET ORAL DAILY
Status: DISCONTINUED | OUTPATIENT
Start: 2019-08-20 | End: 2019-08-22 | Stop reason: HOSPADM

## 2019-08-19 RX ORDER — SODIUM CHLORIDE 0.9 % (FLUSH) 0.9 %
10 SYRINGE (ML) INJECTION EVERY 12 HOURS SCHEDULED
Status: DISCONTINUED | OUTPATIENT
Start: 2019-08-19 | End: 2019-08-22 | Stop reason: HOSPADM

## 2019-08-19 RX ORDER — NICOTINE POLACRILEX 4 MG
15 LOZENGE BUCCAL PRN
Status: DISCONTINUED | OUTPATIENT
Start: 2019-08-19 | End: 2019-08-22 | Stop reason: HOSPADM

## 2019-08-19 RX ORDER — NIFEDIPINE 90 MG/1
90 TABLET, EXTENDED RELEASE ORAL DAILY
Status: DISCONTINUED | OUTPATIENT
Start: 2019-08-20 | End: 2019-08-22 | Stop reason: HOSPADM

## 2019-08-19 RX ORDER — NITROGLYCERIN 0.4 MG/1
0.4 TABLET SUBLINGUAL EVERY 5 MIN PRN
Status: DISCONTINUED | OUTPATIENT
Start: 2019-08-18 | End: 2019-08-22 | Stop reason: HOSPADM

## 2019-08-19 RX ORDER — NIFEDIPINE 60 MG/1
60 TABLET, EXTENDED RELEASE ORAL 2 TIMES DAILY
Status: DISCONTINUED | OUTPATIENT
Start: 2019-08-19 | End: 2019-08-19

## 2019-08-19 RX ORDER — ZOLPIDEM TARTRATE 5 MG/1
10 TABLET ORAL NIGHTLY PRN
Status: DISCONTINUED | OUTPATIENT
Start: 2019-08-19 | End: 2019-08-22 | Stop reason: HOSPADM

## 2019-08-19 RX ORDER — SPIRONOLACTONE 25 MG/1
50 TABLET ORAL 2 TIMES DAILY
Status: DISCONTINUED | OUTPATIENT
Start: 2019-08-19 | End: 2019-08-19

## 2019-08-19 RX ORDER — CLONIDINE HYDROCHLORIDE 0.1 MG/1
0.1 TABLET ORAL 2 TIMES DAILY
Status: DISCONTINUED | OUTPATIENT
Start: 2019-08-19 | End: 2019-08-20

## 2019-08-19 RX ORDER — SERTRALINE HYDROCHLORIDE 100 MG/1
100 TABLET, FILM COATED ORAL NIGHTLY
Status: DISCONTINUED | OUTPATIENT
Start: 2019-08-19 | End: 2019-08-22 | Stop reason: HOSPADM

## 2019-08-19 RX ORDER — NIFEDIPINE 90 MG/1
90 TABLET, FILM COATED, EXTENDED RELEASE ORAL DAILY
Status: ON HOLD | COMMUNITY
End: 2020-01-10 | Stop reason: HOSPADM

## 2019-08-19 RX ADMIN — ENOXAPARIN SODIUM 40 MG: 40 INJECTION SUBCUTANEOUS at 09:35

## 2019-08-19 RX ADMIN — NIFEDIPINE 60 MG: 60 TABLET, FILM COATED, EXTENDED RELEASE ORAL at 00:47

## 2019-08-19 RX ADMIN — ZOLPIDEM TARTRATE 10 MG: 5 TABLET ORAL at 21:04

## 2019-08-19 RX ADMIN — INSULIN LISPRO 2 UNITS: 100 INJECTION, SOLUTION INTRAVENOUS; SUBCUTANEOUS at 17:02

## 2019-08-19 RX ADMIN — SERTRALINE 100 MG: 100 TABLET, FILM COATED ORAL at 21:04

## 2019-08-19 RX ADMIN — ACETAMINOPHEN 650 MG: 325 TABLET ORAL at 12:17

## 2019-08-19 RX ADMIN — INSULIN LISPRO 1 UNITS: 100 INJECTION, SOLUTION INTRAVENOUS; SUBCUTANEOUS at 00:50

## 2019-08-19 RX ADMIN — LISINOPRIL AND HYDROCHLOROTHIAZIDE 2 TABLET: 12.5; 1 TABLET ORAL at 09:34

## 2019-08-19 RX ADMIN — CARVEDILOL 6.25 MG: 6.25 TABLET, FILM COATED ORAL at 17:02

## 2019-08-19 RX ADMIN — MIRTAZAPINE 15 MG: 15 TABLET, FILM COATED ORAL at 00:48

## 2019-08-19 RX ADMIN — HYDRALAZINE HYDROCHLORIDE 25 MG: 25 TABLET, FILM COATED ORAL at 07:02

## 2019-08-19 RX ADMIN — MOMETASONE FUROATE AND FORMOTEROL FUMARATE DIHYDRATE 2 PUFF: 200; 5 AEROSOL RESPIRATORY (INHALATION) at 20:03

## 2019-08-19 RX ADMIN — MIRTAZAPINE 15 MG: 15 TABLET, FILM COATED ORAL at 21:04

## 2019-08-19 RX ADMIN — CARVEDILOL 3.12 MG: 3.12 TABLET, FILM COATED ORAL at 09:35

## 2019-08-19 RX ADMIN — HYDRALAZINE HYDROCHLORIDE 25 MG: 25 TABLET, FILM COATED ORAL at 00:48

## 2019-08-19 RX ADMIN — ISOSORBIDE MONONITRATE 30 MG: 30 TABLET, EXTENDED RELEASE ORAL at 17:02

## 2019-08-19 RX ADMIN — INSULIN LISPRO 1 UNITS: 100 INJECTION, SOLUTION INTRAVENOUS; SUBCUTANEOUS at 21:04

## 2019-08-19 RX ADMIN — FUROSEMIDE 20 MG: 20 TABLET ORAL at 09:34

## 2019-08-19 RX ADMIN — SPIRONOLACTONE 50 MG: 25 TABLET ORAL at 09:34

## 2019-08-19 RX ADMIN — CLONIDINE HYDROCHLORIDE 0.1 MG: 0.1 TABLET ORAL at 00:48

## 2019-08-19 RX ADMIN — SERTRALINE 100 MG: 100 TABLET, FILM COATED ORAL at 00:47

## 2019-08-19 RX ADMIN — ACETAMINOPHEN 650 MG: 325 TABLET ORAL at 18:16

## 2019-08-19 RX ADMIN — CLONIDINE HYDROCHLORIDE 0.1 MG: 0.1 TABLET ORAL at 21:04

## 2019-08-19 RX ADMIN — CLONIDINE HYDROCHLORIDE 0.1 MG: 0.1 TABLET ORAL at 09:34

## 2019-08-19 RX ADMIN — ATORVASTATIN CALCIUM 40 MG: 40 TABLET, FILM COATED ORAL at 09:34

## 2019-08-19 RX ADMIN — NIFEDIPINE 60 MG: 60 TABLET, FILM COATED, EXTENDED RELEASE ORAL at 09:34

## 2019-08-19 RX ADMIN — INSULIN LISPRO 2 UNITS: 100 INJECTION, SOLUTION INTRAVENOUS; SUBCUTANEOUS at 11:53

## 2019-08-19 ASSESSMENT — PAIN DESCRIPTION - PROGRESSION
CLINICAL_PROGRESSION: NOT CHANGED

## 2019-08-19 ASSESSMENT — PAIN DESCRIPTION - LOCATION
LOCATION: HEAD
LOCATION: HEAD
LOCATION: CHEST

## 2019-08-19 ASSESSMENT — PAIN DESCRIPTION - ONSET
ONSET: ON-GOING

## 2019-08-19 ASSESSMENT — PAIN DESCRIPTION - ORIENTATION
ORIENTATION: MID
ORIENTATION: ANTERIOR
ORIENTATION: ANTERIOR

## 2019-08-19 ASSESSMENT — PAIN DESCRIPTION - DESCRIPTORS
DESCRIPTORS: HEADACHE
DESCRIPTORS: HEADACHE;STABBING
DESCRIPTORS: POUNDING

## 2019-08-19 ASSESSMENT — PAIN DESCRIPTION - FREQUENCY
FREQUENCY: CONTINUOUS

## 2019-08-19 ASSESSMENT — PAIN SCALES - GENERAL
PAINLEVEL_OUTOF10: 6
PAINLEVEL_OUTOF10: 2
PAINLEVEL_OUTOF10: 4
PAINLEVEL_OUTOF10: 7
PAINLEVEL_OUTOF10: 0
PAINLEVEL_OUTOF10: 6

## 2019-08-19 ASSESSMENT — PAIN - FUNCTIONAL ASSESSMENT
PAIN_FUNCTIONAL_ASSESSMENT: ACTIVITIES ARE NOT PREVENTED

## 2019-08-19 ASSESSMENT — PAIN DESCRIPTION - PAIN TYPE
TYPE: ACUTE PAIN

## 2019-08-19 NOTE — CARE COORDINATION
INITIAL CASE MANAGEMENT ASSESSMENT    Reviewed chart, met with patient to assess possible discharge needs. Explained Case Management role/services. Living Situation: lives in a 2 story home w/ upstairs bed/bath w/ his brother which is w/c bound and to whom he is caregiver    ADLs: independent     DME: none    PT/OT Recs: not ordered     Active Services: none     Transportation: he drives     Medications: confirmed [address] as health coverage, rx are covered and obtained [facility]    PCP: confirmed Rada Krabbe      HD/PD: n/a    PLAN/COMMENTS:   Denied dc needs     SW/CM provided contact information for patient or family to call with any questions. SW/CM will follow and assist as needed.     Electronically signed by Annita Maldonado RN on 8/19/2019 at 12:40 PM

## 2019-08-19 NOTE — ED PROVIDER NOTES
Tobacco Use    Smoking status: Former Smoker     Packs/day: 2.50     Years: 40.00     Pack years: 100.00     Types: Cigarettes    Smokeless tobacco: Never Used   Substance and Sexual Activity    Alcohol use: Never     Frequency: Never    Drug use: No    Sexual activity: Yes     Partners: Female   Lifestyle    Physical activity:     Days per week: Not on file     Minutes per session: Not on file    Stress: Not on file   Relationships    Social connections:     Talks on phone: Not on file     Gets together: Not on file     Attends Jehovah's witness service: Not on file     Active member of club or organization: Not on file     Attends meetings of clubs or organizations: Not on file     Relationship status: Not on file    Intimate partner violence:     Fear of current or ex partner: Not on file     Emotionally abused: Not on file     Physically abused: Not on file     Forced sexual activity: Not on file   Other Topics Concern    Not on file   Social History Narrative    Not on file       SCREENINGS      @NR([id number])@      PHYSICAL EXAM    (up to 7 for level 4, 8 or more for level 5)     ED Triage Vitals [08/18/19 2205]   BP Temp Temp Source Pulse Resp SpO2 Height Weight   (!) 213/118 98.9 °F (37.2 °C) Oral 100 24 94 % 6' (1.829 m) 248 lb 0.3 oz (112.5 kg)       Physical Exam      General Appearance:  Alert, cooperative, no distress, appears stated age. Head:  Normocephalic, without obviousabnormality, atraumatic. Eyes:  conjunctiva/corneas clear, EOM's intact. Sclera anicteric. ENT: Mucous membranes moist.   Neck: Supple, symmetrical, trachea midline, no adenopathy. No jugular venous distention. Lungs:   Clear to auscultation bilaterally, respirationsunlabored. No rales, rhonchi or wheezes. Chest Wall:  No tenderness. Heart:  Regular rate and rhythm, S1 and S2 normal, no murmur, rub or gallop. Abdomen:   Soft, non-tender, bowel sounds active,   no masses, no organomegaly.    Extremities: No

## 2019-08-20 LAB
GLUCOSE BLD-MCNC: 141 MG/DL (ref 70–99)
GLUCOSE BLD-MCNC: 155 MG/DL (ref 70–99)
GLUCOSE BLD-MCNC: 164 MG/DL (ref 70–99)
GLUCOSE BLD-MCNC: 246 MG/DL (ref 70–99)
PERFORMED ON: ABNORMAL

## 2019-08-20 PROCEDURE — 6370000000 HC RX 637 (ALT 250 FOR IP): Performed by: INTERNAL MEDICINE

## 2019-08-20 PROCEDURE — 2500000003 HC RX 250 WO HCPCS: Performed by: PHYSICIAN ASSISTANT

## 2019-08-20 PROCEDURE — 99223 1ST HOSP IP/OBS HIGH 75: CPT | Performed by: INTERNAL MEDICINE

## 2019-08-20 PROCEDURE — 94640 AIRWAY INHALATION TREATMENT: CPT

## 2019-08-20 PROCEDURE — 1200000000 HC SEMI PRIVATE

## 2019-08-20 PROCEDURE — 6360000002 HC RX W HCPCS: Performed by: INTERNAL MEDICINE

## 2019-08-20 PROCEDURE — 2580000003 HC RX 258: Performed by: INTERNAL MEDICINE

## 2019-08-20 RX ORDER — CLONIDINE HYDROCHLORIDE 0.1 MG/1
0.1 TABLET ORAL ONCE
Status: COMPLETED | OUTPATIENT
Start: 2019-08-20 | End: 2019-08-20

## 2019-08-20 RX ORDER — TORSEMIDE 20 MG/1
20 TABLET ORAL 2 TIMES DAILY
Status: DISCONTINUED | OUTPATIENT
Start: 2019-08-20 | End: 2019-08-22

## 2019-08-20 RX ORDER — LABETALOL HYDROCHLORIDE 5 MG/ML
10 INJECTION, SOLUTION INTRAVENOUS EVERY 6 HOURS PRN
Status: DISCONTINUED | OUTPATIENT
Start: 2019-08-20 | End: 2019-08-22 | Stop reason: HOSPADM

## 2019-08-20 RX ORDER — HYDRALAZINE HYDROCHLORIDE 25 MG/1
25 TABLET, FILM COATED ORAL EVERY 8 HOURS SCHEDULED
Status: DISCONTINUED | OUTPATIENT
Start: 2019-08-20 | End: 2019-08-20

## 2019-08-20 RX ORDER — HYDRALAZINE HYDROCHLORIDE 20 MG/ML
10 INJECTION INTRAMUSCULAR; INTRAVENOUS ONCE
Status: COMPLETED | OUTPATIENT
Start: 2019-08-20 | End: 2019-08-20

## 2019-08-20 RX ORDER — CLONIDINE HYDROCHLORIDE 0.1 MG/1
0.2 TABLET ORAL 2 TIMES DAILY
Status: DISCONTINUED | OUTPATIENT
Start: 2019-08-20 | End: 2019-08-22 | Stop reason: HOSPADM

## 2019-08-20 RX ORDER — ASPIRIN 81 MG/1
81 TABLET ORAL DAILY
Status: DISCONTINUED | OUTPATIENT
Start: 2019-08-20 | End: 2019-08-22 | Stop reason: HOSPADM

## 2019-08-20 RX ORDER — HYDRALAZINE HYDROCHLORIDE 20 MG/ML
5 INJECTION INTRAMUSCULAR; INTRAVENOUS ONCE
Status: DISCONTINUED | OUTPATIENT
Start: 2019-08-20 | End: 2019-08-20

## 2019-08-20 RX ORDER — CARVEDILOL 25 MG/1
25 TABLET ORAL 2 TIMES DAILY WITH MEALS
Status: DISCONTINUED | OUTPATIENT
Start: 2019-08-20 | End: 2019-08-22 | Stop reason: HOSPADM

## 2019-08-20 RX ORDER — HYDRALAZINE HYDROCHLORIDE 50 MG/1
100 TABLET, FILM COATED ORAL EVERY 8 HOURS SCHEDULED
Status: DISCONTINUED | OUTPATIENT
Start: 2019-08-20 | End: 2019-08-22

## 2019-08-20 RX ADMIN — ASPIRIN 81 MG: 81 TABLET ORAL at 14:11

## 2019-08-20 RX ADMIN — CLONIDINE HYDROCHLORIDE 0.1 MG: 0.1 TABLET ORAL at 09:04

## 2019-08-20 RX ADMIN — HYDRALAZINE HYDROCHLORIDE 25 MG: 25 TABLET, FILM COATED ORAL at 14:11

## 2019-08-20 RX ADMIN — Medication 10 ML: at 20:12

## 2019-08-20 RX ADMIN — ACETAMINOPHEN 650 MG: 325 TABLET ORAL at 09:10

## 2019-08-20 RX ADMIN — CARVEDILOL 6.25 MG: 6.25 TABLET, FILM COATED ORAL at 09:04

## 2019-08-20 RX ADMIN — LABETALOL HYDROCHLORIDE 10 MG: 5 INJECTION INTRAVENOUS at 01:56

## 2019-08-20 RX ADMIN — LISINOPRIL AND HYDROCHLOROTHIAZIDE 2 TABLET: 12.5; 1 TABLET ORAL at 09:04

## 2019-08-20 RX ADMIN — ATORVASTATIN CALCIUM 40 MG: 40 TABLET, FILM COATED ORAL at 09:04

## 2019-08-20 RX ADMIN — MOMETASONE FUROATE AND FORMOTEROL FUMARATE DIHYDRATE 2 PUFF: 200; 5 AEROSOL RESPIRATORY (INHALATION) at 19:20

## 2019-08-20 RX ADMIN — ISOSORBIDE MONONITRATE 30 MG: 30 TABLET, EXTENDED RELEASE ORAL at 16:50

## 2019-08-20 RX ADMIN — HYDRALAZINE HYDROCHLORIDE 100 MG: 50 TABLET, FILM COATED ORAL at 21:21

## 2019-08-20 RX ADMIN — INSULIN LISPRO 2 UNITS: 100 INJECTION, SOLUTION INTRAVENOUS; SUBCUTANEOUS at 13:05

## 2019-08-20 RX ADMIN — SERTRALINE 100 MG: 100 TABLET, FILM COATED ORAL at 21:20

## 2019-08-20 RX ADMIN — ACETAMINOPHEN 650 MG: 325 TABLET ORAL at 16:50

## 2019-08-20 RX ADMIN — NIFEDIPINE 90 MG: 90 TABLET, FILM COATED, EXTENDED RELEASE ORAL at 09:04

## 2019-08-20 RX ADMIN — INSULIN LISPRO 2 UNITS: 100 INJECTION, SOLUTION INTRAVENOUS; SUBCUTANEOUS at 09:05

## 2019-08-20 RX ADMIN — ZOLPIDEM TARTRATE 10 MG: 5 TABLET ORAL at 21:21

## 2019-08-20 RX ADMIN — CLONIDINE HYDROCHLORIDE 0.2 MG: 0.1 TABLET ORAL at 21:21

## 2019-08-20 RX ADMIN — ACETAMINOPHEN 650 MG: 325 TABLET ORAL at 21:21

## 2019-08-20 RX ADMIN — Medication 10 ML: at 09:08

## 2019-08-20 RX ADMIN — LABETALOL HYDROCHLORIDE 10 MG: 5 INJECTION INTRAVENOUS at 20:11

## 2019-08-20 RX ADMIN — SPIRONOLACTONE 25 MG: 25 TABLET ORAL at 09:04

## 2019-08-20 RX ADMIN — MIRTAZAPINE 15 MG: 15 TABLET, FILM COATED ORAL at 21:21

## 2019-08-20 RX ADMIN — INSULIN LISPRO 2 UNITS: 100 INJECTION, SOLUTION INTRAVENOUS; SUBCUTANEOUS at 16:50

## 2019-08-20 RX ADMIN — INSULIN LISPRO 2 UNITS: 100 INJECTION, SOLUTION INTRAVENOUS; SUBCUTANEOUS at 21:20

## 2019-08-20 RX ADMIN — CLONIDINE HYDROCHLORIDE 0.1 MG: 0.1 TABLET ORAL at 13:04

## 2019-08-20 RX ADMIN — TORSEMIDE 20 MG: 20 TABLET ORAL at 18:59

## 2019-08-20 RX ADMIN — CARVEDILOL 25 MG: 25 TABLET, FILM COATED ORAL at 16:50

## 2019-08-20 RX ADMIN — HYDRALAZINE HYDROCHLORIDE 10 MG: 20 INJECTION INTRAMUSCULAR; INTRAVENOUS at 10:56

## 2019-08-20 ASSESSMENT — PAIN DESCRIPTION - PROGRESSION
CLINICAL_PROGRESSION: NOT CHANGED
CLINICAL_PROGRESSION: NOT CHANGED
CLINICAL_PROGRESSION: GRADUALLY IMPROVING
CLINICAL_PROGRESSION: NOT CHANGED
CLINICAL_PROGRESSION: NOT CHANGED
CLINICAL_PROGRESSION: GRADUALLY IMPROVING
CLINICAL_PROGRESSION: NOT CHANGED

## 2019-08-20 ASSESSMENT — PAIN DESCRIPTION - ONSET
ONSET: ON-GOING
ONSET: ON-GOING

## 2019-08-20 ASSESSMENT — PAIN DESCRIPTION - PAIN TYPE
TYPE: ACUTE PAIN

## 2019-08-20 ASSESSMENT — PAIN SCALES - GENERAL
PAINLEVEL_OUTOF10: 3
PAINLEVEL_OUTOF10: 5
PAINLEVEL_OUTOF10: 5
PAINLEVEL_OUTOF10: 6
PAINLEVEL_OUTOF10: 3
PAINLEVEL_OUTOF10: 2
PAINLEVEL_OUTOF10: 7

## 2019-08-20 ASSESSMENT — PAIN DESCRIPTION - FREQUENCY
FREQUENCY: INTERMITTENT
FREQUENCY: INTERMITTENT

## 2019-08-20 ASSESSMENT — PAIN DESCRIPTION - LOCATION
LOCATION: HEAD

## 2019-08-20 ASSESSMENT — PAIN DESCRIPTION - DESCRIPTORS
DESCRIPTORS: HEADACHE

## 2019-08-20 ASSESSMENT — PAIN DESCRIPTION - ORIENTATION
ORIENTATION: ANTERIOR
ORIENTATION: ANTERIOR

## 2019-08-20 ASSESSMENT — PAIN - FUNCTIONAL ASSESSMENT
PAIN_FUNCTIONAL_ASSESSMENT: ACTIVITIES ARE NOT PREVENTED
PAIN_FUNCTIONAL_ASSESSMENT: ACTIVITIES ARE NOT PREVENTED

## 2019-08-20 NOTE — CONSULTS
Used   Substance Use Topics    Alcohol use: Never     Frequency: Never    Drug use: No     Allergies   Allergen Reactions    Naproxen Hives      carvedilol  25 mg Oral BID WC    cloNIDine  0.2 mg Oral BID    atorvastatin  40 mg Oral Daily    lisinopril-hydrochlorothiazide  2 tablet Oral Daily    mirtazapine  15 mg Oral Nightly    sertraline  100 mg Oral Nightly    sodium chloride flush  10 mL Intravenous 2 times per day    enoxaparin  40 mg Subcutaneous Daily    insulin lispro  0-12 Units Subcutaneous TID WC    insulin lispro  0-6 Units Subcutaneous Nightly    spironolactone  25 mg Oral Daily    NIFEdipine  90 mg Oral Daily    isosorbide mononitrate  30 mg Oral QPM    mometasone-formoterol  2 puff Inhalation BID       Review of Systems -   Constitutional: Negative for weight gain/loss; malaise, fever  Respiratory: Negative for Asthma;  cough and hemoptysis  Cardiovascular: Negative for palpitations,dizziness   Gastrointestinal: Negative for abd.pain; constipation/diarrhea;    Genitourinary: Negative for stones; hematuria; frequency hesitancy  Integumentt: Negative for rash or pruritis  Hematologic/lymphatic: Negative for blood dyscrasia; leukemia/lymphoma  Musculoskeletal: Negative for Connective tissue disease  Neurological:  Negative for Seizure   Behavioral/Psych:Negative for Bipolar disorder, Schizophrenia; Dementia  Endocrine: negative for thyroid, parathyroid disease      Intake/Output Summary (Last 24 hours) at 8/20/2019 1340  Last data filed at 8/20/2019 1034  Gross per 24 hour   Intake 960 ml   Output --   Net 960 ml       Physical Examination:    BP (!) 193/100   Pulse 87   Temp 98.6 °F (37 °C) (Oral)   Resp 16   Ht 6' (1.829 m)   Wt 247 lb 9.2 oz (112.3 kg)   SpO2 93%   BMI 33.58 kg/m²    HEENT:  Face: Atraumatic, Conjunctiva: Pink; non icteric,  Mucous Memb:  Moist, No thyromegaly or Lymphadenopathy  Respiratory:  Resp Assessment: normal, Resp Auscultation: clear   Cardiovascular: Auscultation: nl S1 & S2, Palpation:  Nl PMI;  No heaves or thrills, JVP:  normal  Abdomen: Soft, non-tender, Normal bowel sounds,  No organomegaly  Extremities: No Cyanosis or Clubbing; Edema none  Neurological: Oriented to time, place, and person, Non-anxious  Psychiatric: Normal mood and affect  Skin: Warm and dry,  No rash seen      Current Facility-Administered Medications: labetalol (NORMODYNE;TRANDATE) injection 10 mg, 10 mg, Intravenous, Q6H PRN  carvedilol (COREG) tablet 25 mg, 25 mg, Oral, BID WC  cloNIDine (CATAPRES) tablet 0.2 mg, 0.2 mg, Oral, BID  atorvastatin (LIPITOR) tablet 40 mg, 40 mg, Oral, Daily  lisinopril-hydrochlorothiazide (PRINZIDE;ZESTORETIC) 10-12.5 MG per tablet 2 tablet, 2 tablet, Oral, Daily  mirtazapine (REMERON) tablet 15 mg, 15 mg, Oral, Nightly  sertraline (ZOLOFT) tablet 100 mg, 100 mg, Oral, Nightly  sodium chloride flush 0.9 % injection 10 mL, 10 mL, Intravenous, 2 times per day  sodium chloride flush 0.9 % injection 10 mL, 10 mL, Intravenous, PRN  magnesium hydroxide (MILK OF MAGNESIA) 400 MG/5ML suspension 30 mL, 30 mL, Oral, Daily PRN  ondansetron (ZOFRAN) injection 4 mg, 4 mg, Intravenous, Q6H PRN  enoxaparin (LOVENOX) injection 40 mg, 40 mg, Subcutaneous, Daily  nitroGLYCERIN (NITROSTAT) SL tablet 0.4 mg, 0.4 mg, Sublingual, Q5 Min PRN  insulin lispro (HUMALOG) injection vial 0-12 Units, 0-12 Units, Subcutaneous, TID WC  insulin lispro (HUMALOG) injection vial 0-6 Units, 0-6 Units, Subcutaneous, Nightly  glucose (GLUTOSE) 40 % oral gel 15 g, 15 g, Oral, PRN  dextrose 50 % IV solution, 12.5 g, Intravenous, PRN  glucagon (rDNA) injection 1 mg, 1 mg, Intramuscular, PRN  dextrose 5 % solution, 100 mL/hr, Intravenous, PRN  acetaminophen (TYLENOL) tablet 650 mg, 650 mg, Oral, Q4H PRN  spironolactone (ALDACTONE) tablet 25 mg, 25 mg, Oral, Daily  NIFEdipine (PROCARDIA XL) extended release tablet 90 mg, 90 mg, Oral, Daily  isosorbide mononitrate (IMDUR) extended release tablet 30 mg, 30 mg, Oral, QPM  mometasone-formoterol (DULERA) 200-5 MCG/ACT inhaler 2 puff, 2 puff, Inhalation, BID  zolpidem (AMBIEN) tablet 10 mg, 10 mg, Oral, Nightly PRN      Labs:   Recent Labs     08/18/19  2220 08/19/19  0439   WBC 6.0 5.9   HGB 13.5 12.7*   HCT 40.8 39.0*    159     Recent Labs     08/18/19  2220      K 4.0   CO2 23   BUN 19   CREATININE 1.0   GLUCOSE 233*     No results for input(s): BNP in the last 72 hours. Recent Labs     08/18/19 2220   PROTIME 10.7   INR 0.94     Recent Labs     08/18/19 2220   APTT 29.1     Recent Labs     08/18/19  2220 08/19/19  0122 08/19/19  0439   TROPONINI <0.01 <0.01 <0.01     Lab Results   Component Value Date    HDL 29 11/09/2018    LDLDIRECT 61 11/09/2018    LDLCALC see below 11/09/2018    TRIG 546 11/09/2018     Recent Labs     08/18/19 2220   AST 22   ALT 26   LABALBU 4.6         EKG:       Chest X-Ray:      ECHO:  LVH: EF 60%    Stress Nuclear: 3/25 2019  Normal myocardial perfusion study: Normal LVEF    Corornary angiogram 12/2017  Patent stents in the proximal LAD and mid circumflex coronary artery. Left coronary dominance. No coronary artery disease identified of any significance. The stents were widely patent in the LAD and circumflex. LV ejection fraction 55% without wall motion abnormality. The LVEDP was only 3-5 mmHg suggesting that the patient is chronically intravascularly depleted. ASSESSMENT AND PLAN:      Chest pain is atypical and noncardiac  Its constant without precipitating or relieving factors  Enzymes are negative and EKG shows no evidence of ischemia  Had a stress nuclear scan 3 months ago which was negative for ischemia.   Had a angiogram 2 years ago which showed patent stents  No plans for any work-up      Severe hypertension  Patient has had this for a long time  Sleep apnea obesity is a contributing factor  What is amazing is that he is on so many blood pressure medicines and still low blood pressure is high  I will add a loop diuretic; torsemide 20 twice daily        Meghann Casanova M.D  8/20/2019

## 2019-08-20 NOTE — PLAN OF CARE
Problem: Falls - Risk of:  Goal: Will remain free from falls  Description  Will remain free from falls  8/20/2019 1141 by Jennifer Chacon RN  Outcome: Ongoing  8/20/2019 0342 by Anais Gutierrez RN  Outcome: Ongoing  Goal: Absence of physical injury  Description  Absence of physical injury  Outcome: Ongoing     Problem: Pain:  Goal: Pain level will decrease  Description  Pain level will decrease  Outcome: Ongoing  Goal: Control of acute pain  Description  Control of acute pain  8/20/2019 1141 by Jennifer Chacon RN  Outcome: Ongoing  8/20/2019 0342 by Anais Gutierrez RN  Outcome: Ongoing  Goal: Control of chronic pain  Description  Control of chronic pain  Outcome: Ongoing

## 2019-08-21 LAB
GLUCOSE BLD-MCNC: 154 MG/DL (ref 70–99)
GLUCOSE BLD-MCNC: 166 MG/DL (ref 70–99)
GLUCOSE BLD-MCNC: 167 MG/DL (ref 70–99)
GLUCOSE BLD-MCNC: 188 MG/DL (ref 70–99)
PERFORMED ON: ABNORMAL

## 2019-08-21 PROCEDURE — 1200000000 HC SEMI PRIVATE

## 2019-08-21 PROCEDURE — 6370000000 HC RX 637 (ALT 250 FOR IP): Performed by: INTERNAL MEDICINE

## 2019-08-21 PROCEDURE — 6360000002 HC RX W HCPCS: Performed by: INTERNAL MEDICINE

## 2019-08-21 PROCEDURE — 2580000003 HC RX 258: Performed by: INTERNAL MEDICINE

## 2019-08-21 PROCEDURE — 94760 N-INVAS EAR/PLS OXIMETRY 1: CPT

## 2019-08-21 PROCEDURE — 99232 SBSQ HOSP IP/OBS MODERATE 35: CPT | Performed by: NURSE PRACTITIONER

## 2019-08-21 PROCEDURE — 94640 AIRWAY INHALATION TREATMENT: CPT

## 2019-08-21 RX ADMIN — MOMETASONE FUROATE AND FORMOTEROL FUMARATE DIHYDRATE 2 PUFF: 200; 5 AEROSOL RESPIRATORY (INHALATION) at 07:49

## 2019-08-21 RX ADMIN — LISINOPRIL AND HYDROCHLOROTHIAZIDE 2 TABLET: 12.5; 1 TABLET ORAL at 09:06

## 2019-08-21 RX ADMIN — ACETAMINOPHEN 650 MG: 325 TABLET ORAL at 14:16

## 2019-08-21 RX ADMIN — HYDRALAZINE HYDROCHLORIDE 100 MG: 50 TABLET, FILM COATED ORAL at 21:48

## 2019-08-21 RX ADMIN — ZOLPIDEM TARTRATE 10 MG: 5 TABLET ORAL at 21:48

## 2019-08-21 RX ADMIN — CARVEDILOL 25 MG: 25 TABLET, FILM COATED ORAL at 09:06

## 2019-08-21 RX ADMIN — INSULIN LISPRO 1 UNITS: 100 INJECTION, SOLUTION INTRAVENOUS; SUBCUTANEOUS at 21:48

## 2019-08-21 RX ADMIN — INSULIN LISPRO 2 UNITS: 100 INJECTION, SOLUTION INTRAVENOUS; SUBCUTANEOUS at 09:06

## 2019-08-21 RX ADMIN — INSULIN LISPRO 2 UNITS: 100 INJECTION, SOLUTION INTRAVENOUS; SUBCUTANEOUS at 11:58

## 2019-08-21 RX ADMIN — MIRTAZAPINE 15 MG: 15 TABLET, FILM COATED ORAL at 21:48

## 2019-08-21 RX ADMIN — HYDRALAZINE HYDROCHLORIDE 100 MG: 50 TABLET, FILM COATED ORAL at 06:07

## 2019-08-21 RX ADMIN — TORSEMIDE 20 MG: 20 TABLET ORAL at 17:23

## 2019-08-21 RX ADMIN — INSULIN LISPRO 2 UNITS: 100 INJECTION, SOLUTION INTRAVENOUS; SUBCUTANEOUS at 17:23

## 2019-08-21 RX ADMIN — CLONIDINE HYDROCHLORIDE 0.2 MG: 0.1 TABLET ORAL at 21:48

## 2019-08-21 RX ADMIN — TORSEMIDE 20 MG: 20 TABLET ORAL at 09:07

## 2019-08-21 RX ADMIN — CLONIDINE HYDROCHLORIDE 0.2 MG: 0.1 TABLET ORAL at 09:07

## 2019-08-21 RX ADMIN — ASPIRIN 81 MG: 81 TABLET ORAL at 09:06

## 2019-08-21 RX ADMIN — ENOXAPARIN SODIUM 40 MG: 40 INJECTION SUBCUTANEOUS at 09:06

## 2019-08-21 RX ADMIN — ISOSORBIDE MONONITRATE 30 MG: 30 TABLET, EXTENDED RELEASE ORAL at 17:23

## 2019-08-21 RX ADMIN — Medication 10 ML: at 21:48

## 2019-08-21 RX ADMIN — NIFEDIPINE 90 MG: 90 TABLET, FILM COATED, EXTENDED RELEASE ORAL at 09:06

## 2019-08-21 RX ADMIN — SPIRONOLACTONE 25 MG: 25 TABLET ORAL at 09:07

## 2019-08-21 RX ADMIN — ATORVASTATIN CALCIUM 40 MG: 40 TABLET, FILM COATED ORAL at 09:07

## 2019-08-21 RX ADMIN — MOMETASONE FUROATE AND FORMOTEROL FUMARATE DIHYDRATE 2 PUFF: 200; 5 AEROSOL RESPIRATORY (INHALATION) at 19:36

## 2019-08-21 RX ADMIN — CARVEDILOL 25 MG: 25 TABLET, FILM COATED ORAL at 17:23

## 2019-08-21 RX ADMIN — Medication 10 ML: at 09:11

## 2019-08-21 RX ADMIN — SERTRALINE 100 MG: 100 TABLET, FILM COATED ORAL at 21:48

## 2019-08-21 ASSESSMENT — PAIN DESCRIPTION - ONSET: ONSET: ON-GOING

## 2019-08-21 ASSESSMENT — PAIN SCALES - GENERAL
PAINLEVEL_OUTOF10: 2
PAINLEVEL_OUTOF10: 0
PAINLEVEL_OUTOF10: 4
PAINLEVEL_OUTOF10: 0

## 2019-08-21 ASSESSMENT — PAIN DESCRIPTION - PROGRESSION
CLINICAL_PROGRESSION: NOT CHANGED

## 2019-08-21 ASSESSMENT — PAIN DESCRIPTION - FREQUENCY: FREQUENCY: INTERMITTENT

## 2019-08-21 ASSESSMENT — PAIN - FUNCTIONAL ASSESSMENT: PAIN_FUNCTIONAL_ASSESSMENT: ACTIVITIES ARE NOT PREVENTED

## 2019-08-21 ASSESSMENT — PAIN DESCRIPTION - PAIN TYPE: TYPE: ACUTE PAIN

## 2019-08-21 ASSESSMENT — PAIN DESCRIPTION - DESCRIPTORS: DESCRIPTORS: DISCOMFORT;ACHING

## 2019-08-21 ASSESSMENT — PAIN DESCRIPTION - LOCATION: LOCATION: GENERALIZED

## 2019-08-21 NOTE — PLAN OF CARE
Problem: Falls - Risk of:  Goal: Will remain free from falls  Description  Will remain free from falls  8/20/2019 2151 by Mallorie Lind RN  Outcome: Ongoing  8/20/2019 1141 by Lorelee Bamberger, RN  Outcome: Ongoing   Patient uses call light appropriately to express needs. Bed to lowest position with door open and call light in reach. All fall precautions implemented at this time. Siderails up x2. Non skid footwear in place. Patient has had no falls this shift. Will continue to monitor. Problem: Pain:  Goal: Pain level will decrease  Description  Pain level will decrease  8/20/2019 2151 by Mallorie Lind RN  Outcome: Ongoing  8/20/2019 1141 by Lorelee Bamberger, RN  Outcome: Ongoing   Pain/discomfort being managed with PRN analgesics per MD orders. Pt able to express presence and absence of pain and rate pain appropriately using numerical scale.'  Problem: Cardiac:  Goal: Ability to maintain vital signs within normal range will improve  Description  Ability to maintain vital signs within normal range will improve  Outcome: Ongoing     Problem: Cardiac:  Goal: Cardiovascular alteration will improve  Description  Cardiovascular alteration will improve  Outcome: Ongoing   Cardiac monitor. VS every 4 hours and PRN. Medications as ordered.

## 2019-08-22 VITALS
HEIGHT: 72 IN | BODY MASS INDEX: 33.12 KG/M2 | TEMPERATURE: 98 F | DIASTOLIC BLOOD PRESSURE: 76 MMHG | OXYGEN SATURATION: 92 % | SYSTOLIC BLOOD PRESSURE: 119 MMHG | WEIGHT: 244.49 LBS | HEART RATE: 81 BPM | RESPIRATION RATE: 18 BRPM

## 2019-08-22 LAB
ANION GAP SERPL CALCULATED.3IONS-SCNC: 19 MMOL/L (ref 3–16)
BUN BLDV-MCNC: 42 MG/DL (ref 7–20)
CALCIUM SERPL-MCNC: 9 MG/DL (ref 8.3–10.6)
CHLORIDE BLD-SCNC: 97 MMOL/L (ref 99–110)
CO2: 23 MMOL/L (ref 21–32)
CREAT SERPL-MCNC: 1.3 MG/DL (ref 0.8–1.3)
GFR AFRICAN AMERICAN: >60
GFR NON-AFRICAN AMERICAN: 55
GLUCOSE BLD-MCNC: 153 MG/DL (ref 70–99)
GLUCOSE BLD-MCNC: 154 MG/DL (ref 70–99)
GLUCOSE BLD-MCNC: 192 MG/DL (ref 70–99)
PERFORMED ON: ABNORMAL
PERFORMED ON: ABNORMAL
POTASSIUM SERPL-SCNC: 3.6 MMOL/L (ref 3.5–5.1)
SODIUM BLD-SCNC: 139 MMOL/L (ref 136–145)

## 2019-08-22 PROCEDURE — 6370000000 HC RX 637 (ALT 250 FOR IP): Performed by: INTERNAL MEDICINE

## 2019-08-22 PROCEDURE — 94640 AIRWAY INHALATION TREATMENT: CPT

## 2019-08-22 PROCEDURE — 6360000002 HC RX W HCPCS: Performed by: INTERNAL MEDICINE

## 2019-08-22 PROCEDURE — 2580000003 HC RX 258: Performed by: INTERNAL MEDICINE

## 2019-08-22 PROCEDURE — 94760 N-INVAS EAR/PLS OXIMETRY 1: CPT

## 2019-08-22 PROCEDURE — 80048 BASIC METABOLIC PNL TOTAL CA: CPT

## 2019-08-22 PROCEDURE — 36415 COLL VENOUS BLD VENIPUNCTURE: CPT

## 2019-08-22 RX ORDER — TORSEMIDE 20 MG/1
10 TABLET ORAL DAILY
Status: DISCONTINUED | OUTPATIENT
Start: 2019-08-22 | End: 2019-08-22 | Stop reason: HOSPADM

## 2019-08-22 RX ORDER — CARVEDILOL 25 MG/1
25 TABLET ORAL 2 TIMES DAILY WITH MEALS
Qty: 60 TABLET | Refills: 3 | Status: ON HOLD | OUTPATIENT
Start: 2019-08-22 | End: 2020-01-05 | Stop reason: ALTCHOICE

## 2019-08-22 RX ORDER — SPIRONOLACTONE 25 MG/1
25 TABLET ORAL DAILY
Qty: 30 TABLET | Refills: 3 | Status: ON HOLD | OUTPATIENT
Start: 2019-08-23 | End: 2020-01-05 | Stop reason: SDDI

## 2019-08-22 RX ORDER — TORSEMIDE 10 MG/1
10 TABLET ORAL DAILY
Qty: 30 TABLET | Refills: 3 | Status: ON HOLD | OUTPATIENT
Start: 2019-08-23 | End: 2020-01-05 | Stop reason: SDDI

## 2019-08-22 RX ORDER — SPIRONOLACTONE 25 MG/1
25 TABLET ORAL DAILY
Qty: 30 TABLET | Refills: 3 | Status: SHIPPED | OUTPATIENT
Start: 2019-08-23 | End: 2019-08-22

## 2019-08-22 RX ORDER — ISOSORBIDE MONONITRATE 30 MG/1
30 TABLET, EXTENDED RELEASE ORAL EVERY EVENING
Qty: 30 TABLET | Refills: 3 | Status: SHIPPED | OUTPATIENT
Start: 2019-08-22 | End: 2019-08-22

## 2019-08-22 RX ORDER — CARVEDILOL 25 MG/1
25 TABLET ORAL 2 TIMES DAILY WITH MEALS
Qty: 60 TABLET | Refills: 3 | Status: SHIPPED | OUTPATIENT
Start: 2019-08-22 | End: 2019-08-22

## 2019-08-22 RX ORDER — CLONIDINE HYDROCHLORIDE 0.2 MG/1
0.2 TABLET ORAL 2 TIMES DAILY
Qty: 60 TABLET | Refills: 3 | Status: ON HOLD | OUTPATIENT
Start: 2019-08-22 | End: 2020-01-05 | Stop reason: ALTCHOICE

## 2019-08-22 RX ORDER — HYDRALAZINE HYDROCHLORIDE 25 MG/1
25 TABLET, FILM COATED ORAL EVERY 8 HOURS SCHEDULED
Status: DISCONTINUED | OUTPATIENT
Start: 2019-08-22 | End: 2019-08-22 | Stop reason: HOSPADM

## 2019-08-22 RX ORDER — ISOSORBIDE MONONITRATE 30 MG/1
30 TABLET, EXTENDED RELEASE ORAL EVERY EVENING
Qty: 30 TABLET | Refills: 3 | Status: ON HOLD | OUTPATIENT
Start: 2019-08-22 | End: 2020-01-05 | Stop reason: SDDI

## 2019-08-22 RX ORDER — HYDRALAZINE HYDROCHLORIDE 25 MG/1
25 TABLET, FILM COATED ORAL EVERY 8 HOURS SCHEDULED
Qty: 90 TABLET | Refills: 3 | Status: ON HOLD | OUTPATIENT
Start: 2019-08-22 | End: 2020-01-10 | Stop reason: HOSPADM

## 2019-08-22 RX ORDER — HYDRALAZINE HYDROCHLORIDE 25 MG/1
25 TABLET, FILM COATED ORAL EVERY 8 HOURS SCHEDULED
Qty: 90 TABLET | Refills: 3 | Status: SHIPPED | OUTPATIENT
Start: 2019-08-22 | End: 2019-08-22

## 2019-08-22 RX ORDER — CLONIDINE HYDROCHLORIDE 0.2 MG/1
0.2 TABLET ORAL 2 TIMES DAILY
Qty: 60 TABLET | Refills: 3 | Status: SHIPPED | OUTPATIENT
Start: 2019-08-22 | End: 2019-08-22

## 2019-08-22 RX ORDER — TORSEMIDE 10 MG/1
10 TABLET ORAL DAILY
Qty: 30 TABLET | Refills: 3 | Status: SHIPPED | OUTPATIENT
Start: 2019-08-23 | End: 2019-08-22

## 2019-08-22 RX ADMIN — SPIRONOLACTONE 25 MG: 25 TABLET ORAL at 08:30

## 2019-08-22 RX ADMIN — Medication 10 ML: at 08:33

## 2019-08-22 RX ADMIN — ASPIRIN 81 MG: 81 TABLET ORAL at 08:29

## 2019-08-22 RX ADMIN — HYDRALAZINE HYDROCHLORIDE 100 MG: 50 TABLET, FILM COATED ORAL at 06:55

## 2019-08-22 RX ADMIN — MOMETASONE FUROATE AND FORMOTEROL FUMARATE DIHYDRATE 2 PUFF: 200; 5 AEROSOL RESPIRATORY (INHALATION) at 09:14

## 2019-08-22 RX ADMIN — NIFEDIPINE 90 MG: 90 TABLET, FILM COATED, EXTENDED RELEASE ORAL at 08:29

## 2019-08-22 RX ADMIN — CARVEDILOL 25 MG: 25 TABLET, FILM COATED ORAL at 08:29

## 2019-08-22 RX ADMIN — CLONIDINE HYDROCHLORIDE 0.2 MG: 0.1 TABLET ORAL at 08:29

## 2019-08-22 RX ADMIN — ATORVASTATIN CALCIUM 40 MG: 40 TABLET, FILM COATED ORAL at 08:29

## 2019-08-22 RX ADMIN — TORSEMIDE 10 MG: 20 TABLET ORAL at 08:29

## 2019-08-22 RX ADMIN — LISINOPRIL AND HYDROCHLOROTHIAZIDE 2 TABLET: 12.5; 1 TABLET ORAL at 08:29

## 2019-08-22 RX ADMIN — ENOXAPARIN SODIUM 40 MG: 40 INJECTION SUBCUTANEOUS at 08:30

## 2019-08-22 RX ADMIN — INSULIN LISPRO 2 UNITS: 100 INJECTION, SOLUTION INTRAVENOUS; SUBCUTANEOUS at 08:28

## 2019-08-22 NOTE — DISCHARGE SUMMARY
SL tablet  up to max of 3 total doses. If no relief after 1 dose, call 911.             sertraline (ZOLOFT) 100 MG tablet  Take 100 mg by mouth nightly             Spacer/Aero-Holding Chambers (E-Z SPACER) MITZI  1 Device by Does not apply route daily as needed (shortness of breath)             spironolactone (ALDACTONE) 25 MG tablet  Take 1 tablet by mouth daily             torsemide (DEMADEX) 10 MG tablet  Take 1 tablet by mouth daily             vitamin B-1 (THIAMINE) 100 MG tablet  Take 100 mg by mouth daily             vitamin B-12 (CYANOCOBALAMIN) 500 MCG tablet  Take 500 mcg by mouth daily             vitamin D (D3-1000) 1000 UNITS TABS tablet  Take 1 tablet by mouth daily             zolpidem (AMBIEN) 10 MG tablet  Take 10 mg by mouth nightly. No future appointments. Time Spent on discharge is more than 45 minutes in the examination, evaluation, counseling and review of medications and discharge plan. Signed:  Baltazar Ortiz MD   8/22/2019    The note was completed using EMR. Every effort was made to ensure accuracy; however, inadvertent computerized transcription errors may be present. Thank you Lamar Dakin, MD for the opportunity to be involved in this patient's care. If you have any questions or concerns please feel free to contact me at [phone].

## 2019-08-22 NOTE — PROGRESS NOTES
Verbal and written discharge instructions were given to the patient, patient verbalizes understanding of discharge instructions including medications orders for home and possible side effects associated with them. Patient instructed and verbalizes understanding to call the doctor listed if they should have any complications or worsening symptoms. Verbalizes understanding about follow-up appointments. D/C IV patient tolerated well, no signs of bleeding. Patient discharged home with all belongings. Refusing wheelchair.  Out via ambulation
ENT: No discharge. Pharynx clear. External appearance of ears and nose normal.  Neck: Trachea midline. No obvious mass. Resp: No accessory muscle use. No crackles. No wheezes. No rhonchi. No dullness on percussion. CV: Regular rate. Regular rhythm. No murmur or rub. No edema. GI: Non-tender. Non-distended. No hernia. Skin: Warm, dry, normal texture and turgor. No nodule on exposed extremities. Lymph: No cervical LAD. No supraclavicular LAD. M/S: No cyanosis. No clubbing. No joint deformity. Neuro: Moves all four extremities. CN 2-12 tested, no defect noted. Psych: Oriented x 3. No anxiety. Awake. Alert. Intact judgement and insight. Data    LABS  CBC:   Recent Labs     08/18/19 2220 08/19/19  0439   WBC 6.0 5.9   HGB 13.5 12.7*   HCT 40.8 39.0*   MCV 88.0 87.4    159     BMP:   Recent Labs     08/18/19 2220      K 4.0      CO2 23   BUN 19   CREATININE 1.0     POC GLUCOSE:    Recent Labs     08/20/19  1145 08/20/19  1626 08/20/19  2115 08/21/19  0737 08/21/19  1139   POCGLU 164* 155* 246* 154* 188*     LIVER PROFILE:   Recent Labs     08/18/19 2220   AST 22   ALT 26   LABALBU 4.6   BILITOT <0.2   ALKPHOS 120     PT/INR:   Recent Labs     08/18/19 2220   PROTIME 10.7   INR 0.94     APTT:   Recent Labs     08/18/19 2220   APTT 29.1     UA:  Recent Labs     08/18/19 2220 08/19/19  1140   COLORU YELLOW  --    PHUR 6.0 5.0   WBCUA 2  --    RBCUA 1  --    CLARITYU Clear  --    SPECGRAV 1.029  --    LEUKOCYTESUR Negative  --    UROBILINOGEN 1.0  --    BILIRUBINUR Negative  --    BLOODU Negative  --    GLUCOSEU >=1000*  --    KETUA TRACE*  --      Microbiology:  Wound Culture: No results for input(s): WNDABS, ORG in the last 72 hours. Invalid input(s):  LABGRAM  Nasal Culture: No results for input(s): ORG, MRSAPCR in the last 72 hours. Blood Culture: No results for input(s): BC, BLOODCULT2 in the last 72 hours.   Fungal Culture:   No results for input(s): FUNGSM in the last
dullness on percussion. CV: Regular rate. Regular rhythm. No murmur or rub. No edema. GI: Non-tender. Non-distended. No hernia. Skin: Warm, dry, normal texture and turgor. No nodule on exposed extremities. Lymph: No cervical LAD. No supraclavicular LAD. M/S: No cyanosis. No clubbing. No joint deformity. Neuro: Moves all four extremities. CN 2-12 tested, no defect noted. Psych: Oriented x 3. No anxiety. Awake. Alert. Intact judgement and insight. Data    LABS  CBC:   Recent Labs     08/18/19 2220 08/19/19  0439   WBC 6.0 5.9   HGB 13.5 12.7*   HCT 40.8 39.0*   MCV 88.0 87.4    159     BMP:   Recent Labs     08/18/19 2220      K 4.0      CO2 23   BUN 19   CREATININE 1.0     POC GLUCOSE:    Recent Labs     08/19/19  0010 08/19/19  0737   POCGLU 162* 124*     LIVER PROFILE:   Recent Labs     08/18/19 2220   AST 22   ALT 26   LABALBU 4.6   BILITOT <0.2   ALKPHOS 120     PT/INR:   Recent Labs     08/18/19 2220   PROTIME 10.7   INR 0.94     APTT:   Recent Labs     08/18/19 2220   APTT 29.1     UA:  Recent Labs     08/18/19 2220   COLORU YELLOW   PHUR 6.0   WBCUA 2   RBCUA 1   CLARITYU Clear   SPECGRAV 1.029   LEUKOCYTESUR Negative   UROBILINOGEN 1.0   BILIRUBINUR Negative   BLOODU Negative   GLUCOSEU >=1000*   KETUA TRACE*     Microbiology:  Wound Culture: No results for input(s): WNDABS, ORG in the last 72 hours. Invalid input(s):  LABGRAM  Nasal Culture: No results for input(s): ORG, MRSAPCR in the last 72 hours. Blood Culture: No results for input(s): BC, BLOODCULT2 in the last 72 hours. Fungal Culture:   No results for input(s): FUNGSM in the last 72 hours. No results for input(s): FUNCXBLD in the last 72 hours. CSF Culture:  No results for input(s): COLORCSF, APPEARCSF, CFTUBE, CLOTCSF, WBCCSF, RBCCSF, NEUTCSF, NUMCELLSCSF, LYMPHSCSF, MONOCSF, GLUCCSF, VOLCSF in the last 72 hours.   Respiratory Culture:  No results for input(s): Nella Lind in the last 72
elevation of the right hemidiaphragm. Borderline cardiomegaly.  No pulmonary edema.      Impression:       No acute findings.  No change. MY REVIEW:  59  Male  Chest pain  Cardiomegaly  Hypertension critical 188/97   178/96 159/85  Multiple meds and adjsuting  BMI 33.6  Diabetes  162  156  Hyperlipidemia   The Utilization Review Committee members, including its physician members, have reviewed this case and agree that this patient does meet evidence based criteria for inpatient services,  to ADMISSION =\"admit as inpatient\"  Medical care will continue to be provided by Michelle Sheldon MD, who concurs with this decision and has documented his/her concurrence in the patient's medical record. Mervin Polk MD, [place], [facility], [address] [facility]  Cardiac, Vascular & Thoracic Surgeon  [address] [place] [id number]    Office [phone]   [phone]  [email or web address]    8/19/2019  12:49 PM

## 2019-09-22 NOTE — PROGRESS NOTES
[facility]  [address] [address]  Phone: [phone]  Fax: [phone]    September 22, 2019        Clark Morillo  [address] [facility] [place] [address]          To whom it may concern:    RE: Clark Morillo    Patient was seen and treated today at our clinic and missed school 09/20/19 and 09/23/19    Please contact me for questions or concerns.      Sincerely,        SELVIN SAME DAY PROVIDER   Hospitalist Progress Note    CC:     Shortness of Breath (SOB,CP,dizziness since this morning. reports LOC x 2. reports hitting head. denies blood thinners. ); Chest Pain; Dizziness; and Loss of Consciousness      Admit date: 4/15/2019  Days in hospital:  1    Subjective:     She was doing well this morning. He was having some loose stool. His blood pressure is elevated this afternoon. He had some headache with his high blood pressure. ROS:   Review of Systems   Constitutional: Negative for chills and fever. Respiratory: Negative for cough and shortness of breath. Cardiovascular: Negative for chest pain and palpitations. Gastrointestinal: Negative for abdominal pain, constipation and diarrhea. Genitourinary: Negative for dysuria and urgency. Objective:    BP (!) 216/102   Pulse 78   Temp 98.5 °F (36.9 °C) (Oral)   Resp 20   Ht 6' (1.829 m)   Wt 251 lb 5.2 oz (114 kg)   SpO2 92%   BMI 34.09 kg/m²     Gen: alert, NAD  HEENT: NC/AT, moist mucous membranes, no oropharyngeal erythema or exudate  Neck: supple, trachea midline, no anterior cervical or SC LAD  Heart: Normal s1/s2, RRR, no murmurs, gallops, or rubs. Lungs: CTA bilaterally, no wheezing, no rales, no rhonchi, no use of accessory muscles  Abd: bowel sounds present, soft, nondistended, none tender  Extrem: No clubbing, cyanosis, no  edema  Psych: A & O x3  , affect appropriate  Neuro: grossly intact, moves all four extremities spontaneously.       Medications:  Scheduled Meds:   hydrALAZINE  50 mg Oral TID    carvedilol  3.125 mg Oral BID     sertraline  100 mg Oral Nightly    lisinopril  20 mg Oral Daily    [START ON 4/18/2019] NIFEdipine  90 mg Oral Daily    NIFEdipine  30 mg Oral Once    cloNIDine  0.1 mg Oral BID    mirtazapine  15 mg Oral Nightly    sodium chloride flush  10 mL Intravenous 2 times per day    enoxaparin  40 mg Subcutaneous Daily    insulin lispro  0-12 Units Subcutaneous TID     insulin lispro  0-6 Units Subcutaneous Nightly    bacitracin-polymyxin b   Topical BID    mometasone-formoterol  2 puff Inhalation BID       PRN Meds:  albuterol sulfate HFA, loperamide, zolpidem, sodium chloride flush, magnesium hydroxide, ondansetron, glucose, dextrose, glucagon (rDNA), dextrose, albuterol    IV:   dextrose           Intake/Output Summary (Last 24 hours) at 4/17/2019 1710  Last data filed at 4/17/2019 0503  Gross per 24 hour   Intake 2082.5 ml   Output --   Net 2082.5 ml       Results:  CBC:   Recent Labs     04/16/19  0200 04/17/19  0725   WBC 6.5 4.6   HGB 12.7* 12.2*   HCT 38.6* 37.6*   MCV 85.3 86.2    165     BMP:   Recent Labs     04/16/19  0200 04/16/19  0611 04/17/19  0725   * 141 142   K 2.9* 3.6 3.8   CL 93* 99 105   CO2 27 25 26   BUN 42* 35* 18   CREATININE 1.7* 1.2 0.8     Mag: No results for input(s): MAG in the last 72 hours. Phos:   Lab Results   Component Value Date    PHOS 2.5 04/02/2018     No components found for: GLU    LIVER PROFILE:   Recent Labs     04/16/19  0200   AST 12*   ALT 15   BILITOT <0.2   ALKPHOS 144*     PT/INR: No results for input(s): PROTIME, INR in the last 72 hours. APTT: No results for input(s): APTT in the last 72 hours. UA:  Recent Labs     04/16/19  0200   COLORU YELLOW   PHUR 5.5  5.5   CLARITYU Clear   SPECGRAV 1.012   LEUKOCYTESUR Negative   UROBILINOGEN 0.2   BILIRUBINUR Negative   BLOODU Negative   GLUCOSEU Negative       Invalid input(s): ABG  Lab Results   Component Value Date    CALCIUM 8.2 (L) 04/17/2019    PHOS 2.5 04/02/2018       Assessment and Plan:    Syncope:   patient had similar presentation 3 weeks ago. Patient felt lightheaded after going to the bathroom and then lost his consciousness. He reports some loose stool for the last couple days. Orthostatic were positive in the ED. Patient was seen by cardiology 3 weeks ago for similar presentation.   Continue telemetry  IV fluid  PT/OT     Acute kidney injury  Most likely prerenal with loose stool and diarrhea  Resolved   Avoid nephrotoxic medications     Hypokalemia and hypomagnesemia  Resolved     Diarrhea:   PCR pathogen and C. diff were negative  Imodium     COPD without any acute exacerbation  Continue with home meds     Type 2 diabetes mellitus  Sliding scale insulin     Hypertension urgency  Continue with nifedipine, and lisinopril. Hydralazine was added yesterday, increased this morning to 50. And clonidine.      Positive drug screen:   patient drug screen is positive for cocaine. Patient denies any recent use.   Counseling was provided            Code status:  Full   DVT prophylaxis: Lovenox   Disposition: [facility] improvement         Electronically signed by Elaine Pittman MD on 4/17/2019 at 5:10 PM

## 2019-12-07 ENCOUNTER — APPOINTMENT (OUTPATIENT)
Dept: GENERAL RADIOLOGY | Age: 64
End: 2019-12-07
Payer: MEDICARE

## 2019-12-07 ENCOUNTER — HOSPITAL ENCOUNTER (EMERGENCY)
Age: 64
Discharge: HOME | End: 2019-12-07
Payer: MEDICARE

## 2019-12-07 VITALS
SYSTOLIC BLOOD PRESSURE: 167 MMHG | TEMPERATURE: 98.6 F | DIASTOLIC BLOOD PRESSURE: 103 MMHG | HEART RATE: 90 BPM | WEIGHT: 242.73 LBS | HEIGHT: 72 IN | OXYGEN SATURATION: 97 % | RESPIRATION RATE: 24 BRPM | BODY MASS INDEX: 32.88 KG/M2

## 2019-12-07 DIAGNOSIS — M25.512 ACUTE PAIN OF LEFT SHOULDER: Primary | ICD-10-CM

## 2019-12-07 DIAGNOSIS — M75.52 ACUTE BURSITIS OF LEFT SHOULDER: ICD-10-CM

## 2019-12-07 LAB
EKG ATRIAL RATE: 97 BPM
EKG DIAGNOSIS: NORMAL
EKG P AXIS: 39 DEGREES
EKG P-R INTERVAL: 170 MS
EKG Q-T INTERVAL: 356 MS
EKG QRS DURATION: 102 MS
EKG QTC CALCULATION (BAZETT): 452 MS
EKG R AXIS: 77 DEGREES
EKG T AXIS: 13 DEGREES
EKG VENTRICULAR RATE: 97 BPM

## 2019-12-07 PROCEDURE — 6370000000 HC RX 637 (ALT 250 FOR IP): Performed by: PHYSICIAN ASSISTANT

## 2019-12-07 PROCEDURE — 93005 ELECTROCARDIOGRAM TRACING: CPT | Performed by: EMERGENCY MEDICINE

## 2019-12-07 PROCEDURE — 93010 ELECTROCARDIOGRAM REPORT: CPT | Performed by: INTERNAL MEDICINE

## 2019-12-07 PROCEDURE — 99283 EMERGENCY DEPT VISIT LOW MDM: CPT

## 2019-12-07 PROCEDURE — 73030 X-RAY EXAM OF SHOULDER: CPT

## 2019-12-07 RX ORDER — IBUPROFEN 400 MG/1
800 TABLET ORAL ONCE
Status: COMPLETED | OUTPATIENT
Start: 2019-12-07 | End: 2019-12-07

## 2019-12-07 RX ORDER — HYDROCODONE BITARTRATE AND ACETAMINOPHEN 5; 325 MG/1; MG/1
1 TABLET ORAL EVERY 6 HOURS PRN
Qty: 5 TABLET | Refills: 0 | Status: SHIPPED | OUTPATIENT
Start: 2019-12-07 | End: 2019-12-10

## 2019-12-07 RX ORDER — ACETAMINOPHEN 500 MG
1000 TABLET ORAL ONCE
Status: COMPLETED | OUTPATIENT
Start: 2019-12-07 | End: 2019-12-07

## 2019-12-07 RX ORDER — PREDNISONE 10 MG/1
60 TABLET ORAL DAILY
Qty: 30 TABLET | Refills: 0 | Status: SHIPPED | OUTPATIENT
Start: 2019-12-07 | End: 2019-12-12

## 2019-12-07 RX ADMIN — ACETAMINOPHEN 1000 MG: 500 TABLET ORAL at 11:50

## 2019-12-07 RX ADMIN — IBUPROFEN 800 MG: 400 TABLET, FILM COATED ORAL at 11:49

## 2019-12-07 ASSESSMENT — PAIN SCALES - GENERAL
PAINLEVEL_OUTOF10: 8
PAINLEVEL_OUTOF10: 8
PAINLEVEL_OUTOF10: 9

## 2019-12-07 ASSESSMENT — PAIN DESCRIPTION - ORIENTATION: ORIENTATION: LEFT

## 2019-12-07 ASSESSMENT — PAIN DESCRIPTION - DESCRIPTORS: DESCRIPTORS: THROBBING

## 2019-12-07 ASSESSMENT — PAIN DESCRIPTION - FREQUENCY: FREQUENCY: CONTINUOUS

## 2019-12-07 ASSESSMENT — PAIN DESCRIPTION - LOCATION: LOCATION: SHOULDER

## 2020-01-04 ENCOUNTER — HOSPITAL ENCOUNTER (INPATIENT)
Age: 65
LOS: 6 days | Discharge: HOME | DRG: 897 | End: 2020-01-10
Attending: EMERGENCY MEDICINE | Admitting: HOSPITALIST
Payer: MEDICARE

## 2020-01-04 ENCOUNTER — APPOINTMENT (OUTPATIENT)
Dept: GENERAL RADIOLOGY | Age: 65
DRG: 897 | End: 2020-01-04
Payer: MEDICARE

## 2020-01-04 LAB
A/G RATIO: 1.6 (ref 1.1–2.2)
ALBUMIN SERPL-MCNC: 4.6 G/DL (ref 3.4–5)
ALP BLD-CCNC: 126 U/L (ref 40–129)
ALT SERPL-CCNC: 35 U/L (ref 10–40)
ANION GAP SERPL CALCULATED.3IONS-SCNC: 22 MMOL/L (ref 3–16)
APTT: 35.3 SEC (ref 24.2–36.2)
APTT: 37.9 SEC (ref 24.2–36.2)
APTT: 41.4 SEC (ref 24.2–36.2)
AST SERPL-CCNC: 29 U/L (ref 15–37)
BASOPHILS ABSOLUTE: 0 K/UL (ref 0–0.2)
BASOPHILS RELATIVE PERCENT: 0.2 %
BILIRUB SERPL-MCNC: 0.5 MG/DL (ref 0–1)
BUN BLDV-MCNC: 22 MG/DL (ref 7–20)
CALCIUM SERPL-MCNC: 9.5 MG/DL (ref 8.3–10.6)
CHLORIDE BLD-SCNC: 95 MMOL/L (ref 99–110)
CO2: 22 MMOL/L (ref 21–32)
CREAT SERPL-MCNC: 1.4 MG/DL (ref 0.8–1.3)
EOSINOPHILS ABSOLUTE: 0 K/UL (ref 0–0.6)
EOSINOPHILS RELATIVE PERCENT: 0 %
GFR AFRICAN AMERICAN: >60
GFR NON-AFRICAN AMERICAN: 51
GLOBULIN: 2.9 G/DL
GLUCOSE BLD-MCNC: 192 MG/DL (ref 70–99)
GLUCOSE BLD-MCNC: 243 MG/DL (ref 70–99)
HCT VFR BLD CALC: 41 % (ref 40.5–52.5)
HCT VFR BLD CALC: 43.3 % (ref 40.5–52.5)
HEMOGLOBIN: 13.2 G/DL (ref 13.5–17.5)
HEMOGLOBIN: 14.2 G/DL (ref 13.5–17.5)
LYMPHOCYTES ABSOLUTE: 1 K/UL (ref 1–5.1)
LYMPHOCYTES RELATIVE PERCENT: 12.5 %
MCH RBC QN AUTO: 28.1 PG (ref 26–34)
MCH RBC QN AUTO: 28.5 PG (ref 26–34)
MCHC RBC AUTO-ENTMCNC: 32.3 G/DL (ref 31–36)
MCHC RBC AUTO-ENTMCNC: 32.8 G/DL (ref 31–36)
MCV RBC AUTO: 87 FL (ref 80–100)
MCV RBC AUTO: 87.1 FL (ref 80–100)
MONOCYTES ABSOLUTE: 0.7 K/UL (ref 0–1.3)
MONOCYTES RELATIVE PERCENT: 9 %
NEUTROPHILS ABSOLUTE: 6.3 K/UL (ref 1.7–7.7)
NEUTROPHILS RELATIVE PERCENT: 78.3 %
PDW BLD-RTO: 13.8 % (ref 12.4–15.4)
PDW BLD-RTO: 14 % (ref 12.4–15.4)
PERFORMED ON: ABNORMAL
PLATELET # BLD: 213 K/UL (ref 135–450)
PLATELET # BLD: 242 K/UL (ref 135–450)
PMV BLD AUTO: 8 FL (ref 5–10.5)
PMV BLD AUTO: 8.4 FL (ref 5–10.5)
POTASSIUM REFLEX MAGNESIUM: 4.5 MMOL/L (ref 3.5–5.1)
RBC # BLD: 4.71 M/UL (ref 4.2–5.9)
RBC # BLD: 4.97 M/UL (ref 4.2–5.9)
SODIUM BLD-SCNC: 139 MMOL/L (ref 136–145)
TOTAL PROTEIN: 7.5 G/DL (ref 6.4–8.2)
TROPONIN: 0.03 NG/ML
TROPONIN: 0.05 NG/ML
WBC # BLD: 6.6 K/UL (ref 4–11)
WBC # BLD: 8 K/UL (ref 4–11)

## 2020-01-04 PROCEDURE — 99285 EMERGENCY DEPT VISIT HI MDM: CPT

## 2020-01-04 PROCEDURE — 94640 AIRWAY INHALATION TREATMENT: CPT

## 2020-01-04 PROCEDURE — 85730 THROMBOPLASTIN TIME PARTIAL: CPT

## 2020-01-04 PROCEDURE — 6370000000 HC RX 637 (ALT 250 FOR IP): Performed by: HOSPITALIST

## 2020-01-04 PROCEDURE — 84484 ASSAY OF TROPONIN QUANT: CPT

## 2020-01-04 PROCEDURE — 71046 X-RAY EXAM CHEST 2 VIEWS: CPT

## 2020-01-04 PROCEDURE — 71045 X-RAY EXAM CHEST 1 VIEW: CPT

## 2020-01-04 PROCEDURE — 85027 COMPLETE CBC AUTOMATED: CPT

## 2020-01-04 PROCEDURE — 96365 THER/PROPH/DIAG IV INF INIT: CPT

## 2020-01-04 PROCEDURE — 6370000000 HC RX 637 (ALT 250 FOR IP): Performed by: NURSE PRACTITIONER

## 2020-01-04 PROCEDURE — 2060000000 HC ICU INTERMEDIATE R&B

## 2020-01-04 PROCEDURE — 94761 N-INVAS EAR/PLS OXIMETRY MLT: CPT

## 2020-01-04 PROCEDURE — 96375 TX/PRO/DX INJ NEW DRUG ADDON: CPT

## 2020-01-04 PROCEDURE — 96366 THER/PROPH/DIAG IV INF ADDON: CPT

## 2020-01-04 PROCEDURE — 93005 ELECTROCARDIOGRAM TRACING: CPT | Performed by: EMERGENCY MEDICINE

## 2020-01-04 PROCEDURE — 6360000002 HC RX W HCPCS: Performed by: EMERGENCY MEDICINE

## 2020-01-04 PROCEDURE — 6370000000 HC RX 637 (ALT 250 FOR IP): Performed by: EMERGENCY MEDICINE

## 2020-01-04 PROCEDURE — 85025 COMPLETE CBC W/AUTO DIFF WBC: CPT

## 2020-01-04 PROCEDURE — 36415 COLL VENOUS BLD VENIPUNCTURE: CPT

## 2020-01-04 PROCEDURE — 80053 COMPREHEN METABOLIC PANEL: CPT

## 2020-01-04 RX ORDER — HYDRALAZINE HYDROCHLORIDE 25 MG/1
25 TABLET, FILM COATED ORAL EVERY 8 HOURS SCHEDULED
Status: DISCONTINUED | OUTPATIENT
Start: 2020-01-05 | End: 2020-01-06

## 2020-01-04 RX ORDER — THIAMINE MONONITRATE (VIT B1) 100 MG
100 TABLET ORAL DAILY
Status: DISCONTINUED | OUTPATIENT
Start: 2020-01-05 | End: 2020-01-05

## 2020-01-04 RX ORDER — SODIUM CHLORIDE 0.9 % (FLUSH) 0.9 %
10 SYRINGE (ML) INJECTION EVERY 12 HOURS SCHEDULED
Status: DISCONTINUED | OUTPATIENT
Start: 2020-01-04 | End: 2020-01-10 | Stop reason: HOSPADM

## 2020-01-04 RX ORDER — HEPARIN SODIUM 1000 [USP'U]/ML
60 INJECTION, SOLUTION INTRAVENOUS; SUBCUTANEOUS ONCE
Status: DISCONTINUED | OUTPATIENT
Start: 2020-01-04 | End: 2020-01-04 | Stop reason: SDUPTHER

## 2020-01-04 RX ORDER — FENOFIBRATE 54 MG/1
54 TABLET ORAL DAILY
Status: DISCONTINUED | OUTPATIENT
Start: 2020-01-05 | End: 2020-01-10 | Stop reason: HOSPADM

## 2020-01-04 RX ORDER — AMITRIPTYLINE HYDROCHLORIDE 50 MG/1
50 TABLET, FILM COATED ORAL 2 TIMES DAILY
COMMUNITY
End: 2020-08-11 | Stop reason: SDUPTHER

## 2020-01-04 RX ORDER — CLONIDINE HYDROCHLORIDE 0.1 MG/1
0.2 TABLET ORAL 2 TIMES DAILY
Status: DISCONTINUED | OUTPATIENT
Start: 2020-01-04 | End: 2020-01-05

## 2020-01-04 RX ORDER — MIRTAZAPINE 15 MG/1
15 TABLET, FILM COATED ORAL NIGHTLY
Status: DISCONTINUED | OUTPATIENT
Start: 2020-01-04 | End: 2020-01-05

## 2020-01-04 RX ORDER — SERTRALINE HYDROCHLORIDE 100 MG/1
100 TABLET, FILM COATED ORAL NIGHTLY
Status: DISCONTINUED | OUTPATIENT
Start: 2020-01-05 | End: 2020-01-08

## 2020-01-04 RX ORDER — ISOSORBIDE MONONITRATE 30 MG/1
30 TABLET, EXTENDED RELEASE ORAL EVERY EVENING
Status: DISCONTINUED | OUTPATIENT
Start: 2020-01-05 | End: 2020-01-10 | Stop reason: HOSPADM

## 2020-01-04 RX ORDER — ONDANSETRON 2 MG/ML
4 INJECTION INTRAMUSCULAR; INTRAVENOUS EVERY 6 HOURS PRN
Status: DISCONTINUED | OUTPATIENT
Start: 2020-01-04 | End: 2020-01-10 | Stop reason: HOSPADM

## 2020-01-04 RX ORDER — CARVEDILOL 25 MG/1
25 TABLET ORAL 2 TIMES DAILY WITH MEALS
Status: DISCONTINUED | OUTPATIENT
Start: 2020-01-05 | End: 2020-01-09

## 2020-01-04 RX ORDER — ZOLPIDEM TARTRATE 5 MG/1
10 TABLET ORAL NIGHTLY PRN
Status: DISCONTINUED | OUTPATIENT
Start: 2020-01-05 | End: 2020-01-04

## 2020-01-04 RX ORDER — HEPARIN SODIUM 1000 [USP'U]/ML
2000 INJECTION, SOLUTION INTRAVENOUS; SUBCUTANEOUS PRN
Status: DISCONTINUED | OUTPATIENT
Start: 2020-01-04 | End: 2020-01-04

## 2020-01-04 RX ORDER — NICOTINE POLACRILEX 4 MG
15 LOZENGE BUCCAL PRN
Status: DISCONTINUED | OUTPATIENT
Start: 2020-01-04 | End: 2020-01-10 | Stop reason: HOSPADM

## 2020-01-04 RX ORDER — NIFEDIPINE 90 MG/1
90 TABLET, EXTENDED RELEASE ORAL DAILY
Status: DISCONTINUED | OUTPATIENT
Start: 2020-01-05 | End: 2020-01-08

## 2020-01-04 RX ORDER — ACETAMINOPHEN 325 MG/1
650 TABLET ORAL EVERY 4 HOURS PRN
Status: DISCONTINUED | OUTPATIENT
Start: 2020-01-04 | End: 2020-01-10 | Stop reason: HOSPADM

## 2020-01-04 RX ORDER — ATORVASTATIN CALCIUM 40 MG/1
40 TABLET, FILM COATED ORAL DAILY
Status: DISCONTINUED | OUTPATIENT
Start: 2020-01-04 | End: 2020-01-04 | Stop reason: SDUPTHER

## 2020-01-04 RX ORDER — CHOLECALCIFEROL (VITAMIN D3) 125 MCG
500 CAPSULE ORAL DAILY
Status: DISCONTINUED | OUTPATIENT
Start: 2020-01-05 | End: 2020-01-10 | Stop reason: HOSPADM

## 2020-01-04 RX ORDER — ZOLPIDEM TARTRATE 5 MG/1
10 TABLET ORAL NIGHTLY
Status: DISCONTINUED | OUTPATIENT
Start: 2020-01-04 | End: 2020-01-04

## 2020-01-04 RX ORDER — SPIRONOLACTONE 25 MG/1
25 TABLET ORAL DAILY
Status: DISCONTINUED | OUTPATIENT
Start: 2020-01-04 | End: 2020-01-05

## 2020-01-04 RX ORDER — HEPARIN SODIUM 1000 [USP'U]/ML
4000 INJECTION, SOLUTION INTRAVENOUS; SUBCUTANEOUS PRN
Status: DISCONTINUED | OUTPATIENT
Start: 2020-01-04 | End: 2020-01-04

## 2020-01-04 RX ORDER — HEPARIN SODIUM 1000 [USP'U]/ML
30 INJECTION, SOLUTION INTRAVENOUS; SUBCUTANEOUS PRN
Status: DISCONTINUED | OUTPATIENT
Start: 2020-01-04 | End: 2020-01-04 | Stop reason: SDUPTHER

## 2020-01-04 RX ORDER — VITAMIN B COMPLEX
1000 TABLET ORAL DAILY
Status: DISCONTINUED | OUTPATIENT
Start: 2020-01-05 | End: 2020-01-10 | Stop reason: HOSPADM

## 2020-01-04 RX ORDER — TORSEMIDE 20 MG/1
10 TABLET ORAL DAILY
Status: DISCONTINUED | OUTPATIENT
Start: 2020-01-04 | End: 2020-01-05

## 2020-01-04 RX ORDER — SODIUM CHLORIDE 0.9 % (FLUSH) 0.9 %
10 SYRINGE (ML) INJECTION PRN
Status: DISCONTINUED | OUTPATIENT
Start: 2020-01-04 | End: 2020-01-10 | Stop reason: HOSPADM

## 2020-01-04 RX ORDER — ASPIRIN 81 MG/1
81 TABLET, CHEWABLE ORAL DAILY
Status: DISCONTINUED | OUTPATIENT
Start: 2020-01-05 | End: 2020-01-10 | Stop reason: HOSPADM

## 2020-01-04 RX ORDER — ALBUTEROL SULFATE 90 UG/1
2 AEROSOL, METERED RESPIRATORY (INHALATION) EVERY 4 HOURS PRN
Status: DISCONTINUED | OUTPATIENT
Start: 2020-01-04 | End: 2020-01-10 | Stop reason: HOSPADM

## 2020-01-04 RX ORDER — LORAZEPAM 2 MG/ML
1 INJECTION INTRAMUSCULAR ONCE
Status: COMPLETED | OUTPATIENT
Start: 2020-01-04 | End: 2020-01-04

## 2020-01-04 RX ORDER — ZOLPIDEM TARTRATE 5 MG/1
10 TABLET ORAL NIGHTLY PRN
Status: DISCONTINUED | OUTPATIENT
Start: 2020-01-04 | End: 2020-01-10 | Stop reason: HOSPADM

## 2020-01-04 RX ORDER — DEXTROSE MONOHYDRATE 25 G/50ML
12.5 INJECTION, SOLUTION INTRAVENOUS PRN
Status: DISCONTINUED | OUTPATIENT
Start: 2020-01-04 | End: 2020-01-10 | Stop reason: HOSPADM

## 2020-01-04 RX ORDER — DEXTROSE MONOHYDRATE 50 MG/ML
100 INJECTION, SOLUTION INTRAVENOUS PRN
Status: DISCONTINUED | OUTPATIENT
Start: 2020-01-04 | End: 2020-01-10 | Stop reason: HOSPADM

## 2020-01-04 RX ORDER — ATORVASTATIN CALCIUM 40 MG/1
40 TABLET, FILM COATED ORAL NIGHTLY
Status: DISCONTINUED | OUTPATIENT
Start: 2020-01-04 | End: 2020-01-10 | Stop reason: HOSPADM

## 2020-01-04 RX ORDER — MORPHINE SULFATE 4 MG/ML
4 INJECTION, SOLUTION INTRAMUSCULAR; INTRAVENOUS ONCE
Status: COMPLETED | OUTPATIENT
Start: 2020-01-04 | End: 2020-01-04

## 2020-01-04 RX ORDER — HEPARIN SODIUM 10000 [USP'U]/100ML
12 INJECTION, SOLUTION INTRAVENOUS CONTINUOUS
Status: DISCONTINUED | OUTPATIENT
Start: 2020-01-04 | End: 2020-01-04 | Stop reason: SDUPTHER

## 2020-01-04 RX ORDER — LISINOPRIL AND HYDROCHLOROTHIAZIDE 25; 20 MG/1; MG/1
1 TABLET ORAL DAILY
Status: DISCONTINUED | OUTPATIENT
Start: 2020-01-04 | End: 2020-01-05

## 2020-01-04 RX ORDER — HEPARIN SODIUM 1000 [USP'U]/ML
60 INJECTION, SOLUTION INTRAVENOUS; SUBCUTANEOUS PRN
Status: DISCONTINUED | OUTPATIENT
Start: 2020-01-04 | End: 2020-01-04 | Stop reason: SDUPTHER

## 2020-01-04 RX ORDER — HEPARIN SODIUM 1000 [USP'U]/ML
4000 INJECTION, SOLUTION INTRAVENOUS; SUBCUTANEOUS ONCE
Status: COMPLETED | OUTPATIENT
Start: 2020-01-04 | End: 2020-01-04

## 2020-01-04 RX ORDER — HEPARIN SODIUM 10000 [USP'U]/100ML
14.3 INJECTION, SOLUTION INTRAVENOUS CONTINUOUS
Status: DISCONTINUED | OUTPATIENT
Start: 2020-01-04 | End: 2020-01-05

## 2020-01-04 RX ORDER — ASPIRIN 81 MG/1
324 TABLET, CHEWABLE ORAL ONCE
Status: COMPLETED | OUTPATIENT
Start: 2020-01-04 | End: 2020-01-04

## 2020-01-04 RX ADMIN — MORPHINE SULFATE 4 MG: 4 INJECTION, SOLUTION INTRAMUSCULAR; INTRAVENOUS at 17:32

## 2020-01-04 RX ADMIN — Medication 2 PUFF: at 20:37

## 2020-01-04 RX ADMIN — ASPIRIN 81 MG 324 MG: 81 TABLET ORAL at 17:31

## 2020-01-04 RX ADMIN — HEPARIN SODIUM 10 ML/HR: 10000 INJECTION, SOLUTION INTRAVENOUS at 18:02

## 2020-01-04 RX ADMIN — ZOLPIDEM TARTRATE 10 MG: 5 TABLET ORAL at 23:18

## 2020-01-04 RX ADMIN — LORAZEPAM 1 MG: 2 INJECTION INTRAMUSCULAR; INTRAVENOUS at 17:34

## 2020-01-04 RX ADMIN — INSULIN LISPRO 2 UNITS: 100 INJECTION, SOLUTION INTRAVENOUS; SUBCUTANEOUS at 22:43

## 2020-01-04 RX ADMIN — NITROGLYCERIN 1 INCH: 20 OINTMENT TOPICAL at 17:33

## 2020-01-04 RX ADMIN — ACETAMINOPHEN 650 MG: 325 TABLET ORAL at 21:18

## 2020-01-04 RX ADMIN — HEPARIN SODIUM 4000 UNITS: 1000 INJECTION INTRAVENOUS; SUBCUTANEOUS at 18:01

## 2020-01-04 RX ADMIN — ATORVASTATIN CALCIUM 40 MG: 40 TABLET, FILM COATED ORAL at 22:43

## 2020-01-04 ASSESSMENT — PAIN DESCRIPTION - ORIENTATION
ORIENTATION: LEFT;MID
ORIENTATION: MID;LEFT

## 2020-01-04 ASSESSMENT — PAIN SCALES - GENERAL
PAINLEVEL_OUTOF10: 7
PAINLEVEL_OUTOF10: 7
PAINLEVEL_OUTOF10: 0
PAINLEVEL_OUTOF10: 7
PAINLEVEL_OUTOF10: 0
PAINLEVEL_OUTOF10: 6

## 2020-01-04 ASSESSMENT — ENCOUNTER SYMPTOMS
VOMITING: 0
SHORTNESS OF BREATH: 0
ABDOMINAL PAIN: 0
CHEST TIGHTNESS: 1
COUGH: 0
NAUSEA: 1

## 2020-01-04 ASSESSMENT — PAIN DESCRIPTION - PROGRESSION
CLINICAL_PROGRESSION: GRADUALLY WORSENING
CLINICAL_PROGRESSION: GRADUALLY WORSENING
CLINICAL_PROGRESSION: NOT CHANGED
CLINICAL_PROGRESSION: GRADUALLY WORSENING

## 2020-01-04 ASSESSMENT — PAIN DESCRIPTION - LOCATION
LOCATION: CHEST

## 2020-01-04 ASSESSMENT — PAIN - FUNCTIONAL ASSESSMENT
PAIN_FUNCTIONAL_ASSESSMENT: ACTIVITIES ARE NOT PREVENTED
PAIN_FUNCTIONAL_ASSESSMENT: ACTIVITIES ARE NOT PREVENTED
PAIN_FUNCTIONAL_ASSESSMENT: PREVENTS OR INTERFERES SOME ACTIVE ACTIVITIES AND ADLS
PAIN_FUNCTIONAL_ASSESSMENT: ACTIVITIES ARE NOT PREVENTED

## 2020-01-04 ASSESSMENT — PAIN DESCRIPTION - ONSET
ONSET: ON-GOING

## 2020-01-04 ASSESSMENT — PAIN DESCRIPTION - DESCRIPTORS
DESCRIPTORS: POUNDING;PRESSURE
DESCRIPTORS: POUNDING;PRESSURE
DESCRIPTORS: POUNDING;TIGHTNESS

## 2020-01-04 ASSESSMENT — PAIN DESCRIPTION - PAIN TYPE
TYPE: ACUTE PAIN

## 2020-01-04 ASSESSMENT — HEART SCORE: ECG: 1

## 2020-01-04 ASSESSMENT — PAIN DESCRIPTION - FREQUENCY
FREQUENCY: INTERMITTENT
FREQUENCY: CONTINUOUS
FREQUENCY: INTERMITTENT
FREQUENCY: INTERMITTENT

## 2020-01-04 NOTE — ED PROVIDER NOTES
Former Smoker     Packs/day: 2.50     Years: 40.00     Pack years: 100.00     Types: Cigarettes    Smokeless tobacco: Never Used   Substance Use Topics    Alcohol use: Never     Frequency: Never    Drug use: No       SCREENINGS             PHYSICAL EXAM    (up to 7 for level 4, 8 or more for level 5)     ED Triage Vitals [01/04/20 1517]   BP Temp Temp Source Pulse Resp SpO2 Height Weight   (!) 172/95 97 °F (36.1 °C) Oral 118 22 93 % 5' 11\" (1.803 m) 238 lb 15.7 oz (108.4 kg)       Physical Exam  Vitals signs and nursing note reviewed. Constitutional:       General: He is not in acute distress. Appearance: He is well-developed. He is obese. He is not ill-appearing, toxic-appearing or diaphoretic. HENT:      Head: Normocephalic and atraumatic. Mouth/Throat:      Mouth: Mucous membranes are moist.      Pharynx: Oropharynx is clear. Eyes:      Conjunctiva/sclera: Conjunctivae normal.      Pupils: Pupils are equal, round, and reactive to light. Cardiovascular:      Rate and Rhythm: Regular rhythm. Tachycardia present. Pulmonary:      Effort: Pulmonary effort is normal. No respiratory distress. Abdominal:      General: Bowel sounds are normal. There is no distension. Palpations: Abdomen is soft. Skin:     General: Skin is warm and dry. Neurological:      Mental Status: He is alert and oriented to person, place, and time. Psychiatric:         Behavior: Behavior normal. Behavior is cooperative. Thought Content:  Thought content normal.         DIAGNOSTIC RESULTS   LABS:    Labs Reviewed   COMPREHENSIVE METABOLIC PANEL W/ REFLEX TO MG FOR LOW K - Abnormal; Notable for the following components:       Result Value    Chloride 95 (*)     Anion Gap 22 (*)     Glucose 192 (*)     BUN 22 (*)     CREATININE 1.4 (*)     GFR Non- 51 (*)     All other components within normal limits    Narrative:     Performed at:  [facility]  [address] [place] [address] Stacey Bliss  [address] [place] [address]   Phone [phone]   TROPONIN - Abnormal; Notable for the following components:    Troponin 0.03 (*)     All other components within normal limits    Narrative:     Performed at:  [facility]  [address] [place], [place] [address]   Phone [phone]   CBC WITH AUTO DIFFERENTIAL    Narrative:     Performed at:  [facility]  [address] [place], [place] [address]   Phone [phone]   APTT    Narrative:     Performed at:  [facility]  [address] [place], [place] [address]   Phone [phone]   APTT   APTT   APTT       All other labs were within normal range or not returned as of this dictation. EKG: All EKG's are interpreted by the Emergency Department Physician in the absence of a cardiologist.  Please see their note for interpretation of EKG. RADIOLOGY:   Non-plain film images such as CT, Ultrasound and MRI are read by the radiologist. Plain radiographic images are visualized and preliminarily interpreted by the  ED Provider with the below findings:        Interpretation per the Radiologist below, if available at the time of this note:    XR CHEST STANDARD (2 VW)   Final Result   No acute abnormality detected. Xr Chest Standard (2 Vw)    Result Date: 1/4/2020  EXAMINATION: TWO XRAY VIEWS OF THE CHEST 1/4/2020 12:22 pm COMPARISON: 08/18/2019 HISTORY: ORDERING SYSTEM PROVIDED HISTORY: CP TECHNOLOGIST PROVIDED HISTORY: Reason for exam:->CP Reason for Exam: chest pain Acuity: Acute Type of Exam: Initial FINDINGS: Redemonstration of right hemidiaphragmatic elevation. No acute focal infiltrate. Heart and mediastinal contours are unremarkable. No pneumothorax. No free air. No acute bony abnormality. Flowing osteophytosis throughout the thoracic spine is seen. No acute abnormality detected.            PROCEDURES   Unless otherwise noted below, none     Procedures    CRITICAL CARE TIME   N/A    CONSULTS:  None      EMERGENCY DEPARTMENT COURSE and DIFFERENTIAL DIAGNOSIS/MDM:   Vitals:    Vitals:    01/04/20 1641 01/04/20 1731 01/04/20 1803 01/04/20 1807   BP: (!) 147/83 134/78 124/78    Pulse: 114 115 113 112   Resp: 20 26 24 23   Temp:       TempSrc:       SpO2: 96% 97% 96% 96%   Weight:       Height:           Patient was given thefollowing medications:  Medications   heparin 25,000 units in dextrose 5% 250 mL infusion (10 mL/hr Intravenous New Bag 1/4/20 1802)   heparin (porcine) injection 4,000 Units (4,000 Units Intravenous Given 1/4/20 1801)   nitroglycerin (NITRO-BID) 2 % ointment 1 inch (1 inch Topical Given 1/4/20 1733)   morphine (PF) injection 4 mg (4 mg Intravenous Given 1/4/20 1732)   LORazepam (ATIVAN) injection 1 mg (1 mg Intravenous Given 1/4/20 1734)   aspirin chewable tablet 324 mg (324 mg Oral Given 1/4/20 1731)       ED COURSE & MEDICAL DECISION MAKING    Pertinent Labs & Imaging studies reviewed. (See chart for details)   -  Patient seen and evaluated in the emergency department. -  Triage and nursing notes reviewed and incorporated. -  Old chart records reviewed and incorporated. -  Patient case discussed with attending physician, Dr nAu Ferrer. They saw and examined patient. -  Differential diagnosis includes: acute coronary syndrome, pulmonary embolism, COPD/asthma, pneumonia, musculoskeletal, reflux/PUD/gastritis, pneumothorax, CHF, thoracic aortic dissection, anxiety  -  Work-up included:  See above  -  ED treatment included:   Aspirin, Ativan, morphine, nitroglycerin, heparin  - Consults: Hospitalist for admission  -  Results discussed with patient. Labs show no leukocytosis or concerning anemia, his comprehensive metabolic panel shows a BUN of 22, creatinine of 1.4, GFR 51. Patient does have a history of chronic kidney disease. His troponin is 0.03. Imaging studies show no acute cardiopulmonary process.  Please see

## 2020-01-04 NOTE — PROGRESS NOTES
Clinical Pharmacy Note  Heparin Dosing Consult    Stormy Chavarria is a 59 y.o. male ordered heparin per low dose nomogram by Dr. Kamryn Zurita. Lab Results   Component Value Date    APTT 35.3 01/04/2020     Lab Results   Component Value Date    HGB 14.2 01/04/2020    HCT 43.3 01/04/2020     01/04/2020    INR 0.94 08/18/2019       Ht Readings from Last 1 Encounters:   01/04/20 5' 11\" (1.803 m)        Wt Readings from Last 1 Encounters:   01/04/20 238 lb 15.7 oz (108.4 kg)     Dosing weight: 108 kg    Assessment/Plan:  Initial bolus: 4000 units  Initial infusion rate: 10 mL/hr  Next aPTT: 01/05/20 0000    Pharmacy will continue to monitor adjust heparin based on aPTT results using nomogram below:     LOW DOSE HEPARIN PROTOCOL (ACS/STEMI/A FIB)     Initial Bolus: 60 units/kg Max Bolus: 4,000 units       Initial Rate: 12 units/kg/hr Max Initial Rate: 1,000 units/hr     aPTT < 36   Heparin 60 units/kg bolus Increase infusion by 4 units/kg/hr       (maximum 4,000 units)   aPTT 37-48   Heparin 30 units/kg bolus Increase infusion by 2 units/kg/hr       (maximum 2,000 units)   aPTT 49-76   No bolus   No change   aPTT 77-85   No bolus   Decrease infusion by 2 units/kg/hr   aPTT 86-94   Hold heparin for 1 hour Decrease infusion by 3 units/kg/hr   aPTT     Hold heparin for 1 hour Decrease infusion by 4 units/kg/hr   aPTT > 103   Hold heparin for 1 hour Decrease infusion by 6 units/kg/hr    Obtain aPTT 6 hours after initial bolus and 6 hours after any dose change until two consecutive therapeutic aPTTs are achieved - then daily.

## 2020-01-04 NOTE — ED PROVIDER NOTES
I independently performed a history and physical on [employer]. All diagnostic, treatment, and disposition decisions were made by myself in conjunction with the advanced practice provider. For further details of [facility] emergency department encounter, please see BRITANY Perez's documentation. Patient reports developing chest pain after using cocaine today. He has a history of coronary disease with stents and his most recent stenting was 5 years ago while in [place]. He states the pain continues and is mild to moderate intensity in the center of his chest.  He also complains of some slight shortness of breath and diaphoresis. On exam, patient's heart is tachycardic but regular and lungs clear to auscultation bilaterally. Abdomen benign. EKG  The Ekg interpreted by me shows  sinus tachycardia, [id number]  Axis is   Normal  QTc is  normal  Intervals and Durations are unremarkable. ST Segments: Multiple leads with slight ST depression  No significant change from prior EKG dated 7 dec 2019    The total Critical Care time is 30 minutes which excludes separately billable procedures. The critical care was concerning treatment with morphine, nitroglycerin, aspirin and Lovenox for possible ACS. This time is exclusive of any time documented by any other providers.           Jose Alberto Melton MD  01/04/20 9846

## 2020-01-05 LAB
APTT: 35.6 SEC (ref 24.2–36.2)
APTT: 66.1 SEC (ref 24.2–36.2)
APTT: 92 SEC (ref 24.2–36.2)
D DIMER: <200 NG/ML DDU (ref 0–229)
EKG ATRIAL RATE: 121 BPM
EKG DIAGNOSIS: NORMAL
EKG P AXIS: 34 DEGREES
EKG P-R INTERVAL: 168 MS
EKG Q-T INTERVAL: 432 MS
EKG QRS DURATION: 90 MS
EKG QTC CALCULATION (BAZETT): 613 MS
EKG R AXIS: 82 DEGREES
EKG T AXIS: 38 DEGREES
EKG VENTRICULAR RATE: 121 BPM
GLUCOSE BLD-MCNC: 148 MG/DL (ref 70–99)
GLUCOSE BLD-MCNC: 152 MG/DL (ref 70–99)
GLUCOSE BLD-MCNC: 165 MG/DL (ref 70–99)
GLUCOSE BLD-MCNC: 168 MG/DL (ref 70–99)
HCT VFR BLD CALC: 38.3 % (ref 40.5–52.5)
HEMOGLOBIN: 12.5 G/DL (ref 13.5–17.5)
MCH RBC QN AUTO: 28.5 PG (ref 26–34)
MCHC RBC AUTO-ENTMCNC: 32.5 G/DL (ref 31–36)
MCV RBC AUTO: 87.7 FL (ref 80–100)
PDW BLD-RTO: 14 % (ref 12.4–15.4)
PERFORMED ON: ABNORMAL
PLATELET # BLD: 187 K/UL (ref 135–450)
PMV BLD AUTO: 8.5 FL (ref 5–10.5)
PRO-BNP: 195 PG/ML (ref 0–124)
RBC # BLD: 4.37 M/UL (ref 4.2–5.9)
REASON FOR REJECTION: NORMAL
REJECTED TEST: NORMAL
TROPONIN: 0.03 NG/ML
WBC # BLD: 5.3 K/UL (ref 4–11)

## 2020-01-05 PROCEDURE — 94640 AIRWAY INHALATION TREATMENT: CPT

## 2020-01-05 PROCEDURE — 36415 COLL VENOUS BLD VENIPUNCTURE: CPT

## 2020-01-05 PROCEDURE — 6360000002 HC RX W HCPCS: Performed by: HOSPITALIST

## 2020-01-05 PROCEDURE — 85027 COMPLETE CBC AUTOMATED: CPT

## 2020-01-05 PROCEDURE — 93010 ELECTROCARDIOGRAM REPORT: CPT | Performed by: INTERNAL MEDICINE

## 2020-01-05 PROCEDURE — 6370000000 HC RX 637 (ALT 250 FOR IP): Performed by: HOSPITALIST

## 2020-01-05 PROCEDURE — 85730 THROMBOPLASTIN TIME PARTIAL: CPT

## 2020-01-05 PROCEDURE — 2580000003 HC RX 258: Performed by: NURSE PRACTITIONER

## 2020-01-05 PROCEDURE — 6370000000 HC RX 637 (ALT 250 FOR IP): Performed by: NURSE PRACTITIONER

## 2020-01-05 PROCEDURE — 84484 ASSAY OF TROPONIN QUANT: CPT

## 2020-01-05 PROCEDURE — 99223 1ST HOSP IP/OBS HIGH 75: CPT | Performed by: INTERNAL MEDICINE

## 2020-01-05 PROCEDURE — 83880 ASSAY OF NATRIURETIC PEPTIDE: CPT

## 2020-01-05 PROCEDURE — 2060000000 HC ICU INTERMEDIATE R&B

## 2020-01-05 PROCEDURE — 2580000003 HC RX 258: Performed by: HOSPITALIST

## 2020-01-05 PROCEDURE — 85379 FIBRIN DEGRADATION QUANT: CPT

## 2020-01-05 PROCEDURE — 94760 N-INVAS EAR/PLS OXIMETRY 1: CPT

## 2020-01-05 PROCEDURE — 6360000002 HC RX W HCPCS: Performed by: EMERGENCY MEDICINE

## 2020-01-05 RX ORDER — LISINOPRIL 40 MG/1
40 TABLET ORAL DAILY
Status: DISCONTINUED | OUTPATIENT
Start: 2020-01-05 | End: 2020-01-10 | Stop reason: HOSPADM

## 2020-01-05 RX ORDER — DIAZEPAM 5 MG/1
5 TABLET ORAL EVERY 6 HOURS PRN
Status: DISCONTINUED | OUTPATIENT
Start: 2020-01-05 | End: 2020-01-10 | Stop reason: HOSPADM

## 2020-01-05 RX ORDER — LISINOPRIL 40 MG/1
40 TABLET ORAL DAILY
Status: ON HOLD | COMMUNITY
End: 2020-03-23 | Stop reason: HOSPADM

## 2020-01-05 RX ORDER — NITROGLYCERIN 0.4 MG/1
0.4 TABLET SUBLINGUAL EVERY 5 MIN PRN
Status: COMPLETED | OUTPATIENT
Start: 2020-01-05 | End: 2020-01-05

## 2020-01-05 RX ORDER — AMITRIPTYLINE HYDROCHLORIDE 50 MG/1
50 TABLET, FILM COATED ORAL 2 TIMES DAILY
Status: DISCONTINUED | OUTPATIENT
Start: 2020-01-05 | End: 2020-01-07

## 2020-01-05 RX ORDER — METOPROLOL TARTRATE 100 MG/1
100 TABLET ORAL 2 TIMES DAILY
Status: ON HOLD | COMMUNITY
End: 2020-01-10 | Stop reason: HOSPADM

## 2020-01-05 RX ORDER — SODIUM CHLORIDE 9 MG/ML
INJECTION, SOLUTION INTRAVENOUS CONTINUOUS
Status: DISCONTINUED | OUTPATIENT
Start: 2020-01-05 | End: 2020-01-05

## 2020-01-05 RX ORDER — HEPARIN SODIUM 1000 [USP'U]/ML
4000 INJECTION, SOLUTION INTRAVENOUS; SUBCUTANEOUS ONCE
Status: COMPLETED | OUTPATIENT
Start: 2020-01-05 | End: 2020-01-05

## 2020-01-05 RX ADMIN — INSULIN LISPRO 2 UNITS: 100 INJECTION, SOLUTION INTRAVENOUS; SUBCUTANEOUS at 17:44

## 2020-01-05 RX ADMIN — Medication 500 MCG: at 09:06

## 2020-01-05 RX ADMIN — LISINOPRIL 40 MG: 40 TABLET ORAL at 11:48

## 2020-01-05 RX ADMIN — CARVEDILOL 25 MG: 25 TABLET, FILM COATED ORAL at 11:48

## 2020-01-05 RX ADMIN — SODIUM CHLORIDE, PRESERVATIVE FREE 10 ML: 5 INJECTION INTRAVENOUS at 09:07

## 2020-01-05 RX ADMIN — HYDRALAZINE HYDROCHLORIDE 25 MG: 25 TABLET, FILM COATED ORAL at 20:50

## 2020-01-05 RX ADMIN — AMITRIPTYLINE HYDROCHLORIDE 50 MG: 50 TABLET, FILM COATED ORAL at 20:50

## 2020-01-05 RX ADMIN — CARVEDILOL 25 MG: 25 TABLET, FILM COATED ORAL at 17:44

## 2020-01-05 RX ADMIN — ATORVASTATIN CALCIUM 40 MG: 40 TABLET, FILM COATED ORAL at 20:50

## 2020-01-05 RX ADMIN — HYDRALAZINE HYDROCHLORIDE 25 MG: 25 TABLET, FILM COATED ORAL at 15:17

## 2020-01-05 RX ADMIN — NIFEDIPINE 90 MG: 90 TABLET, FILM COATED, EXTENDED RELEASE ORAL at 09:07

## 2020-01-05 RX ADMIN — Medication 100 MG: at 09:06

## 2020-01-05 RX ADMIN — ASPIRIN 81 MG 81 MG: 81 TABLET ORAL at 09:07

## 2020-01-05 RX ADMIN — AMITRIPTYLINE HYDROCHLORIDE 50 MG: 50 TABLET, FILM COATED ORAL at 11:48

## 2020-01-05 RX ADMIN — NITROGLYCERIN 0.4 MG: 0.4 TABLET, ORALLY DISINTEGRATING SUBLINGUAL at 00:30

## 2020-01-05 RX ADMIN — MELATONIN 1000 UNITS: at 09:07

## 2020-01-05 RX ADMIN — INSULIN LISPRO 2 UNITS: 100 INJECTION, SOLUTION INTRAVENOUS; SUBCUTANEOUS at 09:08

## 2020-01-05 RX ADMIN — HEPARIN SODIUM 14.3 ML/HR: 10000 INJECTION, SOLUTION INTRAVENOUS at 11:49

## 2020-01-05 RX ADMIN — SERTRALINE 100 MG: 100 TABLET, FILM COATED ORAL at 20:50

## 2020-01-05 RX ADMIN — NITROGLYCERIN 0.4 MG: 0.4 TABLET, ORALLY DISINTEGRATING SUBLINGUAL at 10:03

## 2020-01-05 RX ADMIN — INSULIN LISPRO 2 UNITS: 100 INJECTION, SOLUTION INTRAVENOUS; SUBCUTANEOUS at 13:06

## 2020-01-05 RX ADMIN — ZOLPIDEM TARTRATE 10 MG: 5 TABLET ORAL at 22:50

## 2020-01-05 RX ADMIN — FENOFIBRATE 54 MG: 54 TABLET ORAL at 15:17

## 2020-01-05 RX ADMIN — NITROGLYCERIN 0.4 MG: 0.4 TABLET, ORALLY DISINTEGRATING SUBLINGUAL at 02:17

## 2020-01-05 RX ADMIN — Medication 2 PUFF: at 20:25

## 2020-01-05 RX ADMIN — INSULIN LISPRO 1 UNITS: 100 INJECTION, SOLUTION INTRAVENOUS; SUBCUTANEOUS at 22:50

## 2020-01-05 RX ADMIN — SODIUM CHLORIDE, PRESERVATIVE FREE 10 ML: 5 INJECTION INTRAVENOUS at 20:50

## 2020-01-05 RX ADMIN — Medication 2 PUFF: at 08:59

## 2020-01-05 RX ADMIN — SODIUM CHLORIDE: 9 INJECTION, SOLUTION INTRAVENOUS at 11:48

## 2020-01-05 RX ADMIN — DIAZEPAM 5 MG: 5 TABLET ORAL at 11:49

## 2020-01-05 RX ADMIN — ISOSORBIDE MONONITRATE 30 MG: 30 TABLET, EXTENDED RELEASE ORAL at 11:48

## 2020-01-05 RX ADMIN — HEPARIN SODIUM 4000 UNITS: 1000 INJECTION INTRAVENOUS; SUBCUTANEOUS at 02:14

## 2020-01-05 ASSESSMENT — PAIN DESCRIPTION - ONSET
ONSET: ON-GOING

## 2020-01-05 ASSESSMENT — PAIN DESCRIPTION - PAIN TYPE
TYPE: ACUTE PAIN

## 2020-01-05 ASSESSMENT — PAIN DESCRIPTION - ORIENTATION
ORIENTATION: MID;LEFT

## 2020-01-05 ASSESSMENT — PAIN DESCRIPTION - DESCRIPTORS
DESCRIPTORS: POUNDING;PRESSURE

## 2020-01-05 ASSESSMENT — PAIN DESCRIPTION - FREQUENCY
FREQUENCY: CONTINUOUS
FREQUENCY: INTERMITTENT

## 2020-01-05 ASSESSMENT — PAIN DESCRIPTION - LOCATION
LOCATION: CHEST

## 2020-01-05 ASSESSMENT — PAIN - FUNCTIONAL ASSESSMENT
PAIN_FUNCTIONAL_ASSESSMENT: ACTIVITIES ARE NOT PREVENTED

## 2020-01-05 ASSESSMENT — PAIN DESCRIPTION - PROGRESSION
CLINICAL_PROGRESSION: NOT CHANGED
CLINICAL_PROGRESSION: GRADUALLY IMPROVING
CLINICAL_PROGRESSION: NOT CHANGED
CLINICAL_PROGRESSION: GRADUALLY IMPROVING

## 2020-01-05 ASSESSMENT — PAIN SCALES - GENERAL
PAINLEVEL_OUTOF10: 3
PAINLEVEL_OUTOF10: 7
PAINLEVEL_OUTOF10: 6
PAINLEVEL_OUTOF10: 6

## 2020-01-05 NOTE — PROGRESS NOTES
Hospital Medicine Progress Note      Admit Date: 1/4/2020         Overnight Events: No    CC: F/U for chest pain    HPI: The patient is a 59 yrs old male, with a past medical history significant for CAD, COPD, hypertension, hyperlipidemia, diabetes type 2, sleep apnea- does not use CPAP, who presented to [facility] ER for complaints of chest pain/pressure. The patient had apparently been out with some of his friends on Saturday. The had been drinking alcohol when 1 of his friends reportedly had either cocaine or crack cocaine. According to the patient this is the first time he has ever tried cocaine. He immediately noticed that his heart began to race very fast and he was having terrible palpitations. In addition to this, he was very easily becoming short of breath with any kind of activity. This had initially improved some, then again worsened later. In the emergency department, the patient was noted to have a troponin of 0.03. This trended up to 0.05, then back down to 0.03. He has a cardiac history with multiple stents in place. The patient was admitted for further work-up and treatment. Cardiology was consulted. Medications were reviewed and adjusted. IV fluids were administered, as his creatinine at the time of admission was 1.4 and through chart review it was noted that his baseline is around 1..     Interval History/Subjective: No new complaints, he does note SOB with ambulation and feels that his heart is pounding very hard. The patient is compliant with his BP medications at home. He was out partying with some of his friends the day prior to admission and consuming ETOH - later one of his friends had some crack cocaine and he tried this - reportedly for the first time. Shortly there after he became shaky, nervous and could feel his heart pounding very hard.      Review of Systems:     The patient denied headaches, visual changes, LOC, ABD pain, N/V/D, skin changes, new or worsening weakness or neuromuscular deficits. He does note some MONSALVE and that his CP is worse with ambulation. He feels like his heart is beating very hard. Comprehensive ROS negative except as mentioned above. 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 Past Surgical History:        Procedure Laterality Date    ABDOMEN SURGERY      CARDIAC SURGERY      3 STENTS PLACED     CHOLECYSTECTOMY      COLONOSCOPY      CORONARY ANGIOPLASTY WITH STENT PLACEMENT      3 stents     FRACTURE SURGERY      RIGHT ANKLE    LITHOTRIPSY      SHOULDER ARTHROSCOPY      VASCULAR SURGERY         Allergies:  Naproxen    Past medical and surgical history reviewed. Any changes have been noted. PHYSICAL EXAM:  BP (!) 159/89   Pulse 91   Temp 97.7 °F (36.5 °C) (Oral)   Resp 18   Ht 5' 11\" (1.803 m)   Wt 239 lb 3.2 oz (108.5 kg)   SpO2 96%   BMI 33.36 kg/m²       Intake/Output Summary (Last 24 hours) at 1/5/2020 1002  Last data filed at 1/5/2020 0015  Gross per 24 hour   Intake 960 ml   Output --   Net 960 ml       General: Alert and oriented. Resting in bed in no apparent distress. Pleasant and cooperative. Obese. Does appear to be slightly anxious. HEENT: Normocephalic. Atraumatic. Pupils equal and reactive. EOM intact. Oral mucosa pink/moist/intact. Neck: Supple. Symmetrical. Trachea midline. Lungs: Clear to auscultation bilaterally. Respirations even and unlabored. Chest: Exam unremarkable. Cardiac: S1/S2 noted. Regular Rhythm and rate. Abdomen/GI: Soft. Non-tender. Non-distended. BS+. Extremities: PP+. Atraumatic. No redness/cyanosis/edema noted. Brisk cap refill. Skin: Dry and intact.  No lesions magnesium hydroxide, ondansetron, glucose, dextrose, glucagon (rDNA), dextrose, acetaminophen, zolpidem    Assessment & Plan:        Chest Pain   Began just after using cocaine. Pressure like. Could feel his heart pounding very hard. Hx of stent placement x 3  ASA, Statin. Discussed with pharmacy. Changed his metoprolol to Coreg. Troponin 0.03 -->0.05 --> 0.03   3/23/19 echo reviewed. EF of 55 - 60%. 3/23/19 NM Stress testing - normal study. Has been seen at multiple institutions for CP. Cardiology consulted. Appreciate recs. Heparin gtt due to elevated trop - though at least initially demand ischemia is suspected. Stress testing per cardiology for risk stratification. ALEA  2/2 dehydration  - ETOH surely did not help this. Likely the addition of stimulants did not help this either. IVF for the time being. Avoid nephrotoxic medications. Renally dose medications. Monitor for electrolyte abnormalities. COPD, not currently in exacerbation  Continue current medical regimen  Nebulizer treatments  Supportive therapies  Supplemental oxygen as needed (titrate to SpO2 88-92%)   Monitor for s/s of exacerbation    Cocaine use  The patient's first use of cocaine or crack cocaine. OARRS noted  PRN valium for anxiety initiated. Continue to encourage abstinence from cocaine use    Sleep Apnea  Does not wear CPAP/BiPAP at home. Discussed the importance of this. It sounds like he did not tolerate whichever mask he had  Once this healthcare encounter is over, the patient should revisit the issue with his primary care provider. HTN  Continue current medical regimen  Monitor BP  Titrate medications as needed. HLD  Statin  Monitor for S/S of Rhabdomyolysis       Body mass index is 33.36 kg/m². The patient and / or the family were informed of the results of any tests, a time was given to answer questions, a plan was proposed and they agreed with plan.       DVT prophylaxis: [] Lovenox  [x] Heparin gtt [] SCDs because of  [] warfarin/oral direct thrombin inhibitor [] Encourage ambulation    GI prophylaxis: [] PPI/A6kgootms  [x] not indicated    Probiotic if on abx: [] Yes [] No [x] Not Indicated    Diet: DIET CLEAR LIQUID; No Caffeine    Consults:  IP CONSULT TO CARDIOLOGY      Disposition:  [x] Home  [] Home with home health [] Rehab [] Psych [] SNF  [] LTAC  [] Long term nursing home or group home [] Transfer to ICU  [] Transfer to PCU [] Other:    Code Status: Full Code    ELOS: TBEDMOND [place] - NP  01/05/20

## 2020-01-05 NOTE — CONSULTS
Liver Disease in his sister. Home Medications:  Were reviewed and are listed in nursing record and/or below  Prior to Admission medications    Medication Sig Start Date End Date Taking? Authorizing Provider   lisinopril (PRINIVIL;ZESTRIL) 40 MG tablet Take 40 mg by mouth daily   Yes Historical Provider, MD   metoprolol (LOPRESSOR) 100 MG tablet Take 100 mg by mouth 2 times daily   Yes Historical Provider, MD   amitriptyline (ELAVIL) 50 MG tablet Take 50 mg by mouth 2 times daily   Yes Historical Provider, MD   hydrALAZINE (APRESOLINE) 25 MG tablet Take 1 tablet by mouth every 8 hours 8/22/19  Yes Marty Navarro MD   NIFEdipine (ADALAT CC) 90 MG extended release tablet Take 90 mg by mouth daily   Yes Historical Provider, MD   sertraline (ZOLOFT) 100 MG tablet Take 100 mg by mouth nightly  4/15/19  Yes Historical Provider, MD   atorvastatin (LIPITOR) 40 MG tablet Take 40 mg by mouth nightly  2/26/15  Yes Historical Provider, MD   fenofibrate (TRICOR) 54 MG tablet Take 1 tablet by mouth daily s [facility] pharmacy: Please dispense generic fenofibrate unless prescriber denote 1/23/19  Yes Orlin Caldwell MD   albuterol sulfate HFA (PROVENTIL HFA) 108 (90 Base) MCG/ACT inhaler Inhale 2 puffs into the lungs every 4 hours as needed for Wheezing (coughing episodes) 11/24/18  Yes Froilan Lord MD   vitamin B-12 (CYANOCOBALAMIN) 500 MCG tablet Take 500 mcg by mouth daily   Yes Historical Provider, MD   metFORMIN (GLUCOPHAGE) 1000 MG tablet Take 1,000 mg by mouth 2 times daily (with meals)   Yes Historical Provider, MD   zolpidem (AMBIEN) 10 MG tablet Take 10 mg by mouth nightly. Yes Historical Provider, MD   mirtazapine (REMERON) 15 MG tablet Take 1 tablet by mouth nightly 2/19/18  Yes Joslyn Pate MD   nitroGLYCERIN (NITROSTAT) 0.4 MG SL tablet up to max of 3 total doses.  If no relief after 1 dose, call 911. 10/5/17  Yes Jamilah Ruano, DO   vitamin D (D3-1000) 1000 UNITS TABS tablet Take 1 tablet by mouth daily 5/24/17  Yes Humberto Ramos MD   aspirin 81 MG tablet Take 81 mg by mouth daily   Yes Historical Provider, MD   Blood Glucose Monitoring Suppl (FREESTYLE LITE) MITZI 1 Device by Does not apply route daily as needed (Check BG 3 times a day) 4/17/19   Rasta Cordero MD   blood glucose test strips (FREESTYLE LITE) strip 1 each by In Vitro route 3 times daily As needed.  4/17/19   Rasta Cordero MD   FREESTYLE LANCETS [address] [facility] 1 each by Does not apply route 3 times daily 4/17/19   Rasta Cordero MD   mometasone-formoterol [facility]) 200-5 MCG/ACT inhaler Inhale 2 puffs into the lungs 2 times daily 1/22/19   Vic Stephens MD   Spacer/Aero-Holding Chambers (E-Z SPACER) MITZI 1 Device by Does not apply route daily as needed (shortness of breath) 10/13/17   Humberto Ramos MD   glucose blood VI test strips (FREESTYLE TEST STRIPS) strip 1 each by In Vitro route daily Test daily ICD E11.42 8/24/17   Humberto Ramos MD   glucose monitoring kit (FREESTYLE) monitoring kit 1 kit by Does not apply route daily ICD E11.42 5/24/17   MD Danny Simmons MISC Test daily ICD E11.42 5/24/17   Humberto Ramos MD        CURRENT Medications:  lisinopril (PRINIVIL;ZESTRIL) tablet 40 mg, Daily  amitriptyline (ELAVIL) tablet 50 mg, BID  0.9 % sodium chloride infusion, Continuous  diazepam (VALIUM) tablet 5 mg, Q6H PRN  heparin 25,000 units in dextrose 5% 250 mL infusion, Continuous  albuterol sulfate  (90 Base) MCG/ACT inhaler 2 puff, Q4H PRN  carvedilol (COREG) tablet 25 mg, BID WC  insulin lispro (HUMALOG) injection vial 0-12 Units, TID WC  insulin lispro (HUMALOG) injection vial 0-6 Units, Nightly  fenofibrate (TRICOR) tablet 54 mg, Daily  hydrALAZINE (APRESOLINE) tablet 25 mg, 3 times per day  isosorbide mononitrate (IMDUR) extended release tablet 30 mg, QPM  mometasone-formoterol (DULERA) 200-5 MCG/ACT inhaler 2 puff, BID  NIFEdipine (PROCARDIA XL) extended release tablet 90 mg, Daily  sertraline (ZOLOFT) tablet 100 mg, Nightly  vitamin B-12 (CYANOCOBALAMIN) tablet 500 mcg, Daily  vitamin D (CHOLECALCIFEROL) tablet 1,000 Units, Daily  sodium chloride flush 0.9 % injection 10 mL, 2 times per day  sodium chloride flush 0.9 % injection 10 mL, PRN  magnesium hydroxide (MILK OF MAGNESIA) 400 MG/5ML suspension 30 mL, Daily PRN  ondansetron (ZOFRAN) injection 4 mg, Q6H PRN  atorvastatin (LIPITOR) tablet 40 mg, Nightly  aspirin chewable tablet 81 mg, Daily  glucose (GLUTOSE) 40 % oral gel 15 g, PRN  dextrose 50 % IV solution, PRN  glucagon (rDNA) injection 1 mg, PRN  dextrose 5 % solution, PRN  acetaminophen (TYLENOL) tablet 650 mg, Q4H PRN  zolpidem (AMBIEN) tablet 10 mg, Nightly PRN        Allergies:  Naproxen     Review of Systems:   · Constitutional: no unanticipated weight loss. There's been no change in energy level, sleep pattern, or activity level. No fevers, chills. · Eyes: No visual changes or diplopia. No scleral icterus. · ENT: No Headaches, hearing loss or vertigo. No mouth sores or sore throat. · Cardiovascular: as reviewed in HPI  · Respiratory: No cough or wheezing, no sputum production. No hemoptysis. · Gastrointestinal: No abdominal pain, appetite loss, blood in stools. No change in bowel or bladder habits. · Genitourinary: No dysuria, trouble voiding, or hematuria. · Musculoskeletal:  No gait disturbance, no joint complaints. · Integumentary: No rash or pruritis. · Neurological: No headache, diplopia, change in muscle strength, numbness or tingling. · Psychiatric: No anxiety or depression. · Endocrine: No temperature intolerance. No excessive thirst, fluid intake, or urination. No tremor. · Hematologic/Lymphatic: No abnormal bruising or bleeding, blood clots or swollen lymph nodes. · Allergic/Immunologic: No nasal congestion or hives.     Objective:   PHYSICAL EXAM:    Vitals:    01/05/20 1147   BP: (!) 146/83   Pulse: 96   Resp: 22   Temp: 98.2 °F (36.8 °C)   SpO2: 95%    Weight: 239 lb 3.2 3/2019  Left ventricular cavity size is normal. There is mild concentric left ventricular hypertrophy and normal systolic function. LV ejection fraction is visually estimated to be 55-60%. Normal right ventricular size and function. There are no significant valvular abnormalities appreciated in this study. Tele: sinus    Stress test: 3/2019  Normal myocardial perfusion study. Normal LV size and systolic function. Cath: 12/2017  1. Minimal spasm of nondominant right coronary artery which was catheter induced clearly. 2.  Patent stents in the proximal LAD and mid circumflex coronary artery. Left coronary dominance. No coronary artery disease identified of any significance. The stents were widely patent in the LAD and circumflex. 3.  LV ejection fraction 55% without wall motion abnormality. The LVEDP was only 3-5 mmHg suggesting that the patient is chronically intravascularly depleted. Assessment and Plan   1) Demand ischemia. Minimally elevated troponin may be related to cocaine use/vasosopasm. No emergent indication for angiography. 2) Atypical chest pain. Recurrent symptom started weeks prior to cocaine use. Will risk stratify with Padyang Even. He denies chest pains when stress test performed in 3/2019 so with reportedly new symptoms will repeat testing. 3) CAD. Continue with medical management and risk factor modification including aspirin, statin, and B-blocker. 4) Essential hypertension/LVH. Goal BP <130/80. Continue medical therapy. 5) MONSALVE. May be multifactorial. Will check BNP and D-dimer. 6) Hyperlipidemia. Continue high intensity statin. 7) Cocaine abuse. Encouraged abstinence. Overall, the problems requiring hospitalization are high in severity. If stress test without reversible ischemia, he could be discharged from cardiology standpoint. Thank you for allowing us to participate in the care of [facility]. Kirt Hensley.  Ally Scott [address]  1/5/2020 2:37 PM

## 2020-01-05 NOTE — PROGRESS NOTES
Pt arrived to floor via stretcher from ED and ambulated to bed. Telemetry box #22 activated and confirmed with [facility]. Patient oriented to room and use of call light. Call light and personal items within reach. Admission and assessment initiated. POC and education initiated and reviewed with patient. Denied further needs or questions at this time. Will continue to monitor.     Electronically signed by Angel Eldridge RN on 1/4/2020 at 11:27 PM

## 2020-01-05 NOTE — PROGRESS NOTES
4 Eyes Skin Assessment     The patient is being assess for  Admission    I agree that 2 RN's have performed a thorough Head to Toe Skin Assessment on the patient. ALL assessment sites listed below have been assessed. Areas assessed by both nurses:   [x]   Head, Face, and Ears   [x]   Shoulders, Back, and Chest  [x]   Arms, Elbows, and Hands   [x]   Coccyx, Sacrum, and IschIum  [x]   Legs, Feet, and Heels        Does the Patient have Skin Breakdown?   No         Mundo Prevention initiated:  No   Wound Care Orders initiated:  No      [facility] nurse consulted for Pressure Injury (Stage 3,4, Unstageable, DTI, NWPT, and Complex wounds), New and Established Ostomies:  No      Nurse 1 eSignature: Electronically signed by Nick Merida RN on 1/4/20 at 11:28 PM    **SHARE this note so that the co-signing nurse is able to place an eSignature**    Nurse 2 eSignature: Electronically signed by Cinda Chery RN on 1/5/20 at 2:43 AM

## 2020-01-05 NOTE — PROGRESS NOTES
2040: Upon admission, patient complaining of chest pain rated \"7/10\" that he characterizes as \"pounding\" and \"pressure\". Order received from [place], NP for prn Tylenol, no other new orders. 2118: Administered Tylenol per [facility]    2148: Reassessed pain level with patient - patient states pain is still a \"7/10\" located in chest. Perfect Serve sent to [place], NP for update. No new orders.     Electronically signed by Risa Roche RN on 1/4/2020 at 10:22 PM

## 2020-01-05 NOTE — PLAN OF CARE
Problem: Pain:  Goal: Control of acute pain  Description  Control of acute pain  Outcome: Ongoing  Pain/discomfort being managed with PRN analgesics per MD orders. Pt able to express presence and absence of pain and rate pain appropriately using numerical scale. Problem: Falls - Risk of:  Goal: Will remain free from falls  Description  Will remain free from falls  Outcome: Ongoing  Fall risk precautions in place. Bed in lowest position with wheels locked,bed alarm in place and activated, non-skid socks on pt, fall risk ID on pt, call light in reach, pt encouraged to call before getting out of bed and for any other needs or complaints.       Problem: Cardiac Output - Decreased:  Goal: Hemodynamic stability will improve  Description  Hemodynamic stability will improve  Outcome: Ongoing

## 2020-01-05 NOTE — H&P
Recent Labs     01/04/20  1553   WBC 8.0   HGB 14.2        BMP:    Recent Labs     01/04/20  1553      K 4.5   CL 95*   CO2 22   BUN 22*   CREATININE 1.4*   GLUCOSE 192*     Hepatic:   Recent Labs     01/04/20  1553   AST 29   ALT 35   BILITOT 0.5   ALKPHOS 126     Troponin:   Recent Labs     01/04/20  1553   TROPONINI 0.03*     BNP: No results for input(s): BNP in the last 72 hours. INR: No results for input(s): INR in the last 72 hours. Lab Results   Component Value Date    LABA1C 7.2 04/01/2018           No results for input(s): CKTOTAL in the last 72 hours. -----------------------------------------------------------------    Chest x-ray  No acute cardiopulmonary abnormality      Assessment / Plan     Chest pain, unspecified R07.9  Atypical chest pain  Likely due to use of cocaine  Admit patient to telemetry  Trend cardiac enzymes  If patient rules out for MI   Consult cardiology  Continue patient on aspirin, beta blocker and statin and heparin    Pain management  R52  Continue with the IV and oral pain medication      Essential primary hypertension I10  Continue patient on home medication      Type 2 diabetes mellitus E11.9  hold OHA  Insulin sliding scale            DVT and GI prophylaxis      Full Code      Katherine Peacock M.D    This note was transcribed using [id number] [facility]. Please disregard any translational errors.

## 2020-01-06 LAB
ANION GAP SERPL CALCULATED.3IONS-SCNC: 12 MMOL/L (ref 3–16)
BASOPHILS ABSOLUTE: 0 K/UL (ref 0–0.2)
BASOPHILS RELATIVE PERCENT: 0.5 %
BUN BLDV-MCNC: 22 MG/DL (ref 7–20)
CALCIUM SERPL-MCNC: 8.5 MG/DL (ref 8.3–10.6)
CHLORIDE BLD-SCNC: 99 MMOL/L (ref 99–110)
CO2: 26 MMOL/L (ref 21–32)
CREAT SERPL-MCNC: 1 MG/DL (ref 0.8–1.3)
EOSINOPHILS ABSOLUTE: 0.1 K/UL (ref 0–0.6)
EOSINOPHILS RELATIVE PERCENT: 2.3 %
GFR AFRICAN AMERICAN: >60
GFR NON-AFRICAN AMERICAN: >60
GLUCOSE BLD-MCNC: 135 MG/DL (ref 70–99)
GLUCOSE BLD-MCNC: 136 MG/DL (ref 70–99)
GLUCOSE BLD-MCNC: 138 MG/DL (ref 70–99)
GLUCOSE BLD-MCNC: 159 MG/DL (ref 70–99)
GLUCOSE BLD-MCNC: 202 MG/DL (ref 70–99)
HCT VFR BLD CALC: 37.4 % (ref 40.5–52.5)
HEMOGLOBIN: 12.4 G/DL (ref 13.5–17.5)
LV EF: 66 %
LVEF MODALITY: NORMAL
LYMPHOCYTES ABSOLUTE: 1.3 K/UL (ref 1–5.1)
LYMPHOCYTES RELATIVE PERCENT: 29.9 %
MCH RBC QN AUTO: 29.1 PG (ref 26–34)
MCHC RBC AUTO-ENTMCNC: 33.2 G/DL (ref 31–36)
MCV RBC AUTO: 87.6 FL (ref 80–100)
MONOCYTES ABSOLUTE: 0.4 K/UL (ref 0–1.3)
MONOCYTES RELATIVE PERCENT: 8.8 %
NEUTROPHILS ABSOLUTE: 2.5 K/UL (ref 1.7–7.7)
NEUTROPHILS RELATIVE PERCENT: 58.5 %
PDW BLD-RTO: 13.7 % (ref 12.4–15.4)
PERFORMED ON: ABNORMAL
PLATELET # BLD: 161 K/UL (ref 135–450)
PMV BLD AUTO: 8.2 FL (ref 5–10.5)
POTASSIUM SERPL-SCNC: 4.2 MMOL/L (ref 3.5–5.1)
RBC # BLD: 4.27 M/UL (ref 4.2–5.9)
SODIUM BLD-SCNC: 137 MMOL/L (ref 136–145)
WBC # BLD: 4.2 K/UL (ref 4–11)

## 2020-01-06 PROCEDURE — 85025 COMPLETE CBC W/AUTO DIFF WBC: CPT

## 2020-01-06 PROCEDURE — 6370000000 HC RX 637 (ALT 250 FOR IP): Performed by: HOSPITALIST

## 2020-01-06 PROCEDURE — 36415 COLL VENOUS BLD VENIPUNCTURE: CPT

## 2020-01-06 PROCEDURE — 94760 N-INVAS EAR/PLS OXIMETRY 1: CPT

## 2020-01-06 PROCEDURE — 6370000000 HC RX 637 (ALT 250 FOR IP): Performed by: NURSE PRACTITIONER

## 2020-01-06 PROCEDURE — 78452 HT MUSCLE IMAGE SPECT MULT: CPT

## 2020-01-06 PROCEDURE — 94640 AIRWAY INHALATION TREATMENT: CPT

## 2020-01-06 PROCEDURE — 3430000000 HC RX DIAGNOSTIC RADIOPHARMACEUTICAL: Performed by: INTERNAL MEDICINE

## 2020-01-06 PROCEDURE — 80048 BASIC METABOLIC PNL TOTAL CA: CPT

## 2020-01-06 PROCEDURE — 2580000003 HC RX 258: Performed by: HOSPITALIST

## 2020-01-06 PROCEDURE — 93308 TTE F-UP OR LMTD: CPT

## 2020-01-06 PROCEDURE — 2060000000 HC ICU INTERMEDIATE R&B

## 2020-01-06 PROCEDURE — A9502 TC99M TETROFOSMIN: HCPCS | Performed by: INTERNAL MEDICINE

## 2020-01-06 PROCEDURE — 6360000002 HC RX W HCPCS: Performed by: INTERNAL MEDICINE

## 2020-01-06 PROCEDURE — 93017 CV STRESS TEST TRACING ONLY: CPT

## 2020-01-06 RX ORDER — HYDRALAZINE HYDROCHLORIDE 50 MG/1
50 TABLET, FILM COATED ORAL EVERY 8 HOURS SCHEDULED
Status: DISCONTINUED | OUTPATIENT
Start: 2020-01-06 | End: 2020-01-07

## 2020-01-06 RX ADMIN — ZOLPIDEM TARTRATE 10 MG: 5 TABLET ORAL at 21:28

## 2020-01-06 RX ADMIN — Medication 500 MCG: at 11:40

## 2020-01-06 RX ADMIN — AMITRIPTYLINE HYDROCHLORIDE 50 MG: 50 TABLET, FILM COATED ORAL at 21:28

## 2020-01-06 RX ADMIN — TETROFOSMIN 30 MILLICURIE: 1.38 INJECTION, POWDER, LYOPHILIZED, FOR SOLUTION INTRAVENOUS at 08:48

## 2020-01-06 RX ADMIN — MELATONIN 1000 UNITS: at 11:39

## 2020-01-06 RX ADMIN — ASPIRIN 81 MG 81 MG: 81 TABLET ORAL at 11:40

## 2020-01-06 RX ADMIN — HYDRALAZINE HYDROCHLORIDE 50 MG: 50 TABLET, FILM COATED ORAL at 21:28

## 2020-01-06 RX ADMIN — SODIUM CHLORIDE, PRESERVATIVE FREE 10 ML: 5 INJECTION INTRAVENOUS at 21:27

## 2020-01-06 RX ADMIN — REGADENOSON 0.4 MG: 0.08 INJECTION, SOLUTION INTRAVENOUS at 08:45

## 2020-01-06 RX ADMIN — AMITRIPTYLINE HYDROCHLORIDE 50 MG: 50 TABLET, FILM COATED ORAL at 11:40

## 2020-01-06 RX ADMIN — DIAZEPAM 5 MG: 5 TABLET ORAL at 15:20

## 2020-01-06 RX ADMIN — Medication 2 PUFF: at 20:51

## 2020-01-06 RX ADMIN — TETROFOSMIN 10 MILLICURIE: 1.38 INJECTION, POWDER, LYOPHILIZED, FOR SOLUTION INTRAVENOUS at 07:20

## 2020-01-06 RX ADMIN — ATORVASTATIN CALCIUM 40 MG: 40 TABLET, FILM COATED ORAL at 21:28

## 2020-01-06 RX ADMIN — NIFEDIPINE 90 MG: 90 TABLET, FILM COATED, EXTENDED RELEASE ORAL at 11:40

## 2020-01-06 RX ADMIN — LISINOPRIL 40 MG: 40 TABLET ORAL at 11:39

## 2020-01-06 RX ADMIN — HYDRALAZINE HYDROCHLORIDE 25 MG: 25 TABLET, FILM COATED ORAL at 15:18

## 2020-01-06 RX ADMIN — CARVEDILOL 25 MG: 25 TABLET, FILM COATED ORAL at 11:40

## 2020-01-06 RX ADMIN — CARVEDILOL 25 MG: 25 TABLET, FILM COATED ORAL at 17:38

## 2020-01-06 RX ADMIN — FENOFIBRATE 54 MG: 54 TABLET ORAL at 11:39

## 2020-01-06 RX ADMIN — HYDRALAZINE HYDROCHLORIDE 25 MG: 25 TABLET, FILM COATED ORAL at 06:08

## 2020-01-06 RX ADMIN — SERTRALINE 100 MG: 100 TABLET, FILM COATED ORAL at 21:28

## 2020-01-06 RX ADMIN — ISOSORBIDE MONONITRATE 30 MG: 30 TABLET, EXTENDED RELEASE ORAL at 17:38

## 2020-01-06 ASSESSMENT — PAIN DESCRIPTION - ONSET
ONSET: ON-GOING

## 2020-01-06 ASSESSMENT — PAIN DESCRIPTION - ORIENTATION
ORIENTATION: MID;LEFT
ORIENTATION: LEFT;MID
ORIENTATION: MID;LEFT

## 2020-01-06 ASSESSMENT — PAIN DESCRIPTION - DESCRIPTORS
DESCRIPTORS: POUNDING;PRESSURE
DESCRIPTORS: POUNDING;PRESSURE
DESCRIPTORS: DISCOMFORT
DESCRIPTORS: POUNDING;PRESSURE

## 2020-01-06 ASSESSMENT — PAIN DESCRIPTION - DIRECTION: RADIATING_TOWARDS: NON-RADIATING

## 2020-01-06 ASSESSMENT — PAIN DESCRIPTION - LOCATION
LOCATION: CHEST

## 2020-01-06 ASSESSMENT — PAIN DESCRIPTION - PROGRESSION
CLINICAL_PROGRESSION: GRADUALLY IMPROVING
CLINICAL_PROGRESSION: NOT CHANGED

## 2020-01-06 ASSESSMENT — PAIN - FUNCTIONAL ASSESSMENT
PAIN_FUNCTIONAL_ASSESSMENT: ACTIVITIES ARE NOT PREVENTED

## 2020-01-06 ASSESSMENT — PAIN DESCRIPTION - PAIN TYPE
TYPE: ACUTE PAIN

## 2020-01-06 ASSESSMENT — PAIN SCALES - GENERAL
PAINLEVEL_OUTOF10: 3

## 2020-01-06 ASSESSMENT — PAIN DESCRIPTION - FREQUENCY
FREQUENCY: CONTINUOUS

## 2020-01-06 NOTE — PROGRESS NOTES
Hospital Medicine Progress Note      Admit Date: 1/4/2020         Overnight Events: none    CC: F/U for chest pain    HPI:   This is a 77-year-old male with a history of CAD, COPD, hypertension, hyperlipidemia, diabetes, ARTEMIO who presented to the ED with complaints of chest pain. Symptoms were associated with the use of cocaine. Troponins were mildly elevated. Cardiology was consulted. Started on IV fluids secondary to a mild ALEA with a creatinine of 1.4. Interval History/Subjective:   States that chest pain is improved, complains of severe depression, denies fevers, chills, chest pain or shortness of breath at present. No other symptoms at present. Review of Systems:     Comprehensive ROS negative except as mentioned above. Past Medical History:        Diagnosis Date    CAD (coronary artery disease)     Chest pain 10/15/2017    [facility]     [facility]     Diabetes mellitus ([facility]     Hyperlipidemia     Hypertension     Kidney stone     MRSA colonization 4/16/19; 03/24/2019    Sleep apnea     He said he doesn't use a CPAP machine. Past Surgical History:        Procedure Laterality Date    ABDOMEN SURGERY      CARDIAC SURGERY      3 STENTS PLACED     CHOLECYSTECTOMY      COLONOSCOPY      CORONARY ANGIOPLASTY WITH STENT PLACEMENT      3 stents     FRACTURE SURGERY      RIGHT ANKLE    LITHOTRIPSY      SHOULDER ARTHROSCOPY      VASCULAR SURGERY         Allergies:  Naproxen    Past medical and surgical history reviewed. Any changes have been noted.      Scheduled and prn Medications:    Scheduled Meds:   lisinopril  40 mg Oral Daily    amitriptyline  50 mg Oral BID    carvedilol  25 mg Oral BID WC    insulin lispro  0-12 Units Subcutaneous TID WC    insulin lispro  0-6 Units Subcutaneous Nightly    fenofibrate  54 mg Oral Daily    hydrALAZINE  25 mg Oral 3 times per day    isosorbide mononitrate  30 mg Oral QPM    99 01/06/2020    CO2 26 01/06/2020       Lab Results   Component Value Date    MG 1.30 04/16/2019       Lab Results   Component Value Date    ALT 35 01/04/2020    AST 29 01/04/2020    ALKPHOS 126 01/04/2020    BILITOT 0.5 01/04/2020        No flowsheet data found. Imaging:  NM Cardiac Stress Test Nuclear Imaging   Final Result      XR CHEST STANDARD (2 VW)   Final Result   No acute abnormality detected. Assessment & Plan:        Atypical chest pain  -Could be related to cocaine use versus vasospasm secondary to use  -Cardiology consulted  -Commending stress test at this time, will have today-negative for ischemia  -Troponins have stabilized at 0.03  -Stress test March 2019  -Started on heparin drip initially however this was discontinued    Hypertensive Urgency  - BP elevated today  - repeat manually  - check after coreg  - increased hydralazine    Severe depression  -Patient complains of previously having suicidal ideations  -Not currently having any suicidal ideations but states that his mental health is \"not right. \"  -He goes on to state that he goes to work and comes home and sits in his chair and has severe depressed thoughts.  -Psychiatry consulted for recommendations, patient asking for inpatient evaluation    Mild ALEA  -Likely related to cocaine use  -Hydrated with IV fluids and improved  -Stop fluids    CAD with PCI  -Stents placed approximately 13 years ago in [place] per chart review  -Angiogram 2017, noted to have widely patent stents at that time. No intervention performed  -Cardiology following  -On beta-blocker, aspirin, statin  -Isosorbide, ACE    Cocaine use  -Discussed cessation  -States that he was using to \"numb his pain. \"    Other comorbidities:  COPD without acute exacerbation  ARTEMIO noncompliant with CPAP  Essential hypertension  Hyperlipidemia, unspecified      Body mass index is 34.07 kg/m².     The patient and / or the family were informed of the results of any tests, a time was given to answer questions, a plan was proposed and they agreed with plan.     DVT prophylaxis: [x] Lovenox  [] SQ Heparin  [] SCDs because of  [] warfarin/oral direct thrombin inhibitor [] Encourage ambulation    GI prophylaxis: [] PPI/[id number]  [x] not indicated    Probiotic if on abx: [] Yes [] No [x] Not Indicated    Diet: DIET CARDIAC; Carb Control: 5 carb choices (75 gms)/meal; No Caffeine    Consults:  IP CONSULT TO CARDIOLOGY  IP CONSULT TO PSYCHIATRY    Disposition:  [] Home  [] Home with home health [] Rehab [] Psych [] SNF  [] LTAC  [] Long term nursing home or group home [] Transfer to ICU  [] Transfer to PCU [] Other:    Code Status: Full Code    ELOS: tbd      EDMOND Carrillo CNP  01/06/20

## 2020-01-06 NOTE — PROGRESS NOTES
Entered pt's room to obtain vital signs. Pt not in room or on unit. Phoned stress lab and pt having stress test.  Transport did take pt off unit without notifying this RN.

## 2020-01-07 LAB
EKG ATRIAL RATE: 87 BPM
EKG DIAGNOSIS: NORMAL
EKG P AXIS: 44 DEGREES
EKG P-R INTERVAL: 174 MS
EKG Q-T INTERVAL: 364 MS
EKG QRS DURATION: 98 MS
EKG QTC CALCULATION (BAZETT): 438 MS
EKG R AXIS: 66 DEGREES
EKG T AXIS: 58 DEGREES
EKG VENTRICULAR RATE: 87 BPM
GLUCOSE BLD-MCNC: 159 MG/DL (ref 70–99)
GLUCOSE BLD-MCNC: 162 MG/DL (ref 70–99)
GLUCOSE BLD-MCNC: 162 MG/DL (ref 70–99)
GLUCOSE BLD-MCNC: 195 MG/DL (ref 70–99)
PERFORMED ON: ABNORMAL

## 2020-01-07 PROCEDURE — 6370000000 HC RX 637 (ALT 250 FOR IP): Performed by: NURSE PRACTITIONER

## 2020-01-07 PROCEDURE — 94640 AIRWAY INHALATION TREATMENT: CPT

## 2020-01-07 PROCEDURE — 93010 ELECTROCARDIOGRAM REPORT: CPT | Performed by: INTERNAL MEDICINE

## 2020-01-07 PROCEDURE — 94760 N-INVAS EAR/PLS OXIMETRY 1: CPT

## 2020-01-07 PROCEDURE — 2060000000 HC ICU INTERMEDIATE R&B

## 2020-01-07 PROCEDURE — 2580000003 HC RX 258: Performed by: HOSPITALIST

## 2020-01-07 PROCEDURE — 6370000000 HC RX 637 (ALT 250 FOR IP): Performed by: HOSPITALIST

## 2020-01-07 PROCEDURE — 99221 1ST HOSP IP/OBS SF/LOW 40: CPT | Performed by: PSYCHIATRY & NEUROLOGY

## 2020-01-07 PROCEDURE — 93005 ELECTROCARDIOGRAM TRACING: CPT | Performed by: PSYCHIATRY & NEUROLOGY

## 2020-01-07 RX ORDER — LORAZEPAM 1 MG/1
1 TABLET ORAL ONCE
Status: COMPLETED | OUTPATIENT
Start: 2020-01-07 | End: 2020-01-07

## 2020-01-07 RX ORDER — CLONIDINE HYDROCHLORIDE 0.1 MG/1
0.1 TABLET ORAL ONCE
Status: COMPLETED | OUTPATIENT
Start: 2020-01-07 | End: 2020-01-07

## 2020-01-07 RX ORDER — AMITRIPTYLINE HYDROCHLORIDE 50 MG/1
50 TABLET, FILM COATED ORAL NIGHTLY
Status: DISCONTINUED | OUTPATIENT
Start: 2020-01-07 | End: 2020-01-10 | Stop reason: HOSPADM

## 2020-01-07 RX ORDER — AMITRIPTYLINE HYDROCHLORIDE 50 MG/1
50 TABLET, FILM COATED ORAL NIGHTLY
Status: DISCONTINUED | OUTPATIENT
Start: 2020-01-08 | End: 2020-01-07

## 2020-01-07 RX ORDER — HYDRALAZINE HYDROCHLORIDE 50 MG/1
100 TABLET, FILM COATED ORAL EVERY 8 HOURS SCHEDULED
Status: DISCONTINUED | OUTPATIENT
Start: 2020-01-07 | End: 2020-01-10 | Stop reason: HOSPADM

## 2020-01-07 RX ADMIN — LORAZEPAM 1 MG: 1 TABLET ORAL at 14:56

## 2020-01-07 RX ADMIN — HYDRALAZINE HYDROCHLORIDE 50 MG: 50 TABLET, FILM COATED ORAL at 06:17

## 2020-01-07 RX ADMIN — INSULIN LISPRO 2 UNITS: 100 INJECTION, SOLUTION INTRAVENOUS; SUBCUTANEOUS at 18:07

## 2020-01-07 RX ADMIN — ISOSORBIDE MONONITRATE 30 MG: 30 TABLET, EXTENDED RELEASE ORAL at 18:06

## 2020-01-07 RX ADMIN — HYDRALAZINE HYDROCHLORIDE 100 MG: 50 TABLET, FILM COATED ORAL at 21:37

## 2020-01-07 RX ADMIN — Medication 500 MCG: at 10:05

## 2020-01-07 RX ADMIN — INSULIN LISPRO 1 UNITS: 100 INJECTION, SOLUTION INTRAVENOUS; SUBCUTANEOUS at 21:37

## 2020-01-07 RX ADMIN — AMITRIPTYLINE HYDROCHLORIDE 50 MG: 50 TABLET, FILM COATED ORAL at 21:37

## 2020-01-07 RX ADMIN — CARVEDILOL 25 MG: 25 TABLET, FILM COATED ORAL at 10:04

## 2020-01-07 RX ADMIN — ASPIRIN 81 MG 81 MG: 81 TABLET ORAL at 10:04

## 2020-01-07 RX ADMIN — SODIUM CHLORIDE, PRESERVATIVE FREE 10 ML: 5 INJECTION INTRAVENOUS at 10:05

## 2020-01-07 RX ADMIN — INSULIN LISPRO 2 UNITS: 100 INJECTION, SOLUTION INTRAVENOUS; SUBCUTANEOUS at 12:48

## 2020-01-07 RX ADMIN — SODIUM CHLORIDE, PRESERVATIVE FREE 10 ML: 5 INJECTION INTRAVENOUS at 19:42

## 2020-01-07 RX ADMIN — SERTRALINE 100 MG: 100 TABLET, FILM COATED ORAL at 19:36

## 2020-01-07 RX ADMIN — MELATONIN 1000 UNITS: at 10:04

## 2020-01-07 RX ADMIN — DIAZEPAM 5 MG: 5 TABLET ORAL at 10:05

## 2020-01-07 RX ADMIN — NIFEDIPINE 90 MG: 90 TABLET, FILM COATED, EXTENDED RELEASE ORAL at 10:04

## 2020-01-07 RX ADMIN — ATORVASTATIN CALCIUM 40 MG: 40 TABLET, FILM COATED ORAL at 19:36

## 2020-01-07 RX ADMIN — Medication 2 PUFF: at 08:53

## 2020-01-07 RX ADMIN — ZOLPIDEM TARTRATE 10 MG: 5 TABLET ORAL at 21:37

## 2020-01-07 RX ADMIN — LISINOPRIL 40 MG: 40 TABLET ORAL at 10:05

## 2020-01-07 RX ADMIN — Medication 2 PUFF: at 20:43

## 2020-01-07 RX ADMIN — HYDRALAZINE HYDROCHLORIDE 50 MG: 50 TABLET, FILM COATED ORAL at 12:58

## 2020-01-07 RX ADMIN — INSULIN LISPRO 2 UNITS: 100 INJECTION, SOLUTION INTRAVENOUS; SUBCUTANEOUS at 10:06

## 2020-01-07 RX ADMIN — CLONIDINE HYDROCHLORIDE 0.1 MG: 0.1 TABLET ORAL at 13:24

## 2020-01-07 RX ADMIN — CARVEDILOL 25 MG: 25 TABLET, FILM COATED ORAL at 18:06

## 2020-01-07 RX ADMIN — FENOFIBRATE 54 MG: 54 TABLET ORAL at 10:05

## 2020-01-07 RX ADMIN — AMITRIPTYLINE HYDROCHLORIDE 50 MG: 50 TABLET, FILM COATED ORAL at 10:05

## 2020-01-07 ASSESSMENT — PAIN DESCRIPTION - LOCATION
LOCATION: CHEST

## 2020-01-07 ASSESSMENT — PAIN SCALES - GENERAL
PAINLEVEL_OUTOF10: 3
PAINLEVEL_OUTOF10: 3
PAINLEVEL_OUTOF10: 0
PAINLEVEL_OUTOF10: 3
PAINLEVEL_OUTOF10: 0

## 2020-01-07 ASSESSMENT — PAIN DESCRIPTION - FREQUENCY
FREQUENCY: CONTINUOUS

## 2020-01-07 ASSESSMENT — PAIN - FUNCTIONAL ASSESSMENT
PAIN_FUNCTIONAL_ASSESSMENT: ACTIVITIES ARE NOT PREVENTED

## 2020-01-07 ASSESSMENT — PAIN DESCRIPTION - PROGRESSION
CLINICAL_PROGRESSION: NOT CHANGED

## 2020-01-07 ASSESSMENT — PAIN DESCRIPTION - PAIN TYPE
TYPE: ACUTE PAIN

## 2020-01-07 ASSESSMENT — PAIN DESCRIPTION - ORIENTATION
ORIENTATION: MID;LEFT
ORIENTATION: MID
ORIENTATION: LEFT;MID

## 2020-01-07 ASSESSMENT — PAIN DESCRIPTION - DESCRIPTORS
DESCRIPTORS: POUNDING;PRESSURE
DESCRIPTORS: ACHING;CONSTANT;DISCOMFORT;THROBBING
DESCRIPTORS: ACHING;CONSTANT;DULL;THROBBING

## 2020-01-07 ASSESSMENT — PAIN DESCRIPTION - DIRECTION
RADIATING_TOWARDS: NON-RADIATING
RADIATING_TOWARDS: NON-RADIATING

## 2020-01-07 ASSESSMENT — PAIN DESCRIPTION - ONSET
ONSET: ON-GOING

## 2020-01-07 NOTE — PROGRESS NOTES
Hospital Medicine Progress Note      Admit Date: 1/4/2020         Overnight Events: none    CC: F/U for chest pain    HPI:   This is a 70-year-old male with a history of CAD, COPD, hypertension, hyperlipidemia, diabetes, ARTEMIO who presented to the ED with complaints of chest pain. Symptoms were associated with the use of cocaine. Troponins were mildly elevated. Cardiology was consulted. Started on IV fluids secondary to a mild ALEA with a creatinine of 1.4, now 1. Stress test was negative for acute ischemia. On my evaluation yesterday patient complained of severe depression and psychiatry was consulted. He was given a referral for an outpatient center for continued care as an outpatient. No current suicidal ideations. Attempted to discharge again today however blood pressure still out of control. Renal artery Doppler ordered      Interval History/Subjective:   Denies fevers, chills, chest pain or shortness of breath. No other symptoms at present. Review of Systems:     Comprehensive ROS negative except as mentioned above. Past Medical History:        Diagnosis Date    CAD (coronary artery disease)     Chest pain 10/15/2017    [facility]     COPD [facility]chronic obstructive [facility] disease) ([facility]     Diabetes mellitus ([facility]     Hyperlipidemia     Hypertension     Kidney stone     MRSA colonization 4/16/19; 03/24/2019    Sleep apnea     He said he doesn't use a CPAP machine. Past Surgical History:        Procedure Laterality Date    ABDOMEN SURGERY      CARDIAC SURGERY      3 STENTS PLACED     CHOLECYSTECTOMY      COLONOSCOPY      CORONARY ANGIOPLASTY WITH STENT PLACEMENT      3 stents     FRACTURE SURGERY      RIGHT ANKLE    LITHOTRIPSY      SHOULDER ARTHROSCOPY      VASCULAR SURGERY         Allergies:  Naproxen    Past medical and surgical history reviewed. Any changes have been noted.      Scheduled and prn Medications:    Scheduled Meds:   [START ON 1/8/2020] arm    LABS:    Lab Results   Component Value Date    WBC 4.2 01/06/2020    HGB 12.4 (L) 01/06/2020    HCT 37.4 (L) 01/06/2020    MCV 87.6 01/06/2020     01/06/2020    LYMPHOPCT 29.9 01/06/2020    RBC 4.27 01/06/2020    MCH 29.1 01/06/2020    MCHC 33.2 01/06/2020    RDW 13.7 01/06/2020       Lab Results   Component Value Date    CREATININE 1.0 01/06/2020    BUN 22 (H) 01/06/2020     01/06/2020    K 4.2 01/06/2020    CL 99 01/06/2020    CO2 26 01/06/2020       Lab Results   Component Value Date    MG 1.30 04/16/2019       Lab Results   Component Value Date    ALT 35 01/04/2020    AST 29 01/04/2020    ALKPHOS 126 01/04/2020    BILITOT 0.5 01/04/2020        No flowsheet data found. Imaging:  NM Cardiac Stress Test Nuclear Imaging   Final Result      XR CHEST STANDARD (2 VW)   Final Result   No acute abnormality detected. VL Renal Arterial Duplex Complete    (Results Pending)       Assessment & Plan:        Atypical chest pain  -Could be related to cocaine use versus vasospasm secondary to use  -Cardiology consulted  -Commending stress test at this time, will have today-negative for ischemia  -Troponins have stabilized at 0.03  -Stress test March 2019  -Started on heparin drip initially however this was discontinued    Hypertensive Urgency  - BP continues to remain elevated  - repeat manually-replaced  - increased hydralazine again today  - added x1 dose of clonidine and 1x dose of ativan to help with anxiety  -Renal artery Doppler noted from April 2018 with no significant stenosis. No need to repeat at this time. Severe depression  -Patient complains of previously having suicidal ideations  -Not currently having any suicidal ideations but states that his mental health is \"not right. \"  -He goes on to state that he goes to work and comes home and sits in his chair and has severe depressed thoughts.  -Psychiatry consulted for recommendations, patient asking for inpatient evaluation, will follow up right after discharge    Mild ALEA  -Likely related to cocaine use  -Hydrated with IV fluids and improved  -Stop fluids  -improved    CAD with PCI  -Stents placed approximately 13 years ago in [place] per chart review  -Angiogram 2017, noted to have widely patent stents at that time. No intervention performed  -Cardiology following  -On beta-blocker, aspirin, statin  -Isosorbide, ACE    Cocaine use  -Discussed cessation  -States that he was using to \"numb his pain. \"    Other comorbidities:  COPD without acute exacerbation  ARTEMIO noncompliant with CPAP  Essential hypertension  Hyperlipidemia, unspecified      Body mass index is 33.95 kg/m². The patient and / or the family were informed of the results of any tests, a time was given to answer questions, a plan was proposed and they agreed with plan.     DVT prophylaxis: [x] Lovenox  [] SQ Heparin  [] SCDs because of  [] warfarin/oral direct thrombin inhibitor [] Encourage ambulation    GI prophylaxis: [] PPI/[id number]  [x] not indicated    Probiotic if on abx: [] Yes [] No [x] Not Indicated    Diet: DIET CARDIAC; Carb Control: 5 carb choices (75 gms)/meal; No Caffeine    Consults:  IP CONSULT TO CARDIOLOGY  IP CONSULT TO PSYCHIATRY    Disposition:  [] Home  [] Home with home health [] Rehab [] Psych [] SNF  [] LTAC  [] Long term nursing home or group home [] Transfer to ICU  [] Transfer to PCU [] Other:    Code Status: Full Code    ELOS: tbd      EDMOND Bai - LANIE  01/07/20

## 2020-01-07 NOTE — CONSULTS
safety risk. Thank you for this consult, please call the psychiatry consult line for further questions. I will continue to follow at this time. ____________________________________________________________________________    HPI:   context: 58 yo M with hx of depression, presented to the hospital with chest pain. He was noted to have used cocaine on  which was felt to contribute to his cardiac symptoms. He reported severe depression and concern he needs hospitalization and consult was placed for evaluation. associated symptoms:   Increased depressed mood, anehedonia, fatigue, poor sleep (dependent on zolpidem, elavil for sleep), high anxiety and worries (worries about losing his brother who is ill). Regarding suicidal ideation, prior to admission he has been having intermittent suicidal thoughts. He would think \"whats the point in living\" \"whats the point in being here\". He reports using cocaine once in the past, but he sought it out and took a large amount because he felt he no longer cared about his life. He really didn't care if it caused him to end his life. At this time, he reports continuing to have intermittent suicidal ideation. He denies any specific plans or intent on acting on them but he does have some worries that if he doesn't address his depression he may do something harmful to himself. He is very willing to engage in treatment for his mood disorder and wants help with it, he doesn't feel he needs to be confined to a psych unit, however. He is willing to do a partial hospitalization program.     modifying factors:   He's lost several people close to him in the last month. Brother  (he was helping care for him for 2 yrs), aunt , niece . Has another brother who is ill who may pass away soon and he feels \"I don't know how much more I can handle\".      Timing: subacute on chronic  duration: worse over the last 1 month, but has had mood issues for several yrs  severity: severe    ROS:   Gen: no fevers or chills  HEENT: no vision or hearing problems  CV: no cp, no palpitations  Resp: no dyspnea  : no dysuria  MSK: no muscle or joint pain  GI: no n/v/d  Skin: no rashes  Neuro: no numbness/weakness  Endo: no tremors    Past Psychiatric History:    Hosp: 2 yrs ago at [facility] after suicide attempt by intentionally taking IV heroin (1st and only time using heroin)   Diagnoses: depression   Med trials: sertraline 100mg (not helpful), elavil (helpful for sleep), zolpidem (helpful for sleep)   Outpt: no piror outpatient psychiatry   Suicide Attempts: once prior by overdosing intentional on heroin IV    Substance Use History:  Nicotine: denies  Alcohol: occasionally a couple drinks per week. Denies a hx of alcoholism  Illicits: has used cocaine a couple times in his life. He has some +UDS in 4/16/19 +cocaine, Jun 2018 +cocaine, Oct 2017 +cocaine. He reports one prior use a couple yrs ago and the use prior to him coming to the hospital.     Past Medical History:   Past Medical History:   Diagnosis Date    CAD (coronary artery disease)     Chest pain 10/15/2017    [address] abuse ([address]     COPD (chronic obstructive pulmonary disease) ([address]     Diabetes mellitus ([address]     Hyperlipidemia     Hypertension     Kidney stone     MRSA colonization 4/16/19; 03/24/2019    Sleep apnea     He said he doesn't use a CPAP machine. Past Surgical History:   Procedure Laterality Date    ABDOMEN SURGERY      CARDIAC SURGERY      3 STENTS PLACED     CHOLECYSTECTOMY      COLONOSCOPY      CORONARY ANGIOPLASTY WITH STENT PLACEMENT      3 stents     FRACTURE SURGERY      RIGHT ANKLE    LITHOTRIPSY      SHOULDER ARTHROSCOPY      VASCULAR SURGERY         Social/Developmental History:    Relationship: single   Children: 1 son who lives in [address] [place] [address] Road: [facility]. He has a couple younger brothers who have alcohol problems.  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 He denies other mental illnesses in the family    Allergies: Allergies   Allergen Reactions    Naproxen Hives       Home Medications:   No current facility-administered medications on file prior to encounter. Current Outpatient Medications on File Prior to Encounter   Medication Sig Dispense Refill    lisinopril (PRINIVIL;ZESTRIL) 40 MG tablet Take 40 mg by mouth daily      metoprolol (LOPRESSOR) 100 MG tablet Take 100 mg by mouth 2 times daily      amitriptyline (ELAVIL) 50 MG tablet Take 50 mg by mouth 2 times daily      hydrALAZINE (APRESOLINE) 25 MG tablet Take 1 tablet by mouth every 8 hours 90 tablet 3    NIFEdipine (ADALAT CC) 90 MG extended release tablet Take 90 mg by mouth daily      sertraline (ZOLOFT) 100 MG tablet Take 100 mg by mouth nightly       atorvastatin (LIPITOR) 40 MG tablet Take 40 mg by mouth nightly       fenofibrate (TRICOR) 54 MG tablet Take 1 tablet by mouth daily s [facility] pharmacy: Please dispense generic fenofibrate unless prescriber denote 30 tablet 3    albuterol sulfate HFA (PROVENTIL HFA) 108 (90 Base) MCG/ACT inhaler Inhale 2 puffs into the lungs every 4 hours as needed for Wheezing (coughing episodes) 1 Inhaler 0    vitamin B-12 (CYANOCOBALAMIN) 500 MCG tablet Take 500 mcg by mouth daily      metFORMIN (GLUCOPHAGE) 1000 MG tablet Take 1,000 mg by mouth 2 times daily (with meals)      zolpidem (AMBIEN) 10 MG tablet Take 10 mg by mouth nightly.       mirtazapine (REMERON) 15 MG tablet Take 1 tablet by mouth nightly 30 tablet 3    nitroGLYCERIN (NITROSTAT) 0.4 MG SL tablet up to max of 3 total doses. If no relief after 1 dose, call 911. 25 tablet 3    vitamin D (D3-1000) 1000 UNITS TABS tablet Take 1 tablet by mouth daily 90 tablet 3    aspirin 81 MG tablet Take 81 mg by mouth daily      Blood Glucose Monitoring Suppl (FREESTYLE LITE) MITZI 1 Device by Does not apply route daily as needed (Check BG 3 times a day) 1 Device 0    blood glucose test strips (FREESTYLE LITE) strip 1 each by In Vitro route 3 times daily As needed.  100 each 3    FREESTYLE LANCETS MISC 1 each by Does not apply route 3 times daily 100 each 3    mometasone-formoterol (DULERA) 200-5 MCG/ACT inhaler Inhale 2 puffs into the lungs 2 times daily 1 Inhaler 1    Spacer/Aero-Holding Chambers (E-Z SPACER) MITZI 1 Device by Does not apply route daily as needed (shortness of breath) 1 Device 0    glucose blood VI test strips (FREESTYLE TEST STRIPS) strip 1 each by In Vitro route daily Test daily ICD E11.42 100 each 11    glucose monitoring kit (FREESTYLE) monitoring kit 1 kit by Does not apply route daily ICD E11.42 1 kit 0    Lancets MISC Test daily ICD E11.42 50 each 3       Medications:  Scheduled Meds:   hydrALAZINE  50 mg Oral 3 times per day    lisinopril  40 mg Oral Daily    amitriptyline  50 mg Oral BID    carvedilol  25 mg Oral BID WC    insulin lispro  0-12 Units Subcutaneous TID WC    insulin lispro  0-6 Units Subcutaneous Nightly    fenofibrate  54 mg Oral Daily    isosorbide mononitrate  30 mg Oral QPM    mometasone-formoterol  2 puff Inhalation BID    NIFEdipine  90 mg Oral Daily    sertraline  100 mg Oral Nightly    vitamin B-12  500 mcg Oral Daily    Vitamin D  1,000 Units Oral Daily    sodium chloride flush  10 mL Intravenous 2 times per day    atorvastatin  40 mg Oral Nightly    aspirin  81 mg Oral Daily     PRN Meds:.diazepam, perflutren lipid microspheres, albuterol sulfate HFA, sodium chloride flush, magnesium hydroxide, ondansetron, glucose, dextrose, glucagon (rDNA), dextrose, acetaminophen, 2018    HDL 33 (L) 2017    HDL 36 (L) 2017     Lab Results   Component Value Date    LDLCALC see below 2018    [facility] see below 2017    LDLCALC see below 2017     Lab Results   Component Value Date    LABVLDL see below 2018    LABVLDL see below 2017    LABVLDL see below 2017     No results found for: [facility]  Lab Results   Component Value Date    TSH 2.78 2017     No results found for: [id number]  Lab Results   Component Value Date    [id number] 327 02/10/2018     No results found for: FOLATE    Last Drug screen: 2019: +opiate, 19 + cocaine    Imagin2019:      FINDINGS:   BRAIN/VENTRICLES: There is no acute intracranial hemorrhage, mass effect or   midline shift.  No abnormal extra-axial fluid collection.  The gray-white   differentiation is maintained without evidence of an acute infarct.  There is   no evidence of hydrocephalus.       ORBITS: The visualized portion of the orbits demonstrate no acute abnormality.       SINUSES: The visualized paranasal sinuses and mastoid air cells demonstrate   no acute abnormality.       SOFT TISSUES/SKULL:  No acute abnormality of the visualized skull or soft   tissues.           Impression   No acute intracranial abnormality.         EK2020: rate 121 bpm, sinus tachycardia, Possible Left atrial enlargementIncomplete right bundle branch block, QTc 613ms         Cosme Cerda MD   Psychiatrist

## 2020-01-08 LAB
AMPHETAMINE SCREEN, URINE: ABNORMAL
BARBITURATE SCREEN URINE: ABNORMAL
BENZODIAZEPINE SCREEN, URINE: ABNORMAL
CANNABINOID SCREEN URINE: ABNORMAL
COCAINE METABOLITE SCREEN URINE: POSITIVE
GLUCOSE BLD-MCNC: 140 MG/DL (ref 70–99)
GLUCOSE BLD-MCNC: 159 MG/DL (ref 70–99)
GLUCOSE BLD-MCNC: 166 MG/DL (ref 70–99)
GLUCOSE BLD-MCNC: 187 MG/DL (ref 70–99)
Lab: ABNORMAL
METHADONE SCREEN, URINE: ABNORMAL
OPIATE SCREEN URINE: ABNORMAL
OXYCODONE URINE: ABNORMAL
PERFORMED ON: ABNORMAL
PH UA: 5
PHENCYCLIDINE SCREEN URINE: ABNORMAL
PLATELET # BLD: 177 K/UL (ref 135–450)
PROPOXYPHENE SCREEN: ABNORMAL

## 2020-01-08 PROCEDURE — 6370000000 HC RX 637 (ALT 250 FOR IP): Performed by: NURSE PRACTITIONER

## 2020-01-08 PROCEDURE — 94760 N-INVAS EAR/PLS OXIMETRY 1: CPT

## 2020-01-08 PROCEDURE — 6370000000 HC RX 637 (ALT 250 FOR IP): Performed by: HOSPITALIST

## 2020-01-08 PROCEDURE — 85049 AUTOMATED PLATELET COUNT: CPT

## 2020-01-08 PROCEDURE — 80307 DRUG TEST PRSMV CHEM ANLYZR: CPT

## 2020-01-08 PROCEDURE — 94640 AIRWAY INHALATION TREATMENT: CPT

## 2020-01-08 PROCEDURE — 36415 COLL VENOUS BLD VENIPUNCTURE: CPT

## 2020-01-08 PROCEDURE — 6370000000 HC RX 637 (ALT 250 FOR IP): Performed by: PSYCHIATRY & NEUROLOGY

## 2020-01-08 PROCEDURE — 2580000003 HC RX 258: Performed by: HOSPITALIST

## 2020-01-08 PROCEDURE — 99231 SBSQ HOSP IP/OBS SF/LOW 25: CPT | Performed by: PSYCHIATRY & NEUROLOGY

## 2020-01-08 PROCEDURE — 2500000003 HC RX 250 WO HCPCS: Performed by: NURSE PRACTITIONER

## 2020-01-08 PROCEDURE — 2060000000 HC ICU INTERMEDIATE R&B

## 2020-01-08 RX ORDER — LABETALOL HYDROCHLORIDE 5 MG/ML
20 INJECTION, SOLUTION INTRAVENOUS ONCE
Status: COMPLETED | OUTPATIENT
Start: 2020-01-08 | End: 2020-01-08

## 2020-01-08 RX ORDER — NIFEDIPINE 60 MG/1
60 TABLET, EXTENDED RELEASE ORAL 2 TIMES DAILY
Status: DISCONTINUED | OUTPATIENT
Start: 2020-01-08 | End: 2020-01-10

## 2020-01-08 RX ORDER — MINOXIDIL 2.5 MG/1
2.5 TABLET ORAL ONCE
Status: COMPLETED | OUTPATIENT
Start: 2020-01-08 | End: 2020-01-08

## 2020-01-08 RX ORDER — SPIRONOLACTONE 25 MG/1
50 TABLET ORAL 2 TIMES DAILY
Status: DISCONTINUED | OUTPATIENT
Start: 2020-01-08 | End: 2020-01-10 | Stop reason: HOSPADM

## 2020-01-08 RX ORDER — SPIRONOLACTONE 25 MG/1
25 TABLET ORAL DAILY
Status: DISCONTINUED | OUTPATIENT
Start: 2020-01-08 | End: 2020-01-08

## 2020-01-08 RX ORDER — DOXAZOSIN 2 MG/1
2 TABLET ORAL ONCE
Status: COMPLETED | OUTPATIENT
Start: 2020-01-08 | End: 2020-01-08

## 2020-01-08 RX ORDER — SPIRONOLACTONE 25 MG/1
25 TABLET ORAL 2 TIMES DAILY
Status: DISCONTINUED | OUTPATIENT
Start: 2020-01-08 | End: 2020-01-08

## 2020-01-08 RX ORDER — HYDROCHLOROTHIAZIDE 25 MG/1
25 TABLET ORAL DAILY
Status: DISCONTINUED | OUTPATIENT
Start: 2020-01-08 | End: 2020-01-10 | Stop reason: HOSPADM

## 2020-01-08 RX ADMIN — SERTRALINE 150 MG: 100 TABLET, FILM COATED ORAL at 21:46

## 2020-01-08 RX ADMIN — LABETALOL HYDROCHLORIDE 20 MG: 5 INJECTION INTRAVENOUS at 16:52

## 2020-01-08 RX ADMIN — FENOFIBRATE 54 MG: 54 TABLET ORAL at 09:26

## 2020-01-08 RX ADMIN — CARVEDILOL 25 MG: 25 TABLET, FILM COATED ORAL at 09:26

## 2020-01-08 RX ADMIN — Medication 500 MCG: at 09:26

## 2020-01-08 RX ADMIN — SPIRONOLACTONE 50 MG: 25 TABLET ORAL at 17:49

## 2020-01-08 RX ADMIN — ATORVASTATIN CALCIUM 40 MG: 40 TABLET, FILM COATED ORAL at 21:47

## 2020-01-08 RX ADMIN — ZOLPIDEM TARTRATE 10 MG: 5 TABLET ORAL at 21:47

## 2020-01-08 RX ADMIN — HYDROCHLOROTHIAZIDE 25 MG: 25 TABLET ORAL at 13:11

## 2020-01-08 RX ADMIN — INSULIN LISPRO 2 UNITS: 100 INJECTION, SOLUTION INTRAVENOUS; SUBCUTANEOUS at 17:49

## 2020-01-08 RX ADMIN — ACETAMINOPHEN 650 MG: 325 TABLET ORAL at 20:22

## 2020-01-08 RX ADMIN — HYDRALAZINE HYDROCHLORIDE 100 MG: 50 TABLET, FILM COATED ORAL at 06:26

## 2020-01-08 RX ADMIN — HYDRALAZINE HYDROCHLORIDE 100 MG: 50 TABLET, FILM COATED ORAL at 14:13

## 2020-01-08 RX ADMIN — SODIUM CHLORIDE, PRESERVATIVE FREE 10 ML: 5 INJECTION INTRAVENOUS at 09:27

## 2020-01-08 RX ADMIN — INSULIN LISPRO 2 UNITS: 100 INJECTION, SOLUTION INTRAVENOUS; SUBCUTANEOUS at 13:11

## 2020-01-08 RX ADMIN — CARVEDILOL 25 MG: 25 TABLET, FILM COATED ORAL at 17:49

## 2020-01-08 RX ADMIN — NIFEDIPINE 90 MG: 90 TABLET, FILM COATED, EXTENDED RELEASE ORAL at 09:26

## 2020-01-08 RX ADMIN — AMITRIPTYLINE HYDROCHLORIDE 50 MG: 50 TABLET, FILM COATED ORAL at 21:47

## 2020-01-08 RX ADMIN — NIFEDIPINE 60 MG: 60 TABLET, FILM COATED, EXTENDED RELEASE ORAL at 21:47

## 2020-01-08 RX ADMIN — HYDRALAZINE HYDROCHLORIDE 100 MG: 50 TABLET, FILM COATED ORAL at 21:47

## 2020-01-08 RX ADMIN — ISOSORBIDE MONONITRATE 30 MG: 30 TABLET, EXTENDED RELEASE ORAL at 17:49

## 2020-01-08 RX ADMIN — MELATONIN 1000 UNITS: at 09:26

## 2020-01-08 RX ADMIN — Medication 2 PUFF: at 08:55

## 2020-01-08 RX ADMIN — MINOXIDIL 2.5 MG: 2.5 TABLET ORAL at 10:34

## 2020-01-08 RX ADMIN — DIAZEPAM 5 MG: 5 TABLET ORAL at 20:22

## 2020-01-08 RX ADMIN — INSULIN LISPRO 2 UNITS: 100 INJECTION, SOLUTION INTRAVENOUS; SUBCUTANEOUS at 09:27

## 2020-01-08 RX ADMIN — ASPIRIN 81 MG 81 MG: 81 TABLET ORAL at 09:26

## 2020-01-08 RX ADMIN — Medication 2 PUFF: at 21:43

## 2020-01-08 RX ADMIN — LISINOPRIL 40 MG: 40 TABLET ORAL at 09:26

## 2020-01-08 RX ADMIN — DOXAZOSIN 2 MG: 2 TABLET ORAL at 17:49

## 2020-01-08 RX ADMIN — SODIUM CHLORIDE, PRESERVATIVE FREE 10 ML: 5 INJECTION INTRAVENOUS at 21:52

## 2020-01-08 ASSESSMENT — PAIN DESCRIPTION - ORIENTATION: ORIENTATION: ANTERIOR

## 2020-01-08 ASSESSMENT — PAIN SCALES - GENERAL
PAINLEVEL_OUTOF10: 1
PAINLEVEL_OUTOF10: 0
PAINLEVEL_OUTOF10: 6

## 2020-01-08 ASSESSMENT — PAIN DESCRIPTION - PAIN TYPE: TYPE: ACUTE PAIN

## 2020-01-08 ASSESSMENT — PAIN DESCRIPTION - LOCATION: LOCATION: HEAD

## 2020-01-08 ASSESSMENT — PAIN DESCRIPTION - FREQUENCY: FREQUENCY: INTERMITTENT

## 2020-01-08 ASSESSMENT — PAIN DESCRIPTION - ONSET: ONSET: GRADUAL

## 2020-01-08 ASSESSMENT — PAIN - FUNCTIONAL ASSESSMENT: PAIN_FUNCTIONAL_ASSESSMENT: ACTIVITIES ARE NOT PREVENTED

## 2020-01-08 ASSESSMENT — PAIN DESCRIPTION - DESCRIPTORS: DESCRIPTORS: ACHING;THROBBING

## 2020-01-08 NOTE — PROGRESS NOTES
BP responded nicely to labetalol, if needed later please administer again.       Dottie Lima, APRN - CNP 1/8/2020 5:42 PM

## 2020-01-08 NOTE — CONSULTS
Office: [phone]       Fax: [phone]      Nephrology Initial Consult Note        Patient's Name: Danielle Hatch  Date of Visit: 1/8/2020    Reason for Consult:  High blood pressure   Requesting Physician:  Kay Romero MD  PCP: Sakina Meraz MD      Chief Complaint:  chest pain      History of Present Ilness:    Danielle Hatch is a 59 y.o. male with PMHx of hypertension, COPD, sub abuse, coronary artery disease, DM, hyperlipidemia who was hospitalized on 1/4/2020 with complaints of chest pain. blood pressure has been running high in last few days. Currently on Carvedilol 25 mg bid, Lisinopril 40 mg/d, nifedipine 90 mg/d. Received Clonidine 0.1 mg on 1/7 and Minoxidil 2.5 mg 1/8/2020. Hydralazine increased from 25 mg tid to 50 mg tid on 1/6, then to 100 mg tid on 1/7  Renal doppler in April 2019: No evidence of hemodynamically significant stenosis in either renal artery  In past PRA, ary have been unremarkable. Reports some headache. No current chest pain. Admitted to snorting cocaine on 1/3. Says he was stressed out after death of his brother. Reported history of obstructive sleep apnea but not on CPAP    Past Medical History:   Diagnosis Date    CAD (coronary artery disease)     Chest pain 10/15/2017    Cocaine abuse ([address]     [facility]     Diabetes mellitus [facility]     Hyperlipidemia     Hypertension     Kidney stone     MRSA colonization 4/16/19; 03/24/2019    Sleep apnea     He said he doesn't use a CPAP machine.        Past Surgical History:   Procedure Laterality Date    ABDOMEN SURGERY      CARDIAC SURGERY      3 STENTS PLACED     CHOLECYSTECTOMY      COLONOSCOPY      CORONARY ANGIOPLASTY WITH STENT PLACEMENT      3 stents     FRACTURE SURGERY      RIGHT ANKLE    LITHOTRIPSY      (TYLENOL) tablet 650 mg, Q4H PRN  zolpidem (AMBIEN) tablet 10 mg, Nightly PRN        Review of Systems:   All systems reviewed but were negative except as mentioned in HPI      Physical exam:     Vitals:  BP (!) 194/99   Pulse 76   Temp 97.7 °F (36.5 °C) (Oral)   Resp 18   Ht 5' 11\" (1.803 m)   Wt 243 lb 2.7 oz (110.3 kg)   SpO2 95%   BMI 33.91 kg/m²   Constitutional:  awake, NAD  Skin: no rash, turgor wnl  HEENT:  MMM, No icterus  Neck: no bruits, No JVD  Cardiovascular:  S1, S2 reg  Respiratory: CTA, no crackles  Abdomen:  +BS, soft, NT, ND  Ext: no lower extremity edema  Psychiatric: mood and affect appropriate  Skin: dry/intact  CNS: alert, no agitation    Data:     Labs:  CBC:   Recent Labs     20  0508 20  0519   WBC 4.2  --    HGB 12.4*  --     177     BMP:   Recent Labs     20  0508      K 4.2   CL 99   CO2 26   BUN 22*   CREATININE 1.0   GLUCOSE 202*     Ca/Mg/Phos:   Recent Labs     20  0508   CALCIUM 8.5         IMAGING:  NM Cardiac Stress Test Nuclear Imaging   Final Result      XR CHEST STANDARD (2 VW)   Final Result   No acute abnormality detected. Assessment :       1. Uncontrolled HTN  -Previous workup for renovascular/sec hypertension unremarkable    -Electrolytes: no dyskalemia  -Acid-Base: bicarb 26  -Volume: appears euvol    2. Chest pain  -underlying CAD  -medical management    3. DM  -fair glycemic control    Plan:     -increase Nifedipine 60 mg bid  -Spironolactone 25 mg bid  -HCTZ 25 mg daily  -AM BMP    -Monitor I/O, UOP  -Maintain MAP>65  -Avoid nephrotoxin, if able. Thank you for allowing us to participate in care of Lizbeth Apgar . We will continue to follow. Feel free to contact me with any questions.     Jonn Hogan  2020    [facility] [address]  Office : [phone]  Fax :[phone]

## 2020-01-08 NOTE — PLAN OF CARE
Problem: Falls - Risk of:  Goal: Will remain free from falls  Description  Will remain free from falls  Outcome: Ongoing  Fall risk precautions in place. Bed in lowest position with wheels locked,bed alarm in place and activated, non-skid socks on pt, fall risk ID on pt, call light in reach, pt encouraged to call before getting out of bed and for any other needs or complaints. Problem: Tissue Perfusion - Cardiopulmonary, Altered:  Goal: Absence of angina  Description  Absence of angina  Outcome: Ongoing  Pain/discomfort being managed with PRN analgesics per MD orders. Pt able to express presence and absence of pain and rate pain appropriately using numerical scale.

## 2020-01-08 NOTE — PROGRESS NOTES
cooperative  Speech    spontaneous, normal rate and normal volume  Mood/Affect    Depressed / fair motility and range  Thought Process    linear, goal directed and coherent  Thought Content    intact , no delusions, future oriented, no suicidal ideation  Associations    logical connections  Attention/Concentration    intact  Orientation    oriented to person, place, time, and general circumstances  Memory    recent and remote memory intact  Insight/Judgement    Good / Intact    Labs:  Lab Results   Component Value Date    CREATININE 1.0 01/06/2020    BUN 22 (H) 01/06/2020     01/06/2020    K 4.2 01/06/2020    CL 99 01/06/2020    CO2 26 01/06/2020     Lab Results   Component Value Date    WBC 4.2 01/06/2020    HGB 12.4 (L) 01/06/2020    HCT 37.4 (L) 01/06/2020    MCV 87.6 01/06/2020     01/08/2020     Lab Results   Component Value Date    ALT 35 01/04/2020    AST 29 01/04/2020    ALKPHOS 126 01/04/2020    BILITOT 0.5 01/04/2020     EKG 1/7/2020: rate 87 bpm, NSR, partial RBBB, QTc 438ms    A:   58 yo M with depression and some SI, no intent or plan. SI better today. Blood pressure still out of control, probably contributing to his headache. EKG with better QT. 1. Major depressive disorder, recurrent, severe  2. R.o substance induced mood disorder  3. R/o substance use disorder    Recommendations:   1. Increase sertraline to 150mg qhs  2. Continue elavil 50mg qhs  3. Continue zolpidem 10mg qhs  4. Follow through with [facility] program at [facility] after discharged as planned    Dispo: does not require inpatient psych  Safety: RF include male, mood disorder, past self harm attempt, chronic medical issues, substance abuse. Pt is high risk for future dangerousness to self, however at this time he is future oriented, does not report any intent, plan ,immediacy or urgency for self harm and is not currently an imminent safety risk.  He has a reasonable follow up plan in place    Thank you for this consult, please call the psychiatry consult line for further questions. I will sign off at this time.      Jennifer Navarro MD  Psychiatrist

## 2020-01-08 NOTE — PROGRESS NOTES
Naproxen    Past medical and surgical history reviewed. Any changes have been noted. Scheduled and prn Medications:    Scheduled Meds:   NIFEdipine  60 mg Oral BID    hydrochlorothiazide  25 mg Oral Daily    sertraline  150 mg Oral Nightly    doxazosin  2 mg Oral Once    spironolactone  50 mg Oral BID    hydrALAZINE  100 mg Oral 3 times per day    amitriptyline  50 mg Oral Nightly    lisinopril  40 mg Oral Daily    carvedilol  25 mg Oral BID WC    insulin lispro  0-12 Units Subcutaneous TID WC    insulin lispro  0-6 Units Subcutaneous Nightly    fenofibrate  54 mg Oral Daily    isosorbide mononitrate  30 mg Oral QPM    mometasone-formoterol  2 puff Inhalation BID    vitamin B-12  500 mcg Oral Daily    Vitamin D  1,000 Units Oral Daily    sodium chloride flush  10 mL Intravenous 2 times per day    atorvastatin  40 mg Oral Nightly    aspirin  81 mg Oral Daily     Continuous Infusions:   dextrose       PRN Meds:.diazepam, perflutren lipid microspheres, albuterol sulfate HFA, sodium chloride flush, magnesium hydroxide, ondansetron, glucose, dextrose, glucagon (rDNA), dextrose, acetaminophen, zolpidem    PHYSICAL EXAM:  BP (!) 210/94   Pulse 75   Temp 98.2 °F (36.8 °C) (Oral)   Resp 18   Ht 5' 11\" (1.803 m)   Wt 243 lb 2.7 oz (110.3 kg)   SpO2 96%   BMI 33.91 kg/m²       Intake/Output Summary (Last 24 hours) at 1/8/2020 1725  Last data filed at 1/8/2020 1448  Gross per 24 hour   Intake 960 ml   Output --   Net 960 ml       General: Alert and oriented. Resting in bed,  Obese, calm without acute distress noted  HEENT: Normocephalic. Atraumatic. Pupils equal and reactive. EOM intact. Oral mucosa pink/moist/intact. Neck: Supple. Symmetrical. Trachea midline. Lungs: Clear to auscultation bilaterally. Respirations even and unlabored. Chest: Exam unremarkable. Cardiac: S1/S2 noted. Regular Rhythm and rate. Abdomen/GI: Soft. Non-tender. Non-distended. BS+. Extremities: PP+. Atraumatic.  No previously having suicidal ideations  -Not currently having any suicidal ideations but states that his mental health is \"not right. \"  -He goes on to state that he goes to work and comes home and sits in his chair and has severe depressed thoughts.  -Psychiatry consulted for recommendations, patient asking for inpatient evaluation, will follow up right after discharge    Mild ALEA  -Likely related to cocaine use  -Hydrated with IV fluids and improved  -Stop fluids  -improved    CAD with PCI  -Stents placed approximately 13 years ago in [place] per chart review  -Angiogram 2017, noted to have widely patent stents at that time. No intervention performed  -Cardiology following  -On beta-blocker, aspirin, statin  -Isosorbide, ACE    Cocaine use  -Discussed cessation  -States that he was using to \"numb his pain. \"    Other comorbidities:  COPD without acute exacerbation  ARTEMIO noncompliant with CPAP  Essential hypertension  Hyperlipidemia, unspecified      Body mass index is 33.91 kg/m². The patient and / or the family were informed of the results of any tests, a time was given to answer questions, a plan was proposed and they agreed with plan.     DVT prophylaxis: [x] Lovenox  [] SQ Heparin  [] SCDs because of  [] warfarin/oral direct thrombin inhibitor [] Encourage ambulation    GI prophylaxis: [] PPI/[id number]  [x] not indicated    Probiotic if on abx: [] Yes [] No [x] Not Indicated    Diet: DIET CARDIAC; Carb Control: 5 carb choices (75 gms)/meal; No Caffeine    Consults:  IP CONSULT TO CARDIOLOGY  IP CONSULT TO PSYCHIATRY  IP CONSULT TO NEPHROLOGY    Disposition:  [] Home  [] Home with home health [] Rehab [] Psych [] SNF  [] LTAC  [] Long term nursing home or group home [] Transfer to ICU  [] Transfer to PCU [] Other:    Code Status: Full Code    ELOS: tbd      Malcolm Mcclure, EDMOND - CNP  01/08/20

## 2020-01-09 LAB
ALBUMIN SERPL-MCNC: 4.2 G/DL (ref 3.4–5)
ANION GAP SERPL CALCULATED.3IONS-SCNC: 16 MMOL/L (ref 3–16)
BUN BLDV-MCNC: 19 MG/DL (ref 7–20)
CALCIUM SERPL-MCNC: 9.5 MG/DL (ref 8.3–10.6)
CHLORIDE BLD-SCNC: 98 MMOL/L (ref 99–110)
CO2: 24 MMOL/L (ref 21–32)
CREAT SERPL-MCNC: 0.9 MG/DL (ref 0.8–1.3)
GFR AFRICAN AMERICAN: >60
GFR NON-AFRICAN AMERICAN: >60
GLUCOSE BLD-MCNC: 144 MG/DL (ref 70–99)
GLUCOSE BLD-MCNC: 185 MG/DL (ref 70–99)
GLUCOSE BLD-MCNC: 210 MG/DL (ref 70–99)
GLUCOSE BLD-MCNC: 216 MG/DL (ref 70–99)
GLUCOSE BLD-MCNC: 217 MG/DL (ref 70–99)
GLUCOSE BLD-MCNC: 244 MG/DL (ref 70–99)
PERFORMED ON: ABNORMAL
PHOSPHORUS: 3.9 MG/DL (ref 2.5–4.9)
POTASSIUM SERPL-SCNC: 4.4 MMOL/L (ref 3.5–5.1)
SODIUM BLD-SCNC: 138 MMOL/L (ref 136–145)

## 2020-01-09 PROCEDURE — 6370000000 HC RX 637 (ALT 250 FOR IP): Performed by: NURSE PRACTITIONER

## 2020-01-09 PROCEDURE — 2580000003 HC RX 258: Performed by: HOSPITALIST

## 2020-01-09 PROCEDURE — 6370000000 HC RX 637 (ALT 250 FOR IP): Performed by: HOSPITALIST

## 2020-01-09 PROCEDURE — 6370000000 HC RX 637 (ALT 250 FOR IP): Performed by: PSYCHIATRY & NEUROLOGY

## 2020-01-09 PROCEDURE — 94640 AIRWAY INHALATION TREATMENT: CPT

## 2020-01-09 PROCEDURE — 2000000000 HC ICU R&B

## 2020-01-09 PROCEDURE — 94760 N-INVAS EAR/PLS OXIMETRY 1: CPT

## 2020-01-09 PROCEDURE — 36415 COLL VENOUS BLD VENIPUNCTURE: CPT

## 2020-01-09 PROCEDURE — 2500000003 HC RX 250 WO HCPCS: Performed by: NURSE PRACTITIONER

## 2020-01-09 PROCEDURE — 80069 RENAL FUNCTION PANEL: CPT

## 2020-01-09 PROCEDURE — 2580000003 HC RX 258: Performed by: NURSE PRACTITIONER

## 2020-01-09 RX ORDER — DOXAZOSIN 2 MG/1
2 TABLET ORAL EVERY 12 HOURS SCHEDULED
Status: DISCONTINUED | OUTPATIENT
Start: 2020-01-09 | End: 2020-01-10 | Stop reason: HOSPADM

## 2020-01-09 RX ORDER — LABETALOL HYDROCHLORIDE 5 MG/ML
20 INJECTION, SOLUTION INTRAVENOUS ONCE
Status: COMPLETED | OUTPATIENT
Start: 2020-01-09 | End: 2020-01-09

## 2020-01-09 RX ORDER — BUTALBITAL, ACETAMINOPHEN AND CAFFEINE 50; 325; 40 MG/1; MG/1; MG/1
1 TABLET ORAL EVERY 6 HOURS PRN
Status: DISCONTINUED | OUTPATIENT
Start: 2020-01-09 | End: 2020-01-10 | Stop reason: HOSPADM

## 2020-01-09 RX ADMIN — DOXAZOSIN 2 MG: 2 TABLET ORAL at 08:36

## 2020-01-09 RX ADMIN — HYDRALAZINE HYDROCHLORIDE 100 MG: 50 TABLET, FILM COATED ORAL at 14:39

## 2020-01-09 RX ADMIN — Medication 2 PUFF: at 09:10

## 2020-01-09 RX ADMIN — HYDROCHLOROTHIAZIDE 25 MG: 25 TABLET ORAL at 08:36

## 2020-01-09 RX ADMIN — ASPIRIN 81 MG 81 MG: 81 TABLET ORAL at 08:36

## 2020-01-09 RX ADMIN — SODIUM CHLORIDE 5 MG/HR: 9 INJECTION, SOLUTION INTRAVENOUS at 17:35

## 2020-01-09 RX ADMIN — MELATONIN 1000 UNITS: at 08:36

## 2020-01-09 RX ADMIN — SPIRONOLACTONE 50 MG: 25 TABLET ORAL at 17:46

## 2020-01-09 RX ADMIN — ATORVASTATIN CALCIUM 40 MG: 40 TABLET, FILM COATED ORAL at 20:51

## 2020-01-09 RX ADMIN — LABETALOL HYDROCHLORIDE 20 MG: 5 INJECTION INTRAVENOUS at 16:59

## 2020-01-09 RX ADMIN — NIFEDIPINE 60 MG: 60 TABLET, FILM COATED, EXTENDED RELEASE ORAL at 08:36

## 2020-01-09 RX ADMIN — SERTRALINE 150 MG: 100 TABLET, FILM COATED ORAL at 20:50

## 2020-01-09 RX ADMIN — LISINOPRIL 40 MG: 40 TABLET ORAL at 08:36

## 2020-01-09 RX ADMIN — ACETAMINOPHEN 650 MG: 325 TABLET ORAL at 08:35

## 2020-01-09 RX ADMIN — Medication 500 MCG: at 08:36

## 2020-01-09 RX ADMIN — ISOSORBIDE MONONITRATE 30 MG: 30 TABLET, EXTENDED RELEASE ORAL at 17:46

## 2020-01-09 RX ADMIN — HYDRALAZINE HYDROCHLORIDE 100 MG: 50 TABLET, FILM COATED ORAL at 05:05

## 2020-01-09 RX ADMIN — Medication 2 PUFF: at 21:16

## 2020-01-09 RX ADMIN — LABETALOL HYDROCHLORIDE 300 MG: 200 TABLET, FILM COATED ORAL at 20:51

## 2020-01-09 RX ADMIN — ZOLPIDEM TARTRATE 10 MG: 5 TABLET ORAL at 22:25

## 2020-01-09 RX ADMIN — DOXAZOSIN 2 MG: 2 TABLET ORAL at 20:50

## 2020-01-09 RX ADMIN — FENOFIBRATE 54 MG: 54 TABLET ORAL at 08:45

## 2020-01-09 RX ADMIN — INSULIN LISPRO 4 UNITS: 100 INJECTION, SOLUTION INTRAVENOUS; SUBCUTANEOUS at 17:49

## 2020-01-09 RX ADMIN — CARVEDILOL 25 MG: 25 TABLET, FILM COATED ORAL at 08:35

## 2020-01-09 RX ADMIN — HYDRALAZINE HYDROCHLORIDE 100 MG: 50 TABLET, FILM COATED ORAL at 22:25

## 2020-01-09 RX ADMIN — INSULIN LISPRO 2 UNITS: 100 INJECTION, SOLUTION INTRAVENOUS; SUBCUTANEOUS at 20:49

## 2020-01-09 RX ADMIN — BUTALBITAL, ACETAMINOPHEN, AND CAFFEINE 1 TABLET: 50; 325; 40 TABLET ORAL at 19:54

## 2020-01-09 RX ADMIN — LABETALOL HYDROCHLORIDE 300 MG: 200 TABLET, FILM COATED ORAL at 08:45

## 2020-01-09 RX ADMIN — INSULIN LISPRO 2 UNITS: 100 INJECTION, SOLUTION INTRAVENOUS; SUBCUTANEOUS at 08:35

## 2020-01-09 RX ADMIN — SODIUM CHLORIDE, PRESERVATIVE FREE 10 ML: 5 INJECTION INTRAVENOUS at 08:39

## 2020-01-09 RX ADMIN — SODIUM CHLORIDE, PRESERVATIVE FREE 10 ML: 5 INJECTION INTRAVENOUS at 22:26

## 2020-01-09 RX ADMIN — SPIRONOLACTONE 50 MG: 25 TABLET ORAL at 08:36

## 2020-01-09 RX ADMIN — AMITRIPTYLINE HYDROCHLORIDE 50 MG: 50 TABLET, FILM COATED ORAL at 22:25

## 2020-01-09 RX ADMIN — INSULIN LISPRO 4 UNITS: 100 INJECTION, SOLUTION INTRAVENOUS; SUBCUTANEOUS at 12:40

## 2020-01-09 ASSESSMENT — PAIN SCALES - GENERAL
PAINLEVEL_OUTOF10: 6
PAINLEVEL_OUTOF10: 0
PAINLEVEL_OUTOF10: 4
PAINLEVEL_OUTOF10: 0
PAINLEVEL_OUTOF10: 5

## 2020-01-09 ASSESSMENT — PAIN DESCRIPTION - PAIN TYPE
TYPE: ACUTE PAIN
TYPE: ACUTE PAIN

## 2020-01-09 ASSESSMENT — PAIN DESCRIPTION - ORIENTATION
ORIENTATION: ANTERIOR;MID
ORIENTATION: INNER

## 2020-01-09 ASSESSMENT — PAIN DESCRIPTION - PROGRESSION
CLINICAL_PROGRESSION: GRADUALLY WORSENING
CLINICAL_PROGRESSION: GRADUALLY WORSENING

## 2020-01-09 ASSESSMENT — PAIN DESCRIPTION - FREQUENCY
FREQUENCY: CONTINUOUS
FREQUENCY: INTERMITTENT

## 2020-01-09 ASSESSMENT — PAIN DESCRIPTION - ONSET
ONSET: ON-GOING
ONSET: ON-GOING

## 2020-01-09 ASSESSMENT — PAIN DESCRIPTION - LOCATION
LOCATION: HEAD
LOCATION: HEAD

## 2020-01-09 ASSESSMENT — PAIN DESCRIPTION - DESCRIPTORS
DESCRIPTORS: HEADACHE;ACHING
DESCRIPTORS: HEADACHE

## 2020-01-09 NOTE — PLAN OF CARE
improve  Outcome: Ongoing     Problem: Cardiac:  Goal: Hemodynamic stability will improve  Description  Hemodynamic stability will improve  1/9/2020 0959 by Alyce Levine RN  Outcome: Ongoing     Problem: Cardiac:  Goal: Complications related to the disease process, condition or treatment will be avoided or minimized  Description  Complications related to the disease process, condition or treatment will be avoided or minimized  1/9/2020 0959 by Alyce Levine RN  Outcome: Ongoing     Problem: Health Behavior:  Goal: Identification of resources available to assist in meeting health care needs will improve  Description  Identification of resources available to assist in meeting health care needs will improve  1/9/2020 0959 by Alyce Levine RN  Outcome: Ongoing

## 2020-01-09 NOTE — PROGRESS NOTES
PO labetolol given. BP rechecked in 1 hour and is 188/112.  Will notify MD.     Electronically signed by Sarah Israel RN on 1/9/2020 at 9:45 AM

## 2020-01-09 NOTE — PROGRESS NOTES
Hospital Medicine Progress Note      Admit Date: 1/4/2020         Overnight Events: none    CC: F/U for chest pain    HPI:   This is a 28-year-old male with a history of CAD, COPD, hypertension, hyperlipidemia, diabetes, ARTEMIO who presented to the ED with complaints of chest pain. Symptoms were associated with the use of cocaine. Troponins were mildly elevated. Cardiology was consulted. Started on IV fluids secondary to a mild ALEA with a creatinine of 1.4, now 1. Stress test was negative for acute ischemia. On my evaluation yesterday patient complained of severe depression and psychiatry was consulted. He was given a referral for an outpatient center for continued care as an outpatient. No current suicidal ideations. Attempted to discharge again today however blood pressure still out of control,has had normal renal Doppler within the past year. Today I consulted nephrology as his blood pressure is still not maintaining. Medications were without much improvement. Interval History/Subjective:   Co headaches now. Denies fevers, chills,cp, sob, nausea vomiting or other sx    Review of Systems:     Comprehensive ROS negative except as mentioned above. Past Medical History:        Diagnosis Date    CAD (coronary artery disease)     Chest pain 10/15/2017    [facility]     [facility]     Diabetes mellitus [facility]     Hyperlipidemia     Hypertension     Kidney stone     MRSA colonization 4/16/19; 03/24/2019    Sleep apnea     He said he doesn't use a CPAP machine.        Past Surgical History:        Procedure Laterality Date    ABDOMEN SURGERY      CARDIAC SURGERY      3 STENTS PLACED     CHOLECYSTECTOMY      COLONOSCOPY      CORONARY ANGIOPLASTY WITH STENT PLACEMENT      3 stents     FRACTURE SURGERY      RIGHT ANKLE    LITHOTRIPSY      SHOULDER ARTHROSCOPY      VASCULAR SURGERY         Allergies:  Naproxen    Past medical and rate.  Abdomen/GI: Soft. Non-tender. Non-distended. BS+. Extremities: PP+. Atraumatic. No redness/cyanosis/edema noted. Brisk cap refill. Skin: Dry and intact. No lesions noted. Neuro: Grossly intact. No focal deficits noted. No change to his assessment. Calm sitting in bed, NAD    LABS:    Lab Results   Component Value Date    WBC 4.2 01/06/2020    HGB 12.4 (L) 01/06/2020    HCT 37.4 (L) 01/06/2020    MCV 87.6 01/06/2020     01/08/2020    LYMPHOPCT 29.9 01/06/2020    RBC 4.27 01/06/2020    MCH 29.1 01/06/2020    MCHC 33.2 01/06/2020    RDW 13.7 01/06/2020       Lab Results   Component Value Date    CREATININE 0.9 01/09/2020    BUN 19 01/09/2020     01/09/2020    K 4.4 01/09/2020    CL 98 (L) 01/09/2020    CO2 24 01/09/2020       Lab Results   Component Value Date    MG 1.30 04/16/2019       Lab Results   Component Value Date    ALT 35 01/04/2020    AST 29 01/04/2020    ALKPHOS 126 01/04/2020    BILITOT 0.5 01/04/2020        No flowsheet data found. Imaging:  NM Cardiac Stress Test Nuclear Imaging   Final Result      XR CHEST STANDARD (2 VW)   Final Result   No acute abnormality detected. Assessment & Plan:        Hypertensive Urgency/Emergency  - BP continues to remain elevated even after multiple titrations of Coreg, hydralazine, a dose of clonidine, minoxidil, added Aldactone and increase Procardia  -Nephrology consulted. -Renal artery Doppler noted from April 2018 with no significant stenosis. No need to repeat at this time. - added lasix, increase aldactone dosing.   - IV dose of labetolol seemed to help and Coreg was changed to PO labetolol. Cardura was added by Nephrology. - His bp still without improvement and now with headache, transferred to the ICU for cardene gtt.      Atypical chest pain  -Could be related to cocaine use versus vasospasm secondary to use  -Cardiology consulted  -Commending stress test at this time, will have today-negative for ischemia  -Troponins have stabilized at 0.03  -Stress test March 2019  -Started on heparin drip initially however this was discontinued    Severe depression  -Patient complains of previously having suicidal ideations  -Not currently having any suicidal ideations but states that his mental health is \"not right. \"  -He goes on to state that he goes to work and comes home and sits in his chair and has severe depressed thoughts.  -Psychiatry consulted for recommendations, patient asking for inpatient evaluation, will follow up right after discharge    Mild ALEA  -Likely related to cocaine use  -Hydrated with IV fluids and improved  -Stop fluids  -improved    CAD with PCI  -Stents placed approximately 13 years ago in [place] per chart review  -Angiogram 2017, noted to have widely patent stents at that time. No intervention performed  -Cardiology following  -On beta-blocker, aspirin, statin  -Isosorbide, ACE    Cocaine use  -Discussed cessation  -States that he was using to \"numb his pain. \"    Other comorbidities:  COPD without acute exacerbation  ARTEMIO noncompliant with CPAP  Essential hypertension  Hyperlipidemia, unspecified      Body mass index is 33.42 kg/m². The patient and / or the family were informed of the results of any tests, a time was given to answer questions, a plan was proposed and they agreed with plan.     DVT prophylaxis: [x] Lovenox  [] SQ Heparin  [] SCDs because of  [] warfarin/oral direct thrombin inhibitor [] Encourage ambulation    GI prophylaxis: [] PPI/[id number]  [x] not indicated    Probiotic if on abx: [] Yes [] No [x] Not Indicated    Diet: DIET CARDIAC; Carb Control: 5 carb choices (75 gms)/meal; No Caffeine    Consults:  IP CONSULT TO CARDIOLOGY  IP CONSULT TO PSYCHIATRY  IP CONSULT TO NEPHROLOGY    Disposition:  [] Home  [] Home with home health [] Rehab [] Psych [] SNF  [] LTAC  [] Long term nursing home or group home [] Transfer to ICU  [] Transfer to PCU [] Other:    Code Status: Full Code    ELOS: EDMOND Medeiros - CNP  01/09/20

## 2020-01-09 NOTE — PROGRESS NOTES
Pt transferred to 2105. Pt belongings taken with. Bedside report to be given to ICU RN.      Electronically signed by Ramirez Rhodes RN on 1/9/2020 at 5:29 PM

## 2020-01-09 NOTE — PROGRESS NOTES
 Vitamin D  1,000 Units Oral Daily    sodium chloride flush  10 mL Intravenous 2 times per day    atorvastatin  40 mg Oral Nightly    aspirin  81 mg Oral Daily     Continuous Infusions:   dextrose         Labs:  CBC:   Recent Labs     01/08/20  0519        BMP:    Recent Labs     01/09/20  0451      K 4.4   CL 98*   CO2 24   BUN 19   CREATININE 0.9   GLUCOSE 185*     Ca/Mg/Phos:   Recent Labs     01/09/20  0451   CALCIUM 9.5   PHOS 3.9     Hepatic: No results for input(s): AST, ALT, ALB, BILITOT, ALKPHOS in the last 72 hours. UA:  Recent Labs     01/08/20  1425   PHUR 5.0      Urine Microscopic: No results for input(s): LABCAST, BACTERIA, COMU, HYALCAST, WBCUA, RBCUA, EPIU in the last 72 hours. Urine Chemistry: No results for input(s): Lysbeth Gallon, PROTEINUR, NAUR in the last 72 hours. Objective:   Vitals: BP (!) 188/112   Pulse 72   Temp 97.6 °F (36.4 °C) (Oral)   Resp 16   Ht 5' 11\" (1.803 m)   Wt 242 lb 1 oz (109.8 kg)   SpO2 98%   BMI 33.76 kg/m²    Wt Readings from Last 3 Encounters:   01/09/20 242 lb 1 oz (109.8 kg)   12/07/19 242 lb 11.6 oz (110.1 kg)   08/22/19 244 lb 7.8 oz (110.9 kg)      24HR INTAKE/OUTPUT:      Intake/Output Summary (Last 24 hours) at 1/9/2020 9251  Last data filed at 1/9/2020 0548  Gross per 24 hour   Intake 1080 ml   Output --   Net 1080 ml     Constitutional:  Alert, awake, no apparent distress  NECK: supple, no JVD  Cardiovascular:  S1, S2 reg  Respiratory:  CTA bilaterally   Abdomen: +bs, soft, NT  Ext: no LE edema  CNS: awake and alert    IMAGING:  NM Cardiac Stress Test Nuclear Imaging   Final Result      XR CHEST STANDARD (2 VW)   Final Result   No acute abnormality detected. Assessment :     . Uncontrolled HTN  -Previous workup for renovascular/sec hypertension unremarkable  -history of sub abuse. Recent cocaine use     -Electrolytes: no dyskalemia  -Acid-Base: bicarb 26  -Volume: appears euvol     2.  Chest pain  -underlying

## 2020-01-09 NOTE — PROGRESS NOTES
1738- pt walked self to ICU bed and was attached to ICU monitors. Pt A&O x 4 and steady on feet. Pt denies pain at this time. Will start cardene gtt per order. 1817- Cardene gtt stopped. Pt c/o headache and requesting medication other than tylenol because pt states tylenol doesn't work for him    [phone] [id number]- Critical care notified via perfect serve of pt's transfer to ICU    1855- Leonidas Jeffries NP updated on pt's BP and pt's request for medication for his headaches. New order rec'd. See MAR.     1905- Pt up to bathroom. Stedy on feet. Tolerated well.      1- Report given to Scarlet Elizondo, RN    Electronically signed by Mike Aragon RN on 1/9/2020 at 7:38 PM

## 2020-01-09 NOTE — PROGRESS NOTES
Spoke with Dr. Vivien Way, will transfer to ICU for Cardene gtt.    Marquis Greene, EDMOND - CNP 1/9/2020 4:15 PM

## 2020-01-10 VITALS
WEIGHT: 238.54 LBS | SYSTOLIC BLOOD PRESSURE: 121 MMHG | BODY MASS INDEX: 33.4 KG/M2 | RESPIRATION RATE: 17 BRPM | TEMPERATURE: 98 F | OXYGEN SATURATION: 89 % | DIASTOLIC BLOOD PRESSURE: 63 MMHG | HEIGHT: 71 IN | HEART RATE: 84 BPM

## 2020-01-10 LAB
GLUCOSE BLD-MCNC: 145 MG/DL (ref 70–99)
GLUCOSE BLD-MCNC: 201 MG/DL (ref 70–99)
PERFORMED ON: ABNORMAL
PERFORMED ON: ABNORMAL
PLATELET # BLD: 192 K/UL (ref 135–450)

## 2020-01-10 PROCEDURE — 36415 COLL VENOUS BLD VENIPUNCTURE: CPT

## 2020-01-10 PROCEDURE — 6370000000 HC RX 637 (ALT 250 FOR IP): Performed by: NURSE PRACTITIONER

## 2020-01-10 PROCEDURE — 94640 AIRWAY INHALATION TREATMENT: CPT

## 2020-01-10 PROCEDURE — 6370000000 HC RX 637 (ALT 250 FOR IP): Performed by: HOSPITALIST

## 2020-01-10 PROCEDURE — 94760 N-INVAS EAR/PLS OXIMETRY 1: CPT

## 2020-01-10 PROCEDURE — 2580000003 HC RX 258: Performed by: HOSPITALIST

## 2020-01-10 PROCEDURE — 85049 AUTOMATED PLATELET COUNT: CPT

## 2020-01-10 RX ORDER — HYDRALAZINE HYDROCHLORIDE 100 MG/1
100 TABLET, FILM COATED ORAL EVERY 8 HOURS SCHEDULED
Qty: 90 TABLET | Refills: 3 | Status: ON HOLD | OUTPATIENT
Start: 2020-01-10 | End: 2020-02-12

## 2020-01-10 RX ORDER — HYDROCHLOROTHIAZIDE 25 MG/1
25 TABLET ORAL DAILY
Qty: 30 TABLET | Refills: 3 | Status: ON HOLD | OUTPATIENT
Start: 2020-01-11 | End: 2020-02-12

## 2020-01-10 RX ORDER — DOXAZOSIN 2 MG/1
2 TABLET ORAL EVERY 12 HOURS SCHEDULED
Qty: 30 TABLET | Refills: 3 | Status: ON HOLD | OUTPATIENT
Start: 2020-01-10 | End: 2020-02-12

## 2020-01-10 RX ORDER — ISOSORBIDE MONONITRATE 30 MG/1
30 TABLET, EXTENDED RELEASE ORAL EVERY EVENING
Qty: 30 TABLET | Refills: 3 | Status: ON HOLD | OUTPATIENT
Start: 2020-01-10 | End: 2020-02-12

## 2020-01-10 RX ORDER — LABETALOL 300 MG/1
300 TABLET, FILM COATED ORAL EVERY 12 HOURS SCHEDULED
Qty: 60 TABLET | Refills: 3 | Status: ON HOLD | OUTPATIENT
Start: 2020-01-10 | End: 2020-02-12

## 2020-01-10 RX ORDER — SPIRONOLACTONE 50 MG/1
50 TABLET, FILM COATED ORAL 2 TIMES DAILY
Qty: 30 TABLET | Refills: 3 | Status: ON HOLD | OUTPATIENT
Start: 2020-01-10 | End: 2020-02-12

## 2020-01-10 RX ORDER — NIFEDIPINE 60 MG/1
60 TABLET, FILM COATED, EXTENDED RELEASE ORAL 2 TIMES DAILY
Qty: 30 TABLET | Refills: 3 | Status: ON HOLD | OUTPATIENT
Start: 2020-01-10 | End: 2020-02-12

## 2020-01-10 RX ORDER — NIFEDIPINE 60 MG/1
60 TABLET, EXTENDED RELEASE ORAL 2 TIMES DAILY
Status: DISCONTINUED | OUTPATIENT
Start: 2020-01-10 | End: 2020-01-10 | Stop reason: HOSPADM

## 2020-01-10 RX ADMIN — ASPIRIN 81 MG 81 MG: 81 TABLET ORAL at 08:56

## 2020-01-10 RX ADMIN — HYDRALAZINE HYDROCHLORIDE 100 MG: 50 TABLET, FILM COATED ORAL at 05:24

## 2020-01-10 RX ADMIN — SPIRONOLACTONE 50 MG: 25 TABLET ORAL at 08:55

## 2020-01-10 RX ADMIN — DOXAZOSIN 2 MG: 2 TABLET ORAL at 08:55

## 2020-01-10 RX ADMIN — HYDRALAZINE HYDROCHLORIDE 100 MG: 50 TABLET, FILM COATED ORAL at 14:36

## 2020-01-10 RX ADMIN — Medication 2 PUFF: at 09:44

## 2020-01-10 RX ADMIN — LISINOPRIL 40 MG: 40 TABLET ORAL at 09:06

## 2020-01-10 RX ADMIN — SODIUM CHLORIDE, PRESERVATIVE FREE 10 ML: 5 INJECTION INTRAVENOUS at 08:56

## 2020-01-10 RX ADMIN — INSULIN LISPRO 2 UNITS: 100 INJECTION, SOLUTION INTRAVENOUS; SUBCUTANEOUS at 09:06

## 2020-01-10 RX ADMIN — MELATONIN 1000 UNITS: at 08:55

## 2020-01-10 RX ADMIN — Medication 500 MCG: at 08:55

## 2020-01-10 RX ADMIN — NIFEDIPINE 60 MG: 60 TABLET, FILM COATED, EXTENDED RELEASE ORAL at 10:18

## 2020-01-10 RX ADMIN — INSULIN LISPRO 4 UNITS: 100 INJECTION, SOLUTION INTRAVENOUS; SUBCUTANEOUS at 14:36

## 2020-01-10 RX ADMIN — LABETALOL HYDROCHLORIDE 300 MG: 200 TABLET, FILM COATED ORAL at 08:55

## 2020-01-10 RX ADMIN — HYDROCHLOROTHIAZIDE 25 MG: 25 TABLET ORAL at 08:56

## 2020-01-10 RX ADMIN — FENOFIBRATE 54 MG: 54 TABLET ORAL at 09:06

## 2020-01-10 ASSESSMENT — PAIN SCALES - GENERAL: PAINLEVEL_OUTOF10: 0

## 2020-01-10 NOTE — DISCHARGE SUMMARY
Hospital Medicine Discharge Summary      Patient ID: Kane Pond      Patient's PCP: Gamal Veliz MD    Admit Date: 1/4/2020     Discharge Date:   01/10/20     Admitting Physician: Jordan Nance MD     Discharge Provider: EDMOND Denton CNP     Consults: Nephrology    Discharge Diagnoses:     Active Hospital Problems    Diagnosis Date Noted    Major depressive disorder, recurrent severe without psychotic features ([facility] [address] [F33.2]     Demand ischemia ([facility] [address] [I24.8]     MONSALVE (dyspnea on exertion) [R06.09] 11/09/2018    Chest pain [R07.9] 05/10/2018    Coronary artery disease involving native coronary artery of native heart without angina pectoris [I25.10] 05/24/2017       Disposition:  [x] Home  [] Home with home health [] Rehab [] Psych [] SNF  [] LTAC  [] Long term nursing home or group home [] Transfer to ICU  [] Transfer to PCU [] Other:    Gamal Veliz MD  [facility] Moni Gomez  [facility]  [phone]    Schedule an appointment as soon as possible for a visit in 1 week  for hospital follow up    Rachel Pickett MD  [address] [place] [address]  [phone]    Schedule an appointment as soon as possible for a visit in 1 week  for hospital follow up       Discharge Condition: stable      Code Status:  Full Code     Activity: activity as tolerated    Diet: DIET CARDIAC; Carb Control: 5 carb choices (75 gms)/meal; No Caffeine     Discharge Medications:     Current Discharge Medication List           Details   doxazosin (CARDURA) 2 MG tablet Take 1 tablet by mouth every 12 hours  Qty: 30 tablet, Refills: 3      labetalol (NORMODYNE) 300 MG tablet Take 1 tablet by mouth every 12 hours  Qty: 60 tablet, Refills: 3      hydrochlorothiazide (HYDRODIURIL) 25 MG tablet Take 1 tablet by mouth daily  Qty: 30 tablet, Refills: 3      spironolactone (ALDACTONE) 50 MG tablet Take 1 tablet by mouth 2 times daily  Qty: 30 tablet, Refills: 3              Details   isosorbide mononitrate (IMDUR) 30 MG extended release tablet Take 1 tablet by mouth every evening  Qty: 30 tablet, Refills: 3      hydrALAZINE (APRESOLINE) 100 MG tablet Take 1 tablet by mouth every 8 hours  Qty: 90 tablet, Refills: 3      NIFEdipine (ADALAT CC) 60 MG extended release tablet Take 1 tablet by mouth 2 times daily  Qty: 30 tablet, Refills: 3              Details   lisinopril (PRINIVIL;ZESTRIL) 40 MG tablet Take 40 mg by mouth daily      amitriptyline (ELAVIL) 50 MG tablet Take 50 mg by mouth 2 times daily      sertraline (ZOLOFT) 100 MG tablet Take 100 mg by mouth nightly       atorvastatin (LIPITOR) 40 MG tablet Take 40 mg by mouth nightly       fenofibrate (TRICOR) 54 MG tablet Take 1 tablet by mouth daily s [facility] pharmacy: Please dispense generic fenofibrate unless prescriber denote  Qty: 30 tablet, Refills: 3      albuterol sulfate HFA (PROVENTIL HFA) 108 (90 Base) MCG/ACT inhaler Inhale 2 puffs into the lungs every 4 hours as needed for Wheezing (coughing episodes)  Qty: 1 Inhaler, Refills: 0      vitamin B-12 (CYANOCOBALAMIN) 500 MCG tablet Take 500 mcg by mouth daily      metFORMIN (GLUCOPHAGE) 1000 MG tablet Take 1,000 mg by mouth 2 times daily (with meals)      zolpidem (AMBIEN) 10 MG tablet Take 10 mg by mouth nightly. mirtazapine (REMERON) 15 MG tablet Take 1 tablet by mouth nightly  Qty: 30 tablet, Refills: 3      nitroGLYCERIN (NITROSTAT) 0.4 MG SL tablet up to max of 3 total doses. If no relief after 1 dose, call 911. Qty: 25 tablet, Refills: 3      vitamin D (D3-1000) 1000 UNITS TABS tablet Take 1 tablet by mouth daily  Qty: 90 tablet, Refills: 3      aspirin 81 MG tablet Take 81 mg by mouth daily      Blood Glucose Monitoring Suppl (FREESTYLE LITE) MITZI 1 Device by Does not apply route daily as needed (Check BG 3 times a day)  Qty: 1 Device, Refills: 0      !! blood glucose test strips (FREESTYLE LITE) strip 1 each by In Vitro route 3 times daily As needed.   Qty: 100 each, Refills: 3      !! FREESTYLE LANCETS follow-up with his PCP early next week to make sure his blood pressure remained stable. He was also instructed to follow-up with nephrology for titrations of these medications further. He verbalized understanding and was in agreement with the plan of care. During his stay he expressed concerns of severe depression. Psychiatry was consulted. They recommended an inpatient facility at his leisure. He was given information to follow-up as an outpatient. Exam:     /76   Pulse 84   Temp 98 °F (36.7 °C) (Oral)   Resp 17   Ht 5' 11\" (1.803 m)   Wt 238 lb 8.6 oz (108.2 kg)   SpO2 (!) 89%   BMI 33.27 kg/m²     General: Resting in bed in no apparent distress. HEENT: Normocephalic. Atraumatic. Pupils equal and reactive. EOM intact. Oral mucosa pink/moist/intact. Neck: Supple. Symmetrical. Trachea midline. Lungs: Clear to auscultation bilaterally. Respirations even and unlabored. Chest: Exam unremarkable. Cardiac: S1/S2 noted. Regular Rhythm and rate. Abdomen/GI: Soft. Non-tender. Non-distended. BS+. Extremities: PP+. Atraumatic. No redness/cyanosis/edema noted. Brisk cap refill. Skin: Dry and intact. No lesions noted. Neuro: Grossly intact. No focal deficits noted. Significant Diagnostic Studies:   Stress, cxr      Labs: For convenience and continuity at follow-up the following most recent labs are provided:    CBC:    Lab Results   Component Value Date    WBC 4.2 01/06/2020    HGB 12.4 01/06/2020    HCT 37.4 01/06/2020     01/10/2020       Renal:    Lab Results   Component Value Date     01/09/2020    K 4.4 01/09/2020    K 4.5 01/04/2020    CL 98 01/09/2020    CO2 24 01/09/2020    BUN 19 01/09/2020    CREATININE 0.9 01/09/2020    CALCIUM 9.5 01/09/2020    PHOS 3.9 01/09/2020       No future appointments. Time Spent on discharge is more than 30 minutes in the examination, evaluation, counseling and review of medications and discharge plan.      RX monitoring reviewed Signed:    Mary Becerril, APRN - CNP   1/10/2020      Thank you Ava Jenkins MD for the opportunity to be involved in this patient's care. If you have any questions or concerns please feel free to contact me at [phone].

## 2020-01-10 NOTE — PLAN OF CARE
management should include both nonpharmacologic and pharmacologic interventions.   Goal: Control of acute pain  Description  Control of acute pain  1/10/2020 1545 by Bria Arizmendi RN  Outcome: Completed  1/10/2020 0705 by Tad Busby RN  Outcome: Met This Shift  Goal: Control of chronic pain  Description  Control of chronic pain  1/10/2020 1545 by Bria Arizmendi RN  Outcome: Completed  1/10/2020 0705 by Tad Busby RN  Outcome: Met This Shift     Problem: Tissue Perfusion - Cardiopulmonary, Altered:  Goal: Absence of angina  Description  Absence of angina  1/10/2020 1545 by Bria Arizmendi RN  Outcome: Completed  1/10/2020 0705 by Tad Busby RN  Outcome: Met This Shift  Goal: Circulatory function within specified parameters  Description  Circulatory function within specified parameters  1/10/2020 1545 by Bria Arizmendi RN  Outcome: Completed  1/10/2020 0705 by Tad Busby RN  Outcome: Met This Shift  Goal: Hemodynamic stability will improve  Description  Hemodynamic stability will improve  1/10/2020 1545 by Bria Arizmendi RN  Outcome: Completed  1/10/2020 0705 by Tad Busby RN  Outcome: Met This Shift     Problem: Cardiac:  Goal: Hemodynamic stability will improve  Description  Hemodynamic stability will improve  1/10/2020 1545 by Bria Arizmendi RN  Outcome: Completed  1/10/2020 0705 by Tad Busby RN  Outcome: Met This Shift  Goal: Complications related to the disease process, condition or treatment will be avoided or minimized  Description  Complications related to the disease process, condition or treatment will be avoided or minimized  1/10/2020 1545 by Bria Arizmendi RN  Outcome: Completed  1/10/2020 0705 by Tad Busby RN  Outcome: Met This Shift     Problem: Health Behavior:  Goal: Identification of resources available to assist in meeting health care needs will improve  Description  Identification of resources available to assist in meeting health care needs will improve  1/10/2020 1545 by Blaine Carrion RN  Outcome: Completed  1/10/2020 0705 by Ilana Dhillon RN  Outcome: Met This Shift

## 2020-01-10 NOTE — CARE COORDINATION
Presented IMM  Denied additional dc needs  States his car is here  Electronically signed by Yair Wooten RN on 1/10/2020 at 3:09 PM

## 2020-01-10 NOTE — PLAN OF CARE
Problem: Pain:  Goal: Pain level will decrease  Description  Pain level will decrease  Outcome: Met This Shift  Goal: Control of acute pain  Description  Control of acute pain  Outcome: Met This Shift  Goal: Control of chronic pain  Description  Control of chronic pain  Outcome: Met This Shift     Problem: Falls - Risk of:  Goal: Will remain free from falls  Description  Will remain free from falls  Outcome: Met This Shift  Goal: Absence of physical injury  Description  Absence of physical injury  Outcome: Met This Shift     Problem: Cardiac Output - Decreased:  Goal: Hemodynamic stability will improve  Description  Hemodynamic stability will improve  Outcome: Met This Shift     Problem: Serum Glucose Level - Abnormal:  Goal: Ability to maintain appropriate glucose levels will improve  Description  Ability to maintain appropriate glucose levels will improve  Outcome: Met This Shift     Problem: Pain:  Goal: Control of acute pain  Description  Control of acute pain  Outcome: Met This Shift  Goal: Control of chronic pain  Description  Control of chronic pain  Outcome: Met This Shift     Problem: Tissue Perfusion - Cardiopulmonary, Altered:  Goal: Absence of angina  Description  Absence of angina  Outcome: Met This Shift  Goal: Circulatory function within specified parameters  Description  Circulatory function within specified parameters  Outcome: Met This Shift  Goal: Hemodynamic stability will improve  Description  Hemodynamic stability will improve  Outcome: Met This Shift     Problem: Cardiac:  Goal: Hemodynamic stability will improve  Description  Hemodynamic stability will improve  Outcome: Met This Shift  Goal: Complications related to the disease process, condition or treatment will be avoided or minimized  Description  Complications related to the disease process, condition or treatment will be avoided or minimized  Outcome: Met This Shift     Problem: Health Behavior:  Goal: Identification of resources available to assist in meeting health care needs will improve  Description  Identification of resources available to assist in meeting health care needs will improve  Outcome: Met This Shift     Problem: Discharge Planning:  Goal: Discharged to appropriate level of care  Description  Discharged to appropriate level of care  Outcome: Ongoing

## 2020-01-10 NOTE — PROGRESS NOTES
[phone]- Pt discharged with personal belongings and has been educated on signs of hyper and hypotension. Pt's medications have been reviewed and patient does not have any questions. Pt agrees to make appointment with PCP upon discharge.        Electronically signed by Mian Flores RN on 1/10/2020 at 3:38 PM

## 2020-01-20 ENCOUNTER — HOSPITAL ENCOUNTER (EMERGENCY)
Age: 65
Discharge: HOME | End: 2020-01-20
Attending: EMERGENCY MEDICINE
Payer: MEDICARE

## 2020-01-20 ENCOUNTER — APPOINTMENT (OUTPATIENT)
Dept: CT IMAGING | Age: 65
End: 2020-01-20
Payer: MEDICARE

## 2020-01-20 ENCOUNTER — APPOINTMENT (OUTPATIENT)
Dept: GENERAL RADIOLOGY | Age: 65
End: 2020-01-20
Payer: MEDICARE

## 2020-01-20 VITALS
OXYGEN SATURATION: 95 % | DIASTOLIC BLOOD PRESSURE: 97 MMHG | SYSTOLIC BLOOD PRESSURE: 172 MMHG | RESPIRATION RATE: 22 BRPM | HEIGHT: 71 IN | HEART RATE: 93 BPM | BODY MASS INDEX: 33.27 KG/M2 | TEMPERATURE: 98 F

## 2020-01-20 LAB
AMPHETAMINE SCREEN, URINE: NORMAL
ANION GAP SERPL CALCULATED.3IONS-SCNC: 16 MMOL/L (ref 3–16)
BARBITURATE SCREEN URINE: NORMAL
BASOPHILS ABSOLUTE: 0 K/UL (ref 0–0.2)
BASOPHILS RELATIVE PERCENT: 0.6 %
BENZODIAZEPINE SCREEN, URINE: NORMAL
BILIRUBIN URINE: ABNORMAL
BLOOD, URINE: NEGATIVE
BUN BLDV-MCNC: 24 MG/DL (ref 7–20)
CALCIUM SERPL-MCNC: 9.3 MG/DL (ref 8.3–10.6)
CANNABINOID SCREEN URINE: NORMAL
CHLORIDE BLD-SCNC: 103 MMOL/L (ref 99–110)
CLARITY: CLEAR
CO2: 20 MMOL/L (ref 21–32)
COCAINE METABOLITE SCREEN URINE: NORMAL
COLOR: YELLOW
CREAT SERPL-MCNC: 1.1 MG/DL (ref 0.8–1.3)
EKG ATRIAL RATE: 113 BPM
EKG DIAGNOSIS: NORMAL
EKG P AXIS: 47 DEGREES
EKG P-R INTERVAL: 174 MS
EKG Q-T INTERVAL: 340 MS
EKG QRS DURATION: 92 MS
EKG QTC CALCULATION (BAZETT): 466 MS
EKG R AXIS: 77 DEGREES
EKG T AXIS: 41 DEGREES
EKG VENTRICULAR RATE: 113 BPM
EOSINOPHILS ABSOLUTE: 0.1 K/UL (ref 0–0.6)
EOSINOPHILS RELATIVE PERCENT: 1.6 %
EPITHELIAL CELLS, UA: 1 /HPF (ref 0–5)
GFR AFRICAN AMERICAN: >60
GFR NON-AFRICAN AMERICAN: >60
GLUCOSE BLD-MCNC: 207 MG/DL (ref 70–99)
GLUCOSE URINE: 250 MG/DL
HCT VFR BLD CALC: 41.3 % (ref 40.5–52.5)
HEMOGLOBIN: 13.6 G/DL (ref 13.5–17.5)
HYALINE CASTS: 3 /LPF (ref 0–8)
INR BLD: 0.92 (ref 0.86–1.14)
KETONES, URINE: ABNORMAL MG/DL
LEUKOCYTE ESTERASE, URINE: NEGATIVE
LYMPHOCYTES ABSOLUTE: 1.3 K/UL (ref 1–5.1)
LYMPHOCYTES RELATIVE PERCENT: 22.2 %
Lab: NORMAL
MCH RBC QN AUTO: 28.5 PG (ref 26–34)
MCHC RBC AUTO-ENTMCNC: 32.9 G/DL (ref 31–36)
MCV RBC AUTO: 86.7 FL (ref 80–100)
METHADONE SCREEN, URINE: NORMAL
MICROSCOPIC EXAMINATION: YES
MONOCYTES ABSOLUTE: 0.4 K/UL (ref 0–1.3)
MONOCYTES RELATIVE PERCENT: 7 %
NEUTROPHILS ABSOLUTE: 4 K/UL (ref 1.7–7.7)
NEUTROPHILS RELATIVE PERCENT: 68.6 %
NITRITE, URINE: NEGATIVE
OPIATE SCREEN URINE: NORMAL
OXYCODONE URINE: NORMAL
PDW BLD-RTO: 13.8 % (ref 12.4–15.4)
PH UA: 5
PH UA: 5.5 (ref 5–8)
PHENCYCLIDINE SCREEN URINE: NORMAL
PLATELET # BLD: 170 K/UL (ref 135–450)
PMV BLD AUTO: 8.1 FL (ref 5–10.5)
POTASSIUM REFLEX MAGNESIUM: 3.8 MMOL/L (ref 3.5–5.1)
PROPOXYPHENE SCREEN: NORMAL
PROTEIN UA: 100 MG/DL
PROTHROMBIN TIME: 10.7 SEC (ref 10–13.2)
RAPID INFLUENZA  B AGN: NEGATIVE
RAPID INFLUENZA A AGN: NEGATIVE
RBC # BLD: 4.77 M/UL (ref 4.2–5.9)
RBC UA: 1 /HPF (ref 0–4)
SODIUM BLD-SCNC: 139 MMOL/L (ref 136–145)
SPECIFIC GRAVITY UA: 1.03 (ref 1–1.03)
TROPONIN: <0.01 NG/ML
URINE REFLEX TO CULTURE: ABNORMAL
URINE TYPE: ABNORMAL
UROBILINOGEN, URINE: 0.2 E.U./DL
WBC # BLD: 5.8 K/UL (ref 4–11)
WBC UA: 1 /HPF (ref 0–5)

## 2020-01-20 PROCEDURE — 81001 URINALYSIS AUTO W/SCOPE: CPT

## 2020-01-20 PROCEDURE — 85025 COMPLETE CBC W/AUTO DIFF WBC: CPT

## 2020-01-20 PROCEDURE — 99284 EMERGENCY DEPT VISIT MOD MDM: CPT

## 2020-01-20 PROCEDURE — 84484 ASSAY OF TROPONIN QUANT: CPT

## 2020-01-20 PROCEDURE — 36415 COLL VENOUS BLD VENIPUNCTURE: CPT

## 2020-01-20 PROCEDURE — 80307 DRUG TEST PRSMV CHEM ANLYZR: CPT

## 2020-01-20 PROCEDURE — 80048 BASIC METABOLIC PNL TOTAL CA: CPT

## 2020-01-20 PROCEDURE — 6370000000 HC RX 637 (ALT 250 FOR IP): Performed by: NURSE PRACTITIONER

## 2020-01-20 PROCEDURE — 70450 CT HEAD/BRAIN W/O DYE: CPT

## 2020-01-20 PROCEDURE — 93010 ELECTROCARDIOGRAM REPORT: CPT | Performed by: INTERNAL MEDICINE

## 2020-01-20 PROCEDURE — 93005 ELECTROCARDIOGRAM TRACING: CPT | Performed by: EMERGENCY MEDICINE

## 2020-01-20 PROCEDURE — 96374 THER/PROPH/DIAG INJ IV PUSH: CPT

## 2020-01-20 PROCEDURE — 2580000003 HC RX 258: Performed by: NURSE PRACTITIONER

## 2020-01-20 PROCEDURE — 6360000004 HC RX CONTRAST MEDICATION: Performed by: EMERGENCY MEDICINE

## 2020-01-20 PROCEDURE — 71260 CT THORAX DX C+: CPT

## 2020-01-20 PROCEDURE — 85610 PROTHROMBIN TIME: CPT

## 2020-01-20 PROCEDURE — 6360000002 HC RX W HCPCS: Performed by: EMERGENCY MEDICINE

## 2020-01-20 PROCEDURE — 87804 INFLUENZA ASSAY W/OPTIC: CPT

## 2020-01-20 RX ORDER — NITROGLYCERIN 0.4 MG/1
0.4 TABLET SUBLINGUAL EVERY 5 MIN PRN
Status: DISCONTINUED | OUTPATIENT
Start: 2020-01-20 | End: 2020-01-20 | Stop reason: HOSPADM

## 2020-01-20 RX ORDER — ASPIRIN 81 MG/1
324 TABLET, CHEWABLE ORAL ONCE
Status: COMPLETED | OUTPATIENT
Start: 2020-01-20 | End: 2020-01-20

## 2020-01-20 RX ORDER — LORAZEPAM 1 MG/1
1 TABLET ORAL EVERY 8 HOURS PRN
Qty: 6 TABLET | Refills: 0 | Status: ON HOLD | OUTPATIENT
Start: 2020-01-20 | End: 2020-02-12

## 2020-01-20 RX ORDER — 0.9 % SODIUM CHLORIDE 0.9 %
1000 INTRAVENOUS SOLUTION INTRAVENOUS ONCE
Status: COMPLETED | OUTPATIENT
Start: 2020-01-20 | End: 2020-01-20

## 2020-01-20 RX ORDER — LORAZEPAM 2 MG/ML
1 INJECTION INTRAMUSCULAR ONCE
Status: COMPLETED | OUTPATIENT
Start: 2020-01-20 | End: 2020-01-20

## 2020-01-20 RX ADMIN — IOPAMIDOL 75 ML: 755 INJECTION, SOLUTION INTRAVENOUS at 14:20

## 2020-01-20 RX ADMIN — SODIUM CHLORIDE 1000 ML: 9 INJECTION, SOLUTION INTRAVENOUS at 13:55

## 2020-01-20 RX ADMIN — ASPIRIN 81 MG 324 MG: 81 TABLET ORAL at 13:55

## 2020-01-20 RX ADMIN — LORAZEPAM 1 MG: 2 INJECTION INTRAMUSCULAR; INTRAVENOUS at 15:16

## 2020-01-20 RX ADMIN — NITROGLYCERIN 0.4 MG: 0.4 TABLET, ORALLY DISINTEGRATING SUBLINGUAL at 13:55

## 2020-01-20 RX ADMIN — NITROGLYCERIN 0.4 MG: 0.4 TABLET, ORALLY DISINTEGRATING SUBLINGUAL at 14:01

## 2020-01-20 ASSESSMENT — PAIN SCALES - WONG BAKER: WONGBAKER_NUMERICALRESPONSE: 6

## 2020-01-20 ASSESSMENT — PAIN DESCRIPTION - ONSET
ONSET: SUDDEN

## 2020-01-20 ASSESSMENT — PAIN DESCRIPTION - FREQUENCY
FREQUENCY: CONTINUOUS

## 2020-01-20 ASSESSMENT — PAIN DESCRIPTION - ORIENTATION
ORIENTATION: MID

## 2020-01-20 ASSESSMENT — PAIN - FUNCTIONAL ASSESSMENT
PAIN_FUNCTIONAL_ASSESSMENT: 0-10
PAIN_FUNCTIONAL_ASSESSMENT: ACTIVITIES ARE NOT PREVENTED
PAIN_FUNCTIONAL_ASSESSMENT: ACTIVITIES ARE NOT PREVENTED

## 2020-01-20 ASSESSMENT — ENCOUNTER SYMPTOMS
COUGH: 0
VOMITING: 0
SHORTNESS OF BREATH: 1
COLOR CHANGE: 0
BACK PAIN: 0
ABDOMINAL PAIN: 0

## 2020-01-20 ASSESSMENT — PAIN SCALES - GENERAL
PAINLEVEL_OUTOF10: 6
PAINLEVEL_OUTOF10: 6
PAINLEVEL_OUTOF10: 5

## 2020-01-20 ASSESSMENT — PAIN DESCRIPTION - DESCRIPTORS
DESCRIPTORS: CONSTANT

## 2020-01-20 ASSESSMENT — PAIN DESCRIPTION - LOCATION
LOCATION: CHEST

## 2020-01-20 ASSESSMENT — PAIN DESCRIPTION - PAIN TYPE
TYPE: ACUTE PAIN

## 2020-01-20 ASSESSMENT — PAIN DESCRIPTION - PROGRESSION
CLINICAL_PROGRESSION: NOT CHANGED
CLINICAL_PROGRESSION: NOT CHANGED

## 2020-01-20 NOTE — ED PROVIDER NOTES
[facility]      Pt Name: German Amaro  MRN: [id number]  [place] 1955  Date of evaluation: 1/20/2020  Provider: BRITANY Rothman    This patient was seen and evaluated by the attending physician Dr. Ondina Silverio. CHIEF COMPLAINT       Chief Complaint   Patient presents with    Hypertension     pt eval for same last week-did not fill meds    Loss of Consciousness     denies injury       CRITICAL CARE TIME   I performed a total Critical Care time of  15 minutes, excluding separately reportable procedures. There was a high probability of clinically significant/life threatening deterioration in the patient's condition which required my urgent intervention. Not limited to multiple reexaminations, discussions with attending physician and consultants. HISTORY OF PRESENT ILLNESS  (Location/Symptom, Timing/Onset, Context/Setting, Quality, Duration, Modifying Factors, Severity.)   German Amaro is a 59 y.o. male who presents to the emergency department complaining of constant chest pressure since early this morning. He states it is associated with a pulsating sensation in his chest.  No abdominal pain no vomiting. No recent travel or surgery. He had leg swelling 2 weeks ago but states this has improved. He states that he was admitted to the hospital 2 weeks ago he has not abused cocaine in the past 16 days per his report. He had a negative stress test and was seen by cardiology during his admission 2 weeks ago. He states that he has had 3 stents placed about 5 years ago. He has diabetes hypertension and hyperlipidemia. He states that he is continued to take the prescribed metoprolol couple Prantal lisinopril and clonidine he was prescribed prior to his admission. However he has been unable to afford the new medications he was started on after he was discharged from the hospital 16 days ago and thus has not started his new regimen. He has been monitoring his blood pressure twice daily and states it was 236/115 today. Nursing Notes were reviewed and I agree. REVIEW OF SYSTEMS    (2-9 systems for level 4, 10 or more for level 5)     Review of Systems   Constitutional: Negative for fever. Respiratory: Positive for shortness of breath. Negative for cough. Cardiovascular: Positive for chest pain. Negative for leg swelling. Gastrointestinal: Negative for abdominal pain and vomiting. Musculoskeletal: Negative for back pain, neck pain and neck stiffness. Skin: Negative for color change, rash and wound. Neurological: Negative for weakness and numbness. Psychiatric/Behavioral: Negative for behavioral problems. Except as noted above the remainder of the review of systems was reviewed and negative. PAST MEDICAL HISTORY         Diagnosis Date    CAD (coronary artery disease)     Chest pain 10/15/2017    Cocaine abuse ([facility] [address]     COPD (chronic obstructive pulmonary disease) ([facility] [address]     Diabetes mellitus ([facility] [address]     Hyperlipidemia     Hypertension     Kidney stone     MRSA colonization 4/16/19; 03/24/2019    Sleep apnea     He said he doesn't use a CPAP machine.        SURGICAL HISTORY           Procedure Laterality Date    ABDOMEN SURGERY      CARDIAC SURGERY      3 STENTS PLACED     CHOLECYSTECTOMY      COLONOSCOPY      CORONARY ANGIOPLASTY WITH STENT PLACEMENT      3 stents     FRACTURE SURGERY      RIGHT ANKLE    LITHOTRIPSY      SHOULDER ARTHROSCOPY      VASCULAR SURGERY         CURRENT MEDICATIONS       Previous Medications    ALBUTEROL SULFATE HFA (PROVENTIL HFA) 108 (90 BASE) MCG/ACT INHALER    Inhale 2 puffs into the lungs every 4 hours as needed for Wheezing (coughing episodes)    AMITRIPTYLINE (ELAVIL) 50 MG TABLET    Take 50 mg by mouth 2 times daily    ASPIRIN 81 MG TABLET    Take 81 mg by mouth daily    ATORVASTATIN (LIPITOR) 40 MG TABLET    Take 40 mg by mouth nightly     DOXAZOSIN (CARDURA) 2 MG TABLET    Take 1 tablet by mouth every 12 hours    FENOFIBRATE (TRICOR) 54 MG TABLET    Take 1 tablet by mouth daily [facility]: Please dispense generic fenofibrate unless prescriber denote    HYDRALAZINE (APRESOLINE) 100 MG TABLET    Take 1 tablet by mouth every 8 hours    HYDROCHLOROTHIAZIDE (HYDRODIURIL) 25 MG TABLET    Take 1 tablet by mouth daily    ISOSORBIDE MONONITRATE (IMDUR) 30 MG EXTENDED RELEASE TABLET    Take 1 tablet by mouth every evening    LABETALOL (NORMODYNE) 300 MG TABLET    Take 1 tablet by mouth every 12 hours    LISINOPRIL (PRINIVIL;ZESTRIL) 40 MG TABLET    Take 40 mg by mouth daily    METFORMIN (GLUCOPHAGE) 1000 MG TABLET    Take 1,000 mg by mouth 2 times daily (with meals)    MIRTAZAPINE (REMERON) 15 MG TABLET    Take 1 tablet by mouth nightly    MOMETASONE-FORMOTEROL (DULERA) 200-5 MCG/ACT INHALER    Inhale 2 puffs into the lungs 2 times daily    NIFEDIPINE (ADALAT CC) 60 MG EXTENDED RELEASE TABLET    Take 1 tablet by mouth 2 times daily    NITROGLYCERIN (NITROSTAT) 0.4 MG SL TABLET    up to max of 3 total doses. If no relief after 1 dose, call 911. SERTRALINE (ZOLOFT) 100 MG TABLET    Take 100 mg by mouth nightly     SPIRONOLACTONE (ALDACTONE) 50 MG TABLET    Take 1 tablet by mouth 2 times daily    VITAMIN B-12 (CYANOCOBALAMIN) 500 MCG TABLET    Take 500 mcg by mouth daily    VITAMIN D (D3-1000) 1000 UNITS TABS TABLET    Take 1 tablet by mouth daily    ZOLPIDEM (AMBIEN) 10 MG TABLET    Take 10 mg by mouth nightly.        ALLERGIES     Naproxen    FAMILY HISTORY           Problem Relation Age of Onset    High Blood Pressure Mother     Cancer Father         lung cancer    Diabetes Brother     Liver Disease Sister     Diabetes Brother      Family Status   Relation Name Status    Mother      Father      Sister  Alive    Brother  Alive    Sister  Alive    Sister      Brother  Alive    Brother  Alive    Brother  Alive   [facility]

## 2020-01-20 NOTE — ED NOTES
Bed: B-10  Expected date:   Expected time:   Means of arrival:   Comments:  #2     Ervin Cheatham RN  01/20/20 6964

## 2020-01-20 NOTE — ED PROVIDER NOTES
I independently performed a history and physical on [facility] [employer]. All diagnostic, treatment, and disposition decisions were made by myself in conjunction with the advanced practice provider. For further details of [facility] emergency department encounter, please see BRITANY Godoy's documentation. Patient reports an episode of syncope today as well as some chest pain. He states he is no longer using cocaine and his last alcoholic drink was 4 days ago he had a beer and a shot of whiskey together. At the time of my evaluation he remains somewhat tachycardic and hypertensive but he states he is no longer shaky or sweaty like he was earlier. He states he has taken all the medications that he previously had for hypertension but has not been able to fill the new medications that were prescribed during his recent admission. Labs and imaging with no significant findings. No PE. I believe the patient is experiencing some element of alcohol withdrawal perhaps combined with some benzodiazepine withdrawal as he reports a history of Ativan and Klonopin use as well. I do not believe the patient has an acute coronary syndrome at this time however. We will treat him for his benzo withdrawal and we have helped arrange for medication assistance as an outpatient and he will get the indications tomorrow. Advised him to continue taking the medications he is already prescribed. EKG  The Ekg interpreted by me shows  sinus tachycardia, [id number]  Axis is   Normal  QTc is  normal  Intervals and Durations are unremarkable.       ST Segments: no acute change  No significant change from prior EKG dated 1/7/2020            Juliet Kennedy MD  01/20/20 7773

## 2020-01-20 NOTE — ED NOTES
Discharge and education instructions reviewed. Patient verbalized understanding, teach-back successful. Patient denied questions at this time. No acute distress noted. Patient instructed to follow-up as noted - return to emergency department if symptoms worsen. Patient verbalized understanding. Discharged per EDMD with discharged instructions.        Clara Reynolds RN  01/20/20 1075

## 2020-01-20 NOTE — ED TRIAGE NOTES
Patient here for hypertension. Was seen here last week for similar complaint. Systolics on arrival >495. Patient states he could not afford the anti-hypertensive prescribed to him last week so he hasn't been able to correct the problem. Patient got dizzy, light-headed and \"passed out\" in the bathroom at home this afternoon. Patient states that he has a headache and some chest pain associated with the hypertension. Patient states he did not injure himself when he got dizzy and lightheaded. Patient states he \"feels nervous\", he is noted to be tachycardic, afebrile. Patient without other complaints. Breath sounds clear = on RA.

## 2020-02-11 ENCOUNTER — APPOINTMENT (OUTPATIENT)
Dept: GENERAL RADIOLOGY | Age: 65
End: 2020-02-11
Payer: MEDICARE

## 2020-02-11 ENCOUNTER — HOSPITAL ENCOUNTER (OUTPATIENT)
Age: 65
Setting detail: OBSERVATION
Discharge: HOME | End: 2020-02-13
Attending: EMERGENCY MEDICINE | Admitting: INTERNAL MEDICINE
Payer: MEDICARE

## 2020-02-11 LAB
AMPHETAMINE SCREEN, URINE: NORMAL
ANION GAP SERPL CALCULATED.3IONS-SCNC: 16 MMOL/L (ref 3–16)
BARBITURATE SCREEN URINE: NORMAL
BASOPHILS ABSOLUTE: 0 K/UL (ref 0–0.2)
BASOPHILS RELATIVE PERCENT: 0.6 %
BENZODIAZEPINE SCREEN, URINE: NORMAL
BUN BLDV-MCNC: 24 MG/DL (ref 7–20)
CALCIUM SERPL-MCNC: 8.9 MG/DL (ref 8.3–10.6)
CANNABINOID SCREEN URINE: NORMAL
CHLORIDE BLD-SCNC: 101 MMOL/L (ref 99–110)
CO2: 22 MMOL/L (ref 21–32)
COCAINE METABOLITE SCREEN URINE: NORMAL
CREAT SERPL-MCNC: 1 MG/DL (ref 0.8–1.3)
D DIMER: <200 NG/ML DDU (ref 0–229)
EOSINOPHILS ABSOLUTE: 0.1 K/UL (ref 0–0.6)
EOSINOPHILS RELATIVE PERCENT: 1 %
ETHANOL: NORMAL MG/DL (ref 0–0.08)
GFR AFRICAN AMERICAN: >60
GFR NON-AFRICAN AMERICAN: >60
GLUCOSE BLD-MCNC: 121 MG/DL (ref 70–99)
HCG QUALITATIVE: NEGATIVE
HCT VFR BLD CALC: 39.6 % (ref 40.5–52.5)
HEMOGLOBIN: 13.2 G/DL (ref 13.5–17.5)
LYMPHOCYTES ABSOLUTE: 1.8 K/UL (ref 1–5.1)
LYMPHOCYTES RELATIVE PERCENT: 28.9 %
Lab: NORMAL
MCH RBC QN AUTO: 28.5 PG (ref 26–34)
MCHC RBC AUTO-ENTMCNC: 33.3 G/DL (ref 31–36)
MCV RBC AUTO: 85.6 FL (ref 80–100)
METHADONE SCREEN, URINE: NORMAL
MONOCYTES ABSOLUTE: 0.5 K/UL (ref 0–1.3)
MONOCYTES RELATIVE PERCENT: 7.8 %
NEUTROPHILS ABSOLUTE: 3.9 K/UL (ref 1.7–7.7)
NEUTROPHILS RELATIVE PERCENT: 61.7 %
OPIATE SCREEN URINE: NORMAL
OXYCODONE URINE: NORMAL
PDW BLD-RTO: 13.5 % (ref 12.4–15.4)
PH UA: 6
PHENCYCLIDINE SCREEN URINE: NORMAL
PLATELET # BLD: 184 K/UL (ref 135–450)
PMV BLD AUTO: 8.1 FL (ref 5–10.5)
POTASSIUM REFLEX MAGNESIUM: 3.8 MMOL/L (ref 3.5–5.1)
PROPOXYPHENE SCREEN: NORMAL
RBC # BLD: 4.63 M/UL (ref 4.2–5.9)
SODIUM BLD-SCNC: 139 MMOL/L (ref 136–145)
TROPONIN: 0.02 NG/ML
TROPONIN: <0.01 NG/ML
WBC # BLD: 6.3 K/UL (ref 4–11)

## 2020-02-11 PROCEDURE — 2500000003 HC RX 250 WO HCPCS: Performed by: NURSE PRACTITIONER

## 2020-02-11 PROCEDURE — G0378 HOSPITAL OBSERVATION PER HR: HCPCS

## 2020-02-11 PROCEDURE — 80048 BASIC METABOLIC PNL TOTAL CA: CPT

## 2020-02-11 PROCEDURE — 84703 CHORIONIC GONADOTROPIN ASSAY: CPT

## 2020-02-11 PROCEDURE — 99285 EMERGENCY DEPT VISIT HI MDM: CPT

## 2020-02-11 PROCEDURE — 71046 X-RAY EXAM CHEST 2 VIEWS: CPT

## 2020-02-11 PROCEDURE — 85025 COMPLETE CBC W/AUTO DIFF WBC: CPT

## 2020-02-11 PROCEDURE — 6370000000 HC RX 637 (ALT 250 FOR IP): Performed by: NURSE PRACTITIONER

## 2020-02-11 PROCEDURE — 85379 FIBRIN DEGRADATION QUANT: CPT

## 2020-02-11 PROCEDURE — 73030 X-RAY EXAM OF SHOULDER: CPT

## 2020-02-11 PROCEDURE — 93005 ELECTROCARDIOGRAM TRACING: CPT | Performed by: EMERGENCY MEDICINE

## 2020-02-11 PROCEDURE — 6360000002 HC RX W HCPCS: Performed by: EMERGENCY MEDICINE

## 2020-02-11 PROCEDURE — 96375 TX/PRO/DX INJ NEW DRUG ADDON: CPT

## 2020-02-11 PROCEDURE — G0480 DRUG TEST DEF 1-7 CLASSES: HCPCS

## 2020-02-11 PROCEDURE — 36415 COLL VENOUS BLD VENIPUNCTURE: CPT

## 2020-02-11 PROCEDURE — 84484 ASSAY OF TROPONIN QUANT: CPT

## 2020-02-11 PROCEDURE — 80307 DRUG TEST PRSMV CHEM ANLYZR: CPT

## 2020-02-11 PROCEDURE — 96374 THER/PROPH/DIAG INJ IV PUSH: CPT

## 2020-02-11 RX ORDER — NITROGLYCERIN 0.4 MG/1
0.4 TABLET SUBLINGUAL EVERY 5 MIN PRN
Status: DISCONTINUED | OUTPATIENT
Start: 2020-02-11 | End: 2020-02-12 | Stop reason: SDUPTHER

## 2020-02-11 RX ORDER — HYDRALAZINE HYDROCHLORIDE 20 MG/ML
20 INJECTION INTRAMUSCULAR; INTRAVENOUS ONCE
Status: COMPLETED | OUTPATIENT
Start: 2020-02-11 | End: 2020-02-11

## 2020-02-11 RX ORDER — LABETALOL HYDROCHLORIDE 5 MG/ML
10 INJECTION, SOLUTION INTRAVENOUS ONCE
Status: COMPLETED | OUTPATIENT
Start: 2020-02-11 | End: 2020-02-11

## 2020-02-11 RX ORDER — ASPIRIN 81 MG/1
324 TABLET, CHEWABLE ORAL ONCE
Status: COMPLETED | OUTPATIENT
Start: 2020-02-11 | End: 2020-02-11

## 2020-02-11 RX ORDER — OXYCODONE HYDROCHLORIDE AND ACETAMINOPHEN 5; 325 MG/1; MG/1
1 TABLET ORAL ONCE
Status: COMPLETED | OUTPATIENT
Start: 2020-02-11 | End: 2020-02-11

## 2020-02-11 RX ADMIN — LABETALOL HYDROCHLORIDE 10 MG: 5 INJECTION INTRAVENOUS at 21:04

## 2020-02-11 RX ADMIN — ASPIRIN 81 MG 324 MG: 81 TABLET ORAL at 18:43

## 2020-02-11 RX ADMIN — OXYCODONE HYDROCHLORIDE AND ACETAMINOPHEN 1 TABLET: 5; 325 TABLET ORAL at 22:55

## 2020-02-11 RX ADMIN — HYDROMORPHONE HYDROCHLORIDE 1 MG: 1 INJECTION, SOLUTION INTRAMUSCULAR; INTRAVENOUS; SUBCUTANEOUS at 20:23

## 2020-02-11 RX ADMIN — HYDRALAZINE HYDROCHLORIDE 20 MG: 20 INJECTION INTRAMUSCULAR; INTRAVENOUS at 20:19

## 2020-02-11 RX ADMIN — NITROGLYCERIN 0.4 MG: 0.4 TABLET, ORALLY DISINTEGRATING SUBLINGUAL at 18:44

## 2020-02-11 ASSESSMENT — HEART SCORE: ECG: 0

## 2020-02-11 ASSESSMENT — PAIN SCALES - GENERAL
PAINLEVEL_OUTOF10: 8
PAINLEVEL_OUTOF10: 6
PAINLEVEL_OUTOF10: 8
PAINLEVEL_OUTOF10: 6
PAINLEVEL_OUTOF10: 10

## 2020-02-11 ASSESSMENT — PAIN DESCRIPTION - FREQUENCY
FREQUENCY: CONTINUOUS

## 2020-02-11 ASSESSMENT — PAIN DESCRIPTION - ORIENTATION
ORIENTATION: LEFT

## 2020-02-11 ASSESSMENT — PAIN DESCRIPTION - LOCATION
LOCATION: CHEST;SHOULDER
LOCATION: CHEST;SHOULDER
LOCATION: CHEST

## 2020-02-11 ASSESSMENT — PAIN DESCRIPTION - ONSET
ONSET: ON-GOING

## 2020-02-11 ASSESSMENT — PAIN - FUNCTIONAL ASSESSMENT
PAIN_FUNCTIONAL_ASSESSMENT: ACTIVITIES ARE NOT PREVENTED

## 2020-02-11 ASSESSMENT — PAIN DESCRIPTION - PAIN TYPE
TYPE: ACUTE PAIN

## 2020-02-11 ASSESSMENT — PAIN DESCRIPTION - PROGRESSION
CLINICAL_PROGRESSION: NOT CHANGED

## 2020-02-11 ASSESSMENT — PAIN DESCRIPTION - DESCRIPTORS
DESCRIPTORS: PRESSURE
DESCRIPTORS: PRESSURE
DESCRIPTORS: ACHING

## 2020-02-11 NOTE — ED PROVIDER NOTES
use a CPAP machine. Past Surgical History:   Procedure Laterality Date    ABDOMEN SURGERY      CARDIAC SURGERY      3 STENTS PLACED     CHOLECYSTECTOMY      COLONOSCOPY      CORONARY ANGIOPLASTY WITH STENT PLACEMENT      3 stents     FRACTURE SURGERY      RIGHT ANKLE    LITHOTRIPSY      SHOULDER ARTHROSCOPY      VASCULAR SURGERY         CURRENT MEDICATIONS  (may include discharge medications prescribed in the ED)  Current Outpatient Rx   Medication Sig Dispense Refill    LORazepam (ATIVAN) 1 MG tablet Take 1 tablet by mouth every 8 hours as needed for Anxiety for up to 6 doses.  Sedation precautions 6 tablet 0    isosorbide mononitrate (IMDUR) 30 MG extended release tablet Take 1 tablet by mouth every evening 30 tablet 3    hydrALAZINE (APRESOLINE) 100 MG tablet Take 1 tablet by mouth every 8 hours 90 tablet 3    doxazosin (CARDURA) 2 MG tablet Take 1 tablet by mouth every 12 hours 30 tablet 3    labetalol (NORMODYNE) 300 MG tablet Take 1 tablet by mouth every 12 hours 60 tablet 3    hydrochlorothiazide (HYDRODIURIL) 25 MG tablet Take 1 tablet by mouth daily 30 tablet 3    spironolactone (ALDACTONE) 50 MG tablet Take 1 tablet by mouth 2 times daily 30 tablet 3    NIFEdipine (ADALAT CC) 60 MG extended release tablet Take 1 tablet by mouth 2 times daily 30 tablet 3    lisinopril (PRINIVIL;ZESTRIL) 40 MG tablet Take 40 mg by mouth daily      amitriptyline (ELAVIL) 50 MG tablet Take 50 mg by mouth 2 times daily      sertraline (ZOLOFT) 100 MG tablet Take 100 mg by mouth nightly       atorvastatin (LIPITOR) 40 MG tablet Take 40 mg by mouth nightly       mometasone-formoterol (DULERA) 200-5 MCG/ACT inhaler Inhale 2 puffs into the lungs 2 times daily 1 Inhaler 1    fenofibrate (TRICOR) 54 MG tablet Take 1 tablet by mouth daily s [facility] pharmacy: Please dispense generic fenofibrate unless prescriber denote 30 tablet 3    albuterol sulfate HFA (PROVENTIL HFA) 108 (90 Base) MCG/ACT inhaler Inhale 2 puffs into the lungs every 4 hours as needed for Wheezing (coughing episodes) 1 Inhaler 0    vitamin B-12 (CYANOCOBALAMIN) 500 MCG tablet Take 500 mcg by mouth daily      metFORMIN (GLUCOPHAGE) 1000 MG tablet Take 1,000 mg by mouth 2 times daily (with meals)      zolpidem (AMBIEN) 10 MG tablet Take 10 mg by mouth nightly.  mirtazapine (REMERON) 15 MG tablet Take 1 tablet by mouth nightly 30 tablet 3    nitroGLYCERIN (NITROSTAT) 0.4 MG SL tablet up to max of 3 total doses.  If no relief after 1 dose, call 911. 25 tablet 3    vitamin D (D3-1000) 1000 UNITS TABS tablet Take 1 tablet by mouth daily 90 tablet 3    aspirin 81 MG tablet Take 81 mg by mouth daily         ALLERGIES    Allergies   Allergen Reactions    Naproxen Hives       SOCIAL & FAMILY HISTORY    Social History     Socioeconomic History    Marital status: Single     Spouse name: None    Number of children: 1    Years of education: 12    Highest education level: None   Occupational History    Occupation: retired   Social Needs    Financial resource strain: None    Food insecurity:     Worry: None     Inability: None    Transportation needs:     Medical: None     Non-medical: None   Tobacco Use    Smoking status: Former Smoker     Packs/day: 2.50     Years: 40.00     Pack years: 100.00     Types: Cigarettes    Smokeless tobacco: Never Used   Substance and Sexual Activity    Alcohol use: Never     Frequency: Never    Drug use: Yes     Types: Cocaine    Sexual activity: Yes     Partners: Female   Lifestyle    Physical activity:     Days per week: None     Minutes per session: None    Stress: None   Relationships    Social connections:     Talks on phone: None     Gets together: None     Attends Gnosticism service: None     Active member of club or organization: None     Attends meetings of clubs or organizations: None     Relationship status: None    Intimate partner violence:     Fear of current or ex partner: None     Emotionally 8.1 5.0 - 10.5 fL    Neutrophils % 61.7 %    Lymphocytes % 28.9 %    Monocytes % 7.8 %    Eosinophils % 1.0 %    Basophils % 0.6 %    Neutrophils Absolute 3.9 1.7 - 7.7 K/uL    Lymphocytes Absolute 1.8 1.0 - 5.1 K/uL    Monocytes Absolute 0.5 0.0 - 1.3 K/uL    Eosinophils Absolute 0.1 0.0 - 0.6 K/uL    Basophils Absolute 0.0 0.0 - 0.2 K/uL   Basic Metabolic Panel w/ Reflex to MG   Result Value Ref Range    Sodium 139 136 - 145 mmol/L    Potassium reflex Magnesium 3.8 3.5 - 5.1 mmol/L    Chloride 101 99 - 110 mmol/L    CO2 22 21 - 32 mmol/L    Anion Gap 16 3 - 16    Glucose 121 (H) 70 - 99 mg/dL    BUN 24 (H) 7 - 20 mg/dL    CREATININE 1.0 0.8 - 1.3 mg/dL    GFR Non-African American >60 >60    GFR African American >60 >60    Calcium 8.9 8.3 - 10.6 mg/dL   Troponin   Result Value Ref Range    Troponin <0.01 <0.01 ng/mL   D-Dimer, Quantitative   Result Value Ref Range    D-Dimer, Quant <200 0 - 229 ng/mL DDU   Drug screen multi urine   Result Value Ref Range    Amphetamine Screen, Urine Neg Negative <1000ng/mL    Barbiturate Screen, Ur Neg Negative <200 ng/mL    Benzodiazepine Screen, Urine Neg Negative <200 ng/mL    Cannabinoid Scrn, Ur Neg Negative <50 ng/mL    Cocaine Metabolite Screen, Urine Neg Negative <300 ng/mL    Opiate Scrn, Ur Neg Negative <300 ng/mL    PCP Screen, Urine Neg Negative <25 ng/mL    Methadone Screen, Urine Neg Negative <300 ng/mL    Propoxyphene Scrn, Ur Neg Negative <300 ng/mL    Oxycodone Urine Neg Negative <100 ng/ml    pH, UA 6.0     Drug Screen Comment: see below    Ethanol   Result Value Ref Range    Ethanol Lvl None Detected mg/dL   Troponin   Result Value Ref Range    Troponin 0.02 (H) <0.01 ng/mL       ED COURSE & MEDICAL DECISION MAKING    Pertinent Labs & Imaging studies reviewed and interpreted. (See chart for details)  The patient was immediately placed on the cardiac monitor. IV access obtained. ASA was ordered as well as nitroglycerin.     See chart for details of medications given during the ED stay. Vitals:    02/11/20 1900 02/11/20 1915 02/11/20 2019 02/11/20 2045   BP: (!) 206/106 (!) 194/106 (!) 189/109 (!) 169/85   Pulse: 87 88  101   Resp: 17 24 18   Temp:       TempSrc:       SpO2: 97% 97%  99%   Weight:       Height:           Differential Diagnosis: Acute Coronary Syndrome, Congestive Heart Failure, Thoracic Dissection, Pericarditis, Pericardial Effusion, Pulmonary Embolism, Pneumonia, Pneumothorax, Ischemic Bowel, Bowel Obstruction, PUD, GERD, Acute Cholecystitis, Pancreatitis, Hepatitis, Colitis, other    CRITICAL CARE NOTE:  There was a high probability of clinically significant life-threatening deterioration of the patient's condition requiring my urgent intervention. Total critical care time was at least 35 minutes. This includes vital sign monitoring, pulse oximetry monitoring, telemetry monitoring, clinical response to the IV medications, reviewing the nursing notes, consultation time, dictation/documentation time, and interpretation of the labwork. This excludes any separately billable procedures performed. The critical care time above also includes time spent obtaining history from patient's electronic chart as he was uncertain when his last stress test or angiogram was. He did recently have a stress test and in early January on 1/4/2020 which was negative. This was performed with Dr. Renetta Mcelroy a cardiologist here at [facility]. I also had to call his pharmacy as patient is very poor historian and does not know his own medications. Patient is afebrile and nontoxic in appearance. Labs reveal no leukocytosis or anemia. Metabolic panel unremarkable. CXR findings as above. Ethanol level none detected. Urine drug screen unremarkable. EKG interpreted by physician. Troponin negative. D-dimer negative. Patient's HEART score is is 4. However he had a negative stress test in January. Therefore delta troponin was ordered.     I also in calling patient's

## 2020-02-12 LAB
ANION GAP SERPL CALCULATED.3IONS-SCNC: 15 MMOL/L (ref 3–16)
BUN BLDV-MCNC: 20 MG/DL (ref 7–20)
CALCIUM SERPL-MCNC: 8.7 MG/DL (ref 8.3–10.6)
CHLORIDE BLD-SCNC: 102 MMOL/L (ref 99–110)
CHOLESTEROL, TOTAL: 150 MG/DL (ref 0–199)
CO2: 25 MMOL/L (ref 21–32)
CREAT SERPL-MCNC: 0.8 MG/DL (ref 0.8–1.3)
EKG ATRIAL RATE: 88 BPM
EKG ATRIAL RATE: 90 BPM
EKG DIAGNOSIS: NORMAL
EKG DIAGNOSIS: NORMAL
EKG P AXIS: 53 DEGREES
EKG P AXIS: 6 DEGREES
EKG P-R INTERVAL: 148 MS
EKG P-R INTERVAL: 170 MS
EKG Q-T INTERVAL: 360 MS
EKG Q-T INTERVAL: 378 MS
EKG QRS DURATION: 102 MS
EKG QRS DURATION: 78 MS
EKG QTC CALCULATION (BAZETT): 440 MS
EKG QTC CALCULATION (BAZETT): 457 MS
EKG R AXIS: 68 DEGREES
EKG R AXIS: 74 DEGREES
EKG T AXIS: 45 DEGREES
EKG T AXIS: 53 DEGREES
EKG VENTRICULAR RATE: 88 BPM
EKG VENTRICULAR RATE: 90 BPM
ESTIMATED AVERAGE GLUCOSE: 162.8 MG/DL
GFR AFRICAN AMERICAN: >60
GFR NON-AFRICAN AMERICAN: >60
GLUCOSE BLD-MCNC: 124 MG/DL (ref 70–99)
GLUCOSE BLD-MCNC: 138 MG/DL (ref 70–99)
GLUCOSE BLD-MCNC: 158 MG/DL (ref 70–99)
GLUCOSE BLD-MCNC: 159 MG/DL (ref 70–99)
GLUCOSE BLD-MCNC: 159 MG/DL (ref 70–99)
GLUCOSE BLD-MCNC: 186 MG/DL (ref 70–99)
GLUCOSE BLD-MCNC: 211 MG/DL (ref 70–99)
HBA1C MFR BLD: 7.3 %
HCT VFR BLD CALC: 38.7 % (ref 40.5–52.5)
HDLC SERPL-MCNC: 32 MG/DL (ref 40–60)
HEMOGLOBIN: 12.9 G/DL (ref 13.5–17.5)
LDL CHOLESTEROL CALCULATED: ABNORMAL MG/DL
LDL CHOLESTEROL DIRECT: 72 MG/DL
MCH RBC QN AUTO: 28.8 PG (ref 26–34)
MCHC RBC AUTO-ENTMCNC: 33.4 G/DL (ref 31–36)
MCV RBC AUTO: 86.4 FL (ref 80–100)
PDW BLD-RTO: 13.9 % (ref 12.4–15.4)
PERFORMED ON: ABNORMAL
PLATELET # BLD: 175 K/UL (ref 135–450)
PMV BLD AUTO: 8.1 FL (ref 5–10.5)
POTASSIUM REFLEX MAGNESIUM: 3.8 MMOL/L (ref 3.5–5.1)
RBC # BLD: 4.48 M/UL (ref 4.2–5.9)
SODIUM BLD-SCNC: 142 MMOL/L (ref 136–145)
TRIGL SERPL-MCNC: 515 MG/DL (ref 0–150)
TROPONIN: 0.01 NG/ML
TROPONIN: <0.01 NG/ML
VLDLC SERPL CALC-MCNC: ABNORMAL MG/DL
WBC # BLD: 5.3 K/UL (ref 4–11)

## 2020-02-12 PROCEDURE — G0378 HOSPITAL OBSERVATION PER HR: HCPCS

## 2020-02-12 PROCEDURE — 93010 ELECTROCARDIOGRAM REPORT: CPT | Performed by: INTERNAL MEDICINE

## 2020-02-12 PROCEDURE — 80061 LIPID PANEL: CPT

## 2020-02-12 PROCEDURE — 2580000003 HC RX 258: Performed by: NURSE PRACTITIONER

## 2020-02-12 PROCEDURE — 84484 ASSAY OF TROPONIN QUANT: CPT

## 2020-02-12 PROCEDURE — 96372 THER/PROPH/DIAG INJ SC/IM: CPT

## 2020-02-12 PROCEDURE — 6360000002 HC RX W HCPCS: Performed by: NURSE PRACTITIONER

## 2020-02-12 PROCEDURE — 83036 HEMOGLOBIN GLYCOSYLATED A1C: CPT

## 2020-02-12 PROCEDURE — 6370000000 HC RX 637 (ALT 250 FOR IP): Performed by: NURSE PRACTITIONER

## 2020-02-12 PROCEDURE — 93005 ELECTROCARDIOGRAM TRACING: CPT | Performed by: NURSE PRACTITIONER

## 2020-02-12 PROCEDURE — 36415 COLL VENOUS BLD VENIPUNCTURE: CPT

## 2020-02-12 PROCEDURE — 94640 AIRWAY INHALATION TREATMENT: CPT

## 2020-02-12 PROCEDURE — 94760 N-INVAS EAR/PLS OXIMETRY 1: CPT

## 2020-02-12 PROCEDURE — 83721 ASSAY OF BLOOD LIPOPROTEIN: CPT

## 2020-02-12 PROCEDURE — 6370000000 HC RX 637 (ALT 250 FOR IP): Performed by: HOSPITALIST

## 2020-02-12 PROCEDURE — 85027 COMPLETE CBC AUTOMATED: CPT

## 2020-02-12 PROCEDURE — 80048 BASIC METABOLIC PNL TOTAL CA: CPT

## 2020-02-12 RX ORDER — DOXAZOSIN 2 MG/1
2 TABLET ORAL EVERY 12 HOURS SCHEDULED
Status: DISCONTINUED | OUTPATIENT
Start: 2020-02-12 | End: 2020-02-13 | Stop reason: HOSPADM

## 2020-02-12 RX ORDER — ZOLPIDEM TARTRATE 5 MG/1
10 TABLET ORAL NIGHTLY
Status: DISCONTINUED | OUTPATIENT
Start: 2020-02-12 | End: 2020-02-13 | Stop reason: HOSPADM

## 2020-02-12 RX ORDER — NICOTINE POLACRILEX 4 MG
15 LOZENGE BUCCAL PRN
Status: DISCONTINUED | OUTPATIENT
Start: 2020-02-12 | End: 2020-02-13 | Stop reason: HOSPADM

## 2020-02-12 RX ORDER — SODIUM CHLORIDE 0.9 % (FLUSH) 0.9 %
10 SYRINGE (ML) INJECTION EVERY 12 HOURS SCHEDULED
Status: DISCONTINUED | OUTPATIENT
Start: 2020-02-12 | End: 2020-02-13 | Stop reason: HOSPADM

## 2020-02-12 RX ORDER — SPIRONOLACTONE 25 MG/1
50 TABLET ORAL 2 TIMES DAILY
Status: DISCONTINUED | OUTPATIENT
Start: 2020-02-12 | End: 2020-02-13 | Stop reason: HOSPADM

## 2020-02-12 RX ORDER — HYDRALAZINE HYDROCHLORIDE 50 MG/1
100 TABLET, FILM COATED ORAL EVERY 8 HOURS SCHEDULED
Status: DISCONTINUED | OUTPATIENT
Start: 2020-02-12 | End: 2020-02-13 | Stop reason: HOSPADM

## 2020-02-12 RX ORDER — ISOSORBIDE MONONITRATE 30 MG/1
30 TABLET, EXTENDED RELEASE ORAL EVERY EVENING
Status: DISCONTINUED | OUTPATIENT
Start: 2020-02-12 | End: 2020-02-13 | Stop reason: HOSPADM

## 2020-02-12 RX ORDER — BUDESONIDE AND FORMOTEROL FUMARATE DIHYDRATE 160; 4.5 UG/1; UG/1
2 AEROSOL RESPIRATORY (INHALATION) 2 TIMES DAILY
Status: DISCONTINUED | OUTPATIENT
Start: 2020-02-12 | End: 2020-02-13 | Stop reason: HOSPADM

## 2020-02-12 RX ORDER — DEXTROSE MONOHYDRATE 25 G/50ML
12.5 INJECTION, SOLUTION INTRAVENOUS PRN
Status: DISCONTINUED | OUTPATIENT
Start: 2020-02-12 | End: 2020-02-13 | Stop reason: HOSPADM

## 2020-02-12 RX ORDER — NITROGLYCERIN 0.4 MG/1
0.4 TABLET SUBLINGUAL EVERY 5 MIN PRN
Status: DISCONTINUED | OUTPATIENT
Start: 2020-02-12 | End: 2020-02-13 | Stop reason: HOSPADM

## 2020-02-12 RX ORDER — ATORVASTATIN CALCIUM 40 MG/1
40 TABLET, FILM COATED ORAL NIGHTLY
Status: DISCONTINUED | OUTPATIENT
Start: 2020-02-12 | End: 2020-02-13 | Stop reason: HOSPADM

## 2020-02-12 RX ORDER — LISINOPRIL 40 MG/1
40 TABLET ORAL DAILY
Status: DISCONTINUED | OUTPATIENT
Start: 2020-02-12 | End: 2020-02-13 | Stop reason: HOSPADM

## 2020-02-12 RX ORDER — FENOFIBRATE 54 MG/1
54 TABLET ORAL DAILY
Status: DISCONTINUED | OUTPATIENT
Start: 2020-02-12 | End: 2020-02-13 | Stop reason: HOSPADM

## 2020-02-12 RX ORDER — TRAMADOL HYDROCHLORIDE 50 MG/1
100 TABLET ORAL EVERY 6 HOURS PRN
Status: DISCONTINUED | OUTPATIENT
Start: 2020-02-12 | End: 2020-02-12

## 2020-02-12 RX ORDER — ASPIRIN 81 MG/1
81 TABLET, CHEWABLE ORAL DAILY
Status: DISCONTINUED | OUTPATIENT
Start: 2020-02-12 | End: 2020-02-13 | Stop reason: HOSPADM

## 2020-02-12 RX ORDER — TRAMADOL HYDROCHLORIDE 50 MG/1
50 TABLET ORAL EVERY 6 HOURS PRN
Status: DISCONTINUED | OUTPATIENT
Start: 2020-02-12 | End: 2020-02-12

## 2020-02-12 RX ORDER — NIFEDIPINE 60 MG/1
60 TABLET, EXTENDED RELEASE ORAL 2 TIMES DAILY
Status: DISCONTINUED | OUTPATIENT
Start: 2020-02-12 | End: 2020-02-13 | Stop reason: HOSPADM

## 2020-02-12 RX ORDER — OXYCODONE HYDROCHLORIDE AND ACETAMINOPHEN 5; 325 MG/1; MG/1
2 TABLET ORAL EVERY 4 HOURS PRN
Status: DISCONTINUED | OUTPATIENT
Start: 2020-02-12 | End: 2020-02-13 | Stop reason: HOSPADM

## 2020-02-12 RX ORDER — METOPROLOL TARTRATE 100 MG/1
100 TABLET ORAL 2 TIMES DAILY
Status: ON HOLD | COMMUNITY
End: 2020-03-02 | Stop reason: HOSPADM

## 2020-02-12 RX ORDER — ALBUTEROL SULFATE 90 UG/1
2 AEROSOL, METERED RESPIRATORY (INHALATION) EVERY 4 HOURS PRN
Status: DISCONTINUED | OUTPATIENT
Start: 2020-02-12 | End: 2020-02-13 | Stop reason: HOSPADM

## 2020-02-12 RX ORDER — OXYCODONE HYDROCHLORIDE AND ACETAMINOPHEN 5; 325 MG/1; MG/1
1 TABLET ORAL EVERY 4 HOURS PRN
Status: DISCONTINUED | OUTPATIENT
Start: 2020-02-12 | End: 2020-02-13 | Stop reason: HOSPADM

## 2020-02-12 RX ORDER — DEXTROSE MONOHYDRATE 50 MG/ML
100 INJECTION, SOLUTION INTRAVENOUS PRN
Status: DISCONTINUED | OUTPATIENT
Start: 2020-02-12 | End: 2020-02-13 | Stop reason: HOSPADM

## 2020-02-12 RX ORDER — ONDANSETRON 2 MG/ML
4 INJECTION INTRAMUSCULAR; INTRAVENOUS EVERY 6 HOURS PRN
Status: DISCONTINUED | OUTPATIENT
Start: 2020-02-12 | End: 2020-02-13 | Stop reason: HOSPADM

## 2020-02-12 RX ORDER — HYDROCHLOROTHIAZIDE 25 MG/1
25 TABLET ORAL DAILY
Status: DISCONTINUED | OUTPATIENT
Start: 2020-02-12 | End: 2020-02-13 | Stop reason: HOSPADM

## 2020-02-12 RX ORDER — SERTRALINE HYDROCHLORIDE 100 MG/1
100 TABLET, FILM COATED ORAL NIGHTLY
Status: DISCONTINUED | OUTPATIENT
Start: 2020-02-12 | End: 2020-02-13 | Stop reason: HOSPADM

## 2020-02-12 RX ORDER — MIRTAZAPINE 15 MG/1
15 TABLET, FILM COATED ORAL NIGHTLY
Status: DISCONTINUED | OUTPATIENT
Start: 2020-02-12 | End: 2020-02-13 | Stop reason: HOSPADM

## 2020-02-12 RX ORDER — AMITRIPTYLINE HYDROCHLORIDE 50 MG/1
50 TABLET, FILM COATED ORAL 2 TIMES DAILY
Status: DISCONTINUED | OUTPATIENT
Start: 2020-02-12 | End: 2020-02-13 | Stop reason: HOSPADM

## 2020-02-12 RX ORDER — SODIUM CHLORIDE 0.9 % (FLUSH) 0.9 %
10 SYRINGE (ML) INJECTION PRN
Status: DISCONTINUED | OUTPATIENT
Start: 2020-02-12 | End: 2020-02-13 | Stop reason: HOSPADM

## 2020-02-12 RX ADMIN — ATORVASTATIN CALCIUM 40 MG: 40 TABLET, FILM COATED ORAL at 02:27

## 2020-02-12 RX ADMIN — MIRTAZAPINE 15 MG: 15 TABLET, FILM COATED ORAL at 21:42

## 2020-02-12 RX ADMIN — BUDESONIDE AND FORMOTEROL FUMARATE DIHYDRATE 2 PUFF: 160; 4.5 AEROSOL RESPIRATORY (INHALATION) at 20:50

## 2020-02-12 RX ADMIN — METFORMIN HYDROCHLORIDE 1000 MG: 500 TABLET ORAL at 08:24

## 2020-02-12 RX ADMIN — OXYCODONE HYDROCHLORIDE AND ACETAMINOPHEN 2 TABLET: 5; 325 TABLET ORAL at 14:43

## 2020-02-12 RX ADMIN — LISINOPRIL 40 MG: 40 TABLET ORAL at 08:24

## 2020-02-12 RX ADMIN — DOXAZOSIN 2 MG: 2 TABLET ORAL at 08:24

## 2020-02-12 RX ADMIN — NIFEDIPINE 60 MG: 60 TABLET, FILM COATED, EXTENDED RELEASE ORAL at 21:42

## 2020-02-12 RX ADMIN — ZOLPIDEM TARTRATE 10 MG: 5 TABLET ORAL at 02:28

## 2020-02-12 RX ADMIN — OXYCODONE HYDROCHLORIDE AND ACETAMINOPHEN 2 TABLET: 5; 325 TABLET ORAL at 18:43

## 2020-02-12 RX ADMIN — ASPIRIN 81 MG 81 MG: 81 TABLET ORAL at 08:24

## 2020-02-12 RX ADMIN — INSULIN LISPRO 1 UNITS: 100 INJECTION, SOLUTION INTRAVENOUS; SUBCUTANEOUS at 12:06

## 2020-02-12 RX ADMIN — AMITRIPTYLINE HYDROCHLORIDE 50 MG: 50 TABLET, FILM COATED ORAL at 21:42

## 2020-02-12 RX ADMIN — HYDRALAZINE HYDROCHLORIDE 100 MG: 50 TABLET, FILM COATED ORAL at 14:43

## 2020-02-12 RX ADMIN — ATORVASTATIN CALCIUM 40 MG: 40 TABLET, FILM COATED ORAL at 21:42

## 2020-02-12 RX ADMIN — SODIUM CHLORIDE, PRESERVATIVE FREE 10 ML: 5 INJECTION INTRAVENOUS at 21:44

## 2020-02-12 RX ADMIN — INSULIN LISPRO 1 UNITS: 100 INJECTION, SOLUTION INTRAVENOUS; SUBCUTANEOUS at 02:27

## 2020-02-12 RX ADMIN — TRAMADOL HYDROCHLORIDE 100 MG: 50 TABLET ORAL at 08:31

## 2020-02-12 RX ADMIN — NIFEDIPINE 60 MG: 60 TABLET, FILM COATED, EXTENDED RELEASE ORAL at 02:28

## 2020-02-12 RX ADMIN — SPIRONOLACTONE 50 MG: 25 TABLET ORAL at 08:24

## 2020-02-12 RX ADMIN — INSULIN LISPRO 1 UNITS: 100 INJECTION, SOLUTION INTRAVENOUS; SUBCUTANEOUS at 21:41

## 2020-02-12 RX ADMIN — TRAMADOL HYDROCHLORIDE 100 MG: 50 TABLET ORAL at 02:28

## 2020-02-12 RX ADMIN — NIFEDIPINE 60 MG: 60 TABLET, FILM COATED, EXTENDED RELEASE ORAL at 08:24

## 2020-02-12 RX ADMIN — DOXAZOSIN 2 MG: 2 TABLET ORAL at 21:41

## 2020-02-12 RX ADMIN — BUDESONIDE AND FORMOTEROL FUMARATE DIHYDRATE 2 PUFF: 160; 4.5 AEROSOL RESPIRATORY (INHALATION) at 08:47

## 2020-02-12 RX ADMIN — HYDRALAZINE HYDROCHLORIDE 100 MG: 50 TABLET, FILM COATED ORAL at 21:41

## 2020-02-12 RX ADMIN — SPIRONOLACTONE 50 MG: 25 TABLET ORAL at 17:07

## 2020-02-12 RX ADMIN — SERTRALINE 100 MG: 100 TABLET, FILM COATED ORAL at 21:41

## 2020-02-12 RX ADMIN — OXYCODONE HYDROCHLORIDE AND ACETAMINOPHEN 2 TABLET: 5; 325 TABLET ORAL at 23:05

## 2020-02-12 RX ADMIN — SODIUM CHLORIDE, PRESERVATIVE FREE 10 ML: 5 INJECTION INTRAVENOUS at 08:31

## 2020-02-12 RX ADMIN — OXYCODONE HYDROCHLORIDE AND ACETAMINOPHEN 2 TABLET: 5; 325 TABLET ORAL at 10:41

## 2020-02-12 RX ADMIN — AMITRIPTYLINE HYDROCHLORIDE 50 MG: 50 TABLET, FILM COATED ORAL at 02:28

## 2020-02-12 RX ADMIN — HYDROCHLOROTHIAZIDE 25 MG: 25 TABLET ORAL at 08:24

## 2020-02-12 RX ADMIN — FENOFIBRATE 54 MG: 54 TABLET ORAL at 08:24

## 2020-02-12 RX ADMIN — INSULIN LISPRO 1 UNITS: 100 INJECTION, SOLUTION INTRAVENOUS; SUBCUTANEOUS at 17:08

## 2020-02-12 RX ADMIN — ENOXAPARIN SODIUM 40 MG: 40 INJECTION SUBCUTANEOUS at 08:25

## 2020-02-12 RX ADMIN — AMITRIPTYLINE HYDROCHLORIDE 50 MG: 50 TABLET, FILM COATED ORAL at 08:25

## 2020-02-12 RX ADMIN — HYDRALAZINE HYDROCHLORIDE 100 MG: 50 TABLET, FILM COATED ORAL at 06:36

## 2020-02-12 RX ADMIN — ZOLPIDEM TARTRATE 10 MG: 5 TABLET ORAL at 21:41

## 2020-02-12 RX ADMIN — METFORMIN HYDROCHLORIDE 1000 MG: 500 TABLET ORAL at 17:07

## 2020-02-12 RX ADMIN — ISOSORBIDE MONONITRATE 30 MG: 30 TABLET, EXTENDED RELEASE ORAL at 17:07

## 2020-02-12 ASSESSMENT — PAIN SCALES - GENERAL
PAINLEVEL_OUTOF10: 4
PAINLEVEL_OUTOF10: 4
PAINLEVEL_OUTOF10: 8
PAINLEVEL_OUTOF10: 7
PAINLEVEL_OUTOF10: 5
PAINLEVEL_OUTOF10: 7
PAINLEVEL_OUTOF10: 8
PAINLEVEL_OUTOF10: 9
PAINLEVEL_OUTOF10: 8
PAINLEVEL_OUTOF10: 9
PAINLEVEL_OUTOF10: 8

## 2020-02-12 ASSESSMENT — PAIN - FUNCTIONAL ASSESSMENT
PAIN_FUNCTIONAL_ASSESSMENT: PREVENTS OR INTERFERES SOME ACTIVE ACTIVITIES AND ADLS

## 2020-02-12 ASSESSMENT — PAIN DESCRIPTION - DIRECTION
RADIATING_TOWARDS: NECK

## 2020-02-12 ASSESSMENT — PAIN DESCRIPTION - ORIENTATION
ORIENTATION: LEFT

## 2020-02-12 ASSESSMENT — PAIN DESCRIPTION - FREQUENCY
FREQUENCY: CONTINUOUS

## 2020-02-12 ASSESSMENT — PAIN DESCRIPTION - ONSET
ONSET: ON-GOING

## 2020-02-12 ASSESSMENT — PAIN DESCRIPTION - LOCATION
LOCATION: SHOULDER

## 2020-02-12 ASSESSMENT — PAIN DESCRIPTION - PAIN TYPE
TYPE: ACUTE PAIN

## 2020-02-12 ASSESSMENT — PAIN DESCRIPTION - PROGRESSION
CLINICAL_PROGRESSION: NOT CHANGED

## 2020-02-12 ASSESSMENT — PAIN DESCRIPTION - DESCRIPTORS
DESCRIPTORS: SHARP;ACHING;DISCOMFORT
DESCRIPTORS: ACHING;DISCOMFORT;SHARP
DESCRIPTORS: ACHING;DISCOMFORT
DESCRIPTORS: ACHING;DISCOMFORT;SHARP

## 2020-02-12 ASSESSMENT — PAIN SCALES - WONG BAKER: WONGBAKER_NUMERICALRESPONSE: 0

## 2020-02-12 NOTE — CONSULTS
Office: [phone]       Fax: [phone]      Nephrology Initial Consult Note        Patient's Name: Efrain Bean  Admit Date: 2/11/2020  Date of Visit: 2/12/2020    Reason for Consult:  HTN  Requesting Physician:  Preeti Healy MD  PCP: Luis Alfredo Bernal MD    Chief Complaint:  chest pain      History of Present Illness:      Efrain Bean is a 72 y.o. male with PMHx of hypertension, diabetes mellitus, coronary artery disease, COPD, substance abuse (cocaine in recent past) who was hospitalized on 2/11/2020 with complaints of elevated blood pressure. Patient visited his PCP and was noted to have SBP>200. He was sent to hospital. He was also complaining of chest pain. He was given iv labetalol, hydralazine and nitro. blood pressure improved. He was having some headache afterwords. Patient denies non compliance with meds. Of noted he was recently hospitalized with similar complaints. At that time ur tox was  + for cocaine. Patient denies any current use of recreational drugs. Denies active NSAID use  In past PRA, ary have been unremarkable  Renal doppler in April 2019: No evidence of hemodynamically significant stenosis in either renal artery  Reported history of obstructive sleep apnea but not on CPAP    Past Medical History:   Diagnosis Date    CAD (coronary artery disease)     Chest pain 10/15/2017    Cocaine abuse ([address]     [facility]     Diabetes mellitus [facility]     Hyperlipidemia     Hypertension     Kidney stone     MRSA colonization 4/16/19; 03/24/2019    Sleep apnea     He said he doesn't use a CPAP machine.        Past Surgical History:   Procedure Laterality Date    ABDOMEN SURGERY      CARDIAC SURGERY      3 STENTS PLACED     CHOLECYSTECTOMY      COLONOSCOPY      CORONARY [facility]OPLASTY WITH [facility] [address]  Office : [phone]  Fax :[phone]

## 2020-02-12 NOTE — CARE COORDINATION
INITIAL CASE MANAGEMENT ASSESSMENT    Reviewed chart, met with patient to assess possible discharge needs. Explained Case Management role/services. Living Situation:pt lives in 2 story home , with upstairs bed and bath. Lives with brother who is wheelchair bound. ADLs: pt is independent pta. DME: none     PT/OT Recs: none      Active Services:none      Transportation:brother who drives. Medications: pt lives in [place] and would like his prescriptions filled here at [facility]. PCP: Marlene Sarabia MD       HD/PD: none     PLAN/COMMENTS: pt plans to return home with his brother at [place]. Pt states he was given a prescription for his blood pressure and after an insurance check the med was  $  293.00 and no generic was available. Provided pt with info on [facility] and [facility]. Will have [facility] do price check at [place] for any home meds at [place]. Pt denies any other dc needs at this time. Electronically signed by MICAHEL Waller on 2/12/2020 at 12:24 PM    SW/CM provided contact information for patient or family to call with any questions. SW/CM will follow and assist as needed.

## 2020-02-12 NOTE — ED PROVIDER NOTES
[facility]  eMERGENCY dEPARTMENT eNCOUnter   Physician Attestation    Pt Name: Matthew Lamas  MRN: [id number]  [place] 1955  Date of evaluation: 2/11/20        Physician Note:    I havepersonally performed and/or participated in the history, exam and medical decision making and agree with all pertinent clinical information. I have also reviewed and agree with the past medical, family and social historyunless otherwise noted. I have personally performed a face to face diagnostic evaluation onthis patient. I have reviewed the mid-levels findings and agree. History: This is a 27-year-old gentleman who does have a history of some substance abuse. He also has a history of hypertension. He said several visits over the last few months of elevated blood pressure. He had a negative nuclear stress test done just within the last couple of months. He has been having some left shoulder discomfort. He went to see his primary care provider and his blood pressure was extremely high. He was having some chest pain as well so naturally he was sent to the emergency department. Going through all of this patient's medications was a challenge. Please see the nurse practitioner's note as she did an outstanding job going through [employer] and spending time on the phone with the pharmacy. It does not sound as if he has been completely compliant. We started out by giving him some IV hydralazine and some Dilaudid for shoulder pain. We then added some labetalol we did get his blood pressure to come down. However we did do a second troponin which came back elevated so it might of become elevated primarily because of the very high blood pressure and poor control. Physical Exam  Vitals signs and nursing note reviewed. Constitutional:       Appearance: He is well-developed. He is not diaphoretic. HENT:      Head: Normocephalic and atraumatic.       Right Ear: External ear normal.      Left Ear: External ear normal.   Eyes:      General: No scleral icterus. Right eye: No discharge. Left eye: No discharge. Conjunctiva/sclera: Conjunctivae normal.   Neck:      Musculoskeletal: Normal range of motion. Trachea: No tracheal deviation. Pulmonary:      Effort: Pulmonary effort is normal. No respiratory distress. Breath sounds: No stridor. Musculoskeletal: Normal range of motion. Skin:     General: Skin is warm and dry. Neurological:      Mental Status: He is alert and oriented to person, place, and time. Coordination: Coordination normal.   Psychiatric:         Behavior: Behavior normal.       EKG visualized preliminarily interpreted by myself shows sinus rhythm at a rate of 90 with a normal axis of 68. Nonspecific ST findings but no acute injury or acute ischemia is appreciated. Biphasic P wave in lead V1.    1. Chest pain, unspecified type    2. Hypertension, unspecified type    3. Noncompliance with medication regimen    4. Elevated troponin          DISPOSITION/PLAN  PATIENT REFERRED TO:  No follow-up provider specified.   DISCHARGE MEDICATIONS:  New Prescriptions    No medications on file         MD Irma Lenz MD  02/11/20 4214

## 2020-02-12 NOTE — PROGRESS NOTES
CARDIAC; No Caffeine      OBJECTIVE:   Patient Active Problem List   Diagnosis    Chronic obstructive pulmonary disease (HCC)    [facility]    ARTEMIO (obstructive sleep apnea)    Hypertension    Coronary artery disease involving native coronary artery of native heart without angina pectoris    Type 2 diabetes mellitus with hyperglycemia ([facility])    Mixed hyperlipidemia    CAD in native artery    Syncope    Atypical chest pain    [facility], [facility]    Suicide ideation    Nondependent [facility] ([facility]    Pneumonia    Depression    Anxiety    Sleeping difficulty    Hypertensive urgency    Drug use disorder    Uncontrolled hypertension    LVH (left ventricular hypertrophy) due to hypertensive disease, without heart failure    Chest pain    Syncope, psychogenic    MONSALVE (dyspnea on exertion)    Hypertensive emergency    Abnormal radiograph    Hypertriglyceridemia    Sepsis [facility]    COPD exacerbation ([facility]    Pleuritic chest pain    Hypomagnesemia    Hypokalemia    HTN (hypertension)    DMII (diabetes mellitus, type 2) ([facility])    Demand ischemia ([facility])    Major depressive disorder, recurrent severe without psychotic features ([facility])       Allergies  Naproxen    Medications    Scheduled Meds:   amitriptyline  50 mg Oral BID    doxazosin  2 mg Oral 2 times per day    fenofibrate  54 mg Oral Daily    hydrALAZINE  100 mg Oral 3 times per day    hydrochlorothiazide  25 mg Oral Daily    isosorbide mononitrate  30 mg Oral QPM    lisinopril  40 mg Oral Daily    metFORMIN  1,000 mg Oral BID WC    mirtazapine  15 mg Oral Nightly    budesonide-formoterol  2 puff Inhalation BID    NIFEdipine  60 mg Oral BID    sertraline  100 mg Oral Nightly    spironolactone  50 mg Oral BID    zolpidem  10 mg Oral Nightly    sodium chloride flush  10 mL Intravenous 2 times per day    aspirin  81 mg Oral Daily    atorvastatin  40 mg Oral Nightly    enoxaparin  40 ALKPHOS in the last 72 hours. No results for input(s): AMYLASE in the last 72 hours. No results for input(s): LIPASE in the last 72 hours. UA:No results for input(s): NITRITE, LABCAST, WBCUA, RBCUA, MUCUS in the last 72 hours. TROPONIN:   Recent Labs     02/11/20  2130 02/12/20  0118 02/12/20  0446   TROPONINI 0.02* 0.01 <0.01       Lab Results   Component Value Date/Time    URRFLXCULT Not Indicated 01/20/2020 01:29 PM       No results for input(s): TSHREFLEX in the last 72 hours. No components found for: [id number]  POC GLUCOSE:    Recent Labs     02/12/20  0044 02/12/20  0109 02/12/20  0737   POCGLU 186* 211* 138*     No results for input(s): LABA1C in the last 72 hours. Lab Results   Component Value Date    LABA1C 7.2 04/01/2018         ASSESSMENT AND PLAN     Chest pain  3 stents placed about 9 years ago  negative stress test on last admission  No significant elevation of troponin-demand ischemia-uncontrolled hypertension  Aspirin     Hypertension   Goal BP <130/80. Consults nephrology for hypertension  Case management consulted to assist patient in obtaining his medications at discharge     Diabetes type 2 chronic and stable  -Sliding scale insulin    -Continue home medications      Code Status: Full Code        Dispo - continue care        The patient and / or the family were informed of the results of any tests, a time was given to answer questions, a plan was proposed and they agreed with plan. Corey Fisher MD    This note was transcribed using [id number]. Please disregard any translational errors.

## 2020-02-12 NOTE — H&P
Hospital Medicine History & Physical      PCP: Luis Alfredo Bernal MD    Date of Admission: 2/11/2020    Date of Service: Pt seen/examined on February 11, 2020 . placed in Observation. Chief Complaint: Chest pain      History Of Present Illness:      72 y.o. male with PMHx of CAD, chest pain, cocaine abuse, COPD, diabetes type 2, hyperlipidemia, hypertension presented to [facility] with a 1 hour onset of chest pain. Sharlot Pipe it started this afternoon he rates the pain as an 8 out of 10. He is also complaining of left shoulder pain. He denies any injury but whenever he turns his neck to the right he has increased left shoulder pain. He was also at his primary care doctor's office today noted his blood pressure was elevated and was sent to the ER for management. It is noted that the patient has not picked up any of his blood pressure medicine since discharge from the hospital 2 weeks ago. He states he is unable to afford the medications. He denies any current cocaine or alcohol use. Timing/Onset: 1 hour onset to arrival  Location/Symptom: Midsternal chest pain and left shoulder pain  Duration: Persistent  Context/Setting: Describes the chest pain as a achy sensation that does not go away, left shoulder pain is a piercing pain  Quality: 8 out of 10 to his left shoulder  Aggravating Factors: Chest pain nothing makes it worse or better, left shoulder pain worse with movement  Relieving Factors-pain medication  Treatment: Nothing    Past Medical History:          Diagnosis Date    CAD (coronary artery disease)     Chest pain 10/15/2017    Cocaine abuse ([address]     COPD (chronic obstructive pulmonary disease) ([address]     Diabetes mellitus ([address]     Hyperlipidemia     Hypertension     Kidney stone     MRSA colonization 4/16/19; 03/24/2019    Sleep apnea     He said he doesn't use a CPAP machine.        Past Surgical History:          Procedure Laterality Date    ABDOMEN SURGERY      CARDIAC SURGERY 3 STENTS PLACED     CHOLECYSTECTOMY      COLONOSCOPY      CORONARY ANGIOPLASTY WITH STENT PLACEMENT      3 stents     FRACTURE SURGERY      RIGHT ANKLE    LITHOTRIPSY      SHOULDER ARTHROSCOPY      VASCULAR SURGERY         Medications Prior to Admission:      Prior to Admission medications    Medication Sig Start Date End Date Taking? Authorizing Provider   LORazepam (ATIVAN) 1 MG tablet Take 1 tablet by mouth every 8 hours as needed for Anxiety for up to 6 doses.  Sedation precautions 1/20/20 2/19/20  BRITANY Way   isosorbide mononitrate (IMDUR) 30 MG extended release tablet Take 1 tablet by mouth every evening 1/10/20   EDMOND Campbell CNP   hydrALAZINE (APRESOLINE) 100 MG tablet Take 1 tablet by mouth every 8 hours 1/10/20   EMDOND Campbell CNP   doxazosin (CARDURA) 2 MG tablet Take 1 tablet by mouth every 12 hours 1/10/20   EDMOND Campbell CNP   labetalol (NORMODYNE) 300 MG tablet Take 1 tablet by mouth every 12 hours 1/10/20   EDMOND Campbell CNP   hydrochlorothiazide (HYDRODIURIL) 25 MG tablet Take 1 tablet by mouth daily 1/11/20   EDMOND Campbell CNP   spironolactone (ALDACTONE) 50 MG tablet Take 1 tablet by mouth 2 times daily 1/10/20   EDMOND Campbell CNP   NIFEdipine (ADALAT CC) 60 MG extended release tablet Take 1 tablet by mouth 2 times daily 1/10/20   EDMOND Campbell CNP   lisinopril (PRINIVIL;ZESTRIL) 40 MG tablet Take 40 mg by mouth daily    Historical Provider, MD   amitriptyline (ELAVIL) 50 MG tablet Take 50 mg by mouth 2 times daily    Historical Provider, MD   sertraline (ZOLOFT) 100 MG tablet Take 100 mg by mouth nightly  4/15/19   Historical Provider, MD   atorvastatin (LIPITOR) 40 MG tablet Take 40 mg by mouth nightly  2/26/15   Historical Provider, MD   mometasone-formoterol (DULERA) 200-5 MCG/ACT inhaler Inhale 2 puffs into the lungs 2 times daily 1/22/19   Consuelo Bravo MD   fenofibrate (TRICOR) 54 MG tablet Take 1 tablet by mouth daily s [facility] pharmacy: Please dispense generic fenofibrate unless prescriber denote 1/23/19   Kt Tineo MD   albuterol sulfate HFA (PROVENTIL HFA) 108 (90 Base) MCG/ACT inhaler Inhale 2 puffs into the lungs every 4 hours as needed for Wheezing (coughing episodes) 11/24/18   Buffy Chandler MD   vitamin B-12 (CYANOCOBALAMIN) 500 MCG tablet Take 500 mcg by mouth daily    Historical Provider, MD   metFORMIN (GLUCOPHAGE) 1000 MG tablet Take 1,000 mg by mouth 2 times daily (with meals)    Historical Provider, MD   zolpidem (AMBIEN) 10 MG tablet Take 10 mg by mouth nightly. Historical Provider, MD   mirtazapine (REMERON) 15 MG tablet Take 1 tablet by mouth nightly 2/19/18   Ovidio Montesinos MD   nitroGLYCERIN (NITROSTAT) 0.4 MG SL tablet up to max of 3 total doses. If no relief after 1 dose, call 911. 10/5/17   Jamilah Ruano, DO   vitamin D (D3-1000) 1000 UNITS TABS tablet Take 1 tablet by mouth daily 5/24/17   Ovidio Montesinos MD   aspirin 81 MG tablet Take 81 mg by mouth daily    Historical Provider, MD       Allergies:  Naproxen    Social History:      The patient currently lives at his own home    TOBACCO:   reports that he has quit smoking. His smoking use included cigarettes. He has a 100.00 pack-year smoking history. He has never used smokeless tobacco.  ETOH:   reports no history of alcohol use. Family History:      Reviewed in detail positive as follows:        Problem Relation Age of Onset    High Blood Pressure Mother     Cancer Father         lung cancer    Diabetes Brother     Liver Disease Sister     Diabetes Brother        REVIEW OF SYSTEMS:   Pertinent positives as noted in the HPI. All other systems reviewed and negative.     PHYSICAL EXAM PERFORMED:    BP (!) 169/85   Pulse 101   Temp 99 °F (37.2 °C) (Tympanic)   Resp 18   Ht 5' 11\" (1.803 m)   Wt 242 lb 4.6 oz (109.9 kg)   SpO2 99%   BMI 33.79 kg/m²     General appearance:  No apparent distress, appears stated age and cooperative. HEENT:  Normal cephalic, atraumatic without obvious deformity. Pupils equal, round, and reactive to light. Extra ocular muscles intact. Conjunctivae/corneas clear. Neck: Supple, with full range of motion. No jugular venous distention. Trachea midline. Respiratory:  Normal respiratory effort. Clear to auscultation, bilaterally without Rales/Wheezes/Rhonchi. Cardiovascular:  Regular rate and rhythm without murmurs, rubs or gallops. Abdomen: Soft, non-tender, non-distended, without rebound or guarding. Normal bowel sounds. Musculoskeletal:  No clubbing, cyanosis or edema bilaterally. Full range of motion without deformity. Positive tenderness to the anterior left shoulder in the supraspinatus region with palpation and movement  Skin: Skin color, texture, turgor normal.  No rashes or lesions. Neurologic:  Neurovascularly intact without any focal sensory/motor deficits. Cranial nerves: II-XII intact, grossly non-focal.  Psychiatric:  Alert and oriented, thought content appropriate, normal insight  Capillary Refill: Brisk,< 3 seconds   Peripheral Pulses: +2 palpable, equal bilaterally     Medical decision making  Labs:     Recent Labs     02/11/20  1749   WBC 6.3   HGB 13.2*   HCT 39.6*        Recent Labs     02/11/20  1749      K 3.8      CO2 22   BUN 24*   CREATININE 1.0   CALCIUM 8.9     No results for input(s): AST, ALT, BILIDIR, BILITOT, ALKPHOS in the last 72 hours. No results for input(s): INR in the last 72 hours.   Recent Labs     02/11/20  1749 02/11/20  2130   TROPONINI <0.01 0.02*       Urinalysis:      Lab Results   Component Value Date    NITRU Negative 01/20/2020    WBCUA 1 01/20/2020    BACTERIA Rare 01/19/2018    RBCUA 1 01/20/2020    BLOODU Negative 01/20/2020    SPECGRAV 1.027 01/20/2020    GLUCOSEU 250 01/20/2020       Radiology:     CXR: I have reviewed the CXR with the following interpretation:   EKG:  I have reviewed the EKG with the following Emergency Room. - I have discussed other possible etiologies of the symptoms such as Musculoskeletal Pain and GERD, including unique presenting characteritics of each. Patient understands that if the evaluation does not show that the symptoms are secondary to ischemic changes, he will be discharged and instructed to follow up with his outpatient physician to help determine the etiology of his symptoms. Elevated troponin  Since troponin elevated from 0.01- 0.02. Chart review he does have elevated troponins in the past secondary to demand ischemia  -Trend troponin  -Consider cardiology consult  -Telemetry monitoring    Hypertension chronic and stable  Goal BP <130/80. Continue medical therapy. -Reinitiate antihypertensives  -Close monitoring of blood pressure  -Case management consulted to assist patient in obtaining his medications at discharge    Diabetes type 2 chronic and stable  -Sliding scale insulin  -Hypoglycemic orders  -Continue home medications    Risk  High due to the patient's presentation with worsening chest pain with history of CAD. DVT Prophylaxis: Lovenox  Diet: Diet NPO Effective Now  Code Status: Full code    PT/OT Eval Status: na    Dispo - The patient is admitted to Internal Medicine service. EDMOND Lawson - CNP    Thank you Balta Cox MD for the opportunity to be involved in this patient's care. If you have any questions or concerns please feel free to contact me at [phone].

## 2020-02-13 VITALS
HEIGHT: 71 IN | TEMPERATURE: 98.2 F | DIASTOLIC BLOOD PRESSURE: 69 MMHG | OXYGEN SATURATION: 93 % | WEIGHT: 243.4 LBS | HEART RATE: 101 BPM | RESPIRATION RATE: 16 BRPM | BODY MASS INDEX: 34.07 KG/M2 | SYSTOLIC BLOOD PRESSURE: 111 MMHG

## 2020-02-13 LAB
ANION GAP SERPL CALCULATED.3IONS-SCNC: 15 MMOL/L (ref 3–16)
BUN BLDV-MCNC: 29 MG/DL (ref 7–20)
CALCIUM SERPL-MCNC: 8.5 MG/DL (ref 8.3–10.6)
CHLORIDE BLD-SCNC: 100 MMOL/L (ref 99–110)
CO2: 24 MMOL/L (ref 21–32)
CREAT SERPL-MCNC: 1.1 MG/DL (ref 0.8–1.3)
GFR AFRICAN AMERICAN: >60
GFR NON-AFRICAN AMERICAN: >60
GLUCOSE BLD-MCNC: 156 MG/DL (ref 70–99)
GLUCOSE BLD-MCNC: 157 MG/DL (ref 70–99)
GLUCOSE BLD-MCNC: 176 MG/DL (ref 70–99)
HCT VFR BLD CALC: 38.6 % (ref 40.5–52.5)
HEMOGLOBIN: 12.7 G/DL (ref 13.5–17.5)
MCH RBC QN AUTO: 28.5 PG (ref 26–34)
MCHC RBC AUTO-ENTMCNC: 32.8 G/DL (ref 31–36)
MCV RBC AUTO: 86.9 FL (ref 80–100)
PDW BLD-RTO: 13.9 % (ref 12.4–15.4)
PERFORMED ON: ABNORMAL
PERFORMED ON: ABNORMAL
PLATELET # BLD: 173 K/UL (ref 135–450)
PMV BLD AUTO: 8.3 FL (ref 5–10.5)
POTASSIUM SERPL-SCNC: 4.3 MMOL/L (ref 3.5–5.1)
RBC # BLD: 4.44 M/UL (ref 4.2–5.9)
SODIUM BLD-SCNC: 139 MMOL/L (ref 136–145)
WBC # BLD: 4.7 K/UL (ref 4–11)

## 2020-02-13 PROCEDURE — 94640 AIRWAY INHALATION TREATMENT: CPT

## 2020-02-13 PROCEDURE — 96372 THER/PROPH/DIAG INJ SC/IM: CPT

## 2020-02-13 PROCEDURE — 36415 COLL VENOUS BLD VENIPUNCTURE: CPT

## 2020-02-13 PROCEDURE — 6360000002 HC RX W HCPCS: Performed by: NURSE PRACTITIONER

## 2020-02-13 PROCEDURE — 94760 N-INVAS EAR/PLS OXIMETRY 1: CPT

## 2020-02-13 PROCEDURE — 6370000000 HC RX 637 (ALT 250 FOR IP): Performed by: NURSE PRACTITIONER

## 2020-02-13 PROCEDURE — 6370000000 HC RX 637 (ALT 250 FOR IP): Performed by: HOSPITALIST

## 2020-02-13 PROCEDURE — 85027 COMPLETE CBC AUTOMATED: CPT

## 2020-02-13 PROCEDURE — G0378 HOSPITAL OBSERVATION PER HR: HCPCS

## 2020-02-13 PROCEDURE — 80048 BASIC METABOLIC PNL TOTAL CA: CPT

## 2020-02-13 RX ORDER — NIFEDIPINE 60 MG/1
60 TABLET, FILM COATED, EXTENDED RELEASE ORAL 2 TIMES DAILY
Qty: 30 TABLET | Refills: 3 | Status: SHIPPED | OUTPATIENT
Start: 2020-02-13 | End: 2020-02-13 | Stop reason: HOSPADM

## 2020-02-13 RX ORDER — DOXAZOSIN 2 MG/1
2 TABLET ORAL EVERY 12 HOURS SCHEDULED
Qty: 30 TABLET | Refills: 3 | Status: SHIPPED | OUTPATIENT
Start: 2020-02-13 | End: 2020-04-21

## 2020-02-13 RX ORDER — HYDROCHLOROTHIAZIDE 25 MG/1
25 TABLET ORAL DAILY
Qty: 30 TABLET | Refills: 3 | Status: ON HOLD | OUTPATIENT
Start: 2020-02-13 | End: 2020-10-06 | Stop reason: SDUPTHER

## 2020-02-13 RX ORDER — SPIRONOLACTONE 50 MG/1
50 TABLET, FILM COATED ORAL 2 TIMES DAILY
Qty: 30 TABLET | Refills: 3 | Status: SHIPPED | OUTPATIENT
Start: 2020-02-13 | End: 2020-02-13 | Stop reason: HOSPADM

## 2020-02-13 RX ORDER — HYDRALAZINE HYDROCHLORIDE 100 MG/1
100 TABLET, FILM COATED ORAL EVERY 8 HOURS SCHEDULED
Qty: 90 TABLET | Refills: 3 | Status: SHIPPED | OUTPATIENT
Start: 2020-02-13 | End: 2020-04-21

## 2020-02-13 RX ADMIN — METFORMIN HYDROCHLORIDE 1000 MG: 500 TABLET ORAL at 08:35

## 2020-02-13 RX ADMIN — HYDROCHLOROTHIAZIDE 25 MG: 25 TABLET ORAL at 08:35

## 2020-02-13 RX ADMIN — FENOFIBRATE 54 MG: 54 TABLET ORAL at 08:34

## 2020-02-13 RX ADMIN — HYDRALAZINE HYDROCHLORIDE 100 MG: 50 TABLET, FILM COATED ORAL at 15:45

## 2020-02-13 RX ADMIN — OXYCODONE HYDROCHLORIDE AND ACETAMINOPHEN 2 TABLET: 5; 325 TABLET ORAL at 15:44

## 2020-02-13 RX ADMIN — NIFEDIPINE 60 MG: 60 TABLET, FILM COATED, EXTENDED RELEASE ORAL at 08:34

## 2020-02-13 RX ADMIN — HYDRALAZINE HYDROCHLORIDE 100 MG: 50 TABLET, FILM COATED ORAL at 05:57

## 2020-02-13 RX ADMIN — ENOXAPARIN SODIUM 40 MG: 40 INJECTION SUBCUTANEOUS at 08:34

## 2020-02-13 RX ADMIN — INSULIN LISPRO 1 UNITS: 100 INJECTION, SOLUTION INTRAVENOUS; SUBCUTANEOUS at 12:22

## 2020-02-13 RX ADMIN — ASPIRIN 81 MG 81 MG: 81 TABLET ORAL at 08:34

## 2020-02-13 RX ADMIN — SPIRONOLACTONE 50 MG: 25 TABLET ORAL at 08:35

## 2020-02-13 RX ADMIN — OXYCODONE HYDROCHLORIDE AND ACETAMINOPHEN 2 TABLET: 5; 325 TABLET ORAL at 11:41

## 2020-02-13 RX ADMIN — DOXAZOSIN 2 MG: 2 TABLET ORAL at 08:35

## 2020-02-13 RX ADMIN — LISINOPRIL 40 MG: 40 TABLET ORAL at 08:35

## 2020-02-13 RX ADMIN — AMITRIPTYLINE HYDROCHLORIDE 50 MG: 50 TABLET, FILM COATED ORAL at 08:35

## 2020-02-13 RX ADMIN — INSULIN LISPRO 1 UNITS: 100 INJECTION, SOLUTION INTRAVENOUS; SUBCUTANEOUS at 08:35

## 2020-02-13 RX ADMIN — BUDESONIDE AND FORMOTEROL FUMARATE DIHYDRATE 2 PUFF: 160; 4.5 AEROSOL RESPIRATORY (INHALATION) at 07:43

## 2020-02-13 RX ADMIN — OXYCODONE HYDROCHLORIDE AND ACETAMINOPHEN 2 TABLET: 5; 325 TABLET ORAL at 05:57

## 2020-02-13 ASSESSMENT — PAIN DESCRIPTION - FREQUENCY
FREQUENCY: CONTINUOUS
FREQUENCY: CONTINUOUS

## 2020-02-13 ASSESSMENT — PAIN DESCRIPTION - PAIN TYPE
TYPE: ACUTE PAIN
TYPE: ACUTE PAIN

## 2020-02-13 ASSESSMENT — PAIN SCALES - GENERAL
PAINLEVEL_OUTOF10: 4
PAINLEVEL_OUTOF10: 8
PAINLEVEL_OUTOF10: 4

## 2020-02-13 ASSESSMENT — PAIN DESCRIPTION - DESCRIPTORS
DESCRIPTORS: DISCOMFORT
DESCRIPTORS: ACHING;DISCOMFORT

## 2020-02-13 ASSESSMENT — PAIN DESCRIPTION - LOCATION
LOCATION: SHOULDER
LOCATION: SHOULDER

## 2020-02-13 ASSESSMENT — PAIN DESCRIPTION - ONSET
ONSET: ON-GOING
ONSET: ON-GOING

## 2020-02-13 ASSESSMENT — PAIN DESCRIPTION - PROGRESSION
CLINICAL_PROGRESSION: NOT CHANGED
CLINICAL_PROGRESSION: NOT CHANGED

## 2020-02-13 ASSESSMENT — PAIN - FUNCTIONAL ASSESSMENT
PAIN_FUNCTIONAL_ASSESSMENT: PREVENTS OR INTERFERES SOME ACTIVE ACTIVITIES AND ADLS
PAIN_FUNCTIONAL_ASSESSMENT: PREVENTS OR INTERFERES SOME ACTIVE ACTIVITIES AND ADLS

## 2020-02-13 ASSESSMENT — PAIN DESCRIPTION - ORIENTATION
ORIENTATION: LEFT
ORIENTATION: LEFT

## 2020-02-13 NOTE — PLAN OF CARE
Problem: Pain:  Goal: Pain level will decrease  Description  Pain level will decrease  2/13/2020 1005 by Gwyn Chanel RN  Outcome: Ongoing  2/13/2020 0447 by Lucian Patino RN  Outcome: Ongoing  Goal: Control of acute pain  Description  Control of acute pain  2/13/2020 1005 by Gwyn Chanel RN  Outcome: Ongoing  2/13/2020 0447 by Lucian Patino RN  Outcome: Ongoing  Goal: Control of chronic pain  Description  Control of chronic pain  2/13/2020 1005 by Gwyn Chanel RN  Outcome: Ongoing  2/13/2020 0447 by Lucian Patino RN  Outcome: Ongoing     Problem: Falls - Risk of:  Goal: Will remain free from falls  Description  Will remain free from falls  2/13/2020 1005 by Gwyn Chanel RN  Outcome: Ongoing  2/13/2020 0447 by Lucian Patino RN  Outcome: Met This Shift  Goal: Absence of physical injury  Description  Absence of physical injury  2/13/2020 1005 by Gwyn Chanel RN  Outcome: Ongoing  2/13/2020 0447 by Lucian Patino RN  Outcome: Met This Shift

## 2020-02-14 NOTE — DISCHARGE SUMMARY
Hospital Medicine Discharge Summary      Patient ID: Leyda Goodman , [id number]     Patient's PCP: Marco Wheeler MD    Admit Date: 2/11/2020     Discharge Date: 2/13/2020      Admitting Physician: Chay Nguyễn MD    Discharge Physician: Portia Ruiz MD     Discharge Diagnoses: Active Hospital Problems    Diagnosis Date Noted    DMII (diabetes mellitus, type 2) ([address] [E11.9] 03/24/2019    Chest pain [R07.9] 05/10/2018    Hypertension [I10] 05/24/2017         The patient was seen and examined on the day of discharge and this discharge summary is in conjunction with any daily progress note from day of discharge.     Hospital Course:        Patient demographics:  The patient  Leyda Goodman is a 72 y.o. male      Significant past medical history:       Patient Active Problem List   Diagnosis    Chronic obstructive pulmonary disease (HCC)    [facility]) [facility]    ARTEMIO (obstructive sleep apnea)    Hypertension    Coronary artery disease involving native coronary artery of native heart without angina pectoris    Type 2 diabetes mellitus with hyperglycemia ([address]    Mixed hyperlipidemia    CAD in native artery    Syncope    Atypical chest pain    Major depressive disorder, recurrent ([address]    Suicide ideation    Nondependent cocaine abuse ([address]    Pneumonia    Depression    Anxiety    Sleeping difficulty    Hypertensive urgency    Drug use disorder    Uncontrolled hypertension    LVH (left ventricular hypertrophy) due to hypertensive disease, without heart failure    Chest pain    Syncope, psychogenic    MONSALVE (dyspnea on exertion)    Hypertensive emergency    Abnormal radiograph    Hypertriglyceridemia    Sepsis ([address]    COPD exacerbation (HCC)    Pleuritic chest pain    Hypomagnesemia    Hypokalemia    HTN (hypertension)    DMII (diabetes mellitus, type 2) (HCC)    Demand ischemia (HCC)    Major depressive disorder, recurrent severe without psychotic features [facility])            Presenting symptoms:  Chest pain     Diagnostic workup:        CONSULTS DURING ADMISSION :   IP CONSULT TO CASE MANAGEMENT        Patient was diagnosed with:  Chest pain  Uncontrolled hypertension     Treatment while inpatient:  Patient presented to the emergency room with chest pain. Patient was ruled out for acute coronary syndrome. Patient's chest pain was referred from the left shoulder. Patient most likely has left shoulder rotator cuff tendinitis. Patient is advised to follow-up with orthopedic surgery. Patient was also noted to have uncontrolled hypertension for which nephrology was consulted and patient's antihypertensive medication were adjusted. Patient responded to the treatment well     Discharge Condition:  stable      Discharged to:  Home      Activity:   as tolerated:     Follow Up: Follow-up with PCP in 1-2 weeks               Labs:  For convenience and continuity at follow-up the following most recent labs are provided:      CBC:   Lab Results   Component Value Date    WBC 4.7 02/13/2020    HGB 12.7 02/13/2020    HCT 38.6 02/13/2020     02/13/2020       RENAL:   Lab Results   Component Value Date     02/13/2020    K 4.3 02/13/2020    K 3.8 02/12/2020     02/13/2020    CO2 24 02/13/2020    BUN 29 02/13/2020    CREATININE 1.1 02/13/2020           Discharge Medications:    Terrance Elam   Home Medication Instructions [id number]:[id number]    Printed on:02/14/20 1424   Medication Information                      amitriptyline (ELAVIL) 50 MG tablet  Take 50 mg by mouth 2 times daily             aspirin 81 MG tablet  Take 81 mg by mouth daily             doxazosin (CARDURA) 2 MG tablet  Take 1 tablet by mouth every 12 hours             hydrALAZINE (APRESOLINE) 100 MG tablet  Take 1 tablet by mouth every 8 hours             hydrochlorothiazide (HYDRODIURIL) 25 MG tablet  Take 1 tablet by mouth daily             lisinopril (PRINIVIL;ZESTRIL) 40 MG tablet  Take 40 mg by mouth daily             metoprolol (LOPRESSOR) 100 MG tablet  Take 100 mg by mouth 2 times daily             vitamin B-12 (CYANOCOBALAMIN) 500 MCG tablet  Take 500 mcg by mouth daily             vitamin D (D3-1000) 1000 UNITS TABS tablet  Take 1 tablet by mouth daily             zolpidem (AMBIEN) 10 MG tablet  Take 10 mg by mouth nightly. Time Spent on discharge is more than 30 min in the examination, evaluation, counseling and review of medications and discharge plan. Signed:  Promise Guerrero MD   2/14/2020      Thank you Gilberto Blackwell MD for the opportunity to be involved in this patient's care. If you have any questions or concerns please feel free to contact me at [phone]. This note was transcribed using [id number]. Please disregard any translational errors.

## 2020-02-25 ENCOUNTER — APPOINTMENT (OUTPATIENT)
Dept: CT IMAGING | Age: 65
DRG: 641 | End: 2020-02-25
Payer: MEDICARE

## 2020-02-25 ENCOUNTER — APPOINTMENT (OUTPATIENT)
Dept: GENERAL RADIOLOGY | Age: 65
DRG: 641 | End: 2020-02-25
Payer: MEDICARE

## 2020-02-25 ENCOUNTER — HOSPITAL ENCOUNTER (INPATIENT)
Age: 65
LOS: 6 days | Discharge: HOME | DRG: 641 | End: 2020-03-02
Attending: EMERGENCY MEDICINE | Admitting: INTERNAL MEDICINE
Payer: MEDICARE

## 2020-02-25 PROBLEM — I95.9 ARTERIAL HYPOTENSION: Status: ACTIVE | Noted: 2020-02-25

## 2020-02-25 LAB
ALBUMIN SERPL-MCNC: 4.1 G/DL (ref 3.4–5)
ALP BLD-CCNC: 123 U/L (ref 40–129)
ALT SERPL-CCNC: 21 U/L (ref 10–40)
ANION GAP SERPL CALCULATED.3IONS-SCNC: 12 MMOL/L (ref 3–16)
AST SERPL-CCNC: 13 U/L (ref 15–37)
BASOPHILS ABSOLUTE: 0 K/UL (ref 0–0.2)
BASOPHILS RELATIVE PERCENT: 0.7 %
BILIRUB SERPL-MCNC: 0.4 MG/DL (ref 0–1)
BILIRUBIN DIRECT: <0.2 MG/DL (ref 0–0.3)
BILIRUBIN URINE: ABNORMAL
BILIRUBIN, INDIRECT: ABNORMAL MG/DL (ref 0–1)
BLOOD, URINE: NEGATIVE
BUN BLDV-MCNC: 37 MG/DL (ref 7–20)
CALCIUM SERPL-MCNC: 9.3 MG/DL (ref 8.3–10.6)
CHLORIDE BLD-SCNC: 100 MMOL/L (ref 99–110)
CLARITY: CLEAR
CO2: 24 MMOL/L (ref 21–32)
COLOR: YELLOW
CREAT SERPL-MCNC: 1.5 MG/DL (ref 0.8–1.3)
EKG ATRIAL RATE: 58 BPM
EKG DIAGNOSIS: NORMAL
EKG P AXIS: 48 DEGREES
EKG P-R INTERVAL: 186 MS
EKG Q-T INTERVAL: 416 MS
EKG QRS DURATION: 90 MS
EKG QTC CALCULATION (BAZETT): 408 MS
EKG R AXIS: 68 DEGREES
EKG T AXIS: 49 DEGREES
EKG VENTRICULAR RATE: 58 BPM
EOSINOPHILS ABSOLUTE: 0.1 K/UL (ref 0–0.6)
EOSINOPHILS RELATIVE PERCENT: 1.1 %
EPITHELIAL CELLS, UA: 1 /HPF (ref 0–5)
GFR AFRICAN AMERICAN: 57
GFR NON-AFRICAN AMERICAN: 47
GLUCOSE BLD-MCNC: 161 MG/DL (ref 70–99)
GLUCOSE BLD-MCNC: 221 MG/DL (ref 70–99)
GLUCOSE URINE: NEGATIVE MG/DL
HCT VFR BLD CALC: 39.2 % (ref 40.5–52.5)
HEMOGLOBIN: 12.8 G/DL (ref 13.5–17.5)
HYALINE CASTS: 18 /LPF (ref 0–8)
KETONES, URINE: NEGATIVE MG/DL
LACTIC ACID: 2 MMOL/L (ref 0.4–2)
LEUKOCYTE ESTERASE, URINE: NEGATIVE
LYMPHOCYTES ABSOLUTE: 1.8 K/UL (ref 1–5.1)
LYMPHOCYTES RELATIVE PERCENT: 26.9 %
MCH RBC QN AUTO: 28.3 PG (ref 26–34)
MCHC RBC AUTO-ENTMCNC: 32.7 G/DL (ref 31–36)
MCV RBC AUTO: 86.4 FL (ref 80–100)
MICROSCOPIC EXAMINATION: YES
MONOCYTES ABSOLUTE: 0.5 K/UL (ref 0–1.3)
MONOCYTES RELATIVE PERCENT: 7.3 %
NEUTROPHILS ABSOLUTE: 4.4 K/UL (ref 1.7–7.7)
NEUTROPHILS RELATIVE PERCENT: 64 %
NITRITE, URINE: NEGATIVE
PDW BLD-RTO: 13.4 % (ref 12.4–15.4)
PERFORMED ON: ABNORMAL
PH UA: 5 (ref 5–8)
PLATELET # BLD: 208 K/UL (ref 135–450)
PMV BLD AUTO: 8.5 FL (ref 5–10.5)
POTASSIUM REFLEX MAGNESIUM: 4.6 MMOL/L (ref 3.5–5.1)
PROTEIN UA: ABNORMAL MG/DL
RAPID INFLUENZA  B AGN: NEGATIVE
RAPID INFLUENZA A AGN: NEGATIVE
RBC # BLD: 4.54 M/UL (ref 4.2–5.9)
RBC UA: 4 /HPF (ref 0–4)
SODIUM BLD-SCNC: 136 MMOL/L (ref 136–145)
SPECIFIC GRAVITY UA: >1.03 (ref 1–1.03)
TOTAL PROTEIN: 6.5 G/DL (ref 6.4–8.2)
TROPONIN: 0.02 NG/ML
TROPONIN: 0.02 NG/ML
URINE REFLEX TO CULTURE: ABNORMAL
URINE TYPE: ABNORMAL
UROBILINOGEN, URINE: 0.2 E.U./DL
WBC # BLD: 6.8 K/UL (ref 4–11)
WBC UA: 2 /HPF (ref 0–5)

## 2020-02-25 PROCEDURE — 6360000004 HC RX CONTRAST MEDICATION: Performed by: EMERGENCY MEDICINE

## 2020-02-25 PROCEDURE — 51798 US URINE CAPACITY MEASURE: CPT

## 2020-02-25 PROCEDURE — 2060000000 HC ICU INTERMEDIATE R&B

## 2020-02-25 PROCEDURE — 84484 ASSAY OF TROPONIN QUANT: CPT

## 2020-02-25 PROCEDURE — 99285 EMERGENCY DEPT VISIT HI MDM: CPT

## 2020-02-25 PROCEDURE — 71046 X-RAY EXAM CHEST 2 VIEWS: CPT

## 2020-02-25 PROCEDURE — 6370000000 HC RX 637 (ALT 250 FOR IP): Performed by: INTERNAL MEDICINE

## 2020-02-25 PROCEDURE — 93005 ELECTROCARDIOGRAM TRACING: CPT | Performed by: EMERGENCY MEDICINE

## 2020-02-25 PROCEDURE — 83605 ASSAY OF LACTIC ACID: CPT

## 2020-02-25 PROCEDURE — 36415 COLL VENOUS BLD VENIPUNCTURE: CPT

## 2020-02-25 PROCEDURE — 2580000003 HC RX 258: Performed by: INTERNAL MEDICINE

## 2020-02-25 PROCEDURE — 93010 ELECTROCARDIOGRAM REPORT: CPT | Performed by: INTERNAL MEDICINE

## 2020-02-25 PROCEDURE — 85025 COMPLETE CBC W/AUTO DIFF WBC: CPT

## 2020-02-25 PROCEDURE — 87804 INFLUENZA ASSAY W/OPTIC: CPT

## 2020-02-25 PROCEDURE — 2580000003 HC RX 258: Performed by: EMERGENCY MEDICINE

## 2020-02-25 PROCEDURE — 96361 HYDRATE IV INFUSION ADD-ON: CPT

## 2020-02-25 PROCEDURE — 74177 CT ABD & PELVIS W/CONTRAST: CPT

## 2020-02-25 PROCEDURE — 96360 HYDRATION IV INFUSION INIT: CPT

## 2020-02-25 PROCEDURE — 81001 URINALYSIS AUTO W/SCOPE: CPT

## 2020-02-25 PROCEDURE — 6370000000 HC RX 637 (ALT 250 FOR IP): Performed by: EMERGENCY MEDICINE

## 2020-02-25 PROCEDURE — 80048 BASIC METABOLIC PNL TOTAL CA: CPT

## 2020-02-25 PROCEDURE — 80076 HEPATIC FUNCTION PANEL: CPT

## 2020-02-25 RX ORDER — ZOLPIDEM TARTRATE 5 MG/1
10 TABLET ORAL NIGHTLY
Status: DISCONTINUED | OUTPATIENT
Start: 2020-02-25 | End: 2020-03-02 | Stop reason: HOSPADM

## 2020-02-25 RX ORDER — ONDANSETRON 2 MG/ML
4 INJECTION INTRAMUSCULAR; INTRAVENOUS EVERY 6 HOURS PRN
Status: DISCONTINUED | OUTPATIENT
Start: 2020-02-25 | End: 2020-03-02 | Stop reason: HOSPADM

## 2020-02-25 RX ORDER — MIDODRINE HYDROCHLORIDE 5 MG/1
5 TABLET ORAL
Status: DISCONTINUED | OUTPATIENT
Start: 2020-02-25 | End: 2020-02-26

## 2020-02-25 RX ORDER — HYDROCODONE BITARTRATE AND ACETAMINOPHEN 5; 325 MG/1; MG/1
1 TABLET ORAL ONCE
Status: DISCONTINUED | OUTPATIENT
Start: 2020-02-25 | End: 2020-03-02 | Stop reason: HOSPADM

## 2020-02-25 RX ORDER — CHOLECALCIFEROL (VITAMIN D3) 125 MCG
500 CAPSULE ORAL DAILY
Status: DISCONTINUED | OUTPATIENT
Start: 2020-02-26 | End: 2020-03-02 | Stop reason: HOSPADM

## 2020-02-25 RX ORDER — 0.9 % SODIUM CHLORIDE 0.9 %
1000 INTRAVENOUS SOLUTION INTRAVENOUS ONCE
Status: COMPLETED | OUTPATIENT
Start: 2020-02-25 | End: 2020-02-25

## 2020-02-25 RX ORDER — SODIUM CHLORIDE 9 MG/ML
INJECTION, SOLUTION INTRAVENOUS CONTINUOUS
Status: DISCONTINUED | OUTPATIENT
Start: 2020-02-25 | End: 2020-02-26

## 2020-02-25 RX ORDER — ZOLPIDEM TARTRATE 5 MG/1
10 TABLET ORAL NIGHTLY
Status: DISCONTINUED | OUTPATIENT
Start: 2020-02-26 | End: 2020-02-25

## 2020-02-25 RX ORDER — ASPIRIN 81 MG/1
81 TABLET, CHEWABLE ORAL DAILY
Status: DISCONTINUED | OUTPATIENT
Start: 2020-02-26 | End: 2020-03-02 | Stop reason: HOSPADM

## 2020-02-25 RX ORDER — POLYETHYLENE GLYCOL 3350 17 G/17G
17 POWDER, FOR SOLUTION ORAL DAILY PRN
Status: DISCONTINUED | OUTPATIENT
Start: 2020-02-25 | End: 2020-03-02 | Stop reason: HOSPADM

## 2020-02-25 RX ORDER — SODIUM CHLORIDE 0.9 % (FLUSH) 0.9 %
10 SYRINGE (ML) INJECTION PRN
Status: DISCONTINUED | OUTPATIENT
Start: 2020-02-25 | End: 2020-03-02 | Stop reason: HOSPADM

## 2020-02-25 RX ORDER — SODIUM CHLORIDE 0.9 % (FLUSH) 0.9 %
10 SYRINGE (ML) INJECTION EVERY 12 HOURS SCHEDULED
Status: DISCONTINUED | OUTPATIENT
Start: 2020-02-25 | End: 2020-03-02 | Stop reason: HOSPADM

## 2020-02-25 RX ORDER — AMITRIPTYLINE HYDROCHLORIDE 50 MG/1
50 TABLET, FILM COATED ORAL 2 TIMES DAILY
Status: DISCONTINUED | OUTPATIENT
Start: 2020-02-25 | End: 2020-03-02 | Stop reason: HOSPADM

## 2020-02-25 RX ORDER — ZOLPIDEM TARTRATE 5 MG/1
5 TABLET ORAL NIGHTLY
Status: DISCONTINUED | OUTPATIENT
Start: 2020-02-25 | End: 2020-02-25

## 2020-02-25 RX ORDER — ACETAMINOPHEN 650 MG/1
650 SUPPOSITORY RECTAL EVERY 6 HOURS PRN
Status: DISCONTINUED | OUTPATIENT
Start: 2020-02-25 | End: 2020-03-02 | Stop reason: HOSPADM

## 2020-02-25 RX ORDER — PROMETHAZINE HYDROCHLORIDE 25 MG/1
12.5 TABLET ORAL EVERY 6 HOURS PRN
Status: DISCONTINUED | OUTPATIENT
Start: 2020-02-25 | End: 2020-03-02 | Stop reason: HOSPADM

## 2020-02-25 RX ORDER — ACETAMINOPHEN 325 MG/1
650 TABLET ORAL EVERY 6 HOURS PRN
Status: DISCONTINUED | OUTPATIENT
Start: 2020-02-25 | End: 2020-03-02 | Stop reason: HOSPADM

## 2020-02-25 RX ORDER — VITAMIN B COMPLEX
1000 TABLET ORAL DAILY
Status: DISCONTINUED | OUTPATIENT
Start: 2020-02-26 | End: 2020-03-02 | Stop reason: HOSPADM

## 2020-02-25 RX ADMIN — MIDODRINE HYDROCHLORIDE 5 MG: 5 TABLET ORAL at 17:42

## 2020-02-25 RX ADMIN — ZOLPIDEM TARTRATE 10 MG: 5 TABLET ORAL at 21:00

## 2020-02-25 RX ADMIN — AMITRIPTYLINE HYDROCHLORIDE 50 MG: 50 TABLET, FILM COATED ORAL at 19:59

## 2020-02-25 RX ADMIN — SODIUM CHLORIDE: 9 INJECTION, SOLUTION INTRAVENOUS at 18:16

## 2020-02-25 RX ADMIN — SODIUM CHLORIDE 1000 ML: 9 INJECTION, SOLUTION INTRAVENOUS at 14:46

## 2020-02-25 RX ADMIN — SODIUM CHLORIDE 1000 ML: 9 INJECTION, SOLUTION INTRAVENOUS at 16:52

## 2020-02-25 RX ADMIN — SODIUM CHLORIDE, PRESERVATIVE FREE 10 ML: 5 INJECTION INTRAVENOUS at 20:02

## 2020-02-25 RX ADMIN — IOPAMIDOL 75 ML: 755 INJECTION, SOLUTION INTRAVENOUS at 15:00

## 2020-02-25 RX ADMIN — SODIUM CHLORIDE 1000 ML: 9 INJECTION, SOLUTION INTRAVENOUS at 13:13

## 2020-02-25 RX ADMIN — SODIUM CHLORIDE: 9 INJECTION, SOLUTION INTRAVENOUS at 20:02

## 2020-02-25 ASSESSMENT — PAIN DESCRIPTION - PAIN TYPE: TYPE: ACUTE PAIN

## 2020-02-25 ASSESSMENT — PAIN SCALES - GENERAL
PAINLEVEL_OUTOF10: 0
PAINLEVEL_OUTOF10: 1
PAINLEVEL_OUTOF10: 0
PAINLEVEL_OUTOF10: 6

## 2020-02-25 ASSESSMENT — PAIN DESCRIPTION - LOCATION
LOCATION: NECK
LOCATION: GENERALIZED

## 2020-02-25 ASSESSMENT — PAIN DESCRIPTION - DESCRIPTORS: DESCRIPTORS: ACHING

## 2020-02-25 ASSESSMENT — PAIN DESCRIPTION - ONSET: ONSET: ON-GOING

## 2020-02-25 ASSESSMENT — PAIN DESCRIPTION - PROGRESSION: CLINICAL_PROGRESSION: NOT CHANGED

## 2020-02-25 ASSESSMENT — PAIN DESCRIPTION - FREQUENCY: FREQUENCY: CONTINUOUS

## 2020-02-25 NOTE — ED NOTES
Pt given 2 turkey sandwiches and 2 diet leroy, no further needs at this time. Call light in reach.       Minda Aguilar RN  02/25/20 7784

## 2020-02-25 NOTE — PROGRESS NOTES
Medication Reconciliation    List of medications patient is currently taking is complete. Source of information: 1. Conversation with patient at bedside                                      2. EPIC records      Allergies  Naproxen     Notes regarding home medications:   1. Patient received all of his morning home medications prior to arrival to the emergency department today.     Jena Vann, PharmD, BCPS  2/25/2020 2:13 PM

## 2020-02-25 NOTE — CONSULTS
Office: [phone]       Fax: [phone]      Nephrology Initial Consult Note        Patient's Name: Efrain Bean  Admit Date: 2/25/2020  Date of Visit: 2/25/2020    Reason for Consult: ALEA  Requesting Physician:  Petra Mcnamara MD  PCP: Luis Alfredo Bernal MD    Chief Complaint:  diarrhea     History of Present Illness:      Efrain Bean is a 72 y.o. male with PMHx of hypertension, diabetes mellitus, coronary artery disease, COPD, substance abuse (cocaine in past)  who was hospitalized on 2/25/2020 with complaints of diarrhea. Reported watery diarrhea for 3-4 days. Having dizziness. No nausea or vomiting. No recent travel. Denied abdominal pain. Initial blood pressure in ED noted in 70-80s. Creat 1.5  Reported low UO at home. In ED had st cath done that yielded about 100 ml urine  Had abdominal CT scan done in ED with iv contrast  Patient has history of refractory hypertension and was previously hospitalized for hypertensive crisis about 2 wks ago. Past Medical History:   Diagnosis Date    CAD (coronary artery disease)     Chest pain 10/15/2017    Cocaine abuse ([address]     COPD (chronic obstructive pulmonary disease) ([address]     Diabetes mellitus ([address]     Hyperlipidemia     Hypertension     Kidney stone     MRSA colonization 4/16/19; 03/24/2019    Sleep apnea     He said he doesn't use a CPAP machine.        Past Surgical History:   Procedure Laterality Date    ABDOMEN SURGERY      CARDIAC SURGERY      3 STENTS PLACED     CHOLECYSTECTOMY      COLONOSCOPY      CORONARY ANGIOPLASTY WITH STENT PLACEMENT      3 stents     FRACTURE SURGERY      RIGHT ANKLE    LITHOTRIPSY      SHOULDER ARTHROSCOPY      VASCULAR SURGERY         Family History   Problem Relation Age of Onset    High Blood Pressure Mother     Cancer Father         lung cancer    ABDOMEN PELVIS W IV CONTRAST Additional Contrast? None   Final Result   No acute abdominal or pelvic abnormality. Diffuse hepatic steatosis. Uncomplicated colonic diverticulosis. XR CHEST STANDARD (2 VW)   Final Result   No evidence of acute cardiopulmonary disease. Assessment :     1. ALEA  -Non-Oliguric  -Baseline creat: 0.8-1.1 Now 1.5  -UA concentrated, hyaline cast  -Volume: Hypovolemic  -Electrolytes: No Dyskalemia  -Acid-Base: bicarb 24   -75 ml iv contrast used 2/25/2020   -ALEA in the setting of hypotension, volume loss. At risk for ATI    Recent Labs     02/25/20  1115   BUN 37*   CREATININE 1.5*     Recent Labs     02/25/20  1115      K 4.6   CO2 24         2. HTN  -Blood pressure low  -on multiple blood pressure meds  -In past PRA, ary have been unremarkable  -Renal doppler in April 2019: No evidence of hemodynamically significant stenosis in either renal artery  -Reported history of obstructive sleep apnea but not on CPAP  -history of cocaine use in past    BP Readings from Last 1 Encounters:   02/25/20 (!) 85/59     2. CAD  -TTE (jan 2020): LVEF 55-60%, mild LVH. No mention about PASP  -medical management     3. DM  -Not on metformin    Lab Results   Component Value Date    LABA1C 7.3 02/12/2020          Plan:     -Agree with IVF for volume repletion. Start with NS and change to LR as BP improves  -Hold Home BP meds  -monitor lytes  -AM: BMP, Mg    -Monitor I/O, UOP  -Maintain MAP>65  -Avoid nephrotoxin, if able. -Dose meds to current eGFR    Thank you for allowing us to participate in care of [facility] . We will continue to follow. Feel free to contact me with any questions.       Benny Mercedes  2/25/2020    [facility]  Office : [phone]  Fax :[phone]

## 2020-02-25 NOTE — H&P
lisinopril, hydralazine, metoprolol, and hydrochlorothiazide), who presents to the emergency room with complaints of generalized weakness, dizziness, low blood pressure that has been ongoing for the past couple of days. He reports multiple episodes of watery stool that has been ongoing for the past 4 days. No fever or chills. He reports no specific left lower quadrant abdominal pain. He has nonspecific diffuse body aches, mostly in bilateral shoulders and chest.  On presentation to the emergency room, he had blood pressure of 96/63 and systolic blood pressure was in the 80s at the time of my evaluation (after 2 L fluid bolus). Past Medical History:      Diagnosis Date    CAD (coronary artery disease)     Chest pain 10/15/2017    [facility]     COPD (chronic obstructive pulmonary disease) ([facility]     Diabetes mellitus ([facility]     Hyperlipidemia     Hypertension     Kidney stone     MRSA colonization 4/16/19; 03/24/2019    Sleep apnea     He said he doesn't use a CPAP machine. Past Surgical History:      Procedure Laterality Date    ABDOMEN SURGERY      CARDIAC SURGERY      3 STENTS PLACED     CHOLECYSTECTOMY      COLONOSCOPY      CORONARY ANGIOPLASTY WITH STENT PLACEMENT      3 stents     FRACTURE SURGERY      RIGHT ANKLE    LITHOTRIPSY      SHOULDER ARTHROSCOPY      VASCULAR SURGERY         Medications (prior to admission):  Prior to Admission medications    Medication Sig Start Date End Date Taking?  Authorizing Provider   doxazosin (CARDURA) 2 MG tablet Take 1 tablet by mouth every 12 hours 2/13/20  Yes Preeti Healy MD   hydrochlorothiazide (HYDRODIURIL) 25 MG tablet Take 1 tablet by mouth daily 2/13/20  Yes Preeti Healy MD   hydrALAZINE (APRESOLINE) 100 MG tablet Take 1 tablet by mouth every 8 hours 2/13/20  Yes Preeti Healy MD   metoprolol (LOPRESSOR) 100 MG tablet Take 100 mg by mouth 2 times daily   Yes Historical Provider, MD   lisinopril 208     BMP:    Recent Labs     02/25/20  1115      K 4.6      CO2 24   BUN 37*   CREATININE 1.5*   GLUCOSE 161*     Hepatic:   Recent Labs     02/25/20  1115   AST 13*   ALT 21   BILITOT 0.4   ALKPHOS 123       Xr Chest Standard (2 Vw)    Result Date: 2/25/2020  EXAMINATION: TWO XRAY VIEWS OF THE CHEST 2/25/2020 1:40 pm COMPARISON: February 11, 2020 HISTORY: ORDERING SYSTEM PROVIDED HISTORY: hypotension, general fatigue, dizziness TECHNOLOGIST PROVIDED HISTORY: Reason for exam:->hypotension, general fatigue, dizziness Reason for Exam: hypotension, general fatigue, dizziness Acuity: Acute Type of Exam: Initial FINDINGS: Unchanged anterior eventration of the right diaphragm. Minimal atelectasis in the right lung base again noted. No evidence of pneumonia, edema or other acute pulmonary process. No evidence of acute process of the cardiac or mediastinal structures. No evidence of pneumothorax or pleural effusion. No evidence of acute cardiopulmonary disease. Xr Chest Standard (2 Vw)    Result Date: 2/11/2020  EXAMINATION: TWO XRAY VIEWS OF THE CHEST 2/11/2020 5:53 pm COMPARISON: 01/04/2020 HISTORY: ORDERING SYSTEM PROVIDED HISTORY: Chest Pain TECHNOLOGIST PROVIDED HISTORY: Reason for exam:->Chest Pain Reason for Exam: chest pain Acuity: Acute Type of Exam: Initial FINDINGS: There is stable elevation of the right hemidiaphragm. No focal infiltrate, pleural effusion or pneumothorax is demonstrated. The heart is normal in size. No acute osseous abnormality is seen. No acute cardiopulmonary disease     Ct Abdomen Pelvis W Iv Contrast Additional Contrast? None    Result Date: 2/25/2020  EXAMINATION: CT OF THE ABDOMEN AND PELVIS WITH CONTRAST 2/25/2020 2:58 pm TECHNIQUE: CT of the abdomen and pelvis was performed with the administration of intravenous contrast. Multiplanar reformatted images are provided for review.  Dose modulation, iterative reconstruction, and/or weight based adjustment of the mA/kV was utilized to reduce the radiation dose to as low as reasonably achievable. COMPARISON: CT abdomen and pelvis performed 03/08/2019. HISTORY: ORDERING SYSTEM PROVIDED HISTORY: left flank pain, diarrhea TECHNOLOGIST PROVIDED HISTORY: If patient is on cardiac monitor and/or pulse ox, they may be taken off cardiac monitor and pulse ox, left on O2 if currently on. All monitors reattached when patient returns to room. Additional Contrast?->None Reason for exam:->left flank pain, diarrhea Reason for Exam: left flank pain, diarrhea Acuity: Acute Type of Exam: Initial FINDINGS: Lower Chest: There is atelectasis versus scarring in the right lung base. The visualized cardiac structures are unremarkable. Organs: There is diffuse hepatic steatosis. The gallbladder has been removed. The pancreas, spleen, and adrenal glands are unremarkable. The kidneys are without obstructive uropathy. There are bilateral renal cysts. The ureters are not dilated. The urinary bladder is contracted with a thickened wall diffusely. GI/Bowel: The stomach is contracted and otherwise unremarkable. Loops of small bowel are normal in caliber without evidence for obstruction. The colon contains air and fecal residue. There are uncomplicated diverticula. The appendix is normal.  There is no intraperitoneal free air or free fluid. Pelvis: The prostate gland and seminal vesicles are unremarkable. Phleboliths are seen in the pelvis. Peritoneum/Retroperitoneum: The psoas muscles are symmetric. The abdominal aorta is normal in caliber. The inferior vena cava is unremarkable. There is no retroperitoneal or mesenteric adenopathy. Bones/Soft Tissues: The extra-abdominal soft tissues demonstrate small fat containing inguinal hernias. There is no acute osseous abnormality. No acute abdominal or pelvic abnormality. Diffuse hepatic steatosis. Uncomplicated colonic diverticulosis.      Xr Shoulder Left (min 2 Views)    Result Date: 2/11/2020  EXAMINATION: THREE XRAY VIEWS OF THE LEFT SHOULDER 2/11/2020 6:13 pm COMPARISON: 12/07/2019 HISTORY: ORDERING SYSTEM PROVIDED HISTORY: pain TECHNOLOGIST PROVIDED HISTORY: Reason for exam:->pain Reason for Exam: pain; no injury Acuity: Acute Type of Exam: Initial FINDINGS: Foreshortening of the distal left clavicle is unchanged. Mild left glenohumeral joint osteoarthrosis is identified with sclerosis and minimal spurring. No acute fracture or dislocation is identified. No focal bony lesion or destructive bony process. The soft tissues are within normal limits. No acute bony abnormality. EKG: Sinus bradycardia, 58 beats per minutes. No acute ST/T changes. I reviewed EKG. Discussed with ER provider.       Thank you Pankaj Clancy MD for the opportunity to be involved in this patient's care.    -----------------------------  Marcial Kim MD  [facility]

## 2020-02-25 NOTE — ED NOTES
Pt bladder scan complete, 100 ml found, Jose Angel Gomez notified.      Dee Gutierres, RN  02/25/20 4595

## 2020-02-25 NOTE — LETTER
2020    [address] [place] [address] Ortho & Spine  Consult Billing Form:      DEMOGRAPHICS:                                                                                                              .    Patient Name:  Yumiko Louis  Patient :  1955   Patient SS#:  [id number]    Patient Phone:  [phone] (home)  Alt. Patient Phone:    Patient Address:  [address] [place] [id number]    PCP:  Jeannine Sifuentes MD  Insurance:  Payor: Priyanka Reyna / Plan: Namrata Brownlee ESSENTIAL/PLUS / Product Type: *No Product type* /   Insurance ID Number:    DIAGNOSIS & PROCEDURE:                                                                                            .    Diagnosis:   Left shoulder pain    Hospital:  [facility]    Provider:  Xena VASQUES    SCHEDULING INFORMATION:                                                                                         .     Date of Consultation:                              Xena VASQUES  20     BILLING INFORMATION:                                                                                                    .    Procedure:       CPT Code Modifier

## 2020-02-25 NOTE — ED PROVIDER NOTES
CHIEF COMPLAINT  Hypotension (pt states BP at home in 56'P systolic, dizziness. weakness.)      HISTORY OF PRESENT ILLNESS  Mayra Feldman is a 72 y.o. male who presents to the ED complaining of dizziness and hypotension over the past day and a half. Patient states he noticed that he was getting dizzy when ambulating and nearly passing out. States he was checking his blood pressure at home over the past 2 days and has been consistently near 70 systolic. Denies any associated chest pain. Denies any nausea or vomiting. No abdominal pain. States he has been having diarrhea consistently over the past month or so. Denies any black or melanotic stools. Denies any difficulty urinating or increase in frequency of urination. Denies any hematuria. No bright red blood per rectum. States he is tolerating oral intake and thought he drank a large amount of fluids yesterday but still felt dizzy and was hypotensive today and therefore came to the ED. No other complaints, modifying factors or associated symptoms. Nursing notes reviewed. Past Medical History:   Diagnosis Date    CAD (coronary artery disease)     Chest pain 10/15/2017    [facility] [address] [facility]     COPD (chronic obstructive pulmonary disease) ([facility] [address]     Diabetes mellitus ([facility] [address] [facility]     Hyperlipidemia     Hypertension     Kidney stone     MRSA colonization 4/16/19; 03/24/2019    Sleep apnea     He said he doesn't use a CPAP machine.      Past Surgical History:   Procedure Laterality Date    ABDOMEN SURGERY      CARDIAC SURGERY      3 STENTS PLACED     CHOLECYSTECTOMY      COLONOSCOPY      CORONARY ANGIOPLASTY WITH STENT PLACEMENT      3 stents     FRACTURE SURGERY      RIGHT ANKLE    LITHOTRIPSY      SHOULDER ARTHROSCOPY      VASCULAR SURGERY       Family History   Problem Relation Age of Onset    High Blood Pressure Mother     Cancer Father         lung cancer    Diabetes Brother     Liver Disease Sister     Diabetes Brother      Social History     Socioeconomic History    Marital status: Single     Spouse name: Not on file    Number of children: 1    Years of education: 15    Highest education level: Not on file   Occupational History    Occupation: retired   Social Needs    Financial resource strain: Not on file    Food insecurity:     Worry: Not on file     Inability: Not on file   Datalink needs:     Medical: Not on file     Non-medical: Not on file   Tobacco Use    Smoking status: Former Smoker     Packs/day: 2.50     Years: 40.00     Pack years: 100.00     Types: Cigarettes    Smokeless tobacco: Never Used   Substance and Sexual Activity    Alcohol use: Never     Frequency: Never    Drug use: Yes     Types: Cocaine    Sexual activity: Yes     Partners: Female   Lifestyle    Physical activity:     Days per week: Not on file     Minutes per session: Not on file    Stress: Not on file   Relationships    Social connections:     Talks on phone: Not on file     Gets together: Not on file     Attends Sikh service: Not on file     Active member of club or organization: Not on file     Attends meetings of clubs or organizations: Not on file     Relationship status: Not on file    Intimate partner violence:     Fear of current or ex partner: Not on file     Emotionally abused: Not on file     Physically abused: Not on file     Forced sexual activity: Not on file   Other Topics Concern    Not on file   Social History Narrative    Not on file     Current Facility-Administered Medications   Medication Dose Route Frequency Provider Last Rate Last Dose    iopamidol (ISOVUE-370) 76 % injection 100 mL  100 mL Intravenous ONCE PRN Richmond Mina MD        0.9 % sodium chloride bolus  1,000 mL Intravenous Once Richmond Mina MD         Current Outpatient Medications   Medication Sig Dispense Refill    doxazosin (CARDURA) 2 MG tablet Take 1 tablet by mouth every 12 hours 30 tablet 3    hydrochlorothiazide (HYDRODIURIL) 25 MG colonic diverticulosis. XR CHEST STANDARD (2 VW)   Final Result   No evidence of acute cardiopulmonary disease. EKG INTERPRETATION  Sinus bradycardia with a rate of 58, normal axis, , QRS 90, QTc 408, no ST elevations, no acute change compared EKG from 2/12/2020    PROCEDURES    ED COURSE/MDM  Dehydration, diarrhea, UTI, electrolyte abnormality, anemia, ACS/MI, pneumonia     Patient seen and evaluated. History and physical as above. Patient presents hypotensive. Has been having diarrhea which may be the cause of his dehydration and hypotension. Patient is alert and oriented x4. No focal deficits. No complaints of chest pain or shortness of breath. Does have some abdominal cramping occasionally. Denies any bleeding episodes or melanotic stool. Plan for IV fluids, routine labs, influenza screen, lactic acid, urinalysis and CT abdomen pelvis. Will also add on troponin, EKG and chest x-ray. ED Course as of Feb 25 1631   Tue Feb 25, 2020   1511 Patient with ALEA with creatinine 1.5. Normal lactic acid 2.0. Troponin elevated at 0.02 which is possibly from dehydration as patient is having no chest pain. Stable hemoglobin at 12.8. Patient BP improving with IV fluids and is currently 85/52. Second liter IV fluids pain. Discussed admission with patient. Patient agreeable. Awaiting result of urinalysis as well as flu swab. Patient treated with a dose of Norco for general body aches. CT abdomen pelvis ordered secondary to left-sided abdominal tenderness on reexamination. [DS]   1231 Patient reassessed. Blood pressure has improved to 91/70 after 2 L. Third liter ordered. Patient updated regarding his elevated troponin as well as ALEA. Discussed admission for trending troponin as well as rehydration. Patient agreeable. Consult placed to hospitalist for admission.     [DS]      ED Course User Index  [DS] Frankie Arrieta MD       Patient was given scripts for the following

## 2020-02-26 LAB
A/G RATIO: 1.5 (ref 1.1–2.2)
ALBUMIN SERPL-MCNC: 3.6 G/DL (ref 3.4–5)
ALP BLD-CCNC: 116 U/L (ref 40–129)
ALT SERPL-CCNC: 18 U/L (ref 10–40)
AMPHETAMINE SCREEN, URINE: NORMAL
ANION GAP SERPL CALCULATED.3IONS-SCNC: 9 MMOL/L (ref 3–16)
AST SERPL-CCNC: 13 U/L (ref 15–37)
BARBITURATE SCREEN URINE: NORMAL
BASOPHILS ABSOLUTE: 0 K/UL (ref 0–0.2)
BASOPHILS RELATIVE PERCENT: 0.8 %
BENZODIAZEPINE SCREEN, URINE: NORMAL
BILIRUB SERPL-MCNC: <0.2 MG/DL (ref 0–1)
BUN BLDV-MCNC: 28 MG/DL (ref 7–20)
C DIFF TOXIN/ANTIGEN: NORMAL
CALCIUM SERPL-MCNC: 8.4 MG/DL (ref 8.3–10.6)
CANNABINOID SCREEN URINE: NORMAL
CHLORIDE BLD-SCNC: 106 MMOL/L (ref 99–110)
CO2: 24 MMOL/L (ref 21–32)
COCAINE METABOLITE SCREEN URINE: NORMAL
CREAT SERPL-MCNC: 0.9 MG/DL (ref 0.8–1.3)
EOSINOPHILS ABSOLUTE: 0.1 K/UL (ref 0–0.6)
EOSINOPHILS RELATIVE PERCENT: 1.7 %
GFR AFRICAN AMERICAN: >60
GFR NON-AFRICAN AMERICAN: >60
GLOBULIN: 2.4 G/DL
GLUCOSE BLD-MCNC: 125 MG/DL (ref 70–99)
GLUCOSE BLD-MCNC: 125 MG/DL (ref 70–99)
GLUCOSE BLD-MCNC: 129 MG/DL (ref 70–99)
GLUCOSE BLD-MCNC: 138 MG/DL (ref 70–99)
GLUCOSE BLD-MCNC: 142 MG/DL (ref 70–99)
HCT VFR BLD CALC: 35.5 % (ref 40.5–52.5)
HEMOGLOBIN: 11.8 G/DL (ref 13.5–17.5)
LYMPHOCYTES ABSOLUTE: 1.8 K/UL (ref 1–5.1)
LYMPHOCYTES RELATIVE PERCENT: 33.1 %
Lab: NORMAL
MCH RBC QN AUTO: 28.6 PG (ref 26–34)
MCHC RBC AUTO-ENTMCNC: 33.1 G/DL (ref 31–36)
MCV RBC AUTO: 86.4 FL (ref 80–100)
METHADONE SCREEN, URINE: NORMAL
MONOCYTES ABSOLUTE: 0.5 K/UL (ref 0–1.3)
MONOCYTES RELATIVE PERCENT: 8.6 %
NEUTROPHILS ABSOLUTE: 3 K/UL (ref 1.7–7.7)
NEUTROPHILS RELATIVE PERCENT: 55.8 %
OPIATE SCREEN URINE: NORMAL
OXYCODONE URINE: NORMAL
PDW BLD-RTO: 13.6 % (ref 12.4–15.4)
PERFORMED ON: ABNORMAL
PH UA: 5
PHENCYCLIDINE SCREEN URINE: NORMAL
PLATELET # BLD: 158 K/UL (ref 135–450)
PMV BLD AUTO: 8.2 FL (ref 5–10.5)
POTASSIUM REFLEX MAGNESIUM: 4.8 MMOL/L (ref 3.5–5.1)
PROPOXYPHENE SCREEN: NORMAL
RBC # BLD: 4.11 M/UL (ref 4.2–5.9)
SODIUM BLD-SCNC: 139 MMOL/L (ref 136–145)
TOTAL PROTEIN: 6 G/DL (ref 6.4–8.2)
TROPONIN: <0.01 NG/ML
WBC # BLD: 5.3 K/UL (ref 4–11)

## 2020-02-26 PROCEDURE — 94760 N-INVAS EAR/PLS OXIMETRY 1: CPT

## 2020-02-26 PROCEDURE — 6370000000 HC RX 637 (ALT 250 FOR IP): Performed by: INTERNAL MEDICINE

## 2020-02-26 PROCEDURE — 36415 COLL VENOUS BLD VENIPUNCTURE: CPT

## 2020-02-26 PROCEDURE — 87505 NFCT AGENT DETECTION GI: CPT

## 2020-02-26 PROCEDURE — 80053 COMPREHEN METABOLIC PANEL: CPT

## 2020-02-26 PROCEDURE — 2580000003 HC RX 258: Performed by: INTERNAL MEDICINE

## 2020-02-26 PROCEDURE — 2580000003 HC RX 258: Performed by: HOSPITALIST

## 2020-02-26 PROCEDURE — 85025 COMPLETE CBC W/AUTO DIFF WBC: CPT

## 2020-02-26 PROCEDURE — 80307 DRUG TEST PRSMV CHEM ANLYZR: CPT

## 2020-02-26 PROCEDURE — 6370000000 HC RX 637 (ALT 250 FOR IP): Performed by: NURSE PRACTITIONER

## 2020-02-26 PROCEDURE — 87449 NOS EACH ORGANISM AG IA: CPT

## 2020-02-26 PROCEDURE — 2060000000 HC ICU INTERMEDIATE R&B

## 2020-02-26 PROCEDURE — 84484 ASSAY OF TROPONIN QUANT: CPT

## 2020-02-26 PROCEDURE — 6360000002 HC RX W HCPCS: Performed by: INTERNAL MEDICINE

## 2020-02-26 PROCEDURE — 87324 CLOSTRIDIUM AG IA: CPT

## 2020-02-26 RX ORDER — METOPROLOL TARTRATE 50 MG/1
50 TABLET, FILM COATED ORAL 2 TIMES DAILY
Status: DISCONTINUED | OUTPATIENT
Start: 2020-02-26 | End: 2020-02-28

## 2020-02-26 RX ORDER — SODIUM CHLORIDE, SODIUM LACTATE, POTASSIUM CHLORIDE, CALCIUM CHLORIDE 600; 310; 30; 20 MG/100ML; MG/100ML; MG/100ML; MG/100ML
INJECTION, SOLUTION INTRAVENOUS CONTINUOUS
Status: DISCONTINUED | OUTPATIENT
Start: 2020-02-26 | End: 2020-02-27

## 2020-02-26 RX ORDER — TRAMADOL HYDROCHLORIDE 50 MG/1
50 TABLET ORAL ONCE
Status: COMPLETED | OUTPATIENT
Start: 2020-02-26 | End: 2020-02-26

## 2020-02-26 RX ADMIN — ACETAMINOPHEN 650 MG: 325 TABLET ORAL at 20:34

## 2020-02-26 RX ADMIN — ASPIRIN 81 MG 81 MG: 81 TABLET ORAL at 09:29

## 2020-02-26 RX ADMIN — CYANOCOBALAMIN TAB 500 MCG 500 MCG: 500 TAB at 09:29

## 2020-02-26 RX ADMIN — ZOLPIDEM TARTRATE 10 MG: 5 TABLET ORAL at 20:34

## 2020-02-26 RX ADMIN — TRAMADOL HYDROCHLORIDE 50 MG: 50 TABLET, FILM COATED ORAL at 23:10

## 2020-02-26 RX ADMIN — METOPROLOL TARTRATE 50 MG: 50 TABLET, FILM COATED ORAL at 10:59

## 2020-02-26 RX ADMIN — SODIUM CHLORIDE, PRESERVATIVE FREE 10 ML: 5 INJECTION INTRAVENOUS at 20:35

## 2020-02-26 RX ADMIN — AMITRIPTYLINE HYDROCHLORIDE 50 MG: 50 TABLET, FILM COATED ORAL at 09:29

## 2020-02-26 RX ADMIN — VITAMIN D, TAB 1000IU (100/BT) 1000 UNITS: 25 TAB at 09:29

## 2020-02-26 RX ADMIN — SODIUM CHLORIDE: 9 INJECTION, SOLUTION INTRAVENOUS at 11:01

## 2020-02-26 RX ADMIN — SODIUM CHLORIDE, POTASSIUM CHLORIDE, SODIUM LACTATE AND CALCIUM CHLORIDE: 600; 310; 30; 20 INJECTION, SOLUTION INTRAVENOUS at 11:35

## 2020-02-26 RX ADMIN — AMITRIPTYLINE HYDROCHLORIDE 50 MG: 50 TABLET, FILM COATED ORAL at 20:34

## 2020-02-26 RX ADMIN — SODIUM CHLORIDE: 9 INJECTION, SOLUTION INTRAVENOUS at 03:06

## 2020-02-26 RX ADMIN — METOPROLOL TARTRATE 50 MG: 50 TABLET, FILM COATED ORAL at 20:34

## 2020-02-26 RX ADMIN — ENOXAPARIN SODIUM 40 MG: 40 INJECTION SUBCUTANEOUS at 09:29

## 2020-02-26 ASSESSMENT — PAIN SCALES - GENERAL
PAINLEVEL_OUTOF10: 6
PAINLEVEL_OUTOF10: 0
PAINLEVEL_OUTOF10: 7
PAINLEVEL_OUTOF10: 0
PAINLEVEL_OUTOF10: 6
PAINLEVEL_OUTOF10: 0
PAINLEVEL_OUTOF10: 7
PAINLEVEL_OUTOF10: 0
PAINLEVEL_OUTOF10: 0

## 2020-02-26 ASSESSMENT — PAIN - FUNCTIONAL ASSESSMENT
PAIN_FUNCTIONAL_ASSESSMENT: PREVENTS OR INTERFERES SOME ACTIVE ACTIVITIES AND ADLS

## 2020-02-26 ASSESSMENT — PAIN DESCRIPTION - ORIENTATION
ORIENTATION: LEFT
ORIENTATION: LEFT;LOWER
ORIENTATION: LEFT

## 2020-02-26 ASSESSMENT — PAIN DESCRIPTION - PROGRESSION
CLINICAL_PROGRESSION: NOT CHANGED

## 2020-02-26 ASSESSMENT — PAIN DESCRIPTION - DIRECTION: RADIATING_TOWARDS: N

## 2020-02-26 ASSESSMENT — PAIN DESCRIPTION - ONSET
ONSET: ON-GOING
ONSET: ON-GOING
ONSET: OTHER (COMMENT)

## 2020-02-26 ASSESSMENT — PAIN DESCRIPTION - PAIN TYPE
TYPE: ACUTE PAIN

## 2020-02-26 ASSESSMENT — PAIN DESCRIPTION - LOCATION
LOCATION: NECK

## 2020-02-26 ASSESSMENT — PAIN DESCRIPTION - DESCRIPTORS
DESCRIPTORS: ACHING

## 2020-02-26 ASSESSMENT — PAIN SCALES - WONG BAKER
WONGBAKER_NUMERICALRESPONSE: 0

## 2020-02-26 ASSESSMENT — PAIN DESCRIPTION - FREQUENCY
FREQUENCY: CONTINUOUS

## 2020-02-26 NOTE — PLAN OF CARE
Problem: Falls - Risk of:  Goal: Absence of physical injury  Description  Absence of physical injury  Outcome: Ongoing     Problem: Falls - Risk of:  Goal: Will remain free from falls  Description  Will remain free from falls  Outcome: Ongoing

## 2020-02-26 NOTE — PROGRESS NOTES
NITRU Negative   LEUKOCYTESUR Negative   LABMICR YES   URINETYPE Cleancatch      Urine Microscopic:   Recent Labs     02/25/20  1600   HYALCAST 18*   WBCUA 2   RBCUA 4   EPIU 1     Urine Chemistry: No results for input(s): Heath Landeros in the last 72 hours. Objective:   Vitals: BP (!) 155/86   Pulse 73   Temp 97.5 °F (36.4 °C) (Oral)   Resp 16   Ht 5' 11\" (1.803 m)   Wt 245 lb 6 oz (111.3 kg)   SpO2 96%   BMI 34.22 kg/m²    Wt Readings from Last 3 Encounters:   02/26/20 245 lb 6 oz (111.3 kg)   02/13/20 243 lb 6.4 oz (110.4 kg)   01/10/20 238 lb 8.6 oz (108.2 kg)      24HR INTAKE/OUTPUT:      Intake/Output Summary (Last 24 hours) at 2/26/2020 0716  Last data filed at 2/26/2020 0497  Gross per 24 hour   Intake 2196 ml   Output --   Net 2196 ml     Constitutional:  awake, NAD  HEENT:  MMM, No icterus  Neck: no bruits, No JVD  Cardiovascular:  S1, S2 reg  Respiratory: CTA, no crackles  Abdomen:  +BS, soft, NT, ND  Ext: no lower extremity edema  Skin: dry/intact  CNS: alert, no agitation    IMAGING:  CT ABDOMEN PELVIS W IV CONTRAST Additional Contrast? None   Final Result   No acute abdominal or pelvic abnormality. Diffuse hepatic steatosis. Uncomplicated colonic diverticulosis. XR CHEST STANDARD (2 VW)   Final Result   No evidence of acute cardiopulmonary disease. Assessment :     1. ALEA  -Non-Oliguric  -Baseline creat: 0.8-1.1 Now 1.5->0.9  -UA concentrated, hyaline cast  -Volume: Hypovolemic  -Electrolytes: No Dyskalemia  -Acid-Base: bicarb 24   -75 ml iv contrast used 2/25/2020   -ALEA in the setting of hypotension, volume loss. ALEA resolving    Recent Labs     02/25/20  1115 02/26/20  0747   BUN 37* 28*   CREATININE 1.5* 0.9     Recent Labs     02/25/20  1115 02/26/20  0747    139   K 4.6 4.8   CO2 24 24     2.  HTN  -Blood pressure better  -on multiple blood pressure meds at home  -In past PRA, ary have been unremarkable  -Renal doppler in April

## 2020-02-27 LAB
GI BACTERIAL PATHOGENS BY PCR: NORMAL
GLUCOSE BLD-MCNC: 133 MG/DL (ref 70–99)
GLUCOSE BLD-MCNC: 148 MG/DL (ref 70–99)
PERFORMED ON: ABNORMAL
PERFORMED ON: ABNORMAL

## 2020-02-27 PROCEDURE — 6360000002 HC RX W HCPCS: Performed by: INTERNAL MEDICINE

## 2020-02-27 PROCEDURE — 6370000000 HC RX 637 (ALT 250 FOR IP): Performed by: NURSE PRACTITIONER

## 2020-02-27 PROCEDURE — 6370000000 HC RX 637 (ALT 250 FOR IP): Performed by: INTERNAL MEDICINE

## 2020-02-27 PROCEDURE — 6370000000 HC RX 637 (ALT 250 FOR IP): Performed by: HOSPITALIST

## 2020-02-27 PROCEDURE — 2060000000 HC ICU INTERMEDIATE R&B

## 2020-02-27 PROCEDURE — 2580000003 HC RX 258: Performed by: INTERNAL MEDICINE

## 2020-02-27 PROCEDURE — 94760 N-INVAS EAR/PLS OXIMETRY 1: CPT

## 2020-02-27 PROCEDURE — 2580000003 HC RX 258: Performed by: HOSPITALIST

## 2020-02-27 RX ORDER — LOPERAMIDE HYDROCHLORIDE 2 MG/1
4 CAPSULE ORAL ONCE
Status: DISCONTINUED | OUTPATIENT
Start: 2020-02-27 | End: 2020-02-28

## 2020-02-27 RX ORDER — LISINOPRIL 40 MG/1
40 TABLET ORAL DAILY
Status: DISCONTINUED | OUTPATIENT
Start: 2020-02-27 | End: 2020-03-02 | Stop reason: HOSPADM

## 2020-02-27 RX ORDER — DOXAZOSIN 2 MG/1
2 TABLET ORAL EVERY 12 HOURS SCHEDULED
Status: DISCONTINUED | OUTPATIENT
Start: 2020-02-27 | End: 2020-03-02 | Stop reason: HOSPADM

## 2020-02-27 RX ORDER — HYDROCHLOROTHIAZIDE 25 MG/1
25 TABLET ORAL DAILY
Status: DISCONTINUED | OUTPATIENT
Start: 2020-02-27 | End: 2020-03-02 | Stop reason: HOSPADM

## 2020-02-27 RX ORDER — HYDRALAZINE HYDROCHLORIDE 50 MG/1
100 TABLET, FILM COATED ORAL EVERY 8 HOURS SCHEDULED
Status: DISCONTINUED | OUTPATIENT
Start: 2020-02-27 | End: 2020-03-02 | Stop reason: HOSPADM

## 2020-02-27 RX ORDER — CYCLOBENZAPRINE HCL 10 MG
10 TABLET ORAL ONCE
Status: COMPLETED | OUTPATIENT
Start: 2020-02-27 | End: 2020-02-27

## 2020-02-27 RX ADMIN — ZOLPIDEM TARTRATE 10 MG: 5 TABLET ORAL at 23:03

## 2020-02-27 RX ADMIN — CYCLOBENZAPRINE HYDROCHLORIDE 10 MG: 10 TABLET, FILM COATED ORAL at 23:37

## 2020-02-27 RX ADMIN — SODIUM CHLORIDE, PRESERVATIVE FREE 10 ML: 5 INJECTION INTRAVENOUS at 23:04

## 2020-02-27 RX ADMIN — DOXAZOSIN 2 MG: 2 TABLET ORAL at 09:59

## 2020-02-27 RX ADMIN — SODIUM CHLORIDE, POTASSIUM CHLORIDE, SODIUM LACTATE AND CALCIUM CHLORIDE: 600; 310; 30; 20 INJECTION, SOLUTION INTRAVENOUS at 07:37

## 2020-02-27 RX ADMIN — HYDRALAZINE HYDROCHLORIDE 100 MG: 50 TABLET, FILM COATED ORAL at 23:37

## 2020-02-27 RX ADMIN — DOXAZOSIN 2 MG: 2 TABLET ORAL at 23:03

## 2020-02-27 RX ADMIN — HYDRALAZINE HYDROCHLORIDE 100 MG: 50 TABLET, FILM COATED ORAL at 13:51

## 2020-02-27 RX ADMIN — HYDROCHLOROTHIAZIDE 25 MG: 25 TABLET ORAL at 09:59

## 2020-02-27 RX ADMIN — METOPROLOL TARTRATE 50 MG: 50 TABLET, FILM COATED ORAL at 23:03

## 2020-02-27 RX ADMIN — ASPIRIN 81 MG 81 MG: 81 TABLET ORAL at 07:47

## 2020-02-27 RX ADMIN — ENOXAPARIN SODIUM 40 MG: 40 INJECTION SUBCUTANEOUS at 07:46

## 2020-02-27 RX ADMIN — LISINOPRIL 40 MG: 40 TABLET ORAL at 09:58

## 2020-02-27 RX ADMIN — ACETAMINOPHEN 650 MG: 325 TABLET ORAL at 23:37

## 2020-02-27 RX ADMIN — HYDRALAZINE HYDROCHLORIDE 100 MG: 50 TABLET, FILM COATED ORAL at 07:46

## 2020-02-27 RX ADMIN — AMITRIPTYLINE HYDROCHLORIDE 50 MG: 50 TABLET, FILM COATED ORAL at 23:03

## 2020-02-27 RX ADMIN — CYANOCOBALAMIN TAB 500 MCG 500 MCG: 500 TAB at 07:47

## 2020-02-27 RX ADMIN — ACETAMINOPHEN 650 MG: 325 TABLET ORAL at 09:15

## 2020-02-27 RX ADMIN — AMITRIPTYLINE HYDROCHLORIDE 50 MG: 50 TABLET, FILM COATED ORAL at 07:47

## 2020-02-27 RX ADMIN — VITAMIN D, TAB 1000IU (100/BT) 1000 UNITS: 25 TAB at 07:46

## 2020-02-27 RX ADMIN — METOPROLOL TARTRATE 50 MG: 50 TABLET, FILM COATED ORAL at 07:51

## 2020-02-27 ASSESSMENT — PAIN DESCRIPTION - PROGRESSION
CLINICAL_PROGRESSION: NOT CHANGED

## 2020-02-27 ASSESSMENT — PAIN DESCRIPTION - LOCATION
LOCATION: NECK
LOCATION: NECK;SHOULDER

## 2020-02-27 ASSESSMENT — PAIN SCALES - GENERAL
PAINLEVEL_OUTOF10: 6
PAINLEVEL_OUTOF10: 4
PAINLEVEL_OUTOF10: 6
PAINLEVEL_OUTOF10: 4
PAINLEVEL_OUTOF10: 6
PAINLEVEL_OUTOF10: 4
PAINLEVEL_OUTOF10: 6
PAINLEVEL_OUTOF10: 7
PAINLEVEL_OUTOF10: 4

## 2020-02-27 ASSESSMENT — PAIN DESCRIPTION - PAIN TYPE
TYPE: ACUTE PAIN

## 2020-02-27 ASSESSMENT — PAIN DESCRIPTION - FREQUENCY
FREQUENCY: INTERMITTENT
FREQUENCY: CONTINUOUS

## 2020-02-27 ASSESSMENT — PAIN SCALES - WONG BAKER
WONGBAKER_NUMERICALRESPONSE: 0

## 2020-02-27 ASSESSMENT — PAIN DESCRIPTION - DESCRIPTORS
DESCRIPTORS: DISCOMFORT
DESCRIPTORS: SHOOTING

## 2020-02-27 ASSESSMENT — PAIN DESCRIPTION - ORIENTATION
ORIENTATION: LEFT
ORIENTATION: RIGHT;LEFT

## 2020-02-27 ASSESSMENT — PAIN - FUNCTIONAL ASSESSMENT: PAIN_FUNCTIONAL_ASSESSMENT: PREVENTS OR INTERFERES SOME ACTIVE ACTIVITIES AND ADLS

## 2020-02-27 ASSESSMENT — PAIN DESCRIPTION - ONSET: ONSET: ON-GOING

## 2020-02-27 NOTE — PLAN OF CARE
Problem: Falls - Risk of:  Goal: Will remain free from falls  Description  Will remain free from falls  2/27/2020 0827 by George Bryant RN  Outcome: Ongoing  Fall risk assessment completed per unit protocol. Patient's bed is in the lowest position, call light is within reach and the patient's room is free of clutter. The patient has been instructed to call for assistance before getting out of bed or the chair. Goal: Absence of physical injury  Description  Absence of physical injury  2/27/2020 0827 by George Bryant RN  Outcome: Ongoing     Problem: Cardiac:  Goal: Ability to maintain vital signs within normal range will improve  Description  Ability to maintain vital signs within normal range will improve  Outcome: Ongoing  Blood pressure elevated. AM medications given, will continue to monitor.    Goal: Cardiovascular alteration will improve  Description  Cardiovascular alteration will improve  Outcome: Ongoing

## 2020-02-27 NOTE — PLAN OF CARE
Problem: Pain:  Goal: Pain level will decrease  Description  Pain level will decrease  Outcome: Ongoing     Problem: Pain:  Goal: Control of acute pain  Description  Control of acute pain  Outcome: Ongoing     Patient reporting pain 7/10. Shooting pain in neck and shoulders. PRN tylenol given as ordered. MD notified via secure message.

## 2020-02-27 NOTE — PROGRESS NOTES
Office: [phone]       Fax: [phone]      Nephrology Progress Note        Patient's Name: Leyda Goodman  Admit Date: 2/25/2020  Date of Visit: 2/27/2020    Reason for Consult:  ALEA    Subjective:      Leyda Goodman is a 72 y.o. male with PMHx of hypertension, diabetes mellitus, coronary artery disease, COPD, substance abuse (cocaine in past)  who was hospitalized on 2/25/2020 with complaints of diarrhea. Reported watery diarrhea for 3-4 days. Having dizziness. No nausea or vomiting. No recent travel. Denied abdominal pain. Initial blood pressure in ED noted in 70-80s. Creat 1.5  Reported low UO at home. In ED had st cath done that yielded about 100 ml urine  Had abdominal CT scan done in ED with iv contrast  Patient has history of refractory hypertension and was previously hospitalized for hypertensive crisis about 2 wks ago. Interval History  Has diarrhea  BP now high  No headache, chest pain      Medications:    Allergies:  Naproxen    Scheduled Meds:   metoprolol  50 mg Oral BID    HYDROcodone 5 mg - acetaminophen  1 tablet Oral Once    amitriptyline  50 mg Oral BID    aspirin  81 mg Oral Daily    vitamin B-12  500 mcg Oral Daily    Vitamin D  1,000 Units Oral Daily    sodium chloride flush  10 mL Intravenous 2 times per day    enoxaparin  40 mg Subcutaneous Daily    zolpidem  10 mg Oral Nightly     Continuous Infusions:   lactated ringers 50 mL/hr at 02/26/20 1135       Labs:  CBC:   Recent Labs     02/25/20  1115 02/26/20  0747   WBC 6.8 5.3   HGB 12.8* 11.8*    158     Ca/Mg/Phos:   Recent Labs     02/25/20  1115 02/26/20  0747   CALCIUM 9.3 8.4     UA:  Recent Labs     02/25/20  1600 02/26/20  1500   COLORU YELLOW  --    CLARITYU Clear  --    GLUCOSEU Negative  --    BILIRUBINUR SMALL*  --    KETUA Negative  --    SPECGRAV HTN  -Blood pressure high  -on multiple blood pressure meds at home  -In past PRA, ary have been unremarkable  -Renal doppler in April 2019: No evidence of hemodynamically significant stenosis in either renal artery  -Reported history of obstructive sleep apnea but not on CPAP  -history of cocaine use in past    BP Readings from Last 1 Encounters:   02/27/20 (!) 184/107     2. CAD  -TTE (jan 2020): LVEF 55-60%, mild LVH. No mention about PASP  -medical management     3. DM  -Not on metformin    Lab Results   Component Value Date    LABA1C 7.3 02/12/2020     4. Diarrhea  -stool culture negative     Plan:     -DC IVF   -Resume Home BP meds  -Imodium today  -monitor lytes  -AM: BMP, Mg    -Monitor I/O, UOP  -Maintain MAP>65  -Avoid nephrotoxin, if able. -Dose meds to current eGFR    Thank you for allowing us to participate in care of [facility] . We will continue to follow. Feel free to contact me with any questions.       Era Atkinson  2/27/2020    [facility] [address]  Office : [phone]  Fax :[phone]

## 2020-02-28 ENCOUNTER — APPOINTMENT (OUTPATIENT)
Dept: GENERAL RADIOLOGY | Age: 65
DRG: 641 | End: 2020-02-28
Payer: MEDICARE

## 2020-02-28 LAB
ALBUMIN SERPL-MCNC: 3.9 G/DL (ref 3.4–5)
ANION GAP SERPL CALCULATED.3IONS-SCNC: 14 MMOL/L (ref 3–16)
BUN BLDV-MCNC: 21 MG/DL (ref 7–20)
C DIFF TOXIN/ANTIGEN: NORMAL
CALCIUM SERPL-MCNC: 9 MG/DL (ref 8.3–10.6)
CHLORIDE BLD-SCNC: 97 MMOL/L (ref 99–110)
CO2: 26 MMOL/L (ref 21–32)
CREAT SERPL-MCNC: 0.9 MG/DL (ref 0.8–1.3)
GFR AFRICAN AMERICAN: >60
GFR NON-AFRICAN AMERICAN: >60
GLUCOSE BLD-MCNC: 193 MG/DL (ref 70–99)
MAGNESIUM: 1.3 MG/DL (ref 1.8–2.4)
PHOSPHORUS: 2.9 MG/DL (ref 2.5–4.9)
POTASSIUM SERPL-SCNC: 4.1 MMOL/L (ref 3.5–5.1)
SODIUM BLD-SCNC: 137 MMOL/L (ref 136–145)

## 2020-02-28 PROCEDURE — 80069 RENAL FUNCTION PANEL: CPT

## 2020-02-28 PROCEDURE — 97165 OT EVAL LOW COMPLEX 30 MIN: CPT

## 2020-02-28 PROCEDURE — 73030 X-RAY EXAM OF SHOULDER: CPT

## 2020-02-28 PROCEDURE — 6360000002 HC RX W HCPCS: Performed by: INTERNAL MEDICINE

## 2020-02-28 PROCEDURE — 97161 PT EVAL LOW COMPLEX 20 MIN: CPT

## 2020-02-28 PROCEDURE — 2060000000 HC ICU INTERMEDIATE R&B

## 2020-02-28 PROCEDURE — 87449 NOS EACH ORGANISM AG IA: CPT

## 2020-02-28 PROCEDURE — 6370000000 HC RX 637 (ALT 250 FOR IP): Performed by: INTERNAL MEDICINE

## 2020-02-28 PROCEDURE — 6370000000 HC RX 637 (ALT 250 FOR IP): Performed by: HOSPITALIST

## 2020-02-28 PROCEDURE — 87324 CLOSTRIDIUM AG IA: CPT

## 2020-02-28 PROCEDURE — 2580000003 HC RX 258: Performed by: INTERNAL MEDICINE

## 2020-02-28 PROCEDURE — 94760 N-INVAS EAR/PLS OXIMETRY 1: CPT

## 2020-02-28 PROCEDURE — 83735 ASSAY OF MAGNESIUM: CPT

## 2020-02-28 PROCEDURE — 97530 THERAPEUTIC ACTIVITIES: CPT

## 2020-02-28 PROCEDURE — 97116 GAIT TRAINING THERAPY: CPT

## 2020-02-28 PROCEDURE — 99221 1ST HOSP IP/OBS SF/LOW 40: CPT | Performed by: NURSE PRACTITIONER

## 2020-02-28 PROCEDURE — 36415 COLL VENOUS BLD VENIPUNCTURE: CPT

## 2020-02-28 RX ORDER — LOPERAMIDE HYDROCHLORIDE 2 MG/1
2 CAPSULE ORAL 4 TIMES DAILY PRN
Status: DISCONTINUED | OUTPATIENT
Start: 2020-02-28 | End: 2020-02-29

## 2020-02-28 RX ORDER — METOPROLOL TARTRATE 50 MG/1
100 TABLET, FILM COATED ORAL 2 TIMES DAILY
Status: DISCONTINUED | OUTPATIENT
Start: 2020-02-28 | End: 2020-02-29

## 2020-02-28 RX ORDER — MAGNESIUM SULFATE IN WATER 40 MG/ML
2 INJECTION, SOLUTION INTRAVENOUS ONCE
Status: COMPLETED | OUTPATIENT
Start: 2020-02-28 | End: 2020-02-28

## 2020-02-28 RX ORDER — SPIRONOLACTONE 25 MG/1
25 TABLET ORAL ONCE
Status: COMPLETED | OUTPATIENT
Start: 2020-02-28 | End: 2020-02-28

## 2020-02-28 RX ORDER — OXYCODONE HYDROCHLORIDE AND ACETAMINOPHEN 5; 325 MG/1; MG/1
1 TABLET ORAL EVERY 6 HOURS PRN
Status: DISCONTINUED | OUTPATIENT
Start: 2020-02-28 | End: 2020-03-02 | Stop reason: HOSPADM

## 2020-02-28 RX ADMIN — OXYCODONE HYDROCHLORIDE AND ACETAMINOPHEN 1 TABLET: 5; 325 TABLET ORAL at 13:06

## 2020-02-28 RX ADMIN — HYDROCHLOROTHIAZIDE 25 MG: 25 TABLET ORAL at 08:51

## 2020-02-28 RX ADMIN — OXYCODONE HYDROCHLORIDE AND ACETAMINOPHEN 1 TABLET: 5; 325 TABLET ORAL at 19:37

## 2020-02-28 RX ADMIN — ASPIRIN 81 MG 81 MG: 81 TABLET ORAL at 08:51

## 2020-02-28 RX ADMIN — HYDRALAZINE HYDROCHLORIDE 100 MG: 50 TABLET, FILM COATED ORAL at 21:46

## 2020-02-28 RX ADMIN — DOXAZOSIN 2 MG: 2 TABLET ORAL at 08:51

## 2020-02-28 RX ADMIN — METOPROLOL TARTRATE 100 MG: 50 TABLET, FILM COATED ORAL at 21:45

## 2020-02-28 RX ADMIN — LISINOPRIL 40 MG: 40 TABLET ORAL at 08:51

## 2020-02-28 RX ADMIN — SODIUM CHLORIDE, PRESERVATIVE FREE 10 ML: 5 INJECTION INTRAVENOUS at 08:52

## 2020-02-28 RX ADMIN — SPIRONOLACTONE 25 MG: 25 TABLET ORAL at 11:19

## 2020-02-28 RX ADMIN — ZOLPIDEM TARTRATE 10 MG: 5 TABLET ORAL at 21:45

## 2020-02-28 RX ADMIN — ENOXAPARIN SODIUM 40 MG: 40 INJECTION SUBCUTANEOUS at 08:51

## 2020-02-28 RX ADMIN — CYANOCOBALAMIN TAB 500 MCG 500 MCG: 500 TAB at 08:51

## 2020-02-28 RX ADMIN — VITAMIN D, TAB 1000IU (100/BT) 1000 UNITS: 25 TAB at 08:51

## 2020-02-28 RX ADMIN — AMITRIPTYLINE HYDROCHLORIDE 50 MG: 50 TABLET, FILM COATED ORAL at 21:45

## 2020-02-28 RX ADMIN — HYDRALAZINE HYDROCHLORIDE 100 MG: 50 TABLET, FILM COATED ORAL at 13:06

## 2020-02-28 RX ADMIN — ACETAMINOPHEN 650 MG: 325 TABLET ORAL at 08:59

## 2020-02-28 RX ADMIN — METOPROLOL TARTRATE 50 MG: 50 TABLET, FILM COATED ORAL at 08:51

## 2020-02-28 RX ADMIN — HYDRALAZINE HYDROCHLORIDE 100 MG: 50 TABLET, FILM COATED ORAL at 06:08

## 2020-02-28 RX ADMIN — MAGNESIUM SULFATE HEPTAHYDRATE 2 G: 40 INJECTION, SOLUTION INTRAVENOUS at 15:41

## 2020-02-28 RX ADMIN — SODIUM CHLORIDE, PRESERVATIVE FREE 10 ML: 5 INJECTION INTRAVENOUS at 21:47

## 2020-02-28 RX ADMIN — AMITRIPTYLINE HYDROCHLORIDE 50 MG: 50 TABLET, FILM COATED ORAL at 08:51

## 2020-02-28 RX ADMIN — DOXAZOSIN 2 MG: 2 TABLET ORAL at 21:46

## 2020-02-28 ASSESSMENT — PAIN DESCRIPTION - LOCATION
LOCATION: SHOULDER

## 2020-02-28 ASSESSMENT — PAIN DESCRIPTION - ONSET
ONSET: ON-GOING

## 2020-02-28 ASSESSMENT — PAIN SCALES - GENERAL
PAINLEVEL_OUTOF10: 6
PAINLEVEL_OUTOF10: 4
PAINLEVEL_OUTOF10: 7
PAINLEVEL_OUTOF10: 6
PAINLEVEL_OUTOF10: 0
PAINLEVEL_OUTOF10: 5
PAINLEVEL_OUTOF10: 0
PAINLEVEL_OUTOF10: 6
PAINLEVEL_OUTOF10: 7
PAINLEVEL_OUTOF10: 7
PAINLEVEL_OUTOF10: 5
PAINLEVEL_OUTOF10: 7

## 2020-02-28 ASSESSMENT — PAIN DESCRIPTION - PAIN TYPE
TYPE: ACUTE PAIN

## 2020-02-28 ASSESSMENT — PAIN DESCRIPTION - PROGRESSION

## 2020-02-28 ASSESSMENT — PAIN - FUNCTIONAL ASSESSMENT
PAIN_FUNCTIONAL_ASSESSMENT: ACTIVITIES ARE NOT PREVENTED
PAIN_FUNCTIONAL_ASSESSMENT: PREVENTS OR INTERFERES SOME ACTIVE ACTIVITIES AND ADLS
PAIN_FUNCTIONAL_ASSESSMENT: ACTIVITIES ARE NOT PREVENTED

## 2020-02-28 ASSESSMENT — PAIN SCALES - WONG BAKER
WONGBAKER_NUMERICALRESPONSE: 0

## 2020-02-28 ASSESSMENT — PAIN DESCRIPTION - ORIENTATION
ORIENTATION: RIGHT;LEFT

## 2020-02-28 ASSESSMENT — PAIN DESCRIPTION - FREQUENCY
FREQUENCY: CONTINUOUS

## 2020-02-28 ASSESSMENT — PAIN DESCRIPTION - DESCRIPTORS
DESCRIPTORS: DISCOMFORT

## 2020-02-28 ASSESSMENT — PAIN DESCRIPTION - DIRECTION
RADIATING_TOWARDS: NECK

## 2020-02-28 NOTE — PROGRESS NOTES
with normal S1/S2 without murmurs, rubs or gallops. Abdomen: Soft, non-tender, non-distended with normal bowel sounds. Musculoskeletal: No clubbing, cyanosis or edema bilaterally. Full range of motion without deformity. Skin: Skin color, texture, turgor normal.  No rashes or lesions. Neurologic:  Neurovascularly intact without any focal sensory/motor deficits. Cranial nerves: II-XII intact, grossly non-focal.  Psychiatric: Alert and oriented, thought content appropriate, normal insight  Capillary Refill: Brisk,< 3 seconds   Peripheral Pulses: +2 palpable, equal bilaterally       Labs:   Recent Labs     02/25/20  1115 02/26/20  0747   WBC 6.8 5.3   HGB 12.8* 11.8*   HCT 39.2* 35.5*    158     Recent Labs     02/25/20  1115 02/26/20  0747    139   K 4.6 4.8    106   CO2 24 24   BUN 37* 28*   CREATININE 1.5* 0.9   CALCIUM 9.3 8.4     Recent Labs     02/25/20  1115 02/26/20  0747   AST 13* 13*   ALT 21 18   BILIDIR <0.2  --    BILITOT 0.4 <0.2   ALKPHOS 123 116     No results for input(s): INR in the last 72 hours. Recent Labs     02/25/20  1115 02/25/20  2112 02/26/20  0024   TROPONINI 0.02* 0.02* <0.01       Urinalysis:      Lab Results   Component Value Date    NITRU Negative 02/25/2020    WBCUA 2 02/25/2020    BACTERIA Rare 01/19/2018    RBCUA 4 02/25/2020    BLOODU Negative 02/25/2020    SPECGRAV >1.030 02/25/2020    GLUCOSEU Negative 02/25/2020       Radiology:  CT ABDOMEN PELVIS W IV CONTRAST Additional Contrast? None   Final Result   No acute abdominal or pelvic abnormality. Diffuse hepatic steatosis. Uncomplicated colonic diverticulosis. XR CHEST STANDARD (2 VW)   Final Result   No evidence of acute cardiopulmonary disease. Assessment/Plan:    Active Hospital Problems    Diagnosis    Arterial hypotension [I95.9]     Arterial hypotension.  Improved.  Likely secondary to severe dehydration in the setting of multiple antihypertensive meds (Cardura,

## 2020-02-28 NOTE — PLAN OF CARE
Problem: Falls - Risk of:  Goal: Will remain free from falls  Description  Will remain free from falls  Outcome: Ongoing  Goal: Absence of physical injury  Description  Absence of physical injury  Outcome: Ongoing     Problem: Cardiac:  Goal: Ability to maintain vital signs within normal range will improve  Description  Ability to maintain vital signs within normal range will improve  Outcome: Ongoing  Goal: Cardiovascular alteration will improve  Description  Cardiovascular alteration will improve  Outcome: Ongoing     Problem: Pain:  Goal: Pain level will decrease  Description  Pain level will decrease  Outcome: Ongoing  Goal: Control of acute pain  Description  Control of acute pain  Outcome: Ongoing

## 2020-02-28 NOTE — PROGRESS NOTES
Office: [phone]       Fax: [phone]      Nephrology Progress Note        Patient's Name: Maria Ines Lora  Admit Date: 2/25/2020  Date of Visit: 2/28/2020    Reason for Consult:  ALEA    Subjective:      Maria Ines Lora is a 72 y.o. male with PMHx of hypertension, diabetes mellitus, coronary artery disease, COPD, substance abuse (cocaine in past)  who was hospitalized on 2/25/2020 with complaints of diarrhea. Reported watery diarrhea for 3-4 days. Having dizziness. No nausea or vomiting. No recent travel. Denied abdominal pain. Initial blood pressure in ED noted in 70-80s. Creat 1.5  Reported low UO at home. In ED had st cath done that yielded about 100 ml urine  Had abdominal CT scan done in ED with iv contrast  Patient has history of refractory hypertension and was previously hospitalized for hypertensive crisis about 2 wks ago. Interval History  Has diarrhea, refused imodium  BP still high  No headache, chest pain      Medications:    Allergies:  Naproxen    Scheduled Meds:   metoprolol  100 mg Oral BID    spironolactone  25 mg Oral Once    hydrALAZINE  100 mg Oral 3 times per day    doxazosin  2 mg Oral 2 times per day    hydroCHLOROthiazide  25 mg Oral Daily    lisinopril  40 mg Oral Daily    loperamide  4 mg Oral Once    HYDROcodone 5 mg - acetaminophen  1 tablet Oral Once    amitriptyline  50 mg Oral BID    aspirin  81 mg Oral Daily    vitamin B-12  500 mcg Oral Daily    Vitamin D  1,000 Units Oral Daily    sodium chloride flush  10 mL Intravenous 2 times per day    enoxaparin  40 mg Subcutaneous Daily    zolpidem  10 mg Oral Nightly     Continuous Infusions:      Labs:  CBC:   Recent Labs     02/25/20  1115 02/26/20  0747   WBC 6.8 5.3   HGB 12.8* 11.8*    158     Ca/Mg/Phos:   Recent Labs     02/25/20  1115 02/26/20  0747

## 2020-02-28 NOTE — PROGRESS NOTES
Imaging notified this nurse regarding ordered MRI, states testing must be done on outpatient basis [facility]

## 2020-02-28 NOTE — PROGRESS NOTES
in place: Yes  Type of devices: Left in bed;Nurse notified;Call light within reach           Patient Diagnosis(es): The primary encounter diagnosis was Dehydration. Diagnoses of Diarrhea, unspecified type, ALEA (acute kidney injury) ([facility] [address] and Elevated troponin were also pertinent to this visit. has a past medical history of CAD (coronary artery disease), Chest pain, Cocaine abuse ([facility] [address] COPD (chronic obstructive pulmonary disease) ([facility] [address] Diabetes mellitus ([facility] [address] Hyperlipidemia, Hypertension, Kidney stone, MRSA colonization, and Sleep apnea. has a past surgical history that includes Cholecystectomy; Shoulder arthroscopy; Colonoscopy; Coronary angioplasty with stent; Lithotripsy; Abdomen surgery; fracture surgery; Cardiac surgery; and vascular surgery. Restrictions  Restrictions/Precautions  Restrictions/Precautions: Contact Precautions    Subjective   General  Chart Reviewed: Yes  Patient assessed for rehabilitation services?: Yes  Additional Pertinent Hx: per MD note, Efrain Bean is a 72 y.o. male who presents to the ED complaining of dizziness and hypotension over the past day and a half. Patient states he noticed that he was getting dizzy when ambulating and nearly passing out. States he was checking his blood pressure at home over the past 2 days and has been consistently near 70 systolic. Denies any associated chest pain. Denies any nausea or vomiting. No abdominal pain. States he has been having diarrhea consistently over the past month or so. Denies any black or melanotic stools. Denies any difficulty urinating or increase in frequency of urination. Denies any hematuria. No bright red blood per rectum. States he is tolerating oral intake and thought he drank a large amount of fluids yesterday but still felt dizzy and was hypotensive today and therefore came to the ED.   Family / Caregiver Present: No  Referring Practitioner: Migel Erickson MD  Subjective  Subjective: Pt supine in Independent  Sit to Supine: Independent  Scooting: Independent  Transfers  Sit to stand: Independent  Stand to sit:  Independent     Cognition  Overall Cognitive Status: WFL        Sensation  Overall Sensation Status: WFL(B UE WFL; pt reports some numbness in L UE after sleeping)        LUE AROM (degrees)  LUE AROM : WFL  RUE AROM (degrees)  RUE AROM : WFL  LUE Strength  Gross LUE Strength: WFL  L Hand General: 4+/5  LUE Strength Comment: generall 4+/5 throughout; shoulder not formally assessed but full ROM against gravity  RUE Strength  Gross RUE Strength: WFL  R Hand General: 4+/5  RUE Strength Comment: generally 4+/5 throughout                   Plan   Plan  Times per week: D/C from OT      AM-PAC Score        AM-PAC Inpatient Daily Activity Raw Score: 24 (02/28/20 1538)  AM-PAC Inpatient ADL T-Scale Score : 57.54 (02/28/20 1538)  ADL Inpatient CMS 0-100% Score: 0 (02/28/20 1538)  ADL Inpatient CMS G-Code Modifier : [address] (02/28/20 1538)    Goals  Short term goals  Time Frame for Short term goals: no ongoing OT needs  Short term goal 1: complete functional mobility and ADLs Ind- goal met 2/28  Patient Goals   Patient goals : return home       Therapy Time   Individual Concurrent Group Co-treatment   Time In 1512         Time Out 1535         Minutes 23         Timed Code Treatment Minutes: 8 Minutes(15 minute eval)       MARIXA Orellana/MAGNOLIA

## 2020-02-28 NOTE — CONSULTS
[facility] Orthopedic Surgery  Consult Note    This patient is seen in consultation at the request of Dr Kunal Waggoner    Reason for Consult:  Left shoulder pain    CHIEF COMPLAINT:  Left shoulder pain    History Obtained From:  patient, electronic medical record    HISTORY OF PRESENT ILLNESS:    The patient is a 72 y.o. male who presents with left shoulder pain. Pain is described in left shoulder and with the intensity of moderate. Pain is described as aching, shooting. Discomfort is intermittent. Pain is worse with flexion of neck or turning head to the left side. He states pain is intermittent. He reports had left shoulder surgery in the past for distal clavicle resection. No recent overuse of fall on left shoulder or arm. No other complaints. Pt is alert , oriented and calm. Past Medical History:        Diagnosis Date    CAD (coronary artery disease)     Chest pain 10/15/2017    Cocaine abuse ([facility] [address]     COPD (chronic obstructive pulmonary disease) ([facility] [address]     Diabetes mellitus ([facility] [address]     Hyperlipidemia     Hypertension     Kidney stone     MRSA colonization 4/16/19; 03/24/2019    Sleep apnea     He said he doesn't use a CPAP machine.        Past Surgical History:        Procedure Laterality Date    ABDOMEN SURGERY      CARDIAC SURGERY      3 STENTS PLACED     CHOLECYSTECTOMY      COLONOSCOPY      CORONARY ANGIOPLASTY WITH STENT PLACEMENT      3 stents     FRACTURE SURGERY      RIGHT ANKLE    LITHOTRIPSY      SHOULDER ARTHROSCOPY      VASCULAR SURGERY         Social History     Tobacco Use    Smoking status: Former Smoker     Packs/day: 2.50     Years: 40.00     Pack years: 100.00     Types: Cigarettes    Smokeless tobacco: Never Used   Substance Use Topics    Alcohol use: Never     Frequency: Never       Family History   Problem Relation Age of Onset    High Blood Pressure Mother     Cancer Father         lung cancer    Diabetes Brother     Liver Disease Sister     Diabetes Brother            Current

## 2020-02-28 NOTE — PROGRESS NOTES
Physical Therapy    Facility/Department: [address] PROGRESSIVE CARE  Initial Assessment    NAME: Matthew Lamas  : 1955  MRN: [id number]    Date of Service: 2020    Discharge Recommendations:  Home with assist PRN   PT Equipment Recommendations  Equipment Needed: No  Matthew Lamas scored a 23/24 on the AM-PAC short mobility form. At this time, no further PT is recommended upon discharge due to pt is safe to discharge home with family. Recommend patient returns to prior setting with prior services. Assessment   Body structures, Functions, Activity limitations: Decreased functional mobility   Assessment: Pt presents today with mildly dereased endurance but safe for all room mobility and transfers without an AD. Pt is safe to D/C home with no further PT when medically ready. Pt agrees. Treatment Diagnosis: decreased functional mobility following dehydration and hypotension  Prognosis: Excellent  Decision Making: Low Complexity  History: see above  Exam: see above  Clinical Presentation: evolving  PT Education: General Safety;Plan of Care  Barriers to Learning: none noted  No Skilled PT: Safe to return home  REQUIRES PT FOLLOW UP: No  Activity Tolerance  Activity Tolerance: Patient Tolerated treatment well;Patient limited by endurance       Patient Diagnosis(es): The primary encounter diagnosis was Dehydration. Diagnoses of Diarrhea, unspecified type, ALEA (acute kidney injury) ([address] and Elevated troponin were also pertinent to this visit. has a past medical history of CAD (coronary artery disease), Chest pain, Cocaine abuse ([address] COPD (chronic obstructive pulmonary disease) ([address] Diabetes mellitus ([address] Hyperlipidemia, Hypertension, Kidney stone, MRSA colonization, and Sleep apnea. has a past surgical history that includes Cholecystectomy; Shoulder arthroscopy; Colonoscopy; Coronary angioplasty with stent; Lithotripsy;  Abdomen surgery; fracture surgery; Cardiac surgery; and vascular surgery.     Restrictions  Restrictions/Precautions  Restrictions/Precautions: Contact Precautions  Vision/Hearing  Vision: Impaired  Vision Exceptions: Wears glasses at all times  Hearing: Within functional limits     Subjective  General  Chart Reviewed: Yes  Patient assessed for rehabilitation services?: Yes  Additional Pertinent Hx: Pt with low BP/ dehydration  Response To Previous Treatment: Not applicable  Family / Caregiver Present: No  Referring Practitioner: Debi Kowalski  Referral Date : 02/28/20  Diagnosis: hypovolemia/ hypotension  Follows Commands: Within Functional Limits  General Comment  Comments: pt reports pain and burning in L shoulder and down L arm  Pain Screening  Patient Currently in Pain: Yes  Vital Signs  Patient Currently in Pain: Yes       Orientation  Orientation  Overall Orientation Status: Within Functional Limits  Social/Functional History  Social/Functional History  Lives With: Family(brother and sister in law)  Type of Home: House  Home Layout: Two level, Able to Live on Main level with bedroom/bathroom, Laundry in basement  Home Access: Stairs to enter without rails  Entrance Stairs - Number of Steps: 2  Bathroom Shower/Tub: Walk-in shower  Bathroom Toilet: Standard  ADL Assistance: Independent  Homemaking Assistance: Independent  Homemaking Responsibilities: Yes  Ambulation Assistance: Independent  Transfer Assistance: Independent  Active : Yes  Occupation: Retired, Full time employment  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 [address]  Overall Cognitive Status: WFL    Objective          AROM RLE (degrees)  RLE AROM: WFL  AROM LLE (degrees)  LLE AROM : WFL  Strength RLE  Strength RLE: WNL  Strength LLE  Strength LLE: WNL  Tone RLE  RLE Tone: Normotonic  Tone LLE  LLE Tone: Normotonic  Motor Control  Gross Motor?: WNL  Coordination  Rapid Alternating Movements: Normal  Heel to Shin: Normal  Sensation  Overall Sensation Status: WNL(B LE lt touch)  Bed mobility  Rolling to Left: Independent  Rolling to Right: Independent  Supine to Sit: Independent  Sit to Supine: Independent  Comment: pain in L shoulder  Transfers  Sit to Stand: Independent  Stand to sit: Independent  Ambulation  Ambulation?: Yes  More Ambulation?: No  Ambulation 1  Surface: level tile  Device: No Device  Assistance: Supervision; Independent  Quality of Gait: quick pace, no LOB  Gait Deviations: None  Distance: 100' in room,  before walking, 120 after walking, no SOB noted     Balance  Posture: Good  Sitting - Static: Good  Sitting - Dynamic: Good  Standing - Static: Good  Standing - Dynamic: Good  Exercises  Hip Flexion: 10  Knee Long Arc Quad: 10  Ankle Pumps: 10, ankle circles x 10     Plan   Plan  Times per week: no further PT needed  Safety Devices  Type of devices:  All fall risk precautions in place, Gait belt, Left in bed    AM-PAC Score  AM-PAC Inpatient Mobility Raw Score : 23 (02/28/20 1530)  AM-PAC Inpatient T-Scale Score : 56.93 (02/28/20 1530)  Mobility Inpatient CMS 0-100% Score: 11.2 (02/28/20 1530)  Mobility Inpatient CMS G-Code Modifier : CI (02/28/20 1530)     Goals  Patient Goals   Patient goals : no further goals needed, pt safe to d/c home       Therapy Time   Individual Concurrent Group Co-treatment   Time In 1513         Time Out 1538         Minutes 25         Timed Code Treatment Minutes: 25 Minutes     This eval will serve as the d/c summary  Eugenia Bui, [address] [facility]

## 2020-02-29 LAB
ALBUMIN SERPL-MCNC: 3.8 G/DL (ref 3.4–5)
ANION GAP SERPL CALCULATED.3IONS-SCNC: 16 MMOL/L (ref 3–16)
BUN BLDV-MCNC: 22 MG/DL (ref 7–20)
CALCIUM SERPL-MCNC: 8.5 MG/DL (ref 8.3–10.6)
CHLORIDE BLD-SCNC: 96 MMOL/L (ref 99–110)
CO2: 24 MMOL/L (ref 21–32)
CREAT SERPL-MCNC: 1 MG/DL (ref 0.8–1.3)
GFR AFRICAN AMERICAN: >60
GFR NON-AFRICAN AMERICAN: >60
GLUCOSE BLD-MCNC: 273 MG/DL (ref 70–99)
HCT VFR BLD CALC: 38.4 % (ref 40.5–52.5)
HEMOGLOBIN: 12.7 G/DL (ref 13.5–17.5)
MAGNESIUM: 1.5 MG/DL (ref 1.8–2.4)
MCH RBC QN AUTO: 28.6 PG (ref 26–34)
MCHC RBC AUTO-ENTMCNC: 32.9 G/DL (ref 31–36)
MCV RBC AUTO: 86.9 FL (ref 80–100)
PDW BLD-RTO: 13.6 % (ref 12.4–15.4)
PHOSPHORUS: 3.2 MG/DL (ref 2.5–4.9)
PLATELET # BLD: 165 K/UL (ref 135–450)
PMV BLD AUTO: 8.2 FL (ref 5–10.5)
POTASSIUM SERPL-SCNC: 4.4 MMOL/L (ref 3.5–5.1)
RBC # BLD: 4.43 M/UL (ref 4.2–5.9)
SODIUM BLD-SCNC: 136 MMOL/L (ref 136–145)
WBC # BLD: 5.3 K/UL (ref 4–11)

## 2020-02-29 PROCEDURE — 80069 RENAL FUNCTION PANEL: CPT

## 2020-02-29 PROCEDURE — 2060000000 HC ICU INTERMEDIATE R&B

## 2020-02-29 PROCEDURE — 6370000000 HC RX 637 (ALT 250 FOR IP): Performed by: INTERNAL MEDICINE

## 2020-02-29 PROCEDURE — 94760 N-INVAS EAR/PLS OXIMETRY 1: CPT

## 2020-02-29 PROCEDURE — 6360000002 HC RX W HCPCS

## 2020-02-29 PROCEDURE — 2580000003 HC RX 258: Performed by: INTERNAL MEDICINE

## 2020-02-29 PROCEDURE — 36415 COLL VENOUS BLD VENIPUNCTURE: CPT

## 2020-02-29 PROCEDURE — 6360000002 HC RX W HCPCS: Performed by: INTERNAL MEDICINE

## 2020-02-29 PROCEDURE — 2500000003 HC RX 250 WO HCPCS: Performed by: INTERNAL MEDICINE

## 2020-02-29 PROCEDURE — 85027 COMPLETE CBC AUTOMATED: CPT

## 2020-02-29 PROCEDURE — 83735 ASSAY OF MAGNESIUM: CPT

## 2020-02-29 PROCEDURE — 6370000000 HC RX 637 (ALT 250 FOR IP): Performed by: HOSPITALIST

## 2020-02-29 RX ORDER — LABETALOL HYDROCHLORIDE 5 MG/ML
10 INJECTION, SOLUTION INTRAVENOUS EVERY 4 HOURS PRN
Status: DISCONTINUED | OUTPATIENT
Start: 2020-02-29 | End: 2020-03-02 | Stop reason: HOSPADM

## 2020-02-29 RX ORDER — MAGNESIUM SULFATE 1 G/100ML
1 INJECTION INTRAVENOUS
Status: DISPENSED | OUTPATIENT
Start: 2020-02-29 | End: 2020-02-29

## 2020-02-29 RX ORDER — CARVEDILOL 12.5 MG/1
12.5 TABLET ORAL 2 TIMES DAILY WITH MEALS
Status: DISCONTINUED | OUTPATIENT
Start: 2020-02-29 | End: 2020-03-01

## 2020-02-29 RX ORDER — SPIRONOLACTONE 25 MG/1
25 TABLET ORAL DAILY
Status: DISCONTINUED | OUTPATIENT
Start: 2020-02-29 | End: 2020-03-02

## 2020-02-29 RX ORDER — MAGNESIUM SULFATE 1 G/100ML
1 INJECTION INTRAVENOUS ONCE
Status: COMPLETED | OUTPATIENT
Start: 2020-02-29 | End: 2020-02-29

## 2020-02-29 RX ORDER — LABETALOL HYDROCHLORIDE 5 MG/ML
10 INJECTION, SOLUTION INTRAVENOUS ONCE
Status: COMPLETED | OUTPATIENT
Start: 2020-02-29 | End: 2020-02-29

## 2020-02-29 RX ADMIN — AMITRIPTYLINE HYDROCHLORIDE 50 MG: 50 TABLET, FILM COATED ORAL at 09:06

## 2020-02-29 RX ADMIN — HYDRALAZINE HYDROCHLORIDE 100 MG: 50 TABLET, FILM COATED ORAL at 07:07

## 2020-02-29 RX ADMIN — SODIUM CHLORIDE, PRESERVATIVE FREE 10 ML: 5 INJECTION INTRAVENOUS at 09:14

## 2020-02-29 RX ADMIN — ENOXAPARIN SODIUM 40 MG: 40 INJECTION SUBCUTANEOUS at 09:05

## 2020-02-29 RX ADMIN — ZOLPIDEM TARTRATE 10 MG: 5 TABLET ORAL at 21:23

## 2020-02-29 RX ADMIN — MAGNESIUM SULFATE HEPTAHYDRATE 1 G: 1 INJECTION, SOLUTION INTRAVENOUS at 10:24

## 2020-02-29 RX ADMIN — LABETALOL HYDROCHLORIDE 10 MG: 5 INJECTION INTRAVENOUS at 18:15

## 2020-02-29 RX ADMIN — OXYCODONE HYDROCHLORIDE AND ACETAMINOPHEN 1 TABLET: 5; 325 TABLET ORAL at 02:38

## 2020-02-29 RX ADMIN — CARVEDILOL 12.5 MG: 12.5 TABLET, FILM COATED ORAL at 17:22

## 2020-02-29 RX ADMIN — DOXAZOSIN 2 MG: 2 TABLET ORAL at 21:22

## 2020-02-29 RX ADMIN — CARVEDILOL 12.5 MG: 12.5 TABLET, FILM COATED ORAL at 12:04

## 2020-02-29 RX ADMIN — VITAMIN D, TAB 1000IU (100/BT) 1000 UNITS: 25 TAB at 09:05

## 2020-02-29 RX ADMIN — MAGNESIUM SULFATE HEPTAHYDRATE 1 G: 1 INJECTION, SOLUTION INTRAVENOUS at 09:06

## 2020-02-29 RX ADMIN — SODIUM CHLORIDE, PRESERVATIVE FREE 10 ML: 5 INJECTION INTRAVENOUS at 21:23

## 2020-02-29 RX ADMIN — OXYCODONE HYDROCHLORIDE AND ACETAMINOPHEN 1 TABLET: 5; 325 TABLET ORAL at 16:35

## 2020-02-29 RX ADMIN — LISINOPRIL 40 MG: 40 TABLET ORAL at 09:06

## 2020-02-29 RX ADMIN — AMITRIPTYLINE HYDROCHLORIDE 50 MG: 50 TABLET, FILM COATED ORAL at 21:22

## 2020-02-29 RX ADMIN — HYDRALAZINE HYDROCHLORIDE 100 MG: 50 TABLET, FILM COATED ORAL at 21:22

## 2020-02-29 RX ADMIN — ASPIRIN 81 MG 81 MG: 81 TABLET ORAL at 09:06

## 2020-02-29 RX ADMIN — METOPROLOL TARTRATE 125 MG: 50 TABLET, FILM COATED ORAL at 09:14

## 2020-02-29 RX ADMIN — CYANOCOBALAMIN TAB 500 MCG 500 MCG: 500 TAB at 09:06

## 2020-02-29 RX ADMIN — DOXAZOSIN 2 MG: 2 TABLET ORAL at 09:06

## 2020-02-29 RX ADMIN — HYDROCHLOROTHIAZIDE 25 MG: 25 TABLET ORAL at 09:06

## 2020-02-29 RX ADMIN — OXYCODONE HYDROCHLORIDE AND ACETAMINOPHEN 1 TABLET: 5; 325 TABLET ORAL at 10:23

## 2020-02-29 RX ADMIN — MAGNESIUM SULFATE HEPTAHYDRATE 1 G: 1 INJECTION, SOLUTION INTRAVENOUS at 12:10

## 2020-02-29 RX ADMIN — HYDRALAZINE HYDROCHLORIDE 100 MG: 50 TABLET, FILM COATED ORAL at 14:09

## 2020-02-29 RX ADMIN — SPIRONOLACTONE 25 MG: 25 TABLET ORAL at 12:04

## 2020-02-29 ASSESSMENT — PAIN SCALES - GENERAL
PAINLEVEL_OUTOF10: 7
PAINLEVEL_OUTOF10: 7
PAINLEVEL_OUTOF10: 4
PAINLEVEL_OUTOF10: 7
PAINLEVEL_OUTOF10: 7

## 2020-02-29 ASSESSMENT — PAIN DESCRIPTION - PAIN TYPE
TYPE: ACUTE PAIN

## 2020-02-29 ASSESSMENT — PAIN DESCRIPTION - DESCRIPTORS
DESCRIPTORS: SHARP
DESCRIPTORS: DISCOMFORT;NAGGING
DESCRIPTORS: DISCOMFORT;NAGGING

## 2020-02-29 ASSESSMENT — PAIN DESCRIPTION - PROGRESSION
CLINICAL_PROGRESSION: NOT CHANGED

## 2020-02-29 ASSESSMENT — PAIN DESCRIPTION - LOCATION
LOCATION: SHOULDER

## 2020-02-29 ASSESSMENT — PAIN - FUNCTIONAL ASSESSMENT
PAIN_FUNCTIONAL_ASSESSMENT: PREVENTS OR INTERFERES SOME ACTIVE ACTIVITIES AND ADLS

## 2020-02-29 ASSESSMENT — PAIN DESCRIPTION - FREQUENCY
FREQUENCY: CONTINUOUS
FREQUENCY: CONTINUOUS

## 2020-02-29 ASSESSMENT — PAIN DESCRIPTION - ORIENTATION
ORIENTATION: RIGHT;LEFT
ORIENTATION: RIGHT;LEFT
ORIENTATION: LEFT

## 2020-02-29 ASSESSMENT — PAIN DESCRIPTION - ONSET
ONSET: ON-GOING

## 2020-02-29 ASSESSMENT — PAIN DESCRIPTION - DIRECTION
RADIATING_TOWARDS: NECK
RADIATING_TOWARDS: NECK AND ARM
RADIATING_TOWARDS: NECK

## 2020-02-29 ASSESSMENT — PAIN SCALES - WONG BAKER: WONGBAKER_NUMERICALRESPONSE: 0

## 2020-02-29 NOTE — PROGRESS NOTES
hypoperfusion. Had minimal elevation in troponin over the last couple months, had a recent NM negative stress test, no chest pain. 6. Dizziness. Haroldo Gracy to hypotension as noted above. improved.   7. Uncontrolled hypertension, resumed home medications and adjusted, still elevated BP, I will increase metoprolol further, if no improvement, I will add amlodipine. 8. Left shoulder pain, possibly radicular, NS consulted.        Diet: DIET GENERAL;  Code Status: Full Code        Dispo - ongoing management     Eve Doan MD

## 2020-02-29 NOTE — PLAN OF CARE
73 yo M with shoulder pain, possible cervical radic. No imaging. PLAN:  Patient needs MRI Cspine without contrast.   Unable to get this as inpatient per Imaging Department as this is not the reason he was admitted. Patient to get outpatient MRI and followup in clinic. Call [phone] to schedule appointment after MRI completed.

## 2020-02-29 NOTE — PLAN OF CARE
Problem: Falls - Risk of:  Goal: Will remain free from falls  Description  Will remain free from falls  2/29/2020 1142 by Tereso Torres RN  Outcome: Ongoing     Problem: Falls - Risk of:  Goal: Absence of physical injury  Description  Absence of physical injury  2/29/2020 1142 by Tereso Torres RN  Outcome: Ongoing     Problem: Cardiac:  Goal: Ability to maintain vital signs within normal range will improve  Description  Ability to maintain vital signs within normal range will improve  2/29/2020 1142 by Tereso Torres RN  Outcome: Ongoing     Problem: Cardiac:  Goal: Cardiovascular alteration will improve  Description  Cardiovascular alteration will improve  2/29/2020 1142 by Tereso Torres RN  Outcome: Ongoing     Problem: Pain:  Goal: Pain level will decrease  Description  Pain level will decrease  2/29/2020 1142 by Tereso Torres RN  Outcome: Ongoing     Problem: Pain:  Goal: Control of acute pain  Description  Control of acute pain  2/29/2020 1142 by Tereso Torres RN  Outcome: Ongoing

## 2020-02-29 NOTE — PROGRESS NOTES
K 4.1 4.4   CO2 26 24   MG 1.30* 1.50*     2. HTN  -Blood pressure high  -on multiple blood pressure meds at home  -In past PRA, ary have been unremarkable  -Renal doppler in April 2019: No evidence of hemodynamically significant stenosis in either renal artery  -Reported history of obstructive sleep apnea but not on CPAP  -history of cocaine use in past    BP Readings from Last 1 Encounters:   02/29/20 (!) 173/129     2. CAD  -TTE (jan 2020): LVEF 55-60%, mild LVH. No mention about PASP  -medical management     3. DM  -Not on metformin    Lab Results   Component Value Date    LABA1C 7.3 02/12/2020     4. Diarrhea  -stool culture negative     Plan:     -change metoprolol to Carvedilol  -Aldactone 25 mg daily  -Imodium as needed  -monitor lytes    -Monitor I/O, UOP  -Maintain MAP>65  -Avoid nephrotoxin, if able. -Dose meds to current eGFR    Thank you for allowing us to participate in care of [facility] . We will continue to follow. Feel free to contact me with any questions.       Alden Broussard  2/29/2020    [facility] [address]  Office : [phone]  Fax :[phone]

## 2020-03-01 LAB
ANION GAP SERPL CALCULATED.3IONS-SCNC: 14 MMOL/L (ref 3–16)
BUN BLDV-MCNC: 21 MG/DL (ref 7–20)
CALCIUM SERPL-MCNC: 8.7 MG/DL (ref 8.3–10.6)
CHLORIDE BLD-SCNC: 100 MMOL/L (ref 99–110)
CO2: 24 MMOL/L (ref 21–32)
CREAT SERPL-MCNC: 0.8 MG/DL (ref 0.8–1.3)
GFR AFRICAN AMERICAN: >60
GFR NON-AFRICAN AMERICAN: >60
GLUCOSE BLD-MCNC: 260 MG/DL (ref 70–99)
HCT VFR BLD CALC: 36.9 % (ref 40.5–52.5)
HEMOGLOBIN: 12.2 G/DL (ref 13.5–17.5)
MAGNESIUM: 1.6 MG/DL (ref 1.8–2.4)
MCH RBC QN AUTO: 28.7 PG (ref 26–34)
MCHC RBC AUTO-ENTMCNC: 33.1 G/DL (ref 31–36)
MCV RBC AUTO: 86.8 FL (ref 80–100)
PDW BLD-RTO: 13.6 % (ref 12.4–15.4)
PLATELET # BLD: 152 K/UL (ref 135–450)
PMV BLD AUTO: 8.2 FL (ref 5–10.5)
POTASSIUM SERPL-SCNC: 4.4 MMOL/L (ref 3.5–5.1)
RBC # BLD: 4.25 M/UL (ref 4.2–5.9)
SODIUM BLD-SCNC: 138 MMOL/L (ref 136–145)
WBC # BLD: 5.1 K/UL (ref 4–11)

## 2020-03-01 PROCEDURE — 2060000000 HC ICU INTERMEDIATE R&B

## 2020-03-01 PROCEDURE — 2500000003 HC RX 250 WO HCPCS: Performed by: INTERNAL MEDICINE

## 2020-03-01 PROCEDURE — 6370000000 HC RX 637 (ALT 250 FOR IP): Performed by: INTERNAL MEDICINE

## 2020-03-01 PROCEDURE — 6370000000 HC RX 637 (ALT 250 FOR IP): Performed by: HOSPITALIST

## 2020-03-01 PROCEDURE — 85027 COMPLETE CBC AUTOMATED: CPT

## 2020-03-01 PROCEDURE — 83735 ASSAY OF MAGNESIUM: CPT

## 2020-03-01 PROCEDURE — 6360000002 HC RX W HCPCS: Performed by: INTERNAL MEDICINE

## 2020-03-01 PROCEDURE — 2580000003 HC RX 258: Performed by: INTERNAL MEDICINE

## 2020-03-01 PROCEDURE — 36415 COLL VENOUS BLD VENIPUNCTURE: CPT

## 2020-03-01 PROCEDURE — 94760 N-INVAS EAR/PLS OXIMETRY 1: CPT

## 2020-03-01 PROCEDURE — 80048 BASIC METABOLIC PNL TOTAL CA: CPT

## 2020-03-01 RX ORDER — PREDNISONE 20 MG/1
40 TABLET ORAL DAILY
Status: DISCONTINUED | OUTPATIENT
Start: 2020-03-01 | End: 2020-03-02 | Stop reason: HOSPADM

## 2020-03-01 RX ORDER — CARVEDILOL 25 MG/1
25 TABLET ORAL 2 TIMES DAILY WITH MEALS
Status: DISCONTINUED | OUTPATIENT
Start: 2020-03-01 | End: 2020-03-02 | Stop reason: HOSPADM

## 2020-03-01 RX ORDER — LANOLIN ALCOHOL/MO/W.PET/CERES
400 CREAM (GRAM) TOPICAL 2 TIMES DAILY
Status: COMPLETED | OUTPATIENT
Start: 2020-03-01 | End: 2020-03-02

## 2020-03-01 RX ORDER — PREDNISONE 20 MG/1
20 TABLET ORAL DAILY
Status: DISCONTINUED | OUTPATIENT
Start: 2020-03-01 | End: 2020-03-01

## 2020-03-01 RX ORDER — MAGNESIUM SULFATE IN WATER 40 MG/ML
2 INJECTION, SOLUTION INTRAVENOUS ONCE
Status: COMPLETED | OUTPATIENT
Start: 2020-03-01 | End: 2020-03-01

## 2020-03-01 RX ADMIN — SPIRONOLACTONE 25 MG: 25 TABLET ORAL at 08:36

## 2020-03-01 RX ADMIN — LISINOPRIL 40 MG: 40 TABLET ORAL at 08:37

## 2020-03-01 RX ADMIN — DOXAZOSIN 2 MG: 2 TABLET ORAL at 21:06

## 2020-03-01 RX ADMIN — LABETALOL HYDROCHLORIDE 10 MG: 5 INJECTION INTRAVENOUS at 07:43

## 2020-03-01 RX ADMIN — HYDRALAZINE HYDROCHLORIDE 100 MG: 50 TABLET, FILM COATED ORAL at 06:15

## 2020-03-01 RX ADMIN — ASPIRIN 81 MG 81 MG: 81 TABLET ORAL at 08:37

## 2020-03-01 RX ADMIN — LABETALOL HYDROCHLORIDE 10 MG: 5 INJECTION INTRAVENOUS at 15:54

## 2020-03-01 RX ADMIN — CARVEDILOL 12.5 MG: 12.5 TABLET, FILM COATED ORAL at 07:42

## 2020-03-01 RX ADMIN — OXYCODONE HYDROCHLORIDE AND ACETAMINOPHEN 1 TABLET: 5; 325 TABLET ORAL at 00:09

## 2020-03-01 RX ADMIN — HYDRALAZINE HYDROCHLORIDE 100 MG: 50 TABLET, FILM COATED ORAL at 21:06

## 2020-03-01 RX ADMIN — DOXAZOSIN 2 MG: 2 TABLET ORAL at 08:37

## 2020-03-01 RX ADMIN — VITAMIN D, TAB 1000IU (100/BT) 1000 UNITS: 25 TAB at 08:36

## 2020-03-01 RX ADMIN — MAGNESIUM SULFATE HEPTAHYDRATE 2 G: 40 INJECTION, SOLUTION INTRAVENOUS at 11:38

## 2020-03-01 RX ADMIN — LABETALOL HYDROCHLORIDE 10 MG: 5 INJECTION INTRAVENOUS at 21:05

## 2020-03-01 RX ADMIN — CYANOCOBALAMIN TAB 500 MCG 500 MCG: 500 TAB at 08:36

## 2020-03-01 RX ADMIN — PREDNISONE 40 MG: 20 TABLET ORAL at 11:42

## 2020-03-01 RX ADMIN — ZOLPIDEM TARTRATE 10 MG: 5 TABLET ORAL at 21:06

## 2020-03-01 RX ADMIN — Medication 400 MG: at 21:06

## 2020-03-01 RX ADMIN — OXYCODONE HYDROCHLORIDE AND ACETAMINOPHEN 1 TABLET: 5; 325 TABLET ORAL at 16:59

## 2020-03-01 RX ADMIN — SODIUM CHLORIDE, PRESERVATIVE FREE 10 ML: 5 INJECTION INTRAVENOUS at 21:12

## 2020-03-01 RX ADMIN — Medication 400 MG: at 10:26

## 2020-03-01 RX ADMIN — AMITRIPTYLINE HYDROCHLORIDE 50 MG: 50 TABLET, FILM COATED ORAL at 21:06

## 2020-03-01 RX ADMIN — AMITRIPTYLINE HYDROCHLORIDE 50 MG: 50 TABLET, FILM COATED ORAL at 08:36

## 2020-03-01 RX ADMIN — SODIUM CHLORIDE, PRESERVATIVE FREE 10 ML: 5 INJECTION INTRAVENOUS at 08:34

## 2020-03-01 RX ADMIN — HYDROCHLOROTHIAZIDE 25 MG: 25 TABLET ORAL at 08:37

## 2020-03-01 RX ADMIN — HYDRALAZINE HYDROCHLORIDE 100 MG: 50 TABLET, FILM COATED ORAL at 13:42

## 2020-03-01 RX ADMIN — CARVEDILOL 25 MG: 25 TABLET, FILM COATED ORAL at 16:57

## 2020-03-01 ASSESSMENT — PAIN DESCRIPTION - ORIENTATION
ORIENTATION: LEFT
ORIENTATION: LEFT

## 2020-03-01 ASSESSMENT — PAIN DESCRIPTION - ONSET
ONSET: ON-GOING
ONSET: ON-GOING

## 2020-03-01 ASSESSMENT — PAIN DESCRIPTION - DIRECTION
RADIATING_TOWARDS: NECK AND ARM
RADIATING_TOWARDS: NECK

## 2020-03-01 ASSESSMENT — PAIN SCALES - WONG BAKER
WONGBAKER_NUMERICALRESPONSE: 0
WONGBAKER_NUMERICALRESPONSE: 0

## 2020-03-01 ASSESSMENT — PAIN DESCRIPTION - FREQUENCY: FREQUENCY: INTERMITTENT

## 2020-03-01 ASSESSMENT — PAIN DESCRIPTION - LOCATION
LOCATION: SHOULDER
LOCATION: SHOULDER

## 2020-03-01 ASSESSMENT — PAIN DESCRIPTION - DESCRIPTORS
DESCRIPTORS: THROBBING
DESCRIPTORS: SHARP

## 2020-03-01 ASSESSMENT — PAIN SCALES - GENERAL
PAINLEVEL_OUTOF10: 7
PAINLEVEL_OUTOF10: 0
PAINLEVEL_OUTOF10: 5
PAINLEVEL_OUTOF10: 7
PAINLEVEL_OUTOF10: 4

## 2020-03-01 ASSESSMENT — PAIN DESCRIPTION - PAIN TYPE
TYPE: ACUTE PAIN
TYPE: ACUTE PAIN

## 2020-03-01 ASSESSMENT — PAIN DESCRIPTION - PROGRESSION
CLINICAL_PROGRESSION: GRADUALLY IMPROVING
CLINICAL_PROGRESSION: NOT CHANGED

## 2020-03-01 NOTE — PROGRESS NOTES
Office: [phone]       Fax: [phone]      Nephrology Progress Note        Patient's Name: Leyda Goodman  Admit Date: 2/25/2020  Date of Visit: 3/1/2020    Reason for Consult:  ALEA    Subjective:      Leyda Goodman is a 72 y.o. male with PMHx of hypertension, diabetes mellitus, coronary artery disease, COPD, substance abuse (cocaine in past)  who was hospitalized on 2/25/2020 with complaints of diarrhea. Reported watery diarrhea for 3-4 days. Having dizziness. No nausea or vomiting. No recent travel. Denied abdominal pain. Initial blood pressure in ED noted in 70-80s. Creat 1.5  Reported low UO at home. In ED had st cath done that yielded about 100 ml urine  Had abdominal CT scan done in ED with iv contrast  Patient has history of refractory hypertension and was previously hospitalized for hypertensive crisis about 2 wks ago. Interval History  complains of left shoulder pain  BP improving  No headache, chest pain      Medications:    Allergies:  Naproxen    Scheduled Meds:   spironolactone  25 mg Oral Daily    carvedilol  12.5 mg Oral BID WC    hydrALAZINE  100 mg Oral 3 times per day    doxazosin  2 mg Oral 2 times per day    hydroCHLOROthiazide  25 mg Oral Daily    lisinopril  40 mg Oral Daily    HYDROcodone 5 mg - acetaminophen  1 tablet Oral Once    amitriptyline  50 mg Oral BID    aspirin  81 mg Oral Daily    vitamin B-12  500 mcg Oral Daily    Vitamin D  1,000 Units Oral Daily    sodium chloride flush  10 mL Intravenous 2 times per day    enoxaparin  40 mg Subcutaneous Daily    zolpidem  10 mg Oral Nightly     Continuous Infusions:      Labs:  CBC:   Recent Labs     02/29/20  0648 03/01/20  0802   WBC 5.3 5.1   HGB 12.7* 12.2*    152     Ca/Mg/Phos:   Recent Labs     02/28/20  0947 02/29/20  0648 03/01/20  0802   CALCIUM 9.0 8.5 8.7   MG 1.30* 1.50* 1.60*   PHOS 2.9 3.2  --          Objective:   Vitals: BP (!) 153/69   Pulse 96   Temp 97.6 °F (36.4 °C) (Oral)   Resp 16   Ht 5' 11\" (1.803 m)   Wt 246 lb 0.5 oz (111.6 kg)   SpO2 95%   BMI 34.31 kg/m²    Wt Readings from Last 3 Encounters:   03/01/20 246 lb 0.5 oz (111.6 kg)   02/13/20 243 lb 6.4 oz (110.4 kg)   01/10/20 238 lb 8.6 oz (108.2 kg)      24HR INTAKE/OUTPUT:      Intake/Output Summary (Last 24 hours) at 3/1/2020 0936  Last data filed at 3/1/2020 0600  Gross per 24 hour   Intake 600 ml   Output --   Net 600 ml     Constitutional:  awake, NAD  HEENT:  MMM, No icterus  Neck: no bruits, No JVD  Cardiovascular:  S1, S2 reg  Respiratory: CTA, no crackles  Abdomen:  +BS, soft, NT, ND  Ext: no lower extremity edema  Skin: dry/intact  CNS: alert, no agitation    IMAGING:  XR SHOULDER LEFT (MIN 2 VIEWS)   Final Result   No acute abnormality of the left shoulder. CT ABDOMEN PELVIS W IV CONTRAST Additional Contrast? None   Final Result   No acute abdominal or pelvic abnormality. Diffuse hepatic steatosis. Uncomplicated colonic diverticulosis. XR CHEST STANDARD (2 VW)   Final Result   No evidence of acute cardiopulmonary disease. Assessment :     1. ALEA  -Non-Oliguric  -Baseline creat: 0.8-1.1 Now 1.5->0.9  -UA concentrated, hyaline cast  -Volume: Euvolemic  -Electrolytes: Hypomagnesemia and No Dyskalemia  -Acid-Base: bicarb 24   -75 ml iv contrast used 2/25/2020   -ALEA in the setting of hypotension, volume loss. ALEA resolving    Recent Labs     02/28/20  0947 02/29/20  0648 03/01/20  0802   BUN 21* 22* 21*   CREATININE 0.9 1.0 0.8     Recent Labs     02/28/20  0947 02/29/20  0648 03/01/20  0802    136 138   K 4.1 4.4 4.4   CO2 26 24 24   MG 1.30* 1.50* 1.60*     2.  HTN  -Blood pressure high  -on multiple blood pressure meds at home  -In past PRA, ary have been unremarkable  -Renal doppler in April 2019: No evidence of hemodynamically

## 2020-03-01 NOTE — PROGRESS NOTES
oriented  Capillary Refill: Brisk,< 3 seconds   Peripheral Pulses: +2 palpable, equal bilaterally       Labs:   Recent Labs     02/29/20  0648 03/01/20  0802   WBC 5.3 5.1   HGB 12.7* 12.2*   HCT 38.4* 36.9*    152     Recent Labs     02/28/20  0947 02/29/20  0648 03/01/20  0802    136 138   K 4.1 4.4 4.4   CL 97* 96* 100   CO2 26 24 24   BUN 21* 22* 21*   CREATININE 0.9 1.0 0.8   CALCIUM 9.0 8.5 8.7   PHOS 2.9 3.2  --      No results for input(s): AST, ALT, BILIDIR, BILITOT, ALKPHOS in the last 72 hours. No results for input(s): INR in the last 72 hours. No results for input(s): Orgas Millin in the last 72 hours. Urinalysis:      Lab Results   Component Value Date    NITRU Negative 02/25/2020    WBCUA 2 02/25/2020    BACTERIA Rare 01/19/2018    RBCUA 4 02/25/2020    BLOODU Negative 02/25/2020    SPECGRAV >1.030 02/25/2020    GLUCOSEU Negative 02/25/2020       Radiology:  XR SHOULDER LEFT (MIN 2 VIEWS)   Final Result   No acute abnormality of the left shoulder. CT ABDOMEN PELVIS W IV CONTRAST Additional Contrast? None   Final Result   No acute abdominal or pelvic abnormality. Diffuse hepatic steatosis. Uncomplicated colonic diverticulosis. XR CHEST STANDARD (2 VW)   Final Result   No evidence of acute cardiopulmonary disease. Assessment/Plan:    Active Hospital Problems    Diagnosis    Acute pain of left shoulder [M25.512]    Arterial hypotension [I95.9]     1. Arterial hypotension.  Likely secondary to severe dehydration in the setting of multiple antihypertensive meds on presentation (Cardura, hydrochlorothiazide, lisinopril, metoprolol, hydralazine). and diarrhea, improved now. 2. Severe dehydration.  Likely secondary to GI losses, as diarrhea. received Iv fluids, tolerating po intake for now. 3. Acute kidney injury, likely prerenal improving. 4. Acute enterocolitis, likely viral.  still having some diarrhea, but improved.    5. Elevated troponin of 0.02.  Likely secondary to underlying ALEA and tissue hypoperfusion. Had minimal elevation in troponin over the last couple months, had a recent NM negative stress test, no chest pain. 6. Dizziness. Ottie Elva to hypotension as noted above. improved.   7. Uncontrolled hypertension, resumed home medications and adjusted, still elevated BP, metoprolol changed to coreg yesterday, some improvement, will increase coreg and monitor. 8. Left shoulder pain, possibly radicular, NS consulted. Discussed with Dr Kyara Scherer, will start po prednisone and patient will need to follow up with Dr Kyara Scherer as outpatient for further management.        Diet: DIET GENERAL;  Code Status: Full Code        Dispo - hopefully home in am     Christi Espino MD

## 2020-03-02 VITALS
TEMPERATURE: 98.3 F | OXYGEN SATURATION: 92 % | RESPIRATION RATE: 18 BRPM | HEART RATE: 77 BPM | HEIGHT: 71 IN | WEIGHT: 245.37 LBS | BODY MASS INDEX: 34.35 KG/M2 | DIASTOLIC BLOOD PRESSURE: 85 MMHG | SYSTOLIC BLOOD PRESSURE: 165 MMHG

## 2020-03-02 LAB
HCT VFR BLD CALC: 38.6 % (ref 40.5–52.5)
HEMOGLOBIN: 12.5 G/DL (ref 13.5–17.5)
MAGNESIUM: 1.6 MG/DL (ref 1.8–2.4)
MCH RBC QN AUTO: 28.1 PG (ref 26–34)
MCHC RBC AUTO-ENTMCNC: 32.4 G/DL (ref 31–36)
MCV RBC AUTO: 86.7 FL (ref 80–100)
PDW BLD-RTO: 14 % (ref 12.4–15.4)
PLATELET # BLD: 163 K/UL (ref 135–450)
PMV BLD AUTO: 8.2 FL (ref 5–10.5)
RBC # BLD: 4.45 M/UL (ref 4.2–5.9)
WBC # BLD: 7.5 K/UL (ref 4–11)

## 2020-03-02 PROCEDURE — 2580000003 HC RX 258: Performed by: INTERNAL MEDICINE

## 2020-03-02 PROCEDURE — 83735 ASSAY OF MAGNESIUM: CPT

## 2020-03-02 PROCEDURE — 85027 COMPLETE CBC AUTOMATED: CPT

## 2020-03-02 PROCEDURE — 6370000000 HC RX 637 (ALT 250 FOR IP): Performed by: INTERNAL MEDICINE

## 2020-03-02 PROCEDURE — 6370000000 HC RX 637 (ALT 250 FOR IP): Performed by: HOSPITALIST

## 2020-03-02 PROCEDURE — 2500000003 HC RX 250 WO HCPCS: Performed by: INTERNAL MEDICINE

## 2020-03-02 PROCEDURE — 6360000002 HC RX W HCPCS: Performed by: INTERNAL MEDICINE

## 2020-03-02 PROCEDURE — 36415 COLL VENOUS BLD VENIPUNCTURE: CPT

## 2020-03-02 RX ORDER — SPIRONOLACTONE 25 MG/1
25 TABLET ORAL 2 TIMES DAILY
Status: DISCONTINUED | OUTPATIENT
Start: 2020-03-02 | End: 2020-03-02 | Stop reason: HOSPADM

## 2020-03-02 RX ORDER — SPIRONOLACTONE 25 MG/1
25 TABLET ORAL DAILY
Qty: 30 TABLET | Refills: 0 | Status: ON HOLD | OUTPATIENT
Start: 2020-03-03 | End: 2020-03-23 | Stop reason: HOSPADM

## 2020-03-02 RX ORDER — ACETAMINOPHEN 500 MG
500 TABLET ORAL EVERY 6 HOURS PRN
Qty: 40 TABLET | Refills: 0 | Status: SHIPPED | OUTPATIENT
Start: 2020-03-02 | End: 2020-08-09

## 2020-03-02 RX ORDER — PREDNISONE 20 MG/1
20 TABLET ORAL DAILY
Qty: 4 TABLET | Refills: 0 | Status: SHIPPED | OUTPATIENT
Start: 2020-03-03 | End: 2020-03-07

## 2020-03-02 RX ORDER — CARVEDILOL 25 MG/1
25 TABLET ORAL 2 TIMES DAILY WITH MEALS
Qty: 60 TABLET | Refills: 0 | Status: ON HOLD | OUTPATIENT
Start: 2020-03-02 | End: 2020-10-06 | Stop reason: HOSPADM

## 2020-03-02 RX ORDER — TRAMADOL HYDROCHLORIDE 50 MG/1
50 TABLET ORAL EVERY 8 HOURS PRN
Qty: 9 TABLET | Refills: 0 | Status: SHIPPED | OUTPATIENT
Start: 2020-03-02 | End: 2020-03-05

## 2020-03-02 RX ADMIN — Medication 400 MG: at 08:24

## 2020-03-02 RX ADMIN — HYDRALAZINE HYDROCHLORIDE 100 MG: 50 TABLET, FILM COATED ORAL at 13:56

## 2020-03-02 RX ADMIN — OXYCODONE HYDROCHLORIDE AND ACETAMINOPHEN 1 TABLET: 5; 325 TABLET ORAL at 01:18

## 2020-03-02 RX ADMIN — CARVEDILOL 25 MG: 25 TABLET, FILM COATED ORAL at 08:24

## 2020-03-02 RX ADMIN — MAGNESIUM SULFATE HEPTAHYDRATE 3 G: 500 INJECTION, SOLUTION INTRAMUSCULAR; INTRAVENOUS at 09:49

## 2020-03-02 RX ADMIN — ENOXAPARIN SODIUM 40 MG: 40 INJECTION SUBCUTANEOUS at 08:24

## 2020-03-02 RX ADMIN — DOXAZOSIN 2 MG: 2 TABLET ORAL at 08:24

## 2020-03-02 RX ADMIN — LISINOPRIL 40 MG: 40 TABLET ORAL at 08:24

## 2020-03-02 RX ADMIN — ASPIRIN 81 MG 81 MG: 81 TABLET ORAL at 08:24

## 2020-03-02 RX ADMIN — SPIRONOLACTONE 25 MG: 25 TABLET ORAL at 08:24

## 2020-03-02 RX ADMIN — OXYCODONE HYDROCHLORIDE AND ACETAMINOPHEN 1 TABLET: 5; 325 TABLET ORAL at 08:30

## 2020-03-02 RX ADMIN — CYANOCOBALAMIN TAB 500 MCG 500 MCG: 500 TAB at 08:24

## 2020-03-02 RX ADMIN — HYDRALAZINE HYDROCHLORIDE 100 MG: 50 TABLET, FILM COATED ORAL at 06:01

## 2020-03-02 RX ADMIN — HYDROCHLOROTHIAZIDE 25 MG: 25 TABLET ORAL at 08:24

## 2020-03-02 RX ADMIN — LABETALOL HYDROCHLORIDE 10 MG: 5 INJECTION INTRAVENOUS at 01:12

## 2020-03-02 RX ADMIN — SODIUM CHLORIDE, PRESERVATIVE FREE 10 ML: 5 INJECTION INTRAVENOUS at 08:25

## 2020-03-02 RX ADMIN — VITAMIN D, TAB 1000IU (100/BT) 1000 UNITS: 25 TAB at 08:24

## 2020-03-02 RX ADMIN — PREDNISONE 40 MG: 20 TABLET ORAL at 08:24

## 2020-03-02 RX ADMIN — AMITRIPTYLINE HYDROCHLORIDE 50 MG: 50 TABLET, FILM COATED ORAL at 08:24

## 2020-03-02 ASSESSMENT — PAIN DESCRIPTION - FREQUENCY
FREQUENCY: CONTINUOUS
FREQUENCY: CONTINUOUS
FREQUENCY: INTERMITTENT
FREQUENCY: CONTINUOUS

## 2020-03-02 ASSESSMENT — PAIN DESCRIPTION - ORIENTATION
ORIENTATION: LEFT

## 2020-03-02 ASSESSMENT — PAIN DESCRIPTION - PAIN TYPE
TYPE: ACUTE PAIN

## 2020-03-02 ASSESSMENT — PAIN SCALES - WONG BAKER
WONGBAKER_NUMERICALRESPONSE: 0

## 2020-03-02 ASSESSMENT — PAIN DESCRIPTION - DESCRIPTORS
DESCRIPTORS: SHOOTING;STABBING
DESCRIPTORS: STABBING
DESCRIPTORS: ACHING;DISCOMFORT
DESCRIPTORS: STABBING

## 2020-03-02 ASSESSMENT — PAIN DESCRIPTION - ONSET
ONSET: ON-GOING

## 2020-03-02 ASSESSMENT — PAIN DESCRIPTION - LOCATION
LOCATION: SHOULDER

## 2020-03-02 ASSESSMENT — PAIN - FUNCTIONAL ASSESSMENT
PAIN_FUNCTIONAL_ASSESSMENT: PREVENTS OR INTERFERES SOME ACTIVE ACTIVITIES AND ADLS

## 2020-03-02 ASSESSMENT — PAIN DESCRIPTION - PROGRESSION
CLINICAL_PROGRESSION: GRADUALLY WORSENING
CLINICAL_PROGRESSION: NOT CHANGED
CLINICAL_PROGRESSION: GRADUALLY WORSENING
CLINICAL_PROGRESSION: GRADUALLY IMPROVING

## 2020-03-02 ASSESSMENT — PAIN SCALES - GENERAL
PAINLEVEL_OUTOF10: 7
PAINLEVEL_OUTOF10: 7
PAINLEVEL_OUTOF10: 4
PAINLEVEL_OUTOF10: 0
PAINLEVEL_OUTOF10: 7

## 2020-03-02 ASSESSMENT — PAIN DESCRIPTION - DIRECTION: RADIATING_TOWARDS: NECK

## 2020-03-02 NOTE — CARE COORDINATION
INITIAL CASE MANAGEMENT ASSESSMENT (follow up)     PT/OT Recs: Patient was assessed by PT/OT on  2/28 and is not in need of therapy gong home. PLAN/COMMENTS:   1) Discharge home to family. Brother will transport today. Patient does not appear to have additional psychosocial needs athis time. pt to return home with his brother at [place]. Patient has a a home care list and COA info from last admission.       Respectfully submitted,    NOELLE Magaña  [facility]   [phone]    Electronically signed by NOELLE Arguelles on 3/2/2020 at 11:24 AM

## 2020-03-02 NOTE — PROGRESS NOTES
doppler in April 2019: No evidence of hemodynamically significant stenosis in either renal artery  -Reported history of obstructive sleep apnea but not on CPAP  -history of cocaine use in past  -metoprolol changed to coreg    BP Readings from Last 1 Encounters:   03/02/20 (!) 150/90     2. CAD  -TTE (jan 2020): LVEF 55-60%, mild LVH. No mention about PASP  -medical management     3. DM  -Not on metformin    Lab Results   Component Value Date    LABA1C 7.3 02/12/2020     4. Diarrhea  -stool culture negative     Plan:     -Increase Aldactone 25 mg bid  -avoid PPI  -supplement Mg  -Imodium as needed  -monitor lytes    -Monitor I/O, UOP  -Maintain MAP>65  -Avoid nephrotoxin, if able. -Dose meds to current eGFR    Thank you for allowing us to participate in care of [facility] . We will continue to follow. Feel free to contact me with any questions.       Alden Broussard  3/2/2020    [facility]  Office : [phone]  Fax :[phone]

## 2020-03-02 NOTE — DISCHARGE SUMMARY
Hospital Medicine Discharge Summary    Patient ID: Tiffany Phillips      Patient's PCP: Bryce Driscoll MD    Admit Date: 2/25/2020     Discharge Date:   03/02/2020    Admitting Physician: Alvina Serrano MD     Discharge Physician: Dilcia Benítez MD     Discharge Diagnoses: Active Hospital Problems    Diagnosis    Acute pain of left shoulder [M25.512]    Arterial hypotension [I95.9]       The patient was seen and examined on day of discharge and this discharge summary is in conjunction with any daily progress note from day of discharge. Hospital Course:     From HPI:\"This is a pleasant 72 y.o. male with history of CAD, essential hypertension, hyperlipidemia, who was recently admitted for hypertensive crisis, on multiple antihypertensive medications (Cardura, lisinopril, hydralazine, metoprolol, and hydrochlorothiazide), who presents to the emergency room with complaints of generalized weakness, dizziness, low blood pressure that has been ongoing for the past couple of days. He reports multiple episodes of watery stool that has been ongoing for the past 4 days. No fever or chills. He reports no specific left lower quadrant abdominal pain. He has nonspecific diffuse body aches, mostly in bilateral shoulders and chest.  On presentation to the emergency room, he had blood pressure of 42/42 and systolic blood pressure was in the 80s at the time of my evaluation (after 2 L fluid bolus). \"      1. Arterial hypotension.  Likely secondary to severe dehydration in the setting of multiple antihypertensive meds on presentation (Cardura, hydrochlorothiazide, lisinopril, metoprolol, hydralazine). and diarrhea, improved now. 2. Severe dehydration.  Likely secondary to GI losses, as diarrhea. received Iv fluids, tolerating po intake for now.   3. Acute kidney injury, likely prerenal improving. 4. Acute enterocolitis, likely viral. Improving.    5. Elevated troponin of 0.02.  Likely secondary to underlying ALEA and tissue hypoperfusion. Had minimal elevation in troponin over the last couple months, had a recent NM negative stress test, no chest pain. 6. Dizziness. David Daunt to hypotension as noted above. improved.   7. Uncontrolled hypertension, resumed home medications and adjusted, still elevated BP, metoprolol changed to coreg, manual BP is much better controlled now, will need to follow with pcp for further adjustment, discussed with patient, he agreed. 8. Left shoulder pain, possibly radicular, NS consulted. Discussed with Dr Robb Torres, started po prednisone, improved, follow up with Dr Robb Torres in couple days         Physical Exam Performed:     BP (!) 150/90   Pulse 79   Temp 97.8 °F (36.6 °C) (Oral)   Resp 18   Ht 5' 11\" (1.803 m)   Wt 245 lb 6 oz (111.3 kg)   SpO2 98%   BMI 34.22 kg/m²       General appearance:  No apparent distress  Neck: Supple  Respiratory:  Normal respiratory effort. Clear to auscultation, bilaterally without Rales/Wheezes/Rhonchi. Cardiovascular:  Regular rate and rhythm with normal S1/S2 without murmurs, rubs or gallops. Abdomen: Soft, non-tender. Musculoskeletal:  No clubbing, cyanosis  Skin: Skin color, texture, turgor normal.  No rashes or lesions. Neurologic:  No focal weakness   Psychiatric:  Alert and oriented  Capillary Refill: Brisk,< 3 seconds   Peripheral Pulses: +2 palpable, equal bilaterally       Labs:  For convenience and continuity at follow-up the following most recent labs are provided:      CBC:    Lab Results   Component Value Date    WBC 7.5 03/02/2020    HGB 12.5 03/02/2020    HCT 38.6 03/02/2020     03/02/2020       Renal:    Lab Results   Component Value Date     03/01/2020    K 4.4 03/01/2020    K 4.8 02/26/2020     03/01/2020    CO2 24 03/01/2020    BUN 21 03/01/2020    CREATININE 0.8 03/01/2020    CALCIUM 8.7 03/01/2020    PHOS 3.2 02/29/2020         Significant Diagnostic Studies    Radiology:   XR SHOULDER LEFT (MIN 2 VIEWS)   Final Result   No acute abnormality of the left shoulder. CT ABDOMEN PELVIS W IV CONTRAST Additional Contrast? None   Final Result   No acute abdominal or pelvic abnormality. Diffuse hepatic steatosis. Uncomplicated colonic diverticulosis. XR CHEST STANDARD (2 VW)   Final Result   No evidence of acute cardiopulmonary disease. Consults:     IP CONSULT TO HOSPITALIST  IP CONSULT TO NEPHROLOGY  IP CONSULT TO NEUROSURGERY    Disposition:  home     Condition at Discharge: Stable    Discharge Instructions/Follow-up:  PCP, NS. Code Status:  Full Code    Activity: activity as tolerated    Diet: cardiac diet      Discharge Medications:     Current Discharge Medication List           Details   carvedilol (COREG) 25 MG tablet Take 1 tablet by mouth 2 times daily (with meals)  Qty: 60 tablet, Refills: 0      predniSONE (DELTASONE) 20 MG tablet Take 1 tablet by mouth daily for 4 days  Qty: 4 tablet, Refills: 0      spironolactone (ALDACTONE) 25 MG tablet Take 1 tablet by mouth daily  Qty: 30 tablet, Refills: 0      acetaminophen (APAP EXTRA STRENGTH) 500 MG tablet Take 1 tablet by mouth every 6 hours as needed for Pain  Qty: 40 tablet, Refills: 0      Magnesium Oxide (MAGNESIUM OXIDE 400) 240 MG PACK Take 1 capsule by mouth daily  Qty: 30 each, Refills: 0              Details   doxazosin (CARDURA) 2 MG tablet Take 1 tablet by mouth every 12 hours  Qty: 30 tablet, Refills: 3      hydrochlorothiazide (HYDRODIURIL) 25 MG tablet Take 1 tablet by mouth daily  Qty: 30 tablet, Refills: 3      hydrALAZINE (APRESOLINE) 100 MG tablet Take 1 tablet by mouth every 8 hours  Qty: 90 tablet, Refills: 3      lisinopril (PRINIVIL;ZESTRIL) 40 MG tablet Take 40 mg by mouth daily      amitriptyline (ELAVIL) 50 MG tablet Take 50 mg by mouth 2 times daily      vitamin B-12 (CYANOCOBALAMIN) 500 MCG tablet Take 500 mcg by mouth daily      zolpidem (AMBIEN) 10 MG tablet Take 10 mg by mouth nightly.       vitamin D (D3-1000) 1000 UNITS TABS tablet Take 1 tablet by mouth daily  Qty: 90 tablet, Refills: 3      aspirin 81 MG tablet Take 81 mg by mouth daily             Time Spent on discharge is more than 1 hour in the examination, evaluation, counseling and review of medications and discharge plan. Signed:    Roosevelt Maya MD   3/2/2020      Thank you Javier Najera MD for the opportunity to be involved in this patient's care. If you have any questions or concerns please feel free to contact me at [phone].

## 2020-03-19 ENCOUNTER — APPOINTMENT (OUTPATIENT)
Dept: GENERAL RADIOLOGY | Age: 65
DRG: 552 | End: 2020-03-19
Payer: MEDICARE

## 2020-03-19 ENCOUNTER — HOSPITAL ENCOUNTER (INPATIENT)
Age: 65
LOS: 2 days | Discharge: HOME | DRG: 552 | End: 2020-03-23
Attending: INTERNAL MEDICINE | Admitting: INTERNAL MEDICINE
Payer: MEDICARE

## 2020-03-19 LAB
A/G RATIO: 1.4 (ref 1.1–2.2)
ALBUMIN SERPL-MCNC: 3.8 G/DL (ref 3.4–5)
ALP BLD-CCNC: 134 U/L (ref 40–129)
ALT SERPL-CCNC: 25 U/L (ref 10–40)
ANION GAP SERPL CALCULATED.3IONS-SCNC: 15 MMOL/L (ref 3–16)
AST SERPL-CCNC: 18 U/L (ref 15–37)
BASOPHILS ABSOLUTE: 0.1 K/UL (ref 0–0.2)
BASOPHILS RELATIVE PERCENT: 0.9 %
BILIRUB SERPL-MCNC: <0.2 MG/DL (ref 0–1)
BUN BLDV-MCNC: 24 MG/DL (ref 7–20)
CALCIUM SERPL-MCNC: 9.2 MG/DL (ref 8.3–10.6)
CHLORIDE BLD-SCNC: 99 MMOL/L (ref 99–110)
CO2: 23 MMOL/L (ref 21–32)
CREAT SERPL-MCNC: 1 MG/DL (ref 0.8–1.3)
EOSINOPHILS ABSOLUTE: 0.1 K/UL (ref 0–0.6)
EOSINOPHILS RELATIVE PERCENT: 1.5 %
GFR AFRICAN AMERICAN: >60
GFR NON-AFRICAN AMERICAN: >60
GLOBULIN: 2.7 G/DL
GLUCOSE BLD-MCNC: 209 MG/DL (ref 70–99)
HCT VFR BLD CALC: 38.6 % (ref 40.5–52.5)
HEMOGLOBIN: 12.6 G/DL (ref 13.5–17.5)
LYMPHOCYTES ABSOLUTE: 2 K/UL (ref 1–5.1)
LYMPHOCYTES RELATIVE PERCENT: 34.3 %
MCH RBC QN AUTO: 28.1 PG (ref 26–34)
MCHC RBC AUTO-ENTMCNC: 32.7 G/DL (ref 31–36)
MCV RBC AUTO: 86.1 FL (ref 80–100)
MONOCYTES ABSOLUTE: 0.5 K/UL (ref 0–1.3)
MONOCYTES RELATIVE PERCENT: 9.1 %
NEUTROPHILS ABSOLUTE: 3.1 K/UL (ref 1.7–7.7)
NEUTROPHILS RELATIVE PERCENT: 54.2 %
PDW BLD-RTO: 14.1 % (ref 12.4–15.4)
PLATELET # BLD: 206 K/UL (ref 135–450)
PMV BLD AUTO: 8.1 FL (ref 5–10.5)
POTASSIUM SERPL-SCNC: 4 MMOL/L (ref 3.5–5.1)
RBC # BLD: 4.49 M/UL (ref 4.2–5.9)
SODIUM BLD-SCNC: 137 MMOL/L (ref 136–145)
TOTAL PROTEIN: 6.5 G/DL (ref 6.4–8.2)
TROPONIN: <0.01 NG/ML
TROPONIN: <0.01 NG/ML
WBC # BLD: 5.7 K/UL (ref 4–11)

## 2020-03-19 PROCEDURE — 71046 X-RAY EXAM CHEST 2 VIEWS: CPT

## 2020-03-19 PROCEDURE — 85025 COMPLETE CBC W/AUTO DIFF WBC: CPT

## 2020-03-19 PROCEDURE — 6370000000 HC RX 637 (ALT 250 FOR IP): Performed by: PHYSICIAN ASSISTANT

## 2020-03-19 PROCEDURE — 93005 ELECTROCARDIOGRAM TRACING: CPT | Performed by: EMERGENCY MEDICINE

## 2020-03-19 PROCEDURE — 99285 EMERGENCY DEPT VISIT HI MDM: CPT

## 2020-03-19 PROCEDURE — 80053 COMPREHEN METABOLIC PANEL: CPT

## 2020-03-19 PROCEDURE — 84484 ASSAY OF TROPONIN QUANT: CPT

## 2020-03-19 RX ORDER — ASPIRIN 81 MG/1
243 TABLET, CHEWABLE ORAL ONCE
Status: COMPLETED | OUTPATIENT
Start: 2020-03-19 | End: 2020-03-19

## 2020-03-19 RX ORDER — MIRTAZAPINE 15 MG/1
15 TABLET, FILM COATED ORAL NIGHTLY
COMMUNITY
Start: 2020-03-14 | End: 2020-04-21

## 2020-03-19 RX ORDER — CARVEDILOL 6.25 MG/1
25 TABLET ORAL ONCE
Status: COMPLETED | OUTPATIENT
Start: 2020-03-19 | End: 2020-03-19

## 2020-03-19 RX ORDER — LORAZEPAM 0.5 MG/1
0.5 TABLET ORAL ONCE
Status: COMPLETED | OUTPATIENT
Start: 2020-03-19 | End: 2020-03-19

## 2020-03-19 RX ORDER — DOXAZOSIN 2 MG/1
2 TABLET ORAL ONCE
Status: COMPLETED | OUTPATIENT
Start: 2020-03-19 | End: 2020-03-19

## 2020-03-19 RX ADMIN — CARVEDILOL 25 MG: 6.25 TABLET, FILM COATED ORAL at 22:18

## 2020-03-19 RX ADMIN — LORAZEPAM 0.5 MG: 0.5 TABLET ORAL at 21:25

## 2020-03-19 RX ADMIN — ASPIRIN 81 MG 243 MG: 81 TABLET ORAL at 19:41

## 2020-03-19 RX ADMIN — DOXAZOSIN 2 MG: 2 TABLET ORAL at 23:34

## 2020-03-19 ASSESSMENT — HEART SCORE: ECG: 1

## 2020-03-19 ASSESSMENT — PAIN SCALES - GENERAL
PAINLEVEL_OUTOF10: 0
PAINLEVEL_OUTOF10: 7
PAINLEVEL_OUTOF10: 5

## 2020-03-20 ENCOUNTER — APPOINTMENT (OUTPATIENT)
Dept: MRI IMAGING | Age: 65
DRG: 552 | End: 2020-03-20
Payer: MEDICARE

## 2020-03-20 LAB
AMPHETAMINE SCREEN, URINE: NORMAL
BARBITURATE SCREEN URINE: NORMAL
BENZODIAZEPINE SCREEN, URINE: NORMAL
CANNABINOID SCREEN URINE: NORMAL
COCAINE METABOLITE SCREEN URINE: NORMAL
EKG ATRIAL RATE: 109 BPM
EKG DIAGNOSIS: NORMAL
EKG P AXIS: 46 DEGREES
EKG P-R INTERVAL: 190 MS
EKG Q-T INTERVAL: 338 MS
EKG QRS DURATION: 96 MS
EKG QTC CALCULATION (BAZETT): 455 MS
EKG R AXIS: 79 DEGREES
EKG T AXIS: 46 DEGREES
EKG VENTRICULAR RATE: 109 BPM
GLUCOSE BLD-MCNC: 172 MG/DL (ref 70–99)
GLUCOSE BLD-MCNC: 175 MG/DL (ref 70–99)
GLUCOSE BLD-MCNC: 217 MG/DL (ref 70–99)
GLUCOSE BLD-MCNC: 254 MG/DL (ref 70–99)
Lab: NORMAL
MAGNESIUM: 1.2 MG/DL (ref 1.8–2.4)
METHADONE SCREEN, URINE: NORMAL
MRSA SCREEN RT-PCR: NORMAL
OPIATE SCREEN URINE: NORMAL
OXYCODONE URINE: NORMAL
PERFORMED ON: ABNORMAL
PH UA: 5
PHENCYCLIDINE SCREEN URINE: NORMAL
PROPOXYPHENE SCREEN: NORMAL
TSH SERPL DL<=0.05 MIU/L-ACNC: 1.29 UIU/ML (ref 0.27–4.2)

## 2020-03-20 PROCEDURE — 6360000002 HC RX W HCPCS: Performed by: INTERNAL MEDICINE

## 2020-03-20 PROCEDURE — 6370000000 HC RX 637 (ALT 250 FOR IP): Performed by: HOSPITALIST

## 2020-03-20 PROCEDURE — 83835 ASSAY OF METANEPHRINES: CPT

## 2020-03-20 PROCEDURE — 87641 MR-STAPH DNA AMP PROBE: CPT

## 2020-03-20 PROCEDURE — 96372 THER/PROPH/DIAG INJ SC/IM: CPT

## 2020-03-20 PROCEDURE — 6370000000 HC RX 637 (ALT 250 FOR IP): Performed by: INTERNAL MEDICINE

## 2020-03-20 PROCEDURE — 80307 DRUG TEST PRSMV CHEM ANLYZR: CPT

## 2020-03-20 PROCEDURE — 72141 MRI NECK SPINE W/O DYE: CPT

## 2020-03-20 PROCEDURE — 84443 ASSAY THYROID STIM HORMONE: CPT

## 2020-03-20 PROCEDURE — G0378 HOSPITAL OBSERVATION PER HR: HCPCS

## 2020-03-20 PROCEDURE — 83735 ASSAY OF MAGNESIUM: CPT

## 2020-03-20 PROCEDURE — 2580000003 HC RX 258: Performed by: INTERNAL MEDICINE

## 2020-03-20 PROCEDURE — 99223 1ST HOSP IP/OBS HIGH 75: CPT | Performed by: INTERNAL MEDICINE

## 2020-03-20 PROCEDURE — 93010 ELECTROCARDIOGRAM REPORT: CPT | Performed by: INTERNAL MEDICINE

## 2020-03-20 PROCEDURE — 6370000000 HC RX 637 (ALT 250 FOR IP): Performed by: NURSE PRACTITIONER

## 2020-03-20 PROCEDURE — 94760 N-INVAS EAR/PLS OXIMETRY 1: CPT

## 2020-03-20 PROCEDURE — 36415 COLL VENOUS BLD VENIPUNCTURE: CPT

## 2020-03-20 RX ORDER — AMITRIPTYLINE HYDROCHLORIDE 50 MG/1
50 TABLET, FILM COATED ORAL 2 TIMES DAILY
Status: DISCONTINUED | OUTPATIENT
Start: 2020-03-20 | End: 2020-03-23 | Stop reason: HOSPADM

## 2020-03-20 RX ORDER — CARVEDILOL 25 MG/1
25 TABLET ORAL 2 TIMES DAILY WITH MEALS
Status: DISCONTINUED | OUTPATIENT
Start: 2020-03-20 | End: 2020-03-20

## 2020-03-20 RX ORDER — ZOLPIDEM TARTRATE 5 MG/1
10 TABLET ORAL NIGHTLY
Status: DISCONTINUED | OUTPATIENT
Start: 2020-03-20 | End: 2020-03-23 | Stop reason: HOSPADM

## 2020-03-20 RX ORDER — NICOTINE POLACRILEX 4 MG
15 LOZENGE BUCCAL PRN
Status: DISCONTINUED | OUTPATIENT
Start: 2020-03-20 | End: 2020-03-23 | Stop reason: HOSPADM

## 2020-03-20 RX ORDER — ONDANSETRON 2 MG/ML
4 INJECTION INTRAMUSCULAR; INTRAVENOUS EVERY 6 HOURS PRN
Status: DISCONTINUED | OUTPATIENT
Start: 2020-03-20 | End: 2020-03-23 | Stop reason: HOSPADM

## 2020-03-20 RX ORDER — DOXAZOSIN 2 MG/1
2 TABLET ORAL EVERY 12 HOURS SCHEDULED
Status: DISCONTINUED | OUTPATIENT
Start: 2020-03-20 | End: 2020-03-20

## 2020-03-20 RX ORDER — LANOLIN ALCOHOL/MO/W.PET/CERES
400 CREAM (GRAM) TOPICAL DAILY
Status: DISCONTINUED | OUTPATIENT
Start: 2020-03-20 | End: 2020-03-23 | Stop reason: HOSPADM

## 2020-03-20 RX ORDER — SODIUM CHLORIDE 0.9 % (FLUSH) 0.9 %
10 SYRINGE (ML) INJECTION PRN
Status: DISCONTINUED | OUTPATIENT
Start: 2020-03-20 | End: 2020-03-23 | Stop reason: HOSPADM

## 2020-03-20 RX ORDER — DOXAZOSIN 2 MG/1
2 TABLET ORAL EVERY 12 HOURS SCHEDULED
Status: DISCONTINUED | OUTPATIENT
Start: 2020-03-20 | End: 2020-03-23 | Stop reason: HOSPADM

## 2020-03-20 RX ORDER — 0.9 % SODIUM CHLORIDE 0.9 %
500 INTRAVENOUS SOLUTION INTRAVENOUS ONCE
Status: COMPLETED | OUTPATIENT
Start: 2020-03-20 | End: 2020-03-20

## 2020-03-20 RX ORDER — PROMETHAZINE HYDROCHLORIDE 25 MG/1
12.5 TABLET ORAL EVERY 6 HOURS PRN
Status: DISCONTINUED | OUTPATIENT
Start: 2020-03-20 | End: 2020-03-23 | Stop reason: HOSPADM

## 2020-03-20 RX ORDER — SODIUM CHLORIDE 0.9 % (FLUSH) 0.9 %
10 SYRINGE (ML) INJECTION EVERY 12 HOURS SCHEDULED
Status: DISCONTINUED | OUTPATIENT
Start: 2020-03-20 | End: 2020-03-23 | Stop reason: HOSPADM

## 2020-03-20 RX ORDER — HYDRALAZINE HYDROCHLORIDE 50 MG/1
50 TABLET, FILM COATED ORAL EVERY 8 HOURS SCHEDULED
Status: DISCONTINUED | OUTPATIENT
Start: 2020-03-20 | End: 2020-03-21

## 2020-03-20 RX ORDER — CARVEDILOL 25 MG/1
25 TABLET ORAL 2 TIMES DAILY WITH MEALS
Status: DISCONTINUED | OUTPATIENT
Start: 2020-03-20 | End: 2020-03-23 | Stop reason: HOSPADM

## 2020-03-20 RX ORDER — SPIRONOLACTONE 25 MG/1
25 TABLET ORAL DAILY
Status: DISCONTINUED | OUTPATIENT
Start: 2020-03-20 | End: 2020-03-20

## 2020-03-20 RX ORDER — ZOLPIDEM TARTRATE 5 MG/1
10 TABLET ORAL ONCE
Status: COMPLETED | OUTPATIENT
Start: 2020-03-20 | End: 2020-03-20

## 2020-03-20 RX ORDER — CHOLECALCIFEROL (VITAMIN D3) 125 MCG
500 CAPSULE ORAL DAILY
Status: DISCONTINUED | OUTPATIENT
Start: 2020-03-20 | End: 2020-03-23 | Stop reason: HOSPADM

## 2020-03-20 RX ORDER — ACETAMINOPHEN 650 MG/1
650 SUPPOSITORY RECTAL EVERY 6 HOURS PRN
Status: DISCONTINUED | OUTPATIENT
Start: 2020-03-20 | End: 2020-03-23 | Stop reason: HOSPADM

## 2020-03-20 RX ORDER — DEXTROSE MONOHYDRATE 50 MG/ML
100 INJECTION, SOLUTION INTRAVENOUS PRN
Status: DISCONTINUED | OUTPATIENT
Start: 2020-03-20 | End: 2020-03-23 | Stop reason: HOSPADM

## 2020-03-20 RX ORDER — CARVEDILOL 25 MG/1
25 TABLET ORAL ONCE
Status: DISCONTINUED | OUTPATIENT
Start: 2020-03-20 | End: 2020-03-20

## 2020-03-20 RX ORDER — HYDROCHLOROTHIAZIDE 25 MG/1
25 TABLET ORAL DAILY
Status: DISCONTINUED | OUTPATIENT
Start: 2020-03-20 | End: 2020-03-20

## 2020-03-20 RX ORDER — POLYETHYLENE GLYCOL 3350 17 G/17G
17 POWDER, FOR SOLUTION ORAL DAILY PRN
Status: DISCONTINUED | OUTPATIENT
Start: 2020-03-20 | End: 2020-03-23 | Stop reason: HOSPADM

## 2020-03-20 RX ORDER — HYDRALAZINE HYDROCHLORIDE 50 MG/1
100 TABLET, FILM COATED ORAL EVERY 8 HOURS SCHEDULED
Status: DISCONTINUED | OUTPATIENT
Start: 2020-03-20 | End: 2020-03-20

## 2020-03-20 RX ORDER — DEXTROSE MONOHYDRATE 25 G/50ML
12.5 INJECTION, SOLUTION INTRAVENOUS PRN
Status: DISCONTINUED | OUTPATIENT
Start: 2020-03-20 | End: 2020-03-23 | Stop reason: HOSPADM

## 2020-03-20 RX ORDER — SPIRONOLACTONE 25 MG/1
25 TABLET ORAL DAILY
Status: DISCONTINUED | OUTPATIENT
Start: 2020-03-20 | End: 2020-03-21

## 2020-03-20 RX ORDER — ASPIRIN 81 MG/1
81 TABLET ORAL DAILY
Status: DISCONTINUED | OUTPATIENT
Start: 2020-03-20 | End: 2020-03-23 | Stop reason: HOSPADM

## 2020-03-20 RX ORDER — ACETAMINOPHEN 325 MG/1
650 TABLET ORAL EVERY 6 HOURS PRN
Status: DISCONTINUED | OUTPATIENT
Start: 2020-03-20 | End: 2020-03-23 | Stop reason: HOSPADM

## 2020-03-20 RX ORDER — LIDOCAINE 4 G/G
1 PATCH TOPICAL DAILY
Status: DISCONTINUED | OUTPATIENT
Start: 2020-03-20 | End: 2020-03-23 | Stop reason: HOSPADM

## 2020-03-20 RX ORDER — LISINOPRIL 40 MG/1
40 TABLET ORAL DAILY
Status: DISCONTINUED | OUTPATIENT
Start: 2020-03-20 | End: 2020-03-20

## 2020-03-20 RX ORDER — DOXAZOSIN 2 MG/1
2 TABLET ORAL ONCE
Status: DISCONTINUED | OUTPATIENT
Start: 2020-03-20 | End: 2020-03-20

## 2020-03-20 RX ORDER — ASPIRIN 81 MG/1
243 TABLET, CHEWABLE ORAL ONCE
Status: DISCONTINUED | OUTPATIENT
Start: 2020-03-20 | End: 2020-03-20

## 2020-03-20 RX ORDER — LORAZEPAM 0.5 MG/1
0.5 TABLET ORAL EVERY 6 HOURS PRN
Status: DISCONTINUED | OUTPATIENT
Start: 2020-03-20 | End: 2020-03-23 | Stop reason: HOSPADM

## 2020-03-20 RX ORDER — SPIRONOLACTONE 25 MG/1
25 TABLET ORAL 2 TIMES DAILY
Status: DISCONTINUED | OUTPATIENT
Start: 2020-03-20 | End: 2020-03-20

## 2020-03-20 RX ORDER — MIRTAZAPINE 15 MG/1
15 TABLET, FILM COATED ORAL NIGHTLY
Status: DISCONTINUED | OUTPATIENT
Start: 2020-03-20 | End: 2020-03-23 | Stop reason: HOSPADM

## 2020-03-20 RX ADMIN — AMITRIPTYLINE HYDROCHLORIDE 50 MG: 50 TABLET, FILM COATED ORAL at 09:10

## 2020-03-20 RX ADMIN — SODIUM CHLORIDE 500 ML: 9 INJECTION, SOLUTION INTRAVENOUS at 09:11

## 2020-03-20 RX ADMIN — MIRTAZAPINE 15 MG: 15 TABLET, FILM COATED ORAL at 01:47

## 2020-03-20 RX ADMIN — AMITRIPTYLINE HYDROCHLORIDE 50 MG: 50 TABLET, FILM COATED ORAL at 20:42

## 2020-03-20 RX ADMIN — INSULIN LISPRO 1 UNITS: 100 INJECTION, SOLUTION INTRAVENOUS; SUBCUTANEOUS at 23:29

## 2020-03-20 RX ADMIN — ZOLPIDEM TARTRATE 10 MG: 5 TABLET ORAL at 01:47

## 2020-03-20 RX ADMIN — DOXAZOSIN 2 MG: 2 TABLET ORAL at 20:42

## 2020-03-20 RX ADMIN — SODIUM CHLORIDE, PRESERVATIVE FREE 10 ML: 5 INJECTION INTRAVENOUS at 09:11

## 2020-03-20 RX ADMIN — AMITRIPTYLINE HYDROCHLORIDE 50 MG: 50 TABLET, FILM COATED ORAL at 01:47

## 2020-03-20 RX ADMIN — INSULIN LISPRO 6 UNITS: 100 INJECTION, SOLUTION INTRAVENOUS; SUBCUTANEOUS at 14:34

## 2020-03-20 RX ADMIN — ASPIRIN 81 MG: 81 TABLET, COATED ORAL at 09:10

## 2020-03-20 RX ADMIN — SODIUM CHLORIDE, PRESERVATIVE FREE 10 ML: 5 INJECTION INTRAVENOUS at 20:42

## 2020-03-20 RX ADMIN — Medication 400 MG: at 09:10

## 2020-03-20 RX ADMIN — SPIRONOLACTONE 25 MG: 25 TABLET ORAL at 01:47

## 2020-03-20 RX ADMIN — ZOLPIDEM TARTRATE 10 MG: 5 TABLET ORAL at 23:29

## 2020-03-20 RX ADMIN — MIRTAZAPINE 15 MG: 15 TABLET, FILM COATED ORAL at 20:42

## 2020-03-20 RX ADMIN — INSULIN LISPRO 2 UNITS: 100 INJECTION, SOLUTION INTRAVENOUS; SUBCUTANEOUS at 09:12

## 2020-03-20 RX ADMIN — CYANOCOBALAMIN TAB 500 MCG 500 MCG: 500 TAB at 09:10

## 2020-03-20 RX ADMIN — HYDRALAZINE HYDROCHLORIDE 50 MG: 50 TABLET, FILM COATED ORAL at 20:42

## 2020-03-20 RX ADMIN — ENOXAPARIN SODIUM 40 MG: 40 INJECTION SUBCUTANEOUS at 09:10

## 2020-03-20 RX ADMIN — INSULIN LISPRO 4 UNITS: 100 INJECTION, SOLUTION INTRAVENOUS; SUBCUTANEOUS at 17:29

## 2020-03-20 RX ADMIN — LORAZEPAM 0.5 MG: 0.5 TABLET ORAL at 11:09

## 2020-03-20 RX ADMIN — CARVEDILOL 25 MG: 25 TABLET, FILM COATED ORAL at 17:28

## 2020-03-20 RX ADMIN — HYDROCHLOROTHIAZIDE 25 MG: 25 TABLET ORAL at 01:47

## 2020-03-20 RX ADMIN — SPIRONOLACTONE 25 MG: 25 TABLET ORAL at 17:28

## 2020-03-20 ASSESSMENT — PAIN SCALES - GENERAL
PAINLEVEL_OUTOF10: 0
PAINLEVEL_OUTOF10: 0

## 2020-03-20 ASSESSMENT — PAIN SCALES - WONG BAKER: WONGBAKER_NUMERICALRESPONSE: 0

## 2020-03-20 NOTE — CONSULTS
[address]   1955 March 20, 2020    Reason for Consult: Chest Pain    CC: Chest Pain    HPI:  The patient is 72 y.o. male with a past medical history significant for CAD with prior PCI and essential hypertension who presented to [facility] with chest pain. He states that he got up yesterday and was feeling \"electric shocks\" when he was walking. This occurred with exertion but was present with rest as well. He checked his BP and it was 228/131. The \"shocks\" were not chest pain per his report. He was short of breath. He had taken his medications that morning. These symptoms lasted from 6:30am until about 2pm when he decided to susie to the ED. He states that the \"shocks\":were present here but were gone when he woke up this morning. His BP was actually lower this morning so his am meds were held. He does have a pinched nerve in his shoulder that sends \"shocks\" down his arm when he turns his head. He reports his breathing is fine today. Review of Systems:  Constitutional: No fatigue, weakness, night sweats or fever. HEENT: No new vision difficulties or ringing in the ears. Respiratory: No new SOB, PND, orthopnea or cough. Cardiovascular: See HPI   GI: No n/v, diarrhea, constipation, abdominal pain or changes in bowel habits. No melena, no hematochezia  : No urinary frequency, urgency, incontinence, hematuria or dysuria. Skin: No cyanosis or skin lesions. Musculoskeletal: No new muscle or joint pain. Neurological: No syncope or TIA-like symptoms.   Psychiatric: No anxiety, insomnia or depression     Past Medical History:   Diagnosis Date    CAD (coronary artery disease)     Chest pain 10/15/2017    [facility]     COPD [facility]chronic obstructive pulmonary disease) [facility]     Diabetes mellitus ([facility]     Hyperlipidemia     Hypertension     Kidney stone     MRSA colonization 4/16/19; 03/24/2019    Sleep apnea     He said he doesn't use a CPAP machine.      Past Surgical History:   Procedure Laterality Date    ABDOMEN SURGERY      CARDIAC SURGERY      3 STENTS PLACED     CHOLECYSTECTOMY      COLONOSCOPY      CORONARY ANGIOPLASTY WITH STENT PLACEMENT      3 stents     FRACTURE SURGERY      RIGHT ANKLE    LITHOTRIPSY      SHOULDER ARTHROSCOPY      VASCULAR SURGERY       Family History   Problem Relation Age of Onset    High Blood Pressure Mother     Cancer Father         lung cancer    Diabetes Brother     Liver Disease Sister     Diabetes Brother      Social History     Tobacco Use    Smoking status: Former Smoker     Packs/day: 2.50     Years: 40.00     Pack years: 100.00     Types: Cigarettes    Smokeless tobacco: Never Used   Substance Use Topics    Alcohol use: Never     Frequency: Never    Drug use: Yes     Types: Cocaine       Allergies   Allergen Reactions    Naproxen Hives     Current Facility-Administered Medications   Medication Dose Route Frequency Provider Last Rate Last Dose    aspirin EC tablet 81 mg  81 mg Oral Daily Malou Ascencio MD   81 mg at 03/20/20 0910    amitriptyline (ELAVIL) tablet 50 mg  50 mg Oral BID Malou Ascencio MD   50 mg at 03/20/20 0910    mirtazapine (REMERON) tablet 15 mg  15 mg Oral Nightly Malou Ascencio MD   15 mg at 03/20/20 0147    vitamin B-12 (CYANOCOBALAMIN) tablet 500 mcg  500 mcg Oral Daily Malou Ascencio MD   500 mcg at 03/20/20 0910    magnesium oxide (MAG-OX) tablet 400 mg  400 mg Oral Daily Malou Ascencio MD   400 mg at 03/20/20 0910    sodium chloride flush 0.9 % injection 10 mL  10 mL Intravenous 2 times per day Malou Ascencio MD   10 mL at 03/20/20 0911    sodium chloride flush 0.9 % injection 10 mL  10 mL Intravenous PRN Malou Ascencio MD        acetaminophen (TYLENOL) tablet 650 mg  650 mg Oral Q6H PRN Malou Ascencio MD        Or    acetaminophen (TYLENOL) suppository 650 mg  650 mg Rectal Q6H PRN Malou Ascencio MD        polyethylene glycol (GLYCOLAX) packet 17 g  17 Coronary artery stent noted.  No mediastinal adenopathy.       Lungs/pleura: Lungs clear except for scarring in the right lower lobe.  No   pleural effusion       Upper Abdomen: Limited images of the upper abdomen are unremarkable.       Soft Tissues/Bones: No acute bone or soft tissue abnormality. [facility]   CORONARY ANGIOGRAPHY FINDINGS    DOMINANCE:  Left dominant        LEFT MAIN: Normal                       LEFT ANTERIOR DESCENDING:  Luminal irregularities and   Stenotic 80% proximal first diagonal at a previous stented   site                 LEFT CIRCUMFLEX: Luminal irregularities and   Dominant                       RIGHT CORONARY:  Luminal irregularities and Non-  dominant               LEFT VENTRICULAR FUNCTION:        LVEF: 55%        LVEDP: Normal pre-angio        LV Systolic Pressure: Normal         LV to AO Gradient: none         LV Wall Motion:  Normal,      .        MITRAL VALVE: No mitral insufficiency    Successful PTCA with direct deployment of a 2.5 x 16 mm   Taxus ion drug-eluting stent to a 80% in-stent restenotic   lesion in a first diagonal with less than 10% residual.   Plan:  Continue dual antiplatelet therapy for approximately one year     Cath 1/5/16 ([facility]):  Mid LAD lesion 30% stenosed. · Ost Ramus lesion 40% stenosed. · Ramus lesion 5% stenosed. · 1st Mrg lesion 20% stenosed. Mild CAD with patent stents. Normal LV function. Assessment:  1. Chest Pain, atypical  2. Essential Hypertension  3. Hyperlipidemia with goal LDL<70mg/dL  4. H/o Cocaine Abuse    Plan:  Dangelo's symptoms are very atypical for angina and I would not pursue it. His ECG is unchanged and his troponin assays are negative. He has had many negative stress perfusions studies, the most recent one in January of this year. He was positive for cocaine in 01/2020 and 04/2019 and I stressed to him the importance of avoiding cocaine usage.  This may contribute to his hypertension though

## 2020-03-20 NOTE — CONSULTS
Office: [phone]       Fax: [phone]      Nephrology Initial Consult Note        Patient's Name: Tonny Banerjee  Admit Date: 3/19/2020  Date of Visit: 3/20/2020    Reason for Consult:  HTN management  Requesting Physician: Homar Smalls DO  PCP: Ava Jenkins MD    Chief Complaint:  chest pain      History of Present Illness:      Tonny Banerjee is a 72 y.o. male with PMHx of hypertension, diabetes mellitus, coronary artery disease, COPD, substance abuse (cocaine in past) who was hospitalized on 3/19/2020 with complaints of chest pain. Patient is well known to us from his previous hospitalization most recently on 2/25 with diarrhea. Patient has history of refractory hypertension and has been previously hospitalized for hypertensive crisis. Reported chest pain and left shoulder pain. Had high blood pressure at home, SBP>200. No shortness of breath. Received doxazocin, coreg, ativan overnight. blood pressure in 90s this AM.  No diarrhea, nausea or vomiting. Denies non compliance with meds. Previous workup for secondary hypertension has been unremarkable. No unintentional wt loss. Past Medical History:   Diagnosis Date    CAD (coronary artery disease)     Chest pain 10/15/2017    Coc[address] abuse ([address]     COPD (chronic obstructive pulmonary disease) ([address]     Diabetes mellitus ([address]     Hyperlipidemia     Hypertension     Kidney stone     MRSA colonization 4/16/19; 03/24/2019    Sleep apnea     He said he doesn't use a CPAP machine.        Past Surgical History:   Procedure Laterality Date    ABDOMEN SURGERY      CARDIAC SURGERY      3 STENTS PLACED     CHOLECYSTECTOMY      COLONOSCOPY      CORONARY ANGIOPLASTY WITH STENT PLACEMENT      3 stents     FRACTURE SURGERY      RIGHT ANKLE    LITHOTRIPSY      SHOULDER ARTHROSCOPY      VASCULAR for input(s): CLUR, LABCREA, PROTEINUR, NAUR in the last 72 hours. ROS:     All systems reviewed and negative except as in HPI    Objective:     Vitals: BP (!) 86/47   Pulse 88   Temp 97.6 °F (36.4 °C) (Oral)   Resp 20   Ht 6' (1.829 m)   Wt 247 lb 9.2 oz (112.3 kg)   SpO2 96%   BMI 33.58 kg/m²    Wt Readings from Last 3 Encounters:   03/20/20 247 lb 9.2 oz (112.3 kg)   03/02/20 245 lb 6 oz (111.3 kg)   02/13/20 243 lb 6.4 oz (110.4 kg)      24HR INTAKE/OUTPUT:  No intake or output data in the 24 hours ending 03/20/20 1002  Constitutional:  awake, NAD  HEENT:  MMM, No icterus  Neck: no bruits, No JVD  Cardiovascular:  S1, S2 reg  Respiratory: CTA, no crackles  Abdomen:  +BS, soft, NT, ND  Ext: no lower extremity edema  Psychiatric: mood and affect anxious  Skin: dry/intact  CNS: alert, no agitation    IMAGING:  XR CHEST STANDARD (2 VW)   Final Result   No acute process. MRI CERVICAL SPINE WO CONTRAST    (Results Pending)       Assessment :     1. HTN  -Blood pressure variable  -Blood pressure high on admission  -In past PRA, ary, metanephrine have been unremarkable  -Renal doppler in April 2019: No evidence of hemodynamically significant stenosis in either renal artery  -Reported history of obstructive sleep apnea but not on CPAP  -history of cocaine use in past    BP Readings from Last 1 Encounters:   03/20/20 (!) 86/47     2. CAD  -TTE (jan 2020): LVEF 55-60%, mild LVH. No mention about PASP  -medical management     3. DM  -Not on metformin    4. history of hypomagnesemia     Plan:     -Agree to hold BP meds for now with close monitoring as his BP can increase suddenly.  -Mg, TSH  -urine drug screen    -Monitor I/O, UOP  -Maintain MAP>65  -Avoid nephrotoxin, if able. -Dose meds to current eGFR    Spoke to Oscar Thacker, DO     Thank you for allowing us to participate in care of Deven Kingsley . We will continue to follow. Feel free to contact me with any questions.       Sendy Summers

## 2020-03-20 NOTE — PLAN OF CARE
Problem: Pain Control  Goal: Maintain pain level at or below patient's acceptable level (or 5 if patient is unable to determine acceptable level)  Outcome: Ongoing     Problem: Pain Control  Goal: Improvement in pain related behaviors BP/HR WNL  Outcome: Ongoing     Problem: Cardiovascular  Goal: Hemodynamic stability  Outcome: Ongoing

## 2020-03-20 NOTE — ED PROVIDER NOTES
negatives as per HPI. Except as noted above in the ROS, problem specific ROS was completed and is negative. Physical Exam:  Physical Exam  Vitals signs and nursing note reviewed. Constitutional:       Appearance: He is well-developed. He is not diaphoretic. HENT:      Head: Normocephalic and atraumatic. Nose: Nose normal.   Eyes:      General:         Right eye: No discharge. Left eye: No discharge. Neck:      Musculoskeletal: Normal range of motion and neck supple. Cardiovascular:      Rate and Rhythm: Normal rate and regular rhythm. Heart sounds: Normal heart sounds. No murmur. No friction rub. No gallop. Pulmonary:      Effort: Pulmonary effort is normal. No respiratory distress. Breath sounds: Normal breath sounds. No wheezing or rales. Chest:      Chest wall: Tenderness present. Musculoskeletal: Normal range of motion. Skin:     General: Skin is warm and dry. Neurological:      Mental Status: He is alert and oriented to person, place, and time.    Psychiatric:         Behavior: Behavior normal.          MEDICAL DECISION MAKING    Vitals:    Vitals:    03/19/20 2031 03/19/20 2102 03/19/20 2112 03/19/20 2218   BP: (!) 183/109 (!) 207/113  (!) 188/110   Pulse: 101 106 99 105   Resp: 19 18 20    Temp:       SpO2:           LABS:  Labs Reviewed   CBC WITH AUTO DIFFERENTIAL - Abnormal; Notable for the following components:       Result Value    Hemoglobin 12.6 (*)     Hematocrit 38.6 (*)     All other components within normal limits    Narrative:     Performed at:  [address] [address]   Phone [phone]   COMPREHENSIVE METABOLIC PANEL - Abnormal; Notable for the following components:    Glucose 209 (*)     BUN 24 (*)     Alkaline Phosphatase 134 (*)     All other components within normal limits    Narrative:     Performed at:  [facility]  [address] [place] [address]   Phone [phone]   TROPONIN    Narrative:     Performed at:  [facility]  [address] [place] [address]   Phone [phone]   TROPONIN    Narrative:     Performed at:  [facility]  [address] [place] [address]   Phone [phone] of labs reviewed and werenegative at this time or not returned at the time of this note. RADIOLOGY:   Non-plain film images such as CT, Ultrasound and MRI are read by the radiologist. Colonel Giancarlo PA-C have directly visualized the radiologic plain film image(s) with the below findings:        Interpretation per the Radiologist below, if available at the time of this note:    XR CHEST STANDARD (2 VW)   Final Result   No acute process. Xr Chest Standard (2 Vw)    Result Date: 3/19/2020  EXAMINATION: TWO XRAY VIEWS OF THE CHEST 3/19/2020 7:30 pm COMPARISON: Chest radiograph 02/25/2020. HISTORY: ORDERING SYSTEM PROVIDED HISTORY: Chest pain TECHNOLOGIST PROVIDED HISTORY: Reason for exam:->Chest pain Reason for Exam: Chest Pain (patient states he has felt his CP coming on all day, CP x2 hours, sharp in nature, Acuity: Acute Type of Exam: Initial FINDINGS: The cardiomediastinal silhouette is within normal limits. Chronic asymmetric elevation of the right hemidiaphragm. No pneumothorax, vascular congestion, consolidation, or pleural effusion is identified. No acute osseous abnormality. No acute process.      Xr Chest Standard (2 Vw)    Result Date: 2/25/2020  EXAMINATION: TWO XRAY VIEWS OF THE CHEST 2/25/2020 1:40 pm COMPARISON: February 11, 2020 HISTORY: ORDERING SYSTEM PROVIDED HISTORY: hypotension, general fatigue, dizziness TECHNOLOGIST PROVIDED HISTORY: Reason for exam:->hypotension, general fatigue, dizziness Reason for Exam: hypotension, general fatigue, dizziness Acuity: Acute Type of Exam: Initial FINDINGS: Unchanged anterior eventration of the right diaphragm. Minimal atelectasis in the right lung base again noted. No evidence of pneumonia, edema or other acute pulmonary process. No evidence of acute process of the cardiac or mediastinal structures. No evidence of pneumothorax or pleural effusion. No evidence of acute cardiopulmonary disease. Ct Abdomen Pelvis W Iv Contrast Additional Contrast? None    Result Date: 2/25/2020  EXAMINATION: CT OF THE ABDOMEN AND PELVIS WITH CONTRAST 2/25/2020 2:58 pm TECHNIQUE: CT of the abdomen and pelvis was performed with the administration of intravenous contrast. Multiplanar reformatted images are provided for review. Dose modulation, iterative reconstruction, and/or weight based adjustment of the mA/kV was utilized to reduce the radiation dose to as low as reasonably achievable. COMPARISON: CT abdomen and pelvis performed 03/08/2019. HISTORY: ORDERING SYSTEM PROVIDED HISTORY: left flank pain, diarrhea TECHNOLOGIST PROVIDED HISTORY: If patient is on cardiac monitor and/or pulse ox, they may be taken off cardiac monitor and pulse ox, left on O2 if currently on. All monitors reattached when patient returns to room. Additional Contrast?->None Reason for exam:->left flank pain, diarrhea Reason for Exam: left flank pain, diarrhea Acuity: Acute Type of Exam: Initial FINDINGS: Lower Chest: There is atelectasis versus scarring in the right lung base. The visualized cardiac structures are unremarkable. Organs: There is diffuse hepatic steatosis. The gallbladder has been removed. The pancreas, spleen, and adrenal glands are unremarkable. The kidneys are without obstructive uropathy. There are bilateral renal cysts. The ureters are not dilated. The urinary bladder is contracted with a thickened wall diffusely. GI/Bowel: The stomach is contracted and otherwise unremarkable. Loops of small bowel are normal in caliber without evidence for obstruction.   The colon contains air and fecal residue. There are uncomplicated diverticula. The appendix is normal.  There is no intraperitoneal free air or free fluid. Pelvis: The prostate gland and seminal vesicles are unremarkable. Phleboliths are seen in the pelvis. Peritoneum/Retroperitoneum: The psoas muscles are symmetric. The abdominal aorta is normal in caliber. The inferior vena cava is unremarkable. There is no retroperitoneal or mesenteric adenopathy. Bones/Soft Tissues: The extra-abdominal soft tissues demonstrate small fat containing inguinal hernias. There is no acute osseous abnormality. No acute abdominal or pelvic abnormality. Diffuse hepatic steatosis. Uncomplicated colonic diverticulosis. Xr Shoulder Left (min 2 Views)    Result Date: 2/28/2020  EXAMINATION: THREE XRAY VIEWS OF THE LEFT SHOULDER 2/28/2020 1:46 pm COMPARISON: None. HISTORY: ORDERING SYSTEM PROVIDED HISTORY: pain TECHNOLOGIST PROVIDED HISTORY: Reason for exam:->pain Reason for Exam: pain nki Acuity: Acute Type of Exam: Initial FINDINGS: No fracture or dislocation. There has been resection of the distal clavicle, no evidence of postsurgical complication. Mild degenerative irregularity the cortex of the acromion. Glenohumeral joint appears within normal limits     No acute abnormality of the left shoulder. MEDICAL DECISION MAKING / ED COURSE:      PROCEDURES:   Procedures    None    Patient was given:  Medications   doxazosin (CARDURA) tablet 2 mg (has no administration in time range)   aspirin chewable tablet 243 mg (243 mg Oral Given 3/19/20 1941)   LORazepam (ATIVAN) tablet 0.5 mg (0.5 mg Oral Given 3/19/20 2125)   carvedilol (COREG) tablet 25 mg (25 mg Oral Given 3/19/20 2218)     Emergency room course: Patient on exam pupils equal round and reactive to light extraocular movement is intact. Throat is clear. Neck is supple full range of motion without tenderness. No nuchal rigidity.   Cardiovascular regular rate rhythm, lungs are clear no wheeze, rales or rhonchi. Does have reproducible chest wall tenderness on the upper left side. Full range of motion upper extremity. Abdomen is soft nontender. Normal bowel sounds all 4 quadrant. No rebound or guarding. Full range of motion all extremity. Bilateral lower extremities show no edema.  strength 5+ equal bilaterally. No facial drooping or slurred speech. Neurologically no other motor or sensory deficit noted. Patient is alert and oriented x4. Heart score is 6      CBC has white count of 5.7, RBC 4.49, hemoglobin 12.6 and hematocrit 38.6. CMP shows sodium 137, potassium 4.0, chloride 99      Troponin less than 0.01      Chest x-ray showed no acute process. At this time I discussed patient labs and chest x-ray results with based on patient heart score and history think it is best to bring patient in for chest pain rule out. He was okay with this. I will place a call out to our hospitalist service through [facility] for him. Hospital is reviewed his record and saw that he had a recent stress test in January and said it was normal.  And I discussed this with the patient as well. That we will repeat his troponin and if it is negative then most likely he can go home and follow-up with his cardiologist as outpatient. And patient was okay with this. We will give him his night dose of his blood pressure medication here. Repeat troponin less than 0.01. Patient was given his hydrochlorothiazide 25 mg and Cardura 2 mg p.o. here in the emergency room. No change in blood pressure. Still tachycardic. I re-[employer] [facility] hospitalist for admission for this patient. The patient tolerated their visit well. I evaluated the patient. The physician was available for consultation as needed. The patient and / or the family were informed of the results of any tests, a time was given to answer questions, a plan was proposed and they agreed with plan. CLINICAL IMPRESSION:  1.  Chest

## 2020-03-20 NOTE — PROGRESS NOTES
Hospitalist Progress Note      PCP: Pankaj Clancy MD    Date of Admission: 3/19/2020    Chief Complaint: chest pain    Hospital Course: Tito Blankenship is 72 y.o. male who presented with complaint of chest pain. Symptom onset was acute for a time period of 1 day. The severity is described as moderate. The course of his symptoms over time is constant. The symptoms improved with none and worsened with none. The patient's symptom is associated with generalized weakness. Subjective: feels better now, no more chest pain. Medications:  Reviewed    Infusion Medications    dextrose       Scheduled Medications    aspirin  81 mg Oral Daily    amitriptyline  50 mg Oral BID    mirtazapine  15 mg Oral Nightly    vitamin B-12  500 mcg Oral Daily    magnesium oxide  400 mg Oral Daily    sodium chloride flush  10 mL Intravenous 2 times per day    enoxaparin  40 mg Subcutaneous Daily    zolpidem  10 mg Oral Nightly    insulin lispro  0-12 Units Subcutaneous TID WC    insulin lispro  0-6 Units Subcutaneous Nightly    lidocaine  1 patch Transdermal Daily     PRN Meds: sodium chloride flush, acetaminophen **OR** acetaminophen, polyethylene glycol, promethazine **OR** ondansetron, LORazepam, glucose, dextrose, glucagon (rDNA), dextrose      Intake/Output Summary (Last 24 hours) at 3/20/2020 1146  Last data filed at 3/20/2020 0911  Gross per 24 hour   Intake 360 ml   Output --   Net 360 ml       Physical Exam Performed:    BP (!) 195/106   Pulse 91   Temp 98.3 °F (36.8 °C) (Oral)   Resp 17   Ht 6' (1.829 m)   Wt 247 lb 9.2 oz (112.3 kg)   SpO2 94%   BMI 33.58 kg/m²     General appearance: No apparent distress, appears stated age and cooperative. HEENT: Pupils equal, round, and reactive to light. Conjunctivae/corneas clear. Neck: Supple, with full range of motion. No jugular venous distention. Trachea midline. Respiratory:  Normal respiratory effort.  Clear to auscultation, bilaterally without Rales/Wheezes/Rhonchi. Cardiovascular: Regular rate and rhythm with normal S1/S2 without murmurs, rubs or gallops. Abdomen: Soft, non-tender, non-distended with normal bowel sounds. Musculoskeletal: No clubbing, cyanosis or edema bilaterally. Full range of motion without deformity. Skin: Skin color, texture, turgor normal.  No rashes or lesions. Neurologic:  Neurovascularly intact without any focal sensory/motor deficits. Cranial nerves: II-XII intact, grossly non-focal.  Psychiatric: Alert and oriented, thought content appropriate, normal insight  Capillary Refill: Brisk,< 3 seconds   Peripheral Pulses: +2 palpable, equal bilaterally       Labs:   Recent Labs     03/19/20 1928   WBC 5.7   HGB 12.6*   HCT 38.6*        Recent Labs     03/19/20 1928      K 4.0   CL 99   CO2 23   BUN 24*   CREATININE 1.0   CALCIUM 9.2     Recent Labs     03/19/20 1928   AST 18   ALT 25   BILITOT <0.2   ALKPHOS 134*     No results for input(s): INR in the last 72 hours. Recent Labs     03/19/20 1928 03/19/20  2130   Garon Reil <0.01 <0.01       Urinalysis:      Lab Results   Component Value Date    NITRU Negative 02/25/2020    WBCUA 2 02/25/2020    BACTERIA Rare 01/19/2018    RBCUA 4 02/25/2020    BLOODU Negative 02/25/2020    SPECGRAV >1.030 02/25/2020    GLUCOSEU Negative 02/25/2020       Radiology:  XR CHEST STANDARD (2 VW)   Final Result   No acute process.          MRI CERVICAL SPINE WO CONTRAST    (Results Pending)           Assessment/Plan:    Active Hospital Problems    Diagnosis    Acute pain of left shoulder [M25.512]    DMII (diabetes mellitus, type 2) ([facility] [address] [E11.9]    Chest pain [R07.9]    Hypertensive urgency [I16.0]    Depression [F32.9]    CAD in native artery [I25.10]    Chronic obstructive pulmonary disease ([facility] [address] [J44.9]     Labile blood pressure  Admitted for HTN emergency was given oral BP meds now hypotensive  - I have to stop all BP meds due to BP dropped to 86/47  - nephrology consult, they have been following him and adjusting his BP meds  - check metanephrine  - hx of cocaine positive uds    Chest pain  - hx of CAD, EKG/trop neg  - card consulted  - chest pain resolved now    DMII  - sliding scale    Neck pain radiating to left shoulder  Likely cervical radiculopathy  - MRI C-spine  - lidocaine patch    Depression/insomnia  - remeron  - amitriptyline  - ambien PRN    DVT Prophylaxis: lovenox  Diet: DIET GENERAL;  Code Status: Full Code    PT/OT Eval Status: none    Dispo - wards    Jose Angel Hendrix DO

## 2020-03-20 NOTE — H&P
ARTHROSCOPY      VASCULAR SURGERY         Medications Prior to Admission:      Prior to Admission medications    Medication Sig Start Date End Date Taking? Authorizing Provider   mirtazapine (REMERON) 15 MG tablet Take 15 mg by mouth nightly 3/14/20  Yes Historical Provider, MD   carvedilol (COREG) 25 MG tablet Take 1 tablet by mouth 2 times daily (with meals) 3/2/20  Yes Lance Traylor MD   spironolactone (ALDACTONE) 25 MG tablet Take 1 tablet by mouth daily 3/3/20  Yes Lance Traylor MD   Magnesium Oxide (MAGNESIUM OXIDE 400) 240 MG PACK Take 1 capsule by mouth daily 3/2/20  Yes Lance Traylor MD   doxazosin (CARDURA) 2 MG tablet Take 1 tablet by mouth every 12 hours 2/13/20  Yes Paramjit Campos MD   hydrochlorothiazide (HYDRODIURIL) 25 MG tablet Take 1 tablet by mouth daily 2/13/20  Yes Paramjit Campos MD   hydrALAZINE (APRESOLINE) 100 MG tablet Take 1 tablet by mouth every 8 hours 2/13/20  Yes Paramjit Campos MD   lisinopril (PRINIVIL;ZESTRIL) 40 MG tablet Take 40 mg by mouth daily   Yes Historical Provider, MD   amitriptyline (ELAVIL) 50 MG tablet Take 50 mg by mouth 2 times daily   Yes Historical Provider, MD   vitamin B-12 (CYANOCOBALAMIN) 500 MCG tablet Take 500 mcg by mouth daily   Yes Historical Provider, MD   zolpidem (AMBIEN) 10 MG tablet Take 10 mg by mouth nightly. Yes Historical Provider, MD   vitamin D (D3-1000) 1000 UNITS TABS tablet Take 1 tablet by mouth daily 5/24/17  Yes Pricilla Waters MD   aspirin 81 MG tablet Take 81 mg by mouth daily   Yes Historical Provider, MD   acetaminophen (APAP EXTRA STRENGTH) 500 MG tablet Take 1 tablet by mouth every 6 hours as needed for Pain 3/2/20   Lance Bautista MD       Allergies:  Naproxen    Social History:      The patient currently lives at home alone. TOBACCO:   reports that he has quit smoking. His smoking use included cigarettes. He has a 100.00 pack-year smoking history.  He has never used smokeless

## 2020-03-20 NOTE — CARE COORDINATION
INITIAL CASE MANAGEMENT ASSESSMENT    Reviewed chart, met with patient to assess possible discharge needs. Explained Case Management role/services. Living Situation: pt lives in 2 story home, with bed and bath upstairs. Pt lives with brother who is wheelchair bound.       ADLs: independent pta.       DME: none      PT/OT Recs: none      Active Services: none      Transportation: brother will transport       Medications: pt lives in [place] and would like prescriptions filled here at [facility] if possible.       PCP: Javier Najera MD     HD/PD: none      PLAN/COMMENTS: Patient's goal is to return to home. SW/CM provided contact information for patient or family to call with any questions. SW/CM will follow and assist as needed. Electronically signed by Yancy Urias RN on 3/20/2020 at 3:27 PM

## 2020-03-21 LAB
GLUCOSE BLD-MCNC: 181 MG/DL (ref 70–99)
GLUCOSE BLD-MCNC: 211 MG/DL (ref 70–99)
GLUCOSE BLD-MCNC: 221 MG/DL (ref 70–99)
GLUCOSE BLD-MCNC: 247 MG/DL (ref 70–99)
PERFORMED ON: ABNORMAL

## 2020-03-21 PROCEDURE — 6360000002 HC RX W HCPCS: Performed by: INTERNAL MEDICINE

## 2020-03-21 PROCEDURE — G0378 HOSPITAL OBSERVATION PER HR: HCPCS

## 2020-03-21 PROCEDURE — 97140 MANUAL THERAPY 1/> REGIONS: CPT

## 2020-03-21 PROCEDURE — 94760 N-INVAS EAR/PLS OXIMETRY 1: CPT

## 2020-03-21 PROCEDURE — 97165 OT EVAL LOW COMPLEX 30 MIN: CPT

## 2020-03-21 PROCEDURE — 6370000000 HC RX 637 (ALT 250 FOR IP): Performed by: INTERNAL MEDICINE

## 2020-03-21 PROCEDURE — 2580000003 HC RX 258: Performed by: INTERNAL MEDICINE

## 2020-03-21 PROCEDURE — 97162 PT EVAL MOD COMPLEX 30 MIN: CPT

## 2020-03-21 PROCEDURE — 6370000000 HC RX 637 (ALT 250 FOR IP): Performed by: NURSE PRACTITIONER

## 2020-03-21 PROCEDURE — 96372 THER/PROPH/DIAG INJ SC/IM: CPT

## 2020-03-21 PROCEDURE — 97116 GAIT TRAINING THERAPY: CPT

## 2020-03-21 PROCEDURE — 6370000000 HC RX 637 (ALT 250 FOR IP): Performed by: HOSPITALIST

## 2020-03-21 PROCEDURE — 1200000000 HC SEMI PRIVATE

## 2020-03-21 RX ORDER — MORPHINE SULFATE 4 MG/ML
4 INJECTION, SOLUTION INTRAMUSCULAR; INTRAVENOUS EVERY 4 HOURS PRN
Status: DISCONTINUED | OUTPATIENT
Start: 2020-03-21 | End: 2020-03-23 | Stop reason: HOSPADM

## 2020-03-21 RX ORDER — CLONIDINE HYDROCHLORIDE 0.1 MG/1
0.1 TABLET ORAL EVERY 4 HOURS PRN
Status: DISCONTINUED | OUTPATIENT
Start: 2020-03-21 | End: 2020-03-23 | Stop reason: HOSPADM

## 2020-03-21 RX ORDER — HYDRALAZINE HYDROCHLORIDE 50 MG/1
50 TABLET, FILM COATED ORAL EVERY 8 HOURS SCHEDULED
Status: DISCONTINUED | OUTPATIENT
Start: 2020-03-21 | End: 2020-03-23 | Stop reason: HOSPADM

## 2020-03-21 RX ORDER — LISINOPRIL 40 MG/1
40 TABLET ORAL DAILY
Status: DISCONTINUED | OUTPATIENT
Start: 2020-03-21 | End: 2020-03-21

## 2020-03-21 RX ORDER — MORPHINE SULFATE 2 MG/ML
2 INJECTION, SOLUTION INTRAMUSCULAR; INTRAVENOUS EVERY 4 HOURS PRN
Status: DISCONTINUED | OUTPATIENT
Start: 2020-03-21 | End: 2020-03-21

## 2020-03-21 RX ORDER — LISINOPRIL 10 MG/1
10 TABLET ORAL DAILY
Status: DISCONTINUED | OUTPATIENT
Start: 2020-03-22 | End: 2020-03-23 | Stop reason: HOSPADM

## 2020-03-21 RX ORDER — HYDROCHLOROTHIAZIDE 25 MG/1
25 TABLET ORAL DAILY
Status: DISCONTINUED | OUTPATIENT
Start: 2020-03-21 | End: 2020-03-23 | Stop reason: HOSPADM

## 2020-03-21 RX ORDER — HYDRALAZINE HYDROCHLORIDE 20 MG/ML
10 INJECTION INTRAMUSCULAR; INTRAVENOUS EVERY 6 HOURS PRN
Status: DISCONTINUED | OUTPATIENT
Start: 2020-03-21 | End: 2020-03-23 | Stop reason: HOSPADM

## 2020-03-21 RX ORDER — CLONIDINE HYDROCHLORIDE 0.1 MG/1
0.1 TABLET ORAL ONCE
Status: COMPLETED | OUTPATIENT
Start: 2020-03-21 | End: 2020-03-21

## 2020-03-21 RX ORDER — SPIRONOLACTONE 25 MG/1
50 TABLET ORAL 2 TIMES DAILY
Status: DISCONTINUED | OUTPATIENT
Start: 2020-03-21 | End: 2020-03-23 | Stop reason: HOSPADM

## 2020-03-21 RX ADMIN — AMITRIPTYLINE HYDROCHLORIDE 50 MG: 50 TABLET, FILM COATED ORAL at 08:46

## 2020-03-21 RX ADMIN — INSULIN LISPRO 4 UNITS: 100 INJECTION, SOLUTION INTRAVENOUS; SUBCUTANEOUS at 08:46

## 2020-03-21 RX ADMIN — ENOXAPARIN SODIUM 40 MG: 40 INJECTION SUBCUTANEOUS at 08:45

## 2020-03-21 RX ADMIN — CYANOCOBALAMIN TAB 500 MCG 500 MCG: 500 TAB at 08:45

## 2020-03-21 RX ADMIN — INSULIN LISPRO 2 UNITS: 100 INJECTION, SOLUTION INTRAVENOUS; SUBCUTANEOUS at 12:15

## 2020-03-21 RX ADMIN — HYDRALAZINE HYDROCHLORIDE 50 MG: 50 TABLET, FILM COATED ORAL at 21:35

## 2020-03-21 RX ADMIN — Medication 400 MG: at 08:45

## 2020-03-21 RX ADMIN — ASPIRIN 81 MG: 81 TABLET, COATED ORAL at 08:46

## 2020-03-21 RX ADMIN — DOXAZOSIN 2 MG: 2 TABLET ORAL at 21:35

## 2020-03-21 RX ADMIN — SODIUM CHLORIDE, PRESERVATIVE FREE 10 ML: 5 INJECTION INTRAVENOUS at 21:35

## 2020-03-21 RX ADMIN — CARVEDILOL 25 MG: 25 TABLET, FILM COATED ORAL at 16:43

## 2020-03-21 RX ADMIN — HYDROCHLOROTHIAZIDE 25 MG: 25 TABLET ORAL at 08:46

## 2020-03-21 RX ADMIN — MORPHINE SULFATE 2 MG: 2 INJECTION, SOLUTION INTRAMUSCULAR; INTRAVENOUS at 11:31

## 2020-03-21 RX ADMIN — SPIRONOLACTONE 25 MG: 25 TABLET ORAL at 08:46

## 2020-03-21 RX ADMIN — SPIRONOLACTONE 50 MG: 25 TABLET ORAL at 17:10

## 2020-03-21 RX ADMIN — MORPHINE SULFATE 4 MG: 4 INJECTION, SOLUTION INTRAMUSCULAR; INTRAVENOUS at 18:47

## 2020-03-21 RX ADMIN — MIRTAZAPINE 15 MG: 15 TABLET, FILM COATED ORAL at 23:44

## 2020-03-21 RX ADMIN — HYDRALAZINE HYDROCHLORIDE 50 MG: 50 TABLET, FILM COATED ORAL at 06:43

## 2020-03-21 RX ADMIN — CARVEDILOL 25 MG: 25 TABLET, FILM COATED ORAL at 08:45

## 2020-03-21 RX ADMIN — MORPHINE SULFATE 4 MG: 4 INJECTION, SOLUTION INTRAMUSCULAR; INTRAVENOUS at 23:43

## 2020-03-21 RX ADMIN — AMITRIPTYLINE HYDROCHLORIDE 50 MG: 50 TABLET, FILM COATED ORAL at 23:44

## 2020-03-21 RX ADMIN — LISINOPRIL 40 MG: 40 TABLET ORAL at 08:45

## 2020-03-21 RX ADMIN — CLONIDINE HYDROCHLORIDE 0.1 MG: 0.1 TABLET ORAL at 17:10

## 2020-03-21 RX ADMIN — INSULIN LISPRO 2 UNITS: 100 INJECTION, SOLUTION INTRAVENOUS; SUBCUTANEOUS at 21:35

## 2020-03-21 RX ADMIN — ZOLPIDEM TARTRATE 10 MG: 5 TABLET ORAL at 23:43

## 2020-03-21 RX ADMIN — HYDRALAZINE HYDROCHLORIDE 50 MG: 50 TABLET, FILM COATED ORAL at 13:19

## 2020-03-21 RX ADMIN — CLONIDINE HYDROCHLORIDE 0.1 MG: 0.1 TABLET ORAL at 00:15

## 2020-03-21 RX ADMIN — DOXAZOSIN 2 MG: 2 TABLET ORAL at 08:46

## 2020-03-21 RX ADMIN — INSULIN LISPRO 4 UNITS: 100 INJECTION, SOLUTION INTRAVENOUS; SUBCUTANEOUS at 17:11

## 2020-03-21 ASSESSMENT — PAIN SCALES - GENERAL
PAINLEVEL_OUTOF10: 8
PAINLEVEL_OUTOF10: 9
PAINLEVEL_OUTOF10: 3
PAINLEVEL_OUTOF10: 7
PAINLEVEL_OUTOF10: 8
PAINLEVEL_OUTOF10: 5

## 2020-03-21 ASSESSMENT — PAIN DESCRIPTION - LOCATION
LOCATION: SHOULDER

## 2020-03-21 ASSESSMENT — PAIN DESCRIPTION - DESCRIPTORS
DESCRIPTORS: CONSTANT;DULL
DESCRIPTORS: CONSTANT
DESCRIPTORS: DISCOMFORT;DULL

## 2020-03-21 ASSESSMENT — PAIN DESCRIPTION - ORIENTATION
ORIENTATION: LEFT

## 2020-03-21 ASSESSMENT — PAIN DESCRIPTION - PAIN TYPE
TYPE: ACUTE PAIN

## 2020-03-21 ASSESSMENT — PAIN DESCRIPTION - DIRECTION: RADIATING_TOWARDS: NECK

## 2020-03-21 ASSESSMENT — PAIN DESCRIPTION - FREQUENCY: FREQUENCY: INTERMITTENT

## 2020-03-21 ASSESSMENT — PAIN DESCRIPTION - ONSET: ONSET: ON-GOING

## 2020-03-21 ASSESSMENT — PAIN DESCRIPTION - PROGRESSION: CLINICAL_PROGRESSION: GRADUALLY IMPROVING

## 2020-03-21 NOTE — PROGRESS NOTES
systems reviewed and negative except as in HPI    Objective:     Vitals: BP (!) 178/110   Pulse 91   Temp 98.1 °F (36.7 °C) (Oral)   Resp 17   Ht 6' (1.829 m)   Wt 246 lb 7.6 oz (111.8 kg)   SpO2 97%   BMI 33.43 kg/m²    Wt Readings from Last 3 Encounters:   03/21/20 246 lb 7.6 oz (111.8 kg)   03/02/20 245 lb 6 oz (111.3 kg)   02/13/20 243 lb 6.4 oz (110.4 kg)      24HR INTAKE/OUTPUT:      Intake/Output Summary (Last 24 hours) at 3/21/2020 1101  Last data filed at 3/21/2020 0851  Gross per 24 hour   Intake 1200 ml   Output --   Net 1200 ml     Constitutional:  awake, NAD  HEENT:  MMM, No icterus  Neck: no bruits, No JVD  Cardiovascular:  S1, S2 reg  Respiratory: CTA, no crackles  Abdomen:  +BS, soft, NT, ND  Ext: no lower extremity edema  Psychiatric: mood and affect anxious  Skin: dry/intact  CNS: alert, no agitation    IMAGING:  MRI CERVICAL SPINE WO CONTRAST   Final Result   Multilevel degenerative thecal sac narrowing and neural foraminal stenosis,   worst at C6-C7. XR CHEST STANDARD (2 VW)   Final Result   No acute process. Assessment :     1. HTN  -Blood pressure variable  -Blood pressure high on admission  -In past PRA, ary, metanephrine have been unremarkable  -Renal doppler in April 2019: No evidence of hemodynamically significant stenosis in either renal artery  -Reported history of obstructive sleep apnea but not on CPAP  -history of cocaine use in past    BP Readings from Last 1 Encounters:   03/21/20 (!) 178/110     2. CAD  -TTE (jan 2020): LVEF 55-60%, mild LVH. No mention about PASP  -medical management     3. DM  -Not on metformin    4. history of hypomagnesemia     Plan: Will increase aldactone to 50 mg po bID   Decrease lisinopril to 10 mg po daily   He needs to follow up in office on regular bases    -Monitor I/O, UOP  -Maintain MAP>65  -Avoid nephrotoxin, if able.   -Dose meds to current eGFR    Spoke to Jamshid Mayer DO     Thank you for allowing us to participate in care of Deven Kingsley . We will continue to follow. Feel free to contact me with any questions.       Caridad Noriega  3/21/2020    [facility] [address]  Office : [phone]  Fax :[phone]

## 2020-03-21 NOTE — PROGRESS NOTES
Restrictions  Restrictions/Precautions  Restrictions/Precautions: Fall Risk    Subjective   General  Chart Reviewed: Yes  Patient assessed for rehabilitation services?: Yes  Additional Pertinent Hx: 72 y.o. male with history of CAD s/p stent, HTN, DM II and COPD. Pt presents to hospital with chest pain. Initial enzymes/EKG negative. He had stress test at [address] on 01/09/2020 which was normal. Pt being treated for HTN emergency. Pt was recently hospitalized 2/25 to 3/02 for hypotension due to diarrhea. Family / Caregiver Present: No  Referring Practitioner: Dirk Augustin DO  Subjective  Subjective: Pt seen bedside and agreeable to therapy. Pt reporting pain/tingling L shoulder/neck with movement.    General Comment  Comments: Per RN ok for therapy    Social/Functional History  Social/Functional History  Lives With: Family(brother )  Type of Home: House  Home Layout: Two level, Able to Live on Main level with bedroom/bathroom, Laundry in basement  Home Access: Stairs to enter without rails  Entrance Stairs - Number of Steps: 2  Bathroom Shower/Tub: Walk-in shower, Tub/Shower unit  Bathroom Toilet: Standard  ADL Assistance: [address]: Independent  Homemaking Responsibilities: Yes  Ambulation Assistance: Independent  Transfer Assistance: Independent  Active : Yes  Occupation: Full time employment, Retired  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

## 2020-03-21 NOTE — PROGRESS NOTES
Physical Therapy  Co-Eval with OT   Initial Assessment / Treatment Note / Discharge Note    Room Number: Y6X-3576/4132-01  NAME: German Amaro  : Medical Record Number: [id number]  MRN: [id number]    ASSESSMENT: German Amaro is a 72 y.o. M who prsented to [facility] ED 3/20/20 with \"left side upper chest pain feels like pressure. \" Found to have elevated BP. Prior to admit pt was living with his brother and ambulating independently. Today he is ambulating independently. Physical exam was positive for reproduction of his L arm numbness/tingling/pain with cervical spine movement into cervical extension/SB/rotation. No upper motor neuron signs present on my exam. His symptoms seem amenable to outpatient physical therapy treatment but patient states he can't afford outpatient PT. I instructed him on exercises. Will sign off. Discharge Recommendations: Patient would benefit from continued therapy after discharge, Home independently(would benefit from outpatient physical therapy but pt states he cannot afford)  Equipment Needs: Equipment Needed: No    Date of Service: 20       Patient Diagnosis(es): The primary encounter diagnosis was Chest pain, unspecified type. Diagnoses of Tachycardia and Elevated blood pressure reading with diagnosis of hypertension were also pertinent to this visit. Past Medical History:  has a past medical history of CAD (coronary artery disease), Chest pain, Cocaine abuse ([address] COPD (chronic obstructive pulmonary disease) ([facility] [address] Diabetes mellitus ([facility] [address] Hyperlipidemia, Hypertension, Kidney stone, MRSA colonization, and Sleep apnea. Past Surgical History:  has a past surgical history that includes Cholecystectomy; Shoulder arthroscopy; Colonoscopy; Coronary angioplasty with stent; Lithotripsy; Abdomen surgery; fracture surgery; Cardiac surgery; and vascular surgery.         Restrictions  Restrictions/Precautions: Fall Risk    Vision/Hearing  Vision: Impaired  Vision Exceptions: Wears glasses at all times  Hearing: Within functional limits    SUBJECTIVE:  Chart Reviewed: Yes  Patient assessed for rehabilitation services?: Yes  Additional Pertinent Hx: Severo Decamp is a 72 y.o. M who prsented to [facility] ED 3/20/20 with \"left side upper chest pain feels like pressure. \" Found to have elevated BP. Referring Practitioner: Gini Andrews DO  Diagnosis: Chest pain, L shoulder pain, hypertensive urgency     Subjective: Pt reports L arm numbness/tingling with neck ext and neck L side bending. Pt pleasant and willing to get up. Patient Currently in Pain: Denies  Pain Assessment: 0-10  Pain Level: 8  Gay-Baker Pain Rating: No hurt  Patient's Stated Pain Goal: No pain  Non-Pharmaceutical Pain Intervention(s): Rest     Orientation  Overall Orientation Status: Within Functional Limits  Orientation Level: Oriented X4    Social/Functional History  Social/Functional History  Lives With: Family(brother )  Type of Home: House  Home Layout: Two level, Able to Live on Main level with bedroom/bathroom, Laundry in basement  Home Access: Stairs to enter without rails  Entrance Stairs - Number of Steps: 2  Bathroom Shower/Tub: Walk-in shower, Tub/Shower unit  Bathroom Toilet: Standard  ADL Assistance: Independent  Homemaking Assistance: Independent  Homemaking Responsibilities: Yes  Ambulation Assistance: Independent  Transfer Assistance: Independent  Active : Yes  Occupation: Full time employment, Retired  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

## 2020-03-21 NOTE — PROGRESS NOTES
round, and reactive to light. Conjunctivae/corneas clear. Neck: Supple, with full range of motion. No jugular venous distention. Trachea midline. Respiratory:  Normal respiratory effort. Clear to auscultation, bilaterally without Rales/Wheezes/Rhonchi. Cardiovascular: Regular rate and rhythm with normal S1/S2 without murmurs, rubs or gallops. Abdomen: Soft, non-tender, non-distended with normal bowel sounds. Musculoskeletal: No clubbing, cyanosis or edema bilaterally. Full range of motion without deformity. Skin: Skin color, texture, turgor normal.  No rashes or lesions. Neurologic:  Neurovascularly intact without any focal sensory/motor deficits. Cranial nerves: II-XII intact, grossly non-focal.  Psychiatric: Alert and oriented, thought content appropriate, normal insight  Capillary Refill: Brisk,< 3 seconds   Peripheral Pulses: +2 palpable, equal bilaterally       Labs:   Recent Labs     03/19/20 1928   WBC 5.7   HGB 12.6*   HCT 38.6*        Recent Labs     03/19/20 1928      K 4.0   CL 99   CO2 23   BUN 24*   CREATININE 1.0   CALCIUM 9.2     Recent Labs     03/19/20 1928   AST 18   ALT 25   BILITOT <0.2   ALKPHOS 134*     No results for input(s): INR in the last 72 hours. Recent Labs     03/19/20 1928 03/19/20 2130   Anastasiia Rocha <0.01 <0.01       Urinalysis:      Lab Results   Component Value Date    NITRU Negative 02/25/2020    WBCUA 2 02/25/2020    BACTERIA Rare 01/19/2018    RBCUA 4 02/25/2020    BLOODU Negative 02/25/2020    SPECGRAV >1.030 02/25/2020    GLUCOSEU Negative 02/25/2020       Radiology:  MRI CERVICAL SPINE WO CONTRAST   Final Result   Multilevel degenerative thecal sac narrowing and neural foraminal stenosis,   worst at C6-C7. XR CHEST STANDARD (2 VW)   Final Result   No acute process.                  Assessment/Plan:    Active Hospital Problems    Diagnosis    Acute pain of left shoulder [M25.512]    DMII (diabetes mellitus, type 2) ([facility] [address] [E11.9]    Chest pain [R07.9]    Hypertensive urgency [I16.0]    Depression [F32.9]    Cocaine abuse in remission ([facility] [address] [F14.11]    CAD in native artery [I25.10]    Hyperlipidemia LDL goal <70 [E78.5]    Chronic obstructive pulmonary disease (HCC) [J44.9]     Labile blood pressure  Admitted for HTN emergency then became hypotensive, now HTN to 200 again  - I have resumed all BP meds   - nephrology consult, they have been following him and adjusting his BP meds  - check metanephrine  - hx of cocaine positive uds, but patient said he no longer does drugs    Atypical Chest pain due to Radiculopathy in shoulder   - hx of CAD, EKG/trop neg  - card consulted, does not think this is true angina  - chest pain resolved now  He has had many negative stress perfusions studies, the most recent one in January of this year  - follow up with Dr. Ifeoma Reyes at [facility].      DMII  - sliding scale    Neck pain radiating to left shoulder  Likely cervical radiculopathy  - MRI C-spine showed some stenosis  - lidocaine patch  - neuro surgeon consult  - morphine IV PRN    Depression/insomnia  - remeron  - amitriptyline  - ambien PRN    DVT Prophylaxis: lovenox  Diet: DIET GENERAL;  Code Status: Full Code    PT/OT Eval Status: none    Dispo - wards    Troy Carey DO

## 2020-03-22 LAB
ANION GAP SERPL CALCULATED.3IONS-SCNC: 15 MMOL/L (ref 3–16)
BUN BLDV-MCNC: 22 MG/DL (ref 7–20)
CALCIUM SERPL-MCNC: 8.4 MG/DL (ref 8.3–10.6)
CHLORIDE BLD-SCNC: 99 MMOL/L (ref 99–110)
CO2: 23 MMOL/L (ref 21–32)
CREAT SERPL-MCNC: 1 MG/DL (ref 0.8–1.3)
GFR AFRICAN AMERICAN: >60
GFR NON-AFRICAN AMERICAN: >60
GLUCOSE BLD-MCNC: 153 MG/DL (ref 70–99)
GLUCOSE BLD-MCNC: 188 MG/DL (ref 70–99)
GLUCOSE BLD-MCNC: 212 MG/DL (ref 70–99)
GLUCOSE BLD-MCNC: 240 MG/DL (ref 70–99)
GLUCOSE BLD-MCNC: 247 MG/DL (ref 70–99)
PERFORMED ON: ABNORMAL
POTASSIUM SERPL-SCNC: 5.2 MMOL/L (ref 3.5–5.1)
REASON FOR REJECTION: NORMAL
REJECTED TEST: NORMAL
SODIUM BLD-SCNC: 137 MMOL/L (ref 136–145)

## 2020-03-22 PROCEDURE — 2580000003 HC RX 258: Performed by: INTERNAL MEDICINE

## 2020-03-22 PROCEDURE — 6370000000 HC RX 637 (ALT 250 FOR IP): Performed by: INTERNAL MEDICINE

## 2020-03-22 PROCEDURE — 6370000000 HC RX 637 (ALT 250 FOR IP): Performed by: NURSE PRACTITIONER

## 2020-03-22 PROCEDURE — 94760 N-INVAS EAR/PLS OXIMETRY 1: CPT

## 2020-03-22 PROCEDURE — 84132 ASSAY OF SERUM POTASSIUM: CPT

## 2020-03-22 PROCEDURE — 36415 COLL VENOUS BLD VENIPUNCTURE: CPT

## 2020-03-22 PROCEDURE — 1200000000 HC SEMI PRIVATE

## 2020-03-22 PROCEDURE — 6370000000 HC RX 637 (ALT 250 FOR IP): Performed by: HOSPITALIST

## 2020-03-22 PROCEDURE — 6360000002 HC RX W HCPCS: Performed by: INTERNAL MEDICINE

## 2020-03-22 PROCEDURE — 80048 BASIC METABOLIC PNL TOTAL CA: CPT

## 2020-03-22 RX ADMIN — INSULIN LISPRO 2 UNITS: 100 INJECTION, SOLUTION INTRAVENOUS; SUBCUTANEOUS at 17:25

## 2020-03-22 RX ADMIN — ZOLPIDEM TARTRATE 10 MG: 5 TABLET ORAL at 20:22

## 2020-03-22 RX ADMIN — DOXAZOSIN 2 MG: 2 TABLET ORAL at 20:22

## 2020-03-22 RX ADMIN — CARVEDILOL 25 MG: 25 TABLET, FILM COATED ORAL at 07:48

## 2020-03-22 RX ADMIN — HYDRALAZINE HYDROCHLORIDE 50 MG: 50 TABLET, FILM COATED ORAL at 13:47

## 2020-03-22 RX ADMIN — MIRTAZAPINE 15 MG: 15 TABLET, FILM COATED ORAL at 20:22

## 2020-03-22 RX ADMIN — LISINOPRIL 10 MG: 10 TABLET ORAL at 07:49

## 2020-03-22 RX ADMIN — SODIUM CHLORIDE, PRESERVATIVE FREE 10 ML: 5 INJECTION INTRAVENOUS at 20:23

## 2020-03-22 RX ADMIN — MORPHINE SULFATE 4 MG: 4 INJECTION, SOLUTION INTRAMUSCULAR; INTRAVENOUS at 12:17

## 2020-03-22 RX ADMIN — HYDRALAZINE HYDROCHLORIDE 50 MG: 50 TABLET, FILM COATED ORAL at 20:22

## 2020-03-22 RX ADMIN — CARVEDILOL 25 MG: 25 TABLET, FILM COATED ORAL at 17:26

## 2020-03-22 RX ADMIN — Medication 400 MG: at 07:48

## 2020-03-22 RX ADMIN — HYDRALAZINE HYDROCHLORIDE 50 MG: 50 TABLET, FILM COATED ORAL at 06:05

## 2020-03-22 RX ADMIN — AMITRIPTYLINE HYDROCHLORIDE 50 MG: 50 TABLET, FILM COATED ORAL at 07:48

## 2020-03-22 RX ADMIN — SPIRONOLACTONE 50 MG: 25 TABLET ORAL at 17:26

## 2020-03-22 RX ADMIN — HYDROCHLOROTHIAZIDE 25 MG: 25 TABLET ORAL at 07:48

## 2020-03-22 RX ADMIN — INSULIN LISPRO 4 UNITS: 100 INJECTION, SOLUTION INTRAVENOUS; SUBCUTANEOUS at 12:17

## 2020-03-22 RX ADMIN — ENOXAPARIN SODIUM 40 MG: 40 INJECTION SUBCUTANEOUS at 07:49

## 2020-03-22 RX ADMIN — INSULIN LISPRO 2 UNITS: 100 INJECTION, SOLUTION INTRAVENOUS; SUBCUTANEOUS at 08:00

## 2020-03-22 RX ADMIN — AMITRIPTYLINE HYDROCHLORIDE 50 MG: 50 TABLET, FILM COATED ORAL at 20:22

## 2020-03-22 RX ADMIN — ASPIRIN 81 MG: 81 TABLET, COATED ORAL at 07:48

## 2020-03-22 RX ADMIN — SODIUM CHLORIDE, PRESERVATIVE FREE 10 ML: 5 INJECTION INTRAVENOUS at 12:18

## 2020-03-22 RX ADMIN — DOXAZOSIN 2 MG: 2 TABLET ORAL at 07:48

## 2020-03-22 RX ADMIN — MORPHINE SULFATE 4 MG: 4 INJECTION, SOLUTION INTRAMUSCULAR; INTRAVENOUS at 18:09

## 2020-03-22 RX ADMIN — SPIRONOLACTONE 50 MG: 25 TABLET ORAL at 07:48

## 2020-03-22 RX ADMIN — INSULIN LISPRO 2 UNITS: 100 INJECTION, SOLUTION INTRAVENOUS; SUBCUTANEOUS at 02:00

## 2020-03-22 RX ADMIN — MORPHINE SULFATE 4 MG: 4 INJECTION, SOLUTION INTRAMUSCULAR; INTRAVENOUS at 07:44

## 2020-03-22 RX ADMIN — CYANOCOBALAMIN TAB 500 MCG 500 MCG: 500 TAB at 07:48

## 2020-03-22 ASSESSMENT — PAIN SCALES - GENERAL
PAINLEVEL_OUTOF10: 5
PAINLEVEL_OUTOF10: 5
PAINLEVEL_OUTOF10: 8
PAINLEVEL_OUTOF10: 6
PAINLEVEL_OUTOF10: 7
PAINLEVEL_OUTOF10: 7
PAINLEVEL_OUTOF10: 5

## 2020-03-22 ASSESSMENT — PAIN DESCRIPTION - LOCATION
LOCATION: SHOULDER
LOCATION: SHOULDER

## 2020-03-22 ASSESSMENT — PAIN DESCRIPTION - ORIENTATION
ORIENTATION: LEFT
ORIENTATION: LEFT

## 2020-03-22 ASSESSMENT — PAIN DESCRIPTION - ONSET: ONSET: ON-GOING

## 2020-03-22 ASSESSMENT — PAIN DESCRIPTION - FREQUENCY: FREQUENCY: CONTINUOUS

## 2020-03-22 ASSESSMENT — PAIN DESCRIPTION - PAIN TYPE
TYPE: ACUTE PAIN
TYPE: ACUTE PAIN

## 2020-03-22 ASSESSMENT — PAIN - FUNCTIONAL ASSESSMENT: PAIN_FUNCTIONAL_ASSESSMENT: PREVENTS OR INTERFERES SOME ACTIVE ACTIVITIES AND ADLS

## 2020-03-22 ASSESSMENT — PAIN DESCRIPTION - PROGRESSION: CLINICAL_PROGRESSION: GRADUALLY WORSENING

## 2020-03-22 ASSESSMENT — PAIN DESCRIPTION - DIRECTION: RADIATING_TOWARDS: LEFT ARM

## 2020-03-22 ASSESSMENT — PAIN DESCRIPTION - DESCRIPTORS: DESCRIPTORS: BURNING;CONSTANT;TINGLING

## 2020-03-22 NOTE — PROGRESS NOTES
Hospitalist Progress Note      PCP: Pankaj Clancy MD    Date of Admission: 3/19/2020    Chief Complaint: chest pain    Hospital Course: Tito Blankenship is 72 y.o. male who presented with complaint of chest pain. Symptom onset was acute for a time period of 1 day. The severity is described as moderate. The course of his symptoms over time is constant. The symptoms improved with none and worsened with none. The patient's symptom is associated with generalized weakness. Subjective: feels better now, no more chest pain.         Medications:  Reviewed    Infusion Medications    dextrose       Scheduled Medications    hydrALAZINE  50 mg Oral 3 times per day    hydroCHLOROthiazide  25 mg Oral Daily    spironolactone  50 mg Oral BID    lisinopril  10 mg Oral Daily    aspirin  81 mg Oral Daily    amitriptyline  50 mg Oral BID    mirtazapine  15 mg Oral Nightly    vitamin B-12  500 mcg Oral Daily    magnesium oxide  400 mg Oral Daily    sodium chloride flush  10 mL Intravenous 2 times per day    enoxaparin  40 mg Subcutaneous Daily    zolpidem  10 mg Oral Nightly    insulin lispro  0-12 Units Subcutaneous TID WC    insulin lispro  0-6 Units Subcutaneous Nightly    lidocaine  1 patch Transdermal Daily    carvedilol  25 mg Oral BID WC    doxazosin  2 mg Oral 2 times per day     PRN Meds: hydrALAZINE, cloNIDine, morphine, sodium chloride flush, acetaminophen **OR** acetaminophen, polyethylene glycol, promethazine **OR** ondansetron, LORazepam, glucose, dextrose, glucagon (rDNA), dextrose      Intake/Output Summary (Last 24 hours) at 3/22/2020 1015  Last data filed at 3/21/2020 1815  Gross per 24 hour   Intake 960 ml   Output --   Net 960 ml       Physical Exam Performed:    BP (!) 155/81   Pulse 93   Temp 98.3 °F (36.8 °C) (Oral)   Resp 22   Ht 6' (1.829 m)   Wt 255 lb 11.7 oz (116 kg)   SpO2 94%   BMI 34.68 kg/m²     General appearance: No apparent distress, appears stated age and cooperative. HEENT: Pupils equal, round, and reactive to light. Conjunctivae/corneas clear. Neck: Supple, with full range of motion. No jugular venous distention. Trachea midline. Respiratory:  Normal respiratory effort. Clear to auscultation, bilaterally without Rales/Wheezes/Rhonchi. Cardiovascular: Regular rate and rhythm with normal S1/S2 without murmurs, rubs or gallops. Abdomen: Soft, non-tender, non-distended with normal bowel sounds. Musculoskeletal: No clubbing, cyanosis or edema bilaterally. Full range of motion without deformity. Skin: Skin color, texture, turgor normal.  No rashes or lesions. Neurologic:  Neurovascularly intact without any focal sensory/motor deficits. Cranial nerves: II-XII intact, grossly non-focal.  Psychiatric: Alert and oriented, thought content appropriate, normal insight  Capillary Refill: Brisk,< 3 seconds   Peripheral Pulses: +2 palpable, equal bilaterally       Labs:   Recent Labs     03/19/20 1928   WBC 5.7   HGB 12.6*   HCT 38.6*        Recent Labs     03/19/20 1928      K 4.0   CL 99   CO2 23   BUN 24*   CREATININE 1.0   CALCIUM 9.2     Recent Labs     03/19/20 1928   AST 18   ALT 25   BILITOT <0.2   ALKPHOS 134*     No results for input(s): INR in the last 72 hours. Recent Labs     03/19/20 1928 03/19/20 2130   Arelia Natali <0.01 <0.01       Urinalysis:      Lab Results   Component Value Date    NITRU Negative 02/25/2020    WBCUA 2 02/25/2020    BACTERIA Rare 01/19/2018    RBCUA 4 02/25/2020    BLOODU Negative 02/25/2020    SPECGRAV >1.030 02/25/2020    GLUCOSEU Negative 02/25/2020       Radiology:  MRI CERVICAL SPINE WO CONTRAST   Final Result   Multilevel degenerative thecal sac narrowing and neural foraminal stenosis,   worst at C6-C7. XR CHEST STANDARD (2 VW)   Final Result   No acute process.                  Assessment/Plan:    Active Hospital Problems    Diagnosis    Acute pain of left shoulder [M25.512]    DMII (diabetes mellitus, type 2) ([facility] [address] [E11.9]    Hypertensive emergency [I16.1]    Chest pain [R07.9]    Hypertensive urgency [I16.0]    Depression [F32.9]    Cocaine abuse in remission ([facility] [address] [F14.11]    CAD in native artery [I25.10]    Hyperlipidemia LDL goal <70 [E78.5]    Chronic obstructive pulmonary disease (HCC) [J44.9]     Labile blood pressure  Admitted for HTN emergency then became hypotensive, now HTN to 200 again  - I have resumed all BP meds   - nephrology consult, they have been following him and adjusting his BP meds  - check metanephrine  - hx of cocaine positive uds, but patient said he no longer does drugs  - BP still on the high side, will continue in patient today, can dicharge tomorrow if bp more stable    Atypical Chest pain due to Radiculopathy in shoulder   - hx of CAD, EKG/trop neg  - card consulted, does not think this is true angina  - chest pain resolved now  He has had many negative stress perfusions studies, the most recent one in January of this year  - follow up with Dr. Josesito Perez at [facility].      DMII  - sliding scale    Neck pain radiating to left shoulder  Likely cervical radiculopathy  - MRI C-spine showed some stenosis  - lidocaine patch  - neuro surgeon consult recommend PT/OT for now, ESR at C6-C7 as out patient  - morphine IV PRN    Depression/insomnia  - remeron  - amitriptyline  - ambien PRN    DVT Prophylaxis: lovenox  Diet: DIET GENERAL;  Code Status: Full Code    PT/OT Eval Status: none    Dispo - wards    Franchesca Mehta DO

## 2020-03-22 NOTE — PROGRESS NOTES
Office: [phone]       Fax: [phone]      Nephrology progress Note        Chief Complaint:  chest pain      History of Present Illness:      Maria Ines Lora is a 72 y.o. male with PMHx of hypertension, diabetes mellitus, coronary artery disease, COPD, substance abuse (cocaine in past) who was hospitalized on 3/19/2020 with complaints of chest pain. Patient is well known to us from his previous hospitalization most recently on 2/25 with diarrhea. Patient has history of refractory hypertension and has been previously hospitalized for hypertensive crisis. Reported chest pain and left shoulder pain. Had high blood pressure at home, SBP>200. No shortness of breath. Received doxazocin, coreg, ativan overnight. blood pressure in 90s this AM.  No diarrhea, nausea or vomiting. Denies non compliance with meds. Previous workup for secondary hypertension has been unremarkable. No unintentional wt loss. Interval HX     BP slightly better controlled   No headaches     Past Medical History:   Diagnosis Date    CAD (coronary artery disease)     Chest pain 10/15/2017    [facility] abuse ([facility] [address]     COPD (chronic obstructive pulmonary disease) ([facility] [address]     Diabetes mellitus ([facility] [address]     Hyperlipidemia     Hypertension     Kidney stone     MRSA colonization 4/16/19; 03/24/2019    Sleep apnea     He said he doesn't use a CPAP machine.        Past Surgical History:   Procedure Laterality Date    ABDOMEN SURGERY      CARDIAC SURGERY      3 STENTS PLACED     CHOLECYSTECTOMY      COLONOSCOPY      CORONARY ANGIOPLASTY WITH STENT PLACEMENT      3 stents     FRACTURE SURGERY      RIGHT ANKLE    LITHOTRIPSY      SHOULDER ARTHROSCOPY      VASCULAR SURGERY         Family History   Problem Relation Age of Onset    High Blood Pressure Mother     Cancer Father         lung cancer    Diabetes Brother     Liver Disease Sister     Diabetes Brother         reports that he has quit smoking. His smoking use included cigarettes. He has a 100.00 pack-year smoking history. He has never used smokeless tobacco. He reports current drug use. Drug: Cocaine. He reports that he does not drink alcohol. Medications: Allergies:  Naproxen    Scheduled Meds:   hydrALAZINE  50 mg Oral 3 times per day    hydroCHLOROthiazide  25 mg Oral Daily    spironolactone  50 mg Oral BID    lisinopril  10 mg Oral Daily    aspirin  81 mg Oral Daily    amitriptyline  50 mg Oral BID    mirtazapine  15 mg Oral Nightly    vitamin B-12  500 mcg Oral Daily    magnesium oxide  400 mg Oral Daily    sodium chloride flush  10 mL Intravenous 2 times per day    enoxaparin  40 mg Subcutaneous Daily    zolpidem  10 mg Oral Nightly    insulin lispro  0-12 Units Subcutaneous TID WC    insulin lispro  0-6 Units Subcutaneous Nightly    lidocaine  1 patch Transdermal Daily    carvedilol  25 mg Oral BID WC    doxazosin  2 mg Oral 2 times per day     Continuous Infusions:   dextrose         Labs:  CBC:   Recent Labs     03/19/20  1928   WBC 5.7   HGB 12.6*        Lab Results   Component Value Date     03/19/2020    K 4.0 03/19/2020    CL 99 03/19/2020    CO2 23 03/19/2020    BUN 24 (H) 03/19/2020    CREATININE 1.0 03/19/2020    GLUCOSE 209 (H) 03/19/2020    CALCIUM 9.2 03/19/2020    PROT 6.5 03/19/2020    LABALBU 3.8 03/19/2020    BILITOT <0.2 03/19/2020    ALKPHOS 134 (H) 03/19/2020    AST 18 03/19/2020    ALT 25 03/19/2020    LABGLOM >60 03/19/2020    GFRAA >60 03/19/2020    AGRATIO 1.4 03/19/2020    GLOB 2.7 03/19/2020       UA:  Recent Labs     03/20/20  1730   PHUR 5.0      Urine Microscopic: No results for input(s): LABCAST, BACTERIA, COMU, HYALCAST, WBCUA, RBCUA, EPIU in the last 72 hours. Urine Chemistry: No results for input(s): [place], PROTEINUR, NAUR in the last 72 hours.       ROS:

## 2020-03-22 NOTE — PROGRESS NOTES
Patient deferred family phone call for daily update. States he has spoken to multiple family members on the phone.

## 2020-03-23 VITALS
OXYGEN SATURATION: 95 % | SYSTOLIC BLOOD PRESSURE: 175 MMHG | HEIGHT: 72 IN | RESPIRATION RATE: 16 BRPM | TEMPERATURE: 98.2 F | HEART RATE: 105 BPM | DIASTOLIC BLOOD PRESSURE: 111 MMHG | WEIGHT: 257.72 LBS | BODY MASS INDEX: 34.91 KG/M2

## 2020-03-23 LAB
GLUCOSE BLD-MCNC: 163 MG/DL (ref 70–99)
GLUCOSE BLD-MCNC: 195 MG/DL (ref 70–99)
GLUCOSE BLD-MCNC: 278 MG/DL (ref 70–99)
PERFORMED ON: ABNORMAL

## 2020-03-23 PROCEDURE — 6370000000 HC RX 637 (ALT 250 FOR IP): Performed by: INTERNAL MEDICINE

## 2020-03-23 PROCEDURE — 6370000000 HC RX 637 (ALT 250 FOR IP): Performed by: NURSE PRACTITIONER

## 2020-03-23 PROCEDURE — 94760 N-INVAS EAR/PLS OXIMETRY 1: CPT

## 2020-03-23 PROCEDURE — 6360000002 HC RX W HCPCS: Performed by: INTERNAL MEDICINE

## 2020-03-23 PROCEDURE — 6370000000 HC RX 637 (ALT 250 FOR IP): Performed by: HOSPITALIST

## 2020-03-23 RX ORDER — HYDROCODONE BITARTRATE AND ACETAMINOPHEN 5; 325 MG/1; MG/1
1 TABLET ORAL EVERY 6 HOURS PRN
Status: DISCONTINUED | OUTPATIENT
Start: 2020-03-23 | End: 2020-03-23 | Stop reason: HOSPADM

## 2020-03-23 RX ORDER — HYDROCODONE BITARTRATE AND ACETAMINOPHEN 5; 325 MG/1; MG/1
1 TABLET ORAL EVERY 6 HOURS PRN
Qty: 12 TABLET | Refills: 0 | Status: SHIPPED | OUTPATIENT
Start: 2020-03-23 | End: 2020-03-26

## 2020-03-23 RX ORDER — LISINOPRIL 10 MG/1
10 TABLET ORAL DAILY
Qty: 30 TABLET | Refills: 3 | Status: ON HOLD | OUTPATIENT
Start: 2020-03-24 | End: 2020-10-06 | Stop reason: SDUPTHER

## 2020-03-23 RX ORDER — SPIRONOLACTONE 50 MG/1
50 TABLET, FILM COATED ORAL 2 TIMES DAILY
Qty: 30 TABLET | Refills: 3 | Status: SHIPPED | OUTPATIENT
Start: 2020-03-23 | End: 2020-04-21

## 2020-03-23 RX ADMIN — AMITRIPTYLINE HYDROCHLORIDE 50 MG: 50 TABLET, FILM COATED ORAL at 08:08

## 2020-03-23 RX ADMIN — INSULIN LISPRO 2 UNITS: 100 INJECTION, SOLUTION INTRAVENOUS; SUBCUTANEOUS at 08:08

## 2020-03-23 RX ADMIN — HYDRALAZINE HYDROCHLORIDE 50 MG: 50 TABLET, FILM COATED ORAL at 13:46

## 2020-03-23 RX ADMIN — HYDROCODONE BITARTRATE AND ACETAMINOPHEN 1 TABLET: 5; 325 TABLET ORAL at 08:13

## 2020-03-23 RX ADMIN — Medication 400 MG: at 08:08

## 2020-03-23 RX ADMIN — HYDROCHLOROTHIAZIDE 25 MG: 25 TABLET ORAL at 08:08

## 2020-03-23 RX ADMIN — CYANOCOBALAMIN TAB 500 MCG 500 MCG: 500 TAB at 08:08

## 2020-03-23 RX ADMIN — LORAZEPAM 0.5 MG: 0.5 TABLET ORAL at 09:25

## 2020-03-23 RX ADMIN — CARVEDILOL 25 MG: 25 TABLET, FILM COATED ORAL at 08:08

## 2020-03-23 RX ADMIN — HYDRALAZINE HYDROCHLORIDE 50 MG: 50 TABLET, FILM COATED ORAL at 05:49

## 2020-03-23 RX ADMIN — LISINOPRIL 10 MG: 10 TABLET ORAL at 08:08

## 2020-03-23 RX ADMIN — HYDROCODONE BITARTRATE AND ACETAMINOPHEN 1 TABLET: 5; 325 TABLET ORAL at 16:16

## 2020-03-23 RX ADMIN — DOXAZOSIN 2 MG: 2 TABLET ORAL at 08:08

## 2020-03-23 RX ADMIN — LORAZEPAM 0.5 MG: 0.5 TABLET ORAL at 16:16

## 2020-03-23 RX ADMIN — ASPIRIN 81 MG: 81 TABLET, COATED ORAL at 08:08

## 2020-03-23 RX ADMIN — SPIRONOLACTONE 50 MG: 25 TABLET ORAL at 16:16

## 2020-03-23 RX ADMIN — SPIRONOLACTONE 50 MG: 25 TABLET ORAL at 08:08

## 2020-03-23 RX ADMIN — LORAZEPAM 0.5 MG: 0.5 TABLET ORAL at 00:49

## 2020-03-23 RX ADMIN — INSULIN LISPRO 2 UNITS: 100 INJECTION, SOLUTION INTRAVENOUS; SUBCUTANEOUS at 11:53

## 2020-03-23 RX ADMIN — MORPHINE SULFATE 4 MG: 4 INJECTION, SOLUTION INTRAMUSCULAR; INTRAVENOUS at 00:49

## 2020-03-23 RX ADMIN — CARVEDILOL 25 MG: 25 TABLET, FILM COATED ORAL at 16:16

## 2020-03-23 RX ADMIN — ENOXAPARIN SODIUM 40 MG: 40 INJECTION SUBCUTANEOUS at 08:08

## 2020-03-23 ASSESSMENT — PAIN DESCRIPTION - PROGRESSION
CLINICAL_PROGRESSION: NOT CHANGED
CLINICAL_PROGRESSION: GRADUALLY WORSENING
CLINICAL_PROGRESSION: NOT CHANGED

## 2020-03-23 ASSESSMENT — PAIN DESCRIPTION - ONSET
ONSET: ON-GOING

## 2020-03-23 ASSESSMENT — PAIN - FUNCTIONAL ASSESSMENT
PAIN_FUNCTIONAL_ASSESSMENT: PREVENTS OR INTERFERES SOME ACTIVE ACTIVITIES AND ADLS

## 2020-03-23 ASSESSMENT — PAIN DESCRIPTION - FREQUENCY
FREQUENCY: CONTINUOUS

## 2020-03-23 ASSESSMENT — PAIN DESCRIPTION - PAIN TYPE
TYPE: ACUTE PAIN

## 2020-03-23 ASSESSMENT — PAIN SCALES - GENERAL
PAINLEVEL_OUTOF10: 8
PAINLEVEL_OUTOF10: 7
PAINLEVEL_OUTOF10: 0
PAINLEVEL_OUTOF10: 7
PAINLEVEL_OUTOF10: 5

## 2020-03-23 ASSESSMENT — PAIN DESCRIPTION - DIRECTION
RADIATING_TOWARDS: L ARM

## 2020-03-23 ASSESSMENT — PAIN DESCRIPTION - ORIENTATION
ORIENTATION: LEFT

## 2020-03-23 ASSESSMENT — PAIN DESCRIPTION - LOCATION
LOCATION: SHOULDER;BACK
LOCATION: SHOULDER
LOCATION: BACK;SHOULDER

## 2020-03-23 ASSESSMENT — PAIN DESCRIPTION - DESCRIPTORS
DESCRIPTORS: BURNING;DISCOMFORT;DULL

## 2020-03-23 NOTE — PROGRESS NOTES
and rhythm with normal S1/S2 without murmurs, rubs or gallops. Abdomen: Soft, non-tender, non-distended with normal bowel sounds. Musculoskeletal: No clubbing, cyanosis or edema bilaterally. Full range of motion without deformity. Skin: Skin color, texture, turgor normal.  No rashes or lesions. Neurologic:  Neurovascularly intact without any focal sensory/motor deficits. Cranial nerves: II-XII intact, grossly non-focal.  Psychiatric: Alert and oriented, thought content appropriate, normal insight  Capillary Refill: Brisk,< 3 seconds   Peripheral Pulses: +2 palpable, equal bilaterally       Labs:   No results for input(s): WBC, HGB, HCT, PLT in the last 72 hours. Recent Labs     03/22/20  0926 03/22/20  1718     --    K  --  5.2*   CL 99  --    CO2 23  --    BUN 22*  --    CREATININE 1.0  --    CALCIUM 8.4  --      No results for input(s): AST, ALT, BILIDIR, BILITOT, ALKPHOS in the last 72 hours. No results for input(s): INR in the last 72 hours. No results for input(s): Alferd Gerda in the last 72 hours. Urinalysis:      Lab Results   Component Value Date    NITRU Negative 02/25/2020    WBCUA 2 02/25/2020    BACTERIA Rare 01/19/2018    RBCUA 4 02/25/2020    BLOODU Negative 02/25/2020    SPECGRAV >1.030 02/25/2020    GLUCOSEU Negative 02/25/2020       Radiology:  MRI CERVICAL SPINE WO CONTRAST   Final Result   Multilevel degenerative thecal sac narrowing and neural foraminal stenosis,   worst at C6-C7. XR CHEST STANDARD (2 VW)   Final Result   No acute process.                  Assessment/Plan:    Active Hospital Problems    Diagnosis Date Noted    Acute pain of left shoulder [M25.512]     DMII (diabetes mellitus, type 2) ([facility] [address] [E11.9] 03/24/2019    Hypertensive emergency [I16.1] 11/11/2018    Chest pain [R07.9] 05/10/2018    Hypertensive urgency [I16.0] 04/01/2018    Depression [F32.9] 03/09/2018    Cocaine abuse in remission ([facility] [address] [F14.11]     CAD in native artery [I25.10]     Hyperlipidemia LDL goal <70 [E78.5]     Chronic obstructive pulmonary disease ([facility] [address] [J44.9] 06/16/2014         DVT Prophylaxis: ***  Diet: DIET GENERAL; Low Sodium (2 GM)  Code Status: Full Code    PT/OT Eval Status: ***    Dispo - ***    Yu Lane MD

## 2020-03-23 NOTE — PROGRESS NOTES
Office: [phone]       Fax: [phone]      Nephrology progress Note        Chief Complaint:  chest pain      History of Present Illness:      Jojo Lora is a 72 y.o. male with PMHx of hypertension, diabetes mellitus, coronary artery disease, COPD, substance abuse (cocaine in past) who was hospitalized on 3/19/2020 with complaints of chest pain. Patient is well known to us from his previous hospitalization most recently on 2/25 with diarrhea. Patient has history of refractory hypertension and has been previously hospitalized for hypertensive crisis. Reported chest pain and left shoulder pain. Had high blood pressure at home, SBP>200. No shortness of breath. Received doxazocin, coreg, ativan overnight. blood pressure in 90s this AM.  No diarrhea, nausea or vomiting. Denies non compliance with meds. Previous workup for secondary hypertension has been unremarkable. No unintentional wt loss. Interval HX     BP slightly better controlled   No headaches   No acute events overnight     Past Medical History:   Diagnosis Date    CAD (coronary artery disease)     Chest pain 10/15/2017    Cocaine abuse ([facility] [address]     COPD (chronic obstructive pulmonary disease) ([facility] [address]     Diabetes mellitus ([facility] [address]     Hyperlipidemia     Hypertension     Kidney stone     MRSA colonization 4/16/19; 03/24/2019    Sleep apnea     He said he doesn't use a CPAP machine.        Past Surgical History:   Procedure Laterality Date    ABDOMEN SURGERY      CARDIAC SURGERY      3 STENTS PLACED     CHOLECYSTECTOMY      COLONOSCOPY      CORONARY ANGIOPLASTY WITH STENT PLACEMENT      3 stents     FRACTURE SURGERY      RIGHT ANKLE    LITHOTRIPSY      SHOULDER ARTHROSCOPY      VASCULAR SURGERY         Family History   Problem Relation Age of Onset    High Blood Pressure Mother    Sharri Drake

## 2020-03-23 NOTE — PLAN OF CARE
Problem: Pain Control  Goal: Maintain pain level at or below patient's acceptable level (or 5 if patient is unable to determine acceptable level)  1/28/2081 7484 by Char Alvares RN  Outcome: Ongoing  3/23/2020 0112 by Prachi Juarez RN  Outcome: Ongoing  Goal: Improvement in pain related behaviors BP/HR WNL  5/52/5639 2365 by Char Alvares RN  Outcome: Ongoing  3/23/2020 0112 by Prachi Juarez RN  Outcome: Ongoing     Problem: Cardiovascular  Goal: No DVT, peripheral vascular complications  6/34/5025 7590 by Char Alvares RN  Outcome: Ongoing  3/23/2020 0112 by Prachi Juarez RN  Outcome: Ongoing  Goal: Hemodynamic stability  9/52/8065 6019 by Char Alvares RN  Outcome: Ongoing  3/23/2020 0112 by Prachi Juarez RN  Outcome: Ongoing  Goal: Anticoagulate/Hct stable  4/76/5314 0164 by Char Alvares RN  Outcome: Ongoing  3/23/2020 0112 by Prachi Juarez RN  Outcome: Ongoing  Goal: Agreement to quit smoking  9/89/0314 8567 by Char Alvares RN  Outcome: Ongoing  3/23/2020 0112 by Prachi Juarez RN  Outcome: Ongoing  Goal: Weight maintained or lost  5/23/0182 9835 by Char Alvares RN  Outcome: Ongoing  3/23/2020 0112 by Prachi Juarez RN  Outcome: Ongoing  Goal: Understanding of dietary restrictions  1/19/8063 7337 by Char Alvares RN  Outcome: Ongoing  3/23/2020 0112 by Prachi Juarez RN  Outcome: Ongoing     Problem: Pain:  Goal: Pain level will decrease  Description: Pain level will decrease  4/68/0621 6963 by Char Alvares RN  Outcome: Ongoing  3/23/2020 0112 by Prachi Juarez RN  Outcome: Ongoing  Goal: Control of acute pain  Description: Control of acute pain  8/38/4898 2581 by Char Alvares RN  Outcome: Ongoing  3/23/2020 0112 by Prachi Juarez RN  Outcome: Ongoing  Goal: Control of chronic pain  Description: Control of chronic pain  8/93/8245 8862 by Char Alvares RN  Outcome: Ongoing  3/23/2020 0112 by Prachi Larry, RN  Outcome: Ongoing     Problem: Falls - Risk of:  Goal: Will remain free from

## 2020-03-23 NOTE — FLOWSHEET NOTE
Written and verbal discharge instructions given. Ambulated to front door for discharge to home with sister.

## 2020-03-23 NOTE — FLOWSHEET NOTE
Pt slept well overnight. For 2nd night in a a row pt has pulled his IV out in his sleep with no complications. Pt had tele leads and box pulled off along with gown. Pt states he is a rough sleeper. MD messaged re pt only has MS IV for pain medication and that he has no IV access. Requested po pain medication d/t probable discharge home today. Waiting for reply.

## 2020-03-23 NOTE — DISCHARGE SUMMARY
known as:  COREG  Take 1 tablet by mouth 2 times daily (with meals)     doxazosin 2 MG tablet  Commonly known as:  CARDURA  Take 1 tablet by mouth every 12 hours     hydrALAZINE 100 MG tablet  Commonly known as:  APRESOLINE  Take 1 tablet by mouth every 8 hours     hydroCHLOROthiazide 25 MG tablet  Commonly known as:  HYDRODIURIL  Take 1 tablet by mouth daily     Magnesium Oxide 240 MG Pack  Commonly known as:  Magnesium Oxide 400  Take 1 capsule by mouth daily     mirtazapine 15 MG tablet  Commonly known as:  REMERON     vitamin B-12 500 MCG tablet  Commonly known as:  CYANOCOBALAMIN     vitamin D 25 MCG (1000 UT) Tabs tablet  Commonly known as:  D3-1000  Take 1 tablet by mouth daily     zolpidem 10 MG tablet  Commonly known as:  AMBIEN           Where to Get Your Medications      These medications were sent to [facility] [address], [address] [facility]  [address] [place] [address]    Phone:  [phone]   · lisinopril 10 MG tablet  · spironolactone 50 MG tablet     You can get these medications from any pharmacy    Bring a paper prescription for each of these medications  · HYDROcodone-acetaminophen 5-325 MG per tablet           Physical Exam:    Vitals:  Vitals:    03/23/20 0039 03/23/20 0606 03/23/20 1026 03/23/20 1529   BP: (!) 167/99 (!) 176/113 (!) 173/123 (!) 175/111   Pulse: 91 91 102 105   Resp: 16 18 16 16   Temp: 97.5 °F (36.4 °C) 97.4 °F (36.3 °C) 98 °F (36.7 °C) 98.2 °F (36.8 °C)   TempSrc: Oral Oral Oral Oral   SpO2: 93% 95% 94% 95%   Weight:  257 lb 11.5 oz (116.9 kg)     Height:         Weight: Weight: 257 lb 11.5 oz (116.9 kg)     24 hour intake/output:    Intake/Output Summary (Last 24 hours) at 3/23/2020 4984  Last data filed at 3/23/2020 1300  Gross per 24 hour   Intake 960 ml   Output --   Net 960 ml       General appearance - alert, well appearing, and in no distress  Chest - clear to auscultation, no wheezes, rales or rhonchi, significant disc protrusion, spinal canal stenosis or neural foraminal narrowing. C3-C4: Disc osteophyte complex and facet hypertrophy resulting in mild right and moderate left-sided neural foraminal stenosis. There is mild narrowing of the thecal sac. C4-C5: No significant stenosis. C5-C6: Disc osteophyte complex and facet hypertrophy resulting in moderate left and mild right-sided neural foraminal stenosis. There is mild narrowing of the thecal sac. C6-C7: Disc osteophyte complex and facet hypertrophy resulting in severe right and moderate to severe left-sided neural foraminal stenosis. There is moderate narrowing of the thecal sac. C7-T1: There is no significant disc protrusion, spinal canal stenosis or neural foraminal narrowing. Multilevel degenerative thecal sac narrowing and neural foraminal stenosis, worst at C6-C7. Results for orders placed or performed during the hospital encounter of 03/19/20   MRSA DNA Probe, Nasal   Result Value Ref Range    MRSA SCREEN RT-PCR       Negative - MRSA DNA not detected.   Normal Range: Not detected     CBC Auto Differential   Result Value Ref Range    WBC 5.7 4.0 - 11.0 K/uL    RBC 4.49 4.20 - 5.90 M/uL    Hemoglobin 12.6 (L) 13.5 - 17.5 g/dL    Hematocrit 38.6 (L) 40.5 - 52.5 %    MCV 86.1 80.0 - 100.0 fL    MCH 28.1 26.0 - 34.0 pg    MCHC 32.7 31.0 - 36.0 g/dL    RDW 14.1 12.4 - 15.4 %    Platelets 491 692 - 930 K/uL    MPV 8.1 5.0 - 10.5 fL    Neutrophils % 54.2 %    Lymphocytes % 34.3 %    Monocytes % 9.1 %    Eosinophils % 1.5 %    Basophils % 0.9 %    Neutrophils Absolute 3.1 1.7 - 7.7 K/uL    Lymphocytes Absolute 2.0 1.0 - 5.1 K/uL    Monocytes Absolute 0.5 0.0 - 1.3 K/uL    Eosinophils Absolute 0.1 0.0 - 0.6 K/uL    Basophils Absolute 0.1 0.0 - 0.2 K/uL   Comprehensive Metabolic Panel   Result Value Ref Range    Sodium 137 136 - 145 mmol/L    Potassium 4.0 3.5 - 5.1 mmol/L    Chloride 99 99 - 110 mmol/L    CO2 23 21 - 32 mmol/L    Anion Gap 15 3 - 16 Glucose 209 (H) 70 - 99 mg/dL    BUN 24 (H) 7 - 20 mg/dL    CREATININE 1.0 0.8 - 1.3 mg/dL    GFR Non-African American >60 >60    GFR African American >60 >60    Calcium 9.2 8.3 - 10.6 mg/dL    Total Protein 6.5 6.4 - 8.2 g/dL    Alb 3.8 3.4 - 5.0 g/dL    Albumin/Globulin Ratio 1.4 1.1 - 2.2    Total Bilirubin <0.2 0.0 - 1.0 mg/dL    Alkaline Phosphatase 134 (H) 40 - 129 U/L    ALT 25 10 - 40 U/L    AST 18 15 - 37 U/L    Globulin 2.7 g/dL   Troponin   Result Value Ref Range    Troponin <0.01 <0.01 ng/mL   Troponin   Result Value Ref Range    Troponin <0.01 <0.01 ng/mL   Urine Drug Screen   Result Value Ref Range    Amphetamine Screen, Urine Neg Negative <1000ng/mL    Barbiturate Screen, Ur Neg Negative <200 ng/mL    Benzodiazepine Screen, Urine Neg Negative <200 ng/mL    Cannabinoid Scrn, Ur Neg Negative <50 ng/mL    Cocaine Metabolite Screen, Urine Neg Negative <300 ng/mL    Opiate Scrn, Ur Neg Negative <300 ng/mL    PCP Screen, Urine Neg Negative <25 ng/mL    Methadone Screen, Urine Neg Negative <300 ng/mL    Propoxyphene Scrn, Ur Neg Negative <300 ng/mL    Oxycodone Urine Neg Negative <100 ng/ml    pH, UA 5.0     Drug Screen Comment: see below    TSH without Reflex   Result Value Ref Range    TSH 1.29 0.27 - 4.20 uIU/mL   Magnesium   Result Value Ref Range    Magnesium 1.20 (L) 1.80 - 2.40 mg/dL   Basic metabolic panel   Result Value Ref Range    Sodium 137 136 - 145 mmol/L    Chloride 99 99 - 110 mmol/L    CO2 23 21 - 32 mmol/L    Anion Gap 15 3 - 16    Glucose 247 (H) 70 - 99 mg/dL    BUN 22 (H) 7 - 20 mg/dL    CREATININE 1.0 0.8 - 1.3 mg/dL    GFR Non-African American >60 >60    GFR African American >60 >60    Calcium 8.4 8.3 - 10.6 mg/dL   SPECIMEN REJECTION   Result Value Ref Range    Rejected Test K     Reason for Rejection see below    Potassium   Result Value Ref Range    Potassium 5.2 (H) 3.5 - 5.1 mmol/L   POCT Glucose   Result Value Ref Range    POC Glucose 172 (H) 70 - 99 mg/dl    Performed on ACCU-CHEK    POCT Glucose   Result Value Ref Range    POC Glucose 254 (H) 70 - 99 mg/dl    Performed on ACCU-CHEK    POCT Glucose   Result Value Ref Range    POC Glucose 217 (H) 70 - 99 mg/dl    Performed on ACCU-CHEK    POCT Glucose   Result Value Ref Range    POC Glucose 175 (H) 70 - 99 mg/dl    Performed on ACCU-CHEK    POCT Glucose   Result Value Ref Range    POC Glucose 247 (H) 70 - 99 mg/dl    Performed on ACCU-CHEK    POCT Glucose   Result Value Ref Range    POC Glucose 181 (H) 70 - 99 mg/dl    Performed on ACCU-CHEK    POCT Glucose   Result Value Ref Range    POC Glucose 221 (H) 70 - 99 mg/dl    Performed on ACCU-CHEK    POCT Glucose   Result Value Ref Range    POC Glucose 211 (H) 70 - 99 mg/dl    Performed on ACCU-CHEK    POCT Glucose   Result Value Ref Range    POC Glucose 188 (H) 70 - 99 mg/dl    Performed on ACCU-CHEK    POCT Glucose   Result Value Ref Range    POC Glucose 212 (H) 70 - 99 mg/dl    Performed on ACCU-CHEK    POCT Glucose   Result Value Ref Range    POC Glucose 153 (H) 70 - 99 mg/dl    Performed on ACCU-CHEK    POCT Glucose   Result Value Ref Range    POC Glucose 240 (H) 70 - 99 mg/dl    Performed on ACCU-CHEK    POCT Glucose   Result Value Ref Range    POC Glucose 163 (H) 70 - 99 mg/dl    Performed on ACCU-CHEK    POCT Glucose   Result Value Ref Range    POC Glucose 278 (H) 70 - 99 mg/dl    Performed on ACCU-CHEK    POCT Glucose   Result Value Ref Range    POC Glucose 195 (H) 70 - 99 mg/dl    Performed on ACCU-CHEK    EKG 12 Lead   Result Value Ref Range    Ventricular Rate 109 BPM    Atrial Rate 109 BPM    P-R Interval 190 ms    QRS Duration 96 ms    Q-T Interval 338 ms    QTc Calculation (Bazett) 455 ms    P Axis 46 degrees    R Axis 79 degrees    T Axis 46 degrees    Diagnosis       Sinus tachycardiaPossible Left atrial enlargementRSR' pattern in [id number] T wave abnormalityBorderline ECGWhen compared with ECG of 25-FEB-2020 12:57,Vent.  rate has increased BY  51 BPMT wave amplitude has decreased in Anterior leadsConfirmed by [facility] MD, Ranjit Stewart ([id number]) on 3/20/2020 4:08:20 PM       Diet:  DIET GENERAL; Low Sodium (2 GM)    Activity:  Activity as tolerated (Patient may move about with assist as indicated or with supervision.)    Follow-up:  in the next few days with Balta Cox MD,   Disposition: home    Condition: Stable      Time Spent: 35 minutes    Electronically signed by Connye Councilman, MD on 3/23/2020 at 3:54 PM    Discharging Hospitalist

## 2020-03-24 ENCOUNTER — CARE COORDINATION (OUTPATIENT)
Dept: CASE MANAGEMENT | Age: 65
End: 2020-03-24

## 2020-03-24 LAB
METANEPH/PLASMA INTERP: NORMAL
METANEPHRINE FREE PLASMA: 0.21 NMOL/L (ref 0–0.49)
NORMETANEPHRINE FREE PLASMA: 0.5 NMOL/L (ref 0–0.89)

## 2020-03-24 NOTE — ADT AUTH CERT
Utilization Reviews         Chest Pain - Care Day 5 (3/24/2020) by Sherra Collet, RN         Review Status Review Entered   Completed 3/24/2020 09:51       Criteria Review      Care Day: 5 Care Date: 3/24/2020 Level of Care:    Guideline Day 1    Level Of Care    (X) ICU, [B] intermediate care, [C] or telemetry [D]    Clinical Status    ( ) * Clinical Indications met [E]    Routes    (X) Diet as tolerated    Interventions    (X) Oxygen    Medications    (X) Possible platelet inhibitors, anticoagulation    (X) Possible beta-blocker    (X) Antihypertensive medication as needed to control blood pressure    * Milestone   Additional Notes   3/23/2020         Vitals: BP (!) 176/113   Pulse 91   Temp 97.4 °F (36.3 °C) (Oral)   Resp 18   Ht 6' (1.829 m)   Wt 257 lb 11.5 oz (116.9 kg)   SpO2 95%   BMI       Orders   POCT glucose 4 x daily   Consult Neurosurgery   Aldactone 50 mg po twice daily    Catapres 0.1 mg po q4h prn    Morphine IV q4h prn given x 4 last 24h   Ativan po q6h prn given x 3      Per Nephrology      Thomas Dobbs a 72 y. o. male with PMHx of hypertension, diabetes mellitus, coronary artery disease, COPD, substance abuse (cocaine in past) who was hospitalized on 3/19/2020 with complaints of chest pain. Patient is well known to us from his previous hospitalization most recently on 2/25 with diarrhea. Patient has history of refractory hypertension and has been previously hospitalized for hypertensive crisis. Reported chest pain and left shoulder pain. Had high blood pressure at home, SBP>200. No shortness of breath. Received doxazocin, coreg, ativan overnight. blood pressure in 90s this AM.   No diarrhea, nausea or vomiting. Denies non compliance with meds. Previous workup for secondary hypertension has been unremarkable. No unintentional wt loss.       Plan:        aldactone to 50 mg po bID    Decreased lisinopril to 10 mg po daily    He needs to follow up in office on regular bases   - low potassium diet        -Monitor I/O, UOP   -Maintain MAP>65   -Avoid nephrotoxin, if able. -Dose meds to current eGFR          Chest Pain - Care Day 4 (3/23/2020) by Dagoberto Eubanks RN         Review Status Review Entered   Completed 3/24/2020 09:46       Criteria Review      Care Day: 4 Care Date: 3/23/2020 Level of Care:    Guideline Day 1    Level Of Care    (X) ICU, [B] intermediate care, [C] or telemetry [D]    Clinical Status    ( ) * Clinical Indications met [E]    Routes    (X) Diet as tolerated    Interventions    (X) Oxygen    Medications    (X) Possible platelet inhibitors, anticoagulation    (X) Possible beta-blocker    (X) Antihypertensive medication as needed to control blood pressure    * Milestone   Additional Notes   3/22/2020         BP (!) 155/81   Pulse 93   Temp 98.3 °F (36.8 °C) (Oral)   Resp 22   Ht 6' (1.829 m)   Wt 255 lb 11.7 oz (116 kg)   SpO2 94%   BMI 34.68 kg/m²       Labs      Sodium: 137   Chloride: 99   CO2: 23   BUN: 22 (H)   Creatinine: 1.0   Anion Gap: 15   GFR Non-: >60   GFR African American: >60   Glucose: 247 (H)   Calcium: 8.4         Orders   POCT glucose 4 x daily   Consult Neurosurgery   Aldactone 50 mg po twice daily    Catapres 0.1 mg po q4h prn    Morphine IV q4h prn given x 6 last 24h          Neurosurgery note   73 yo male with chest pain and left shoulder and arm pain.  Cardiac workup negative. BP remains elevated. 97 - 18 - 88-  157/92   No motor/sensory deficit per medicine note   MRI - DDD, spondylosis worst at [facility] left   A/P: Left arm pain may be coming from spondylosis C67.   No emergent surgical indication. Recommend PT, and SIMA at [facility] when available and outpatient follow up to N/S to discuss surgery after medical rx is completed. Nephrology note    Assessment :       1.  HTN   -Blood pressure variable   -Blood pressure high on admission   -In past PRA, ayr, metanephrine have been unremarkable   -Renal doppler in April 2019: No evidence of hemodynamically significant stenosis in either renal artery   -Reported history of obstructive sleep apnea but not on CPAP   -history of cocaine use in past          BP Readings from Last 1 Encounters:   03/22/20 (!) 184/104       2. CAD   -TTE (jan 2020): LVEF 55-60%, mild LVH. No mention about PASP   -medical management       3. DM   -Not on metformin       4. history of hypomagnesemia        Plan:        aldactone to 50 mg po bID    Decreased lisinopril to 10 mg po daily    He needs to follow up in office on regular bases       -Monitor I/O, UOP   -Maintain MAP>65   -Avoid nephrotoxin, if able.    -Dose meds to current eGFR   Attending note      Labile blood pressure   Admitted for HTN emergency then became hypotensive, now HTN to 200 again   - I have resumed all BP meds    - nephrology consult, they have been following him and adjusting his BP meds   - check metanephrine   - hx of cocaine positive uds, but patient said he no longer does drugs   - BP still on the high side, will continue in patient today, can dicharge tomorrow if bp more stable       Atypical Chest pain due to Radiculopathy in shoulder    - hx of CAD, EKG/trop neg   - card consulted, does not think this is true angina   - chest pain resolved now   He has had many negative stress perfusions studies, the most recent one in January of this year   - follow up with Dr. Korin Bashir at [facility]       DMII   - sliding scale       Neck pain radiating to left shoulder   Likely cervical radiculopathy   - MRI C-spine showed some stenosis   - lidocaine patch   - neuro surgeon consult recommend PT/OT for now, ESR at C6-C7 as out patient   - morphine IV PRN       Depression/insomnia   - remeron   - amitriptyline   - ambien PRN       DVT Prophylaxis: lovenox   Diet: DIET GENERAL;   Code Status: Full Code       PT/OT Eval Status: none       Chest Pain - Care Day 3 (3/22/2020) by Pankaj Zamarripa RN         Review Status Review Cardiology.        DMII   - sliding scale       Neck pain radiating to left shoulder   Likely cervical radiculopathy   - MRI C-spine showed some stenosis   - lidocaine patch   - neuro surgeon consult   - morphine IV PRN       Depression/insomnia   - remeron   - amitriptyline   - ambien PRN       DVT Prophylaxis: lovenox   Diet: DIET GENERAL;   Code Status: Full Code       PT/OT Eval Status: none          PA Determination by Margie Link RN         Review Status Review Entered   In Primary 3/21/2020 12:20       Criteria Review         slr obs to in  [facility] Physician Advisor Recommendation  The information in this document is a recommendation to be used for utilization review and utilization management purposes only. This recommendation is not an order. The recommendation is made based on the information reviewed at the time of the referral, is pursuant to the [facility] Conditions of Participation (42 CFR Part 482),and is neither a judgment nor an assessment with regard to the appropriateness or quality of clinical care. Nothing in this document may be used to limit, alter, or affect clinical services provided to the patient named below. The provider of services is ultimately responsible for the submission of a claim that has met all requirements for correct coding, billing, and reimbursement. Based on documented presenting clinical data, comorbid conditions, high risk of adverse events and deterioration, it is our recommendation that the patient's status should be upgraded from OBSERVATION to INPATIENT status.    The final decision of the patient's hospitalization status depends on the attending physician's judgment. Chart reviewed and VUR notified.    Marta Francois MD, [place], MA  Physician [place] [id number] [facility], [address] [place] [address]  C: [phone]       Clinicals:  Assessment/Plan:     Active Hospital Problems    Diagnosis  · Acute pain of

## 2020-03-25 ENCOUNTER — HOSPITAL ENCOUNTER (EMERGENCY)
Age: 65
Discharge: HOME | End: 2020-03-25
Attending: EMERGENCY MEDICINE
Payer: MEDICARE

## 2020-03-25 ENCOUNTER — CARE COORDINATION (OUTPATIENT)
Dept: CASE MANAGEMENT | Age: 65
End: 2020-03-25

## 2020-03-25 ENCOUNTER — APPOINTMENT (OUTPATIENT)
Dept: GENERAL RADIOLOGY | Age: 65
End: 2020-03-25
Payer: MEDICARE

## 2020-03-25 VITALS
OXYGEN SATURATION: 93 % | BODY MASS INDEX: 34.94 KG/M2 | WEIGHT: 249.56 LBS | HEIGHT: 71 IN | HEART RATE: 89 BPM | TEMPERATURE: 98.3 F | DIASTOLIC BLOOD PRESSURE: 90 MMHG | SYSTOLIC BLOOD PRESSURE: 170 MMHG | RESPIRATION RATE: 20 BRPM

## 2020-03-25 LAB
A/G RATIO: 1.4 (ref 1.1–2.2)
ALBUMIN SERPL-MCNC: 3.7 G/DL (ref 3.4–5)
ALP BLD-CCNC: 115 U/L (ref 40–129)
ALT SERPL-CCNC: 33 U/L (ref 10–40)
ANION GAP SERPL CALCULATED.3IONS-SCNC: 14 MMOL/L (ref 3–16)
AST SERPL-CCNC: 22 U/L (ref 15–37)
BASOPHILS ABSOLUTE: 0 K/UL (ref 0–0.2)
BASOPHILS RELATIVE PERCENT: 0.7 %
BILIRUB SERPL-MCNC: <0.2 MG/DL (ref 0–1)
BUN BLDV-MCNC: 18 MG/DL (ref 7–20)
CALCIUM SERPL-MCNC: 9.2 MG/DL (ref 8.3–10.6)
CHLORIDE BLD-SCNC: 100 MMOL/L (ref 99–110)
CO2: 23 MMOL/L (ref 21–32)
CREAT SERPL-MCNC: 0.8 MG/DL (ref 0.8–1.3)
D DIMER: <200 NG/ML DDU (ref 0–229)
EOSINOPHILS ABSOLUTE: 0.1 K/UL (ref 0–0.6)
EOSINOPHILS RELATIVE PERCENT: 1.7 %
GFR AFRICAN AMERICAN: >60
GFR NON-AFRICAN AMERICAN: >60
GLOBULIN: 2.7 G/DL
GLUCOSE BLD-MCNC: 237 MG/DL (ref 70–99)
HCT VFR BLD CALC: 37.3 % (ref 40.5–52.5)
HEMOGLOBIN: 12.5 G/DL (ref 13.5–17.5)
LYMPHOCYTES ABSOLUTE: 1.4 K/UL (ref 1–5.1)
LYMPHOCYTES RELATIVE PERCENT: 22.8 %
MCH RBC QN AUTO: 28.8 PG (ref 26–34)
MCHC RBC AUTO-ENTMCNC: 33.5 G/DL (ref 31–36)
MCV RBC AUTO: 86.2 FL (ref 80–100)
MONOCYTES ABSOLUTE: 0.5 K/UL (ref 0–1.3)
MONOCYTES RELATIVE PERCENT: 8 %
NEUTROPHILS ABSOLUTE: 4 K/UL (ref 1.7–7.7)
NEUTROPHILS RELATIVE PERCENT: 66.8 %
PDW BLD-RTO: 14 % (ref 12.4–15.4)
PLATELET # BLD: 194 K/UL (ref 135–450)
PMV BLD AUTO: 8 FL (ref 5–10.5)
POTASSIUM REFLEX MAGNESIUM: 4.5 MMOL/L (ref 3.5–5.1)
PRO-BNP: 57 PG/ML (ref 0–124)
RBC # BLD: 4.33 M/UL (ref 4.2–5.9)
SODIUM BLD-SCNC: 137 MMOL/L (ref 136–145)
TOTAL PROTEIN: 6.4 G/DL (ref 6.4–8.2)
TROPONIN: <0.01 NG/ML
TROPONIN: <0.01 NG/ML
WBC # BLD: 5.9 K/UL (ref 4–11)

## 2020-03-25 PROCEDURE — 71045 X-RAY EXAM CHEST 1 VIEW: CPT

## 2020-03-25 PROCEDURE — 99285 EMERGENCY DEPT VISIT HI MDM: CPT

## 2020-03-25 PROCEDURE — 83880 ASSAY OF NATRIURETIC PEPTIDE: CPT

## 2020-03-25 PROCEDURE — 85025 COMPLETE CBC W/AUTO DIFF WBC: CPT

## 2020-03-25 PROCEDURE — 93005 ELECTROCARDIOGRAM TRACING: CPT | Performed by: EMERGENCY MEDICINE

## 2020-03-25 PROCEDURE — 96375 TX/PRO/DX INJ NEW DRUG ADDON: CPT

## 2020-03-25 PROCEDURE — 6360000002 HC RX W HCPCS: Performed by: EMERGENCY MEDICINE

## 2020-03-25 PROCEDURE — 80053 COMPREHEN METABOLIC PANEL: CPT

## 2020-03-25 PROCEDURE — 85379 FIBRIN DEGRADATION QUANT: CPT

## 2020-03-25 PROCEDURE — 6370000000 HC RX 637 (ALT 250 FOR IP): Performed by: EMERGENCY MEDICINE

## 2020-03-25 PROCEDURE — 36415 COLL VENOUS BLD VENIPUNCTURE: CPT

## 2020-03-25 PROCEDURE — 84484 ASSAY OF TROPONIN QUANT: CPT

## 2020-03-25 PROCEDURE — 96374 THER/PROPH/DIAG INJ IV PUSH: CPT

## 2020-03-25 RX ORDER — LORAZEPAM 2 MG/ML
0.5 INJECTION INTRAMUSCULAR ONCE
Status: COMPLETED | OUTPATIENT
Start: 2020-03-25 | End: 2020-03-25

## 2020-03-25 RX ORDER — METHYLPREDNISOLONE SODIUM SUCCINATE 125 MG/2ML
125 INJECTION, POWDER, LYOPHILIZED, FOR SOLUTION INTRAMUSCULAR; INTRAVENOUS ONCE
Status: COMPLETED | OUTPATIENT
Start: 2020-03-25 | End: 2020-03-25

## 2020-03-25 RX ORDER — HYDROXYZINE PAMOATE 25 MG/1
25 CAPSULE ORAL 3 TIMES DAILY PRN
Qty: 10 CAPSULE | Refills: 0 | Status: SHIPPED | OUTPATIENT
Start: 2020-03-25 | End: 2020-04-08

## 2020-03-25 RX ORDER — IPRATROPIUM BROMIDE AND ALBUTEROL SULFATE 2.5; .5 MG/3ML; MG/3ML
1 SOLUTION RESPIRATORY (INHALATION) ONCE
Status: COMPLETED | OUTPATIENT
Start: 2020-03-25 | End: 2020-03-25

## 2020-03-25 RX ADMIN — METHYLPREDNISOLONE SODIUM SUCCINATE 125 MG: 125 INJECTION, POWDER, FOR SOLUTION INTRAMUSCULAR; INTRAVENOUS at 18:24

## 2020-03-25 RX ADMIN — IPRATROPIUM BROMIDE AND ALBUTEROL SULFATE 1 AMPULE: .5; 3 SOLUTION RESPIRATORY (INHALATION) at 17:28

## 2020-03-25 RX ADMIN — LORAZEPAM 0.5 MG: 2 INJECTION INTRAMUSCULAR; INTRAVENOUS at 17:28

## 2020-03-25 ASSESSMENT — ENCOUNTER SYMPTOMS
APNEA: 0
EYE REDNESS: 0
BACK PAIN: 0
SHORTNESS OF BREATH: 0
ABDOMINAL PAIN: 0
EYE DISCHARGE: 0
FACIAL SWELLING: 0
NAUSEA: 0
CHOKING: 0
VOMITING: 0

## 2020-03-25 NOTE — ED PROVIDER NOTES
Patient signed out to me by Dr. Rusty Boyer with plan to follow-up second troponin/EKG and d-dimer to rule out PE and get CT of the chest if d-dimer is elevated. Plan upon signout is if repeat troponin and d-dimer negative to discharge. Of note he had a reassuring stress test in January 2020. The patient states that he developed shortness of breath and symptoms earlier today but of note the patient was admitted earlier this month and seen by cardiology. Cardiology note from 3/20/20 during recent admission:  \". Ashley Centeno Ashley Pierson's symptoms are very atypical for angina and I would not pursue it. His ECG is unchanged and his troponin assays are negative. He has had many negative stress perfusions studies, the most recent one in January of this year. Ashley Centeno \"- Dr. Hayden Jung from EMR      PE:  Constitutional: He is oriented to person, place, and time. He appears well-developed and well-nourished. In no acute distress. Head: Normocephalic and atraumatic. Neck: Neck supple. No JVD present. Carotid bruit is not present. No mass and no thyromegaly present. No lymphadenopathy present. Cardiovascular: Normal rate, regular rhythm, normal heart sounds and intact distal pulses. Exam reveals no gallop and no friction rub. No murmur heard. Pulmonary/Chest: Effort normal and breath sounds normal. No respiratory distress. He has no wheezes, rhonchi or rales. Abdominal: Soft, non-tender. Bowel sounds and aorta are normal. He exhibits no organomegaly, mass or bruit. Extremities: No edema, cyanosis, or clubbing. Pulses are 2+ radial/carotid/dorsalis pedis and posterior tibial bilaterally. Neurological: He is alert and oriented to person, place, and time. He has normal reflexes. No cranial nerve deficit. Coordination normal.   Skin: Skin is warm and dry. There is no rash or diaphoresis. Psychiatric: He has a normal mood and affect.  His speech is normal and behavior is normal.     MDM:  D-dimer NEG  Repeat trop/EKG shows no evidence of

## 2020-03-25 NOTE — CARE COORDINATION
2nd attempt to contact patient for follow up ED/ COVID 19 transition call. Left voicemail message to return call. Contact information provided. Will continue to follow up.

## 2020-03-25 NOTE — ED NOTES
Pt placed on/continued on cardiac monitor and continuous pulse ox - see flowsheet     Jennifer Lambert, RUSTY  03/25/20 2811

## 2020-03-25 NOTE — ED PROVIDER NOTES
Co-signs this chart in the absence of a cardiologist.    Normal sinus rhythm. Rate 92. MN interval 168 ms. QRS duration 90 ms. QTc 425 ms. R axis 67 degrees. There is no ST elevation. No acute change. RADIOLOGY:   Non-plain film images such as CT, Ultrasound and MRI are read by the radiologist. Plain radiographic images are visualized and preliminarily interpreted by the emergency physician with the below findings:        Interpretation per the Radiologist below, if available at the time of this note:    XR CHEST PORTABLE   Final Result   No acute process. ED BEDSIDE ULTRASOUND:   Performed by ED Physician - none    LABS:  Labs Reviewed   CBC WITH AUTO DIFFERENTIAL - Abnormal; Notable for the following components:       Result Value    Hemoglobin 12.5 (*)     Hematocrit 37.3 (*)     All other components within normal limits    Narrative:     Performed at:  [facility]  [address] [place], [place]   Phone [phone]   COMPREHENSIVE METABOLIC PANEL W/ REFLEX TO MG FOR LOW K - Abnormal; Notable for the following components:    Glucose 237 (*)     All other components within normal limits    Narrative:     Grossly lipemic  Performed at:  [facility]  [address] [place], [place]   Phone [phone]   TROPONIN    Narrative:     Grossly lipemic  Performed at:  [facility]  [address] [place], [place]   Phone [phone]    Narrative:     Grossly lipemic  Performed at:  [address] [place] [address] Rd [facility]  [address] [place], [place]   Phone [phone]   TROPONIN       All other labs were within normal range or not returned as of this dictation.     EMERGENCY DEPARTMENT COURSE and DIFFERENTIAL DIAGNOSIS/MDM:   Vitals:    Vitals:    03/25/20 1745 03/25/20 1800 03/25/20 1815 03/25/20 1900   BP: 134/85 (!) 151/95 (!) 171/85 (!) 170/90   Pulse: 91 91 91 89   Resp: 20 22 22 20   Temp:       TempSrc:       SpO2: 96% 96% 91% 93%   Weight:       Height:           The patient presented with mild shortness of breath and feeling anxious. At the time of initial examination he described his symptoms as feeling like a panic attack. However, his lungs were clear to auscultation and there was no evidence of any conversational dyspnea or accessory muscle use. Oxygen saturation was 95% on room air. Heart rate was in the 90s. Respiratory rate was 16. He was mildly hypertensive with a blood pressure 176/109. He was given Ativan 0.5 mg IV. EKG was obtained which revealed normal sinus rhythm with no acute change. He does have a history of COPD and was also given a trial of DuoNeb. MDM      REASSESSMENT          5:23 PM: I reviewed his recent discharge summary. Apparently the patient's blood pressure was labile in the hospital requiring adjustment of his antihypertensive medications. His lisinopril dose was decreased and his spironolactone dose was increased. Prior stress test from January 4, 2020 was reviewed. Normal myocardial perfusion study. Normal left ventricular size and systolic function. Ejection fraction 66%. I was unable to find the actual cardiac catheterization report from December 2017. Apparently medical management was recommended at that time. 6:15 PM: The patient was reexamined. He reported that he still had a little bit of difficulty breathing but was overall improved. Oxygen saturation is 95% on room air. He was reexamined and breath sounds were diminished and he now had some faint expiratory wheezes. That raises the possibility that perhaps this is an exacerbation of his COPD. He did report improvement after the DuoNeb treatment. He was given Solu-Medrol 125 mg IV. However, he still insisted that this was a panic attack.   Laboratory studies are unremarkable. D-dimer is pending. 6:55 PM: At the time of change of shift at 7 PM, d-dimer and repeat 3-hour troponin are pending. Care will be transferred to the oncoming physician for follow-up on test results and final disposition of the patient. If d-dimer and repeat troponin are normal I feel that the patient can be safely discharged to home with a prescription for prednisone and his albuterol inhaler. CRITICAL CARE TIME   Total Critical Care time was 30 minutes, excluding separately reportable procedures. There was a high probability of clinically significant/life threatening deterioration in the patient's condition which required my urgent intervention. CONSULTS:  None    PROCEDURES:  Unless otherwise noted below, none     Procedures        FINAL IMPRESSION      1. Acute dyspnea    2. Chronic obstructive pulmonary disease, unspecified COPD type ([facility] [address]    3. Anxiety          DISPOSITION/PLAN   DISPOSITION        PATIENT REFERRED TO:  No follow-up provider specified. DISCHARGE MEDICATIONS:  New Prescriptions    No medications on file     Controlled Substances Monitoring:     RX Monitoring 1/18/2019   Attestation The Prescription Monitoring Report for this patient was reviewed today. Periodic Controlled Substance Monitoring -       (Please note that portions of this note were completed with a voice recognition program.  Efforts were made to edit the dictations but occasionally words are mis-transcribed. )    [id number] Jose Angel Trimble DO (electronically signed)  Attending Emergency Physician         Camilo Reyes,   03/25/20 7500 Reema Rai DO  03/25/20 2386

## 2020-03-25 NOTE — ADT AUTH CERT
tablet 25 mg 25 mg Oral Given Monisha Pulido RN    03/19/2020 2334 doxazosin (CARDURA) tablet 2 mg 2 mg Oral Given Andrea Collier RN    03/19/2020 2125 LORazepam (ATIVAN) tablet 0.5 mg 0.5 mg Oral Given Monisha Pulido RN       Emergency room course: Patient on exam pupils equal round and reactive to light extraocular movement is intact.  Throat is clear.  Neck is supple full range of motion without tenderness.  No nuchal rigidity.  Cardiovascular regular rate rhythm, lungs are clear no wheeze, rales or rhonchi.  Does have reproducible chest wall tenderness on the upper left side.  Full range of motion upper extremity.  Abdomen is soft nontender.  Normal bowel sounds all 4 quadrant.  No rebound or guarding.  Full range of motion all extremity.  Bilateral lower extremities show no edema.   strength 5+ equal bilaterally.  No facial drooping or slurred speech.  Neurologically no other motor or sensory deficit noted.  Patient is alert and oriented x4.       Heart score is 6           CBC has white count of 5.7, RBC 4.49, hemoglobin 12.6 and hematocrit 38. 6.  CMP shows sodium 137, potassium 4.0, chloride 99           Troponin less than 0.01           Chest x-ray showed no acute process.           At this time I discussed patient labs and chest x-ray results with based on patient heart score and history think it is best to bring patient in for chest pain rule out. [facility] was okay with this.  I will place a call out to our hospitalist service through perfect [employer] for him.                   The patient tolerated their visit well.  I evaluated the patient.  The physician was available for consultation as needed.  The patient and / or the family were informed of the results of any tests, a time was given to answer questions, a plan was proposed and they agreed with plan.         CLINICAL IMPRESSION:   1.  Chest pain, unspecified type          INTERNAL MED NOTE (3/20/2020 0243)   ASSESSMENT:   Principal Problem:   Chest pain   Active Problems:     Hypertensive urgency     Chronic obstructive pulmonary disease (HCC)     CAD in native artery     Depression     DMII (diabetes mellitus, type 2) ([facility])     Acute pain of left shoulder   Resolved Problems:     * No resolved hospital problems. *           PLAN:   1. Chest pain - Concerning to ED for ACS, etiology clinically unable to determine.  Initial enzymes/EKG negative.  Follow serial enzymes, reviewed and documented as above and monitor on tele, w/out evidence of ischemia/arrythmia.  Consult cardiology as patient has known history of CAD and stent. Of note, per care everywhere he had stress test at [facility] on 01/09/2020 which was normal. Heart score is 6.        2. Hypertensive urgency - he has history of labile BP and as such his BP med needs to be titrated carefully. Cardiology is consulted.        3. DM II, uncontrolled - HgBA1C 7.3 2/12/2020 and glucose is elevated. Start SSI and adjust as needed.        4. Left shoulder / neck pain - likely radiculopathy. He doesn't have PCP at this time. Needs to establish PCP and then follow up.  Consult SW to assist.        DVT Prophylaxis: Lovenox    Diet: DIET GENERAL;   Code Status: Full Code       PT/OT Eval Status: Not ordered        Dispo - Admit to telemetry           -zina

## 2020-03-26 ENCOUNTER — CARE COORDINATION (OUTPATIENT)
Dept: CASE MANAGEMENT | Age: 65
End: 2020-03-26

## 2020-03-26 LAB
EKG ATRIAL RATE: 92 BPM
EKG ATRIAL RATE: 92 BPM
EKG DIAGNOSIS: NORMAL
EKG DIAGNOSIS: NORMAL
EKG P AXIS: 44 DEGREES
EKG P AXIS: 48 DEGREES
EKG P-R INTERVAL: 168 MS
EKG P-R INTERVAL: 182 MS
EKG Q-T INTERVAL: 344 MS
EKG Q-T INTERVAL: 362 MS
EKG QRS DURATION: 90 MS
EKG QRS DURATION: 98 MS
EKG QTC CALCULATION (BAZETT): 425 MS
EKG QTC CALCULATION (BAZETT): 447 MS
EKG R AXIS: 58 DEGREES
EKG R AXIS: 67 DEGREES
EKG T AXIS: 61 DEGREES
EKG T AXIS: 71 DEGREES
EKG VENTRICULAR RATE: 92 BPM
EKG VENTRICULAR RATE: 92 BPM

## 2020-03-26 PROCEDURE — 93010 ELECTROCARDIOGRAM REPORT: CPT | Performed by: INTERNAL MEDICINE

## 2020-03-26 NOTE — ED NOTES
Discharge instructions reviewed with pt and pt denied having any questions. Discharge paperwork signed and pt discharged.         Paresh Hernandez RN  03/25/20 9961

## 2020-04-01 ENCOUNTER — OFFICE VISIT (OUTPATIENT)
Dept: ORTHOPEDIC SURGERY | Age: 65
End: 2020-04-01
Payer: MEDICARE

## 2020-04-01 ENCOUNTER — TELEPHONE (OUTPATIENT)
Dept: ORTHOPEDIC SURGERY | Age: 65
End: 2020-04-01

## 2020-04-01 VITALS
SYSTOLIC BLOOD PRESSURE: 138 MMHG | HEART RATE: 68 BPM | DIASTOLIC BLOOD PRESSURE: 79 MMHG | WEIGHT: 249.56 LBS | HEIGHT: 71 IN | BODY MASS INDEX: 34.94 KG/M2

## 2020-04-01 PROCEDURE — 99204 OFFICE O/P NEW MOD 45 MIN: CPT | Performed by: PHYSICAL MEDICINE & REHABILITATION

## 2020-04-01 RX ORDER — METHYLPREDNISOLONE 4 MG/1
TABLET ORAL
Qty: 1 KIT | Refills: 0 | Status: SHIPPED | OUTPATIENT
Start: 2020-04-01 | End: 2020-04-21 | Stop reason: ALTCHOICE

## 2020-04-01 RX ORDER — TIZANIDINE 4 MG/1
4 TABLET ORAL NIGHTLY
Qty: 30 TABLET | Refills: 0 | Status: SHIPPED | OUTPATIENT
Start: 2020-04-01 | End: 2020-04-21

## 2020-04-01 NOTE — PROGRESS NOTES
New Patient: SPINE    Referring Provider:  No ref. provider found    Chief Complaint   Patient presents with    Neck Pain     NP, CSP. Ongoing 2 or so weeks, no injury    Arm Pain     Left arm pain, extends into fingers (tingling)       HISTORY OF PRESENT ILLNESS:      · The patient is being sent at the request of No ref. provider found in consultation as a new spine patient for neck pain and left arm pain The patient is a 72 y.o. male whom reports 2-3 weeks of pain. There was not an accident or injury involved and this is the first time he has had this issue. He describes that the pain will radiate down the left arm with pain to about the elbow and then numbness and tingling into the fingers on the left hand. The patient describes the pain to be a 6/10 in severity. The patient describes that the pain is a sharp pain with tingling sensation into the fingers. His pain is persistent and constant. Aggravating factors include bending specifically with the neck backwards, turning over while sleeping. The patient describes that this is limiting his behavior. Relieving factors include rest and not moving his head backwards into the left. · The patient tried ice and heat. The patient was recently hospitalized for high blood pressure. While he was in the hospital for managing the high blood pressure they provided him with a heating pad which did help somewhat with the symptoms. He did have an MRI of the cervical spine completed. He has tried ibuprofen and he was given pain medication in the hospital he cannot remember which medication however.     Pain Assessment  Location of Pain: Neck  Location Modifiers: Posterior, Left(Arm)  Severity of Pain: 6  Quality of Pain: Other (Comment), Sharp(Tingling)  Duration of Pain: Persistent  Frequency of Pain: Constant  Aggravating Factors: Bending, Other (Comment)(Leaning backwards; turning; sleeping)  Limiting Behavior: Yes  Relieving Factors: Rest  Result of Injury: No  Work-Related Injury: No  Are there other pain locations you wish to document?: No      Associated signs and symptoms:   Neurogenic bowel or bladder symptoms:  no   Perceived weakness:  no   Difficulty walking:  no    Recent Imaging (within past one year)   Xrays: no   MRI or CT of spine: yes    Current/Past Treatment:   · Physical Therapy:  none  · Chiropractic:  none  · Injection:  none  · Medications:   NSAIDS:  yes , Ibuprofen    Muscle relaxer:  none   Steriods:  none   Neuropathic medications:  none   Opioids:  Had in the hospital   · Previous surgery:  no  · Previous surgical consult:  no  · Other:  · Infection control  · Tested positive for MRSA in past 12 months:  no  · Tested positive for MSSA \"staph infection\" in past 12 months: no  · Tested positive for VRE (Vancomycin Resistant Enterococci) in past 12 months:   no  · Currently on any antibiotics for an infection: no  · Anticoagulants:  · On a blood thinner:  yes , ASA  · Any history of bleeding disorder: no   · MRI Contraindication: no   · Previous Pain Management: no   · Goal for treatment: To feel better. · How long can you stand? No issue     Sit? 5 minutes         Walk? No issue                   Past Medical History:   Past Medical History:   Diagnosis Date    CAD (coronary artery disease)     Chest pain 10/15/2017    Cocaine abuse ([facility] [address]     COPD (chronic obstructive pulmonary disease) ([facility] [address]     Diabetes mellitus ([facility] [address]     Hyperlipidemia     Hypertension     Kidney stone     MRSA colonization 4/16/19; 03/24/2019    Sleep apnea     He said he doesn't use a CPAP machine.       Past Surgical History:     Past Surgical History:   Procedure Laterality Date    ABDOMEN SURGERY      CARDIAC SURGERY      3 STENTS PLACED     CHOLECYSTECTOMY      COLONOSCOPY      CORONARY ANGIOPLASTY WITH STENT PLACEMENT      3 stents     FRACTURE SURGERY      RIGHT ANKLE    LITHOTRIPSY      SHOULDER ARTHROSCOPY      VASCULAR SURGERY       Current in all planes due to pain   · Strength: 5/5 bilateral upper extremities  · Special Tests:   Spurling's positive left and negative right and Cazares's are negative bilaterally. Stanley and Impingement tests are negative bilaterally. · Skin:There are no rashes, ulcerations or lesions. · Reflexes: Bilaterally triceps, biceps and brachioradialis are 2+. Clonus absent bilaterally at the feet. No pathological reflexes are noted. · Gait & station:  normal, patient ambulates without assistance and no ataxia  · Additional Examinations:  · RIGHT UPPER EXTREMITY:  Inspection/examination of the right upper extremity does not show any tenderness, deformity or injury. Range of motion is normal and pain-free. There is no gross instability. There are no rashes, ulcerations or lesions. Strength and tone are normal. No atrophy or abnormal movements are noted. · LEFT UPPER EXTREMITY: Inspection/examination of the left upper extremity does not show any tenderness, deformity or injury. Range of motion is normal and pain-free. There is no gross instability. There are no rashes, ulcerations or lesions. Strength and tone are normal. No atrophy or abnormal movements are noted. LUMBAR/SACRAL EXAMINATION:  · Inspection: Local inspection shows no step-off or bruising. Lumbar alignment is normal. No instability is noted. · Palpation:   No evidence of tenderness at the midline. Lumbar paraspinal tenderness No tenderness to palpation of the lumbar paraspinals  Bursal tenderness No tenderness bilaterally  There is no paraspinal spasm. · Range of Motion: pain-free ROM  · Strength:   Strength testing is 5/5 in all muscle groups tested. · Special Tests:   Straight leg raise and crossed SLR negative. Marv's testing is negative bilaterally. FADIR's testing is negative bilaterally. Slump test negative. Bowstring test negative  · Skin: There are no rashes, ulcerations or lesions.   · Reflexes: Reflexes are symmetrically 2+ at the inflammatory component of pain. Risks, benefits and alternatives were discussed. Recommended to stop any NSAIDs to reduce risk of GI bleed during the course of the dose pack. 2. PT:  Prescribed home exercise program.    3. Further studies: No further studies. 4. Interventional:  We discussed pursuing a C6-7 IL epidural steroid injection to address the pain. Radiologic imaging and symptoms confirm the pain etiology. Risks, benefits and alternatives of interventional options were discussed. These include and are not limited to bleeding, infection, spinal headache, nerve injury, increased pain and lack of pain relief. The patient verbalized understanding and would like to proceed. The patient will be scheduled accordingly. 5. Healthy Lifestyle Measures:  Patient education material reviewing the following was distributed to [employer]  Anatomic drawings  Healthy lifestyle education  Osteoporosis prevention,   Back and neck pain educational information   Advanced imaging preparedness    Posture education   Proper lifting and carrying techniques,   Weight management  Quitting smoking and   Minor ways to treat back pain  For further information regarding the spine conditions and to review interventional treatments the patient was directed to [employer].    6.  Follow up:  2 weeks    [employer] was instructed to call the office if his symptoms worsen or if new symptoms appear prior to the next scheduled visit. He is specifically instructed to contact the office between now & his scheduled appointment if he has concerns related to his condition or if he needs assistance in scheduling the above tests. He is welcome to call for an appointment sooner if he has any additional concerns or questions. Sabrina Vaughn.  Renee Porter MD, [place], [facility]  Board Certified in [address] [place] [address]  Certified and Fellowship Trained in [address] [facility]  Partner of [facility] ([facility])     This dictation was performed with a verbal recognition program [facility] ) and it was checked for errors. It is possible that there are still dictated errors within this office note. If so, please bring any errors to my attention for an addendum. All efforts were made to ensure that this office note is accurate.

## 2020-04-01 NOTE — TELEPHONE ENCOUNTER
Patient needs scheduled for an injection. Pre-Procedure sheet was given to the patient. x1 (C6/C7 IL)    SIMA Categorized as level 3    Please call patient to schedule once able to do so.

## 2020-04-06 ENCOUNTER — TELEPHONE (OUTPATIENT)
Dept: ORTHOPEDIC SURGERY | Age: 65
End: 2020-04-06

## 2020-04-07 ENCOUNTER — TELEPHONE (OUTPATIENT)
Dept: ORTHOPEDIC SURGERY | Age: 65
End: 2020-04-07

## 2020-04-07 ENCOUNTER — VIRTUAL VISIT (OUTPATIENT)
Dept: ORTHOPEDIC SURGERY | Age: 65
End: 2020-04-07
Payer: MEDICARE

## 2020-04-07 VITALS — BODY MASS INDEX: 34.94 KG/M2 | WEIGHT: 249.56 LBS | RESPIRATION RATE: 12 BRPM | HEIGHT: 71 IN

## 2020-04-07 PROCEDURE — 99214 OFFICE O/P EST MOD 30 MIN: CPT | Performed by: PHYSICAL MEDICINE & REHABILITATION

## 2020-04-07 RX ORDER — GABAPENTIN 300 MG/1
300 CAPSULE ORAL NIGHTLY
Qty: 30 CAPSULE | Refills: 0 | Status: SHIPPED
Start: 2020-04-07 | End: 2020-04-21

## 2020-04-07 RX ORDER — TRAMADOL HYDROCHLORIDE 50 MG/1
50 TABLET ORAL EVERY 8 HOURS PRN
Qty: 21 TABLET | Refills: 0 | Status: SHIPPED | OUTPATIENT
Start: 2020-04-07 | End: 2020-04-14

## 2020-04-07 RX ORDER — GABAPENTIN 100 MG/1
100 CAPSULE ORAL 2 TIMES DAILY
Qty: 60 CAPSULE | Refills: 0 | Status: CANCELLED | OUTPATIENT
Start: 2020-04-07 | End: 2020-05-07

## 2020-04-07 NOTE — TELEPHONE ENCOUNTER
Pending denial due to lack of conservative treatment. You may fax clinicals to [phone] attention to clinical reviewer or you may call for peer to peer at phone number [phone].

## 2020-04-07 NOTE — PROGRESS NOTES
the upper and lower extremities are normal.    CERVICAL EXAMINATION:  · Inspection: Local inspection shows no step-off or bruising. Cervical alignment is normal. No instability is noted. · Palpation and Percussion: No evidence of tenderness at the midline. Paraspinal tenderness is  present. There is no paraspinal spasm. Skin:There are no rashes, ulcerations or lesions  · Range of Motion:  increased pain with facet loading bilaterally   · Strength: 5/5 RUE 4/5 LUE  · Special Tests:   Spurling's + on left. · Skin:There are no rashes, ulcerations or lesions in right & left upper extremities. · Gait & station: normal, patient ambulates without assistance and no ataxia  · Additional Examinations:  · RIGHT UPPER EXTREMITY:  Inspection/examination of the right upper extremity does not show any tenderness, deformity or injury. Range of motion is unremarkable and pain-free. There is no gross instability. There are no rashes, ulcerations or lesions. Strength and tone are normal. No atrophy or abnormal movements are noted. · LEFT UPPER EXTREMITY: Inspection/examination of the left upper extremity does not show any tenderness, deformity or injury. Range of motion is unremarkable and pain-free. There is no gross instability. There are no rashes, ulcerations or lesions. Strength and tone are normal. No atrophy or abnormal movements are noted  · Additional Examinations:  · RIGHT LOWER EXTREMITY: Inspection/examination of the right lower extremity does not show any tenderness, deformity or injury. Range of motion is normal and pain-free. There is no gross instability. There are no rashes, ulcerations or lesions. Strength and tone are normal. No atrophy or abnormal movements are noted. · LEFT LOWER EXTREMITY:  Inspection/examination of the left lower extremity does not show any tenderness, deformity or injury. Range of motion is normal and pain-free. There is no gross instability.  There are no rashes, ulcerations or lesions. Strength and tone are normal. No atrophy or abnormal movements are noted. Diagnostic Testing:    MR cervical spine shows   Multilevel degenerative thecal sac narrowing and neural foraminal stenosis,   worst at C6-C7.        Results for orders placed or performed during the hospital encounter of 03/25/20   CBC Auto Differential   Result Value Ref Range    WBC 5.9 4.0 - 11.0 K/uL    RBC 4.33 4.20 - 5.90 M/uL    Hemoglobin 12.5 (L) 13.5 - 17.5 g/dL    Hematocrit 37.3 (L) 40.5 - 52.5 %    MCV 86.2 80.0 - 100.0 fL    MCH 28.8 26.0 - 34.0 pg    MCHC 33.5 31.0 - 36.0 g/dL    RDW 14.0 12.4 - 15.4 %    Platelets 473 327 - 539 K/uL    MPV 8.0 5.0 - 10.5 fL    Neutrophils % 66.8 %    Lymphocytes % 22.8 %    Monocytes % 8.0 %    Eosinophils % 1.7 %    Basophils % 0.7 %    Neutrophils Absolute 4.0 1.7 - 7.7 K/uL    Lymphocytes Absolute 1.4 1.0 - 5.1 K/uL    Monocytes Absolute 0.5 0.0 - 1.3 K/uL    Eosinophils Absolute 0.1 0.0 - 0.6 K/uL    Basophils Absolute 0.0 0.0 - 0.2 K/uL   Comprehensive Metabolic Panel w/ Reflex to MG   Result Value Ref Range    Sodium 137 136 - 145 mmol/L    Potassium reflex Magnesium 4.5 3.5 - 5.1 mmol/L    Chloride 100 99 - 110 mmol/L    CO2 23 21 - 32 mmol/L    Anion Gap 14 3 - 16    Glucose 237 (H) 70 - 99 mg/dL    BUN 18 7 - 20 mg/dL    CREATININE 0.8 0.8 - 1.3 mg/dL    GFR Non-African American >60 >60    GFR African American >60 >60    Calcium 9.2 8.3 - 10.6 mg/dL    Total Protein 6.4 6.4 - 8.2 g/dL    Alb 3.7 3.4 - 5.0 g/dL    Albumin/Globulin Ratio 1.4 1.1 - 2.2    Total Bilirubin <0.2 0.0 - 1.0 mg/dL    Alkaline Phosphatase 115 40 - 129 U/L    ALT 33 10 - 40 U/L    AST 22 15 - 37 U/L    Globulin 2.7 g/dL   Troponin   Result Value Ref Range    Troponin <0.01 <0.01 ng/mL   Brain Natriuretic Peptide   Result Value Ref Range    Pro-BNP 57 0 - 124 pg/mL   Troponin   Result Value Ref Range    Troponin <0.01 <0.01 ng/mL   D-Dimer, Quantitative   Result Value Ref Range    D-Dimer, Quant <200 0 - 229 ng/mL DDU   EKG 12 Lead   Result Value Ref Range    Ventricular Rate 92 BPM    Atrial Rate 92 BPM    P-R Interval 168 ms    QRS Duration 90 ms    Q-T Interval 344 ms    QTc Calculation (Bazett) 425 ms    P Axis 44 degrees    R Axis 67 degrees    T Axis 61 degrees    Diagnosis       Normal sinus rhythmPossible Left atrial enlargementConfirmed by Artur DANIEL MD ([id number]) on 3/26/2020 8:59:29 AM   EKG 12 Lead   Result Value Ref Range    Ventricular Rate 92 BPM    Atrial Rate 92 BPM    P-R Interval 182 ms    QRS Duration 98 ms    Q-T Interval 362 ms    QTc Calculation (Bazett) 447 ms    P Axis 48 degrees    R Axis 58 degrees    T Axis 71 degrees    Diagnosis       Normal sinus rhythmPossible Left atrial enlargementIncomplete right bundle branch blockConfirmed by Artur DANIEL MD ([id number]) on 3/26/2020 8:59:38 AM     Impression:       1. Cervical radiculopathy    2. Foraminal stenosis of cervical region    3. Spondylosis without myelopathy or radiculopathy, cervical region        Plan:  Clinical Course: Above diagnoses are worsening    I discussed the diagnosis and the treatment options with Geovanni Barry today. In Summary:  The various treatment options were outlined and discussed with Geovanni Barry including:  Conservative care options: physical therapy, ice, medications, bracing, and activity modification. The indications for therapeutic injections. The indications for additional imaging/laboratory studies. The indications for (possible future) interventions. After considering the various options discussed, Geovanni Barry elected to pursue a course of treatment that includes the followin. Medications:  . OARRS reviewed and appropriate. Low risk on ORT Tramadol 50 mg po tid#21  Informed verbal consent was obtained.    Goals of the current treatment regimen include: improvement in pain, improvement in overall in physical performance, ability to perform daily activities, work or disability, He is welcome to call for an appointment sooner if he has any additional concerns or questions. Flakita Powers. Wilmar Jasso MD, ELVER, [facility]  Board Certified in [address] [place] [address] Certified and Fellowship Trained in [facility] ([facility])             This dictation was performed with a verbal recognition program [facility]) and it was checked for errors. It is possible that there are still dictated errors within this office note. If so, please bring any errors to my attention for an addendum. All efforts were made to ensure that this office note is accurate. Patient has verbally accepted the following: We confirm that, for purposes of billing, this is a virtual visit with the provider for which we will submit a claim for reimbursement with the insurance company. The patient accepts responsibility for any co-pays, coinsurance amounts or other amounts not covered by the insurance company. Patient location: [facility]  Physician location: [facility]    Time spent: 10 minutes            Pursuant to the emergency declaration under the [facility], 1135 waiver authority and the [employer] Act, this Virtual  Visit was conducted, with patient's consent, to reduce the patient's risk of exposure to COVID-19 and provide continuity of care for an established patient. Services were provided through a video synchronous discussion virtually to substitute for in-person clinic visit. We have confirmed that, for purposes of billing, this is a virtual visit with for which we will submit a claim for reimbursement with your insurance company. The patient has accepted responsibility for any copays, coinsurance amounts or other amounts not covered by their insurance company. This visit was completed virtually using [email or web address] me.

## 2020-04-13 ENCOUNTER — TELEPHONE (OUTPATIENT)
Dept: ORTHOPEDIC SURGERY | Age: 65
End: 2020-04-13

## 2020-04-13 NOTE — TELEPHONE ENCOUNTER
DENIED  Date: 05/01/20  Type of SX: OUTPATIENT  Location:  [facility]  CPT: 04337  SX area: C6/7  REASON: LACK OF CONSERVATIVE TREATMENT  Peer to peer: [phone]

## 2020-04-14 ENCOUNTER — VIRTUAL VISIT (OUTPATIENT)
Dept: ORTHOPEDIC SURGERY | Age: 65
End: 2020-04-14
Payer: MEDICARE

## 2020-04-14 VITALS — HEIGHT: 71 IN | RESPIRATION RATE: 12 BRPM | WEIGHT: 249.56 LBS | BODY MASS INDEX: 34.94 KG/M2

## 2020-04-14 PROCEDURE — 99214 OFFICE O/P EST MOD 30 MIN: CPT | Performed by: PHYSICAL MEDICINE & REHABILITATION

## 2020-04-14 RX ORDER — HYDROCODONE BITARTRATE AND ACETAMINOPHEN 5; 325 MG/1; MG/1
1 TABLET ORAL 2 TIMES DAILY
Qty: 14 TABLET | Refills: 0 | Status: SHIPPED | OUTPATIENT
Start: 2020-04-14 | End: 2020-04-21

## 2020-04-14 RX ORDER — GABAPENTIN 300 MG/1
300 CAPSULE ORAL 3 TIMES DAILY
Qty: 90 CAPSULE | Refills: 0 | Status: CANCELLED | OUTPATIENT
Start: 2020-04-14 | End: 2020-05-14

## 2020-04-14 RX ORDER — GABAPENTIN 300 MG/1
300 CAPSULE ORAL 3 TIMES DAILY
Qty: 90 CAPSULE | Refills: 1 | Status: ON HOLD | OUTPATIENT
Start: 2020-04-14 | End: 2020-10-05

## 2020-04-14 NOTE — PROGRESS NOTES
Follow up: [facility] visit (Audio/visual connection present)    Hayes Resendiz  1955  [id number]         Chief Complaint   Patient presents with    Neck Pain     F/u CSP    Medication Refill     Tramadol 50mg 1PO TID #21         HISTORY OF PRESENT ILLNESS:  Mr. Leni Siemens is a 72 y.o. male returns for a follow up visit for multiple medical problems. His current presenting problems are   1. Cervical radiculopathy    2. Foraminal stenosis of cervical region    3. Spondylosis without myelopathy or radiculopathy, cervical region    . As per information/history obtained from the PADT(patient assessment and documentation tool) - He complains of pain in the neck with radiation to the arms Left He rates the pain 8/10 and describes it as sharp, shocking. Pain is made worse by: looking upward, sleeping. He denies side effects from the current pain regimen. Patient reports that since the last follow up visit the physical functioning is worse, family/social relationships are worse, mood is worse and sleep patterns are worse, and that the overall functioning is worse. Patient denies neurological bowel or bladder. Mr. Leni Siemens presents today to discuss his ongoing neck and left arm pain, stating his pain radiates from his shoulder to his fingers. He notes also having numbness/tingling in his left hand/fingers. Mr. Leni Siemens states having continued left arm weakness as well. He denies any new injuries or triggers to his neck or arm since his last visit. He has been using Tramadol 3 times daily to help manage his pain and states his has not been helping. The patient has been using Gabapentin as well but denies any improvement with this medication either. He feels overall since his last visit his symptoms have worsened. Adverse Events to opioids:  Patient currently on a bowel regiment? No   A. Nausea? No   B. Vomiting? No   C. Constipation? No   D. Itching? No   E. Drowsiness?  No  Are these new symptoms since last visit? No    Potential Aberrant Drug-Related Behavior:   - Aberrant behavior identified? No   - Potential aberrant behavior identified? No   - Reports lost or stolen prescriptions? No   - Insists on certain medications by name? No   - Purposeful over-sedation? No   - Increased dose without authorization? No    Analgesia Medications: Well Controlled  Activities of Daily Livin. Activity Level- Patient performs independently  2. Sleep Patterns- Patient performs independently  3. Motivation- Patient performs independently  4. Functional Level-   A) Bathing- Patient performs independently   B) Dressing- Patient performs independently   C) Transferring- Patient performs independently   D) Walking- Patient performs independently   E) Eating- Patient performs independently   F) Toilet Use- Patient performs independently   G) Personal Hygiene- Patient performs independently  Adverse Effects:None  Aberrant Behavior:None  · Mental health screening:   · Suffering from sadness: no   · Have you had a loss of a significant other or pet in the last 6 months: no  · Do other people say you worry too much? no   · Any significant change in your life that concerns you? no       Associated signs and symptoms:   Neurogenic bowel or bladder symptoms:  no   Perceived weakness:  yes   Difficulty walking:  no              Past Medical History:   Past Medical History:   Diagnosis Date    CAD (coronary artery disease)     Chest pain 10/15/2017    [facility] abuse ([facility]     COPD [facility]chronic obstructive pulmonary disease) [facility]     Diabetes mellitus ([facility]     Hyperlipidemia     Hypertension     Kidney stone     MRSA colonization 19; 2019    Sleep apnea     He said he doesn't use a CPAP machine.       Past Surgical History:     Past Surgical History:   Procedure Laterality Date    ABDOMEN SURGERY      CARDIAC SURGERY      3 STENTS PLACED     CHOLECYSTECTOMY      COLONOSCOPY      CORONARY ANGIOPLASTY WITH STENT PLACEMENT      3 stents     FRACTURE SURGERY      RIGHT ANKLE    LITHOTRIPSY      SHOULDER ARTHROSCOPY      VASCULAR SURGERY       Current Medications:     Current Outpatient Medications:     traMADol (ULTRAM) 50 MG tablet, Take 1 tablet by mouth every 8 hours as needed for Pain for up to 7 days. , Disp: 21 tablet, Rfl: 0    gabapentin (NEURONTIN) 300 MG capsule, Take 1 capsule by mouth nightly for 30 days. , Disp: 30 capsule, Rfl: 0    methylPREDNISolone (MEDROL, VARGHESE,) 4 MG tablet, Take by mouth as directed., Disp: 1 kit, Rfl: 0    tiZANidine (ZANAFLEX) 4 MG tablet, Take 1 tablet by mouth nightly, Disp: 30 tablet, Rfl: 0    lisinopril (PRINIVIL;ZESTRIL) 10 MG tablet, Take 1 tablet by mouth daily, Disp: 30 tablet, Rfl: 3    spironolactone (ALDACTONE) 50 MG tablet, Take 1 tablet by mouth 2 times daily, Disp: 30 tablet, Rfl: 3    mirtazapine (REMERON) 15 MG tablet, Take 15 mg by mouth nightly, Disp: , Rfl:     carvedilol (COREG) 25 MG tablet, Take 1 tablet by mouth 2 times daily (with meals), Disp: 60 tablet, Rfl: 0    acetaminophen (APAP EXTRA STRENGTH) 500 MG tablet, Take 1 tablet by mouth every 6 hours as needed for Pain, Disp: 40 tablet, Rfl: 0    Magnesium Oxide (MAGNESIUM OXIDE 400) 240 MG PACK, Take 1 capsule by mouth daily, Disp: 30 each, Rfl: 0    doxazosin (CARDURA) 2 MG tablet, Take 1 tablet by mouth every 12 hours, Disp: 30 tablet, Rfl: 3    hydrochlorothiazide (HYDRODIURIL) 25 MG tablet, Take 1 tablet by mouth daily, Disp: 30 tablet, Rfl: 3    hydrALAZINE (APRESOLINE) 100 MG tablet, Take 1 tablet by mouth every 8 hours, Disp: 90 tablet, Rfl: 3    amitriptyline (ELAVIL) 50 MG tablet, Take 50 mg by mouth 2 times daily, Disp: , Rfl:     vitamin B-12 (CYANOCOBALAMIN) 500 MCG tablet, Take 500 mcg by mouth daily, Disp: , Rfl:     zolpidem (AMBIEN) 10 MG tablet, Take 10 mg by mouth nightly., Disp: , Rfl:     vitamin D (D3-1000) 1000 UNITS TABS tablet, Take 1 tablet by mouth daily, Disp: limited in rotation to the left   · Strength: Strength testing is at least 4/5 bilateral upper extremities based on visual exam  · Special Tests:   Spurling's + on left and Cazares's are negative bilaterally. Stanley and Impingement tests are negative bilaterally. · Skin:There are no rashes, ulcerations or lesions. · Gait & station:  normal, patient ambulates without assistance and no ataxia  · Additional Examinations:  · RIGHT UPPER EXTREMITY:  Inspection/examination of the right upper extremity does not show any tenderness, deformity or injury. Range of motion is normal and pain-free. There is no gross instability. There are no rashes, ulcerations or lesions. Strength and tone are normal. No atrophy or abnormal movements are noted. · LEFT UPPER EXTREMITY: Inspection/examination of the left upper extremity does not show any tenderness, deformity or injury. Range of motion is normal and pain-free. There is no gross instability. There are no rashes, ulcerations or lesions. Strength and tone are normal. No atrophy or abnormal movements are noted. · Additional Examinations:  · RIGHT LOWER EXTREMITY: Inspection/examination of the right lower extremity does not show any tenderness, deformity or injury. Range of motion is unremarkable. There is no gross instability. There are no rashes, ulcerations or lesions. Strength and tone are normal. No atrophy or abnormal movements are noted. · LEFT LOWER EXTREMITY:  Inspection/examination of the left lower extremity does not show any tenderness, deformity or injury. Range of motion is unremarkable. There is no gross instability. There are no rashes, ulcerations or lesions. Strength and tone are normal. No atrophy or abnormal movements are noted.         Diagnostic Testing:    MR cervical 3/20/2020  Multilevel degenerative thecal sac narrowing and neural foraminal stenosis,   worst at C6-C7.           Results for orders placed or performed during the hospital encounter of 03/25/20   CBC Auto Differential   Result Value Ref Range    WBC 5.9 4.0 - 11.0 K/uL    RBC 4.33 4.20 - 5.90 M/uL    Hemoglobin 12.5 (L) 13.5 - 17.5 g/dL    Hematocrit 37.3 (L) 40.5 - 52.5 %    MCV 86.2 80.0 - 100.0 fL    MCH 28.8 26.0 - 34.0 pg    MCHC 33.5 31.0 - 36.0 g/dL    RDW 14.0 12.4 - 15.4 %    Platelets 455 125 - 823 K/uL    MPV 8.0 5.0 - 10.5 fL    Neutrophils % 66.8 %    Lymphocytes % 22.8 %    Monocytes % 8.0 %    Eosinophils % 1.7 %    Basophils % 0.7 %    Neutrophils Absolute 4.0 1.7 - 7.7 K/uL    Lymphocytes Absolute 1.4 1.0 - 5.1 K/uL    Monocytes Absolute 0.5 0.0 - 1.3 K/uL    Eosinophils Absolute 0.1 0.0 - 0.6 K/uL    Basophils Absolute 0.0 0.0 - 0.2 K/uL   Comprehensive Metabolic Panel w/ Reflex to MG   Result Value Ref Range    Sodium 137 136 - 145 mmol/L    Potassium reflex Magnesium 4.5 3.5 - 5.1 mmol/L    Chloride 100 99 - 110 mmol/L    CO2 23 21 - 32 mmol/L    Anion Gap 14 3 - 16    Glucose 237 (H) 70 - 99 mg/dL    BUN 18 7 - 20 mg/dL    CREATININE 0.8 0.8 - 1.3 mg/dL    GFR Non-African American >60 >60    GFR African American >60 >60    Calcium 9.2 8.3 - 10.6 mg/dL    Total Protein 6.4 6.4 - 8.2 g/dL    Alb 3.7 3.4 - 5.0 g/dL    Albumin/Globulin Ratio 1.4 1.1 - 2.2    Total Bilirubin <0.2 0.0 - 1.0 mg/dL    Alkaline Phosphatase 115 40 - 129 U/L    ALT 33 10 - 40 U/L    AST 22 15 - 37 U/L    Globulin 2.7 g/dL   Troponin   Result Value Ref Range    Troponin <0.01 <0.01 ng/mL   Brain Natriuretic Peptide   Result Value Ref Range    Pro-BNP 57 0 - 124 pg/mL   Troponin   Result Value Ref Range    Troponin <0.01 <0.01 ng/mL   D-Dimer, Quantitative   Result Value Ref Range    D-Dimer, Quant <200 0 - 229 ng/mL DDU   EKG 12 Lead   Result Value Ref Range    Ventricular Rate 92 BPM    Atrial Rate 92 BPM    P-R Interval 168 ms    QRS Duration 90 ms    Q-T Interval 344 ms    QTc Calculation (Bazett) 425 ms    P Axis 44 degrees    R Axis 67 degrees    T Axis 61 degrees    Diagnosis       Normal sinus rhythmPossible Left atrial enlargementConfirmed by Lauren DANIEL MD ([id number]) on 3/26/2020 8:59:29 AM   EKG 12 Lead   Result Value Ref Range    Ventricular Rate 92 BPM    Atrial Rate 92 BPM    P-R Interval 182 ms    QRS Duration 98 ms    Q-T Interval 362 ms    QTc Calculation (Bazett) 447 ms    P Axis 48 degrees    R Axis 58 degrees    T Axis 71 degrees    Diagnosis       Normal sinus rhythmPossible Left atrial enlargementIncomplete right bundle branch blockConfirmed by Lauren DANIEL MD ([id number]) on 3/26/2020 8:59:38 AM     Impression:       1. Cervical radiculopathy    2. Foraminal stenosis of cervical region    3. Spondylosis without myelopathy or radiculopathy, cervical region        Plan:  Clinical Course: Above diagnoses are worsening    I discussed the diagnosis and the treatment options with Era Isaac today. In Summary:  The various treatment options were outlined and discussed with Era Isaac including:  Conservative care options: physical therapy, ice, medications, bracing, and activity modification. The indications for therapeutic injections. The indications for additional imaging/laboratory studies. The indications for (possible future) interventions. After considering the various options discussed, Era Isaac elected to pursue a course of treatment that includes the followin. Medications:  . OARRS reviewed and appropriate. Low risk on ORT. 4 A's reviewed. Increase to Norco 5/325 mg bid#14. I will also start him on Neurontin 300 mg 3 times daily dispense 90. Risks benefits alternatives were discussed. Informed verbal consent was obtained. Goals of the current treatment regimen include: improvement in pain, improvement in overall in physical performance, ability to perform daily activities, work or disability, emotional distress, health care utilization and decreased medication consumption. Progress will be monitored towards achieving/maintaining therapeutic goals:    1. Improving perceived interference of pain with ADL's, ability to perform HEP, continue to improve in overall flexibility, ROM, strength and endurance, ability to do household activities indoor and/or outdoor, work and social/leisure activities. Improve psychosocial and physical functioning. 2. Improving sleep to 6-7 hours a night. Improve mood/ anxiety and depression symptoms    3. Reduction of reliance on opioid analgesia/more appropriate opioid use. Utilization of adjuvant medications as appropriate. Risks and benefits of the medications and alternative treatments have been discussed with the patient. The patient was advised against drinking alcohol with the opioid pain medicines, advised against driving or handling machinery when starting or adjusting the dose of medicines, feeling groggy or drowsy, or if having any cognitive issues related to the current medications. The patient is fully aware of the risk of overdose and death, if medicines are misused and not taken as prescribed. If the patient develops new symptoms or if the symptoms worsen, the patient was told to call the office. RX Monitoring 1/18/2019   Attestation The Prescription Monitoring Report for this patient was reviewed today. Periodic Controlled Substance Monitoring -         2. PT:  Prescribed home exercise program.    3. Further studies: No further studies. 4. Interventional:  NATA once he has completed HEP    5. Follow up:  1-2 weeks      Hayes Resendiz was instructed to call the office if his symptoms worsen or if new symptoms appear prior to the next scheduled visit. He is specifically instructed to contact the office between now & his scheduled appointment if he has concerns related to his condition or if he needs assistance in scheduling the above tests. He is welcome to call for an appointment sooner if he has any additional concerns or questions. Gabriela MCCLELLAN ATC, am scribing for and in the presence of Dr. April Michael. 04/14/20 8:43 AM Gabriela Bowman. The physical examination was performed between the patient and Dr. April Michael. All counseling during the appointment was performed between the patient and the provider. I, Dr. April Michael, personally performed the services described in this documentation as scribed by Gabriela Elder ATC in my presence and it is both accurate and complete. Bladimir Bah MD, ELVER, [facility]  Board Certified in [address] [place] [address] Certified and Fellowship Trained in [facility] ([facility])      Patient has verbally accepted the following: We confirm that, for purposes of billing, this is a virtual visit with the provider for which we will submit a claim for reimbursement with the insurance company. The patient accepts responsibility for any co-pays, coinsurance amounts or other amounts not covered by the insurance company. Patient location: [facility] - patient home  Physician location: [address] [place] [address]  Time spent: 15 min             This dictation was performed with a verbal recognition program [facility] ) and it was checked for errors. It is possible that there are still dictated errors within this office note. If so, please bring any errors to my attention for an addendum. All efforts were made to ensure that this office note is accurate. Pursuant to the emergency declaration under the [facility], [facility] waiver authority and the [employer] Act, this Virtual  Visit was conducted, with patient's consent, to reduce the patient's risk of exposure to COVID-19 and provide continuity of care for an established patient. Services were provided through a video synchronous discussion virtually to substitute for in-person clinic visit.     We have

## 2020-04-20 ENCOUNTER — TELEPHONE (OUTPATIENT)
Dept: ORTHOPEDIC SURGERY | Age: 65
End: 2020-04-20

## 2020-04-21 ENCOUNTER — TELEPHONE (OUTPATIENT)
Dept: ORTHOPEDIC SURGERY | Age: 65
End: 2020-04-21

## 2020-04-21 ENCOUNTER — VIRTUAL VISIT (OUTPATIENT)
Dept: ORTHOPEDIC SURGERY | Age: 65
End: 2020-04-21
Payer: MEDICARE

## 2020-04-21 VITALS — BODY MASS INDEX: 34.94 KG/M2 | RESPIRATION RATE: 12 BRPM | HEIGHT: 71 IN | WEIGHT: 249.56 LBS

## 2020-04-21 PROCEDURE — 99214 OFFICE O/P EST MOD 30 MIN: CPT | Performed by: PHYSICAL MEDICINE & REHABILITATION

## 2020-04-21 RX ORDER — HYDROCODONE BITARTRATE AND ACETAMINOPHEN 5; 325 MG/1; MG/1
1 TABLET ORAL 2 TIMES DAILY
Qty: 14 TABLET | Refills: 0 | Status: SHIPPED | OUTPATIENT
Start: 2020-04-21 | End: 2020-04-28

## 2020-04-21 NOTE — LETTER
Please schedule the following with:     Date:   20 @ 8:45AM   Account: [de-identified]  Patient: Alycia Mittal    : 1955  Address:  [address] [facility]    Phone (H):  [phone] (home)      ----------------------------------------------------------------------------------------------  Diagnosis:     ICD-10-CM    1. Weakness of left arm R29.898 HYDROcodone-acetaminophen (NORCO) 5-325 MG per tablet   2. Cervical radiculopathy M54.12 HYDROcodone-acetaminophen (NORCO) 5-325 MG per tablet   3. Foraminal stenosis of cervical region M48.02 HYDROcodone-acetaminophen (NORCO) 5-325 MG per tablet   4. Cervical stenosis of spine M48.02 HYDROcodone-acetaminophen (NORCO) 5-325 MG per tablet         Levels:C6/7 Interlaminar SIMA  Procedure type INTERLAMINER  Side MIDLINE  CPT Codes 38731    ----------------------------------------------------------------------------------------------  Injection # 1   [facility]    Attending Physician       Stephanie Block.  Denny Cottrell MD.      ----------------------------------------------------------------------------------------------  Injection Scheduled For:    At:    1st Insurance BC[employer] MEDICARE  Pre-Cert#    2nd Insurance NONE    Pre-Cert#    Comments or Special instructions:    · Infection control  · Tested positive for MRSA in past 12 months:  no  · Tested positive for MSSA \"staph infection\" in past 12 months: no  · Tested positive for VRE (Vancomycin Resistant Enterococci) in past 12 months:   no  · Currently on any antibiotics for an infection: no  · Anticoagulants:  · On a blood thinner:  no   · Any history of bleeding disorder: no   · Advanced Liver disease: no   · Advanced Renal disease: no   · Glaucoma: no   · Diabetes: no     Sedation:  Yes  -----------------------------------------------------------------------------------------------  Allergies   Allergen Reactions    Naproxen Hives         Urgency- Due to new onset weakness in the left arm (3/5 MMT) I would like to proceed with a  C6/7 IL SIMA. He has failed narcotics, conservative treatments and would like to avoid surgery. MR shows C6/7 stenosis.

## 2020-04-21 NOTE — TELEPHONE ENCOUNTER
L/m on patient's v/m regarding urgent SIMA scheduling. Patient has been approved to undergo injection on 4/23/20 @ 8:45am with a 7:45am arrival time at [facility]. Pre-procedure instructions were left on patient's v/m. He was asked to call back to confirm receipt of v/m and to confirm that he will be at the surgery center.

## 2020-04-21 NOTE — TELEPHONE ENCOUNTER
DOS   04/23/2020  CPT   27000  DX   R29.898   M54.12   M48.02    OP SX AUTH  [id number]  VALID   04/23/20250 - 07/22/2020    LEVELS   C6 - C7   PROCEDURE   INTERLAMINAR SIMA    [address] [facility]:   Jorge David

## 2020-04-21 NOTE — PROGRESS NOTES
Follow up: [facility] visit (Audio/visual connection present)    Analia Grewal  1955  [id number]         Chief Complaint   Patient presents with    Medication Check     Norco 5-325mg 1PO BID     Neck Pain     F/U CSP         HISTORY OF PRESENT ILLNESS:  Mr. Delmy Bernardo is a 72 y.o. male returns for a follow up visit for multiple medical problems. His current presenting problems are   1. Weakness of left arm    2. Cervical radiculopathy    3. Foraminal stenosis of cervical region    4. Cervical stenosis of spine    . As per information/history obtained from the PADT(patient assessment and documentation tool) - He complains of pain in the neck with radiation to the left hand. He rates the pain 9/10 and describes it as sharp, numbness, shocking, tingling. Pain is made worse by: looking upward, sleeping. He denies side effects from the current pain regimen. Patient reports that since the last follow up visit the physical functioning is worse, family/social relationships are unchanged, mood is unchanged and sleep patterns are unchanged, and that the overall functioning is worse. Patient denies neurological bowel or bladder. Mr. Delmy Bernardo presents in follow up from his last visit on 4/14/2020. He notes constant numbness from his shoulder to his elbow in the right arm that started two days ago without inciting event. There is tingling present from his elbow to his fingers, all five of his fingers. He has also started to experience daily headaches 3 days ago. Patient denies any blurred or double vision. He has been experiencing tinnitus that started when his pain initially started - this has been ongoing. Mr. Delmy Bernardo has been taking Norco 5-325mg twice daily. He states this was helping but no longer is. Patient notes that his last dose was administered last night. His symptoms are still waking him up at night. Patient denies any adverse reactions or side effects from this medication.      Adverse Events to opioids:  Patient currently on a bowel regiment? Yes   A. Nausea? No   B. Vomiting? No   C. Constipation? No   D. Itching? No    E. Drowsiness? No  Are these new symptoms since last visit? No    Potential Aberrant Drug-Related Behavior:   - Aberrant behavior identified? No   - Potential aberrant behavior identified? No   - Reports lost or stolen prescriptions? No   - Insists on certain medications by name? No   - Purposeful over-sedation? No   - Increased dose without authorization? No    Analgesia Medications: Well Controlled  Activities of Daily Livin. Activity Level- Patient performs independently  2. Sleep Patterns- Patient performs independently  3. Motivation- Patient performs independently  4. Functional Level-   A) Bathing- Patient performs independently   B) Dressing- Patient performs independently   C) Transferring- Patient performs independently   D) Walking- Patient performs independently   E) Eating- Patient performs independently   F) Toilet Use- Patient performs independently   G) Personal Hygiene- Patient performs independently  Adverse Effects:None  Aberrant Behavior:None  · Mental health screening:   · Suffering from sadness: no   · Have you had a loss of a significant other or pet in the last 6 months: no  · Do other people say you worry too much? no   · Any significant change in your life that concerns you? no       Associated signs and symptoms:   Neurogenic bowel or bladder symptoms:  no   Perceived weakness:  yes   Difficulty walking:  no            Past medical, surgical, social and family history reviewed with the patient.  No pertinent relevant history  Past Medical History:   Past Medical History:   Diagnosis Date    CAD (coronary artery disease)     Chest pain 10/15/2017    Cocaine abuse ([facility] [address]     COPD (chronic obstructive pulmonary disease) (HCC)     Diabetes mellitus ([facility] [address] [facility]     Hyperlipidemia     Hypertension     Kidney stone     MRSA colonization 19; Interval 168 ms    QRS Duration 90 ms    Q-T Interval 344 ms    QTc Calculation (Bazett) 425 ms    P Axis 44 degrees    R Axis 67 degrees    T Axis 61 degrees    Diagnosis       Normal sinus rhythmPossible Left atrial enlargementConfirmed by Debra DANIEL MD ([id number]) on 3/26/2020 8:59:29 AM   EKG 12 Lead   Result Value Ref Range    Ventricular Rate 92 BPM    Atrial Rate 92 BPM    P-R Interval 182 ms    QRS Duration 98 ms    Q-T Interval 362 ms    QTc Calculation (Bazett) 447 ms    P Axis 48 degrees    R Axis 58 degrees    T Axis 71 degrees    Diagnosis       Normal sinus rhythmPossible Left atrial enlargementIncomplete right bundle branch blockConfirmed by Debra DANIEL MD ([id number]) on 3/26/2020 8:59:38 AM     Impression:       1. Weakness of left arm    2. Cervical radiculopathy    3. Foraminal stenosis of cervical region    4. Cervical stenosis of spine        Plan:  Clinical Course: Above diagnoses are worsening    I discussed the diagnosis and the treatment options with Analia Grewal today. In Summary:  The various treatment options were outlined and discussed with Analia Grewal including:  Conservative care options: physical therapy, ice, medications, bracing, and activity modification. The indications for therapeutic injections. The indications for additional imaging/laboratory studies. The indications for (possible future) interventions. After considering the various options discussed, Analia Grewal elected to pursue a course of treatment that includes the followin. Medications:  . OARRS reviewed and appropriate. Low risk on ORT. 4 A's reviewed. Continue current regimen of Norco 5/325 mg po bid. Opioid agreement was signed. Informed verbal consent was obtained.    Goals of the current treatment regimen include: improvement in pain, improvement in overall in physical performance, ability to perform daily activities, work or disability, emotional distress, health care utilization and decreased medication consumption. Progress will be monitored towards achieving/maintaining therapeutic goals:    1. Improving perceived interference of pain with ADL's, ability to perform HEP, continue to improve in overall flexibility, ROM, strength and endurance, ability to do household activities indoor and/or outdoor, work and social/leisure activities. Improve psychosocial and physical functioning. 2. Improving sleep to 6-7 hours a night. Improve mood/ anxiety and depression symptoms    3. Reduction of reliance on opioid analgesia/more appropriate opioid use. Utilization of adjuvant medications as appropriate. Risks and benefits of the medications and alternative treatments have been discussed with the patient. The patient was advised against drinking alcohol with the opioid pain medicines, advised against driving or handling machinery when starting or adjusting the dose of medicines, feeling groggy or drowsy, or if having any cognitive issues related to the current medications. The patient is fully aware of the risk of overdose and death, if medicines are misused and not taken as prescribed. If the patient develops new symptoms or if the symptoms worsen, the patient was told to call the office. RX Monitoring 1/18/2019   Attestation The Prescription Monitoring Report for this patient was reviewed today. Periodic Controlled Substance Monitoring -         2. PT:  Encouraged to continue with Home exercise program.    3. Further studies: No further studies. 4. Interventional:  We discussed pursuing a C6/7 IL epidural steroid injection to address the pain. Radiologic imaging and symptoms confirm the pain etiology. Risks, benefits and alternatives of interventional options were discussed. These include and are not limited to bleeding, infection, spinal headache, nerve injury and lack of pain relief. The patient verbalized understanding and would like to proceed.   The patient will be scheduled accordingly. 5. Follow up:  1-2 weeks      Quentin Johnson was instructed to call the office if his symptoms worsen or if new symptoms appear prior to the next scheduled visit. He is specifically instructed to contact the office between now & his scheduled appointment if he has concerns related to his condition or if he needs assistance in scheduling the above tests. He is welcome to call for an appointment sooner if he has any additional concerns or questions. Eve Medrano MD, [place], [facility]  Board Certified in [address] [place] [address] Certified and Fellowship Trained in [facility] ([facility])             This dictation was performed with a verbal recognition program [facility]) and it was checked for errors. It is possible that there are still dictated errors within this office note. If so, please bring any errors to my attention for an addendum. All efforts were made to ensure that this office note is accurate. Patient has verbally accepted the following: We confirm that, for purposes of billing, this is a virtual visit with the provider for which we will submit a claim for reimbursement with the insurance company. The patient accepts responsibility for any co-pays, coinsurance amounts or other amounts not covered by the insurance company. Patient location: [facility]  Physician location: [facility]  Time spent: 25 min      Pursuant to the emergency declaration under the [facility], [facility]5 waiver authority and the [employer] and [employer] General Act, this Virtual  Visit was conducted, with patient's consent, to reduce the patient's risk of exposure to COVID-19 and provide continuity of care for an established patient.     Services were provided through a video synchronous discussion virtually to substitute for in-person clinic visit. We have confirmed that, for purposes of billing, this is a virtual visit with for which we will submit a claim for reimbursement with your insurance company. The patient has accepted responsibility for any copays, coinsurance amounts or other amounts not covered by their insurance company. This visit was completed virtually using doxy. me     Urgency- Due to new onset weakness in the left arm (3/5 MMT) I would like to proceed with a  C6/7 IL SIMA. He has failed narcotics, conservative treatments and would like to avoid surgery. MR shows C6/7 stenosis.

## 2020-04-22 ENCOUNTER — TELEPHONE (OUTPATIENT)
Dept: ORTHOPEDIC SURGERY | Age: 65
End: 2020-04-22

## 2020-04-22 NOTE — TELEPHONE ENCOUNTER
L/M for the patient informing him that the office is very sorry for his loss, confirmed that his injection will be canceled for tomorrow. The patient was also informed however that if he calls back to reschedule the injection it may take several weeks in order to get the patient back on the schedule.

## 2020-04-22 NOTE — TELEPHONE ENCOUNTER
Patient has to cancel his procedure for tomorrow due to a death in the family and has to fly to [place]. He will call back as soon as he gets back to town.

## 2020-04-23 ENCOUNTER — CARE COORDINATION (OUTPATIENT)
Dept: CASE MANAGEMENT | Age: 65
End: 2020-04-23

## 2020-04-24 ENCOUNTER — CARE COORDINATION (OUTPATIENT)
Dept: CASE MANAGEMENT | Age: 65
End: 2020-04-24

## 2020-04-28 ENCOUNTER — TELEPHONE (OUTPATIENT)
Dept: ORTHOPEDIC SURGERY | Age: 65
End: 2020-04-28

## 2020-04-30 ENCOUNTER — TELEPHONE (OUTPATIENT)
Dept: ORTHOPEDIC SURGERY | Age: 65
End: 2020-04-30

## 2020-05-01 ENCOUNTER — VIRTUAL VISIT (OUTPATIENT)
Dept: ORTHOPEDIC SURGERY | Age: 65
End: 2020-05-01
Payer: MEDICARE

## 2020-05-01 VITALS — RESPIRATION RATE: 14 BRPM | WEIGHT: 244.93 LBS | HEIGHT: 72 IN | BODY MASS INDEX: 33.18 KG/M2

## 2020-05-01 PROCEDURE — 99213 OFFICE O/P EST LOW 20 MIN: CPT | Performed by: PHYSICAL MEDICINE & REHABILITATION

## 2020-05-01 RX ORDER — PREDNISONE 10 MG/1
TABLET ORAL
Qty: 26 TABLET | Refills: 0 | Status: ON HOLD | OUTPATIENT
Start: 2020-05-01 | End: 2020-10-05

## 2020-05-01 NOTE — PROGRESS NOTES
Follow up:  [facility] visit (Audio/visual connection present)    Jessica Perera  1955  [id number]         Chief Complaint   Patient presents with    Neck Pain     F/U CSP     Medication Check     NORCO 5-325MG 1 PO BID #14 - wants something stronger          HISTORY OF PRESENT ILLNESS:  Mr. Niurka Durant is a 72 y.o. male returns for a follow up visit for multiple medical problems. His current presenting problems are   1. Cervical radiculopathy    2. Foraminal stenosis of cervical region    3. Cervical stenosis of spine    . As per information/history obtained from the PADT(patient assessment and documentation tool) - He complains of pain in the neck with radiation to the elbows Left He rates the pain 8/10 and describes it as throbbing. Pain is made worse by: movement. He denies side effects from the current pain regimen. Patient reports that since the last follow up visit the physical functioning is worse, family/social relationships are worse, mood is worse and sleep patterns are worse, and that the overall functioning is worse. Patient denies neurological bowel or bladder. His aunt  and he went to [place]. He canceled his NATA. He feels numb from his shoulder to his elbow. Associated signs and symptoms:   Neurogenic bowel or bladder symptoms:  no   Perceived weakness:  no   Difficulty walking:  no            Past medical, surgical, social and family history reviewed with the patient. No pertinent relevant history  Past Medical History:   Past Medical History:   Diagnosis Date    CAD (coronary artery disease)     Chest pain 10/15/2017    Cocaine abuse ([address]     COPD (chronic obstructive pulmonary disease) ([facility] [address]     Diabetes mellitus ([facility] [address]     not on meds    Hyperlipidemia     Hypertension     Kidney stone     MRSA colonization 19; 2019    Sleep apnea     He said he doesn't use a CPAP machine.       Past Surgical History:     Past Surgical History:   Procedure tenderness, deformity or injury. Range of motion is normal and pain-free. There is no gross instability. There are no rashes, ulcerations or lesions. Strength and tone are normal. No atrophy or abnormal movements are noted. · LEFT UPPER EXTREMITY: Inspection/examination of the left upper extremity does not show any tenderness, deformity or injury. Range of motion is normal and pain-free. There is no gross instability. There are no rashes, ulcerations or lesions. Strength and tone are normal. No atrophy or abnormal movements are noted. · RIGHT LOWER EXTREMITY: Inspection/examination of the right lower extremity does not show any tenderness, deformity or injury. Range of motion is unremarkable. There is no gross instability. There are no rashes, ulcerations or lesions. Strength and tone are normal. No atrophy or abnormal movements are noted. · LEFT LOWER EXTREMITY:  Inspection/examination of the left lower extremity does not show any tenderness, deformity or injury. Range of motion is unremarkable. There is no gross instability. There are no rashes, ulcerations or lesions. Strength and tone are normal. No atrophy or abnormal movements are noted. Diagnostic Testing:    MR cervical spine shows   Multilevel degenerative thecal sac narrowing and neural foraminal stenosis,   worst at C6-C7.        Results for orders placed or performed during the hospital encounter of 03/25/20   CBC Auto Differential   Result Value Ref Range    WBC 5.9 4.0 - 11.0 K/uL    RBC 4.33 4.20 - 5.90 M/uL    Hemoglobin 12.5 (L) 13.5 - 17.5 g/dL    Hematocrit 37.3 (L) 40.5 - 52.5 %    MCV 86.2 80.0 - 100.0 fL    MCH 28.8 26.0 - 34.0 pg    MCHC 33.5 31.0 - 36.0 g/dL    RDW 14.0 12.4 - 15.4 %    Platelets 420 802 - 238 K/uL    MPV 8.0 5.0 - 10.5 fL    Neutrophils % 66.8 %    Lymphocytes % 22.8 %    Monocytes % 8.0 %    Eosinophils % 1.7 %    Basophils % 0.7 %    Neutrophils Absolute 4.0 1.7 - 7.7 K/uL    Lymphocytes Absolute 1.4 1.0 - 5.1 K/uL    Monocytes Absolute 0.5 0.0 - 1.3 K/uL    Eosinophils Absolute 0.1 0.0 - 0.6 K/uL    Basophils Absolute 0.0 0.0 - 0.2 K/uL   Comprehensive Metabolic Panel w/ Reflex to MG   Result Value Ref Range    Sodium 137 136 - 145 mmol/L    Potassium reflex Magnesium 4.5 3.5 - 5.1 mmol/L    Chloride 100 99 - 110 mmol/L    CO2 23 21 - 32 mmol/L    Anion Gap 14 3 - 16    Glucose 237 (H) 70 - 99 mg/dL    BUN 18 7 - 20 mg/dL    CREATININE 0.8 0.8 - 1.3 mg/dL    GFR Non-African American >60 >60    GFR African American >60 >60    Calcium 9.2 8.3 - 10.6 mg/dL    Total Protein 6.4 6.4 - 8.2 g/dL    Alb 3.7 3.4 - 5.0 g/dL    Albumin/Globulin Ratio 1.4 1.1 - 2.2    Total Bilirubin <0.2 0.0 - 1.0 mg/dL    Alkaline Phosphatase 115 40 - 129 U/L    ALT 33 10 - 40 U/L    AST 22 15 - 37 U/L    Globulin 2.7 g/dL   Troponin   Result Value Ref Range    Troponin <0.01 <0.01 ng/mL   Brain Natriuretic Peptide   Result Value Ref Range    Pro-BNP 57 0 - 124 pg/mL   Troponin   Result Value Ref Range    Troponin <0.01 <0.01 ng/mL   D-Dimer, Quantitative   Result Value Ref Range    D-Dimer, Quant <200 0 - 229 ng/mL DDU   EKG 12 Lead   Result Value Ref Range    Ventricular Rate 92 BPM    Atrial Rate 92 BPM    P-R Interval 168 ms    QRS Duration 90 ms    Q-T Interval 344 ms    QTc Calculation (Bazett) 425 ms    P Axis 44 degrees    R Axis 67 degrees    T Axis 61 degrees    Diagnosis       Normal sinus rhythmPossible Left atrial enlargementConfirmed by Page DANIEL MD ([id number]) on 3/26/2020 8:59:29 AM   EKG 12 Lead   Result Value Ref Range    Ventricular Rate 92 BPM    Atrial Rate 92 BPM    P-R Interval 182 ms    QRS Duration 98 ms    Q-T Interval 362 ms    QTc Calculation (Bazett) 447 ms    P Axis 48 degrees    R Axis 58 degrees    T Axis 71 degrees    Diagnosis       Normal sinus rhythmPossible Left atrial enlargementIncomplete right bundle branch blockConfirmed by Page DANIEL MD ([id number]) on 3/26/2020 8:59:38 AM     Impression:       1.  Cervical note. If so, please bring any errors to my attention for an addendum. All efforts were made to ensure that this office note is accurate. Patient has verbally accepted the following: We confirm that, for purposes of billing, this is a virtual visit with the provider for which we will submit a claim for reimbursement with the insurance company. The patient accepts responsibility for any co-pays, coinsurance amounts or other amounts not covered by the insurance company. Patient location: Patient home  Physician location: physician home   Time spent: 15 min  Present on the call: myself and patient    Pursuant to the emergency declaration under the [facility], [facility] waiver authority and the [employer] and [employer] General Act, this Virtual  Visit was conducted, with patient's consent, to reduce the patient's risk of exposure to COVID-19 and provide continuity of care for an established patient. Services were provided through a video synchronous discussion virtually to substitute for in-person clinic visit. We have confirmed that, for purposes of billing, this is a virtual visit with for which we will submit a claim for reimbursement with your insurance company. The patient has accepted responsibility for any copays, coinsurance amounts or other amounts not covered by their insurance company. This visit was completed virtually using doxy. me.

## 2020-05-07 ENCOUNTER — TELEPHONE (OUTPATIENT)
Dept: ORTHOPEDIC SURGERY | Age: 65
End: 2020-05-07

## 2020-05-11 ENCOUNTER — TELEPHONE (OUTPATIENT)
Dept: ORTHOPEDIC SURGERY | Age: 65
End: 2020-05-11

## 2020-05-13 ENCOUNTER — OFFICE VISIT (OUTPATIENT)
Dept: PRIMARY CARE CLINIC | Age: 65
End: 2020-05-13

## 2020-05-13 NOTE — PROGRESS NOTES
Patient presented to [facility] drive up clinic for preop testing. Patient was swabbed and given information advising them to remain isolated until procedure date.

## 2020-05-14 LAB — SARS-COV-2: NOT DETECTED

## 2020-05-19 ENCOUNTER — TELEPHONE (OUTPATIENT)
Dept: ORTHOPEDIC SURGERY | Age: 65
End: 2020-05-19

## 2020-07-10 ENCOUNTER — HOSPITAL ENCOUNTER (EMERGENCY)
Age: 65
Discharge: HOME | End: 2020-07-10
Attending: EMERGENCY MEDICINE
Payer: MEDICARE

## 2020-07-10 VITALS
OXYGEN SATURATION: 96 % | BODY MASS INDEX: 33.9 KG/M2 | DIASTOLIC BLOOD PRESSURE: 98 MMHG | TEMPERATURE: 98.8 F | SYSTOLIC BLOOD PRESSURE: 171 MMHG | HEART RATE: 96 BPM | RESPIRATION RATE: 16 BRPM | WEIGHT: 250 LBS

## 2020-07-10 PROCEDURE — 99282 EMERGENCY DEPT VISIT SF MDM: CPT

## 2020-07-10 PROCEDURE — 6370000000 HC RX 637 (ALT 250 FOR IP): Performed by: EMERGENCY MEDICINE

## 2020-07-10 RX ORDER — HYDROXYZINE 50 MG/1
50 TABLET, FILM COATED ORAL EVERY 8 HOURS PRN
Qty: 15 TABLET | Refills: 0 | Status: SHIPPED | OUTPATIENT
Start: 2020-07-10 | End: 2020-07-15

## 2020-07-10 RX ORDER — LORAZEPAM 1 MG/1
1 TABLET ORAL ONCE
Status: COMPLETED | OUTPATIENT
Start: 2020-07-10 | End: 2020-07-10

## 2020-07-10 RX ADMIN — LORAZEPAM 1 MG: 1 TABLET ORAL at 19:39

## 2020-07-10 NOTE — ED PROVIDER NOTES
Triage Chief Complaint:   Anxiety (c/o anxiety since yesterday. Progressively getting worse. )      [place]:  Farrah Arechiga is a 72 y.o. male that presents To the emergency department with anxiety and panic attack. Patient states he has a history of this. He has previously been on medication for this but he cannot recall what it is. He states. he does not currently have a primary care physician to prescribe him any medication. He states he has been feeling like this since yesterday. He states he feels shaky and panicky and was unable to sleep last night due to racing thoughts. He denies SI or HI. He denies chest pain or shortness of breath, nausea or vomiting    Past Medical History:   Diagnosis Date    CAD (coronary artery disease)     Chest pain 10/15/2017    [facility]     COPD [facility]chronic obstructive [facility] disease) [facility]     Diabetes mellitus ([facility]     not on meds    Hyperlipidemia     Hypertension     Kidney stone     MRSA colonization 4/16/19; 03/24/2019    Sleep apnea     He said he doesn't use a CPAP machine.      Past Surgical History:   Procedure Laterality Date    ABDOMEN SURGERY      CARDIAC SURGERY      3 STENTS PLACED     CHOLECYSTECTOMY      COLONOSCOPY      CORONARY ANGIOPLASTY WITH STENT PLACEMENT      3 stents     FRACTURE SURGERY      RIGHT ANKLE    LITHOTRIPSY      SHOULDER ARTHROSCOPY      VASCULAR SURGERY       Family History   Problem Relation Age of Onset    High Blood Pressure Mother     Cancer Father         lung cancer    Diabetes Brother     Liver Disease Sister     Diabetes Brother      Social History     Socioeconomic History    Marital status: Single     Spouse name: Not on file    Number of children: 1    Years of education: 15    Highest education level: Not on file   Occupational History    Occupation: retired   Social Needs    Financial resource strain: Not on file    Food insecurity     Worry: Not on file     Inability: Not on file   [facility] Transportation needs     Medical: Not on file     Non-medical: Not on file   Tobacco Use    Smoking status: Former Smoker     Packs/day: 2.50     Years: 40.00     Pack years: 100.00     Types: Cigarettes    Smokeless tobacco: Never Used   Substance and Sexual Activity    Alcohol use: Never     Frequency: Never    Drug use: Yes     Types: Cocaine     Comment: 5 years ago    Sexual activity: Yes     Partners: Female   Lifestyle    Physical activity     Days per week: Not on file     Minutes per session: Not on file    Stress: Not on file   Relationships    Social connections     Talks on phone: Not on file     Gets together: Not on file     Attends Latter-day service: Not on file     Active member of club or organization: Not on file     Attends meetings of clubs or organizations: Not on file     Relationship status: Not on file    Intimate partner violence     Fear of current or ex partner: Not on file     Emotionally abused: Not on file     Physically abused: Not on file     Forced sexual activity: Not on file   Other Topics Concern    Not on file   Social History Narrative    Not on file     No current facility-administered medications for this encounter. Current Outpatient Medications   Medication Sig Dispense Refill    hydrOXYzine (ATARAX) 50 MG tablet Take 1 tablet by mouth every 8 hours as needed for Anxiety 15 tablet 0    predniSONE (DELTASONE) 10 MG tablet Sig:3pillspoBID x2days, then 2pillspoBID x2days, then 1pillpoBID x2 days, then 1pillpo QD. 26 tablet 0    gabapentin (NEURONTIN) 300 MG capsule Take 1 capsule by mouth 3 times daily for 180 days.  Intended supply: 30 days 90 capsule 1    lisinopril (PRINIVIL;ZESTRIL) 10 MG tablet Take 1 tablet by mouth daily (Patient taking differently: Take 20 mg by mouth daily ) 30 tablet 3    carvedilol (COREG) 25 MG tablet Take 1 tablet by mouth 2 times daily (with meals) (Patient taking differently: Take 25 mg by mouth daily ) 60 tablet 0    acetaminophen (APAP EXTRA STRENGTH) 500 MG tablet Take 1 tablet by mouth every 6 hours as needed for Pain 40 tablet 0    hydrochlorothiazide (HYDRODIURIL) 25 MG tablet Take 1 tablet by mouth daily 30 tablet 3    amitriptyline (ELAVIL) 50 MG tablet Take 50 mg by mouth 2 times daily Not taking at this time      vitamin B-12 (CYANOCOBALAMIN) 500 MCG tablet Take 500 mcg by mouth daily      zolpidem (AMBIEN) 10 MG tablet Take 10 mg by mouth nightly.  vitamin D (D3-1000) 1000 UNITS TABS tablet Take 1 tablet by mouth daily 90 tablet 3    aspirin 81 MG tablet Take 81 mg by mouth daily       Allergies   Allergen Reactions    Naproxen Hives     Nursing Notes Reviewed    ROS:  At least 10 systems reviewed and otherwise negative except as in the [address] Physical Exam:  ED Triage Vitals [07/10/20 1929]   Enc Vitals Group      BP (!) 193/106      Pulse 101      Resp 16      Temp 98.8 °F (37.1 °C)      Temp Source Oral      SpO2 96 %      Weight 250 lb (113.4 kg)      Height       Head Circumference       Peak Flow       Pain Score       Pain Loc       Pain Edu? Excl. in [address]? My pulse oximetry interpretation is which is within the normal range    GENERAL APPEARANCE: Awake and alert. Cooperative. No acute distress. HEAD:  Atraumatic. EYES: EOM's grossly intact. ENT: Mucous membranes are moist.  No trismus. NECK:  Trachea midline. HEART: RRR. Radial pulses 2+. LUNGS: Respirations unlabored. CTAB  ABDOMEN: Soft. Non-tender. No guarding or rebound. EXTREMITIES: No acute deformities. SKIN: Warm and dry. NEUROLOGICAL: No gross facial drooping. Moves all 4 extremities spontaneously. PSYCHIATRIC: Normal mood. I have reviewed and interpreted all of the currently available lab results from this visit (if applicable):  No results found for this visit on 07/10/20.        EKG: (All EKG's are interpreted by myself in the absence of a cardiologist)      MDM:  Patient is hypertensive here however he states his

## 2020-07-17 ENCOUNTER — HOSPITAL ENCOUNTER (EMERGENCY)
Age: 65
Discharge: HOME | End: 2020-07-18
Attending: EMERGENCY MEDICINE
Payer: MEDICARE

## 2020-07-17 PROCEDURE — 99282 EMERGENCY DEPT VISIT SF MDM: CPT

## 2020-07-17 RX ORDER — LORAZEPAM 1 MG/1
1 TABLET ORAL ONCE
Status: COMPLETED | OUTPATIENT
Start: 2020-07-17 | End: 2020-07-18

## 2020-07-17 RX ORDER — LORAZEPAM 0.5 MG/1
0.5 TABLET ORAL 3 TIMES DAILY PRN
Qty: 10 TABLET | Refills: 0 | Status: SHIPPED | OUTPATIENT
Start: 2020-07-17 | End: 2020-07-21

## 2020-07-18 VITALS
OXYGEN SATURATION: 96 % | DIASTOLIC BLOOD PRESSURE: 87 MMHG | SYSTOLIC BLOOD PRESSURE: 139 MMHG | BODY MASS INDEX: 32.52 KG/M2 | HEIGHT: 72 IN | TEMPERATURE: 98.7 F | WEIGHT: 240.08 LBS | HEART RATE: 90 BPM | RESPIRATION RATE: 16 BRPM

## 2020-07-18 PROCEDURE — 6370000000 HC RX 637 (ALT 250 FOR IP): Performed by: EMERGENCY MEDICINE

## 2020-07-18 RX ADMIN — LORAZEPAM 1 MG: 1 TABLET ORAL at 00:32

## 2020-07-18 NOTE — ED PROVIDER NOTES
Triage Chief Complaint:   Anxiety      [place]:  Yunier Spence is a 72 y.o. male that presents to the emergency department with anxiety. He has a history of anxiety. He was seen here about 10 days ago for the same. He denies chest pain or shortness of breath. He denies SI or HI. He states that he used all of the Atarax that he had been given previously. He states that he is been so busy with work this week that he has not had a chance to call any of the primary care physicians for this. He states he had decreased sleep due to his anxiety. .    Past Medical History:   Diagnosis Date    CAD (coronary artery disease)     Chest pain 10/15/2017    [facility]     COPD (chronic obstructive pulmonary disease) [facility]     Diabetes mellitus ([facility]     not on meds    Hyperlipidemia     Hypertension     Kidney stone     MRSA colonization 4/16/19; 03/24/2019    Sleep apnea     He said he doesn't use a CPAP machine.      Past Surgical History:   Procedure Laterality Date    ABDOMEN SURGERY      CARDIAC SURGERY      3 STENTS PLACED     CHOLECYSTECTOMY      COLONOSCOPY      CORONARY ANGIOPLASTY WITH STENT PLACEMENT      3 stents     FRACTURE SURGERY      RIGHT ANKLE    LITHOTRIPSY      SHOULDER ARTHROSCOPY      VASCULAR SURGERY       Family History   Problem Relation Age of Onset    High Blood Pressure Mother     Cancer Father         lung cancer    Diabetes Brother     Liver Disease Sister     Diabetes Brother      Social History     Socioeconomic History    Marital status: Single     Spouse name: Not on file    Number of children: 1    Years of education: 15    Highest education level: Not on file   Occupational History    Occupation: retired   Social Needs    Financial resource strain: Not on file    Food insecurity     Worry: Not on file     Inability: Not on file   [employer] needs     Medical: Not on file     Non-medical: Not on file   Tobacco Use    Smoking status: Former Smoker     Packs/day: 2.50     Years: 40.00     Pack years: 100.00     Types: Cigarettes    Smokeless tobacco: Never Used   Substance and Sexual Activity    Alcohol use: Never     Frequency: Never    Drug use: Yes     Types: Cocaine     Comment: 5 years ago    Sexual activity: Yes     Partners: Female   Lifestyle    Physical activity     Days per week: Not on file     Minutes per session: Not on file    Stress: Not on file   Relationships    Social connections     Talks on phone: Not on file     Gets together: Not on file     Attends Mandaeism service: Not on file     Active member of club or organization: Not on file     Attends meetings of clubs or organizations: Not on file     Relationship status: Not on file    Intimate partner violence     Fear of current or ex partner: Not on file     Emotionally abused: Not on file     Physically abused: Not on file     Forced sexual activity: Not on file   Other Topics Concern    Not on file   Social History Narrative    Not on file     Current Facility-Administered Medications   Medication Dose Route Frequency Provider Last Rate Last Dose    LORazepam (ATIVAN) tablet 1 mg  1 mg Oral Once Mohinder Cameron MD         Current Outpatient Medications   Medication Sig Dispense Refill    LORazepam (ATIVAN) 0.5 MG tablet Take 1 tablet by mouth 3 times daily as needed for Anxiety for up to 4 days. 10 tablet 0    predniSONE (DELTASONE) 10 MG tablet Sig:3pillspoBID x2days, then 2pillspoBID x2days, then 1pillpoBID x2 days, then 1pillpo QD. 26 tablet 0    gabapentin (NEURONTIN) 300 MG capsule Take 1 capsule by mouth 3 times daily for 180 days.  Intended supply: 30 days 90 capsule 1    lisinopril (PRINIVIL;ZESTRIL) 10 MG tablet Take 1 tablet by mouth daily (Patient taking differently: Take 20 mg by mouth daily ) 30 tablet 3    carvedilol (COREG) 25 MG tablet Take 1 tablet by mouth 2 times daily (with meals) (Patient taking differently: Take 25 mg by mouth daily ) 60 tablet 0    acetaminophen (APAP EXTRA STRENGTH) 500 MG tablet Take 1 tablet by mouth every 6 hours as needed for Pain 40 tablet 0    hydrochlorothiazide (HYDRODIURIL) 25 MG tablet Take 1 tablet by mouth daily 30 tablet 3    amitriptyline (ELAVIL) 50 MG tablet Take 50 mg by mouth 2 times daily Not taking at this time      vitamin B-12 (CYANOCOBALAMIN) 500 MCG tablet Take 500 mcg by mouth daily      zolpidem (AMBIEN) 10 MG tablet Take 10 mg by mouth nightly.  vitamin D (D3-1000) 1000 UNITS TABS tablet Take 1 tablet by mouth daily 90 tablet 3    aspirin 81 MG tablet Take 81 mg by mouth daily       Allergies   Allergen Reactions    Naproxen Hives     Nursing Notes Reviewed    ROS:  At least 10 systems reviewed and otherwise negative except as in the [address] Physical Exam:  ED Triage Vitals [07/17/20 5808]   Enc Vitals Group      /87      Pulse 90      Resp 16      Temp 98.7 °F (37.1 °C)      Temp src       SpO2 96 %      Weight 240 lb 1.3 oz (108.9 kg)      Height       Head Circumference       Peak Flow       Pain Score       Pain Loc       Pain Edu? Excl. in [address]? My pulse oximetry interpretation is which is within the normal range    GENERAL APPEARANCE: Awake and alert. Cooperative. No acute distress. HEAD:  Atraumatic. EYES: EOM's grossly intact. ENT: Mucous membranes are moist.  No trismus. NECK:  Trachea midline. HEART: RRR. Radial pulses 2+. LUNGS: Respirations unlabored. CTAB  ABDOMEN: Soft. Non-tender. No guarding or rebound. EXTREMITIES: No acute deformities. SKIN: Warm and dry. NEUROLOGICAL: No gross facial drooping. Moves all 4 extremities spontaneously. PSYCHIATRIC: Normal mood. I have reviewed and interpreted all of the currently available lab results from this visit (if applicable):  No results found for this visit on 07/17/20. EKG: (All EKG's are interpreted by myself in the absence of a cardiologist)      MDM:  Patient awake and alert in no acute distress. His OARRS report is negative for any benzodiazepines. I instructed him to call for PCP referral In 2 days given that it is the weekend. He has no chest pain, shortness of breath or other systemic symptoms at this time. I did give him a dose of Ativan in the emergency department will give him a short supply of Ativan at home. Given strict return precautions. Clinical Impression:  1. Anxiety state        Disposition Vitals:  [unfilled], [unfilled], [unfilled], [unfilled]    Disposition referral (if applicable):  [facility]) Pre-Services  [phone]          Disposition medications (if applicable):  New Prescriptions    LORAZEPAM (ATIVAN) 0.5 MG TABLET    Take 1 tablet by mouth 3 times daily as needed for Anxiety for up to 4 days.          (Please note that portions of this note may have been completed with a voice recognition program. Efforts were made to edit the dictations but occasionally words are mis-transcribed.)    MD Nakul Valencia MD  07/18/20 0002

## 2020-08-09 ENCOUNTER — HOSPITAL ENCOUNTER (EMERGENCY)
Age: 65
Discharge: HOME | End: 2020-08-09
Payer: MEDICARE

## 2020-08-09 VITALS
HEART RATE: 83 BPM | RESPIRATION RATE: 19 BRPM | HEIGHT: 71 IN | BODY MASS INDEX: 34.32 KG/M2 | OXYGEN SATURATION: 94 % | DIASTOLIC BLOOD PRESSURE: 97 MMHG | TEMPERATURE: 98.4 F | WEIGHT: 245.15 LBS | SYSTOLIC BLOOD PRESSURE: 186 MMHG

## 2020-08-09 PROCEDURE — 6370000000 HC RX 637 (ALT 250 FOR IP): Performed by: PHYSICIAN ASSISTANT

## 2020-08-09 PROCEDURE — 99282 EMERGENCY DEPT VISIT SF MDM: CPT

## 2020-08-09 RX ORDER — LORAZEPAM 1 MG/1
1 TABLET ORAL ONCE
Status: COMPLETED | OUTPATIENT
Start: 2020-08-09 | End: 2020-08-09

## 2020-08-09 RX ADMIN — LORAZEPAM 1 MG: 1 TABLET ORAL at 22:11

## 2020-08-09 ASSESSMENT — ENCOUNTER SYMPTOMS
RESPIRATORY NEGATIVE: 1
VOMITING: 0
NAUSEA: 0
ABDOMINAL PAIN: 0

## 2020-08-10 NOTE — ED TRIAGE NOTES
Brandon Cheung is a 72 y.o. male  Brought himself to the ER for eval of a panic attack that started this afternoon. The patient states he is unable to get to his PCP until 9/23. The patient is alert and oriented with an open and patent airway with a normal respiratory effort.

## 2020-08-10 NOTE — ED PROVIDER NOTES
[address] [place] [address] EMERGENCY DEPARTMENT  [address] [address]  Dept: [id number] [facility] [address]: [phone]  eMERGENCYdEPARTMENT eNCOUnter      Pt Name: Jaylin Leigh  MRN: [id number]  [place] a 1955  Date of evaluation: 8/9/2020  Provider:Lindsey Allen PA-C    CHIEF COMPLAINT       Chief Complaint   Patient presents with    Panic Attack     pt states had panic attack this afternoon; hx of panic attacks; pt states has doc appt for 09/23 but feels like needs something to calm him down sooner       HISTORY OF PRESENT ILLNESS  (Location/Symptom, Timing/Onset, Context/Setting, Quality, Duration,Modifying Factors, Severity.)   Jaylin Leigh is a 72 y.o. male who presents to the emergency department by private vehicle complaining of anxiety. Patient states he has a history of anxiety. He is to be on medications for anxiety but does not currently have a PCP. He has a appointment with a new PCP scheduled for 9/23/2020 to establish care. He states he has always anxiety. Has struggled as with increased frequency over the past several weeks. Denies any chest pain, shortness breath, nausea, vomiting, dizziness, headaches. States his blood pressure is elevated secondary to anxiety. He has been monitoring his blood pressure at home and is normal when he is not in an anxious state. All symptoms consistent with previous anxiety/panic attacks. Denies any atypical features or physical complaints at this time. Patient requesting medication to treat anxiety in the ED. Patient feels well otherwise. Nursing Notes were reviewedand agreed with or any disagreements were addressed in the HPI. REVIEW OF SYSTEMS    (2-9 systems for level 4, 10 or more for level 5)     Review of Systems   Constitutional: Negative for chills and fever. HENT: Negative. Respiratory: Negative. Cardiovascular: Negative.     Gastrointestinal: Negative for abdominal pain, nausea and vomiting. Genitourinary: Negative. Musculoskeletal: Negative for arthralgias and myalgias. Skin: Negative. Neurological: Negative for dizziness and light-headedness. Psychiatric/Behavioral: Negative for behavioral problems and confusion. The patient is nervous/anxious. Except as noted above the remainder of the review of systems was reviewed and negative. PAST MEDICAL HISTORY         Diagnosis Date    CAD (coronary artery disease)     Chest pain 10/15/2017    [facility]     [facility]     Diabetes mellitus ([facility]     not on meds    Hyperlipidemia     Hypertension     Kidney stone     MRSA colonization 19; 2019    Sleep apnea     He said he doesn't use a CPAP machine. SURGICAL HISTORY           Procedure Laterality Date    ABDOMEN SURGERY      CARDIAC SURGERY      3 STENTS PLACED     CHOLECYSTECTOMY      COLONOSCOPY      CORONARY ANGIOPLASTY WITH STENT PLACEMENT      3 stents     FRACTURE SURGERY      RIGHT ANKLE    LITHOTRIPSY      SHOULDER ARTHROSCOPY      VASCULAR SURGERY         CURRENT MEDICATIONS     [unfilled]    ALLERGIES     Naproxen    FAMILY HISTORY           Problem Relation Age of Onset    High Blood Pressure Mother     Cancer Father         lung cancer    Diabetes Brother     Liver Disease Sister     Diabetes Brother      Family Status   Relation Name Status    Mother      [facility] Father      Sister  Alive    Brother  Alive    Sister  Alive    Sister      Brother  Alive    Brother  Alive    Brother  Alive    Brother  Alive        SOCIAL HISTORY      reports that he has quit smoking. His smoking use included cigarettes. He has a 100.00 pack-year smoking history. He has never used smokeless tobacco. He reports current drug use. Drug: Cocaine. He reports that he does not drink alcohol.     PHYSICAL EXAM    (up to 7 for level 4, 8 or more for level 5)     ED blood pressure at home and is normal when he is not anxious state. He will continue to monitor at home. Patient states symptoms experiencing higher consistent with previous anxiety. He denies any chest pain, shortness of breath, nausea vomiting, dizziness. Patient feels well otherwise. Patient requesting medication for anxiety. Patient with 1 mg orally of Ativan. Patient has appointment established with his PCP. Encouraged him to contact the office to see if appointment can be moved forward or if any openings. He voiced understanding. He was discharged home in stable condition. The patient tolerated their visit well. I have discussed the findings of today's workup with the patient and addressed the patient's questions and concerns. Important warning signs as well as new or worsening symptoms which would necessitate immediate return to the ED were discussed. The plan is to discharge from the ED at this time, and the patient is in stable condition. The patient acknowledged understanding is agreeable with this plan. CONSULTS:  None    PROCEDURES:  Procedures    FINAL IMPRESSION      1. Anxiety state          DISPOSITION/PLAN   [unfilled]    PATIENT REFERRED TO:  [facility] Emergency Department  [address]  [phone]  Go to   If symptoms worsen    your PCP as scheduled    Call in 1 day  For follow up and reevaluation.       DISCHARGE MEDICATIONS:  New Prescriptions    No medications on file       (Please note that portions of this note were completed with a voice recognition program.  Efforts were made to edit the dictations but occasionally words are mis-transcribed.)    CINDY Canseco [place]  08/09/20 42 [facility]  08/09/20 [id number]

## 2020-08-11 ENCOUNTER — HOSPITAL ENCOUNTER (EMERGENCY)
Age: 65
Discharge: HOME | End: 2020-08-11
Payer: MEDICARE

## 2020-08-11 VITALS
HEIGHT: 71 IN | OXYGEN SATURATION: 96 % | RESPIRATION RATE: 18 BRPM | DIASTOLIC BLOOD PRESSURE: 81 MMHG | TEMPERATURE: 97.4 F | HEART RATE: 97 BPM | WEIGHT: 243.61 LBS | SYSTOLIC BLOOD PRESSURE: 183 MMHG | BODY MASS INDEX: 34.1 KG/M2

## 2020-08-11 PROCEDURE — 99282 EMERGENCY DEPT VISIT SF MDM: CPT

## 2020-08-11 RX ORDER — LORAZEPAM 0.5 MG/1
0.5 TABLET ORAL NIGHTLY PRN
Qty: 10 TABLET | Refills: 0 | Status: SHIPPED | OUTPATIENT
Start: 2020-08-11 | End: 2020-08-21

## 2020-08-11 RX ORDER — AMITRIPTYLINE HYDROCHLORIDE 50 MG/1
50 TABLET, FILM COATED ORAL 2 TIMES DAILY
Qty: 60 TABLET | Refills: 0 | Status: ON HOLD | OUTPATIENT
Start: 2020-08-11 | End: 2020-10-05

## 2020-08-11 ASSESSMENT — ENCOUNTER SYMPTOMS
VOMITING: 0
ABDOMINAL PAIN: 0
NAUSEA: 0
SORE THROAT: 0
BACK PAIN: 0
SHORTNESS OF BREATH: 0

## 2020-08-12 NOTE — ED NOTES
Pt presents to the ED with c/o anxiety. Pt is seen frequently in ED with same complaint. Pt states he Is out of his medication and can not see his physician for awhile. Vital signs are stable. No acute distress noted.      Keyon Lopez RN  08/11/20 0206

## 2020-08-12 NOTE — ED PROVIDER NOTES
[facility] ENCOUNTER      Pt Name: Cris Malagon  MRN: [id number]  [place] 1955  Date of evaluation: 8/11/2020  Provider: Karlos Mercedes PA-C    This patient was not seen and evaluated by the attending physician No att. providers found. CHIEF COMPLAINT       Chief Complaint   Patient presents with    Anxiety     starting at 1500 today. denies pain, denies other symptoms. HISTORYOF PRESENT ILLNESS  (Location/Symptom, Timing/Onset, Context/Setting, Quality, Duration, Modifying Factors, Severity.)   Cris Malagon is a 72 y.o. male who presents to the emergency department complaining of chronic insomnia and anxiety. The patient reports he reluctantly has come to the emergency department, yet again,  Because he cannot manage his anxiety at home. He reports he has been unable to sleep. He gets very shaky and has racing thoughts. He states he has to keep himself very busy and he is constantly agitated and irritated. He previously was on amitriptyline 50 mg twice daily and did very well on this but ran out a few months ago. He reports that his previous physician is no longer practicing and he has been struggling to find a new primary care provider. He does finally have an appointment scheduled in 1 week on August 19. He has visited the emergency department frequently for his anxiety and occasionally has been prescribed Ativan which he states does allow him to sleep well enough and manage his anxiety. He is denying any suicidal thoughts. He is denying any chest pain or shortness of breath. Nursing Notes were reviewedand I agree. REVIEW OF SYSTEMS    (2-9 systems for level 4, 10 or more forlevel 5)     Review of Systems   Constitutional: Negative for chills and fever. HENT: Negative for sore throat. Respiratory: Negative for shortness of breath. Cardiovascular: Negative for chest pain.    Gastrointestinal: Negative for abdominal pain, nausea and vomiting. Genitourinary: Negative for difficulty urinating and dysuria. Musculoskeletal: Negative for back pain. Skin: Negative for rash. Neurological: Negative for light-headedness and headaches. Psychiatric/Behavioral: Positive for sleep disturbance. Negative for suicidal ideas. The patient is nervous/anxious. All other systems reviewed and are negative. Except as noted above the remainder ofthe review of systems was reviewed and negative. PAST MEDICALHISTORY         Diagnosis Date    CAD (coronary artery disease)     Chest pain 10/15/2017    [facility]     [facility]     Diabetes mellitus ([facility]     not on meds    Hyperlipidemia     Hypertension     Kidney stone     MRSA colonization 4/16/19; 03/24/2019    Sleep apnea     He said he doesn't use a CPAP machine. SURGICAL HISTORY           Procedure Laterality Date    ABDOMEN SURGERY      CARDIAC SURGERY      3 STENTS PLACED     CHOLECYSTECTOMY      COLONOSCOPY      CORONARY ANGIOPLASTY WITH STENT PLACEMENT      3 stents     FRACTURE SURGERY      RIGHT ANKLE    LITHOTRIPSY      SHOULDER ARTHROSCOPY      VASCULAR SURGERY         CURRENT MEDICATIONS       Previous Medications    ASPIRIN 81 MG TABLET    Take 81 mg by mouth daily    CARVEDILOL (COREG) 25 MG TABLET    Take 1 tablet by mouth 2 times daily (with meals)    GABAPENTIN (NEURONTIN) 300 MG CAPSULE    Take 1 capsule by mouth 3 times daily for 180 days. Intended supply: 30 days    HYDROCHLOROTHIAZIDE (HYDRODIURIL) 25 MG TABLET    Take 1 tablet by mouth daily    LISINOPRIL (PRINIVIL;ZESTRIL) 10 MG TABLET    Take 1 tablet by mouth daily    PREDNISONE (DELTASONE) 10 MG TABLET    Sig:3pillspoBID x2days, then 2pillspoBID x2days, then 1pillpoBID x2 days, then 1pillpo QD.     VITAMIN B-12 (CYANOCOBALAMIN) 500 MCG TABLET    Take 500 mcg by mouth daily    VITAMIN D (D3-1000) 1000 UNITS TABS TABLET Take 1 tablet by mouth daily    ZOLPIDEM (AMBIEN) 10 MG TABLET    Take 10 mg by mouth nightly. ALLERGIES     Naproxen    FAMILY HISTORY           Problem Relation Age of Onset    High Blood Pressure Mother     Cancer Father         lung cancer    Diabetes Brother     Liver Disease Sister     Diabetes Brother      Family Status   Relation Name Status    Mother     Madina Escobar Father      Sister  Alive    Brother  Alive    Sister  Alive    Sister      Brother  Alive    Brother  Alive    Brother  Alive    Brother  Alive        SOCIAL HISTORY    reports that he has quit smoking. His smoking use included cigarettes. He has a 100.00 pack-year smoking history. He has never used smokeless tobacco. He reports current drug use. Drug: Cocaine. He reports that he does not drink alcohol. PHYSICAL EXAM    (up to 7 for level 4, 8 or more for level 5)     ED Triage Vitals [20]   BP Temp Temp Source Pulse Resp SpO2 Height Weight   (!) 183/81 97.4 °F (36.3 °C) Infrared 97 18 96 % 5' 11\" (1.803 m) 243 lb 9.7 oz (110.5 kg)       Physical Exam  Vitals signs and nursing note reviewed. Constitutional:       General: He is not in acute distress. Appearance: He is well-developed. HENT:      Head: Normocephalic and atraumatic. Neck:      Musculoskeletal: Neck supple. Pulmonary:      Effort: Pulmonary effort is normal. No respiratory distress. Musculoskeletal: Normal range of motion. Skin:     General: Skin is warm and dry. Neurological:      Mental Status: He is alert and oriented to person, place, and time. Psychiatric:         Mood and Affect: Mood normal.         Behavior: Behavior normal.         Thought Content:  Thought content normal.            EMERGENCY DEPARTMENT COURSE and DIFFERENTIAL DIAGNOSIS/MDM:   Vitals:    Vitals:    20   BP: (!) 183/81   Pulse: 97   Resp: 18   Temp: 97.4 °F (36.3 °C)   TempSrc: Infrared   SpO2: 96%   Weight: 243 lb 9.7 oz (110.5 kg)   Height: 5' 11\" (1.803 m)        I have evaluated this patient. My supervising physician was available for consultation. The patient's chart was reviewed thoroughly. It is unclear why no one has refilled his amitriptyline even though he has been seen for this multiple times. He did well on this medication for years so I have no problem writing a 1 month supply especially knowing he is seeing a new primary care provider in just a week. I did give him a week's worth of nightly 0.5 mg Ativan tabs to help with sleep until his follow-up appointment. Discussed results, diagnosis and plan with patient and/or family. Questions addressed. Dispositionand follow-up agreed upon. Specific discharge instructions explained. The patient and/or family and I have discussed the diagnosis and risks, and we agree with discharging home to follow-up with their primary care,specialist or referral doctor. We also discussed returning to the Emergency Department immediately if new or worsening symptoms occur. We have discussed the symptoms which are most concerning that necessitate immediatereturn. PROCEDURES:  None    FINAL IMPRESSION      1. Anxiety    2. Encounter for medication refill    3. Chronic insomnia          DISPOSITION/PLAN   DISPOSITION Decision To Discharge 08/11/2020 08:40:30 PM      PATIENT REFERRED TO:  Barrie Doan MD  [address] [place] [address] 90 [facility]  [phone]    Go on 8/19/2020        MEDICATIONS:  New Prescriptions    LORAZEPAM (ATIVAN) 0.5 MG TABLET    Take 1 tablet by mouth nightly as needed for Anxiety for up to 10 days.        (Please note that portions of this note were completed with a voice recognition program.  Efforts were made toedit the dictations but occasionally words are mis-transcribed.)    CINDY Bernstein PA-C  08/11/20 2051

## 2020-08-12 NOTE — ED NOTES
Walked pt from [facility] to ED bed. Obtained VS. Pt wearing mask, medic wearing mask, gloves, safety glasses.      Yeyo Osuna, EMT-P  08/11/20 2039

## 2020-09-28 ENCOUNTER — HOSPITAL ENCOUNTER (EMERGENCY)
Age: 65
Discharge: HOME | End: 2020-09-28
Payer: MEDICARE

## 2020-09-28 ENCOUNTER — APPOINTMENT (OUTPATIENT)
Dept: CT IMAGING | Age: 65
End: 2020-09-28
Payer: MEDICARE

## 2020-09-28 VITALS
OXYGEN SATURATION: 95 % | HEART RATE: 93 BPM | TEMPERATURE: 98 F | WEIGHT: 244.71 LBS | HEIGHT: 71 IN | SYSTOLIC BLOOD PRESSURE: 121 MMHG | DIASTOLIC BLOOD PRESSURE: 78 MMHG | BODY MASS INDEX: 34.26 KG/M2 | RESPIRATION RATE: 16 BRPM

## 2020-09-28 LAB
A/G RATIO: 1.6 (ref 1.1–2.2)
ALBUMIN SERPL-MCNC: 4.2 G/DL (ref 3.4–5)
ALP BLD-CCNC: 146 U/L (ref 40–129)
ALT SERPL-CCNC: 47 U/L (ref 10–40)
ANION GAP SERPL CALCULATED.3IONS-SCNC: 16 MMOL/L (ref 3–16)
AST SERPL-CCNC: 35 U/L (ref 15–37)
BASOPHILS ABSOLUTE: 0.1 K/UL (ref 0–0.2)
BASOPHILS RELATIVE PERCENT: 0.8 %
BILIRUB SERPL-MCNC: <0.2 MG/DL (ref 0–1)
BILIRUBIN URINE: NEGATIVE
BLOOD, URINE: NEGATIVE
BUN BLDV-MCNC: 23 MG/DL (ref 7–20)
CALCIUM SERPL-MCNC: 9.1 MG/DL (ref 8.3–10.6)
CHLORIDE BLD-SCNC: 97 MMOL/L (ref 99–110)
CLARITY: CLEAR
CO2: 25 MMOL/L (ref 21–32)
COLOR: YELLOW
CREAT SERPL-MCNC: 1 MG/DL (ref 0.8–1.3)
EOSINOPHILS ABSOLUTE: 0.2 K/UL (ref 0–0.6)
EOSINOPHILS RELATIVE PERCENT: 2.6 %
EPITHELIAL CELLS, UA: ABNORMAL /HPF (ref 0–5)
GFR AFRICAN AMERICAN: >60
GFR NON-AFRICAN AMERICAN: >60
GLOBULIN: 2.6 G/DL
GLUCOSE BLD-MCNC: 268 MG/DL (ref 70–99)
GLUCOSE URINE: 500 MG/DL
HCT VFR BLD CALC: 37.8 % (ref 40.5–52.5)
HEMOGLOBIN: 12.5 G/DL (ref 13.5–17.5)
HYALINE CASTS: ABNORMAL /LPF (ref 0–2)
KETONES, URINE: NEGATIVE MG/DL
LEUKOCYTE ESTERASE, URINE: NEGATIVE
LIPASE: 24 U/L (ref 13–60)
LYMPHOCYTES ABSOLUTE: 2 K/UL (ref 1–5.1)
LYMPHOCYTES RELATIVE PERCENT: 28.5 %
MCH RBC QN AUTO: 29.2 PG (ref 26–34)
MCHC RBC AUTO-ENTMCNC: 33.2 G/DL (ref 31–36)
MCV RBC AUTO: 87.8 FL (ref 80–100)
MICROSCOPIC EXAMINATION: YES
MONOCYTES ABSOLUTE: 0.4 K/UL (ref 0–1.3)
MONOCYTES RELATIVE PERCENT: 6.3 %
MUCUS: ABNORMAL /LPF
NEUTROPHILS ABSOLUTE: 4.2 K/UL (ref 1.7–7.7)
NEUTROPHILS RELATIVE PERCENT: 61.8 %
NITRITE, URINE: NEGATIVE
PDW BLD-RTO: 13.6 % (ref 12.4–15.4)
PH UA: 6 (ref 5–8)
PLATELET # BLD: 193 K/UL (ref 135–450)
PMV BLD AUTO: 8.2 FL (ref 5–10.5)
POTASSIUM SERPL-SCNC: 3.5 MMOL/L (ref 3.5–5.1)
PROTEIN UA: 100 MG/DL
RBC # BLD: 4.3 M/UL (ref 4.2–5.9)
RBC UA: ABNORMAL /HPF (ref 0–4)
SODIUM BLD-SCNC: 138 MMOL/L (ref 136–145)
SPECIFIC GRAVITY UA: >=1.03 (ref 1–1.03)
TOTAL PROTEIN: 6.8 G/DL (ref 6.4–8.2)
TROPONIN: <0.01 NG/ML
URINE REFLEX TO CULTURE: ABNORMAL
URINE TYPE: ABNORMAL
UROBILINOGEN, URINE: 0.2 E.U./DL
WBC # BLD: 6.9 K/UL (ref 4–11)
WBC UA: ABNORMAL /HPF (ref 0–5)

## 2020-09-28 PROCEDURE — 99284 EMERGENCY DEPT VISIT MOD MDM: CPT

## 2020-09-28 PROCEDURE — 36415 COLL VENOUS BLD VENIPUNCTURE: CPT

## 2020-09-28 PROCEDURE — 6360000004 HC RX CONTRAST MEDICATION: Performed by: PHYSICIAN ASSISTANT

## 2020-09-28 PROCEDURE — 93005 ELECTROCARDIOGRAM TRACING: CPT | Performed by: PHYSICIAN ASSISTANT

## 2020-09-28 PROCEDURE — 2580000003 HC RX 258: Performed by: PHYSICIAN ASSISTANT

## 2020-09-28 PROCEDURE — 81001 URINALYSIS AUTO W/SCOPE: CPT

## 2020-09-28 PROCEDURE — 84484 ASSAY OF TROPONIN QUANT: CPT

## 2020-09-28 PROCEDURE — 80053 COMPREHEN METABOLIC PANEL: CPT

## 2020-09-28 PROCEDURE — 85025 COMPLETE CBC W/AUTO DIFF WBC: CPT

## 2020-09-28 PROCEDURE — 6370000000 HC RX 637 (ALT 250 FOR IP): Performed by: PHYSICIAN ASSISTANT

## 2020-09-28 PROCEDURE — 83690 ASSAY OF LIPASE: CPT

## 2020-09-28 PROCEDURE — 74177 CT ABD & PELVIS W/CONTRAST: CPT

## 2020-09-28 RX ORDER — OXYCODONE HYDROCHLORIDE AND ACETAMINOPHEN 5; 325 MG/1; MG/1
2 TABLET ORAL ONCE
Status: COMPLETED | OUTPATIENT
Start: 2020-09-28 | End: 2020-09-28

## 2020-09-28 RX ORDER — CYCLOBENZAPRINE HCL 10 MG
10 TABLET ORAL 3 TIMES DAILY PRN
Qty: 20 TABLET | Refills: 0 | Status: SHIPPED | OUTPATIENT
Start: 2020-09-28 | End: 2020-11-01

## 2020-09-28 RX ORDER — HYDROCODONE BITARTRATE AND ACETAMINOPHEN 5; 325 MG/1; MG/1
1 TABLET ORAL EVERY 6 HOURS PRN
Qty: 10 TABLET | Refills: 0 | Status: SHIPPED | OUTPATIENT
Start: 2020-09-28 | End: 2020-10-01

## 2020-09-28 RX ORDER — CYCLOBENZAPRINE HCL 10 MG
10 TABLET ORAL ONCE
Status: COMPLETED | OUTPATIENT
Start: 2020-09-28 | End: 2020-09-28

## 2020-09-28 RX ORDER — 0.9 % SODIUM CHLORIDE 0.9 %
250 INTRAVENOUS SOLUTION INTRAVENOUS ONCE
Status: COMPLETED | OUTPATIENT
Start: 2020-09-28 | End: 2020-09-28

## 2020-09-28 RX ADMIN — OXYCODONE HYDROCHLORIDE AND ACETAMINOPHEN 2 TABLET: 5; 325 TABLET ORAL at 19:00

## 2020-09-28 RX ADMIN — IOVERSOL 100 ML: 678 INJECTION INTRA-ARTERIAL; INTRAVENOUS at 19:13

## 2020-09-28 RX ADMIN — CYCLOBENZAPRINE HYDROCHLORIDE 10 MG: 10 TABLET, FILM COATED ORAL at 22:30

## 2020-09-28 RX ADMIN — SODIUM CHLORIDE 250 ML: 9 INJECTION, SOLUTION INTRAVENOUS at 19:23

## 2020-09-28 ASSESSMENT — PAIN DESCRIPTION - DESCRIPTORS
DESCRIPTORS: ACHING

## 2020-09-28 ASSESSMENT — ENCOUNTER SYMPTOMS
SHORTNESS OF BREATH: 0
DIARRHEA: 1
NAUSEA: 1
ANAL BLEEDING: 0
VOMITING: 1
ABDOMINAL PAIN: 1
BLOOD IN STOOL: 0
CONSTIPATION: 0

## 2020-09-28 ASSESSMENT — PAIN DESCRIPTION - PROGRESSION
CLINICAL_PROGRESSION: GRADUALLY IMPROVING
CLINICAL_PROGRESSION: GRADUALLY WORSENING

## 2020-09-28 ASSESSMENT — PAIN DESCRIPTION - LOCATION
LOCATION: FLANK
LOCATION: BACK
LOCATION: BACK

## 2020-09-28 ASSESSMENT — PAIN DESCRIPTION - ONSET
ONSET: ON-GOING

## 2020-09-28 ASSESSMENT — PAIN SCALES - GENERAL
PAINLEVEL_OUTOF10: 7
PAINLEVEL_OUTOF10: 8

## 2020-09-28 ASSESSMENT — PAIN DESCRIPTION - ORIENTATION
ORIENTATION: LOWER
ORIENTATION: LEFT
ORIENTATION: LOWER

## 2020-09-28 ASSESSMENT — PAIN DESCRIPTION - PAIN TYPE
TYPE: ACUTE PAIN
TYPE: ACUTE PAIN

## 2020-09-28 ASSESSMENT — PAIN DESCRIPTION - FREQUENCY: FREQUENCY: CONTINUOUS

## 2020-09-28 NOTE — ED PROVIDER NOTES
day prior). Negative for anal bleeding, blood in stool and constipation. Genitourinary: Positive for flank pain. Negative for dysuria, frequency and hematuria. Neurological:        Negative for bowel or bladder incontinence, saddle anesthesia, IVDU, urinary retention. Except as noted above the remainder ofthe review of systems was reviewed and negative. PAST MEDICALHISTORY         Diagnosis Date    CAD (coronary artery disease)     Chest pain 10/15/2017    [facility]     COPD [facility]ch[facility] obstructive [facility] disease) ([facility]     Diabetes mellitus ([facility]     not on meds    Hyperlipidemia     Hypertension     Kidney stone     MRSA colonization 4/16/19; 03/24/2019    Sleep apnea     He said he doesn't use a CPAP machine. SURGICAL HISTORY           Procedure Laterality Date    ABDOMEN SURGERY      CARDIAC SURGERY      3 STENTS PLACED     CHOLECYSTECTOMY      COLONOSCOPY      CORONARY ANGIOPLASTY WITH STENT PLACEMENT      3 stents     FRACTURE SURGERY      RIGHT ANKLE    LITHOTRIPSY      SHOULDER ARTHROSCOPY      VASCULAR SURGERY         CURRENT MEDICATIONS       Previous Medications    AMITRIPTYLINE (ELAVIL) 50 MG TABLET    Take 1 tablet by mouth 2 times daily Not taking at this time    ASPIRIN 81 MG TABLET    Take 81 mg by mouth daily    CARVEDILOL (COREG) 25 MG TABLET    Take 1 tablet by mouth 2 times daily (with meals)    GABAPENTIN (NEURONTIN) 300 MG CAPSULE    Take 1 capsule by mouth 3 times daily for 180 days. Intended supply: 30 days    HYDROCHLOROTHIAZIDE (HYDRODIURIL) 25 MG TABLET    Take 1 tablet by mouth daily    LISINOPRIL (PRINIVIL;ZESTRIL) 10 MG TABLET    Take 1 tablet by mouth daily    PREDNISONE (DELTASONE) 10 MG TABLET    Sig:3pillspoBID x2days, then 2pillspoBID x2days, then 1pillpoBID x2 days, then 1pillpo QD.     VITAMIN B-12 (CYANOCOBALAMIN) 500 MCG TABLET    Take 500 mcg by mouth daily    VITAMIN D (D3-1000) 1000 UNITS TABS TABLET    Take 1 tablet by mouth Status: He is alert. Psychiatric:         Mood and Affect: Mood normal.         Behavior: Behavior normal.         Thought Content: Thought content normal.         Judgment: Judgment normal.         DIFFERENTIAL DIAGNOSIS   Appendicitis, Small Bowel Obstruction, Pancreatitis, Cholesystitis, Hepatitis, Aortic Dissection, DKA, Pylonephritis, Kidney Stone, Diverticulitis    DIAGNOSTIC RESULTS   EKG obtained. See Dr. Fer Delarosa note for interpretation. RADIOLOGY:   Non-plain film images such as CT, Ultrasound and MRI are read by the radiologist. Plain radiographic images are visualized and preliminarily interpreted BRITANY Segura with the below findings:      Interpretation per the Radiologist below, if available at the time of this note:    CT ABDOMEN PELVIS W IV CONTRAST Additional Contrast? None   Preliminary Result   No acute process in the abdomen or pelvis. Non-obstructing small left nephrolithiasis. Hepatic steatosis.                LABS:  Results for orders placed or performed during the hospital encounter of 09/28/20   Urinalysis Reflex to Culture    Specimen: Urine, clean catch   Result Value Ref Range    Color, UA Yellow Straw/Yellow    Clarity, UA Clear Clear    Glucose, Ur 500 (A) Negative mg/dL    Bilirubin Urine Negative Negative    Ketones, Urine Negative Negative mg/dL    Specific Gravity, UA >=1.030 1.005 - 1.030    Blood, Urine Negative Negative    pH, UA 6.0 5.0 - 8.0    Protein,  (A) Negative mg/dL    Urobilinogen, Urine 0.2 <2.0 E.U./dL    Nitrite, Urine Negative Negative    Leukocyte Esterase, Urine Negative Negative    Microscopic Examination YES     Urine Type NotGiven     Urine Reflex to Culture Not Indicated    CBC Auto Differential   Result Value Ref Range    WBC 6.9 4.0 - 11.0 K/uL    RBC 4.30 4.20 - 5.90 M/uL    Hemoglobin 12.5 (L) 13.5 - 17.5 g/dL    Hematocrit 37.8 (L) 40.5 - 52.5 %    MCV 87.8 80.0 - 100.0 fL    MCH 29.2 26.0 - 34.0 pg    MCHC 33.2 31.0 - 36.0 g/dL    RDW 13.6 12.4 - 15.4 %    Platelets 378 777 - 255 K/uL    MPV 8.2 5.0 - 10.5 fL    Neutrophils % 61.8 %    Lymphocytes % 28.5 %    Monocytes % 6.3 %    Eosinophils % 2.6 %    Basophils % 0.8 %    Neutrophils Absolute 4.2 1.7 - 7.7 K/uL    Lymphocytes Absolute 2.0 1.0 - 5.1 K/uL    Monocytes Absolute 0.4 0.0 - 1.3 K/uL    Eosinophils Absolute 0.2 0.0 - 0.6 K/uL    Basophils Absolute 0.1 0.0 - 0.2 K/uL   Comprehensive Metabolic Panel   Result Value Ref Range    Sodium 138 136 - 145 mmol/L    Potassium 3.5 3.5 - 5.1 mmol/L    Chloride 97 (L) 99 - 110 mmol/L    CO2 25 21 - 32 mmol/L    Anion Gap 16 3 - 16    Glucose 268 (H) 70 - 99 mg/dL    BUN 23 (H) 7 - 20 mg/dL    CREATININE 1.0 0.8 - 1.3 mg/dL    GFR Non-African American >60 >60    GFR African American >60 >60    Calcium 9.1 8.3 - 10.6 mg/dL    Total Protein 6.8 6.4 - 8.2 g/dL    Alb 4.2 3.4 - 5.0 g/dL    Albumin/Globulin Ratio 1.6 1.1 - 2.2    Total Bilirubin <0.2 0.0 - 1.0 mg/dL    Alkaline Phosphatase 146 (H) 40 - 129 U/L    ALT 47 (H) 10 - 40 U/L    AST 35 15 - 37 U/L    Globulin 2.6 g/dL   Lipase   Result Value Ref Range    Lipase 24.0 13.0 - 60.0 U/L   Troponin   Result Value Ref Range    Troponin <0.01 <0.01 ng/mL   Microscopic Urinalysis   Result Value Ref Range    Hyaline Casts, UA 3-5 (A) 0 - 2 /LPF    Mucus, UA 2+ (A) None Seen /LPF    WBC, UA 0-2 0 - 5 /HPF    RBC, UA None seen 0 - 4 /HPF    Epithelial Cells, UA 2-5 0 - 5 /HPF       All other labs were within normal range or not returned as of this dictation. EMERGENCY DEPARTMENT COURSE and DIFFERENTIALDIAGNOSIS/MDM:   Vitals:    Vitals:    09/28/20 1701   BP: (!) 169/92   Pulse: 110   Resp: 16   Temp: 98.9 °F (37.2 °C)   TempSrc: Oral   SpO2: 95%   Weight: 244 lb 11.4 oz (111 kg)   Height: 5' 11\" (1.803 m)       Patient wasnontoxic, well appearing, afebrile with normal vital signs with exception of hypertension 169/92 and tachycardia 110. Patient is saturate well on room air.    I am in the room evaluated the patient heart rate is 107-109. Will obtain blood work, EKG, CT abdomen pelvis. Given Percocet for pain. CBC normal.  CMP with glucose 268 with normal gap and bicarb. Lipase normal.  Trop negative. Urine with no signs of infection. CT a/p with no acute process. I suspect his pain is musculoskeletal in nature. It is worse with movement. Upon reevaluation, he is sitting in bed in no distress. Clinically appears well.  low suspicion for cause equina or cord compression. DP pulse 2+ bilaterally. Achilles DTRs 1+ bilaterally. Normal sensation in all 3 dermatomes of the feet bilaterally. 5/5 strength inversion eversion plantar flexion dorsiflexion bilateral feet. Will treat with flexeril and few norco.  FU with PCP in next few days for reeval and return for worsening. He agreed and understood. I estimate there is LOW risk for ACUTE APPENDICITIS, BOWEL OBSTRUCTION, CHOLECYSTITIS, DIVERTICULITIS, INCARCERATED HERNIA, PANCREATITIS, or PERFORATED BOWEL or ULCER, thus I consider thedischarge disposition reasonable. Also, there is no evidence or peritonitis, sepsis, or toxicity. PROCEDURES:  None    FINAL IMPRESSION      1. Left flank pain    2. Acute left-sided low back pain, unspecified whether sciatica present    3.  Elevated blood pressure reading          DISPOSITION/PLAN   DISPOSITION Discharge - Pending Orders Complete 09/28/2020 10:01:54 PM      PATIENT REFERRED TO:  Parley Sacks, MD [address] Renda Holmes  [address] [facility] [phone]    Schedule an appointment as soon as possible for a visit in 2 days  for reevaluation and to recheck blood pressure    [address]  [place] [address] [phone]    As needed, If symptoms worsen, for worsening pain, or if numbness tingling loss of bowel or bladder control or unable to urinate or for any other concerns      DISCHARGE MEDICATIONS:  New Prescriptions CYCLOBENZAPRINE (FLEXERIL) 10 MG TABLET    Take 1 tablet by mouth 3 times daily as needed for Muscle spasms    HYDROCODONE-ACETAMINOPHEN (NORCO) 5-325 MG PER TABLET    Take 1 tablet by mouth every 6 hours as needed for Pain for up to 3 days.        (Please note that portionsof this note were completed with a voice recognition program.  Efforts were made to edit the dictations but occasionally words are mis-transcribed.)    Dorian Bray, [address] [place] [address]  09/28/20 3368

## 2020-09-29 LAB
EKG ATRIAL RATE: 102 BPM
EKG DIAGNOSIS: NORMAL
EKG P AXIS: 35 DEGREES
EKG P-R INTERVAL: 208 MS
EKG Q-T INTERVAL: 366 MS
EKG QRS DURATION: 108 MS
EKG QTC CALCULATION (BAZETT): 477 MS
EKG R AXIS: 75 DEGREES
EKG T AXIS: 40 DEGREES
EKG VENTRICULAR RATE: 102 BPM

## 2020-09-29 PROCEDURE — 93010 ELECTROCARDIOGRAM REPORT: CPT | Performed by: INTERNAL MEDICINE

## 2020-09-29 NOTE — ED PROVIDER NOTES
I did not participate in the care of this patient. I did interpret the 12-lead EKG as follows:  Sinus tachycardia at a rate of 102 bpm, WV interval QRS QTC normal.  Normal axis.   No acute ischemic changes     Esha Lopez MD  09/28/20 2014

## 2020-10-05 ENCOUNTER — APPOINTMENT (OUTPATIENT)
Dept: GENERAL RADIOLOGY | Age: 65
End: 2020-10-05
Payer: MEDICARE

## 2020-10-05 ENCOUNTER — HOSPITAL ENCOUNTER (OUTPATIENT)
Age: 65
Setting detail: OBSERVATION
Discharge: HOME | End: 2020-10-06
Attending: EMERGENCY MEDICINE | Admitting: INTERNAL MEDICINE
Payer: MEDICARE

## 2020-10-05 PROBLEM — F14.10 COCAINE ABUSE (HCC): Status: ACTIVE | Noted: 2020-10-05

## 2020-10-05 LAB
A/G RATIO: 1.5 (ref 1.1–2.2)
ALBUMIN SERPL-MCNC: 4.5 G/DL (ref 3.4–5)
ALP BLD-CCNC: 129 U/L (ref 40–129)
ALT SERPL-CCNC: 40 U/L (ref 10–40)
AMPHETAMINE SCREEN, URINE: ABNORMAL
ANION GAP SERPL CALCULATED.3IONS-SCNC: 21 MMOL/L (ref 3–16)
AST SERPL-CCNC: 27 U/L (ref 15–37)
BARBITURATE SCREEN URINE: ABNORMAL
BASOPHILS ABSOLUTE: 0.1 K/UL (ref 0–0.2)
BASOPHILS RELATIVE PERCENT: 0.6 %
BENZODIAZEPINE SCREEN, URINE: ABNORMAL
BILIRUB SERPL-MCNC: 0.5 MG/DL (ref 0–1)
BUN BLDV-MCNC: 32 MG/DL (ref 7–20)
CALCIUM SERPL-MCNC: 9.3 MG/DL (ref 8.3–10.6)
CANNABINOID SCREEN URINE: ABNORMAL
CHLORIDE BLD-SCNC: 97 MMOL/L (ref 99–110)
CO2: 22 MMOL/L (ref 21–32)
COCAINE METABOLITE SCREEN URINE: POSITIVE
CREAT SERPL-MCNC: 1.4 MG/DL (ref 0.8–1.3)
EKG ATRIAL RATE: 107 BPM
EKG DIAGNOSIS: NORMAL
EKG P AXIS: 39 DEGREES
EKG P-R INTERVAL: 190 MS
EKG Q-T INTERVAL: 356 MS
EKG QRS DURATION: 104 MS
EKG QTC CALCULATION (BAZETT): 475 MS
EKG R AXIS: 90 DEGREES
EKG T AXIS: 34 DEGREES
EKG VENTRICULAR RATE: 107 BPM
EOSINOPHILS ABSOLUTE: 0 K/UL (ref 0–0.6)
EOSINOPHILS RELATIVE PERCENT: 0.1 %
ETHANOL: NORMAL MG/DL (ref 0–0.08)
GFR AFRICAN AMERICAN: >60
GFR NON-AFRICAN AMERICAN: 51
GLOBULIN: 3 G/DL
GLUCOSE BLD-MCNC: 125 MG/DL (ref 70–99)
GLUCOSE BLD-MCNC: 167 MG/DL (ref 70–99)
GLUCOSE BLD-MCNC: 193 MG/DL (ref 70–99)
GLUCOSE BLD-MCNC: 229 MG/DL (ref 70–99)
HCT VFR BLD CALC: 39.6 % (ref 40.5–52.5)
HEMOGLOBIN: 13.1 G/DL (ref 13.5–17.5)
LYMPHOCYTES ABSOLUTE: 1.2 K/UL (ref 1–5.1)
LYMPHOCYTES RELATIVE PERCENT: 12.1 %
Lab: ABNORMAL
MCH RBC QN AUTO: 29.5 PG (ref 26–34)
MCHC RBC AUTO-ENTMCNC: 33.1 G/DL (ref 31–36)
MCV RBC AUTO: 89 FL (ref 80–100)
METHADONE SCREEN, URINE: ABNORMAL
MONOCYTES ABSOLUTE: 0.7 K/UL (ref 0–1.3)
MONOCYTES RELATIVE PERCENT: 6.7 %
NEUTROPHILS ABSOLUTE: 7.8 K/UL (ref 1.7–7.7)
NEUTROPHILS RELATIVE PERCENT: 80.5 %
OPIATE SCREEN URINE: ABNORMAL
OXYCODONE URINE: ABNORMAL
PDW BLD-RTO: 13.9 % (ref 12.4–15.4)
PERFORMED ON: ABNORMAL
PH UA: 5
PHENCYCLIDINE SCREEN URINE: ABNORMAL
PLATELET # BLD: 195 K/UL (ref 135–450)
PMV BLD AUTO: 8.1 FL (ref 5–10.5)
POTASSIUM REFLEX MAGNESIUM: 4.2 MMOL/L (ref 3.5–5.1)
PRO-BNP: 32 PG/ML (ref 0–124)
PROPOXYPHENE SCREEN: ABNORMAL
RBC # BLD: 4.45 M/UL (ref 4.2–5.9)
SODIUM BLD-SCNC: 140 MMOL/L (ref 136–145)
TOTAL CK: 206 U/L (ref 39–308)
TOTAL PROTEIN: 7.5 G/DL (ref 6.4–8.2)
TROPONIN: 0.02 NG/ML
WBC # BLD: 9.8 K/UL (ref 4–11)

## 2020-10-05 PROCEDURE — G0378 HOSPITAL OBSERVATION PER HR: HCPCS

## 2020-10-05 PROCEDURE — 6360000002 HC RX W HCPCS: Performed by: EMERGENCY MEDICINE

## 2020-10-05 PROCEDURE — 80053 COMPREHEN METABOLIC PANEL: CPT

## 2020-10-05 PROCEDURE — 83880 ASSAY OF NATRIURETIC PEPTIDE: CPT

## 2020-10-05 PROCEDURE — 84484 ASSAY OF TROPONIN QUANT: CPT

## 2020-10-05 PROCEDURE — 2580000003 HC RX 258: Performed by: INTERNAL MEDICINE

## 2020-10-05 PROCEDURE — 96376 TX/PRO/DX INJ SAME DRUG ADON: CPT

## 2020-10-05 PROCEDURE — 36415 COLL VENOUS BLD VENIPUNCTURE: CPT

## 2020-10-05 PROCEDURE — 93005 ELECTROCARDIOGRAM TRACING: CPT | Performed by: EMERGENCY MEDICINE

## 2020-10-05 PROCEDURE — 99291 CRITICAL CARE FIRST HOUR: CPT

## 2020-10-05 PROCEDURE — 82550 ASSAY OF CK (CPK): CPT

## 2020-10-05 PROCEDURE — 96374 THER/PROPH/DIAG INJ IV PUSH: CPT

## 2020-10-05 PROCEDURE — 6360000002 HC RX W HCPCS: Performed by: INTERNAL MEDICINE

## 2020-10-05 PROCEDURE — G0008 ADMIN INFLUENZA VIRUS VAC: HCPCS | Performed by: INTERNAL MEDICINE

## 2020-10-05 PROCEDURE — 90686 IIV4 VACC NO PRSV 0.5 ML IM: CPT | Performed by: INTERNAL MEDICINE

## 2020-10-05 PROCEDURE — G0480 DRUG TEST DEF 1-7 CLASSES: HCPCS

## 2020-10-05 PROCEDURE — 93010 ELECTROCARDIOGRAM REPORT: CPT | Performed by: INTERNAL MEDICINE

## 2020-10-05 PROCEDURE — 6370000000 HC RX 637 (ALT 250 FOR IP): Performed by: INTERNAL MEDICINE

## 2020-10-05 PROCEDURE — 80307 DRUG TEST PRSMV CHEM ANLYZR: CPT

## 2020-10-05 PROCEDURE — 85025 COMPLETE CBC W/AUTO DIFF WBC: CPT

## 2020-10-05 PROCEDURE — 6370000000 HC RX 637 (ALT 250 FOR IP): Performed by: EMERGENCY MEDICINE

## 2020-10-05 PROCEDURE — 71045 X-RAY EXAM CHEST 1 VIEW: CPT

## 2020-10-05 PROCEDURE — 2580000003 HC RX 258: Performed by: EMERGENCY MEDICINE

## 2020-10-05 RX ORDER — ASPIRIN 81 MG/1
324 TABLET, CHEWABLE ORAL ONCE
Status: COMPLETED | OUTPATIENT
Start: 2020-10-05 | End: 2020-10-05

## 2020-10-05 RX ORDER — ONDANSETRON 2 MG/ML
4 INJECTION INTRAMUSCULAR; INTRAVENOUS EVERY 6 HOURS PRN
Status: DISCONTINUED | OUTPATIENT
Start: 2020-10-05 | End: 2020-10-06 | Stop reason: HOSPADM

## 2020-10-05 RX ORDER — DEXTROSE MONOHYDRATE 50 MG/ML
100 INJECTION, SOLUTION INTRAVENOUS PRN
Status: DISCONTINUED | OUTPATIENT
Start: 2020-10-05 | End: 2020-10-06 | Stop reason: HOSPADM

## 2020-10-05 RX ORDER — LISINOPRIL 10 MG/1
10 TABLET ORAL DAILY
Status: DISCONTINUED | OUTPATIENT
Start: 2020-10-05 | End: 2020-10-06 | Stop reason: HOSPADM

## 2020-10-05 RX ORDER — SODIUM CHLORIDE 0.9 % (FLUSH) 0.9 %
10 SYRINGE (ML) INJECTION EVERY 12 HOURS SCHEDULED
Status: DISCONTINUED | OUTPATIENT
Start: 2020-10-05 | End: 2020-10-06 | Stop reason: HOSPADM

## 2020-10-05 RX ORDER — CYCLOBENZAPRINE HCL 10 MG
10 TABLET ORAL 3 TIMES DAILY PRN
Status: DISCONTINUED | OUTPATIENT
Start: 2020-10-05 | End: 2020-10-06 | Stop reason: HOSPADM

## 2020-10-05 RX ORDER — AMITRIPTYLINE HYDROCHLORIDE 50 MG/1
50 TABLET, FILM COATED ORAL 2 TIMES DAILY
Status: DISCONTINUED | OUTPATIENT
Start: 2020-10-05 | End: 2020-10-06 | Stop reason: HOSPADM

## 2020-10-05 RX ORDER — VITAMIN B COMPLEX
1000 TABLET ORAL DAILY
Status: DISCONTINUED | OUTPATIENT
Start: 2020-10-05 | End: 2020-10-06 | Stop reason: HOSPADM

## 2020-10-05 RX ORDER — ACETAMINOPHEN 650 MG/1
650 SUPPOSITORY RECTAL EVERY 6 HOURS PRN
Status: DISCONTINUED | OUTPATIENT
Start: 2020-10-05 | End: 2020-10-06 | Stop reason: HOSPADM

## 2020-10-05 RX ORDER — PROMETHAZINE HYDROCHLORIDE 25 MG/1
12.5 TABLET ORAL EVERY 6 HOURS PRN
Status: DISCONTINUED | OUTPATIENT
Start: 2020-10-05 | End: 2020-10-06 | Stop reason: HOSPADM

## 2020-10-05 RX ORDER — GABAPENTIN 300 MG/1
300 CAPSULE ORAL 3 TIMES DAILY
Status: DISCONTINUED | OUTPATIENT
Start: 2020-10-05 | End: 2020-10-05

## 2020-10-05 RX ORDER — CHOLECALCIFEROL (VITAMIN D3) 125 MCG
500 CAPSULE ORAL DAILY
Status: DISCONTINUED | OUTPATIENT
Start: 2020-10-05 | End: 2020-10-06 | Stop reason: HOSPADM

## 2020-10-05 RX ORDER — HYDROXYZINE HYDROCHLORIDE 25 MG/1
25 TABLET, FILM COATED ORAL 3 TIMES DAILY PRN
COMMUNITY
End: 2020-12-13

## 2020-10-05 RX ORDER — AMITRIPTYLINE HYDROCHLORIDE 100 MG/1
100 TABLET, FILM COATED ORAL 2 TIMES DAILY
COMMUNITY
End: 2021-03-11

## 2020-10-05 RX ORDER — DEXTROSE MONOHYDRATE 25 G/50ML
12.5 INJECTION, SOLUTION INTRAVENOUS PRN
Status: DISCONTINUED | OUTPATIENT
Start: 2020-10-05 | End: 2020-10-06 | Stop reason: HOSPADM

## 2020-10-05 RX ORDER — ASPIRIN 81 MG/1
81 TABLET, CHEWABLE ORAL DAILY
Status: DISCONTINUED | OUTPATIENT
Start: 2020-10-05 | End: 2020-10-06 | Stop reason: HOSPADM

## 2020-10-05 RX ORDER — SERTRALINE HYDROCHLORIDE 100 MG/1
100 TABLET, FILM COATED ORAL DAILY
COMMUNITY

## 2020-10-05 RX ORDER — ACETAMINOPHEN 325 MG/1
650 TABLET ORAL EVERY 6 HOURS PRN
Status: DISCONTINUED | OUTPATIENT
Start: 2020-10-05 | End: 2020-10-06 | Stop reason: HOSPADM

## 2020-10-05 RX ORDER — LORAZEPAM 2 MG/ML
1 INJECTION INTRAMUSCULAR ONCE
Status: COMPLETED | OUTPATIENT
Start: 2020-10-05 | End: 2020-10-05

## 2020-10-05 RX ORDER — NICOTINE POLACRILEX 4 MG
15 LOZENGE BUCCAL PRN
Status: DISCONTINUED | OUTPATIENT
Start: 2020-10-05 | End: 2020-10-06 | Stop reason: HOSPADM

## 2020-10-05 RX ORDER — ZOLPIDEM TARTRATE 5 MG/1
5 TABLET ORAL NIGHTLY
Status: DISCONTINUED | OUTPATIENT
Start: 2020-10-05 | End: 2020-10-06 | Stop reason: HOSPADM

## 2020-10-05 RX ORDER — 0.9 % SODIUM CHLORIDE 0.9 %
1000 INTRAVENOUS SOLUTION INTRAVENOUS ONCE
Status: DISCONTINUED | OUTPATIENT
Start: 2020-10-05 | End: 2020-10-05

## 2020-10-05 RX ORDER — SODIUM CHLORIDE 0.9 % (FLUSH) 0.9 %
10 SYRINGE (ML) INJECTION PRN
Status: DISCONTINUED | OUTPATIENT
Start: 2020-10-05 | End: 2020-10-06 | Stop reason: HOSPADM

## 2020-10-05 RX ORDER — HYDROCHLOROTHIAZIDE 25 MG/1
25 TABLET ORAL DAILY
Status: DISCONTINUED | OUTPATIENT
Start: 2020-10-05 | End: 2020-10-06 | Stop reason: HOSPADM

## 2020-10-05 RX ORDER — POLYETHYLENE GLYCOL 3350 17 G/17G
17 POWDER, FOR SOLUTION ORAL DAILY PRN
Status: DISCONTINUED | OUTPATIENT
Start: 2020-10-05 | End: 2020-10-06 | Stop reason: HOSPADM

## 2020-10-05 RX ORDER — LORAZEPAM 2 MG/ML
1 INJECTION INTRAMUSCULAR EVERY 6 HOURS
Status: COMPLETED | OUTPATIENT
Start: 2020-10-05 | End: 2020-10-06

## 2020-10-05 RX ADMIN — Medication 10 ML: at 20:38

## 2020-10-05 RX ADMIN — LORAZEPAM 1 MG: 2 INJECTION INTRAMUSCULAR; INTRAVENOUS at 20:38

## 2020-10-05 RX ADMIN — LISINOPRIL 10 MG: 10 TABLET ORAL at 15:22

## 2020-10-05 RX ADMIN — LORAZEPAM 1 MG: 2 INJECTION INTRAMUSCULAR; INTRAVENOUS at 10:36

## 2020-10-05 RX ADMIN — AMITRIPTYLINE HYDROCHLORIDE 50 MG: 50 TABLET, FILM COATED ORAL at 20:35

## 2020-10-05 RX ADMIN — CYANOCOBALAMIN TAB 500 MCG 500 MCG: 500 TAB at 15:21

## 2020-10-05 RX ADMIN — ACETAMINOPHEN 650 MG: 325 TABLET ORAL at 20:35

## 2020-10-05 RX ADMIN — CYCLOBENZAPRINE 10 MG: 10 TABLET, FILM COATED ORAL at 15:22

## 2020-10-05 RX ADMIN — AMITRIPTYLINE HYDROCHLORIDE 50 MG: 50 TABLET, FILM COATED ORAL at 15:22

## 2020-10-05 RX ADMIN — LORAZEPAM 1 MG: 2 INJECTION INTRAMUSCULAR; INTRAVENOUS at 15:22

## 2020-10-05 RX ADMIN — INFLUENZA A VIRUS A/VICTORIA/2454/2019 IVR-207 (H1N1) ANTIGEN (PROPIOLACTONE INACTIVATED), INFLUENZA A VIRUS A/HONG KONG/2671/2019 IVR-208 (H3N2) ANTIGEN (PROPIOLACTONE INACTIVATED), INFLUENZA B VIRUS B/VICTORIA/705/2018 BVR-11 ANTIGEN (PROPIOLACTONE INACTIVATED), INFLUENZA B VIRUS B/PHUKET/3073/2013 BVR-1B ANTIGEN (PROPIOLACTONE INACTIVATED) 0.5 ML: 15; 15; 15; 15 INJECTION, SUSPENSION INTRAMUSCULAR at 20:36

## 2020-10-05 RX ADMIN — ASPIRIN 324 MG: 81 TABLET, CHEWABLE ORAL at 10:58

## 2020-10-05 RX ADMIN — ZOLPIDEM TARTRATE 5 MG: 5 TABLET ORAL at 20:35

## 2020-10-05 RX ADMIN — ASPIRIN 81 MG: 81 TABLET, CHEWABLE ORAL at 15:22

## 2020-10-05 RX ADMIN — Medication 1000 UNITS: at 15:22

## 2020-10-05 RX ADMIN — HYDROCHLOROTHIAZIDE 25 MG: 25 TABLET ORAL at 15:21

## 2020-10-05 RX ADMIN — SODIUM CHLORIDE 1000 ML: 9 INJECTION, SOLUTION INTRAVENOUS at 12:13

## 2020-10-05 ASSESSMENT — PAIN DESCRIPTION - ORIENTATION
ORIENTATION: LEFT
ORIENTATION: LEFT;RIGHT
ORIENTATION: LEFT

## 2020-10-05 ASSESSMENT — PAIN - FUNCTIONAL ASSESSMENT: PAIN_FUNCTIONAL_ASSESSMENT: PREVENTS OR INTERFERES WITH MANY ACTIVE NOT PASSIVE ACTIVITIES

## 2020-10-05 ASSESSMENT — PAIN DESCRIPTION - DESCRIPTORS
DESCRIPTORS: CONSTANT;DISCOMFORT
DESCRIPTORS: DISCOMFORT
DESCRIPTORS: ACHING

## 2020-10-05 ASSESSMENT — PAIN SCALES - GENERAL
PAINLEVEL_OUTOF10: 6
PAINLEVEL_OUTOF10: 3
PAINLEVEL_OUTOF10: 0
PAINLEVEL_OUTOF10: 7

## 2020-10-05 ASSESSMENT — PAIN DESCRIPTION - ONSET: ONSET: ON-GOING

## 2020-10-05 ASSESSMENT — PAIN DESCRIPTION - PAIN TYPE
TYPE: CHRONIC PAIN
TYPE: CHRONIC PAIN

## 2020-10-05 ASSESSMENT — PAIN DESCRIPTION - PROGRESSION: CLINICAL_PROGRESSION: GRADUALLY WORSENING

## 2020-10-05 ASSESSMENT — PAIN DESCRIPTION - LOCATION
LOCATION: LEG
LOCATION: HIP;LEG
LOCATION: HIP

## 2020-10-05 ASSESSMENT — PAIN DESCRIPTION - FREQUENCY: FREQUENCY: CONTINUOUS

## 2020-10-05 NOTE — ED NOTES
Handoff report given to [facility]. Pain 6/10.      Bryan Weems, RN  10/05/20 8906

## 2020-10-05 NOTE — PROGRESS NOTES
Patient arrived via stretcher from ED to room 4146. Patient ambulated from stretcher to bed. Heart monitor connected and verified with [facility].  VSS, assesment, 4eyes, and admission completed. Patient oriented to room and unit. All questions answered. Call light and bedside table within reach. Patient is resting quietly with no further questions at this time. Will continue to monitor.

## 2020-10-05 NOTE — H&P
Hospital Medicine History & Physical      PCP: No primary care provider on file. Date of Admission: 10/5/2020    Date of Service: Pt seen/examined on 10/5/2020 and placed in observation. Chief Complaint:  SOB, anxiety. History Of Present Illness: The patient is a 72 y.o. male w hx of CAD s/p prior PTCA, subsequently several presentations with chest pain and reassuring cardiac work up including negative (low risk) stress test in jan 2020. He presented to outside ED today with complaints of feeling jittery and anxious. Pt reports he went to the [address] [place] [address] [employer]  [address] yesterday and met some \"old friends. .. they had cocaine and I ended up using it again\" per patient report. Became very anxious and restless after using it with some mild dyspnea and decided to come to ED for evaluation. He denies chest pain per say. Denies palpitations. He reports he was taking his BP meds as prescribed, but pharmacists advised me that he has not filled his meds in months. Past Medical History:        Diagnosis Date    CAD (coronary artery disease)     Chest pain 10/15/2017    [facility]     COPD (chronic obstructive pulmonary disease) ([facility]     Diabetes mellitus ([facility]     not on meds    Hyperlipidemia     Hypertension     Kidney stone     MRSA colonization 4/16/19; 03/24/2019    Sleep apnea     He said he doesn't use a CPAP machine.        Past Surgical History:        Procedure Laterality Date    ABDOMEN SURGERY      CARDIAC SURGERY      3 STENTS PLACED     CHOLECYSTECTOMY      COLONOSCOPY      CORONARY ANGIOPLASTY WITH STENT PLACEMENT      3 stents     FRACTURE SURGERY      RIGHT ANKLE    LITHOTRIPSY      SHOULDER ARTHROSCOPY      VASCULAR SURGERY         Medications Prior to Admission:    Prior to Admission medications    Medication Sig Start Date End Date Taking? Authorizing Provider   amitriptyline (ELAVIL) 100 MG tablet Take 100 mg by mouth 2 times daily   Yes Historical Provider, MD   sertraline (ZOLOFT) 100 MG tablet Take 100 mg by mouth daily   Yes Historical Provider, MD   hydrOXYzine (ATARAX) 25 MG tablet Take 25 mg by mouth 3 times daily as needed for Itching   Yes Historical Provider, MD   cyclobenzaprine (FLEXERIL) 10 MG tablet Take 1 tablet by mouth 3 times daily as needed for Muscle spasms 9/28/20  Yes BRITANY Quevedo   vitamin B-12 (CYANOCOBALAMIN) 500 MCG tablet Take 500 mcg by mouth daily   Yes Historical Provider, MD   vitamin D (D3-1000) 1000 UNITS TABS tablet Take 1 tablet by mouth daily 5/24/17  Yes Gary Garcia MD   aspirin 81 MG tablet Take 81 mg by mouth daily   Yes Historical Provider, MD   lisinopril (PRINIVIL;ZESTRIL) 10 MG tablet Take 1 tablet by mouth daily  Patient taking differently: Take 20 mg by mouth daily  3/24/20   Aleyda Ceron MD   carvedilol (COREG) 25 MG tablet Take 1 tablet by mouth 2 times daily (with meals)  Patient taking differently: Take 25 mg by mouth daily  3/2/20   Lanec Traylor MD   hydrochlorothiazide (HYDRODIURIL) 25 MG tablet Take 1 tablet by mouth daily 2/13/20   Sonido Charlton MD   zolpidem (AMBIEN) 10 MG tablet Take 10 mg by mouth nightly as needed. Historical Provider, MD       Allergies:  Naproxen    Social History:  The patient currently lives at home. TOBACCO:   reports that he has quit smoking. His smoking use included cigarettes. He has a 100.00 pack-year smoking history. He has never used smokeless tobacco.  ETOH:   reports current alcohol use. Family History:  Reviewed in detail and negative for DM, Early CAD, Cancer, CVA.  Positive as follows:        Problem Relation Age of Onset    High Blood Pressure Mother     Cancer Father         lung cancer    Diabetes Brother     Liver Disease Sister     Diabetes Brother        REVIEW OF SYSTEMS:  As noted in the HPI. All other systems reviewed and negative. PHYSICAL EXAM:    BP (!) 155/88   Pulse 93   Temp 98.5 °F (36.9 °C) (Axillary)   Resp 18   Ht 5' 11\" (1.803 m)   Wt 245 lb 9.5 oz (111.4 kg)   SpO2 90%   BMI 34.25 kg/m²     General appearance: No apparent distress appears stated age and cooperative. HEENT Normal cephalic, atraumatic without obvious deformity. Pupils equal, round, and reactive to light. Extra ocular muscles intact. Conjunctivae/corneas clear. Neck: Supple, No jugular venous distention/bruits. Trachea midline without thyromegaly or adenopathy with full range of motion. Lungs: Clear to auscultation, bilaterally without Rales/Wheezes/Rhonchi with good respiratory effort. Heart: Regular rate and rhythm with Normal S1/S2 without murmurs, rubs or gallops, point of maximum impulse non-displaced  Abdomen: Soft, non-tender or non-distended without rigidity or guarding and positive bowel sounds all four quadrants. Extremities: No clubbing, cyanosis, or edema bilaterally. Full range of motion without deformity and normal gait intact. Skin: Skin color, texture, turgor normal.  No rashes or lesions. Neurologic: Alert and oriented X 3, neurovascularly intact with sensory/motor intact upper extremities/lower extremities, bilaterally. Cranial nerves: II-XII intact, grossly non-focal.  Mental status: Alert, oriented, thought content appropriate. Capillary Refill: Acceptable  < 3 seconds  Peripheral Pulses: +3 Easily felt, not easily obliterated with pressure      CBC   Recent Labs     10/05/20  1023   WBC 9.8   HGB 13.1*   HCT 39.6*         RENAL  Recent Labs     10/05/20  1023      K 4.2   CL 97*   CO2 22   BUN 32*   CREATININE 1.4*     LFT'S  Recent Labs     10/05/20  1023   AST 27   ALT 40   BILITOT 0.5   ALKPHOS 129     COAG  No results for input(s): INR in the last 72 hours.   CARDIAC ENZYMES  Recent Labs     10/05/20  1023 10/05/20  1025 10/05/20  1436   CKTOTAL  --  206  -- TROPONINI 0.02*  --  0.02*       U/A:    Lab Results   Component Value Date    COLORU Yellow 09/28/2020    WBCUA 0-2 09/28/2020    RBCUA None seen 09/28/2020    MUCUS 2+ 09/28/2020    BACTERIA Rare 01/19/2018    CLARITYU Clear 09/28/2020    SPECGRAV >=1.030 09/28/2020    LEUKOCYTESUR Negative 09/28/2020    BLOODU Negative 09/28/2020    GLUCOSEU 500 09/28/2020       ABG    Lab Results   Component Value Date    KYC8NUF 25.1 11/12/2018    BEART 0.5 11/12/2018    L5OVVKPE 95.1 11/12/2018    PHART 7.389 11/12/2018    CHQ5NHY 42.5 11/12/2018    PO2ART 72.7 11/12/2018    LJR9QJB 26.4 11/12/2018           Active Hospital Problems    Diagnosis Date Noted    Cocaine abuse [facility]) [F14.10] 10/05/2020         PHYSICIANS CERTIFICATION:    I certify that Lita Hunt is expected to be hospitalized for less than 2 midnights based on the following assessment and plan:      ASSESSMENT/PLAN:      Acute Cocaine Intoxication   With anxiety, HTN, jittery and restless. No Chest pain. Minimal troponin leak - 0.02 - will trend x3. Will stat on ativan scheduled IV for the next 24hrs. Monitor on PCU on telemetry. Avoid BB. Resume lisinopril and HCTZ       HTN - BP meds changed as above. CAD -   Cont ASA. He is on fenofibrate - intolerant to statins. BB stopped due to above. DVT Prophylaxis: lovenox  Diet: DIET CARB CONTROL;  Code Status: Full Code      Dispo - PUC obs with telemetry. Bekah Chanel MD    Thank you No primary care provider on file. for the opportunity to be involved in this patient's care. If you have any questions or concerns please feel free to contact me at [phone].

## 2020-10-05 NOTE — ED PROVIDER NOTES
Emergency Physician Note    Chief Complaint  Other (pt was using cocaine last night with his friends from [address]. pt just wants to be checked out. )       History of Present Illness  Gail Whittington is a 72 y.o. male who presents to the ED for anxiety. Patient reports he was doing cocaine from midnight till 4 AM.  He states that he smoked some and snorted some. He states that he feels anxious and wanted to get checked. He has a history of prior coronary artery stenting 15 years ago with follow-up cardiac cath 10 years ago where he states \"they cleaned out the stents\". Since then he has had no cardiac issues. He denies any chest pain, tightness, heaviness or pressure today. He denies any other drug use. 10 systems reviewed, pertinent positives per HPI otherwise noted to be negative    I have reviewed the following from the nursing documentation:      Prior to Admission medications    Medication Sig Start Date End Date Taking? Authorizing Provider   cyclobenzaprine (FLEXERIL) 10 MG tablet Take 1 tablet by mouth 3 times daily as needed for Muscle spasms 9/28/20   BRITANY Diallo   amitriptyline (ELAVIL) 50 MG tablet Take 1 tablet by mouth 2 times daily Not taking at this time  Patient taking differently: Take 100 mg by mouth nightly Not taking at this time 8/11/20 9/10/20  Neetu Pelletier PA-C   predniSONE (DELTASONE) 10 MG tablet Sig:3pillspoBID x2days, then 2pillspoBID x2days, then 1pillpoBID x2 days, then 1pillpo QD. 5/1/20   Antoni Love MD   gabapentin (NEURONTIN) 300 MG capsule Take 1 capsule by mouth 3 times daily for 180 days.  Intended supply: 30 days 4/14/20 10/11/20  Antoni Love MD   lisinopril (PRINIVIL;ZESTRIL) 10 MG tablet Take 1 tablet by mouth daily  Patient taking differently: Take 20 mg by mouth daily  3/24/20   Devang Rowan MD   carvedilol (COREG) 25 MG tablet Take 1 tablet by mouth 2 times daily (with meals)  Patient taking differently: Take 25 mg by mouth daily 3/2/20   Lance Traylor MD   hydrochlorothiazide (HYDRODIURIL) 25 MG tablet Take 1 tablet by mouth daily 2/13/20   Stevan Jenkins MD   vitamin B-12 (CYANOCOBALAMIN) 500 MCG tablet Take 500 mcg by mouth daily    Historical Provider, MD   zolpidem (AMBIEN) 10 MG tablet Take 10 mg by mouth nightly. Historical Provider, MD   vitamin D (D3-1000) 1000 UNITS TABS tablet Take 1 tablet by mouth daily 5/24/17   Preeti Looney MD   aspirin 81 MG tablet Take 81 mg by mouth daily    Historical Provider, MD       Allergies as of 10/05/2020 - Review Complete 10/05/2020   Allergen Reaction Noted    Naproxen Hives 11/26/2018       Past Medical History:   Diagnosis Date    CAD (coronary artery disease)     Chest pain 10/15/2017    [facility]     COPD [facility]chronic obstructive pulmonary disease) ([facility]     Diabetes mellitus ([facility]     not on meds    Hyperlipidemia     Hypertension     Kidney stone     MRSA colonization 4/16/19; 03/24/2019    Sleep apnea     He said he doesn't use a CPAP machine.         Surgical History:   Past Surgical History:   Procedure Laterality Date    ABDOMEN SURGERY      CARDIAC SURGERY      3 STENTS PLACED     CHOLECYSTECTOMY      COLONOSCOPY      CORONARY ANGIOPLASTY WITH STENT PLACEMENT      3 stents     FRACTURE SURGERY      RIGHT ANKLE    LITHOTRIPSY      SHOULDER ARTHROSCOPY      VASCULAR SURGERY          Family History:    Family History   Problem Relation Age of Onset    High Blood Pressure Mother     Cancer Father         lung cancer    Diabetes Brother     Liver Disease Sister     Diabetes Brother        Social History     Socioeconomic History    Marital status: Single     Spouse name: Not on file    Number of children: 1    Years of education: 15    Highest education level: Not on file   Occupational History    Occupation: retired   Social Needs    Financial resource strain: Not on file    Food insecurity     Worry: Not on file     Inability: Not on file   [employer] needs     Medical: Not on file     Non-medical: Not on file   Tobacco Use    Smoking status: Former Smoker     Packs/day: 2.50     Years: 40.00     Pack years: 100.00     Types: Cigarettes    Smokeless tobacco: Never Used   Substance and Sexual Activity    Alcohol use: Yes     Frequency: Never     Comment: occ.  Drug use: Yes     Types: Cocaine    Sexual activity: Yes     Partners: Female   Lifestyle    Physical activity     Days per week: Not on file     Minutes per session: Not on file    Stress: Not on file   Relationships    Social connections     Talks on phone: Not on file     Gets together: Not on file     Attends [employer] service: Not on file     Active member of club or organization: Not on file     Attends meetings of clubs or organizations: Not on file     Relationship status: Not on file    Intimate partner violence     Fear of current or ex partner: Not on file     Emotionally abused: Not on file     Physically abused: Not on file     Forced sexual activity: Not on file   Other Topics Concern    Not on file   Social History Narrative    Not on file       Nursing notes reviewed. ED Triage Vitals [10/05/20 0941]   Enc Vitals Group      BP (!) 146/78      Pulse 111      Resp 14      Temp 97.9 °F (36.6 °C)      Temp Source Oral      SpO2 97 %      Weight 245 lb 9.5 oz (111.4 kg)      Height       Head Circumference       Peak Flow       Pain Score       Pain Loc       Pain Edu? Excl. in [address]? GENERAL:  Awake, alert. Well developed, obese with anxious appearance. HENT:  Normocephalic, Atraumatic, moist mucous membranes. EYES:  Pupils equal round and reactive to light, Conjunctiva normal, extraocular movements normal.  NECK:  No meningeal signs, Supple. CHEST: Tachycardic and regular, chest wall non-tender. LUNGS:  Clear to auscultation bilaterally. ABDOMEN:  Soft, non-tender, no rebound, rigidity or guarding, non-distended, normal bowel sounds.  No costovertebral angle tenderness to palpation. BACK:  No tenderness. EXTREMITIES:  Normal range of motion, no edema, no bony tenderness, no deformity, distal pulses present. SKIN: Warm, dry and intact. NEUROLOGIC: Normal mental status. Moving all extremities to command. LABS and DIAGNOSTIC RESULTS  EKG  The Ekg interpreted by me shows  sinus tachycardia, uroa=100   Axis is   Normal  QTc is  normal  Intervals and Durations are unremarkable. ST Segments: normal  Delta waves, Brugada Syndrome, and Short IN are not present. No significant change from prior EKG dated 9/28/2020    RADIOLOGY  X-RAYS:  I have reviewed radiologic plain film image(s). ALL OTHER NON-PLAIN FILM IMAGES SUCH AS CT, ULTRASOUND AND MRI HAVE BEEN READ BY THE RADIOLOGIST. XR CHEST PORTABLE   Final Result   No acute cardiopulmonary disease.               LABS  Labs Reviewed   CBC WITH AUTO DIFFERENTIAL - Abnormal; Notable for the following components:       Result Value    Hemoglobin 13.1 (*)     Hematocrit 39.6 (*)     Neutrophils Absolute 7.8 (*)     All other components within normal limits    Narrative:     Performed at:  [facility]  [address] [facility]   Phone [phone]   TROPONIN - Abnormal; Notable for the following components:    Troponin 0.02 (*)     All other components within normal limits    Narrative:     Performed at:  [facility]) [facility]  [address] [facility]   Phone [phone]   COMPREHENSIVE METABOLIC PANEL W/ REFLEX TO MG FOR LOW K - Abnormal; Notable for the following components:    Chloride 97 (*)     Anion Gap 21 (*)     Glucose 229 (*)     BUN 32 (*)     CREATININE 1.4 (*)     GFR Non- 51 (*)     All other components within normal limits    Narrative:     Performed at:  [facility]  [address] [facility] Dragon dictation system. I created this record but it may contain dictation errors.           Luna Martinez MD  10/05/20 1527

## 2020-10-05 NOTE — PROGRESS NOTES
4 Eyes Skin Assessment     NAME:  Josee Lopez  YOB: 1955  MEDICAL RECORD NUMBER:  [id number]    The patient is being assess for  Admission    I agree that 2 RN's have performed a thorough Head to Toe Skin Assessment on the patient. ALL assessment sites listed below have been assessed. Areas assessed by both nurses:    Head, Face, Ears, Shoulders, Back, Chest, Arms, Elbows, Hands, Sacrum. Buttock, Coccyx, Ischium and Legs. Feet and Heels        Does the Patient have a Wound?  No noted wound(s)       Mundo Prevention initiated:  No   Wound Care Orders initiated:  No    Pressure Injury (Stage 3,4, Unstageable, DTI, NWPT, and Complex wounds) if present place consult order under [de-identified] No    New and Established Ostomies if present place consult order under : No      Nurse 1 eSignature: Electronically signed by Chip Barbosa RN on 10/5/20 at 2:41 PM EDT    **SHARE this note so that the co-signing nurse is able to place an eSignature**    Nurse 2 eSignature: Electronically signed by Obey López RN on 10/5/20 at 5:33 PM EDT

## 2020-10-05 NOTE — PROGRESS NOTES
Pharmacy Medication Reconciliation Note     List of medications patient is currently taking is complete. Source of information:   1. Patient  2. Call to [address] [place] [address]      Notes regarding home medications:   1. Patient states he is quite certain the he has been taking lisinopril, HCTZ, and Coreg and that he gets them filled at [facility]. Called [facility] and they state they have not filled these since March/April of 2020. Suspect noncompliance.     Rohini Kohler, PharmD, BCPS  10/5/2020  2:12 PM

## 2020-10-05 NOTE — CARE COORDINATION
ED Advocate saw patient and discussed the importance of a Primary Care Physician. Patient reports that he [facility] PCP- Dr. Dariusz Adan. Registration advised.

## 2020-10-06 VITALS
HEART RATE: 91 BPM | SYSTOLIC BLOOD PRESSURE: 131 MMHG | HEIGHT: 71 IN | BODY MASS INDEX: 34.38 KG/M2 | WEIGHT: 245.59 LBS | TEMPERATURE: 97.3 F | DIASTOLIC BLOOD PRESSURE: 90 MMHG | RESPIRATION RATE: 16 BRPM | OXYGEN SATURATION: 92 %

## 2020-10-06 LAB
ALBUMIN SERPL-MCNC: 3.7 G/DL (ref 3.4–5)
ANION GAP SERPL CALCULATED.3IONS-SCNC: 12 MMOL/L (ref 3–16)
BASOPHILS ABSOLUTE: 0 K/UL (ref 0–0.2)
BASOPHILS ABSOLUTE: 0.1 K/UL (ref 0–0.2)
BASOPHILS RELATIVE PERCENT: 0.6 %
BASOPHILS RELATIVE PERCENT: 0.7 %
BUN BLDV-MCNC: 35 MG/DL (ref 7–20)
CALCIUM SERPL-MCNC: 8.8 MG/DL (ref 8.3–10.6)
CHLORIDE BLD-SCNC: 104 MMOL/L (ref 99–110)
CO2: 25 MMOL/L (ref 21–32)
CREAT SERPL-MCNC: 1.2 MG/DL (ref 0.8–1.3)
EOSINOPHILS ABSOLUTE: 0.2 K/UL (ref 0–0.6)
EOSINOPHILS ABSOLUTE: 0.2 K/UL (ref 0–0.6)
EOSINOPHILS RELATIVE PERCENT: 2.5 %
EOSINOPHILS RELATIVE PERCENT: 2.6 %
GFR AFRICAN AMERICAN: >60
GFR NON-AFRICAN AMERICAN: >60
GLUCOSE BLD-MCNC: 166 MG/DL (ref 70–99)
GLUCOSE BLD-MCNC: 170 MG/DL (ref 70–99)
GLUCOSE BLD-MCNC: 194 MG/DL (ref 70–99)
GLUCOSE BLD-MCNC: 198 MG/DL (ref 70–99)
HCT VFR BLD CALC: 35.9 % (ref 40.5–52.5)
HCT VFR BLD CALC: 37.9 % (ref 40.5–52.5)
HEMOGLOBIN: 11.7 G/DL (ref 13.5–17.5)
HEMOGLOBIN: 12.5 G/DL (ref 13.5–17.5)
LYMPHOCYTES ABSOLUTE: 1.4 K/UL (ref 1–5.1)
LYMPHOCYTES ABSOLUTE: 1.7 K/UL (ref 1–5.1)
LYMPHOCYTES RELATIVE PERCENT: 23.6 %
LYMPHOCYTES RELATIVE PERCENT: 24.5 %
MCH RBC QN AUTO: 28.7 PG (ref 26–34)
MCH RBC QN AUTO: 28.8 PG (ref 26–34)
MCHC RBC AUTO-ENTMCNC: 32.6 G/DL (ref 31–36)
MCHC RBC AUTO-ENTMCNC: 32.9 G/DL (ref 31–36)
MCV RBC AUTO: 87.4 FL (ref 80–100)
MCV RBC AUTO: 88 FL (ref 80–100)
MONOCYTES ABSOLUTE: 0.4 K/UL (ref 0–1.3)
MONOCYTES ABSOLUTE: 0.5 K/UL (ref 0–1.3)
MONOCYTES RELATIVE PERCENT: 6.6 %
MONOCYTES RELATIVE PERCENT: 6.6 %
NEUTROPHILS ABSOLUTE: 3.8 K/UL (ref 1.7–7.7)
NEUTROPHILS ABSOLUTE: 4.7 K/UL (ref 1.7–7.7)
NEUTROPHILS RELATIVE PERCENT: 65.7 %
NEUTROPHILS RELATIVE PERCENT: 66.6 %
PDW BLD-RTO: 13.7 % (ref 12.4–15.4)
PDW BLD-RTO: 13.9 % (ref 12.4–15.4)
PERFORMED ON: ABNORMAL
PHOSPHORUS: 3.4 MG/DL (ref 2.5–4.9)
PLATELET # BLD: 155 K/UL (ref 135–450)
PLATELET # BLD: 161 K/UL (ref 135–450)
PMV BLD AUTO: 7.8 FL (ref 5–10.5)
PMV BLD AUTO: 7.9 FL (ref 5–10.5)
POTASSIUM SERPL-SCNC: 3.8 MMOL/L (ref 3.5–5.1)
RBC # BLD: 4.08 M/UL (ref 4.2–5.9)
RBC # BLD: 4.33 M/UL (ref 4.2–5.9)
SODIUM BLD-SCNC: 141 MMOL/L (ref 136–145)
TROPONIN: 0.01 NG/ML
WBC # BLD: 5.8 K/UL (ref 4–11)
WBC # BLD: 7.1 K/UL (ref 4–11)

## 2020-10-06 PROCEDURE — G0378 HOSPITAL OBSERVATION PER HR: HCPCS

## 2020-10-06 PROCEDURE — 96372 THER/PROPH/DIAG INJ SC/IM: CPT

## 2020-10-06 PROCEDURE — 80069 RENAL FUNCTION PANEL: CPT

## 2020-10-06 PROCEDURE — 6360000002 HC RX W HCPCS: Performed by: INTERNAL MEDICINE

## 2020-10-06 PROCEDURE — 2580000003 HC RX 258: Performed by: INTERNAL MEDICINE

## 2020-10-06 PROCEDURE — 96376 TX/PRO/DX INJ SAME DRUG ADON: CPT

## 2020-10-06 PROCEDURE — 6370000000 HC RX 637 (ALT 250 FOR IP): Performed by: INTERNAL MEDICINE

## 2020-10-06 PROCEDURE — 36415 COLL VENOUS BLD VENIPUNCTURE: CPT

## 2020-10-06 PROCEDURE — 84484 ASSAY OF TROPONIN QUANT: CPT

## 2020-10-06 PROCEDURE — 85025 COMPLETE CBC W/AUTO DIFF WBC: CPT

## 2020-10-06 RX ORDER — HYDROCHLOROTHIAZIDE 25 MG/1
25 TABLET ORAL DAILY
Qty: 30 TABLET | Refills: 3 | Status: SHIPPED | OUTPATIENT
Start: 2020-10-06

## 2020-10-06 RX ORDER — LISINOPRIL 10 MG/1
10 TABLET ORAL DAILY
Qty: 30 TABLET | Refills: 3 | Status: SHIPPED | OUTPATIENT
Start: 2020-10-06

## 2020-10-06 RX ADMIN — Medication 10 ML: at 08:05

## 2020-10-06 RX ADMIN — INSULIN LISPRO 2 UNITS: 100 INJECTION, SOLUTION INTRAVENOUS; SUBCUTANEOUS at 08:05

## 2020-10-06 RX ADMIN — LORAZEPAM 1 MG: 2 INJECTION INTRAMUSCULAR; INTRAVENOUS at 08:03

## 2020-10-06 RX ADMIN — HYDROCHLOROTHIAZIDE 25 MG: 25 TABLET ORAL at 08:02

## 2020-10-06 RX ADMIN — INSULIN LISPRO 1 UNITS: 100 INJECTION, SOLUTION INTRAVENOUS; SUBCUTANEOUS at 00:01

## 2020-10-06 RX ADMIN — FLUOCINONIDE: 0.5 CREAM TOPICAL at 12:35

## 2020-10-06 RX ADMIN — FLUOCINONIDE: 0.5 CREAM TOPICAL at 00:05

## 2020-10-06 RX ADMIN — LORAZEPAM 1 MG: 2 INJECTION INTRAMUSCULAR; INTRAVENOUS at 02:43

## 2020-10-06 RX ADMIN — AMITRIPTYLINE HYDROCHLORIDE 50 MG: 50 TABLET, FILM COATED ORAL at 08:02

## 2020-10-06 RX ADMIN — CYANOCOBALAMIN TAB 500 MCG 500 MCG: 500 TAB at 08:03

## 2020-10-06 RX ADMIN — Medication 1000 UNITS: at 08:03

## 2020-10-06 RX ADMIN — ENOXAPARIN SODIUM 40 MG: 40 INJECTION SUBCUTANEOUS at 08:02

## 2020-10-06 RX ADMIN — INSULIN LISPRO 2 UNITS: 100 INJECTION, SOLUTION INTRAVENOUS; SUBCUTANEOUS at 12:35

## 2020-10-06 RX ADMIN — LISINOPRIL 10 MG: 10 TABLET ORAL at 08:03

## 2020-10-06 RX ADMIN — ASPIRIN 81 MG: 81 TABLET, CHEWABLE ORAL at 08:02

## 2020-10-06 ASSESSMENT — PAIN SCALES - GENERAL
PAINLEVEL_OUTOF10: 0

## 2020-10-06 NOTE — DISCHARGE SUMMARY
Hospital Medicine Discharge Summary    Patient ID: Zay Martinez      Patient's PCP: Ruma Calvillo MD    Admit Date: 10/5/2020     Discharge Date:   10/6/2020    Admitting Physician: Morena Faustin MD     Discharge Physician: Morena Faustin MD     Discharge Diagnoses: Active Hospital Problems    Diagnosis    Cocaine abuse ([facility] [F14.10]       The patient was seen and examined on day of discharge and this discharge summary is in conjunction with any daily progress note from day of discharge. Hospital Course: The patient is a 72 y.o. male w hx of CAD s/p prior PTCA, subsequently several presentations with chest pain and reassuring cardiac work up including negative (low risk) stress test in jan 2020. He presented to outside ED today with complaints of feeling jittery and anxious.      Pt reports he went to the [address] [place] [address] [employer]  [address] yesterday and met some \"old friends. .. they had cocaine and I ended up using it again\" per patient report. Became very anxious and restless after using it with some mild dyspnea and decided to come to ED for evaluation. He denies chest pain per say. Denies palpitations.         He reports he was taking his BP meds as prescribed, but pharmacists advised me that he has not filled his meds in months.        Acute Cocaine Intoxication   With anxiety, HTN, jittery and restless. No Chest pain. Minimal troponin leak - 0.02 - trended to normal.    Ok to stop ativan. Monitor on PCU on telemetry. Avoid BB. Resume lisinopril and HCTZ - script given        HTN - BP meds changed as above. New scripts given and urged pt to maintain compliance with medication.         CAD -   Cont ASA. He is on fenofibrate - intolerant to statins. BB stopped due to above.         Anemia -   He reports diarrhea. Denies any visible blood in stool or urine. Will recheck Hgb level this afternoon - if stable - recommend OP follow up   Will check FOBT.        DMII -   A1C 7.3  He told me Diagnostic Studies    Radiology:   XR CHEST PORTABLE   Final Result   No acute cardiopulmonary disease. Consults:     IP CONSULT TO SPIRITUAL SERVICES    Disposition:  Home, pending repeat CBC this afternoon. Condition at Discharge: Stable    Discharge Instructions/Follow-up:  PCP 1 week. Code Status:  Full Code     Activity: activity as tolerated    Diet: diabetic diet      Discharge Medications:     Current Discharge Medication List           Details   fluocinonide (LIDEX) 0.05 % cream Apply topically 2 times daily. Qty: 1 Tube, Refills: 2              Details   hydroCHLOROthiazide (HYDRODIURIL) 25 MG tablet Take 1 tablet by mouth daily  Qty: 30 tablet, Refills: 3      lisinopril (PRINIVIL;ZESTRIL) 10 MG tablet Take 1 tablet by mouth daily  Qty: 30 tablet, Refills: 3              Details   amitriptyline (ELAVIL) 100 MG tablet Take 100 mg by mouth 2 times daily      sertraline (ZOLOFT) 100 MG tablet Take 100 mg by mouth daily      hydrOXYzine (ATARAX) 25 MG tablet Take 25 mg by mouth 3 times daily as needed for Itching      cyclobenzaprine (FLEXERIL) 10 MG tablet Take 1 tablet by mouth 3 times daily as needed for Muscle spasms  Qty: 20 tablet, Refills: 0      vitamin B-12 (CYANOCOBALAMIN) 500 MCG tablet Take 500 mcg by mouth daily      vitamin D (D3-1000) 1000 UNITS TABS tablet Take 1 tablet by mouth daily  Qty: 90 tablet, Refills: 3      aspirin 81 MG tablet Take 81 mg by mouth daily      zolpidem (AMBIEN) 10 MG tablet Take 10 mg by mouth nightly as needed. Time Spent on discharge is more than 30 minutes in the examination, evaluation, counseling and review of medications and discharge plan. Signed:    Yashira Dos Santos MD   10/6/2020      Thank you Shruthi Duque MD for the opportunity to be involved in this patient's care. If you have any questions or concerns please feel free to contact me at [phone].

## 2020-10-06 NOTE — CARE COORDINATION
INITIAL CASE MANAGEMENT ASSESSMENT     Reviewed chart, met with patient to assess possible discharge needs. Explained Case Management role/services. SW met with patient alone at bedside today.     Living Situation: Patient reports that he resides in a 2 story home with his brother (wheelchair bound) and two dogs. He stated that the bedroom and bathroom are upstairs and there are four stairs leading up to the front door. He stated that he has no trouble entering or exiting the property at this time.     ADLs: Prior to medical admission patient reports that he was independent. He stated that he doesn't anticipate any needs at discharge.      DME: Prior to medical admission patient reports that he used no durable medical equipment. He stated that he doesn't anticipate any needs at discharge.     PT/OT Recs: Since medical admission patient reports that he has been ambulating without assistance. He stated that he doesn't anticipate any needs at discharge.     Active Services: Prior to medical admission patient reports that he used no active services. He stated that he doesn't anticipate any needs at discharge.     Transportation: Prior to medical admission patient reports that he was an active . He stated that his car is parked at the ER and he plans on driving it home at discharge.      Medications: Patient receives medications from [address] [place] [address]. At this time there are no issues with access or affordability.       PCP: Asa Howard -[phone] (phone) and [phone] (fax number). Patient reports that his last appointment with this provider was over two weeks ago.  Bertram Limon  Substance abuse: Patient admits that substance abuse led to this hospitalization. He stated that he hasn't used cocaine in over two years but was hanging with friends who were using over the weekend. He stated that he immediately started having chest pain which led to him coming to our hospital. Patient given resources for treatment.  He indicates that he has been with a provider [address] before in [place] and is able to refer himself after discharge.      PLAN/COMMENTS: pt to return home with his brother at [place]. Will provide info on home care services and [place] on aging to assist at home if needed. Pt has home care list from last admission.       SW provided contact information for patient or family to call with any questions. SW will follow and assist as needed. Respectfully submitted,    NOELLE Lam  [facility]   [phone]    Electronically signed by NOELLE Anthony on 10/6/2020 at 12:11 PM

## 2020-10-06 NOTE — PROGRESS NOTES
D/c order received, pt verbalized agreement to d/c home. D/c instructions prepared and given to pt, pt VU. Medication information packet given r/t NEW and/or CHANGED prescriptions, pt VU. Pt belongings gathered, IV and telemetry box removed without complication. Disposition is home, transported with friend via private vehicle.

## 2020-10-06 NOTE — FLOWSHEET NOTE
10/06/20 1155   Encounter Summary   Services provided to: Patient   Referral/Consult From: Nurse   Support System Family members   Continue Visiting   (vist and AD doc discussion 10/6 CL)   Complexity of Encounter Moderate   Length of Encounter 15 minutes   Spiritual/Episcopalian   Type Spiritual support   Assessment Calm; Approachable; Hopeful;Coping   Intervention Active listening;Explored feelings, thoughts, concerns; Discussed illness/injury and it's impact   Outcome Engaged in conversation;Coping; Hopeful   Advance Directives (For Healthcare)   Healthcare Directive No, patient does not have an advance directive for healthcare treatment   Advance Directives Documents given;Documents explained  (AD doc protocol complete)   Electronically signed by Tawanda Gibbs on 10/6/2020 at 11:58 AM

## 2020-10-29 ENCOUNTER — HOSPITAL ENCOUNTER (EMERGENCY)
Age: 65
Discharge: HOME | End: 2020-10-29
Attending: EMERGENCY MEDICINE
Payer: MEDICARE

## 2020-10-29 ENCOUNTER — APPOINTMENT (OUTPATIENT)
Dept: GENERAL RADIOLOGY | Age: 65
End: 2020-10-29
Payer: MEDICARE

## 2020-10-29 VITALS
DIASTOLIC BLOOD PRESSURE: 116 MMHG | TEMPERATURE: 98.3 F | OXYGEN SATURATION: 96 % | HEART RATE: 95 BPM | HEIGHT: 71 IN | WEIGHT: 253.9 LBS | SYSTOLIC BLOOD PRESSURE: 201 MMHG | BODY MASS INDEX: 35.55 KG/M2 | RESPIRATION RATE: 16 BRPM

## 2020-10-29 LAB
RAPID INFLUENZA  B AGN: NEGATIVE
RAPID INFLUENZA A AGN: NEGATIVE
S PYO AG THROAT QL: NEGATIVE

## 2020-10-29 PROCEDURE — 73560 X-RAY EXAM OF KNEE 1 OR 2: CPT

## 2020-10-29 PROCEDURE — 96372 THER/PROPH/DIAG INJ SC/IM: CPT

## 2020-10-29 PROCEDURE — 87880 STREP A ASSAY W/OPTIC: CPT

## 2020-10-29 PROCEDURE — 99283 EMERGENCY DEPT VISIT LOW MDM: CPT

## 2020-10-29 PROCEDURE — 6360000002 HC RX W HCPCS: Performed by: EMERGENCY MEDICINE

## 2020-10-29 PROCEDURE — 87081 CULTURE SCREEN ONLY: CPT

## 2020-10-29 PROCEDURE — 87804 INFLUENZA ASSAY W/OPTIC: CPT

## 2020-10-29 RX ORDER — KETOROLAC TROMETHAMINE 30 MG/ML
30 INJECTION, SOLUTION INTRAMUSCULAR; INTRAVENOUS ONCE
Status: COMPLETED | OUTPATIENT
Start: 2020-10-29 | End: 2020-10-29

## 2020-10-29 RX ORDER — ACETAMINOPHEN 500 MG
500 TABLET ORAL 4 TIMES DAILY PRN
Qty: 120 TABLET | Refills: 0 | Status: SHIPPED | OUTPATIENT
Start: 2020-10-29 | End: 2020-11-01

## 2020-10-29 RX ADMIN — KETOROLAC TROMETHAMINE 30 MG: 30 INJECTION, SOLUTION INTRAMUSCULAR at 01:16

## 2020-10-29 ASSESSMENT — ENCOUNTER SYMPTOMS
WHEEZING: 0
VOICE CHANGE: 0
SORE THROAT: 1
PHOTOPHOBIA: 0
VOMITING: 0
TROUBLE SWALLOWING: 0
SHORTNESS OF BREATH: 0
NAUSEA: 0
COLOR CHANGE: 0
BACK PAIN: 0
COUGH: 0
RHINORRHEA: 0

## 2020-10-29 ASSESSMENT — PAIN SCALES - GENERAL: PAINLEVEL_OUTOF10: 9

## 2020-10-29 NOTE — ED NOTES
Also complaining of a sore throat for 3 days. Painful to swallow. And sores on his tongue.      Fanny Pizarro RN  10/29/20 9483

## 2020-10-29 NOTE — ED NOTES
Ace wrap placed to left knee. Pt tolerated well. Fitted for crutches and demonstrated accordingly. States pain shot helped. Able to ambulate easily.      Amy Plasencia RN  10/29/20 2616

## 2020-10-29 NOTE — ED PROVIDER NOTES
[address] [facility]  [facility]      Pt Name: Scottie Batista  MRN: [id number]  [place] 1955  Date ofevaluation: 10/29/2020  Provider: Daniele Holly MD    CHIEF COMPLAINT       Chief Complaint   Patient presents with    Knee Pain     left knee injury. Hit knee on truck yesterday         HISTORY OF PRESENT ILLNESS   (Location/Symptom, Timing/Onset,Context/Setting, Quality, Duration, Modifying Factors, Severity)  Note limiting factors. Scottie Smoker is a 72 y.o. male  who  has a past medical history of CAD (coronary artery disease), Chest pain, Cocaine abuse ([facility] [address] COPD (chronic obstructive pulmonary disease) ([facility] [address] Diabetes mellitus ([facility] [address] Hyperlipidemia, Hypertension, Kidney stone, MRSA colonization, and Sleep apnea. who presents to the emergency department for evaluation of left knee pain. Patient states the previous day he was stepping out of his motor vehicle and injured his knee. He states that he is noticed swelling and pain with flexion of the knee. Denies loss of motor function or sensation. He denies hip pain or ankle pain. Denies a history of previous injury to the knee. States he tried over-the-counter medications without relief. He has not used compression or ice for his symptoms. Patient also reported after he left the room that he has been having a sore throat for the last day and has lesions on his tongue. Denies difficulties breathing or swallowing. Denies fevers nausea or vomiting. Denies cough or exposure to sick persons. HPI    NursingNotes were reviewed. REVIEW OF SYSTEMS    (2-9 systems for level 4, 10 or more for level 5)     Review of Systems   Constitutional: Negative for activity change, chills and fever. HENT: Positive for mouth sores and sore throat. Negative for congestion, rhinorrhea, trouble swallowing and voice change. Eyes: Negative for photophobia and visual disturbance.    Respiratory: Negative for cough, shortness of breath and wheezing. Cardiovascular: Negative for palpitations and leg swelling. Gastrointestinal: Negative for nausea and vomiting. Endocrine: Negative for polydipsia and polyuria. Genitourinary: Negative for difficulty urinating and frequency. Musculoskeletal: Positive for arthralgias. Negative for back pain, gait problem and myalgias. Skin: Negative for color change and rash. Neurological: Negative for light-headedness and headaches. Psychiatric/Behavioral: Negative for confusion. The patient is not nervous/anxious. All other systems reviewed and are negative. Except as noted above the remainder of the review of systems was reviewed and negative. PAST MEDICAL HISTORY     Past Medical History:   Diagnosis Date    CAD (coronary artery disease)     Chest pain 10/15/2017    [facility] [address]     COPD (chronic obstructive pulmonary disease) ([facility] [address] [facility]     Diabetes mellitus ([facility] [address]     not on meds    Hyperlipidemia     Hypertension     Kidney stone     MRSA colonization 4/16/19; 03/24/2019    Sleep apnea     He said he doesn't use a CPAP machine. SURGICALHISTORY       Past Surgical History:   Procedure Laterality Date    ABDOMEN SURGERY      CARDIAC SURGERY      3 STENTS PLACED     CHOLECYSTECTOMY      COLONOSCOPY      CORONARY ANGIOPLASTY WITH STENT PLACEMENT      3 stents     FRACTURE SURGERY      RIGHT ANKLE    LITHOTRIPSY      SHOULDER ARTHROSCOPY      VASCULAR SURGERY           CURRENT MEDICATIONS       Previous Medications    AMITRIPTYLINE (ELAVIL) 100 MG TABLET    Take 100 mg by mouth 2 times daily    ASPIRIN 81 MG TABLET    Take 81 mg by mouth daily    CYCLOBENZAPRINE (FLEXERIL) 10 MG TABLET    Take 1 tablet by mouth 3 times daily as needed for Muscle spasms    FLUOCINONIDE (LIDEX) 0.05 % CREAM    Apply topically 2 times daily.     HYDROCHLOROTHIAZIDE (HYDRODIURIL) 25 MG TABLET    Take 1 tablet by mouth daily    HYDROXYZINE (ATARAX) 25 MG TABLET Take 25 mg by mouth 3 times daily as needed for Itching    LISINOPRIL (PRINIVIL;ZESTRIL) 10 MG TABLET    Take 1 tablet by mouth daily    METFORMIN (GLUCOPHAGE) 500 MG TABLET    Take 1 tablet by mouth 2 times daily (with meals)    SERTRALINE (ZOLOFT) 100 MG TABLET    Take 100 mg by mouth daily    VITAMIN B-12 (CYANOCOBALAMIN) 500 MCG TABLET    Take 500 mcg by mouth daily    VITAMIN D (D3-1000) 1000 UNITS TABS TABLET    Take 1 tablet by mouth daily    ZOLPIDEM (AMBIEN) 10 MG TABLET    Take 10 mg by mouth nightly as needed. Naproxen    FAMILY HISTORY       Family History   Problem Relation Age of Onset    High Blood Pressure Mother     Cancer Father         lung cancer    Diabetes Brother     Liver Disease Sister     Diabetes Brother           SOCIAL HISTORY       Social History     Socioeconomic History    Marital status: Single     Spouse name: None    Number of children: 1    Years of education: 15    Highest education level: None   Occupational History    Occupation: retired   Social Needs    Financial resource strain: None    Food insecurity     Worry: None     Inability: None    Transportation needs     Medical: None     Non-medical: None   Tobacco Use    Smoking status: Former Smoker     Packs/day: 2.50     Years: 40.00     Pack years: 100.00     Types: Cigarettes    Smokeless tobacco: Never Used   Substance and Sexual Activity    Alcohol use: Yes     Frequency: Never     Comment: occ.      Drug use: Yes     Types: Cocaine    Sexual activity: Yes     Partners: Female   Lifestyle    Physical activity     Days per week: None     Minutes per session: None    Stress: None   Relationships    Social connections     Talks on phone: None     Gets together: None     Attends Scientologist service: None     Active member of club or organization: None     Attends meetings of clubs or organizations: None     Relationship status: None    Intimate partner violence     Fear of current or ex partner: None     Emotionally abused: None     Physically abused: None     Forced sexual activity: None   Other Topics Concern    None   Social History Narrative    None       SCREENINGS             PHYSICAL EXAM    (up to 7 for level 4, 8 or more for level 5)     ED Triage Vitals [10/29/20 0059]   BP Temp Temp Source Pulse Resp SpO2 Height Weight   (!) 201/116 98.3 °F (36.8 °C) Oral 95 16 96 % 5' 11\" (1.803 m) 253 lb 14.4 oz (115.2 kg)       Physical Exam  Vitals signs and nursing note reviewed. Constitutional:       General: He is not in acute distress. Appearance: He is well-developed. HENT:      Head: Normocephalic and atraumatic. Mouth/Throat:      Mouth: Mucous membranes are moist.      Pharynx: Oropharyngeal exudate and posterior oropharyngeal erythema present. Eyes:      Conjunctiva/sclera: Conjunctivae normal.   Neck:      Musculoskeletal: Normal range of motion. Trachea: No tracheal deviation. Cardiovascular:      Rate and Rhythm: Normal rate and regular rhythm. Pulmonary:      Effort: Pulmonary effort is normal.      Breath sounds: Normal breath sounds. No wheezing or rales. Abdominal:      General: There is no distension. Palpations: Abdomen is soft. Tenderness: There is no abdominal tenderness. There is no guarding or rebound. Musculoskeletal: Normal range of motion. General: Swelling, tenderness and signs of injury present. No deformity. Right lower leg: No edema. Left lower leg: No edema. Skin:     General: Skin is warm and dry. Capillary Refill: Capillary refill takes less than 2 seconds. Coloration: Skin is not pale. Findings: No bruising, erythema, lesion or rash. Neurological:      General: No focal deficit present. Mental Status: He is alert and oriented to person, place, and time. Sensory: No sensory deficit. Motor: No weakness.       Gait: Gait normal.      Deep Tendon Reflexes: Reflexes normal. do not improve after few days of symptomatic treatment he should to follow-up with orthopedics. Patient feels improved on reevaluation. Symptomatic treatment with expectant management discussed with the patient and the amenable to treatment plan and outpatient follow-up. Strict return precautions were discussed with the patient. They demonstrated understanding of when to return to the emergency department for new or worsening symptoms. .  The patient is agreeable with plan of care and disposition. REASSESSMENT          CRITICAL CARE TIME   Total Critical Care time was 0 minutes, excluding separatelyreportable procedures. There was a high probability ofclinically significant/life threatening deterioration in the patient's condition which required my urgent intervention. CONSULTS:  None    PROCEDURES:  Unless otherwise noted below, none     Procedures    FINAL IMPRESSION      1.  Acute pain of left knee          DISPOSITION/PLAN   DISPOSITION Discharge - Pending Orders Complete 10/29/2020 01:12:32 AM      PATIENT REFERREDTO:  MD [id number]  [address] [facility] [phone]    Schedule an appointment as soon as possible for a visit   As needed    [address]  [phone]  Schedule an appointment as soon as possible for a visit in 2 days  As needed, If symptoms worsen      DISCHARGEMEDICATIONS:  New Prescriptions    No medications on file          (Please note that portions of this note were completed with a voice recognition program.  Efforts were made to edit the dictations but occasionally words are mis-transcribed.)    Terese Montoya MD (electronically signed)  Attending Emergency Physician          Terese Montoya MD  10/29/20 [address], [place]  10/29/20 1668

## 2020-10-31 LAB — S PYO THROAT QL CULT: NORMAL

## 2020-11-01 ENCOUNTER — HOSPITAL ENCOUNTER (EMERGENCY)
Age: 65
Discharge: HOME | End: 2020-11-01
Attending: EMERGENCY MEDICINE
Payer: MEDICARE

## 2020-11-01 VITALS
BODY MASS INDEX: 35 KG/M2 | HEART RATE: 76 BPM | OXYGEN SATURATION: 97 % | WEIGHT: 250 LBS | DIASTOLIC BLOOD PRESSURE: 110 MMHG | HEIGHT: 71 IN | RESPIRATION RATE: 12 BRPM | TEMPERATURE: 98 F | SYSTOLIC BLOOD PRESSURE: 212 MMHG

## 2020-11-01 LAB — S PYO AG THROAT QL: NEGATIVE

## 2020-11-01 PROCEDURE — 99282 EMERGENCY DEPT VISIT SF MDM: CPT

## 2020-11-01 PROCEDURE — 87880 STREP A ASSAY W/OPTIC: CPT

## 2020-11-01 PROCEDURE — 87081 CULTURE SCREEN ONLY: CPT

## 2020-11-01 RX ORDER — CEPHALEXIN 500 MG/1
500 CAPSULE ORAL 4 TIMES DAILY
Qty: 40 CAPSULE | Refills: 0 | Status: SHIPPED | OUTPATIENT
Start: 2020-11-01 | End: 2020-11-10

## 2020-11-01 ASSESSMENT — PAIN DESCRIPTION - PAIN TYPE
TYPE: ACUTE PAIN
TYPE: ACUTE PAIN

## 2020-11-01 ASSESSMENT — PAIN SCALES - GENERAL
PAINLEVEL_OUTOF10: 10
PAINLEVEL_OUTOF10: 10

## 2020-11-01 ASSESSMENT — PAIN - FUNCTIONAL ASSESSMENT: PAIN_FUNCTIONAL_ASSESSMENT: 0-10

## 2020-11-01 ASSESSMENT — PAIN DESCRIPTION - LOCATION
LOCATION: MOUTH
LOCATION: MOUTH

## 2020-11-01 ASSESSMENT — PAIN DESCRIPTION - DESCRIPTORS: DESCRIPTORS: SORE

## 2020-11-01 NOTE — ED PROVIDER NOTES
eMERGENCY dEPARTMENT eNCOUnter      Pt Name: Severo Hillman  MRN: [id number]  [place] a 1955  Date of evaluation: 11/1/2020  Provider: Bonny Rodriguez MD     [address] [place] [address]       Chief Complaint   Patient presents with    Mouth Lesions     has white and red lesions on tongue, painful         HISTORY OF PRESENT ILLNESS   (Location/Symptom, Timing/Onset,Context/Setting, Quality, Duration, Modifying Factors, Severity) Note limiting factors. HPI    Severo Hillman is a 72 y.o. male who presents to the emergency department with a sore throat for couple days. Patient states he has white and red lesions on the tongue is very painful. Patient has been using hydrogen peroxide on it. Patient states difficult to eat and swallow. There is no bleeding. There is been no exposure. Patient denies a fever. Patient has no cough. No chest pain. No abdominal pain or diarrhea. Nursing Notes were reviewed. REVIEW OFSYSTEMS    (2+ for level 4; 10+ for level 5)   Review of Systems    General: No fevers, chills or night sweats, No weight loss    Head: Positive sore throat,  No Ear Pain    Chest:  Nontender. No Cough, No SOB,  Chest Pain    GI: No abdominal pain or vomiting    : No dysuria or hematuria    Musculoskeletal: No unrelenting pain or night pain    Neurologic: No bowel or bladder incontinence, No saddle anesthesia, No leg weakness    All other systems reviewed and are negative. PAST MEDICAL HISTORY     Past Medical History:   Diagnosis Date    CAD (coronary artery disease)     Chest pain 10/15/2017    Cocaine abuse ([address]     COPD (ch[facility] disease) ([facility] [address] [facility]     Diabetes mellitus ([facility] [address]     not on meds    Hyperlipidemia     Hypertension     Kidney stone     MRSA colonization 4/16/19; 03/24/2019    Sleep apnea     He said he doesn't use a CPAP machine.        SURGICAL HISTORY       Past Surgical History:   Procedure Laterality Date    ABDOMEN SURGERY      CARDIAC SURGERY 3 STENTS PLACED     CHOLECYSTECTOMY      COLONOSCOPY      CORONARY ANGIOPLASTY WITH STENT PLACEMENT      3 stents     FRACTURE SURGERY      RIGHT ANKLE    LITHOTRIPSY      SHOULDER ARTHROSCOPY      VASCULAR SURGERY         CURRENT MEDICATIONS       Discharge Medication List as of 11/1/2020 10:45 AM      CONTINUE these medications which have NOT CHANGED    Details   fluocinonide (LIDEX) 0.05 % cream Apply topically 2 times daily. , Disp-1 Tube,R-2, Print      hydroCHLOROthiazide (HYDRODIURIL) 25 MG tablet Take 1 tablet by mouth daily, Disp-30 tablet,R-3Print      lisinopril (PRINIVIL;ZESTRIL) 10 MG tablet Take 1 tablet by mouth daily, Disp-30 tablet,R-3Print      metFORMIN (GLUCOPHAGE) 500 MG tablet Take 1 tablet by mouth 2 times daily (with meals), Disp-180 tablet,R-1Normal      amitriptyline (ELAVIL) 100 MG tablet Take 100 mg by mouth 2 times dailyHistorical Med      sertraline (ZOLOFT) 100 MG tablet Take 100 mg by mouth dailyHistorical Med      hydrOXYzine (ATARAX) 25 MG tablet Take 25 mg by mouth 3 times daily as needed for ItchingHistorical Med      vitamin B-12 (CYANOCOBALAMIN) 500 MCG tablet Take 500 mcg by mouth dailyHistorical Med      zolpidem (AMBIEN) 10 MG tablet Take 10 mg by mouth nightly as needed.  Historical Med      vitamin D (D3-1000) 1000 UNITS TABS tablet Take 1 tablet by mouth daily, Disp-90 tablet, R-3Normal      aspirin 81 MG tablet Take 81 mg by mouth dailyHistorical Med             ALLERGIES     Naproxen    FAMILY HISTORY       Family History   Problem Relation Age of Onset    High Blood Pressure Mother     Cancer Father         lung cancer    Diabetes Brother     Liver Disease Sister     Diabetes Brother         SOCIAL HISTORY       Social History     Socioeconomic History    Marital status: Single     Spouse name: None    Number of children: 1    Years of education: 15    Highest education level: None   Occupational History    Occupation: retired   Social Needs    Financial resource strain: None    Food insecurity     Worry: None     Inability: None    Transportation needs     Medical: None     Non-medical: None   Tobacco Use    Smoking status: Former Smoker     Packs/day: 2.50     Years: 40.00     Pack years: 100.00     Types: Cigarettes    Smokeless tobacco: Never Used   Substance and Sexual Activity    Alcohol use: Yes     Frequency: Never     Comment: occ.  Drug use: Not Currently     Types: Cocaine    Sexual activity: Yes     Partners: Female   Lifestyle    Physical activity     Days per week: None     Minutes per session: None    Stress: None   Relationships    Social connections     Talks on phone: None     Gets together: None     Attends Sabianist service: None     Active member of club or organization: None     Attends meetings of clubs or organizations: None     Relationship status: None    Intimate partner violence     Fear of current or ex partner: None     Emotionally abused: None     Physically abused: None     Forced sexual activity: None   Other Topics Concern    None   Social History Narrative    None       SCREENINGS           PHYSICAL EXAM    (up to 7 for level 4, 8 or more for level 5)     ED Triage Vitals   BP Temp Temp src Pulse Resp SpO2 Height Weight   -- -- -- -- -- -- -- --       Physical Exam    General: Alert and awake ×3. Nontoxic appearance. Well-developed well-nourished 55-year-old in no acute distress  HEENT: Normocephalic atraumatic. Neck is supple. Airway intact. No adenopathy. His tongue was large with some mild papular that looks inflamed no drainage. There is no evidence of plaques or yeast infection. There is no evidence of thrush. Uvula is midline. No abscess appreciated  Cardiac: Regular rate and rhythm with no murmurs rubs or gallops  Pulmonary: Lungs are clear in all lung fields. No wheezing. No Rales. Abdomen: Soft and nontender. Negative hepatosplenomegaly.   Bowel sounds are active  Extremities: Moving all extremities. No calf tenderness. Peripheral pulses all intact  Skin: No skin lesions. No rashes  Neurologic: Cranial nerves II through XII was grossly intact. Nonfocal neurological exam  Psychiatric: Patient is pleasant. Mood is appropriate. DIAGNOSTIC RESULTS     EKG (Per Emergency Physician):       RADIOLOGY (Per Emergency Physician): Interpretation per the Radiologist below, if available at the time of this note:  No results found. ED BEDSIDE ULTRASOUND:   Performed by ED Physician - none    LABS:  Labs Reviewed   STREP SCREEN GROUP A THROAT    Narrative:     Performed at:  [facility]) [facility]  [address] [place] [facility]   Phone [phone]   CULTURE, BETA STREP CONFIRM PLATES        All other labs were within normal range or not returned as of this dictation. Procedures      EMERGENCY DEPARTMENT COURSE and DIFFERENTIAL DIAGNOSIS/MDM:   Vitals:    Vitals:    11/01/20 1033   BP: (!) 212/110   Pulse: 76   Resp: 12   Temp: 98 °F (36.7 °C)   TempSrc: Oral   SpO2: 97%   Weight: 250 lb (113.4 kg)   Height: 5' 11\" (1.803 m)       Medications - No data to display    MDM. This is a 77-year-old with painful tongue blisters and sore throat. Rapid strep was negative. Cultures are pending. Patient will be placed on antibiotics. His blood pressure was elevated. Does have history. Patient will follow-up with family physician. Return if worse. I do not think we will need any further treatment. Recommend getting some viscous lidocaine or Magic mouthwash. Those can be done as out patient and over-the-counter's. Patient discharged in good condition. REVAL:         CRITICAL CARE TIME   Total CriticalCare time was 0 minutes, excluding separately reportable procedures. There was a high probability of clinically significant/life threatening deterioration in the patient's condition which required my urgent intervention. CONSULTS:  None    PROCEDURES:  Unless otherwise noted below, none     [unfilled]    FINAL IMPRESSION      1. Acute pharyngitis, unspecified etiology          DISPOSITION/PLAN   DISPOSITION        PATIENT REFERRED TO:  Josh Worley MD  [id number] [facility] [place]aud[place] [phone]    Schedule an appointment as soon as possible for a visit in 1 week  If symptoms worsen      DISCHARGE MEDICATIONS:  Discharge Medication List as of 11/1/2020 10:45 AM      START taking these medications    Details   cephALEXin (KEFLEX) 500 MG capsule Take 1 capsule by mouth 4 times daily for 10 days, Disp-40 capsule,R-0Print                (Please note:  Portions of this note were completed with a voice recognition program.Efforts were made to edit the dictations but occasionally words and phrases are mis-transcribed.)  Form v2016. J.5-cn    RASHAWN DIXON MD (electronically signed)  Emergency Medicine Provider        Bebo Reyna MD  11/01/20 2041

## 2020-11-03 PROBLEM — I25.10 CAD IN NATIVE ARTERY: Status: RESOLVED | Noted: 2020-11-03 | Resolved: 2020-11-03

## 2020-11-03 PROBLEM — I10 HYPERTENSION: Status: RESOLVED | Noted: 2017-05-24 | Resolved: 2020-11-03

## 2020-11-03 PROBLEM — I10 HTN (HYPERTENSION): Status: RESOLVED | Noted: 2019-01-19 | Resolved: 2020-11-03

## 2020-11-03 LAB — S PYO THROAT QL CULT: NORMAL

## 2020-11-10 ENCOUNTER — APPOINTMENT (OUTPATIENT)
Dept: GENERAL RADIOLOGY | Age: 65
End: 2020-11-10
Payer: MEDICARE

## 2020-11-10 ENCOUNTER — HOSPITAL ENCOUNTER (EMERGENCY)
Age: 65
Discharge: HOME | End: 2020-11-11
Attending: EMERGENCY MEDICINE
Payer: MEDICARE

## 2020-11-10 LAB
BASOPHILS ABSOLUTE: 0 K/UL (ref 0–0.2)
BASOPHILS RELATIVE PERCENT: 0.8 %
EOSINOPHILS ABSOLUTE: 0.2 K/UL (ref 0–0.6)
EOSINOPHILS RELATIVE PERCENT: 2.6 %
HCT VFR BLD CALC: 37.5 % (ref 40.5–52.5)
HEMOGLOBIN: 12.7 G/DL (ref 13.5–17.5)
LYMPHOCYTES ABSOLUTE: 2.1 K/UL (ref 1–5.1)
LYMPHOCYTES RELATIVE PERCENT: 33.7 %
MCH RBC QN AUTO: 29.9 PG (ref 26–34)
MCHC RBC AUTO-ENTMCNC: 34 G/DL (ref 31–36)
MCV RBC AUTO: 87.9 FL (ref 80–100)
MONOCYTES ABSOLUTE: 0.5 K/UL (ref 0–1.3)
MONOCYTES RELATIVE PERCENT: 8.1 %
NEUTROPHILS ABSOLUTE: 3.4 K/UL (ref 1.7–7.7)
NEUTROPHILS RELATIVE PERCENT: 54.8 %
PDW BLD-RTO: 13.7 % (ref 12.4–15.4)
PLATELET # BLD: 227 K/UL (ref 135–450)
PMV BLD AUTO: 7.8 FL (ref 5–10.5)
RBC # BLD: 4.27 M/UL (ref 4.2–5.9)
WBC # BLD: 6.2 K/UL (ref 4–11)

## 2020-11-10 PROCEDURE — 80053 COMPREHEN METABOLIC PANEL: CPT

## 2020-11-10 PROCEDURE — 83735 ASSAY OF MAGNESIUM: CPT

## 2020-11-10 PROCEDURE — 85025 COMPLETE CBC W/AUTO DIFF WBC: CPT

## 2020-11-10 PROCEDURE — 93005 ELECTROCARDIOGRAM TRACING: CPT | Performed by: EMERGENCY MEDICINE

## 2020-11-10 PROCEDURE — 99284 EMERGENCY DEPT VISIT MOD MDM: CPT

## 2020-11-10 PROCEDURE — 83880 ASSAY OF NATRIURETIC PEPTIDE: CPT

## 2020-11-10 PROCEDURE — 36415 COLL VENOUS BLD VENIPUNCTURE: CPT

## 2020-11-10 PROCEDURE — 84484 ASSAY OF TROPONIN QUANT: CPT

## 2020-11-10 PROCEDURE — 6370000000 HC RX 637 (ALT 250 FOR IP): Performed by: EMERGENCY MEDICINE

## 2020-11-10 PROCEDURE — 94640 AIRWAY INHALATION TREATMENT: CPT

## 2020-11-10 PROCEDURE — 71046 X-RAY EXAM CHEST 2 VIEWS: CPT

## 2020-11-10 RX ORDER — IPRATROPIUM BROMIDE AND ALBUTEROL SULFATE 2.5; .5 MG/3ML; MG/3ML
1 SOLUTION RESPIRATORY (INHALATION) ONCE
Status: COMPLETED | OUTPATIENT
Start: 2020-11-10 | End: 2020-11-10

## 2020-11-10 RX ORDER — PREDNISONE 20 MG/1
60 TABLET ORAL ONCE
Status: COMPLETED | OUTPATIENT
Start: 2020-11-10 | End: 2020-11-10

## 2020-11-10 RX ADMIN — PREDNISONE 60 MG: 20 TABLET ORAL at 23:29

## 2020-11-10 RX ADMIN — IPRATROPIUM BROMIDE AND ALBUTEROL SULFATE 1 AMPULE: .5; 3 SOLUTION RESPIRATORY (INHALATION) at 23:29

## 2020-11-11 VITALS
RESPIRATION RATE: 20 BRPM | WEIGHT: 246.47 LBS | TEMPERATURE: 97.8 F | HEIGHT: 71 IN | SYSTOLIC BLOOD PRESSURE: 167 MMHG | HEART RATE: 83 BPM | DIASTOLIC BLOOD PRESSURE: 101 MMHG | OXYGEN SATURATION: 98 % | BODY MASS INDEX: 34.51 KG/M2

## 2020-11-11 LAB
A/G RATIO: 1.6 (ref 1.1–2.2)
ALBUMIN SERPL-MCNC: 4.1 G/DL (ref 3.4–5)
ALP BLD-CCNC: 171 U/L (ref 40–129)
ALT SERPL-CCNC: 38 U/L (ref 10–40)
ANION GAP SERPL CALCULATED.3IONS-SCNC: 15 MMOL/L (ref 3–16)
AST SERPL-CCNC: 23 U/L (ref 15–37)
BILIRUB SERPL-MCNC: <0.2 MG/DL (ref 0–1)
BUN BLDV-MCNC: 19 MG/DL (ref 7–20)
CALCIUM SERPL-MCNC: 8.6 MG/DL (ref 8.3–10.6)
CHLORIDE BLD-SCNC: 101 MMOL/L (ref 99–110)
CO2: 21 MMOL/L (ref 21–32)
CREAT SERPL-MCNC: 0.8 MG/DL (ref 0.8–1.3)
EKG ATRIAL RATE: 97 BPM
EKG DIAGNOSIS: NORMAL
EKG P AXIS: 44 DEGREES
EKG P-R INTERVAL: 194 MS
EKG Q-T INTERVAL: 372 MS
EKG QRS DURATION: 102 MS
EKG QTC CALCULATION (BAZETT): 472 MS
EKG R AXIS: 65 DEGREES
EKG T AXIS: 43 DEGREES
EKG VENTRICULAR RATE: 97 BPM
GFR AFRICAN AMERICAN: >60
GFR NON-AFRICAN AMERICAN: >60
GLOBULIN: 2.6 G/DL
GLUCOSE BLD-MCNC: 209 MG/DL (ref 70–99)
MAGNESIUM: 1.2 MG/DL (ref 1.8–2.4)
POTASSIUM REFLEX MAGNESIUM: 3.6 MMOL/L (ref 3.5–5.1)
PRO-BNP: 116 PG/ML (ref 0–124)
SODIUM BLD-SCNC: 137 MMOL/L (ref 136–145)
TOTAL PROTEIN: 6.7 G/DL (ref 6.4–8.2)
TROPONIN: <0.01 NG/ML

## 2020-11-11 PROCEDURE — 93010 ELECTROCARDIOGRAM REPORT: CPT | Performed by: INTERNAL MEDICINE

## 2020-11-11 PROCEDURE — 6370000000 HC RX 637 (ALT 250 FOR IP): Performed by: EMERGENCY MEDICINE

## 2020-11-11 RX ORDER — LANOLIN ALCOHOL/MO/W.PET/CERES
400 CREAM (GRAM) TOPICAL DAILY
Qty: 14 TABLET | Refills: 0 | Status: SHIPPED | OUTPATIENT
Start: 2020-11-11 | End: 2021-04-27

## 2020-11-11 RX ORDER — LANOLIN ALCOHOL/MO/W.PET/CERES
800 CREAM (GRAM) TOPICAL DAILY
Status: DISCONTINUED | OUTPATIENT
Start: 2020-11-11 | End: 2020-11-11 | Stop reason: HOSPADM

## 2020-11-11 RX ADMIN — Medication 800 MG: at 01:09

## 2020-11-11 NOTE — ED PROVIDER NOTES
[address] [facility]  [facility]      Pt Name: Pattie Dupont  MRN: [id number]  [place] 1955  Date of evaluation: 11/10/2020  Provider: Laura Franz MD    CHIEF COMPLAINT       Chief Complaint   Patient presents with    Shortness of Breath     sat 100%, lungs clear states anxiety med \"isn't working\"         HISTORY OF PRESENT ILLNESS   (Location/Symptom, Timing/Onset,Context/Setting, Quality, Duration, Modifying Factors, Severity)  Note limiting factors. Pattie Dupont is a 72 y.o. male who presents to the emergency department for shortness of breath which the patient believes is due to anxiety. He states that it happens intermittently. He denies any chest pain. No shortness of breath fever or cough. He also reports tremors. He was on anxiety medication but feels like it is not helping. He has a history of asthma but has not been taking his medications for asthma. He states that he does not usually have any problems with his breathing where he requires his inhaler. Patient states that he is not currently taking his Metformin because of concerns for carcinogen in his medications. He is not followed up with his primary care provider. Nursing notes were reviewed. REVIEW OF SYSTEMS    (2-9 systems for level 4, 10 or more for level 5)     Review of Systems    Positive and pertinent negative as per HPI. Except as noted above in the ROS, all other systems were reviewed and were negative. PAST MEDICAL HISTORY     Past Medical History:   Diagnosis Date    CAD (coronary artery disease)     Chest pain 10/15/2017    Cocaine abuse ([address]     COPD (chronic obstructive pulmonary disease) ([address]     Diabetes mellitus [facility] [address]     not on meds    Hyperlipidemia     Hypertension     Kidney stone     MRSA colonization 4/16/19; 03/24/2019    Sleep apnea     He said he doesn't use a CPAP machine.          SURGICALHISTORY       Past Surgical History: Procedure Laterality Date    ABDOMEN SURGERY      CARDIAC SURGERY      3 STENTS PLACED     CHOLECYSTECTOMY      COLONOSCOPY      CORONARY ANGIOPLASTY WITH STENT PLACEMENT      3 stents     FRACTURE SURGERY      RIGHT ANKLE    LITHOTRIPSY      SHOULDER ARTHROSCOPY      VASCULAR SURGERY           CURRENT MEDICATIONS       Previous Medications    AMITRIPTYLINE (ELAVIL) 100 MG TABLET    Take 100 mg by mouth 2 times daily    ASPIRIN 81 MG TABLET    Take 81 mg by mouth daily    HYDROCHLOROTHIAZIDE (HYDRODIURIL) 25 MG TABLET    Take 1 tablet by mouth daily    HYDROXYZINE (ATARAX) 25 MG TABLET    Take 25 mg by mouth 3 times daily as needed for Itching    LISINOPRIL (PRINIVIL;ZESTRIL) 10 MG TABLET    Take 1 tablet by mouth daily    SERTRALINE (ZOLOFT) 100 MG TABLET    Take 100 mg by mouth daily STATES THAT THIS MEDICATION IS NOT RELIEVING HIS ANXIETY    VITAMIN B-12 (CYANOCOBALAMIN) 500 MCG TABLET    Take 500 mcg by mouth daily    VITAMIN D (D3-1000) 1000 UNITS TABS TABLET    Take 1 tablet by mouth daily    ZOLPIDEM (AMBIEN) 10 MG TABLET    Take 10 mg by mouth nightly as needed.  OUT OF THIS MED 11/10/12       ALLERGIES     Naproxen    FAMILY HISTORY       Family History   Problem Relation Age of Onset    High Blood Pressure Mother     Cancer Father         lung cancer    Diabetes Brother     Liver Disease Sister     Diabetes Brother           SOCIAL HISTORY       Social History     Socioeconomic History    Marital status: Single     Spouse name: None    Number of children: 1    Years of education: 15    Highest education level: None   Occupational History    Occupation: retired   Social Needs    Financial resource strain: None    Food insecurity     Worry: None     Inability: None    Transportation needs     Medical: None     Non-medical: None   Tobacco Use    Smoking status: Former Smoker     Packs/day: 2.50     Years: 40.00     Pack years: 100.00     Types: Cigarettes    Smokeless tobacco: Never Used   Substance and Sexual Activity    Alcohol use: Yes     Frequency: Never     Comment: occ.  Drug use: Not Currently     Types: Cocaine    Sexual activity: Yes     Partners: Female   Lifestyle    Physical activity     Days per week: None     Minutes per session: None    Stress: None   Relationships    Social connections     Talks on phone: None     Gets together: None     Attends Orthodoxy service: None     Active member of club or organization: None     Attends meetings of clubs or organizations: None     Relationship status: None    Intimate partner violence     Fear of current or ex partner: None     Emotionally abused: None     Physically abused: None     Forced sexual activity: None   Other Topics Concern    None   Social History Narrative    None       SCREENINGS             PHYSICAL EXAM    (up to 7 for level 4, 8 or more for level 5)     ED Triage Vitals   BP Temp Temp Source Pulse Resp SpO2 Height Weight   11/10/20 2219 11/10/20 2219 11/10/20 2219 11/10/20 2219 11/11/20 0003 11/10/20 2219 11/10/20 2219 11/10/20 2219   (!) 206/126 97.8 °F (36.6 °C) Infrared 100 20 99 % 5' 11\" (1.803 m) 246 lb 7.6 oz (111.8 kg)       Physical Exam  Vitals signs and nursing note reviewed. Constitutional:       Appearance: Normal appearance. He is well-developed. He is not ill-appearing. HENT:      Head: Normocephalic and atraumatic. Right Ear: External ear normal.      Left Ear: External ear normal.      Nose: Nose normal.   Eyes:      General: No scleral icterus. Right eye: No discharge. Left eye: No discharge. Conjunctiva/sclera: Conjunctivae normal.   Neck:      Musculoskeletal: Neck supple. Cardiovascular:      Rate and Rhythm: Normal rate and regular rhythm. Heart sounds: Normal heart sounds. Pulmonary:      Effort: Pulmonary effort is normal. No respiratory distress. Breath sounds: Decreased breath sounds and wheezing present. No rales.    Abdominal:      General: Bowel sounds are normal. There is no distension. Palpations: Abdomen is soft. Tenderness: There is no abdominal tenderness. There is no guarding or rebound. Skin:     Coloration: Skin is not pale. Neurological:      Mental Status: He is alert. Psychiatric:         Mood and Affect: Mood normal.         Behavior: Behavior normal.             DIAGNOSTIC RESULTS     EKG: All EKG's are interpreted by the Emergency Department Physician who either signs or Co-signs this chart in the absence of a cardiologist.    12 lead EKG shows normal sinus rhythm at a rate of 97 bpm comparing to both QRS QTC normal.  Normal axis. No acute ischemic findings. Improve heart rate when compared to October 2020. RADIOLOGY:   Non-plain film images such as CT, Ultrasound and MRI are read by the radiologist. Plain radiographic images are visualized and preliminarily interpreted by the emergency physician with the below findings:    Interpretation per the Radiologist below, if available at the time of this note:    XR CHEST (2 VW)   Final Result   No radiographic evidence of acute cardiopulmonary disease.                ED BEDSIDE ULTRASOUND:   Performed by ED Physician - none    LABS:  Labs Reviewed   CBC WITH AUTO DIFFERENTIAL - Abnormal; Notable for the following components:       Result Value    Hemoglobin 12.7 (*)     Hematocrit 37.5 (*)     All other components within normal limits    Narrative:     Performed at:  [facility]  [address] [facility]   Phone [phone]   COMPREHENSIVE METABOLIC PANEL W/ REFLEX TO MG FOR LOW K - Abnormal; Notable for the following components:    Glucose 209 (*)     Alkaline Phosphatase 171 (*)     All other components within normal limits    Narrative:     Performed at:  [facility]  [address] [facility]   Phone [phone]   MAGNESIUM - Abnormal; Notable for currently taking Metformin and he is encouraged to follow-up with his primary care provider. When considering the risk versus benefits of steroids I decided to not treat the patient with steroids because he is diabetic and is not currently taking his medicine. Patient has hypertension and he states that he did take his antihypertensives. His blood pressure is trending down. I recommend close follow-up in the primary care setting with his primary care provider I also encouraged follow-up with his psychiatrist to assess the need for medication adjustments for his anxiety. CRITICAL CARE TIME   None       CONSULTS:  None    PROCEDURES:  Unless otherwise noted above, none     Procedures    FINAL IMPRESSION      1. Exacerbation of asthma, unspecified asthma severity, unspecified whether persistent    2.  Hypomagnesemia          DISPOSITION/PLAN   DISPOSITION Decision To Discharge 11/11/2020 12:59:37 AM      PATIENT REFERREDTO:  MD Bart [address] [facility]  [phone]    Schedule an appointment as soon as possible for a visit         DISCHARGEMEDICATIONS:  New Prescriptions    MAGNESIUM OXIDE (MAG-OX) 400 (240 MG) MG TABLET    Take 1 tablet by mouth daily for 14 days          (Please note that portions of this note were completed with a voice recognition program.  Efforts were made to edit the dictations but occasionally words are mis-transcribed.)    Jose Erwin MD (electronically signed)  Attending Emergency Physician        Jose Erwin MD  11/11/20 1468

## 2020-11-11 NOTE — ED NOTES
Ambulates to room #1 with the  complaint of feeling more SOB than unusual  Lungs sound clear with not crackles or wheezing  Denies cough or fever at home. States that he stopped taking some of his medications for a variety of reasons:metformin due to a class action law suit  And others as \"they didn't work\". No obvious dyspnea noted and pt is quite talkative. Skin w/d color good and resp are easy and unlabored.     Also asks what his swabs results were when he was here before (neg)                                                                                                                                        Luly Marcus RN  11/10/20 9409

## 2020-11-11 NOTE — ED NOTES
HHN given  EKG done and noted   Bloods drawn and walked to lab  Laughing and talking with staff     Vee Weaver RN  11/10/20 1431

## 2020-11-11 NOTE — ED NOTES
Reviewed instructions and encouraged pt to follow up with PMD-  And perhaps consider seeing psych (anxiety).   Has been very pleasant during this stay     Arlene Martin RN  11/11/20 2666

## 2020-12-11 ENCOUNTER — HOSPITAL ENCOUNTER (EMERGENCY)
Age: 65
Discharge: HOME | End: 2020-12-11
Attending: EMERGENCY MEDICINE
Payer: MEDICARE

## 2020-12-11 VITALS
HEIGHT: 71 IN | WEIGHT: 249.34 LBS | HEART RATE: 106 BPM | SYSTOLIC BLOOD PRESSURE: 202 MMHG | BODY MASS INDEX: 34.91 KG/M2 | TEMPERATURE: 97.7 F | RESPIRATION RATE: 22 BRPM | OXYGEN SATURATION: 97 % | DIASTOLIC BLOOD PRESSURE: 102 MMHG

## 2020-12-11 PROCEDURE — 6370000000 HC RX 637 (ALT 250 FOR IP): Performed by: EMERGENCY MEDICINE

## 2020-12-11 PROCEDURE — 99283 EMERGENCY DEPT VISIT LOW MDM: CPT

## 2020-12-11 RX ORDER — LORAZEPAM 1 MG/1
1 TABLET ORAL ONCE
Status: COMPLETED | OUTPATIENT
Start: 2020-12-11 | End: 2020-12-11

## 2020-12-11 RX ADMIN — LORAZEPAM 1 MG: 1 TABLET ORAL at 19:45

## 2020-12-12 PROCEDURE — 99283 EMERGENCY DEPT VISIT LOW MDM: CPT

## 2020-12-12 NOTE — ED NOTES
Discharge instructions reviewed with pt and pt denied having any questions. Discharge paperwork signed and pt discharged.         Aloha Dubin, RN  12/11/20 2003

## 2020-12-12 NOTE — ED PROVIDER NOTES
eMERGENCY dEPARTMENT eNCOUnter      So[facility]es    Chief Complaint   Patient presents with    Anxiety     States that he gets \"really bad\" anxiety about once a month. Takes sertaline for anxiety and states that it doesn't help       HPI    Yeyo Alcantar is a 72 y.o. male who presents with the complaint of anxiety. He states that this is been a chronic problem for him in the it has been getting worse over the past month. He does not claim any pain or shortness of breath. He states that he is been taking a medication given to him by his primary care physician but it is not working. He normally comes up. \"Gets a shot\" which helps him to feel better. The last time he was seen here, he was possibly getting an appointment with his primary care physician which he did. He was also given a month supply of Ativan at that time. There are no exacerbating or relieving factors no other associated signs or symptoms. PAST MEDICAL HISTORY    Past Medical History:   Diagnosis Date    CAD (coronary artery disease)     Chest pain 10/15/2017    Cocaine abuse ([facility] [address]     COPD (chronic obstructive pulmonary disease) ([facility]     Diabetes mellitus ([facility] [address] [facility]     not on meds    Hyperlipidemia     Hypertension     Kidney stone     MRSA colonization 4/16/19; 03/24/2019    Sleep apnea     He said he doesn't use a CPAP machine.        SURGICAL HISTORY    Past Surgical History:   Procedure Laterality Date    ABDOMEN SURGERY      CARDIAC SURGERY      3 STENTS PLACED     CHOLECYSTECTOMY      COLONOSCOPY      CORONARY ANGIOPLASTY WITH STENT PLACEMENT      3 stents     FRACTURE SURGERY      RIGHT ANKLE    LITHOTRIPSY      SHOULDER ARTHROSCOPY      VASCULAR SURGERY         CURRENT MEDICATIONS    Current Outpatient Rx   Medication Sig Dispense Refill    magnesium oxide (MAG-OX) 400 (240 Mg) MG tablet Take 1 tablet by mouth daily for 14 days 14 tablet 0    hydroCHLOROthiazide (HYDRODIURIL) 25 MG tablet Take 1 tablet by mouth daily 30 tablet 3    lisinopril (PRINIVIL;ZESTRIL) 10 MG tablet Take 1 tablet by mouth daily 30 tablet 3    amitriptyline (ELAVIL) 100 MG tablet Take 100 mg by mouth 2 times daily      sertraline (ZOLOFT) 100 MG tablet Take 100 mg by mouth daily STATES THAT THIS MEDICATION IS NOT RELIEVING HIS ANXIETY      hydrOXYzine (ATARAX) 25 MG tablet Take 25 mg by mouth 3 times daily as needed for Itching      vitamin B-12 (CYANOCOBALAMIN) 500 MCG tablet Take 500 mcg by mouth daily      zolpidem (AMBIEN) 10 MG tablet Take 10 mg by mouth nightly as needed. OUT OF THIS MED 11/10/12      vitamin D (D3-1000) 1000 UNITS TABS tablet Take 1 tablet by mouth daily 90 tablet 3    aspirin 81 MG tablet Take 81 mg by mouth daily         ALLERGIES    Allergies   Allergen Reactions    Naproxen Hives       FAMILY HISTORY    Family History   Problem Relation Age of Onset    High Blood Pressure Mother     Cancer Father         lung cancer    Diabetes Brother     Liver Disease Sister     Diabetes Brother        SOCIAL HISTORY    Social History     Socioeconomic History    Marital status: Single     Spouse name: None    Number of children: 1    Years of education: 15    Highest education level: None   Occupational History    Occupation: retired   Social Needs    Financial resource strain: None    Food insecurity     Worry: None     Inability: None    Transportation needs     Medical: None     Non-medical: None   Tobacco Use    Smoking status: Former Smoker     Packs/day: 2.50     Years: 40.00     Pack years: 100.00     Types: Cigarettes    Smokeless tobacco: Never Used   Substance and Sexual Activity    Alcohol use: Yes     Frequency: Never     Comment: occ.      Drug use: Not Currently     Types: Cocaine    Sexual activity: Yes     Partners: Female   Lifestyle    Physical activity     Days per week: None     Minutes per session: None    Stress: None   Relationships    Social connections     Talks on phone: None Gets together: None     Attends Church service: None     Active member of club or organization: None     Attends meetings of clubs or organizations: None     Relationship status: None    Intimate partner violence     Fear of current or ex partner: None     Emotionally abused: None     Physically abused: None     Forced sexual activity: None   Other Topics Concern    None   Social History Narrative    None       REVIEW OF SYSTEMS    Constitutional:  Denies fever, chills, weight loss or weakness   Eyes:  Denies photophobia or discharge   HENT:  Denies sore throat or ear pain   Respiratory:  Denies cough or shortness of breath   Cardiovascular:  Denies chest pain, palpitations or swelling   GI:  Denies abdominal pain, nausea, vomiting, or diarrhea   Musculoskeletal:  Denies back pain   Skin:  Denies rash   Neurologic:  Denies headache, focal weakness or sensory changes   Endocrine:  Denies polyuria or polydypsia   Lymphatic:  Denies swollen glands   Psychiatric:  Denies depression, suicidal ideation or homicidal ideation but he feels anxious  All systems negative except as marked. PHYSICAL EXAM    VITAL SIGNS: BP (!) 202/102 Comment: Pt states he has high blood pressure  Pulse 106   Temp 97.7 °F (36.5 °C) (Oral)   Resp 22   Ht 5' 11\" (1.803 m)   Wt 249 lb 5.4 oz (113.1 kg)   SpO2 97%   BMI 34.78 kg/m²    Constitutional:  Well developed, Well nourished, No acute distress, Non-toxic appearance. HENT:  Normocephalic, Atraumatic, Bilateral external ears normal, Oropharynx moist, No oral exudates, Nose normal. Neck- Normal range of motion, No tenderness, Supple, No stridor. Eyes:  PERRL, EOMI, Conjunctiva normal, No discharge. Respiratory:  Normal breath sounds, No respiratory distress, No wheezing, No chest tenderness. Cardiovascular:  Normal heart rate, Normal rhythm, No murmurs, No rubs, No gallops. GI:  Bowel sounds normal, Soft, No tenderness, No masses, No pulsatile masses.   Protuberant abdomen     Musculoskeletal:  Intact distal pulses, No edema, No tenderness, No cyanosis, No clubbing. Good range of motion in all major joints. No tenderness to palpation or major deformities noted. Back- No tenderness. Integument:  Warm, Dry, No erythema, No rash. Lymphatic:  No lymphadenopathy noted. Neurologic:  Alert & oriented x 3, Normal motor function, Normal sensory function, No focal deficits noted. Psychiatric:  Affect normal, Judgment normal, Mood normal.     EKG        RADIOLOGY        PROCEDURES        ED COURSE & MEDICAL DECISION MAKING    Pertinent Labs & Imaging studies reviewed. (See chart for details)  This patient was given 1 tablet of Ativan here in the emergency department. I will not be giving him a prescription for Ativan. He can follow-up with primary care about this since it is a controlled substance. He was taking Zoloft before but states this does not work well for him. He is not having any chest pain or shortness of breath lightheadedness or dizziness. He is hypertensive but he has a history of hypertension. He has no sign of endorgan damage on physical examination or by history. I do not think this is acute coronary syndrome, CVA, pulmonary embolism. I think he can be discharged home and follow-up with primary care    FINAL IMPRESSION    1.  Anxiety state             Neel Gillis MD  12/11/20 1950

## 2020-12-13 ENCOUNTER — HOSPITAL ENCOUNTER (EMERGENCY)
Age: 65
Discharge: HOME | End: 2020-12-13
Attending: EMERGENCY MEDICINE
Payer: MEDICARE

## 2020-12-13 VITALS
HEART RATE: 88 BPM | BODY MASS INDEX: 34.97 KG/M2 | WEIGHT: 249.78 LBS | HEIGHT: 71 IN | DIASTOLIC BLOOD PRESSURE: 110 MMHG | TEMPERATURE: 98 F | RESPIRATION RATE: 16 BRPM | SYSTOLIC BLOOD PRESSURE: 205 MMHG | OXYGEN SATURATION: 97 %

## 2020-12-13 PROCEDURE — 6370000000 HC RX 637 (ALT 250 FOR IP): Performed by: EMERGENCY MEDICINE

## 2020-12-13 RX ORDER — HYDROXYZINE HYDROCHLORIDE 25 MG/1
25 TABLET, FILM COATED ORAL EVERY 6 HOURS PRN
Qty: 20 TABLET | Refills: 0 | Status: SHIPPED | OUTPATIENT
Start: 2020-12-13 | End: 2020-12-23

## 2020-12-13 RX ORDER — HYDROXYZINE PAMOATE 25 MG/1
25 CAPSULE ORAL ONCE
Status: COMPLETED | OUTPATIENT
Start: 2020-12-13 | End: 2020-12-13

## 2020-12-13 RX ADMIN — HYDROXYZINE PAMOATE 25 MG: 25 CAPSULE ORAL at 01:10

## 2020-12-13 ASSESSMENT — PAIN DESCRIPTION - ONSET
ONSET: ON-GOING
ONSET: ON-GOING

## 2020-12-13 ASSESSMENT — PAIN DESCRIPTION - DESCRIPTORS
DESCRIPTORS: DISCOMFORT
DESCRIPTORS: DISCOMFORT

## 2020-12-13 ASSESSMENT — ENCOUNTER SYMPTOMS
COUGH: 0
EYES NEGATIVE: 1
ABDOMINAL PAIN: 0

## 2020-12-13 ASSESSMENT — PAIN DESCRIPTION - PAIN TYPE
TYPE: REFERRED PAIN
TYPE: CHRONIC PAIN;REFERRED PAIN

## 2020-12-13 ASSESSMENT — PAIN SCALES - GENERAL
PAINLEVEL_OUTOF10: 5
PAINLEVEL_OUTOF10: 5

## 2020-12-13 ASSESSMENT — PAIN DESCRIPTION - LOCATION: LOCATION: CHEST

## 2020-12-13 NOTE — ED PROVIDER NOTES
[address] [facility] ENCOUNTER      Pt Name: Velinda Leyden  MRN: [id number]  [place] 1955  Date of evaluation: 12/12/2020  Provider: Medina Kaur MD    CHIEF COMPLAINT       Chief Complaint   Patient presents with    Anxiety     Pt was seen yesterday for same - states Ativan given lasted til 2 pm today. Pt then began to feel anxious and SOB. Pt takes Zoloft for his anxiety which he states \"doesn't work. \" Pt has never seen a psychiatrist for anxiety. -     HISTORY OF PRESENT ILLNESS  (Location/Symptom, Timing/Onset,Context/Setting, Quality, Duration, Modifying Factors, Severity). Note limiting factors. Velinda Leyden is a 72 y.o. male who presents to the emergency department secondary to concern for his anxiety. He reports he was here yesterday and received a medicine which helped him until about 2 or 3 this afternoon and then his symptoms started coming back. He reports he was previously on Zoloft which he tried for 6 to 7 months but it did not work so he stopped taking it. He states he does not know what triggers his anxiety at this time and sitting and thinking about it makes it worse. He has been trying to work 7 days a week to keep his mind off of it (he works as a ). He has had no other new medications recently. He states he occasionally drinks though not daily, denies any active drug use. Past medical history noted below, significant for coronary artery disease, prior cocaine abuse, COPD, diabetes, hyperlipidemia, hypertension, kidney stone, sleep apnea. He has quit smoking. Aside from what is stated above denies any other symptoms or modifying factors. Nursing Notes reviewed. REVIEW OF SYSTEMS  (2-9 systems for level 4, 10 or more for level 5)   Review of Systems   Constitutional: Negative for activity change. HENT: Negative for congestion. Eyes: Negative. Respiratory: Negative for cough. Cardiovascular: Negative for leg swelling. Gastrointestinal: Negative for abdominal pain. Genitourinary: Negative for dysuria. Musculoskeletal: Negative for gait problem. Skin: Negative for rash. PAST MEDICAL HISTORY     Past Medical History:   Diagnosis Date    CAD (coronary artery disease)     Chest pain 10/15/2017    [facility]     COPD [facility]chronic obstructive pulmonary disease) [facility]     Diabetes mellitus ([facility]     not on meds    Hyperlipidemia     Hypertension     Kidney stone     MRSA colonization 4/16/19; 03/24/2019    Sleep apnea     He said he doesn't use a CPAP machine. SURGICALHISTORY       Past Surgical History:   Procedure Laterality Date    ABDOMEN SURGERY      CARDIAC SURGERY      3 STENTS PLACED     CHOLECYSTECTOMY      COLONOSCOPY      CORONARY ANGIOPLASTY WITH STENT PLACEMENT      3 stents     FRACTURE SURGERY      RIGHT ANKLE    LITHOTRIPSY      SHOULDER ARTHROSCOPY      VASCULAR SURGERY       CURRENT MEDICATIONS       Previous Medications    AMITRIPTYLINE (ELAVIL) 100 MG TABLET    Take 100 mg by mouth 2 times daily    ASPIRIN 81 MG TABLET    Take 81 mg by mouth daily    HYDROCHLOROTHIAZIDE (HYDRODIURIL) 25 MG TABLET    Take 1 tablet by mouth daily    LISINOPRIL (PRINIVIL;ZESTRIL) 10 MG TABLET    Take 1 tablet by mouth daily    MAGNESIUM OXIDE (MAG-OX) 400 (240 MG) MG TABLET    Take 1 tablet by mouth daily for 14 days    SERTRALINE (ZOLOFT) 100 MG TABLET    Take 100 mg by mouth daily STATES THAT THIS MEDICATION IS NOT RELIEVING HIS ANXIETY    VITAMIN B-12 (CYANOCOBALAMIN) 500 MCG TABLET    Take 500 mcg by mouth daily    VITAMIN D (D3-1000) 1000 UNITS TABS TABLET    Take 1 tablet by mouth daily    ZOLPIDEM (AMBIEN) 10 MG TABLET    Take 10 mg by mouth nightly as needed.  OUT OF THIS MED 11/10/12      ALLERGIES     Naproxen  FAMILY HISTORY       Family History   Problem Relation Age of Onset    High Blood Pressure Mother     Cancer Father lung cancer    Diabetes Brother     Liver Disease Sister     Diabetes Brother      SOCIAL HISTORY       Social History     Socioeconomic History    Marital status: Single     Spouse name: Not on file    Number of children: 1    Years of education: 15    Highest education level: Not on file   Occupational History    Occupation: retired   Social Needs    Financial resource strain: Not on file    Food insecurity     Worry: Not on file     Inability: Not on file   [employer] needs     Medical: Not on file     Non-medical: Not on file   Tobacco Use    Smoking status: Former Smoker     Packs/day: 2.50     Years: 40.00     Pack years: 100.00     Types: Cigarettes    Smokeless tobacco: Never Used   Substance and Sexual Activity    Alcohol use: Yes     Frequency: Never     Comment: occ.  Drug use: Not Currently     Types: Cocaine    Sexual activity: Yes     Partners: Female   Lifestyle    Physical activity     Days per week: Not on file     Minutes per session: Not on file    Stress: Not on file   Relationships    Social connections     Talks on phone: Not on file     Gets together: Not on file     Attends Sikhism service: Not on file     Active member of club or organization: Not on file     Attends meetings of clubs or organizations: Not on file     Relationship status: Not on file    Intimate partner violence     Fear of current or ex partner: Not on file     Emotionally abused: Not on file     Physically abused: Not on file     Forced sexual activity: Not on file   Other Topics Concern    Not on file   Social History Narrative    Not on file     SCREENINGS         PHYSICAL EXAM  (up to 7 for level 4, 8 or more for level 5)   INITIAL VITALS: BP: (!) 204/112, Temp: 98 °F (36.7 °C), Pulse: 92, Resp: 16, SpO2: 97 %   Physical Exam  Constitutional:       General: He is not in acute distress. Appearance: Normal appearance. He is obese. He is not ill-appearing or toxic-appearing. HENT:      Head: Normocephalic and atraumatic. Right Ear: External ear normal.      Left Ear: External ear normal.      Nose: Nose normal.      Mouth/Throat:      Mouth: Mucous membranes are moist.   Eyes:      General:         Right eye: No discharge. Left eye: No discharge. Conjunctiva/sclera: Conjunctivae normal.   Neck:      Musculoskeletal: Normal range of motion. Trachea: No tracheal deviation. Cardiovascular:      Rate and Rhythm: Normal rate. Pulmonary:      Effort: Pulmonary effort is normal. No respiratory distress. Skin:     General: Skin is warm and dry. Neurological:      General: No focal deficit present. Mental Status: He is alert and oriented to person, place, and time. Cranial Nerves: No facial asymmetry. Motor: No tremor. Gait: Gait is intact. Psychiatric:         Behavior: Behavior normal.       DIAGNOSTIC RESULTS     Not applicable    EMERGENCY DEPARTMENT COURSE and DIFFERENTIAL DIAGNOSIS/MDM:   Patient was given the following medications:  Orders Placed This Encounter   Medications    hydrOXYzine (ATARAX) 25 MG tablet     Sig: Take 1 tablet by mouth every 6 hours as needed for Anxiety     Dispense:  20 tablet     Refill:  0    hydrOXYzine (VISTARIL) capsule 25 mg     CONSULTS:  None  INITIAL VITALS: BP: (!) 204/112, Temp: 98 °F (36.7 °C), Pulse: 92, Resp: 16, SpO2: 97 %   RECENT VITALS:  BP: (!) 205/110,Temp: 98 °F (36.7 °C), Pulse: 88, Resp: 16, SpO2: 97 %     Zay Martinez is a 72 y.o. male who presents to the emergency department secondary to concern for symptoms anxiety. On arrival he is awake, alert, oriented with vitals that are notable for blood pressure 204/112 otherwise hemodynamically stable and within normal limits. On exam he has no signs of respiratory distress. He is answering questions appropriately and in complete sentences. He continues to deny any symptoms of chest pain, chest pressure, shortness of breath. Just feelings of anxiety. We discussed anxiety and its potential related symptoms. We discussed how the natural progression of anxiety is to worsen if it is not dealt with. We talked about the most effective ways of dealing with anxiety are through a combination of medicine, therapy, and exercise. We discussed trying meditation apps on his phone as well as following up with [facility] [place] [facility] and with his primary care to discuss both his anxiety as well as his elevated blood pressure. He is on multiple medications for blood pressure and he states he has been taking them. He states his blood pressure does go up when he is anxious. It was rechecked and it remained elevated. I talked with him about further work-up for his elevated blood pressure; however, given he is asymptomatic has a history of hypertension and is taking medications I also think it is safe for him to follow-up with his primary care which is what he prefers to do. He does have a cardiologist though states it takes months to get into him and I let him know following up with his primary care would be a good first step. He was given a dose of Atarax in the emergency department and given a prescription as well. Prior to discharge we discussed return precautions which she verbalized understanding of. FINAL IMPRESSION      1. History of anxiety    2. Anxiety state    3.  Elevated blood pressure reading        DISPOSITION/PLAN   DISPOSITION Decision To Discharge 12/13/2020 01:22:32 AM      PATIENT REFERRED TO:  Loyce Holter, MD [id number] [facility] [address] [facility]    Schedule an appointment as soon as possible for a visit   For follow up appointment to talk about your anxiety and blood pressure    [facility] [address]  [facility])  [address] [facility] [address] [phone]  Schedule an appointment as soon as possible for a visit   For follow up appointment      DISCHARGE MEDICATIONS:  New Prescriptions    HYDROXYZINE (ATARAX) 25 MG TABLET    Take 1 tablet by mouth every 6 hours as needed for Anxiety            (Please note that portions of this note were completed with a voice recognition program. Efforts were made to edit the dictations but occasionally words are mis-transcribed.)    Lorena Garcia MD (electronically signed)  Attending Emergency Physician      Lorena Garcia MD  12/13/20 3344

## 2021-03-02 ENCOUNTER — APPOINTMENT (OUTPATIENT)
Dept: GENERAL RADIOLOGY | Age: 66
End: 2021-03-02
Payer: MEDICARE

## 2021-03-02 ENCOUNTER — HOSPITAL ENCOUNTER (EMERGENCY)
Age: 66
Discharge: HOME | End: 2021-03-03
Attending: EMERGENCY MEDICINE
Payer: MEDICARE

## 2021-03-02 DIAGNOSIS — I10 UNCONTROLLED HYPERTENSION: ICD-10-CM

## 2021-03-02 DIAGNOSIS — J21.9 ACUTE BRONCHIOLITIS DUE TO UNSPECIFIED ORGANISM: Primary | ICD-10-CM

## 2021-03-02 DIAGNOSIS — K62.5 RECTAL BLEED: ICD-10-CM

## 2021-03-02 PROCEDURE — 71046 X-RAY EXAM CHEST 2 VIEWS: CPT

## 2021-03-02 PROCEDURE — 99284 EMERGENCY DEPT VISIT MOD MDM: CPT

## 2021-03-02 RX ORDER — 0.9 % SODIUM CHLORIDE 0.9 %
1000 INTRAVENOUS SOLUTION INTRAVENOUS ONCE
Status: COMPLETED | OUTPATIENT
Start: 2021-03-03 | End: 2021-03-03

## 2021-03-02 RX ORDER — IPRATROPIUM BROMIDE AND ALBUTEROL SULFATE 2.5; .5 MG/3ML; MG/3ML
1 SOLUTION RESPIRATORY (INHALATION) ONCE
Status: COMPLETED | OUTPATIENT
Start: 2021-03-03 | End: 2021-03-03

## 2021-03-02 ASSESSMENT — ENCOUNTER SYMPTOMS
SHORTNESS OF BREATH: 1
RHINORRHEA: 0
VOMITING: 0
NAUSEA: 0
BACK PAIN: 0
ABDOMINAL PAIN: 1
BLOOD IN STOOL: 1
COUGH: 1

## 2021-03-03 VITALS
OXYGEN SATURATION: 95 % | WEIGHT: 248.68 LBS | BODY MASS INDEX: 34.81 KG/M2 | RESPIRATION RATE: 22 BRPM | TEMPERATURE: 97.4 F | HEIGHT: 71 IN | HEART RATE: 100 BPM | SYSTOLIC BLOOD PRESSURE: 166 MMHG | DIASTOLIC BLOOD PRESSURE: 120 MMHG

## 2021-03-03 LAB
A/G RATIO: 1.3 (ref 1.1–2.2)
ALBUMIN SERPL-MCNC: 3.9 G/DL (ref 3.4–5)
ALP BLD-CCNC: 146 U/L (ref 40–129)
ALT SERPL-CCNC: 43 U/L (ref 10–40)
ANION GAP SERPL CALCULATED.3IONS-SCNC: 14 MMOL/L (ref 3–16)
APTT: 30.1 SEC (ref 24.2–36.2)
AST SERPL-CCNC: 38 U/L (ref 15–37)
BASOPHILS ABSOLUTE: 0 K/UL (ref 0–0.2)
BASOPHILS RELATIVE PERCENT: 0.7 %
BILIRUB SERPL-MCNC: <0.2 MG/DL (ref 0–1)
BUN BLDV-MCNC: 22 MG/DL (ref 7–20)
CALCIUM SERPL-MCNC: 8.7 MG/DL (ref 8.3–10.6)
CHLORIDE BLD-SCNC: 97 MMOL/L (ref 99–110)
CO2: 24 MMOL/L (ref 21–32)
CREAT SERPL-MCNC: 1 MG/DL (ref 0.8–1.3)
D DIMER: <200 NG/ML DDU (ref 0–229)
EKG ATRIAL RATE: 97 BPM
EKG DIAGNOSIS: NORMAL
EKG P AXIS: 35 DEGREES
EKG P-R INTERVAL: 200 MS
EKG Q-T INTERVAL: 354 MS
EKG QRS DURATION: 98 MS
EKG QTC CALCULATION (BAZETT): 449 MS
EKG R AXIS: 77 DEGREES
EKG T AXIS: 43 DEGREES
EKG VENTRICULAR RATE: 97 BPM
EOSINOPHILS ABSOLUTE: 0.1 K/UL (ref 0–0.6)
EOSINOPHILS RELATIVE PERCENT: 2.3 %
GFR AFRICAN AMERICAN: >60
GFR NON-AFRICAN AMERICAN: >60
GLOBULIN: 3 G/DL
GLUCOSE BLD-MCNC: 274 MG/DL (ref 70–99)
HCT VFR BLD CALC: 39.2 % (ref 40.5–52.5)
HEMOGLOBIN: 12.8 G/DL (ref 13.5–17.5)
INR BLD: 0.91 (ref 0.86–1.14)
LACTIC ACID: 2.7 MMOL/L (ref 0.4–2)
LYMPHOCYTES ABSOLUTE: 1.8 K/UL (ref 1–5.1)
LYMPHOCYTES RELATIVE PERCENT: 42 %
MCH RBC QN AUTO: 28.5 PG (ref 26–34)
MCHC RBC AUTO-ENTMCNC: 32.6 G/DL (ref 31–36)
MCV RBC AUTO: 87.4 FL (ref 80–100)
MONOCYTES ABSOLUTE: 0.5 K/UL (ref 0–1.3)
MONOCYTES RELATIVE PERCENT: 11.2 %
NEUTROPHILS ABSOLUTE: 1.9 K/UL (ref 1.7–7.7)
NEUTROPHILS RELATIVE PERCENT: 43.8 %
OCCULT BLOOD DIAGNOSTIC: ABNORMAL
PDW BLD-RTO: 13.7 % (ref 12.4–15.4)
PLATELET # BLD: 160 K/UL (ref 135–450)
PMV BLD AUTO: 8.2 FL (ref 5–10.5)
POTASSIUM REFLEX MAGNESIUM: 3.7 MMOL/L (ref 3.5–5.1)
PRO-BNP: 178 PG/ML (ref 0–124)
PROTHROMBIN TIME: 10.6 SEC (ref 10–13.2)
RBC # BLD: 4.48 M/UL (ref 4.2–5.9)
SARS-COV-2, PCR: NOT DETECTED
SODIUM BLD-SCNC: 135 MMOL/L (ref 136–145)
TOTAL PROTEIN: 6.9 G/DL (ref 6.4–8.2)
TROPONIN: <0.01 NG/ML
WBC # BLD: 4.3 K/UL (ref 4–11)

## 2021-03-03 PROCEDURE — 93005 ELECTROCARDIOGRAM TRACING: CPT | Performed by: EMERGENCY MEDICINE

## 2021-03-03 PROCEDURE — 2580000003 HC RX 258: Performed by: EMERGENCY MEDICINE

## 2021-03-03 PROCEDURE — 85025 COMPLETE CBC W/AUTO DIFF WBC: CPT

## 2021-03-03 PROCEDURE — 93010 ELECTROCARDIOGRAM REPORT: CPT | Performed by: INTERNAL MEDICINE

## 2021-03-03 PROCEDURE — 6370000000 HC RX 637 (ALT 250 FOR IP): Performed by: EMERGENCY MEDICINE

## 2021-03-03 PROCEDURE — G0328 FECAL BLOOD SCRN IMMUNOASSAY: HCPCS

## 2021-03-03 PROCEDURE — 83605 ASSAY OF LACTIC ACID: CPT

## 2021-03-03 PROCEDURE — U0003 INFECTIOUS AGENT DETECTION BY NUCLEIC ACID (DNA OR RNA); SEVERE ACUTE RESPIRATORY SYNDROME CORONAVIRUS 2 (SARS-COV-2) (CORONAVIRUS DISEASE [COVID-19]), AMPLIFIED PROBE TECHNIQUE, MAKING USE OF HIGH THROUGHPUT TECHNOLOGIES AS DESCRIBED BY CMS-2020-01-R: HCPCS

## 2021-03-03 PROCEDURE — 85379 FIBRIN DEGRADATION QUANT: CPT

## 2021-03-03 PROCEDURE — 85730 THROMBOPLASTIN TIME PARTIAL: CPT

## 2021-03-03 PROCEDURE — 85610 PROTHROMBIN TIME: CPT

## 2021-03-03 PROCEDURE — 84484 ASSAY OF TROPONIN QUANT: CPT

## 2021-03-03 PROCEDURE — 83880 ASSAY OF NATRIURETIC PEPTIDE: CPT

## 2021-03-03 PROCEDURE — 36415 COLL VENOUS BLD VENIPUNCTURE: CPT

## 2021-03-03 PROCEDURE — 80053 COMPREHEN METABOLIC PANEL: CPT

## 2021-03-03 RX ORDER — HYDROXYZINE PAMOATE 25 MG/1
50 CAPSULE ORAL ONCE
Status: COMPLETED | OUTPATIENT
Start: 2021-03-03 | End: 2021-03-03

## 2021-03-03 RX ORDER — BENZONATATE 100 MG/1
100 CAPSULE ORAL 3 TIMES DAILY PRN
Qty: 20 CAPSULE | Refills: 0 | Status: SHIPPED | OUTPATIENT
Start: 2021-03-03

## 2021-03-03 RX ORDER — IPRATROPIUM BROMIDE AND ALBUTEROL SULFATE 2.5; .5 MG/3ML; MG/3ML
1 SOLUTION RESPIRATORY (INHALATION) ONCE
Status: COMPLETED | OUTPATIENT
Start: 2021-03-03 | End: 2021-03-03

## 2021-03-03 RX ORDER — ALBUTEROL SULFATE 90 UG/1
2 AEROSOL, METERED RESPIRATORY (INHALATION) 4 TIMES DAILY PRN
Qty: 1 INHALER | Refills: 0 | Status: SHIPPED | OUTPATIENT
Start: 2021-03-03 | End: 2021-04-27

## 2021-03-03 RX ORDER — BENZONATATE 100 MG/1
100 CAPSULE ORAL ONCE
Status: COMPLETED | OUTPATIENT
Start: 2021-03-03 | End: 2021-03-03

## 2021-03-03 RX ADMIN — HYDROXYZINE PAMOATE 50 MG: 25 CAPSULE ORAL at 00:49

## 2021-03-03 RX ADMIN — SODIUM CHLORIDE 1000 ML: 9 INJECTION, SOLUTION INTRAVENOUS at 00:04

## 2021-03-03 RX ADMIN — IPRATROPIUM BROMIDE AND ALBUTEROL SULFATE 1 AMPULE: .5; 3 SOLUTION RESPIRATORY (INHALATION) at 01:19

## 2021-03-03 RX ADMIN — BENZONATATE 100 MG: 100 CAPSULE ORAL at 01:19

## 2021-03-03 RX ADMIN — IPRATROPIUM BROMIDE AND ALBUTEROL SULFATE 1 AMPULE: .5; 3 SOLUTION RESPIRATORY (INHALATION) at 00:32

## 2021-03-03 NOTE — ED PROVIDER NOTES
[address] [facility] ENCOUNTER      Pt Name: Abelino Cummings  MRN: [id number]  [place] 1955  Date of evaluation: 3/2/2021  Provider: Chidi Frye MD    CHIEF COMPLAINT       Chief Complaint   Patient presents with    Hemoptysis     pt states that he has been coughing with bloody sputum but tonight started with bright red blood in the toilet after a bowel movement    Rectal Bleeding         HISTORY OF PRESENT ILLNESS   (Location/Symptom, Timing/Onset,Context/Setting, Quality, Duration, Modifying Factors, Severity)  Note limiting factors. Abelino Cummings is a 77 y.o. male who presents to the emergency department for 3 days of cough, chest pain associated of coughing, shortness of breath and hemoptysis. He said the hemoptysis started today and throughout the day he had approximately a total of about 4 tablespoons of bloody sputum. He also stated this evening he went to the bathroom and when he got up he noticed that there was blood in the toilet. He said that his stools look somewhat dark and there was bright blood on his toilet paper. He has been having some lower abdominal pain. He denies having pain presently. No fevers or chills. He is not a smoker. No previous history of asthma or COPD. He does not drink alcohol. Has not had any GI bleed in the past but has had a history of anal fissure. Patient states he is hypertensive and he states that he has been taking his medications as prescribed. He has not been vaccinated against COVID-19. No known exposure. Patient is not presently on anticoagulation. Nursing notes were reviewed. REVIEW OF SYSTEMS    (2-9 systems for level 4, 10 or more for level 5)     Review of Systems   Constitutional: Negative for appetite change and fever. HENT: Negative for rhinorrhea. Eyes: Negative for visual disturbance. Respiratory: Positive for cough and shortness of breath. Hemoptysis 3 or 4 tablespoons   Cardiovascular:        Chest pain with cough   Gastrointestinal: Positive for abdominal pain and blood in stool. Negative for nausea and vomiting. Endocrine: Negative for polyuria. Genitourinary: Negative for dysuria. Musculoskeletal: Negative for back pain. Skin: Positive for rash. psoriarsis   Hematological: Does not bruise/bleed easily. 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          SURGICALHISTORY       Past Surgical History:   Procedure Laterality Date    ABDOMEN SURGERY      CARDIAC SURGERY      3 STENTS PLACED     CHOLECYSTECTOMY      COLONOSCOPY      CORONARY ANGIOPLASTY WITH STENT PLACEMENT      3 stents     FRACTURE SURGERY      RIGHT ANKLE    LITHOTRIPSY      SHOULDER ARTHROSCOPY      VASCULAR SURGERY           CURRENT MEDICATIONS       Discharge Medication List as of 3/3/2021  1:59 AM      CONTINUE these medications which have NOT CHANGED    Details   magnesium oxide (MAG-OX) 400 (240 Mg) MG tablet Take 1 tablet by mouth daily for 14 days, Disp-14 tablet,R-0Print      hydroCHLOROthiazide (HYDRODIURIL) 25 MG tablet Take 1 tablet by mouth daily, Disp-30 tablet,R-3Print      lisinopril (PRINIVIL;ZESTRIL) 10 MG tablet Take 1 tablet by mouth daily, Disp-30 tablet,R-3Print      amitriptyline (ELAVIL) 100 MG tablet Take 100 mg by mouth 2 times dailyHistorical Med      sertraline (ZOLOFT) 100 MG tablet Take 100 mg by mouth daily STATES THAT THIS MEDICATION IS NOT RELIEVING HIS ANXIETYHistorical Med vitamin B-12 (CYANOCOBALAMIN) 500 MCG tablet Take 500 mcg by mouth dailyHistorical Med      zolpidem (AMBIEN) 10 MG tablet Take 10 mg by mouth nightly as needed. OUT OF THIS MED 11/10/12Historical Med      vitamin D (D3-1000) 1000 UNITS TABS tablet Take 1 tablet by mouth daily, Disp-90 tablet, R-3Normal      aspirin 81 MG tablet Take 81 mg by mouth dailyHistorical Med             ALLERGIES     Naproxen    FAMILY HISTORY       Family History   Problem Relation Age of Onset    High Blood Pressure Mother     Cancer Father         lung cancer    Diabetes Brother     Liver Disease Sister     Diabetes Brother           SOCIAL HISTORY       Social History     Socioeconomic History    Marital status: Single     Spouse name: None    Number of children: 1    Years of education: 15    Highest education level: None   Occupational History    Occupation: retired   Social Needs    Financial resource strain: None    Food insecurity     Worry: None     Inability: None    Transportation needs     Medical: None     Non-medical: None   Tobacco Use    Smoking status: Former Smoker     Packs/day: 2.50     Years: 40.00     Pack years: 100.00     Types: Cigarettes    Smokeless tobacco: Never Used   Substance and Sexual Activity    Alcohol use: Yes     Frequency: Never     Comment: occ.      Drug use: Not Currently     Types: Cocaine    Sexual activity: Yes     Partners: Female   Lifestyle    Physical activity     Days per week: None     Minutes per session: None    Stress: None   Relationships    Social connections     Talks on phone: None     Gets together: None     Attends Sikh service: None     Active member of club or organization: None     Attends meetings of clubs or organizations: None     Relationship status: None    Intimate partner violence     Fear of current or ex partner: None     Emotionally abused: None     Physically abused: None     Forced sexual activity: None   Other Topics Concern    None 12 lead EKG shows normal sinus rhythm at a rate of 97 bpm, SC interval QRS QTC normal.  Normal axis. No acute ischemic findings. No significant findings when compared to November 2020. RADIOLOGY:   Non-plain film images such as CT, Ultrasound and MRI are read by the radiologist. Plain radiographic images are visualized and preliminarily interpreted by the emergency physician with the below findings:      Interpretation per the Radiologist below, if available at the time of this note:    XR CHEST (2 VW)   Final Result   No airspace disease by radiograph.                ED BEDSIDE ULTRASOUND:   Performed by ED Physician - none    LABS:  Labs Reviewed   CBC WITH AUTO DIFFERENTIAL - Abnormal; Notable for the following components:       Result Value    Hemoglobin 12.8 (*)     Hematocrit 39.2 (*)     All other components within normal limits    Narrative:     Performed at:  [facility]  [address] [facility]   Phone [phone]   COMPREHENSIVE METABOLIC PANEL W/ REFLEX TO MG FOR LOW K - Abnormal; Notable for the following components:    Sodium 135 (*)     Chloride 97 (*)     Glucose 274 (*)     BUN 22 (*)     Alkaline Phosphatase 146 (*)     ALT 43 (*)     AST 38 (*)     All other components within normal limits    Narrative:     Performed at:  [facility]  [address] [facility]   Phone [phone]   LACTIC ACID, PLASMA - Abnormal; Notable for the following components:    Lactic Acid 2.7 (*)     All other components within normal limits    Narrative:     Performed at:  [facility]  [address] [facility]   Phone [phone]   BLOOD OCCULT STOOL DIAGNOSTIC - Abnormal; Notable for the following components:    Occult Blood Diagnostic   (*)     Value: Result: Negative  Normal range: Negative  Result: POSITIVE Normal range: Negative   03/03/2021  00:15      All other components within normal limits    Narrative:     ORDER#: [id number]                          ORDERED BY: Kimberly Barraza  SOURCE: Stool                              COLLECTED:  03/03/21 00:01  ANTIBIOTICS AT ADRIANA.:                      RECEIVED :  03/03/21 00:13  Performed at:  [facility]  [address] [place], [place]   Phone [phone]   BRAIN NATRIURETIC PEPTIDE - Abnormal; Notable for the following components:    Pro- (*)     All other components within normal limits    Narrative:     Performed at:  [facility]  [address] [place], [place]   Phone [phone]   PROTIME-INR    Narrative:     Performed at:  [facility]  [address] [place], [place]   Phone [phone]   APTT    Narrative:     Performed at:  [address] [facility]  [address] [place], [place]   Phone [phone]   TROPONIN    Narrative:     Performed at:  [address] [facility]  [address] [place], [place]   Phone [phone]   D-DIMER, QUANTITATIVE    Narrative:     Performed at:  [facility]  [address] [place], [place]   Phone [phone]       All other labs were within normal range or not returned as of this dictation.     EMERGENCY DEPARTMENT COURSE and DIFFERENTIAL DIAGNOSIS/MDM:   Vitals:    Vitals:    03/02/21 2331 03/03/21 0000 03/03/21 0130   BP: (!) 207/116 (!) 200/104 (!) 166/120   Pulse: 110 99 100   Resp: 18 21 22   Temp: 97.4 °F (36.3 °C)     TempSrc: Oral     SpO2: 95% 96% 95%   Weight: 248 lb 10.9 oz (112.8 kg)     Height: 5' 11\" (1.803 m) Adult male with multiple complaints including cough, chest pain associated with coughing, hemoptysis, and rectal bleeding. Laboratory studies chest x-ray EKG and cardiac monitoring ordered. Cardiac monitor as interpreted myself shows normal sinus rhythm. Laboratory studies show that the patient hemoglobin is stable. Does not have an elevated glucose and although he has hyponatremia this is corrected to normal.  BUN is slightly elevated. Creatinine is normal.  Coagulation studies are normal.  Hemoccult is positive. But his stool was yellow. D-dimer is normal.    The patient was given a DuoNeb treatment. No steroids were given as he is severely hypertensive with a history of uncontrolled hypertension. I will reassess after the breathing treatment. Chest x-ray shows no acute process. Patient is reassessed he states that he is feeling better. Upon auscultation he still continues to have wheezing. He is still coughing. Patient states that he has a history of anxiety and just being here makes him anxious. The albuterol is not helping. The patient is given hydroxyzine. Patient is given additional DuoNeb and Tessalon Perles. Patient has an elevated lactic acid however the patient has no signs of clinical infection chest x-ray is negative he does not have a white count he does not have a fever. His lactic acidosis could be from increased work of breathing. I will not repeat the lactic acid as he was given albuterol which is likely going to worsen the lactic acidosis. Patient will be counseled on things that should prompt immediate return to the emergency room including signs of infection. Patient is reassessed after the second DuoNeb and the [employer] and he feels much better. I auscultate the patient lungs and his wheezing is completely resolved with great air entry. Cardiac monitor interpreted by myself and continues to show normal sinus rhythm. The patient's blood pressure is trending down. I discussed all of her findings with the patient. COVID-19 test is ordered. Patient remains hemodynamically stable. He has improved. No gross blood on rectal exam of normal hemoglobin and no abnormal findings on his chest x-ray I believe that the patient is low risk for serious or life-threatening condition that would require further work-up or hospitalization at this point including massive GI bleed, massive hemoptysis, pulmonary embolism. Follow-up in the primary care setting is recommended. The patient reaffirms that he does have a PCP. He expresses understanding and is agreeable to treatment plan. Return instructions are discussed. All of his concerns were addressed. CRITICAL CARE TIME   None       CONSULTS:  None    PROCEDURES:       Procedures    FINAL IMPRESSION      1. Acute bronchiolitis due to unspecified organism    2. Uncontrolled hypertension    3.  Rectal bleed          DISPOSITION/PLAN   DISPOSITION        PATIENT REFERREDTO:  Shanda Stubbs MD  [address]  [facility] [address]  [phone]    In 2 days        DISCHARGEMEDICATIONS:  Discharge Medication List as of 3/3/2021  1:59 AM      START taking these medications    Details   benzonatate (TESSALON PERLES) 100 MG capsule Take 1 capsule by mouth 3 times daily as needed for Cough, Disp-20 capsule, R-0Normal      albuterol sulfate HFA (VENTOLIN HFA) 108 (90 Base) MCG/ACT inhaler Inhale 2 puffs into the lungs 4 times daily as needed for Wheezing, Disp-1 Inhaler, R-0Normal                (Please note that portions of this note were completed with a voice recognition program.  Efforts were made to edit the dictations but occasionally words are mis-transcribed.)    Renee Hall MD (electronically signed)  Attending Emergency Physician        Renee Hall MD  03/03/21 3066

## 2021-03-03 NOTE — ED NOTES
D/C: Order noted for d/c. Pt confirmed d/c paperwork and prescriptions have correct name. Discharge and education instructions reviewed with patient. Teach-back successful. Pt verbalized understanding and signed d/c papers. Pt denied questions at this time. No acute distress noted. Patient instructed to follow-up as noted - return to emergency department if symptoms worsen. Patient verbalized understanding. Discharged per EDMD with discharge instructions. Pt discharged to private vehicle. Patient stable upon departure. Thanked patient for choosing [facility] for care.       Horacio Redding RN  03/03/21 5713

## 2021-03-11 ENCOUNTER — HOSPITAL ENCOUNTER (EMERGENCY)
Age: 66
Discharge: HOME | End: 2021-03-11
Attending: EMERGENCY MEDICINE
Payer: MEDICARE

## 2021-03-11 VITALS
BODY MASS INDEX: 34.88 KG/M2 | SYSTOLIC BLOOD PRESSURE: 216 MMHG | WEIGHT: 249.12 LBS | OXYGEN SATURATION: 95 % | HEIGHT: 71 IN | RESPIRATION RATE: 16 BRPM | HEART RATE: 97 BPM | TEMPERATURE: 98.5 F | DIASTOLIC BLOOD PRESSURE: 121 MMHG

## 2021-03-11 DIAGNOSIS — F41.1 ANXIETY STATE: Primary | ICD-10-CM

## 2021-03-11 PROCEDURE — 99283 EMERGENCY DEPT VISIT LOW MDM: CPT

## 2021-03-11 PROCEDURE — 6370000000 HC RX 637 (ALT 250 FOR IP): Performed by: EMERGENCY MEDICINE

## 2021-03-11 RX ORDER — HYDROXYZINE HYDROCHLORIDE 25 MG/1
25 TABLET, FILM COATED ORAL EVERY 6 HOURS PRN
Qty: 10 TABLET | Refills: 0 | Status: SHIPPED | OUTPATIENT
Start: 2021-03-11 | End: 2021-05-21 | Stop reason: SDUPTHER

## 2021-03-11 RX ORDER — HYDRALAZINE HYDROCHLORIDE 100 MG/1
100 TABLET, FILM COATED ORAL EVERY 8 HOURS
COMMUNITY
Start: 2020-02-13

## 2021-03-11 RX ORDER — SPIRONOLACTONE 50 MG/1
50 TABLET, FILM COATED ORAL 2 TIMES DAILY
COMMUNITY
Start: 2020-03-23 | End: 2021-04-27

## 2021-03-11 RX ORDER — NITROGLYCERIN 0.4 MG/1
0.4 TABLET SUBLINGUAL EVERY 5 MIN PRN
COMMUNITY

## 2021-03-11 RX ORDER — DOXAZOSIN 2 MG/1
2 TABLET ORAL EVERY 12 HOURS
COMMUNITY
Start: 2020-02-13

## 2021-03-11 RX ORDER — HYDROXYZINE PAMOATE 25 MG/1
25 CAPSULE ORAL ONCE
Status: COMPLETED | OUTPATIENT
Start: 2021-03-11 | End: 2021-03-11

## 2021-03-11 RX ADMIN — HYDROXYZINE PAMOATE 25 MG: 25 CAPSULE ORAL at 04:44

## 2021-03-11 ASSESSMENT — ENCOUNTER SYMPTOMS
CHEST TIGHTNESS: 0
DIARRHEA: 0
SHORTNESS OF BREATH: 0
EYES NEGATIVE: 1
STRIDOR: 0
VOMITING: 0
COUGH: 0
CHOKING: 0
CONSTIPATION: 0
ABDOMINAL PAIN: 0
NAUSEA: 0

## 2021-03-11 NOTE — DISCHARGE INSTR - COC
renal failure (ARF) ([facility]) N17.9    ARTEMIO (obstructive sleep apnea) G47.33    Coronary artery disease involving native coronary artery of native heart without angina pectoris I25.10    Type 2 diabetes mellitus with hyperglycemia ([facility]) E11.65    Hyperlipidemia LDL goal <70 E78.5    Syncope R55    Atypical chest pain R07.89    Major depressive disorder, recurrent ([facility] F33.9    Suicide ideation R45.851    Cocaine abuse in remission ([facility] F14.11    Pneumonia J18.9    Depression F32.9    Anxiety F41.9    Sleeping difficulty G47.9    Hypertensive urgency I16.0    Drug use disorder F19.90    Uncontrolled hypertension I10    LVH (left ventricular hypertrophy) due to hypertensive disease, without heart failure I11.9    Chest pain R07.9    Syncope, psychogenic F48.8    MONSALVE (dyspnea on exertion) R06.00    Hypertensive emergency I16.1    Abnormal radiograph R93.89    Hypertriglyceridemia E78.1    Sepsis ([facility]) A41.9    COPD exacerbation ([facility]) J44.1    Pleuritic chest pain R07.81    Hypomagnesemia E83.42    Hypokalemia E87.6    DMII (diabetes mellitus, type 2) ([facility]) E11.9    Demand ischemia ([facility]) I24.8    Major depressive disorder, recurrent severe without psychotic features [facility] F33.2    Arterial hypotension I95.9    Acute pain of left shoulder M25.512    Cocaine abuse ([facility]) F14.10       Isolation/Infection:   Isolation          No Isolation        Patient Infection Status     Infection Onset Added Last Indicated Last Indicated By Review Planned Expiration Resolved Resolved By    COVID-19 Rule Out 03/03/21 03/03/21 03/03/21 COVID-19 (Ordered) 03/10/21 03/17/21      Resolved    COVID-19 Rule Out 03/03/21 03/03/21 03/03/21 COVID-19 (Ordered)   03/03/21 Rule-Out Test Resulted    COVID-19 Rule Out 05/13/20 05/13/20 05/13/20 COVID-19 (Ordered)   05/14/20 Rule-Out Test Resulted    MRSA 04/02/18 04/02/18 04/16/19 MRSA DNA Probe, Nasal   10/06/20 Marcin Waters RN    Nares 4/16/19      MRSA 09/01/17 17 Lesly Garcia RN   10/16/17 Lesly Garcia, RUSTY    Chest/axilla          Nurse Assessment:  Last Vital Signs: There were no vitals taken for this visit. Last documented pain score (0-10 scale):    Last Weight:   Wt Readings from Last 1 Encounters:   21 248 lb 10.9 oz (112.8 kg)     Mental Status:  {IP PT MENTAL STATUS:21532}    IV Access:  { LEROY IV ACCESS:161172724}    Nursing Mobility/ADLs:  Walking   {CHP DME HXNO:334035691}  Transfer  {CHP DME AESK:363327261}  Bathing  {CHP DME MBTQ:429858232}  Dressing  {CHP DME KZWJ:755275575}  Toileting  {CHP DME EUQD:722960152}  Feeding  {CHP DME BGNJ:087861745}  Med Admin  {CHP DME FRJM:897114939}  Med Delivery   {[facility] MED Delivery:949350003}    Wound Care Documentation and Therapy:        Elimination:  Continence:   · Bowel: {YES / PI:28884}  · Bladder: {YES / QK:75488}  Urinary Catheter: {Urinary Catheter:955795016}   Colostomy/Ileostomy/Ileal Conduit: {YES / TW:63506}       Date of Last BM: ***  No intake or output data in the 24 hours ending 21 0416  No intake/output data recorded.     Safety Concerns:     [address] [employer] Safety Concerns:759643869}    Impairments/Disabilities:      [address] [employer] Impairments/Disabilities:405502311}    Nutrition Therapy:  Current Nutrition Therapy:   [address] [employer] Diet List:004409611}    Routes of Feeding: {CHP DME Other Feedings:283269232}  Liquids: {Slp liquid thickness:66449}  Daily Fluid Restriction: {CHP DME Yes amt example:893279831}  Last Modified Barium Swallow with Video (Video Swallowing Test): {Done Not Done HGWL:677849211}    Treatments at the Time of Hospital Discharge:   Respiratory Treatments: ***  Oxygen Therapy:  {Therapy; copd oxygen:28598}  Ventilator:    { CC Vent KXNR:432535467}    Rehab Therapies: {THERAPEUTIC INTERVENTION:7027062914}  Weight Bearing Status/Restrictions: [address] PERALTA Weight Bearin}  Other Medical Equipment (for information only, NOT a DME order):  {EQUIPMENT:272345918}  Other Treatments: ***    Patient's personal belongings (please select all that are sent with patient):  {CHP DME Belongings:757790462}    RN SIGNATURE:  {Esignature:821840980}    CASE MANAGEMENT/SOCIAL WORK SECTION    Inpatient Status Date: ***    Readmission Risk Assessment Score:  Readmission Risk              Risk of Unplanned Readmission:        0           Discharging to Facility/ Agency   · Name:   · Address:  · Phone:  · Fax:    Dialysis Facility (if applicable)   · Name:  · Address:  · Dialysis Schedule:  · Phone:  · Fax:    / signature: {Esignature:569800544}    PHYSICIAN SECTION    Prognosis: {Prognosis:8084342732}    Condition at Discharge: [facility] Patient Condition:793331533}    Rehab Potential (if transferring to Rehab): {Prognosis:0657123736}    Recommended Labs or Other Treatments After Discharge: ***    Physician Certification: I certify the above information and transfer of Rimma Murphy  is necessary for the continuing treatment of the diagnosis listed and that he requires {Admit to Appropriate Level of Care:88239} for {GREATER/LESS:162751283} 30 days.      Update Admission H&P: {CHP DME Changes in JOCOYO:394598305}    PHYSICIAN SIGNATURE:  {Esignature:865213236}

## 2021-03-11 NOTE — ED PROVIDER NOTES
eMERGENCY dEPARTMENT eNCOUnter      Pt Name: Ezekiel Gaviria  MRN: [id number]  [place] 1955  Date of evaluation: 3/11/2021  Provider: Lord Tom MD     CHIEF COMPLAINT       Chief Complaint   Patient presents with    Anxiety     Pt states he ran out of his Zoloft and is now having a panic attack. HISTORY OF PRESENT ILLNESS   (Location/Symptom, Timing/Onset,Context/Setting, Quality, Duration, Modifying Factors, Severity) Note limiting factors. Ezekiel Gaviria is a 77 y.o. male who presents to the emergency department with a complaint of anxiety. He has been out of his Zoloft for \"a while\"  He states that his symptoms started about an hour prior to arrival.  He was awake and started feeling shaky, restless,  tried to [facility] it off\" unsuccessfully . He reports having episodes like this 1-2 times a month. Unable to clarify what his stressors are for the anxiety episodes. There are several ED visits for the same with the most recent being at this ER about 3 months ago. Dr. Giancarlo Bravo is his primary care doctor and he has another appointment with him in 5 days. He denies any CP, MONSALVE, SOB, dizziness, palpitations. He states that his blood pressure usually is elevated when he has this feeling of anxiety. His blood pressure is currently being managed by his primary care provider and his cardiologist.  He is not taking any of his medications this morning. He states his brother is waiting outside and has to get to work soon, so the patient is refusing any blood work at this time. He is requesting the anxiety medication and dc. REVIEW OFSYSTEMS    (2+ for level 4; 10+ for level 5)   Review of Systems   Constitutional: Negative for activity change, chills, diaphoresis, fatigue and fever. HENT: Negative. Eyes: Negative. Respiratory: Negative for cough, choking, chest tightness, shortness of breath and stridor. Cardiovascular: Negative for chest pain, palpitations and leg swelling. Gastrointestinal: Negative for abdominal pain, constipation, diarrhea, nausea and vomiting. Endocrine: Negative. Genitourinary: Negative. Musculoskeletal: Negative. Neurological: Positive for tremors. Negative for dizziness, syncope, facial asymmetry, speech difficulty, weakness, light-headedness and headaches. Psychiatric/Behavioral: Positive for decreased concentration and sleep disturbance. The patient is nervous/anxious. PAST MEDICAL HISTORY     Past Medical History:   Diagnosis Date    CAD (coronary artery disease)     Chest pain 10/15/2017    Cocaine abuse ([facility]     [facility]     Diabetes mellitus ([facility]     not on meds    Hyperlipidemia     Hypertension     Kidney stone     MRSA colonization 4/16/19; 03/24/2019    Sleep apnea     He said he doesn't use a CPAP machine. SURGICAL HISTORY       Past Surgical History:   Procedure Laterality Date    ABDOMEN SURGERY      CARDIAC SURGERY      3 STENTS PLACED     CHOLECYSTECTOMY      COLONOSCOPY      CORONARY ANGIOPLASTY WITH STENT PLACEMENT      3 stents     FRACTURE SURGERY      RIGHT ANKLE    LITHOTRIPSY      SHOULDER ARTHROSCOPY      VASCULAR SURGERY         CURRENT MEDICATIONS       Discharge Medication List as of 3/11/2021  4:51 AM      CONTINUE these medications which have NOT CHANGED    Details   hydrALAZINE (APRESOLINE) 100 MG tablet Take 100 mg by mouth every 8 hoursHistorical Med      doxazosin (CARDURA) 2 MG tablet Take 2 mg by mouth every 12 hoursHistorical Med      spironolactone (ALDACTONE) 50 MG tablet Take 50 mg by mouth 2 times dailyHistorical Med      hydroCHLOROthiazide (HYDRODIURIL) 25 MG tablet Take 1 tablet by mouth daily, Disp-30 tablet,R-3Print      lisinopril (PRINIVIL;ZESTRIL) 10 MG tablet Take 1 tablet by mouth daily, Disp-30 tablet,R-3Print      zolpidem (AMBIEN) 10 MG tablet Take 10 mg by mouth nightly as needed.  OUT OF THIS MED 11/10/12Historical Med vitamin D (D3-1000) 1000 UNITS TABS tablet Take 1 tablet by mouth daily, Disp-90 tablet, R-3Normal      nitroGLYCERIN (NITROSTAT) 0.4 MG SL tablet Place 0.4 mg under the tongue every 5 minutes as neededHistorical Med      benzonatate (TESSALON PERLES) 100 MG capsule Take 1 capsule by mouth 3 times daily as needed for Cough, Disp-20 capsule, R-0Normal      albuterol sulfate HFA (VENTOLIN HFA) 108 (90 Base) MCG/ACT inhaler Inhale 2 puffs into the lungs 4 times daily as needed for Wheezing, Disp-1 Inhaler, R-0Normal      magnesium oxide (MAG-OX) 400 (240 Mg) MG tablet Take 1 tablet by mouth daily for 14 days, Disp-14 tablet,R-0Print      sertraline (ZOLOFT) 100 MG tablet Take 100 mg by mouth daily STATES THAT THIS MEDICATION IS NOT RELIEVING HIS ANXIETYHistorical Med      aspirin 81 MG tablet Take 81 mg by mouth dailyHistorical Med             ALLERGIES     Naproxen    FAMILY HISTORY       Family History   Problem Relation Age of Onset    High Blood Pressure Mother     Cancer Father         lung cancer    Diabetes Brother     Liver Disease Sister     Diabetes Brother         SOCIAL HISTORY       Social History     Socioeconomic History    Marital status: Single     Spouse name: None    Number of children: 1    Years of education: 15    Highest education level: None   Occupational History    Occupation: retired   Social Needs    Financial resource strain: None    Food insecurity     Worry: None     Inability: None    Transportation needs     Medical: None     Non-medical: None   Tobacco Use    Smoking status: Former Smoker     Packs/day: 2.50     Years: 40.00     Pack years: 100.00     Types: Cigarettes    Smokeless tobacco: Never Used   Substance and Sexual Activity    Alcohol use: Yes     Frequency: Never     Comment: occ.      Drug use: Not Currently     Types: Cocaine    Sexual activity: Yes     Partners: Female   Lifestyle    Physical activity     Days per week: None     Minutes per session: None    Stress: None   Relationships    Social connections     Talks on phone: None     Gets together: None     Attends Church service: None     Active member of club or organization: None     Attends meetings of clubs or organizations: None     Relationship status: None    Intimate partner violence     Fear of current or ex partner: None     Emotionally abused: None     Physically abused: None     Forced sexual activity: None   Other Topics Concern    None   Social History Narrative    None       SCREENINGS           PHYSICAL EXAM    (up to 7 for level 4, 8 or more for level 5)     ED Triage Vitals   BP Temp Temp src Pulse Resp SpO2 Height Weight   -- -- -- -- -- -- -- --       Physical Exam  Constitutional:       General: He is not in acute distress. Appearance: He is obese. He is not ill-appearing, toxic-appearing or diaphoretic. HENT:      Head: Normocephalic and atraumatic. Right Ear: External ear normal.      Left Ear: External ear normal.      Nose: Nose normal.      Mouth/Throat:      Mouth: Mucous membranes are moist.      Pharynx: Oropharynx is clear. Eyes:      General: No scleral icterus. Right eye: No discharge. Left eye: No discharge. Pupils: Pupils are equal, round, and reactive to light. Neck:      Musculoskeletal: Normal range of motion and neck supple. Cardiovascular:      Rate and Rhythm: Normal rate and regular rhythm. Pulses: Normal pulses. Heart sounds: Normal heart sounds. No murmur. No friction rub. No gallop. Pulmonary:      Effort: Pulmonary effort is normal.      Breath sounds: Normal breath sounds. No stridor. No wheezing or rhonchi. Abdominal:      General: Abdomen is flat. Palpations: Abdomen is soft. Musculoskeletal: Normal range of motion. Skin:     General: Skin is warm and dry. Capillary Refill: Capillary refill takes less than 2 seconds. Coloration: Skin is pale.    Neurological:      General: No focal deficit present. Mental Status: He is alert and oriented to person, place, and time. Psychiatric:         Mood and Affect: Mood is anxious. Speech: Speech is tangential.         Behavior: Behavior is cooperative. DIAGNOSTIC RESULTS     EKG (Per Emergency Physician):       RADIOLOGY (Per Emergency Physician): Interpretation per the Radiologist below, if available at the time of this note:  No results found. ED BEDSIDE ULTRASOUND:   Performed by ED Physician - none    LABS:  Labs Reviewed - No data to display     All other labs were within normal range or not returned as of this dictation. Procedures      EMERGENCY DEPARTMENT COURSE and DIFFERENTIAL DIAGNOSIS/MDM:   Vitals:    Vitals:    03/11/21 0420 03/11/21 0500   BP: (!) 219/121 (!) 216/121   Pulse: 97 97   Resp: 16 16   Temp: 98.5 °F (36.9 °C)    TempSrc: Oral    SpO2: 95%    Weight: 249 lb 1.9 oz (113 kg)    Height: 5' 11\" (1.803 m)        Medications   hydrOXYzine (VISTARIL) capsule 25 mg (25 mg Oral Given 3/11/21 0444)       MDM. Patient is a 77-year-old history of panic attack anxiety presents with panic attack patient is very diaphoretic. Patient states he has been out of his Zoloft. He has been to many ED's. For similar episodes. Patient does not want any blood work or work-up at this time. Wanting some anxiety medication and discharge. He was dropped off and is here with his brother. He was given some Vistaril p.o. and prescription for 10 days worth and discharged in good and satisfactory condition. His blood pressure improved after discharge. REVAL:         CRITICAL CARE TIME   Total CriticalCare time was 0 minutes, excluding separately reportable procedures. There was a high probability of clinically significant/life threatening deterioration in the patient's condition which required my urgent intervention.      CONSULTS:  None    PROCEDURES:  Unless otherwise noted below, none     [unfilled]    FINAL IMPRESSION      1. Anxiety state          DISPOSITION/PLAN   DISPOSITION Decision To Discharge 03/11/2021 04:38:08 AM      PATIENT REFERRED TO:  Chelsie Sinclair MD  [id number]  [address] [facility] [place] [phone]    Call in 3 days  As needed, If symptoms worsen      DISCHARGE MEDICATIONS:  Discharge Medication List as of 3/11/2021  4:51 AM      START taking these medications    Details   hydrOXYzine (ATARAX) 25 MG tablet Take 1 tablet by mouth every 6 hours as needed for Anxiety, Disp-10 tablet, R-0Print                (Please note:  Portions of this note were completed with a voice recognition program.Efforts were made to edit the dictations but occasionally words and phrases are mis-transcribed.)  Form v2016. J.5-cn    Nicholas DIXON MD (electronically signed)  Emergency Medicine Provider        Nguyễn Reynolds MD  03/11/21 1176

## 2021-03-28 ENCOUNTER — APPOINTMENT (OUTPATIENT)
Dept: GENERAL RADIOLOGY | Age: 66
End: 2021-03-28
Payer: MEDICARE

## 2021-03-28 ENCOUNTER — APPOINTMENT (OUTPATIENT)
Dept: CT IMAGING | Age: 66
End: 2021-03-28
Payer: MEDICARE

## 2021-03-28 ENCOUNTER — HOSPITAL ENCOUNTER (EMERGENCY)
Age: 66
Discharge: HOME | End: 2021-03-29
Attending: EMERGENCY MEDICINE
Payer: MEDICARE

## 2021-03-28 DIAGNOSIS — F41.1 ANXIETY STATE: ICD-10-CM

## 2021-03-28 DIAGNOSIS — R19.7 DIARRHEA, UNSPECIFIED TYPE: ICD-10-CM

## 2021-03-28 DIAGNOSIS — R42 DIZZINESS: Primary | ICD-10-CM

## 2021-03-28 DIAGNOSIS — I10 ELEVATED BLOOD PRESSURE READING IN OFFICE WITH DIAGNOSIS OF HYPERTENSION: ICD-10-CM

## 2021-03-28 LAB
A/G RATIO: 1.4 (ref 1.1–2.2)
ALBUMIN SERPL-MCNC: 3.9 G/DL (ref 3.4–5)
ALP BLD-CCNC: 140 U/L (ref 40–129)
ALT SERPL-CCNC: 42 U/L (ref 10–40)
ANION GAP SERPL CALCULATED.3IONS-SCNC: 14 MMOL/L (ref 3–16)
AST SERPL-CCNC: 42 U/L (ref 15–37)
BASE EXCESS VENOUS: 1.4 MMOL/L
BASOPHILS ABSOLUTE: 0.1 K/UL (ref 0–0.2)
BASOPHILS RELATIVE PERCENT: 1.2 %
BILIRUB SERPL-MCNC: <0.2 MG/DL (ref 0–1)
BILIRUBIN URINE: NEGATIVE
BLOOD, URINE: NEGATIVE
BUN BLDV-MCNC: 19 MG/DL (ref 7–20)
CALCIUM SERPL-MCNC: 8.6 MG/DL (ref 8.3–10.6)
CARBOXYHEMOGLOBIN: 0.4 %
CHLORIDE BLD-SCNC: 99 MMOL/L (ref 99–110)
CLARITY: CLEAR
CO2: 25 MMOL/L (ref 21–32)
COLOR: YELLOW
CREAT SERPL-MCNC: 0.9 MG/DL (ref 0.8–1.3)
EOSINOPHILS ABSOLUTE: 0.1 K/UL (ref 0–0.6)
EOSINOPHILS RELATIVE PERCENT: 1.5 %
EPITHELIAL CELLS, UA: 0 /HPF (ref 0–5)
GFR AFRICAN AMERICAN: >60
GFR NON-AFRICAN AMERICAN: >60
GLOBULIN: 2.7 G/DL
GLUCOSE BLD-MCNC: 269 MG/DL (ref 70–99)
GLUCOSE URINE: 500 MG/DL
HCO3 VENOUS: 28 MMOL/L (ref 23–29)
HCT VFR BLD CALC: 38 % (ref 40.5–52.5)
HEMOGLOBIN: 12.6 G/DL (ref 13.5–17.5)
HYALINE CASTS: 0 /LPF (ref 0–8)
KETONES, URINE: NEGATIVE MG/DL
LEUKOCYTE ESTERASE, URINE: NEGATIVE
LYMPHOCYTES ABSOLUTE: 1.9 K/UL (ref 1–5.1)
LYMPHOCYTES RELATIVE PERCENT: 34.2 %
MCH RBC QN AUTO: 28.5 PG (ref 26–34)
MCHC RBC AUTO-ENTMCNC: 33.1 G/DL (ref 31–36)
MCV RBC AUTO: 86.2 FL (ref 80–100)
METHEMOGLOBIN VENOUS: 0.9 %
MICROSCOPIC EXAMINATION: YES
MONOCYTES ABSOLUTE: 0.4 K/UL (ref 0–1.3)
MONOCYTES RELATIVE PERCENT: 7.1 %
NEUTROPHILS ABSOLUTE: 3.1 K/UL (ref 1.7–7.7)
NEUTROPHILS RELATIVE PERCENT: 56 %
NITRITE, URINE: NEGATIVE
O2 CONTENT, VEN: 14 ML/DL
O2 SAT, VEN: 79 %
O2 THERAPY: NORMAL
PCO2, VEN: 49.1 MMHG (ref 40–50)
PDW BLD-RTO: 13.4 % (ref 12.4–15.4)
PH UA: 5.5 (ref 5–8)
PH VENOUS: 7.36 (ref 7.35–7.45)
PLATELET # BLD: 183 K/UL (ref 135–450)
PMV BLD AUTO: 8 FL (ref 5–10.5)
PO2, VEN: 46 MMHG
POTASSIUM REFLEX MAGNESIUM: 3.8 MMOL/L (ref 3.5–5.1)
PRO-BNP: 77 PG/ML (ref 0–124)
PROTEIN UA: ABNORMAL MG/DL
RBC # BLD: 4.41 M/UL (ref 4.2–5.9)
RBC UA: 1 /HPF (ref 0–4)
SODIUM BLD-SCNC: 138 MMOL/L (ref 136–145)
SPECIFIC GRAVITY UA: >1.03 (ref 1–1.03)
TCO2 CALC VENOUS: 29 MMOL/L
TOTAL PROTEIN: 6.6 G/DL (ref 6.4–8.2)
TROPONIN: <0.01 NG/ML
URINE REFLEX TO CULTURE: ABNORMAL
URINE TYPE: ABNORMAL
UROBILINOGEN, URINE: 0.2 E.U./DL
WBC # BLD: 5.5 K/UL (ref 4–11)
WBC UA: 1 /HPF (ref 0–5)

## 2021-03-28 PROCEDURE — 81001 URINALYSIS AUTO W/SCOPE: CPT

## 2021-03-28 PROCEDURE — 84484 ASSAY OF TROPONIN QUANT: CPT

## 2021-03-28 PROCEDURE — 99284 EMERGENCY DEPT VISIT MOD MDM: CPT

## 2021-03-28 PROCEDURE — 71045 X-RAY EXAM CHEST 1 VIEW: CPT

## 2021-03-28 PROCEDURE — 70450 CT HEAD/BRAIN W/O DYE: CPT

## 2021-03-28 PROCEDURE — 70498 CT ANGIOGRAPHY NECK: CPT

## 2021-03-28 PROCEDURE — 85025 COMPLETE CBC W/AUTO DIFF WBC: CPT

## 2021-03-28 PROCEDURE — 6370000000 HC RX 637 (ALT 250 FOR IP): Performed by: EMERGENCY MEDICINE

## 2021-03-28 PROCEDURE — 93005 ELECTROCARDIOGRAM TRACING: CPT | Performed by: EMERGENCY MEDICINE

## 2021-03-28 PROCEDURE — 83880 ASSAY OF NATRIURETIC PEPTIDE: CPT

## 2021-03-28 PROCEDURE — 80053 COMPREHEN METABOLIC PANEL: CPT

## 2021-03-28 PROCEDURE — 6360000004 HC RX CONTRAST MEDICATION: Performed by: EMERGENCY MEDICINE

## 2021-03-28 PROCEDURE — 82803 BLOOD GASES ANY COMBINATION: CPT

## 2021-03-28 RX ORDER — MECLIZINE HCL 12.5 MG/1
25 TABLET ORAL ONCE
Status: COMPLETED | OUTPATIENT
Start: 2021-03-28 | End: 2021-03-28

## 2021-03-28 RX ADMIN — IOPAMIDOL 75 ML: 755 INJECTION, SOLUTION INTRAVENOUS at 21:58

## 2021-03-28 RX ADMIN — MECLIZINE 25 MG: 12.5 TABLET ORAL at 21:42

## 2021-03-28 ASSESSMENT — ENCOUNTER SYMPTOMS
SORE THROAT: 0
ABDOMINAL PAIN: 0
SHORTNESS OF BREATH: 1
COUGH: 0

## 2021-03-28 ASSESSMENT — HEART SCORE: ECG: 1

## 2021-03-29 VITALS
BODY MASS INDEX: 33.31 KG/M2 | SYSTOLIC BLOOD PRESSURE: 175 MMHG | HEART RATE: 93 BPM | DIASTOLIC BLOOD PRESSURE: 88 MMHG | WEIGHT: 251.32 LBS | HEIGHT: 73 IN | TEMPERATURE: 97.5 F | RESPIRATION RATE: 16 BRPM | OXYGEN SATURATION: 95 %

## 2021-03-29 LAB
EKG ATRIAL RATE: 95 BPM
EKG DIAGNOSIS: NORMAL
EKG P AXIS: 22 DEGREES
EKG P-R INTERVAL: 156 MS
EKG Q-T INTERVAL: 362 MS
EKG QRS DURATION: 94 MS
EKG QTC CALCULATION (BAZETT): 454 MS
EKG R AXIS: 81 DEGREES
EKG T AXIS: 56 DEGREES
EKG VENTRICULAR RATE: 95 BPM

## 2021-03-29 PROCEDURE — 93010 ELECTROCARDIOGRAM REPORT: CPT | Performed by: INTERNAL MEDICINE

## 2021-03-29 NOTE — ED NOTES
Discharge instructions discussed with pt, all questions answered. Pt is alert and oriented x4, skin warm and dry, respirations even and easy. Pt reports feeling better now. No signs of acute distress noted upon discharge. Pt left ambulatory, steady gait.       Shireen Nuñez RN  03/29/21 6162

## 2021-03-29 NOTE — ED PROVIDER NOTES
[address] [facility]      Pt Name: Marcella Tineo  MRN: [id number]  [place] 1955  Date of evaluation: 3/28/2021  Provider: Belia Singh MD    CHIEF COMPLAINT       Chief Complaint   Patient presents with    Dizziness     \"feels wobbly\"    Anxiety    Diarrhea     x 3 days/ started with bright red blood in stool today     HISTORY OF PRESENT ILLNESS  (Location/Symptom, Timing/Onset,Context/Setting, Quality, Duration, Modifying Factors, Severity). Note limiting factors. Marcella Tineo is a 77 y.o. male who presents to the emergency department secondary to multiple concerns. He reports he hasn't been feeling good for three days (since Friday). He states he woke up and felt okay then after breakfast when he got up from the couch he noted he felt dizzy. He states associated with this he has had diarrhea (four or five episodes a day). He denies any pain with urination up until today when he did have a burning sensation. Denies fevers, chills, nausea, vomiting. He states moving around does not make his dizziness worse and he states the dizziness itself has not gotten worse he just feels it has not gotten better. He denies any chest pain though feels he is breathing heavy, states he is taking all of his medications as prescribed with the exception of evening meds because he doesn't take those until ten o'clock. He lives with his brother who has not been ill. He has not had covid or the vaccines. He states this has happened to him before where he thinks the medicine isn't working and his blood pressure goes up. Past medical history noted below, significant for CAD, cocaine abuse, COPD, DM, HLD, HTN, kidney stones, ARTEMIO. Quit smoking in over 10 years. Aside from what is stated above denies any other symptoms or modifying factors. Nursing Notes reviewed.     REVIEW OF SYSTEMS  (2-9 systems for level 4, 10 or more for level 5)   Review of Systems Constitutional: Positive for unexpected weight change (gained a little weight, he estimates maybe 30 pounds over last three months). HENT: Negative for sore throat. Eyes: Positive for visual disturbance (maybe earlier trying to read newspaper it was a little blurry). Respiratory: Positive for shortness of breath. Negative for cough (chronic unchanged, though has had increased saliva). Gastrointestinal: Negative for abdominal pain. Genitourinary: Positive for dysuria (today). Musculoskeletal: Negative for gait problem. Skin: Negative for rash. Neurological: Positive for dizziness. Negative for syncope. Hematological: Negative. All other systems reviewed and are negative. PAST MEDICAL HISTORY     Past Medical History:   Diagnosis Date    CAD (coronary artery disease)     Chest pain 10/15/2017    [facility] ([facility]     COPD (chronic obstructive pulmonary disease) ([facility]     Diabetes mellitus ([facility]     not on meds    Hyperlipidemia     Hypertension     Kidney stone     MRSA colonization 4/16/19; 03/24/2019    Sleep apnea     He said he doesn't use a CPAP machine.        SURGICALHISTORY       Past Surgical History:   Procedure Laterality Date    ABDOMEN SURGERY      CARDIAC SURGERY      3 STENTS PLACED     CHOLECYSTECTOMY      COLONOSCOPY      CORONARY ANGIOPLASTY WITH STENT PLACEMENT      3 stents     FRACTURE SURGERY      RIGHT ANKLE    LITHOTRIPSY      SHOULDER ARTHROSCOPY      VASCULAR SURGERY       CURRENT MEDICATIONS       Previous Medications    ALBUTEROL SULFATE HFA (VENTOLIN HFA) 108 (90 BASE) MCG/ACT INHALER    Inhale 2 puffs into the lungs 4 times daily as needed for Wheezing    ASPIRIN 81 MG TABLET    Take 81 mg by mouth daily    BENZONATATE (TESSALON PERLES) 100 MG CAPSULE    Take 1 capsule by mouth 3 times daily as needed for Cough    DOXAZOSIN (CARDURA) 2 MG TABLET    Take 2 mg by mouth every 12 hours    HYDRALAZINE (APRESOLINE) 100 MG TABLET    Take 100 mg by mouth every 8 hours    HYDROCHLOROTHIAZIDE (HYDRODIURIL) 25 MG TABLET    Take 1 tablet by mouth daily    HYDROXYZINE (ATARAX) 25 MG TABLET    Take 1 tablet by mouth every 6 hours as needed for Anxiety    LISINOPRIL (PRINIVIL;ZESTRIL) 10 MG TABLET    Take 1 tablet by mouth daily    MAGNESIUM OXIDE (MAG-OX) 400 (240 MG) MG TABLET    Take 1 tablet by mouth daily for 14 days    NITROGLYCERIN (NITROSTAT) 0.4 MG SL TABLET    Place 0.4 mg under the tongue every 5 minutes as needed    SERTRALINE (ZOLOFT) 100 MG TABLET    Take 100 mg by mouth daily STATES THAT THIS MEDICATION IS NOT RELIEVING HIS ANXIETY    SPIRONOLACTONE (ALDACTONE) 50 MG TABLET    Take 50 mg by mouth 2 times daily    VITAMIN D (D3-1000) 1000 UNITS TABS TABLET    Take 1 tablet by mouth daily    ZOLPIDEM (AMBIEN) 10 MG TABLET    Take 10 mg by mouth nightly as needed. OUT OF THIS MED 11/10/12      ALLERGIES     Naproxen  FAMILY HISTORY       Family History   Problem Relation Age of Onset    High Blood Pressure Mother     Cancer Father         lung cancer    Diabetes Brother     Liver Disease Sister     Diabetes Brother      SOCIAL HISTORY       Social History     Socioeconomic History    Marital status: Single     Spouse name: Not on file    Number of children: 3    Years of education: 15    Highest education level: Not on file   Occupational History    Occupation: retired   Social Needs    Financial resource strain: Not on file    Food insecurity     Worry: Not on file     Inability: Not on file   [employer] needs     Medical: Not on file     Non-medical: Not on file   Tobacco Use    Smoking status: Former Smoker     Packs/day: 2.50     Years: 40.00     Pack years: 100.00     Types: Cigarettes    Smokeless tobacco: Never Used   Substance and Sexual Activity    Alcohol use: Yes     Frequency: Never     Comment: occ.      Drug use: Not Currently     Types: Cocaine    Sexual activity: Yes     Partners: Female   Lifestyle    Physical distension. Tenderness: There is no abdominal tenderness. There is no left CVA tenderness or guarding. Musculoskeletal: Normal range of motion. Right lower leg: No edema. Left lower leg: No edema. Skin:     Findings: Rash (hx of eczema, noted on legs and elbows, chronic) present. Neurological:      General: No focal deficit present. Mental Status: He is alert and oriented to person, place, and time. Cranial Nerves: Cranial nerves are intact. Sensory: Sensation is intact. Motor: Motor function is intact. Coordination: Coordination is intact. Psychiatric:         Attention and Perception: Attention normal.         Mood and Affect: Mood is anxious (mild). Speech: Speech normal.         Behavior: Behavior normal. Behavior is cooperative. DIAGNOSTIC RESULTS   EKG: All EKG's are interpreted by the Emergency Department Physician who either signs or Co-signs this chart in the absence of a cardiologist.  Indication: dizzy  Interpretation: Rate 95, rhythm sinus, axis normal.  HI/QRS/QTc within normal limits. No T/ST changes consistent with acute ischemia. Comparison to prior EKG from March 3, 2021 shows no acute ischemic changes are noted. RADIOLOGY:   Interpretation per Radiologist below, if available at the time of this note:   [facility]   Final Result   1. No acute intracranial abnormality. 2. Unremarkable CTA of the head and neck. CT Head WO Contrast   Final Result   1. No acute intracranial abnormality. 2. Unremarkable CTA of the head and neck. XR CHEST PORTABLE   Final Result   No evidence of acute cardiopulmonary disease.              LABS:  Labs Reviewed   CBC WITH AUTO DIFFERENTIAL - Abnormal; Notable for the following components:       Result Value    Hemoglobin 12.6 (*)     Hematocrit 38.0 (*)     All other components within normal limits    Narrative:     Performed at:  [facility]  [address] [address]   Phone [phone]   COMPREHENSIVE METABOLIC PANEL W/ REFLEX TO MG FOR LOW K - Abnormal; Notable for the following components:    Glucose 269 (*)     Alkaline Phosphatase 140 (*)     ALT 42 (*)     AST 42 (*)     All other components within normal limits    Narrative:     Performed at:  [facility]  [address] [address]   Phone [phone]   URINE RT REFLEX TO CULTURE - Abnormal; Notable for the following components:    Glucose, Ur 500 (*)     Protein, UA TRACE (*)     All other components within normal limits    Narrative:     Performed at:  [facility]  [address] [address]   Phone [phone]   TROPONIN    Narrative:     Performed at:  [facility]  [address] [address]   Phone [phone]   BLOOD GAS, VENOUS    Narrative:     Performed at:  [facility]  [address] [address]   Phone [phone]   BRAIN NATRIURETIC PEPTIDE    Narrative:     Performed at:  [facility]  [address] [address]   Phone [phone]   MICROSCOPIC URINALYSIS    Narrative:     Performed at:  [facility]  [address] [address]   Phone [phone]       All other labs were within normal range or not returned as of this dictation.     EMERGENCY DEPARTMENT COURSE and DIFFERENTIAL DIAGNOSIS/MDM:   Patient was given the following medications:  Orders Placed This Encounter   Medications    meclizine (ANTIVERT) tablet 25 mg    iopamidol (ISOVUE-370) 76 % injection 75 mL       CONSULTS:  None    INITIAL VITALS: BP: (!) 224/111, Temp: 97.5 °F (36.4 °C), Pulse: 97, Resp: 16, SpO2: 97 %   RECENT VITALS:  BP: (!) 185/92,Temp: 97.5 °F (36.4 °C), Pulse: 92, Resp: 23, SpO2: 96 Juan Francisco Artis is a 77 y.o. male who presents to the emergency department secondary to multiple concerns. On arrival he is awake, alert, oriented. Vitals are notable for blood pressure 224/111 otherwise hemodynamically stable. On exam he has no neuro deficits, NIH score is 0, no truncal ataxia, test of skew is negative. Body habitus limits lung sounds though no focal findings of rhonchi or rales are noted. Abdomen is protuberant otherwise benign. A peripheral IV was placed, labs were ordered along with chest x-ray, EKG, CT head angio head and neck. EKG without acute ischemic change compared to prior. CXR unremarkable. Labs unremarkable including negative troponin and VBG within normal limits. Urinalysis without signs of overt infection. CT head and CTA head/neck also unremarkable. On reassessment he reported feeling better. We discussed his labs and imaging were unremarkable. At that time his blood pressure had improved though remained elevated was now 185/92. Discussed how important it would be for him to follow-up with his primary care who he states is the one that prescribes his blood pressure medications. Discussed how anxiety will get worse over time if it is not dealt with and encouraged him to follow-up with a therapist or psychiatrist.  He does state he has been encouraged to do this before and has not done it yet but thinks now is the time and he should buckle down and look into it. He was given information for [facility] [place] [facility] which does do walk-in appointments, also told him he can follow-up with his insurance company to see who is available for him to see in that specialty. We also discussed Covid vaccine and encouraged him to continue think about getting it, he stated today he thinks now he will get it (previously he did not want to because he said other people had said he should not).       Prior to discharge we again discussed return precautions and importance of following up with primary care and [facility] [place] [facility] which he expressed understanding of. FINAL IMPRESSION      1. Dizziness    2. Diarrhea, unspecified type    3. Anxiety state    4.  Elevated blood pressure reading in office with diagnosis of hypertension        DISPOSITION/PLAN   DISPOSITION        PATIENT REFERRED TO:  MD [id number] [facility] [phone]    Schedule an appointment as soon as possible for a visit   For follow up appointment    [address] [facility])  [address] [address]  [phone]  Schedule an appointment as soon as possible for a visit   For follow up appointment, they do walk in appointments      DISCHARGE MEDICATIONS:  New Prescriptions    No medications on file            (Please note that portions of this note were completed with a voice recognition program. Efforts were made to edit the dictations but occasionally words are mis-transcribed.)    Bettye Lewis MD (electronically signed)  Attending Emergency Physician      Bettye Lewis MD  03/28/21 [id number]

## 2021-03-30 LAB — REPORT: NORMAL

## 2021-04-27 ENCOUNTER — HOSPITAL ENCOUNTER (EMERGENCY)
Age: 66
Discharge: HOME | End: 2021-04-27
Attending: EMERGENCY MEDICINE
Payer: MEDICARE

## 2021-04-27 ENCOUNTER — APPOINTMENT (OUTPATIENT)
Dept: GENERAL RADIOLOGY | Age: 66
End: 2021-04-27
Payer: MEDICARE

## 2021-04-27 VITALS
BODY MASS INDEX: 32.75 KG/M2 | HEART RATE: 96 BPM | DIASTOLIC BLOOD PRESSURE: 99 MMHG | WEIGHT: 248.24 LBS | OXYGEN SATURATION: 95 % | SYSTOLIC BLOOD PRESSURE: 175 MMHG | TEMPERATURE: 99.2 F | RESPIRATION RATE: 20 BRPM

## 2021-04-27 DIAGNOSIS — R09.81 NASAL CONGESTION: ICD-10-CM

## 2021-04-27 DIAGNOSIS — J06.9 VIRAL URI WITH COUGH: Primary | ICD-10-CM

## 2021-04-27 LAB — SARS-COV-2, PCR: NOT DETECTED

## 2021-04-27 PROCEDURE — 71045 X-RAY EXAM CHEST 1 VIEW: CPT

## 2021-04-27 PROCEDURE — U0005 INFEC AGEN DETEC AMPLI PROBE: HCPCS

## 2021-04-27 PROCEDURE — U0003 INFECTIOUS AGENT DETECTION BY NUCLEIC ACID (DNA OR RNA); SEVERE ACUTE RESPIRATORY SYNDROME CORONAVIRUS 2 (SARS-COV-2) (CORONAVIRUS DISEASE [COVID-19]), AMPLIFIED PROBE TECHNIQUE, MAKING USE OF HIGH THROUGHPUT TECHNOLOGIES AS DESCRIBED BY CMS-2020-01-R: HCPCS

## 2021-04-27 PROCEDURE — 99283 EMERGENCY DEPT VISIT LOW MDM: CPT

## 2021-04-27 RX ORDER — GUAIFENESIN/DEXTROMETHORPHAN 100-10MG/5
10 SYRUP ORAL 4 TIMES DAILY PRN
Qty: 120 ML | Refills: 0 | Status: SHIPPED | OUTPATIENT
Start: 2021-04-27 | End: 2021-04-27 | Stop reason: SDUPTHER

## 2021-04-27 RX ORDER — GUAIFENESIN/DEXTROMETHORPHAN 100-10MG/5
10 SYRUP ORAL 4 TIMES DAILY PRN
Qty: 120 ML | Refills: 0 | Status: SHIPPED | OUTPATIENT
Start: 2021-04-27 | End: 2021-05-07

## 2021-04-27 RX ORDER — FLUTICASONE PROPIONATE 50 MCG
2 SPRAY, SUSPENSION (ML) NASAL DAILY
Qty: 1 BOTTLE | Refills: 0 | Status: SHIPPED | OUTPATIENT
Start: 2021-04-27 | End: 2021-04-27 | Stop reason: SDUPTHER

## 2021-04-27 RX ORDER — FLUTICASONE PROPIONATE 50 MCG
2 SPRAY, SUSPENSION (ML) NASAL DAILY
Qty: 1 BOTTLE | Refills: 0 | Status: SHIPPED | OUTPATIENT
Start: 2021-04-27 | End: 2021-05-04

## 2021-04-27 NOTE — ED PROVIDER NOTES
CHIEF COMPLAINT  Nasal Congestion (runny nose and clear productive cough x 1 1/2 days. Denies any fevers. States \"I can't sleep, I feel weak\")      HISTORY OF PRESENT ILLNESS  Charbel Espinoza is a 77 y.o. male who  has a past medical history of CAD (coronary artery disease), Chest pain, Cocaine abuse ([address] COPD (chronic obstructive pulmonary disease) ([address] Diabetes mellitus ([address] Hyperlipidemia, Hypertension, Kidney stone, MRSA colonization, and Sleep apnea. presents to the ED complaining of nasal congestion and cough has been present since yesterday. Patient states cough is productive of clear sputum. States that he has been having difficulty sleeping because when he lies flat his nasal congestion gets worse and it makes it slightly difficult for him to breathe. Denies any chest pain or chest tightness. No documented fevers. States he does have general fatigue. Denies any nausea or vomiting. No tobacco use. Normal urinary and bowel habits. States he has been trying Tessalon tablets at home for his cough with minimal relief. Denies any other over-the-counter medication use. No recent travel. No known sick contacts. States he supposed be tested for Covid on Friday. No other complaints, modifying factors or associated symptoms. Nursing notes reviewed. Past Medical History:   Diagnosis Date    CAD (coronary artery disease)     Chest pain 10/15/2017    [facility]aine abuse ([facility]     COPD (chronic obstructive pulmonary disease) ([facility]     Diabetes mellitus ([facility]     not on meds    Hyperlipidemia     Hypertension     Kidney stone     MRSA colonization 4/16/19; 03/24/2019    Sleep apnea     He said he doesn't use a CPAP machine.      Past Surgical History:   Procedure Laterality Date    ABDOMEN SURGERY      CARDIAC SURGERY      3 STENTS PLACED     CHOLECYSTECTOMY      COLONOSCOPY      CORONARY ANGIOPLASTY WITH STENT PLACEMENT      3 stents     FRACTURE SURGERY      RIGHT ANKLE    LITHOTRIPSY      SHOULDER ARTHROSCOPY      VASCULAR SURGERY       Family History   Problem Relation Age of Onset    High Blood Pressure Mother     Cancer Father         lung cancer    Diabetes Brother     Liver Disease Sister     Diabetes Brother      Social History     Socioeconomic History    Marital status: Single     Spouse name: Not on file    Number of children: 1    Years of education: 15    Highest education level: Not on file   Occupational History    Occupation: retired   Social Needs    Financial resource strain: Not on file    Food insecurity     Worry: Not on file     Inability: Not on file   [employer] needs     Medical: Not on file     Non-medical: Not on file   Tobacco Use    Smoking status: Former Smoker     Packs/day: 2.50     Years: 40.00     Pack years: 100.00     Types: Cigarettes    Smokeless tobacco: Never Used   Substance and Sexual Activity    Alcohol use: Yes     Frequency: Never     Comment: occ.  Drug use: Not Currently     Types: Cocaine    Sexual activity: Yes     Partners: Female   Lifestyle    Physical activity     Days per week: Not on file     Minutes per session: Not on file    Stress: Not on file   Relationships    Social connections     Talks on phone: Not on file     Gets together: Not on file     Attends [employer] service: Not on file     Active member of club or organization: Not on file     Attends meetings of clubs or organizations: Not on file     Relationship status: Not on file    Intimate partner violence     Fear of current or ex partner: Not on file     Emotionally abused: Not on file     Physically abused: Not on file     Forced sexual activity: Not on file   Other Topics Concern    Not on file   Social History Narrative    Not on file     No current facility-administered medications for this encounter.       Current Outpatient Medications   Medication Sig Dispense Refill    guaiFENesin-dextromethorphan (ROBITUSSIN DM) 100-10 MG/5ML syrup Take 10 mLs by mouth 4 times daily as needed for Cough 120 mL 0    hydrALAZINE (APRESOLINE) 100 MG tablet Take 100 mg by mouth every 8 hours      doxazosin (CARDURA) 2 MG tablet Take 2 mg by mouth every 12 hours      hydrOXYzine (ATARAX) 25 MG tablet Take 1 tablet by mouth every 6 hours as needed for Anxiety 10 tablet 0    benzonatate (TESSALON PERLES) 100 MG capsule Take 1 capsule by mouth 3 times daily as needed for Cough 20 capsule 0    hydroCHLOROthiazide (HYDRODIURIL) 25 MG tablet Take 1 tablet by mouth daily 30 tablet 3    lisinopril (PRINIVIL;ZESTRIL) 10 MG tablet Take 1 tablet by mouth daily 30 tablet 3    sertraline (ZOLOFT) 100 MG tablet Take 100 mg by mouth daily STATES THAT THIS MEDICATION IS NOT RELIEVING HIS ANXIETY      vitamin D (D3-1000) 1000 UNITS TABS tablet Take 1 tablet by mouth daily 90 tablet 3    aspirin 81 MG tablet Take 81 mg by mouth daily      nitroGLYCERIN (NITROSTAT) 0.4 MG SL tablet Place 0.4 mg under the tongue every 5 minutes as needed       Allergies   Allergen Reactions    Naproxen Hives         REVIEW OF SYSTEMS  10 systems reviewed, pertinent positives per HPI otherwise noted to be negative    PHYSICAL EXAM  BP (!) 175/99   Pulse 96   Temp 99.2 °F (37.3 °C) (Oral)   Resp 20   Wt 248 lb 3.8 oz (112.6 kg)   SpO2 95%   BMI 32.75 kg/m²      CONSTITUTIONAL: AOx4, cooperative with exam, afebrile   HEAD: normocephalic, atraumatic   EYES: PERRL, EOMI, anicteric sclera   ENT: Moist mucous membranes, uvula midline, no swelling of the posterior pharynx   NECK: Supple, symmetric, trachea midline, no meningismus   LUNGS: Bilateral breath sounds, CTAB, no rales/ronchi/wheezes   CARDIOVASCULAR: RRR, normal S1/S2, no m/r/g, 2+ pulses throughout   ABDOMEN: Soft, non-tender, non-distended, +BS   NEUROLOGIC:  MAEx4, GCS 15   MUSCULOSKELETAL: No clubbing, cyanosis or edema   SKIN: No rash, pallor or wounds on exposed surfaces         RADIOLOGY  X-RAYS:  I have reviewed

## 2021-05-21 ENCOUNTER — HOSPITAL ENCOUNTER (EMERGENCY)
Age: 66
Discharge: HOME | End: 2021-05-21
Attending: EMERGENCY MEDICINE
Payer: MEDICARE

## 2021-05-21 VITALS
RESPIRATION RATE: 16 BRPM | SYSTOLIC BLOOD PRESSURE: 197 MMHG | OXYGEN SATURATION: 94 % | BODY MASS INDEX: 34.91 KG/M2 | WEIGHT: 249.34 LBS | HEIGHT: 71 IN | TEMPERATURE: 97.6 F | DIASTOLIC BLOOD PRESSURE: 113 MMHG | HEART RATE: 92 BPM

## 2021-05-21 DIAGNOSIS — F41.1 ANXIETY STATE: Primary | ICD-10-CM

## 2021-05-21 PROCEDURE — 6370000000 HC RX 637 (ALT 250 FOR IP): Performed by: EMERGENCY MEDICINE

## 2021-05-21 PROCEDURE — 99284 EMERGENCY DEPT VISIT MOD MDM: CPT

## 2021-05-21 RX ORDER — HYDROXYZINE HYDROCHLORIDE 25 MG/1
25 TABLET, FILM COATED ORAL EVERY 6 HOURS PRN
Qty: 16 TABLET | Refills: 0 | Status: SHIPPED | OUTPATIENT
Start: 2021-05-21

## 2021-05-21 RX ORDER — HYDROXYZINE PAMOATE 25 MG/1
50 CAPSULE ORAL ONCE
Status: COMPLETED | OUTPATIENT
Start: 2021-05-21 | End: 2021-05-21

## 2021-05-21 RX ADMIN — HYDROXYZINE PAMOATE 50 MG: 25 CAPSULE ORAL at 04:15

## 2021-05-21 ASSESSMENT — PAIN SCALES - GENERAL: PAINLEVEL_OUTOF10: 0

## 2021-05-21 NOTE — ED PROVIDER NOTES
 SHOULDER ARTHROSCOPY      VASCULAR SURGERY       Family History   Problem Relation Age of Onset    High Blood Pressure Mother     Cancer Father         lung cancer    Diabetes Brother     Liver Disease Sister     Diabetes Brother      Social History     Socioeconomic History    Marital status: Single     Spouse name: Not on file    Number of children: 3    Years of education: 15    Highest education level: Not on file   Occupational History    Occupation: retired   Tobacco Use    Smoking status: Former Smoker     Packs/day: 2.50     Years: 40.00     Pack years: 100.00     Types: Cigarettes    Smokeless tobacco: Never Used   Vaping Use    Vaping Use: Never used   Substance and Sexual Activity    Alcohol use: Yes     Comment: occ.  Drug use: Not Currently     Types: Cocaine    Sexual activity: Yes     Partners: Female   Other Topics Concern    Not on file   Social History Narrative    Not on file     Social Determinants of Health     Financial Resource Strain:     Difficulty of Paying Living Expenses:    Food Insecurity:     Worried About Running Out of Food in the Last Year:     [address] in the Last Year:    Transportation Needs:     Lack of Transportation (Medical):  Lack of Transportation (Non-Medical):    Physical Activity:     Days of Exercise per Week:     Minutes of Exercise per Session:    Stress:     Feeling of Stress :    Social Connections:     Frequency of Communication with Friends and Family:     Frequency of Social Gatherings with Friends and Family:     Attends Mandaeism Services:     Active Member of Clubs or Organizations:     Attends Club or Organization Meetings:     Marital Status:    Intimate Partner Violence:     Fear of Current or Ex-Partner:     Emotionally Abused:     Physically Abused:     Sexually Abused:      No current facility-administered medications for this encounter.      Current Outpatient Medications   Medication Sig Dispense Refill    hydrOXYzine (ATARAX) 25 MG tablet Take 1 tablet by mouth every 6 hours as needed for Anxiety 16 tablet 0    doxazosin (CARDURA) 2 MG tablet Take 2 mg by mouth every 12 hours      hydroCHLOROthiazide (HYDRODIURIL) 25 MG tablet Take 1 tablet by mouth daily 30 tablet 3    lisinopril (PRINIVIL;ZESTRIL) 10 MG tablet Take 1 tablet by mouth daily 30 tablet 3    sertraline (ZOLOFT) 100 MG tablet Take 100 mg by mouth daily STATES THAT THIS MEDICATION IS NOT RELIEVING HIS ANXIETY      vitamin D (D3-1000) 1000 UNITS TABS tablet Take 1 tablet by mouth daily 90 tablet 3    aspirin 81 MG tablet Take 81 mg by mouth daily      fluticasone (FLONASE) 50 MCG/ACT nasal spray 2 sprays by Each Nostril route daily for 7 days 1 Bottle 0    hydrALAZINE (APRESOLINE) 100 MG tablet Take 100 mg by mouth every 8 hours      nitroGLYCERIN (NITROSTAT) 0.4 MG SL tablet Place 0.4 mg under the tongue every 5 minutes as needed      benzonatate (TESSALON PERLES) 100 MG capsule Take 1 capsule by mouth 3 times daily as needed for Cough 20 capsule 0     Allergies   Allergen Reactions    Naproxen Hives         REVIEW OF SYSTEMS  10 systems reviewed, pertinent positives per HPI otherwise noted to be negative    PHYSICAL EXAM  BP (!) 197/113   Pulse 92   Temp 97.6 °F (36.4 °C) (Oral)   Resp 16   Ht 5' 11\" (1.803 m)   Wt 249 lb 5.4 oz (113.1 kg)   SpO2 94%   BMI 34.78 kg/m²      CONSTITUTIONAL: AOx4, cooperative with exam, afebrile   HEAD: normocephalic, atraumatic   EYES: PERRL, EOMI, anicteric sclera   ENT: Moist mucous membranes, uvula midline   LUNGS: Bilateral breath sounds, CTAB, no rales/ronchi/wheezes   CARDIOVASCULAR: RRR, normal S1/S2, no m/r/g, 2+ pulses throughout   ABDOMEN: Soft, non-tender, non-distended, +BS   NEUROLOGIC:  MAEx4, 5/5 strength throughout; fine touch sensation intact throughout; GCS 15, normal gait   MUSCULOSKELETAL: No clubbing, cyanosis or edema   SKIN: No rash, pallor or wounds on exposed surfaces PSYCH fidgety, pressured speech, normal thought content         RADIOLOGY  X-RAYS:  I have reviewed radiologic plain film image(s). ALL OTHER NON-PLAIN FILM IMAGES SUCH AS CT, ULTRASOUND AND MRI HAVE BEEN READ BY THE RADIOLOGIST. No orders to display          EKG INTERPRETATION  None    PROCEDURES    ED COURSE/MDM  Anxiety state, medication noncompliance    Patient seen and evaluated. History and physical as above. Nontoxic, afebrile. Patient presents with complaints of anxiety. Patient is well-known to the emergency room for having history of anxiety presenting requesting anxiety medication. Patient supposed to be taking Zoloft but has been noncompliant with his medication and not discussed with his primary care physician. Also has hypertension on arrival and states he becomes hypertensive when he has anxiety. States he has been compliant with his oral antihypertensives. Patient's blood pressure slightly better after staying in the ED for short period of time. Patient feeling better after having hydroxyzine. Will discharge with hydroxyzine. Patient instructed to take his home antihypertensives this morning when he gets back to his house. Patient states understanding. Return instructions provided. All questions answered prior to discharge. I estimate there is LOW risk for SUICIDAL BEHAVIOR, HOMICIDAL BEHAVIOR, PSYCHOSIS, DANGEROUS OR VIOLENT BEHAVIOR, DISORIENTATION, OR MENTAL HEALTH CONDITION REQUIRING HOSPITALIZATION, thus I consider the discharge disposition reasonable. Patient was given scripts for the following medications. I counseled patient how to take these medications. Current Discharge Medication List              CLINICAL IMPRESSION  1. Anxiety state        Blood pressure (!) 197/113, pulse 92, temperature 97.6 °F (36.4 °C), temperature source Oral, resp. rate 16, height 5' 11\" (1.803 m), weight 249 lb 5.4 oz (113.1 kg), SpO2 94 %.     DISPOSITION  Patient was discharged to home in good condition. MD Ben Rojas Dr Read  [facility] [phone]    Call today  For a follow up appointment. Disclaimer: All medical record entries made by Proenza Schouer dictation.       (Please note that this note was completed with a voice recognition program. Every attempt was made to edit the dictations, but inevitably there remain words that are mis-transcribed.)            Yaw Fajardo MD  05/21/21 2993

## 2023-03-22 NOTE — PLAN OF CARE
Problem: Pain:  Goal: Pain level will decrease  Description: Pain level will decrease  Outcome: Ongoing  Goal: Control of acute pain  Description: Control of acute pain  Outcome: Ongoing  Goal: Control of chronic pain  Description: Control of chronic pain  Outcome: Ongoing Previously Declined (within the last year)

## 2024-10-10 NOTE — PLAN OF CARE
Problem: Cardiac:  Goal: Ability to maintain vital signs within normal range will improve  Description  Ability to maintain vital signs within normal range will improve  4/22/2019 0018 by Kaitlyn Walker RN  Outcome: Ongoing  A: Cardiac assessment. Titrate Nicardipine drip to  mmHg. Notify MD for unmanaged BP. Problem: Falls - Risk of:  Goal: Will remain free from falls  Description  Will remain free from falls  4/22/2019 0018 by Kaitlyn Walker RN  Outcome: Ongoing  A: Fall prevention education. Non-skid socks on. Call light, urinal, bedside table, and personal items are in reach. Bed locked in the lowest position. Room uncluttered. Intentional rounding. Problem: Pain:  Goal: Control of acute pain  Description  Control of acute pain  4/22/2019 0018 by Kaitlyn Walker RN  Outcome: Ongoing  A: Pain scale. Non-pharmacological pain interventions utilization. Pain med as needed. Pain reassessment after an hour of administration. Informing MD of unmanaged pain.     Electronically signed by Kaitlyn Walker RN on 4/22/2019 at 12:25 AM
Problem: Falls - Risk of:  Goal: Will remain free from falls  Description  Will remain free from falls  4/20/2019 1210 by David Yeager RN  Outcome: Ongoing  Note:   Fall precautions in place. Bed in lowest position, wheels locked, call light in reach, non slip socks on. Pt educated on using call light for assistance. Pt agreeable. Problem: Fluid Volume:  Goal: Ability to achieve a balanced intake and output will improve  Description  Ability to achieve a balanced intake and output will improve  4/20/2019 1210 by David Yeager RN  Outcome: Ongoing  Note:   Pt educated on intake and output. Problem: Pain:  Goal: Control of acute pain  Description  Control of acute pain  Outcome: Ongoing  Note:   Pt educated on using pain scale. Pt given PRN pain medication per Dr orders see Mayra Lopez. Pt agreeable to pain management. Problem: Cardiac:  Goal: Ability to maintain vital signs within normal range will improve  Description  Ability to maintain vital signs within normal range will improve  Outcome: Ongoing  Note:   Pt educated BP parameters and medications and prn.
Problem: Falls - Risk of:  Goal: Will remain free from falls  Description  Will remain free from falls  4/20/2019 1210 by Matthew Adam RN  Outcome: Ongoing  Note:   Fall precautions in place. Bed in lowest position, wheels locked, call light in reach, non slip socks on. Pt educated on using call light for assistance. Pt agreeable. Problem: Pain:  Goal: Pain level will decrease  Description  Pain level will decrease  Outcome: Ongoing     Problem: Pain:  Goal: Control of acute pain  Description  Control of acute pain  4/21/2019 0206 by Lilliana Isaac RN  Outcome: Ongoing  4/20/2019 1210 by Matthew Adam RN  Outcome: Ongoing  Note:   Pt educated on using pain scale. Pt given PRN pain medication per Dr orders see Lex Cid. Pt agreeable to pain management.
Problem: Falls - Risk of:  Goal: Will remain free from falls  Description  Will remain free from falls  4/23/2019 2250 by Santa Davey RN  Outcome: Met This Shift  4/23/2019 1931 by Citlalli Luna RN  Note:   Pt remains free of falls. Fall risk protocol in place. Bed locked in lowest position. Call light in reach. Pt instructed to call for assistance, verbalizes understanding. Will continue to monitor. Problem: Pain:  Description  Pain management should include both nonpharmacologic and pharmacologic interventions. Goal: Control of acute pain  Description  Control of acute pain  4/23/2019 2250 by Santa Davey RN  Outcome: Met This Shift  4/23/2019 1931 by Citlalli Luna RN  Note:   Pt alert and oriented. Pt able to communicate present pain and use the pain scale appropriately. Nonpharmacological pain reducers and pain medication offered as needed. Will cont to monitor.
Problem: Falls - Risk of:  Goal: Will remain free from falls  Description  Will remain free from falls  Outcome: Met This Shift     Problem: Pain:  Goal: Pain level will decrease  Description  Pain level will decrease  Outcome: Met This Shift     Problem: Cardiac:  Goal: Ability to maintain vital signs within normal range will improve  Description  Ability to maintain vital signs within normal range will improve  Outcome: Ongoing, BP WNL when pt asleep, to continue monitoring.
Problem: Falls - Risk of:  Goal: Will remain free from falls  Description  Will remain free from falls  Outcome: Ongoing     Problem: Fluid Volume:  Goal: Ability to achieve a balanced intake and output will improve  Description  Ability to achieve a balanced intake and output will improve  Outcome: Ongoing     Problem: Pain:  Goal: Pain level will decrease  Description  Pain level will decrease  Outcome: Ongoing
Problem: Falls - Risk of:  Goal: Will remain free from falls  Description  Will remain free from falls  Outcome: Ongoing  Note:   Fall precautions in place. Bed in lowest position, wheels locked, call light in reach, non slip socks on, alarm refused. Pt educated on using call light for assistance when needing to get up. Pt agreeable. Problem: Fluid Volume:  Goal: Ability to achieve a balanced intake and output will improve  Description  Ability to achieve a balanced intake and output will improve  Outcome: Ongoing  Note:   Pt educated on intake and output. Monitor     Problem: Pain:  Goal: Control of acute pain  Description  Control of acute pain  Outcome: Ongoing  Note:   Pt educated on using pain scale. Pt given PRN pain medication per Dr orders see Romel Jorgensen. Pt agreeable to pain management.
Problem: Falls - Risk of:  Goal: Will remain free from falls  Note:   Pt remains free of falls. Fall risk protocol in place. Bed locked in lowest position. Call light in reach. Pt instructed to call for assistance, verbalizes understanding. Will continue to monitor. Problem: Pain:  Description  Pain management should include both nonpharmacologic and pharmacologic interventions. Goal: Control of acute pain  Note:   Pt alert and oriented. Pt able to communicate present pain and use the pain scale appropriately. Nonpharmacological pain reducers and pain medication offered as needed. Will cont to monitor. Problem: Cardiac:  Goal: Ability to maintain vital signs within normal range will improve  Note:   Patient weaned off cardene, will continue to monitor.
Problem: Falls - Risk of:  Goal: Will remain free from falls  Note:   Pt remains free of falls. Fall risk protocol in place. Bed locked in lowest position. Call light in reach. Pt instructed to call for assistance, verbalizes understanding. Will continue to monitor. Problem: Pain:  Description  Pain management should include both nonpharmacologic and pharmacologic interventions. Goal: Control of acute pain  Note:   Pt alert and oriented. Pt able to communicate present pain and use the pain scale appropriately. Nonpharmacological pain reducers and pain medication offered as needed. Will cont to monitor. Problem: Cardiac:  Goal: Ability to maintain vital signs within normal range will improve  Note:   Remains on cardene infusion. Continuing to assess and titrate as needed.
Pt did not fall during my care from [phone]
Pt educated on daily plan of care
Pt free from falls this shift. Fall precautions in place at all times. Call light always within reach. Pt able and agreeable to contact for safety appropriately. Pain/discomfort being managed with PRN analgesics per MD orders. Pt able to express presence and absence of pain and rate pain appropriately using numerical scale.
What Type Of Note Output Would You Prefer (Optional)?: Standard Output
Hpi Title: Evaluation of Skin Lesions
How Severe Are Your Spot(S)?: mild
Have Your Spot(S) Been Treated In The Past?: has not been treated
Family Member: Mother Sister
Year Removed: 1900
Additional History: Rough brown spots left eye